# Patient Record
Sex: FEMALE | Race: WHITE | Employment: UNEMPLOYED | URBAN - METROPOLITAN AREA
[De-identification: names, ages, dates, MRNs, and addresses within clinical notes are randomized per-mention and may not be internally consistent; named-entity substitution may affect disease eponyms.]

---

## 2017-10-24 ENCOUNTER — HOSPITAL ENCOUNTER (OUTPATIENT)
Dept: WOMENS IMAGING | Age: 62
Discharge: HOME | End: 2017-10-24
Payer: COMMERCIAL

## 2017-10-24 DIAGNOSIS — Z12.31 ENCOUNTER FOR SCREENING MAMMOGRAM FOR BREAST CANCER: ICD-10-CM

## 2017-10-24 PROCEDURE — 77063 BREAST TOMOSYNTHESIS BI: CPT

## 2017-10-26 ENCOUNTER — HOSPITAL ENCOUNTER (OUTPATIENT)
Age: 62
Discharge: HOME | End: 2017-10-26
Payer: COMMERCIAL

## 2017-10-26 DIAGNOSIS — E78.5 HYPERLIPIDEMIA WITH TARGET LDL LESS THAN 70: Chronic | ICD-10-CM

## 2017-10-26 DIAGNOSIS — I10 HYPERTENSION, BENIGN: Chronic | ICD-10-CM

## 2017-10-26 DIAGNOSIS — E11.9 CONTROLLED TYPE 2 DIABETES MELLITUS WITHOUT COMPLICATION, WITHOUT LONG-TERM CURRENT USE OF INSULIN (HCC): Chronic | ICD-10-CM

## 2017-10-26 LAB
ALBUMIN SERPL-MCNC: 3.9 G/DL (ref 3.5–5.2)
ALBUMIN/GLOBULIN RATIO: ABNORMAL (ref 1–2.5)
ALP BLD-CCNC: 75 U/L (ref 35–104)
ALT SERPL-CCNC: 34 U/L (ref 5–33)
ANION GAP SERPL CALCULATED.3IONS-SCNC: 16 MMOL/L (ref 9–17)
AST SERPL-CCNC: 55 U/L
BILIRUB SERPL-MCNC: 0.27 MG/DL (ref 0.3–1.2)
BUN BLDV-MCNC: 19 MG/DL (ref 8–23)
BUN/CREAT BLD: ABNORMAL (ref 9–20)
CALCIUM SERPL-MCNC: 9.4 MG/DL (ref 8.6–10.4)
CHLORIDE BLD-SCNC: 100 MMOL/L (ref 98–107)
CHOLESTEROL/HDL RATIO: 2.6
CHOLESTEROL: 126 MG/DL
CO2: 28 MMOL/L (ref 20–31)
CREAT SERPL-MCNC: 0.9 MG/DL (ref 0.5–0.9)
CREATININE URINE: 121.4 MG/DL (ref 28–217)
GFR AFRICAN AMERICAN: >60 ML/MIN
GFR NON-AFRICAN AMERICAN: >60 ML/MIN
GFR SERPL CREATININE-BSD FRML MDRD: ABNORMAL ML/MIN/{1.73_M2}
GFR SERPL CREATININE-BSD FRML MDRD: ABNORMAL ML/MIN/{1.73_M2}
GLUCOSE BLD-MCNC: 117 MG/DL (ref 70–99)
HDLC SERPL-MCNC: 48 MG/DL
LDL CHOLESTEROL: 51 MG/DL (ref 0–130)
MICROALBUMIN/CREAT 24H UR: <12 MG/L
MICROALBUMIN/CREAT UR-RTO: 10 MCG/MG CREAT
POTASSIUM SERPL-SCNC: 5.3 MMOL/L (ref 3.7–5.3)
SODIUM BLD-SCNC: 144 MMOL/L (ref 135–144)
TOTAL PROTEIN: 7.9 G/DL (ref 6.4–8.3)
TRIGL SERPL-MCNC: 133 MG/DL
VLDLC SERPL CALC-MCNC: NORMAL MG/DL (ref 1–30)

## 2017-10-26 PROCEDURE — 82043 UR ALBUMIN QUANTITATIVE: CPT

## 2017-10-26 PROCEDURE — 36415 COLL VENOUS BLD VENIPUNCTURE: CPT

## 2017-10-26 PROCEDURE — 80061 LIPID PANEL: CPT

## 2017-10-26 PROCEDURE — 82570 ASSAY OF URINE CREATININE: CPT

## 2017-10-26 PROCEDURE — 80053 COMPREHEN METABOLIC PANEL: CPT

## 2018-04-25 ENCOUNTER — HOSPITAL ENCOUNTER (OUTPATIENT)
Dept: GENERAL RADIOLOGY | Age: 63
Discharge: HOME | End: 2018-04-27
Payer: COMMERCIAL

## 2018-04-25 ENCOUNTER — HOSPITAL ENCOUNTER (OUTPATIENT)
Age: 63
Discharge: HOME | End: 2018-04-27
Payer: COMMERCIAL

## 2018-04-25 ENCOUNTER — HOSPITAL ENCOUNTER (OUTPATIENT)
Age: 63
Discharge: HOME | End: 2018-04-25
Payer: COMMERCIAL

## 2018-04-25 DIAGNOSIS — G83.4 CAUDA EQUINA SYNDROME WITH NEUROGENIC BLADDER (HCC): Primary | ICD-10-CM

## 2018-04-25 DIAGNOSIS — G83.4 CAUDA EQUINA SYNDROME (HCC): ICD-10-CM

## 2018-04-25 LAB
-: ABNORMAL
ABSOLUTE EOS #: 0.4 K/UL (ref 0–0.4)
ABSOLUTE IMMATURE GRANULOCYTE: ABNORMAL K/UL (ref 0–0.3)
ABSOLUTE LYMPH #: 3.1 K/UL (ref 1–4.8)
ABSOLUTE MONO #: 0.8 K/UL (ref 0.1–1.3)
AMORPHOUS: ABNORMAL
ANION GAP SERPL CALCULATED.3IONS-SCNC: 15 MMOL/L (ref 9–17)
BACTERIA: ABNORMAL
BASOPHILS # BLD: 0 % (ref 0–2)
BASOPHILS ABSOLUTE: 0 K/UL (ref 0–0.2)
BILIRUBIN URINE: ABNORMAL
BUN BLDV-MCNC: 13 MG/DL (ref 8–23)
BUN/CREAT BLD: ABNORMAL (ref 9–20)
CALCIUM SERPL-MCNC: 8.8 MG/DL (ref 8.6–10.4)
CASTS UA: ABNORMAL /LPF
CHLORIDE BLD-SCNC: 98 MMOL/L (ref 98–107)
CO2: 27 MMOL/L (ref 20–31)
COLOR: YELLOW
COMMENT UA: ABNORMAL
CREAT SERPL-MCNC: 0.96 MG/DL (ref 0.5–0.9)
CRYSTALS, UA: ABNORMAL /HPF
DIFFERENTIAL TYPE: ABNORMAL
EKG ATRIAL RATE: 82 BPM
EKG P AXIS: 44 DEGREES
EKG P-R INTERVAL: 146 MS
EKG Q-T INTERVAL: 372 MS
EKG QRS DURATION: 74 MS
EKG QTC CALCULATION (BAZETT): 434 MS
EKG R AXIS: 38 DEGREES
EKG T AXIS: 48 DEGREES
EKG VENTRICULAR RATE: 82 BPM
EOSINOPHILS RELATIVE PERCENT: 3 % (ref 0–4)
EPITHELIAL CELLS UA: ABNORMAL /HPF
ESTIMATED AVERAGE GLUCOSE: 174 MG/DL
GFR AFRICAN AMERICAN: >60 ML/MIN
GFR NON-AFRICAN AMERICAN: 59 ML/MIN
GFR SERPL CREATININE-BSD FRML MDRD: ABNORMAL ML/MIN/{1.73_M2}
GFR SERPL CREATININE-BSD FRML MDRD: ABNORMAL ML/MIN/{1.73_M2}
GLUCOSE BLD-MCNC: 114 MG/DL (ref 70–99)
GLUCOSE URINE: NEGATIVE
HBA1C MFR BLD: 7.7 % (ref 4–6)
HCT VFR BLD CALC: 37.4 % (ref 36–46)
HEMOGLOBIN: 11.9 G/DL (ref 12–16)
IMMATURE GRANULOCYTES: ABNORMAL %
INR BLD: 1
KETONES, URINE: NEGATIVE
LEUKOCYTE ESTERASE, URINE: NEGATIVE
LYMPHOCYTES # BLD: 24 % (ref 24–44)
MCH RBC QN AUTO: 28.8 PG (ref 26–34)
MCHC RBC AUTO-ENTMCNC: 31.8 G/DL (ref 31–37)
MCV RBC AUTO: 90.6 FL (ref 80–100)
MONOCYTES # BLD: 6 % (ref 1–7)
MRSA, DNA, NASAL: NORMAL
MUCUS: ABNORMAL
NITRITE, URINE: NEGATIVE
NRBC AUTOMATED: ABNORMAL PER 100 WBC
OTHER OBSERVATIONS UA: ABNORMAL
PARTIAL THROMBOPLASTIN TIME: 26.4 SEC (ref 23–31)
PDW BLD-RTO: 15.2 % (ref 11.5–14.9)
PH UA: 6 (ref 5–8)
PLATELET # BLD: 442 K/UL (ref 150–450)
PLATELET ESTIMATE: ABNORMAL
PMV BLD AUTO: 8.3 FL (ref 6–12)
POTASSIUM SERPL-SCNC: 4.4 MMOL/L (ref 3.7–5.3)
PROTEIN UA: NEGATIVE
PROTHROMBIN TIME: 10.7 SEC (ref 9.7–12)
RBC # BLD: 4.13 M/UL (ref 4–5.2)
RBC # BLD: ABNORMAL 10*6/UL
RBC UA: ABNORMAL /HPF
RENAL EPITHELIAL, UA: ABNORMAL /HPF
SEG NEUTROPHILS: 67 % (ref 36–66)
SEGMENTED NEUTROPHILS ABSOLUTE COUNT: 8.6 K/UL (ref 1.3–9.1)
SODIUM BLD-SCNC: 140 MMOL/L (ref 135–144)
SPECIFIC GRAVITY UA: 1.02 (ref 1–1.03)
SPECIMEN DESCRIPTION: NORMAL
TRICHOMONAS: ABNORMAL
TURBIDITY: CLEAR
URINE HGB: NEGATIVE
UROBILINOGEN, URINE: NORMAL
WBC # BLD: 13 K/UL (ref 3.5–11)
WBC # BLD: ABNORMAL 10*3/UL
WBC UA: ABNORMAL /HPF
YEAST: ABNORMAL

## 2018-04-25 PROCEDURE — 85610 PROTHROMBIN TIME: CPT

## 2018-04-25 PROCEDURE — 87086 URINE CULTURE/COLONY COUNT: CPT

## 2018-04-25 PROCEDURE — 85025 COMPLETE CBC W/AUTO DIFF WBC: CPT

## 2018-04-25 PROCEDURE — 93005 ELECTROCARDIOGRAM TRACING: CPT

## 2018-04-25 PROCEDURE — 87641 MR-STAPH DNA AMP PROBE: CPT

## 2018-04-25 PROCEDURE — 83036 HEMOGLOBIN GLYCOSYLATED A1C: CPT

## 2018-04-25 PROCEDURE — 80048 BASIC METABOLIC PNL TOTAL CA: CPT

## 2018-04-25 PROCEDURE — 85730 THROMBOPLASTIN TIME PARTIAL: CPT

## 2018-04-25 PROCEDURE — 36415 COLL VENOUS BLD VENIPUNCTURE: CPT

## 2018-04-25 PROCEDURE — 71046 X-RAY EXAM CHEST 2 VIEWS: CPT

## 2018-04-25 PROCEDURE — 81001 URINALYSIS AUTO W/SCOPE: CPT

## 2018-04-26 LAB
CULTURE: NORMAL
CULTURE: NORMAL
Lab: NORMAL
SPECIMEN DESCRIPTION: NORMAL
SPECIMEN DESCRIPTION: NORMAL
STATUS: NORMAL

## 2018-05-17 ENCOUNTER — HOSPITAL ENCOUNTER (EMERGENCY)
Age: 63
Discharge: ANOTHER HOSPITAL | End: 2018-05-17
Attending: EMERGENCY MEDICINE
Payer: COMMERCIAL

## 2018-05-17 VITALS
SYSTOLIC BLOOD PRESSURE: 144 MMHG | HEART RATE: 81 BPM | DIASTOLIC BLOOD PRESSURE: 55 MMHG | BODY MASS INDEX: 55.17 KG/M2 | TEMPERATURE: 97.4 F | RESPIRATION RATE: 15 BRPM | WEIGHT: 281 LBS | OXYGEN SATURATION: 96 % | HEIGHT: 60 IN

## 2018-05-17 DIAGNOSIS — G97.82 POSTOPERATIVE CSF LEAK: Primary | ICD-10-CM

## 2018-05-17 DIAGNOSIS — G96.00 POSTOPERATIVE CSF LEAK: Primary | ICD-10-CM

## 2018-05-17 LAB
ABSOLUTE EOS #: 0.4 K/UL (ref 0–0.4)
ABSOLUTE IMMATURE GRANULOCYTE: ABNORMAL K/UL (ref 0–0.3)
ABSOLUTE LYMPH #: 3 K/UL (ref 1–4.8)
ABSOLUTE MONO #: 0.5 K/UL (ref 0.1–1.3)
ALBUMIN SERPL-MCNC: 3.5 G/DL (ref 3.5–5.2)
ALBUMIN/GLOBULIN RATIO: ABNORMAL (ref 1–2.5)
ALP BLD-CCNC: 103 U/L (ref 35–104)
ALT SERPL-CCNC: 26 U/L (ref 5–33)
ANION GAP SERPL CALCULATED.3IONS-SCNC: 18 MMOL/L (ref 9–17)
AST SERPL-CCNC: 30 U/L
BASOPHILS # BLD: 1 % (ref 0–2)
BASOPHILS ABSOLUTE: 0.1 K/UL (ref 0–0.2)
BILIRUB SERPL-MCNC: 0.18 MG/DL (ref 0.3–1.2)
BUN BLDV-MCNC: 17 MG/DL (ref 8–23)
BUN/CREAT BLD: ABNORMAL (ref 9–20)
CALCIUM SERPL-MCNC: 9.4 MG/DL (ref 8.6–10.4)
CHLORIDE BLD-SCNC: 98 MMOL/L (ref 98–107)
CO2: 21 MMOL/L (ref 20–31)
CREAT SERPL-MCNC: 0.99 MG/DL (ref 0.5–0.9)
DIFFERENTIAL TYPE: ABNORMAL
EOSINOPHILS RELATIVE PERCENT: 4 % (ref 0–4)
GFR AFRICAN AMERICAN: >60 ML/MIN
GFR NON-AFRICAN AMERICAN: 57 ML/MIN
GFR SERPL CREATININE-BSD FRML MDRD: ABNORMAL ML/MIN/{1.73_M2}
GFR SERPL CREATININE-BSD FRML MDRD: ABNORMAL ML/MIN/{1.73_M2}
GLUCOSE BLD-MCNC: 192 MG/DL (ref 70–99)
HCT VFR BLD CALC: 36.3 % (ref 36–46)
HEMOGLOBIN: 11.5 G/DL (ref 12–16)
IMMATURE GRANULOCYTES: ABNORMAL %
INR BLD: 1.1
LYMPHOCYTES # BLD: 27 % (ref 24–44)
MCH RBC QN AUTO: 29.4 PG (ref 26–34)
MCHC RBC AUTO-ENTMCNC: 31.7 G/DL (ref 31–37)
MCV RBC AUTO: 93 FL (ref 80–100)
MONOCYTES # BLD: 5 % (ref 1–7)
NRBC AUTOMATED: ABNORMAL PER 100 WBC
PARTIAL THROMBOPLASTIN TIME: 23.2 SEC (ref 23–31)
PDW BLD-RTO: 14.8 % (ref 11.5–14.9)
PLATELET # BLD: 326 K/UL (ref 150–450)
PLATELET ESTIMATE: ABNORMAL
PMV BLD AUTO: 8.3 FL (ref 6–12)
POTASSIUM SERPL-SCNC: 4.2 MMOL/L (ref 3.7–5.3)
PROTHROMBIN TIME: 11.2 SEC (ref 9.7–12)
RBC # BLD: 3.9 M/UL (ref 4–5.2)
RBC # BLD: ABNORMAL 10*6/UL
SEG NEUTROPHILS: 63 % (ref 36–66)
SEGMENTED NEUTROPHILS ABSOLUTE COUNT: 6.9 K/UL (ref 1.3–9.1)
SODIUM BLD-SCNC: 137 MMOL/L (ref 135–144)
TOTAL PROTEIN: 7.6 G/DL (ref 6.4–8.3)
WBC # BLD: 10.9 K/UL (ref 3.5–11)
WBC # BLD: ABNORMAL 10*3/UL

## 2018-05-17 PROCEDURE — 96366 THER/PROPH/DIAG IV INF ADDON: CPT

## 2018-05-17 PROCEDURE — 36415 COLL VENOUS BLD VENIPUNCTURE: CPT

## 2018-05-17 PROCEDURE — 85025 COMPLETE CBC W/AUTO DIFF WBC: CPT

## 2018-05-17 PROCEDURE — 6360000002 HC RX W HCPCS: Performed by: EMERGENCY MEDICINE

## 2018-05-17 PROCEDURE — 85610 PROTHROMBIN TIME: CPT

## 2018-05-17 PROCEDURE — 96365 THER/PROPH/DIAG IV INF INIT: CPT

## 2018-05-17 PROCEDURE — 80053 COMPREHEN METABOLIC PANEL: CPT

## 2018-05-17 PROCEDURE — 85730 THROMBOPLASTIN TIME PARTIAL: CPT

## 2018-05-17 PROCEDURE — 2580000003 HC RX 258: Performed by: EMERGENCY MEDICINE

## 2018-05-17 PROCEDURE — 99285 EMERGENCY DEPT VISIT HI MDM: CPT

## 2018-05-17 PROCEDURE — 96367 TX/PROPH/DG ADDL SEQ IV INF: CPT

## 2018-05-17 PROCEDURE — 96375 TX/PRO/DX INJ NEW DRUG ADDON: CPT

## 2018-05-17 RX ORDER — FENTANYL CITRATE 50 UG/ML
50 INJECTION, SOLUTION INTRAMUSCULAR; INTRAVENOUS ONCE
Status: COMPLETED | OUTPATIENT
Start: 2018-05-17 | End: 2018-05-17

## 2018-05-17 RX ORDER — 0.9 % SODIUM CHLORIDE 0.9 %
1000 INTRAVENOUS SOLUTION INTRAVENOUS ONCE
Status: COMPLETED | OUTPATIENT
Start: 2018-05-17 | End: 2018-05-17

## 2018-05-17 RX ORDER — ONDANSETRON 2 MG/ML
8 INJECTION INTRAMUSCULAR; INTRAVENOUS ONCE
Status: COMPLETED | OUTPATIENT
Start: 2018-05-17 | End: 2018-05-17

## 2018-05-17 RX ADMIN — CEFTRIAXONE SODIUM 1 G: 1 INJECTION, POWDER, FOR SOLUTION INTRAMUSCULAR; INTRAVENOUS at 06:17

## 2018-05-17 RX ADMIN — VANCOMYCIN HYDROCHLORIDE 1750 MG: 1 INJECTION, POWDER, LYOPHILIZED, FOR SOLUTION INTRAVENOUS at 06:34

## 2018-05-17 RX ADMIN — ONDANSETRON 8 MG: 2 INJECTION INTRAMUSCULAR; INTRAVENOUS at 08:09

## 2018-05-17 RX ADMIN — FENTANYL CITRATE 50 MCG: 50 INJECTION INTRAMUSCULAR; INTRAVENOUS at 05:53

## 2018-05-17 RX ADMIN — CEFTRIAXONE SODIUM 1 G: 1 INJECTION, POWDER, FOR SOLUTION INTRAMUSCULAR; INTRAVENOUS at 05:52

## 2018-05-17 RX ADMIN — SODIUM CHLORIDE 1000 ML: 9 INJECTION, SOLUTION INTRAVENOUS at 05:52

## 2018-05-17 ASSESSMENT — PAIN SCALES - GENERAL
PAINLEVEL_OUTOF10: 9
PAINLEVEL_OUTOF10: 7

## 2018-05-17 ASSESSMENT — ENCOUNTER SYMPTOMS
RESPIRATORY NEGATIVE: 1
COUGH: 0
ABDOMINAL PAIN: 0
GASTROINTESTINAL NEGATIVE: 1
BACK PAIN: 1
SHORTNESS OF BREATH: 0
EYES NEGATIVE: 1

## 2018-09-05 PROBLEM — M54.16 LUMBAR RADICULOPATHY: Status: ACTIVE | Noted: 2018-09-05

## 2019-08-29 ENCOUNTER — HOSPITAL ENCOUNTER (OUTPATIENT)
Age: 64
Discharge: HOME | End: 2019-08-29
Payer: COMMERCIAL

## 2019-08-29 DIAGNOSIS — E11.9 CONTROLLED TYPE 2 DIABETES MELLITUS WITHOUT COMPLICATION, WITHOUT LONG-TERM CURRENT USE OF INSULIN (HCC): ICD-10-CM

## 2019-08-29 LAB
ABSOLUTE EOS #: 0.4 K/UL (ref 0–0.4)
ABSOLUTE IMMATURE GRANULOCYTE: ABNORMAL K/UL (ref 0–0.3)
ABSOLUTE LYMPH #: 2.8 K/UL (ref 1–4.8)
ABSOLUTE MONO #: 0.6 K/UL (ref 0.1–1.3)
ALBUMIN SERPL-MCNC: 3.9 G/DL (ref 3.5–5.2)
ALBUMIN/GLOBULIN RATIO: ABNORMAL (ref 1–2.5)
ALP BLD-CCNC: 95 U/L (ref 35–104)
ALT SERPL-CCNC: 32 U/L (ref 5–33)
ANION GAP SERPL CALCULATED.3IONS-SCNC: 12 MMOL/L (ref 9–17)
AST SERPL-CCNC: 55 U/L
BASOPHILS # BLD: 1 % (ref 0–2)
BASOPHILS ABSOLUTE: 0.2 K/UL (ref 0–0.2)
BILIRUB SERPL-MCNC: 0.41 MG/DL (ref 0.3–1.2)
BUN BLDV-MCNC: 28 MG/DL (ref 8–23)
BUN/CREAT BLD: ABNORMAL (ref 9–20)
CALCIUM SERPL-MCNC: 9.8 MG/DL (ref 8.6–10.4)
CHLORIDE BLD-SCNC: 94 MMOL/L (ref 98–107)
CHOLESTEROL/HDL RATIO: 3
CHOLESTEROL: 134 MG/DL
CO2: 28 MMOL/L (ref 20–31)
CREAT SERPL-MCNC: 1.31 MG/DL (ref 0.5–0.9)
DIFFERENTIAL TYPE: ABNORMAL
EOSINOPHILS RELATIVE PERCENT: 3 % (ref 0–4)
GFR AFRICAN AMERICAN: 50 ML/MIN
GFR NON-AFRICAN AMERICAN: 41 ML/MIN
GFR SERPL CREATININE-BSD FRML MDRD: ABNORMAL ML/MIN/{1.73_M2}
GFR SERPL CREATININE-BSD FRML MDRD: ABNORMAL ML/MIN/{1.73_M2}
GLUCOSE BLD-MCNC: 143 MG/DL (ref 70–99)
HCT VFR BLD CALC: 35.8 % (ref 36–46)
HDLC SERPL-MCNC: 44 MG/DL
HEMOGLOBIN: 11.4 G/DL (ref 12–16)
IMMATURE GRANULOCYTES: ABNORMAL %
LDL CHOLESTEROL: 51 MG/DL (ref 0–130)
LYMPHOCYTES # BLD: 23 % (ref 24–44)
MCH RBC QN AUTO: 30.3 PG (ref 26–34)
MCHC RBC AUTO-ENTMCNC: 32 G/DL (ref 31–37)
MCV RBC AUTO: 94.7 FL (ref 80–100)
MONOCYTES # BLD: 5 % (ref 1–7)
NRBC AUTOMATED: ABNORMAL PER 100 WBC
PDW BLD-RTO: 14.1 % (ref 11.5–14.9)
PLATELET # BLD: 379 K/UL (ref 150–450)
PLATELET ESTIMATE: ABNORMAL
PMV BLD AUTO: 8.5 FL (ref 6–12)
POTASSIUM SERPL-SCNC: 5.9 MMOL/L (ref 3.7–5.3)
RBC # BLD: 3.78 M/UL (ref 4–5.2)
RBC # BLD: ABNORMAL 10*6/UL
SEG NEUTROPHILS: 68 % (ref 36–66)
SEGMENTED NEUTROPHILS ABSOLUTE COUNT: 8.1 K/UL (ref 1.3–9.1)
SODIUM BLD-SCNC: 134 MMOL/L (ref 135–144)
TOTAL PROTEIN: 7.5 G/DL (ref 6.4–8.3)
TRIGL SERPL-MCNC: 193 MG/DL
VLDLC SERPL CALC-MCNC: ABNORMAL MG/DL (ref 1–30)
WBC # BLD: 12 K/UL (ref 3.5–11)
WBC # BLD: ABNORMAL 10*3/UL

## 2019-08-29 PROCEDURE — 36415 COLL VENOUS BLD VENIPUNCTURE: CPT

## 2019-08-29 PROCEDURE — 80053 COMPREHEN METABOLIC PANEL: CPT

## 2019-08-29 PROCEDURE — 80061 LIPID PANEL: CPT

## 2019-08-29 PROCEDURE — 85025 COMPLETE CBC W/AUTO DIFF WBC: CPT

## 2019-10-07 ENCOUNTER — HOSPITAL ENCOUNTER (OUTPATIENT)
Age: 64
Discharge: HOME | End: 2019-10-07
Payer: COMMERCIAL

## 2019-10-07 DIAGNOSIS — E87.5 HYPERKALEMIA: ICD-10-CM

## 2019-10-07 DIAGNOSIS — E11.65 UNCONTROLLED TYPE 2 DIABETES MELLITUS WITH HYPERGLYCEMIA (HCC): ICD-10-CM

## 2019-10-07 LAB
ANION GAP SERPL CALCULATED.3IONS-SCNC: 12 MMOL/L (ref 9–17)
BUN BLDV-MCNC: 19 MG/DL (ref 8–23)
BUN/CREAT BLD: ABNORMAL (ref 9–20)
CALCIUM SERPL-MCNC: 9.7 MG/DL (ref 8.6–10.4)
CHLORIDE BLD-SCNC: 100 MMOL/L (ref 98–107)
CO2: 28 MMOL/L (ref 20–31)
CREAT SERPL-MCNC: 1.28 MG/DL (ref 0.5–0.9)
ESTIMATED AVERAGE GLUCOSE: 186 MG/DL
GFR AFRICAN AMERICAN: 51 ML/MIN
GFR NON-AFRICAN AMERICAN: 42 ML/MIN
GFR SERPL CREATININE-BSD FRML MDRD: ABNORMAL ML/MIN/{1.73_M2}
GFR SERPL CREATININE-BSD FRML MDRD: ABNORMAL ML/MIN/{1.73_M2}
GLUCOSE BLD-MCNC: 152 MG/DL (ref 70–99)
HBA1C MFR BLD: 8.1 % (ref 4–6)
POTASSIUM SERPL-SCNC: 6 MMOL/L (ref 3.7–5.3)
SODIUM BLD-SCNC: 140 MMOL/L (ref 135–144)

## 2019-10-07 PROCEDURE — 83036 HEMOGLOBIN GLYCOSYLATED A1C: CPT

## 2019-10-07 PROCEDURE — 36415 COLL VENOUS BLD VENIPUNCTURE: CPT

## 2019-10-07 PROCEDURE — 80048 BASIC METABOLIC PNL TOTAL CA: CPT

## 2019-10-09 ENCOUNTER — HOSPITAL ENCOUNTER (OUTPATIENT)
Age: 64
Discharge: HOME | End: 2019-10-09
Payer: COMMERCIAL

## 2019-10-09 DIAGNOSIS — E87.5 HYPERKALEMIA: ICD-10-CM

## 2019-10-09 LAB — POTASSIUM SERPL-SCNC: 6.1 MMOL/L (ref 3.7–5.3)

## 2019-10-09 PROCEDURE — 36415 COLL VENOUS BLD VENIPUNCTURE: CPT

## 2019-10-09 PROCEDURE — 84132 ASSAY OF SERUM POTASSIUM: CPT

## 2019-10-15 ENCOUNTER — HOSPITAL ENCOUNTER (INPATIENT)
Age: 64
LOS: 3 days | Discharge: HOME | DRG: 378 | End: 2019-10-18
Attending: EMERGENCY MEDICINE | Admitting: FAMILY MEDICINE
Payer: COMMERCIAL

## 2019-10-15 ENCOUNTER — APPOINTMENT (OUTPATIENT)
Dept: GENERAL RADIOLOGY | Age: 64
DRG: 378 | End: 2019-10-15
Payer: COMMERCIAL

## 2019-10-15 ENCOUNTER — APPOINTMENT (OUTPATIENT)
Dept: CT IMAGING | Age: 64
DRG: 378 | End: 2019-10-15
Payer: COMMERCIAL

## 2019-10-15 DIAGNOSIS — K92.1 MELENA: Primary | ICD-10-CM

## 2019-10-15 PROBLEM — K92.2 UPPER GI BLEED: Status: ACTIVE | Noted: 2019-10-15

## 2019-10-15 LAB
-: NORMAL
ABSOLUTE EOS #: 0.43 K/UL (ref 0–0.4)
ABSOLUTE IMMATURE GRANULOCYTE: ABNORMAL K/UL (ref 0–0.3)
ABSOLUTE LYMPH #: 5.18 K/UL (ref 1–4.8)
ABSOLUTE MONO #: 1.3 K/UL (ref 0.1–1.3)
ALBUMIN SERPL-MCNC: 3.7 G/DL (ref 3.5–5.2)
ALBUMIN/GLOBULIN RATIO: ABNORMAL (ref 1–2.5)
ALP BLD-CCNC: 74 U/L (ref 35–104)
ALT SERPL-CCNC: 31 U/L (ref 5–33)
ANION GAP SERPL CALCULATED.3IONS-SCNC: 17 MMOL/L (ref 9–17)
AST SERPL-CCNC: 30 U/L
BASOPHILS # BLD: 0 % (ref 0–2)
BASOPHILS ABSOLUTE: 0 K/UL (ref 0–0.2)
BILIRUB SERPL-MCNC: 0.29 MG/DL (ref 0.3–1.2)
BUN BLDV-MCNC: 56 MG/DL (ref 8–23)
BUN/CREAT BLD: ABNORMAL (ref 9–20)
CALCIUM SERPL-MCNC: 9.6 MG/DL (ref 8.6–10.4)
CHLORIDE BLD-SCNC: 101 MMOL/L (ref 98–107)
CO2: 24 MMOL/L (ref 20–31)
CREAT SERPL-MCNC: 1.27 MG/DL (ref 0.5–0.9)
DIFFERENTIAL TYPE: ABNORMAL
EOSINOPHILS RELATIVE PERCENT: 2 % (ref 0–4)
GFR AFRICAN AMERICAN: 52 ML/MIN
GFR NON-AFRICAN AMERICAN: 43 ML/MIN
GFR SERPL CREATININE-BSD FRML MDRD: ABNORMAL ML/MIN/{1.73_M2}
GFR SERPL CREATININE-BSD FRML MDRD: ABNORMAL ML/MIN/{1.73_M2}
GLUCOSE BLD-MCNC: 180 MG/DL (ref 70–99)
HCT VFR BLD CALC: 28.1 % (ref 36–46)
HEMOGLOBIN: 9 G/DL (ref 12–16)
IMMATURE GRANULOCYTES: ABNORMAL %
INR BLD: 1.1
LACTIC ACID: 3.5 MMOL/L (ref 0.5–2.2)
LIPASE: 52 U/L (ref 13–60)
LYMPHOCYTES # BLD: 24 % (ref 24–44)
MCH RBC QN AUTO: 30.1 PG (ref 26–34)
MCHC RBC AUTO-ENTMCNC: 32 G/DL (ref 31–37)
MCV RBC AUTO: 94.2 FL (ref 80–100)
MONOCYTES # BLD: 6 % (ref 1–7)
MORPHOLOGY: NORMAL
NRBC AUTOMATED: ABNORMAL PER 100 WBC
PARTIAL THROMBOPLASTIN TIME: 24.8 SEC (ref 24–36)
PDW BLD-RTO: 14.2 % (ref 11.5–14.9)
PLATELET # BLD: 439 K/UL (ref 150–450)
PLATELET ESTIMATE: ABNORMAL
PMV BLD AUTO: 8.4 FL (ref 6–12)
POTASSIUM SERPL-SCNC: 4.9 MMOL/L (ref 3.7–5.3)
PROTHROMBIN TIME: 14.2 SEC (ref 11.8–14.6)
RBC # BLD: 2.98 M/UL (ref 4–5.2)
RBC # BLD: ABNORMAL 10*6/UL
REASON FOR REJECTION: NORMAL
SEG NEUTROPHILS: 68 % (ref 36–66)
SEGMENTED NEUTROPHILS ABSOLUTE COUNT: 14.69 K/UL (ref 1.3–9.1)
SODIUM BLD-SCNC: 142 MMOL/L (ref 135–144)
TOTAL PROTEIN: 7.2 G/DL (ref 6.4–8.3)
TROPONIN INTERP: NORMAL
TROPONIN T: NORMAL NG/ML
TROPONIN, HIGH SENSITIVITY: 13 NG/L (ref 0–14)
WBC # BLD: 21.6 K/UL (ref 3.5–11)
WBC # BLD: ABNORMAL 10*3/UL
ZZ NTE CLEAN UP: ORDERED TEST: NORMAL
ZZ NTE WITH NAME CLEAN UP: SPECIMEN SOURCE: NORMAL

## 2019-10-15 PROCEDURE — 36415 COLL VENOUS BLD VENIPUNCTURE: CPT

## 2019-10-15 PROCEDURE — 85025 COMPLETE CBC W/AUTO DIFF WBC: CPT

## 2019-10-15 PROCEDURE — C9113 INJ PANTOPRAZOLE SODIUM, VIA: HCPCS | Performed by: EMERGENCY MEDICINE

## 2019-10-15 PROCEDURE — 85610 PROTHROMBIN TIME: CPT

## 2019-10-15 PROCEDURE — 86850 RBC ANTIBODY SCREEN: CPT

## 2019-10-15 PROCEDURE — 74176 CT ABD & PELVIS W/O CONTRAST: CPT

## 2019-10-15 PROCEDURE — 2580000003 HC RX 258: Performed by: EMERGENCY MEDICINE

## 2019-10-15 PROCEDURE — 6360000002 HC RX W HCPCS: Performed by: EMERGENCY MEDICINE

## 2019-10-15 PROCEDURE — 2060000000 HC ICU INTERMEDIATE R&B

## 2019-10-15 PROCEDURE — 2580000003 HC RX 258: Performed by: STUDENT IN AN ORGANIZED HEALTH CARE EDUCATION/TRAINING PROGRAM

## 2019-10-15 PROCEDURE — 83605 ASSAY OF LACTIC ACID: CPT

## 2019-10-15 PROCEDURE — 86900 BLOOD TYPING SEROLOGIC ABO: CPT

## 2019-10-15 PROCEDURE — 6360000002 HC RX W HCPCS: Performed by: STUDENT IN AN ORGANIZED HEALTH CARE EDUCATION/TRAINING PROGRAM

## 2019-10-15 PROCEDURE — 99285 EMERGENCY DEPT VISIT HI MDM: CPT

## 2019-10-15 PROCEDURE — 85730 THROMBOPLASTIN TIME PARTIAL: CPT

## 2019-10-15 PROCEDURE — 87040 BLOOD CULTURE FOR BACTERIA: CPT

## 2019-10-15 PROCEDURE — 86920 COMPATIBILITY TEST SPIN: CPT

## 2019-10-15 PROCEDURE — 93005 ELECTROCARDIOGRAM TRACING: CPT | Performed by: STUDENT IN AN ORGANIZED HEALTH CARE EDUCATION/TRAINING PROGRAM

## 2019-10-15 PROCEDURE — 81001 URINALYSIS AUTO W/SCOPE: CPT

## 2019-10-15 PROCEDURE — 71045 X-RAY EXAM CHEST 1 VIEW: CPT

## 2019-10-15 PROCEDURE — 80053 COMPREHEN METABOLIC PANEL: CPT

## 2019-10-15 PROCEDURE — 86901 BLOOD TYPING SEROLOGIC RH(D): CPT

## 2019-10-15 PROCEDURE — 83690 ASSAY OF LIPASE: CPT

## 2019-10-15 PROCEDURE — 84484 ASSAY OF TROPONIN QUANT: CPT

## 2019-10-15 RX ORDER — 0.9 % SODIUM CHLORIDE 0.9 %
1000 INTRAVENOUS SOLUTION INTRAVENOUS ONCE
Status: COMPLETED | OUTPATIENT
Start: 2019-10-15 | End: 2019-10-16

## 2019-10-15 RX ORDER — PANTOPRAZOLE SODIUM 40 MG/1
40 TABLET, DELAYED RELEASE ORAL
Status: DISCONTINUED | OUTPATIENT
Start: 2019-10-18 | End: 2019-10-15 | Stop reason: SDUPTHER

## 2019-10-15 RX ADMIN — PANTOPRAZOLE SODIUM 80 MG: 40 INJECTION, POWDER, FOR SOLUTION INTRAVENOUS at 23:32

## 2019-10-15 RX ADMIN — CEFTRIAXONE SODIUM 1 G: 1 INJECTION, POWDER, FOR SOLUTION INTRAMUSCULAR; INTRAVENOUS at 23:57

## 2019-10-15 RX ADMIN — SODIUM CHLORIDE 1000 ML: 9 INJECTION, SOLUTION INTRAVENOUS at 23:32

## 2019-10-15 RX ADMIN — SODIUM CHLORIDE 8 MG/HR: 9 INJECTION, SOLUTION INTRAVENOUS at 23:57

## 2019-10-16 ENCOUNTER — ANESTHESIA EVENT (OUTPATIENT)
Dept: ENDOSCOPY | Age: 64
DRG: 378 | End: 2019-10-16
Payer: COMMERCIAL

## 2019-10-16 ENCOUNTER — ANESTHESIA (OUTPATIENT)
Dept: ENDOSCOPY | Age: 64
DRG: 378 | End: 2019-10-16
Payer: COMMERCIAL

## 2019-10-16 VITALS — TEMPERATURE: 98.6 F | SYSTOLIC BLOOD PRESSURE: 130 MMHG | OXYGEN SATURATION: 100 % | DIASTOLIC BLOOD PRESSURE: 57 MMHG

## 2019-10-16 LAB
-: ABNORMAL
ABSOLUTE EOS #: 0.53 K/UL (ref 0–0.4)
ABSOLUTE IMMATURE GRANULOCYTE: ABNORMAL K/UL (ref 0–0.3)
ABSOLUTE LYMPH #: 3.89 K/UL (ref 1–4.8)
ABSOLUTE MONO #: 0.53 K/UL (ref 0.1–1.3)
AMORPHOUS: ABNORMAL
ANION GAP SERPL CALCULATED.3IONS-SCNC: 13 MMOL/L (ref 9–17)
BACTERIA: ABNORMAL
BASOPHILS # BLD: 0 % (ref 0–2)
BASOPHILS ABSOLUTE: 0 K/UL (ref 0–0.2)
BILIRUBIN URINE: ABNORMAL
BUN BLDV-MCNC: 48 MG/DL (ref 8–23)
BUN/CREAT BLD: ABNORMAL (ref 9–20)
CALCIUM SERPL-MCNC: 8.4 MG/DL (ref 8.6–10.4)
CASTS UA: ABNORMAL /LPF
CASTS UA: ABNORMAL /LPF
CHLORIDE BLD-SCNC: 104 MMOL/L (ref 98–107)
CO2: 23 MMOL/L (ref 20–31)
COLOR: YELLOW
COMMENT UA: ABNORMAL
CREAT SERPL-MCNC: 1.17 MG/DL (ref 0.5–0.9)
CRYSTALS, UA: ABNORMAL /HPF
DIFFERENTIAL TYPE: ABNORMAL
EKG ATRIAL RATE: 77 BPM
EKG P AXIS: 38 DEGREES
EKG P-R INTERVAL: 142 MS
EKG Q-T INTERVAL: 376 MS
EKG QRS DURATION: 70 MS
EKG QTC CALCULATION (BAZETT): 425 MS
EKG R AXIS: 38 DEGREES
EKG T AXIS: 70 DEGREES
EKG VENTRICULAR RATE: 77 BPM
EOSINOPHILS RELATIVE PERCENT: 3 % (ref 0–4)
EPITHELIAL CELLS UA: ABNORMAL /HPF
ESTIMATED AVERAGE GLUCOSE: 192 MG/DL
GFR AFRICAN AMERICAN: 57 ML/MIN
GFR NON-AFRICAN AMERICAN: 47 ML/MIN
GFR SERPL CREATININE-BSD FRML MDRD: ABNORMAL ML/MIN/{1.73_M2}
GFR SERPL CREATININE-BSD FRML MDRD: ABNORMAL ML/MIN/{1.73_M2}
GLUCOSE BLD-MCNC: 147 MG/DL (ref 65–105)
GLUCOSE BLD-MCNC: 160 MG/DL (ref 65–105)
GLUCOSE BLD-MCNC: 174 MG/DL (ref 65–105)
GLUCOSE BLD-MCNC: 194 MG/DL (ref 65–105)
GLUCOSE BLD-MCNC: 199 MG/DL (ref 70–99)
GLUCOSE BLD-MCNC: 209 MG/DL (ref 65–105)
GLUCOSE URINE: NEGATIVE
HBA1C MFR BLD: 8.3 % (ref 4–6)
HCT VFR BLD CALC: 23.8 % (ref 36–46)
HCT VFR BLD CALC: 23.8 % (ref 36–46)
HCT VFR BLD CALC: 25 % (ref 36–46)
HEMOGLOBIN: 7.5 G/DL (ref 12–16)
HEMOGLOBIN: 7.6 G/DL (ref 12–16)
HEMOGLOBIN: 7.8 G/DL (ref 12–16)
IMMATURE GRANULOCYTES: ABNORMAL %
KETONES, URINE: NEGATIVE
LACTIC ACID, WHOLE BLOOD: ABNORMAL MMOL/L (ref 0.7–2.1)
LACTIC ACID: 2.4 MMOL/L (ref 0.5–2.2)
LACTIC ACID: 2.5 MMOL/L (ref 0.5–2.2)
LEUKOCYTE ESTERASE, URINE: NEGATIVE
LYMPHOCYTES # BLD: 22 % (ref 24–44)
MCH RBC QN AUTO: 29.4 PG (ref 26–34)
MCHC RBC AUTO-ENTMCNC: 31.1 G/DL (ref 31–37)
MCV RBC AUTO: 94.4 FL (ref 80–100)
MONOCYTES # BLD: 3 % (ref 1–7)
MORPHOLOGY: NORMAL
MUCUS: ABNORMAL
NITRITE, URINE: NEGATIVE
NRBC AUTOMATED: ABNORMAL PER 100 WBC
OTHER OBSERVATIONS UA: ABNORMAL
PDW BLD-RTO: 14.4 % (ref 11.5–14.9)
PH UA: 5 (ref 5–8)
PLATELET # BLD: 382 K/UL (ref 150–450)
PLATELET ESTIMATE: ABNORMAL
PMV BLD AUTO: 8.3 FL (ref 6–12)
POTASSIUM SERPL-SCNC: 5.3 MMOL/L (ref 3.7–5.3)
PROTEIN UA: NEGATIVE
RBC # BLD: 2.64 M/UL (ref 4–5.2)
RBC # BLD: ABNORMAL 10*6/UL
RBC UA: ABNORMAL /HPF
RENAL EPITHELIAL, UA: ABNORMAL /HPF
SEG NEUTROPHILS: 72 % (ref 36–66)
SEGMENTED NEUTROPHILS ABSOLUTE COUNT: 12.75 K/UL (ref 1.3–9.1)
SODIUM BLD-SCNC: 140 MMOL/L (ref 135–144)
SPECIFIC GRAVITY UA: 1.02 (ref 1–1.03)
TRICHOMONAS: ABNORMAL
TURBIDITY: CLEAR
URINE HGB: NEGATIVE
UROBILINOGEN, URINE: NORMAL
WBC # BLD: 17.7 K/UL (ref 3.5–11)
WBC # BLD: ABNORMAL 10*3/UL
WBC UA: ABNORMAL /HPF
YEAST: ABNORMAL

## 2019-10-16 PROCEDURE — 88342 IMHCHEM/IMCYTCHM 1ST ANTB: CPT

## 2019-10-16 PROCEDURE — 83036 HEMOGLOBIN GLYCOSYLATED A1C: CPT

## 2019-10-16 PROCEDURE — 7100000000 HC PACU RECOVERY - FIRST 15 MIN: Performed by: INTERNAL MEDICINE

## 2019-10-16 PROCEDURE — 43239 EGD BIOPSY SINGLE/MULTIPLE: CPT | Performed by: INTERNAL MEDICINE

## 2019-10-16 PROCEDURE — 88305 TISSUE EXAM BY PATHOLOGIST: CPT

## 2019-10-16 PROCEDURE — 2580000003 HC RX 258: Performed by: INTERNAL MEDICINE

## 2019-10-16 PROCEDURE — 85018 HEMOGLOBIN: CPT

## 2019-10-16 PROCEDURE — 2709999900 HC NON-CHARGEABLE SUPPLY: Performed by: INTERNAL MEDICINE

## 2019-10-16 PROCEDURE — 6360000002 HC RX W HCPCS: Performed by: INTERNAL MEDICINE

## 2019-10-16 PROCEDURE — 6360000002 HC RX W HCPCS: Performed by: FAMILY MEDICINE

## 2019-10-16 PROCEDURE — 2580000003 HC RX 258: Performed by: FAMILY MEDICINE

## 2019-10-16 PROCEDURE — 82947 ASSAY GLUCOSE BLOOD QUANT: CPT

## 2019-10-16 PROCEDURE — 2060000000 HC ICU INTERMEDIATE R&B

## 2019-10-16 PROCEDURE — 99222 1ST HOSP IP/OBS MODERATE 55: CPT | Performed by: INTERNAL MEDICINE

## 2019-10-16 PROCEDURE — 3609012400 HC EGD TRANSORAL BIOPSY SINGLE/MULTIPLE: Performed by: INTERNAL MEDICINE

## 2019-10-16 PROCEDURE — 3700000001 HC ADD 15 MINUTES (ANESTHESIA): Performed by: INTERNAL MEDICINE

## 2019-10-16 PROCEDURE — 83605 ASSAY OF LACTIC ACID: CPT

## 2019-10-16 PROCEDURE — 7100000001 HC PACU RECOVERY - ADDTL 15 MIN: Performed by: INTERNAL MEDICINE

## 2019-10-16 PROCEDURE — C9113 INJ PANTOPRAZOLE SODIUM, VIA: HCPCS | Performed by: INTERNAL MEDICINE

## 2019-10-16 PROCEDURE — 6360000002 HC RX W HCPCS: Performed by: NURSE ANESTHETIST, CERTIFIED REGISTERED

## 2019-10-16 PROCEDURE — 87086 URINE CULTURE/COLONY COUNT: CPT

## 2019-10-16 PROCEDURE — 0DB68ZX EXCISION OF STOMACH, VIA NATURAL OR ARTIFICIAL OPENING ENDOSCOPIC, DIAGNOSTIC: ICD-10-PCS | Performed by: INTERNAL MEDICINE

## 2019-10-16 PROCEDURE — 80048 BASIC METABOLIC PNL TOTAL CA: CPT

## 2019-10-16 PROCEDURE — 93010 ELECTROCARDIOGRAM REPORT: CPT | Performed by: INTERNAL MEDICINE

## 2019-10-16 PROCEDURE — 2500000003 HC RX 250 WO HCPCS: Performed by: NURSE ANESTHETIST, CERTIFIED REGISTERED

## 2019-10-16 PROCEDURE — 6370000000 HC RX 637 (ALT 250 FOR IP): Performed by: INTERNAL MEDICINE

## 2019-10-16 PROCEDURE — C9113 INJ PANTOPRAZOLE SODIUM, VIA: HCPCS | Performed by: FAMILY MEDICINE

## 2019-10-16 PROCEDURE — 3700000000 HC ANESTHESIA ATTENDED CARE: Performed by: INTERNAL MEDICINE

## 2019-10-16 PROCEDURE — 36415 COLL VENOUS BLD VENIPUNCTURE: CPT

## 2019-10-16 PROCEDURE — 85025 COMPLETE CBC W/AUTO DIFF WBC: CPT

## 2019-10-16 PROCEDURE — 2580000003 HC RX 258: Performed by: ANESTHESIOLOGY

## 2019-10-16 PROCEDURE — 85014 HEMATOCRIT: CPT

## 2019-10-16 PROCEDURE — 6370000000 HC RX 637 (ALT 250 FOR IP): Performed by: FAMILY MEDICINE

## 2019-10-16 RX ORDER — FUROSEMIDE 20 MG/1
20 TABLET ORAL DAILY
Status: DISCONTINUED | OUTPATIENT
Start: 2019-10-16 | End: 2019-10-18 | Stop reason: HOSPADM

## 2019-10-16 RX ORDER — PANTOPRAZOLE SODIUM 40 MG/10ML
40 INJECTION, POWDER, LYOPHILIZED, FOR SOLUTION INTRAVENOUS EVERY 12 HOURS
Status: DISCONTINUED | OUTPATIENT
Start: 2019-10-18 | End: 2019-10-18 | Stop reason: HOSPADM

## 2019-10-16 RX ORDER — ACETAMINOPHEN 325 MG/1
650 TABLET ORAL EVERY 4 HOURS PRN
Status: DISCONTINUED | OUTPATIENT
Start: 2019-10-16 | End: 2019-10-18 | Stop reason: HOSPADM

## 2019-10-16 RX ORDER — NICOTINE POLACRILEX 4 MG
15 LOZENGE BUCCAL PRN
Status: DISCONTINUED | OUTPATIENT
Start: 2019-10-16 | End: 2019-10-18 | Stop reason: HOSPADM

## 2019-10-16 RX ORDER — GLIMEPIRIDE 4 MG/1
8 TABLET ORAL
Status: DISCONTINUED | OUTPATIENT
Start: 2019-10-17 | End: 2019-10-18 | Stop reason: HOSPADM

## 2019-10-16 RX ORDER — M-VIT,TX,IRON,MINS/CALC/FOLIC 27MG-0.4MG
1 TABLET ORAL NIGHTLY
Status: DISCONTINUED | OUTPATIENT
Start: 2019-10-16 | End: 2019-10-18 | Stop reason: HOSPADM

## 2019-10-16 RX ORDER — PREGABALIN 150 MG/1
150 CAPSULE ORAL NIGHTLY
Status: DISCONTINUED | OUTPATIENT
Start: 2019-10-16 | End: 2019-10-18 | Stop reason: HOSPADM

## 2019-10-16 RX ORDER — LIDOCAINE HYDROCHLORIDE 10 MG/ML
INJECTION, SOLUTION EPIDURAL; INFILTRATION; INTRACAUDAL; PERINEURAL PRN
Status: DISCONTINUED | OUTPATIENT
Start: 2019-10-16 | End: 2019-10-16 | Stop reason: SDUPTHER

## 2019-10-16 RX ORDER — DEXTROSE MONOHYDRATE 50 MG/ML
100 INJECTION, SOLUTION INTRAVENOUS PRN
Status: DISCONTINUED | OUTPATIENT
Start: 2019-10-16 | End: 2019-10-18 | Stop reason: HOSPADM

## 2019-10-16 RX ORDER — SODIUM CHLORIDE 0.9 % (FLUSH) 0.9 %
10 SYRINGE (ML) INJECTION PRN
Status: DISCONTINUED | OUTPATIENT
Start: 2019-10-16 | End: 2019-10-18 | Stop reason: HOSPADM

## 2019-10-16 RX ORDER — SODIUM CHLORIDE 9 MG/ML
INJECTION, SOLUTION INTRAVENOUS CONTINUOUS
Status: DISCONTINUED | OUTPATIENT
Start: 2019-10-16 | End: 2019-10-16

## 2019-10-16 RX ORDER — DEXTROSE MONOHYDRATE 25 G/50ML
12.5 INJECTION, SOLUTION INTRAVENOUS PRN
Status: DISCONTINUED | OUTPATIENT
Start: 2019-10-16 | End: 2019-10-18 | Stop reason: HOSPADM

## 2019-10-16 RX ORDER — 0.9 % SODIUM CHLORIDE 0.9 %
10 VIAL (ML) INJECTION EVERY 12 HOURS
Status: DISCONTINUED | OUTPATIENT
Start: 2019-10-18 | End: 2019-10-18 | Stop reason: HOSPADM

## 2019-10-16 RX ORDER — CETIRIZINE HYDROCHLORIDE 10 MG/1
10 TABLET ORAL DAILY
Status: DISCONTINUED | OUTPATIENT
Start: 2019-10-16 | End: 2019-10-18 | Stop reason: HOSPADM

## 2019-10-16 RX ORDER — SODIUM CHLORIDE 0.9 % (FLUSH) 0.9 %
10 SYRINGE (ML) INJECTION EVERY 12 HOURS SCHEDULED
Status: DISCONTINUED | OUTPATIENT
Start: 2019-10-16 | End: 2019-10-18 | Stop reason: HOSPADM

## 2019-10-16 RX ORDER — OXYCODONE HYDROCHLORIDE AND ACETAMINOPHEN 5; 325 MG/1; MG/1
2 TABLET ORAL EVERY 6 HOURS PRN
Status: DISCONTINUED | OUTPATIENT
Start: 2019-10-16 | End: 2019-10-18 | Stop reason: HOSPADM

## 2019-10-16 RX ORDER — ONDANSETRON 2 MG/ML
4 INJECTION INTRAMUSCULAR; INTRAVENOUS EVERY 6 HOURS PRN
Status: DISCONTINUED | OUTPATIENT
Start: 2019-10-16 | End: 2019-10-18 | Stop reason: HOSPADM

## 2019-10-16 RX ORDER — VENLAFAXINE HYDROCHLORIDE 37.5 MG/1
37.5 CAPSULE, EXTENDED RELEASE ORAL DAILY
Status: DISCONTINUED | OUTPATIENT
Start: 2019-10-16 | End: 2019-10-18 | Stop reason: HOSPADM

## 2019-10-16 RX ORDER — ATORVASTATIN CALCIUM 40 MG/1
40 TABLET, FILM COATED ORAL NIGHTLY
Status: DISCONTINUED | OUTPATIENT
Start: 2019-10-16 | End: 2019-10-18 | Stop reason: HOSPADM

## 2019-10-16 RX ORDER — DEXTROSE AND SODIUM CHLORIDE 5; .9 G/100ML; G/100ML
INJECTION, SOLUTION INTRAVENOUS CONTINUOUS
Status: DISCONTINUED | OUTPATIENT
Start: 2019-10-16 | End: 2019-10-17

## 2019-10-16 RX ORDER — PREGABALIN 150 MG/1
300 CAPSULE ORAL DAILY
Status: DISCONTINUED | OUTPATIENT
Start: 2019-10-16 | End: 2019-10-18 | Stop reason: HOSPADM

## 2019-10-16 RX ORDER — OXYCODONE HYDROCHLORIDE 10 MG/1
30 TABLET ORAL EVERY 8 HOURS SCHEDULED
Status: DISCONTINUED | OUTPATIENT
Start: 2019-10-16 | End: 2019-10-18 | Stop reason: HOSPADM

## 2019-10-16 RX ORDER — PROPOFOL 10 MG/ML
INJECTION, EMULSION INTRAVENOUS PRN
Status: DISCONTINUED | OUTPATIENT
Start: 2019-10-16 | End: 2019-10-16 | Stop reason: SDUPTHER

## 2019-10-16 RX ORDER — POLYETHYLENE GLYCOL 3350 17 G/17G
238 POWDER, FOR SOLUTION ORAL ONCE
Status: COMPLETED | OUTPATIENT
Start: 2019-10-16 | End: 2019-10-16

## 2019-10-16 RX ORDER — AMLODIPINE BESYLATE 10 MG/1
10 TABLET ORAL DAILY
Status: DISCONTINUED | OUTPATIENT
Start: 2019-10-16 | End: 2019-10-18 | Stop reason: HOSPADM

## 2019-10-16 RX ORDER — INSULIN GLARGINE 100 [IU]/ML
20 INJECTION, SOLUTION SUBCUTANEOUS NIGHTLY
Status: DISCONTINUED | OUTPATIENT
Start: 2019-10-16 | End: 2019-10-18 | Stop reason: HOSPADM

## 2019-10-16 RX ORDER — PSEUDOEPHEDRINE HCL 120 MG/1
120 TABLET, FILM COATED, EXTENDED RELEASE ORAL DAILY
Status: DISCONTINUED | OUTPATIENT
Start: 2019-10-16 | End: 2019-10-18 | Stop reason: HOSPADM

## 2019-10-16 RX ADMIN — HYDROMORPHONE HYDROCHLORIDE 0.25 MG: 1 INJECTION, SOLUTION INTRAMUSCULAR; INTRAVENOUS; SUBCUTANEOUS at 13:13

## 2019-10-16 RX ADMIN — BISACODYL 20 MG: 5 TABLET, COATED ORAL at 13:08

## 2019-10-16 RX ADMIN — ATORVASTATIN CALCIUM 40 MG: 40 TABLET, FILM COATED ORAL at 21:26

## 2019-10-16 RX ADMIN — INSULIN LISPRO 2 UNITS: 100 INJECTION, SOLUTION INTRAVENOUS; SUBCUTANEOUS at 21:23

## 2019-10-16 RX ADMIN — PREGABALIN 150 MG: 150 CAPSULE ORAL at 21:24

## 2019-10-16 RX ADMIN — CETIRIZINE HYDROCHLORIDE 10 MG: 10 TABLET, FILM COATED ORAL at 14:17

## 2019-10-16 RX ADMIN — DEXTROSE AND SODIUM CHLORIDE: 5; 900 INJECTION, SOLUTION INTRAVENOUS at 02:00

## 2019-10-16 RX ADMIN — INSULIN GLARGINE 20 UNITS: 100 INJECTION, SOLUTION SUBCUTANEOUS at 21:23

## 2019-10-16 RX ADMIN — DEXTROSE AND SODIUM CHLORIDE: 5; 900 INJECTION, SOLUTION INTRAVENOUS at 17:29

## 2019-10-16 RX ADMIN — VENLAFAXINE HYDROCHLORIDE 37.5 MG: 37.5 CAPSULE, EXTENDED RELEASE ORAL at 15:47

## 2019-10-16 RX ADMIN — Medication 1250 MG: at 17:33

## 2019-10-16 RX ADMIN — FUROSEMIDE 20 MG: 20 TABLET ORAL at 14:00

## 2019-10-16 RX ADMIN — DICLOFENAC 2 G: 10 GEL TOPICAL at 14:29

## 2019-10-16 RX ADMIN — SODIUM CHLORIDE: 9 INJECTION, SOLUTION INTRAVENOUS at 09:50

## 2019-10-16 RX ADMIN — INSULIN LISPRO 2 UNITS: 100 INJECTION, SOLUTION INTRAVENOUS; SUBCUTANEOUS at 13:58

## 2019-10-16 RX ADMIN — AMLODIPINE BESYLATE 10 MG: 10 TABLET ORAL at 14:00

## 2019-10-16 RX ADMIN — INSULIN LISPRO 1 UNITS: 100 INJECTION, SOLUTION INTRAVENOUS; SUBCUTANEOUS at 17:47

## 2019-10-16 RX ADMIN — SODIUM CHLORIDE 8 MG/HR: 9 INJECTION, SOLUTION INTRAVENOUS at 08:52

## 2019-10-16 RX ADMIN — PROPOFOL 100 MG: 10 INJECTION, EMULSION INTRAVENOUS at 10:40

## 2019-10-16 RX ADMIN — OXYCODONE HYDROCHLORIDE 30 MG: 10 TABLET ORAL at 15:34

## 2019-10-16 RX ADMIN — MULTIPLE VITAMINS W/ MINERALS TAB 1 TABLET: TAB at 21:26

## 2019-10-16 RX ADMIN — POLYETHYLENE GLYCOL 3350 238 G: 17 POWDER, FOR SOLUTION ORAL at 13:13

## 2019-10-16 RX ADMIN — PREGABALIN 300 MG: 150 CAPSULE ORAL at 14:00

## 2019-10-16 RX ADMIN — DEXTROSE AND SODIUM CHLORIDE: 5; 900 INJECTION, SOLUTION INTRAVENOUS at 23:38

## 2019-10-16 RX ADMIN — LIDOCAINE HYDROCHLORIDE 50 MG: 10 INJECTION, SOLUTION EPIDURAL; INFILTRATION; INTRACAUDAL at 10:40

## 2019-10-16 RX ADMIN — DEXTROSE AND SODIUM CHLORIDE: 5; 900 INJECTION, SOLUTION INTRAVENOUS at 08:07

## 2019-10-16 RX ADMIN — PSEUDOEPHEDRINE HYDROCHLORIDE 120 MG: 120 TABLET, FILM COATED, EXTENDED RELEASE ORAL at 15:47

## 2019-10-16 RX ADMIN — SODIUM CHLORIDE 8 MG/HR: 9 INJECTION, SOLUTION INTRAVENOUS at 23:39

## 2019-10-16 RX ADMIN — OXYCODONE HYDROCHLORIDE 30 MG: 10 TABLET ORAL at 21:26

## 2019-10-16 ASSESSMENT — PAIN SCALES - GENERAL
PAINLEVEL_OUTOF10: 6
PAINLEVEL_OUTOF10: 7
PAINLEVEL_OUTOF10: 0
PAINLEVEL_OUTOF10: 3
PAINLEVEL_OUTOF10: 6

## 2019-10-16 ASSESSMENT — LIFESTYLE VARIABLES: SMOKING_STATUS: 0

## 2019-10-16 ASSESSMENT — PULMONARY FUNCTION TESTS
PIF_VALUE: 1

## 2019-10-16 ASSESSMENT — ENCOUNTER SYMPTOMS
EYE PAIN: 0
BLOOD IN STOOL: 1
NAUSEA: 1
SINUS PAIN: 0
DIARRHEA: 0
VOMITING: 1
ABDOMINAL PAIN: 1
RHINORRHEA: 0
COUGH: 0
EYE REDNESS: 0
WHEEZING: 0
DYSPNEA ACTIVITY LEVEL: NO INTERVAL CHANGE
SHORTNESS OF BREATH: 1
SHORTNESS OF BREATH: 1
APNEA: 0
STRIDOR: 0

## 2019-10-17 ENCOUNTER — ANESTHESIA EVENT (OUTPATIENT)
Dept: ENDOSCOPY | Age: 64
DRG: 378 | End: 2019-10-17
Payer: COMMERCIAL

## 2019-10-17 ENCOUNTER — ANESTHESIA (OUTPATIENT)
Dept: ENDOSCOPY | Age: 64
DRG: 378 | End: 2019-10-17
Payer: COMMERCIAL

## 2019-10-17 VITALS — DIASTOLIC BLOOD PRESSURE: 69 MMHG | OXYGEN SATURATION: 99 % | SYSTOLIC BLOOD PRESSURE: 152 MMHG

## 2019-10-17 PROBLEM — G89.4 CHRONIC PAIN SYNDROME: Chronic | Status: ACTIVE | Noted: 2019-10-17

## 2019-10-17 PROBLEM — D72.829 LEUKOCYTOSIS: Status: ACTIVE | Noted: 2019-10-17

## 2019-10-17 LAB
ABSOLUTE EOS #: 0.4 K/UL (ref 0–0.4)
ABSOLUTE IMMATURE GRANULOCYTE: ABNORMAL K/UL (ref 0–0.3)
ABSOLUTE LYMPH #: 2.9 K/UL (ref 1–4.8)
ABSOLUTE MONO #: 0.7 K/UL (ref 0.1–1.3)
ANION GAP SERPL CALCULATED.3IONS-SCNC: 11 MMOL/L (ref 9–17)
BASOPHILS # BLD: 1 % (ref 0–2)
BASOPHILS ABSOLUTE: 0.1 K/UL (ref 0–0.2)
BUN BLDV-MCNC: 19 MG/DL (ref 8–23)
BUN/CREAT BLD: ABNORMAL (ref 9–20)
CALCIUM SERPL-MCNC: 8 MG/DL (ref 8.6–10.4)
CHLORIDE BLD-SCNC: 105 MMOL/L (ref 98–107)
CO2: 23 MMOL/L (ref 20–31)
CREAT SERPL-MCNC: 0.94 MG/DL (ref 0.5–0.9)
CULTURE: NO GROWTH
DIFFERENTIAL TYPE: ABNORMAL
EOSINOPHILS RELATIVE PERCENT: 3 % (ref 0–4)
GFR AFRICAN AMERICAN: >60 ML/MIN
GFR NON-AFRICAN AMERICAN: >60 ML/MIN
GFR SERPL CREATININE-BSD FRML MDRD: ABNORMAL ML/MIN/{1.73_M2}
GFR SERPL CREATININE-BSD FRML MDRD: ABNORMAL ML/MIN/{1.73_M2}
GLUCOSE BLD-MCNC: 155 MG/DL (ref 65–105)
GLUCOSE BLD-MCNC: 159 MG/DL (ref 65–105)
GLUCOSE BLD-MCNC: 164 MG/DL (ref 65–105)
GLUCOSE BLD-MCNC: 178 MG/DL (ref 65–105)
GLUCOSE BLD-MCNC: 186 MG/DL (ref 65–105)
GLUCOSE BLD-MCNC: 190 MG/DL (ref 65–105)
GLUCOSE BLD-MCNC: 199 MG/DL (ref 70–99)
GLUCOSE BLD-MCNC: 244 MG/DL (ref 65–105)
HCT VFR BLD CALC: 20.7 % (ref 36–46)
HCT VFR BLD CALC: 22.7 % (ref 36–46)
HCT VFR BLD CALC: 23 % (ref 36–46)
HCT VFR BLD CALC: 25.9 % (ref 36–46)
HEMOGLOBIN: 6.5 G/DL (ref 12–16)
HEMOGLOBIN: 7.2 G/DL (ref 12–16)
HEMOGLOBIN: 7.4 G/DL (ref 12–16)
HEMOGLOBIN: 8.2 G/DL (ref 12–16)
IMMATURE GRANULOCYTES: ABNORMAL %
LYMPHOCYTES # BLD: 22 % (ref 24–44)
Lab: NORMAL
MCH RBC QN AUTO: 29.9 PG (ref 26–34)
MCHC RBC AUTO-ENTMCNC: 31.9 G/DL (ref 31–37)
MCV RBC AUTO: 93.8 FL (ref 80–100)
MONOCYTES # BLD: 6 % (ref 1–7)
NRBC AUTOMATED: ABNORMAL PER 100 WBC
PDW BLD-RTO: 14.5 % (ref 11.5–14.9)
PLATELET # BLD: 327 K/UL (ref 150–450)
PLATELET ESTIMATE: ABNORMAL
PMV BLD AUTO: 7.9 FL (ref 6–12)
POTASSIUM SERPL-SCNC: 4.7 MMOL/L (ref 3.7–5.3)
RBC # BLD: 2.42 M/UL (ref 4–5.2)
RBC # BLD: ABNORMAL 10*6/UL
SEG NEUTROPHILS: 68 % (ref 36–66)
SEGMENTED NEUTROPHILS ABSOLUTE COUNT: 9.1 K/UL (ref 1.3–9.1)
SODIUM BLD-SCNC: 139 MMOL/L (ref 135–144)
SPECIMEN DESCRIPTION: NORMAL
WBC # BLD: 13.2 K/UL (ref 3.5–11)
WBC # BLD: ABNORMAL 10*3/UL

## 2019-10-17 PROCEDURE — 82947 ASSAY GLUCOSE BLOOD QUANT: CPT

## 2019-10-17 PROCEDURE — 2580000003 HC RX 258: Performed by: ANESTHESIOLOGY

## 2019-10-17 PROCEDURE — 2580000003 HC RX 258: Performed by: INTERNAL MEDICINE

## 2019-10-17 PROCEDURE — 36415 COLL VENOUS BLD VENIPUNCTURE: CPT

## 2019-10-17 PROCEDURE — 6360000002 HC RX W HCPCS: Performed by: INTERNAL MEDICINE

## 2019-10-17 PROCEDURE — 6370000000 HC RX 637 (ALT 250 FOR IP): Performed by: INTERNAL MEDICINE

## 2019-10-17 PROCEDURE — 99223 1ST HOSP IP/OBS HIGH 75: CPT | Performed by: FAMILY MEDICINE

## 2019-10-17 PROCEDURE — 45378 DIAGNOSTIC COLONOSCOPY: CPT | Performed by: INTERNAL MEDICINE

## 2019-10-17 PROCEDURE — 2580000003 HC RX 258: Performed by: FAMILY MEDICINE

## 2019-10-17 PROCEDURE — 2500000003 HC RX 250 WO HCPCS: Performed by: NURSE ANESTHETIST, CERTIFIED REGISTERED

## 2019-10-17 PROCEDURE — 3609027000 HC COLONOSCOPY: Performed by: INTERNAL MEDICINE

## 2019-10-17 PROCEDURE — 36430 TRANSFUSION BLD/BLD COMPNT: CPT

## 2019-10-17 PROCEDURE — 85025 COMPLETE CBC W/AUTO DIFF WBC: CPT

## 2019-10-17 PROCEDURE — 80048 BASIC METABOLIC PNL TOTAL CA: CPT

## 2019-10-17 PROCEDURE — 2060000000 HC ICU INTERMEDIATE R&B

## 2019-10-17 PROCEDURE — P9016 RBC LEUKOCYTES REDUCED: HCPCS

## 2019-10-17 PROCEDURE — 3700000000 HC ANESTHESIA ATTENDED CARE: Performed by: INTERNAL MEDICINE

## 2019-10-17 PROCEDURE — 7100000000 HC PACU RECOVERY - FIRST 15 MIN: Performed by: INTERNAL MEDICINE

## 2019-10-17 PROCEDURE — 86900 BLOOD TYPING SEROLOGIC ABO: CPT

## 2019-10-17 PROCEDURE — 0DJD8ZZ INSPECTION OF LOWER INTESTINAL TRACT, VIA NATURAL OR ARTIFICIAL OPENING ENDOSCOPIC: ICD-10-PCS | Performed by: INTERNAL MEDICINE

## 2019-10-17 PROCEDURE — C9113 INJ PANTOPRAZOLE SODIUM, VIA: HCPCS | Performed by: INTERNAL MEDICINE

## 2019-10-17 PROCEDURE — 85014 HEMATOCRIT: CPT

## 2019-10-17 PROCEDURE — 7100000001 HC PACU RECOVERY - ADDTL 15 MIN: Performed by: INTERNAL MEDICINE

## 2019-10-17 PROCEDURE — 6360000002 HC RX W HCPCS: Performed by: NURSE ANESTHETIST, CERTIFIED REGISTERED

## 2019-10-17 PROCEDURE — 2709999900 HC NON-CHARGEABLE SUPPLY: Performed by: INTERNAL MEDICINE

## 2019-10-17 PROCEDURE — 99232 SBSQ HOSP IP/OBS MODERATE 35: CPT | Performed by: INTERNAL MEDICINE

## 2019-10-17 PROCEDURE — 85018 HEMOGLOBIN: CPT

## 2019-10-17 PROCEDURE — 3700000001 HC ADD 15 MINUTES (ANESTHESIA): Performed by: INTERNAL MEDICINE

## 2019-10-17 PROCEDURE — 6370000000 HC RX 637 (ALT 250 FOR IP): Performed by: FAMILY MEDICINE

## 2019-10-17 RX ORDER — 0.9 % SODIUM CHLORIDE 0.9 %
250 INTRAVENOUS SOLUTION INTRAVENOUS ONCE
Status: COMPLETED | OUTPATIENT
Start: 2019-10-17 | End: 2019-10-17

## 2019-10-17 RX ORDER — ONDANSETRON 2 MG/ML
4 INJECTION INTRAMUSCULAR; INTRAVENOUS
Status: DISCONTINUED | OUTPATIENT
Start: 2019-10-17 | End: 2019-10-17 | Stop reason: HOSPADM

## 2019-10-17 RX ORDER — LIDOCAINE HYDROCHLORIDE 10 MG/ML
INJECTION, SOLUTION EPIDURAL; INFILTRATION; INTRACAUDAL; PERINEURAL PRN
Status: DISCONTINUED | OUTPATIENT
Start: 2019-10-17 | End: 2019-10-17 | Stop reason: SDUPTHER

## 2019-10-17 RX ORDER — PROPOFOL 10 MG/ML
INJECTION, EMULSION INTRAVENOUS PRN
Status: DISCONTINUED | OUTPATIENT
Start: 2019-10-17 | End: 2019-10-17 | Stop reason: SDUPTHER

## 2019-10-17 RX ORDER — SODIUM CHLORIDE 9 MG/ML
INJECTION, SOLUTION INTRAVENOUS CONTINUOUS
Status: DISCONTINUED | OUTPATIENT
Start: 2019-10-17 | End: 2019-10-17

## 2019-10-17 RX ORDER — SODIUM PHOSPHATE, DIBASIC AND SODIUM PHOSPHATE, MONOBASIC 7; 19 G/133ML; G/133ML
1 ENEMA RECTAL ONCE
Status: COMPLETED | OUTPATIENT
Start: 2019-10-17 | End: 2019-10-17

## 2019-10-17 RX ORDER — MEPERIDINE HYDROCHLORIDE 25 MG/ML
12.5 INJECTION INTRAMUSCULAR; INTRAVENOUS; SUBCUTANEOUS EVERY 5 MIN PRN
Status: DISCONTINUED | OUTPATIENT
Start: 2019-10-17 | End: 2019-10-17 | Stop reason: HOSPADM

## 2019-10-17 RX ORDER — FUROSEMIDE 10 MG/ML
20 INJECTION INTRAMUSCULAR; INTRAVENOUS ONCE
Status: COMPLETED | OUTPATIENT
Start: 2019-10-17 | End: 2019-10-17

## 2019-10-17 RX ORDER — MORPHINE SULFATE 2 MG/ML
2 INJECTION, SOLUTION INTRAMUSCULAR; INTRAVENOUS EVERY 5 MIN PRN
Status: DISCONTINUED | OUTPATIENT
Start: 2019-10-17 | End: 2019-10-17 | Stop reason: HOSPADM

## 2019-10-17 RX ORDER — DIPHENHYDRAMINE HYDROCHLORIDE 50 MG/ML
12.5 INJECTION INTRAMUSCULAR; INTRAVENOUS
Status: DISCONTINUED | OUTPATIENT
Start: 2019-10-17 | End: 2019-10-17 | Stop reason: HOSPADM

## 2019-10-17 RX ORDER — LABETALOL 20 MG/4 ML (5 MG/ML) INTRAVENOUS SYRINGE
5 EVERY 10 MIN PRN
Status: DISCONTINUED | OUTPATIENT
Start: 2019-10-17 | End: 2019-10-17 | Stop reason: HOSPADM

## 2019-10-17 RX ADMIN — FUROSEMIDE 20 MG: 20 TABLET ORAL at 09:01

## 2019-10-17 RX ADMIN — INSULIN LISPRO 2 UNITS: 100 INJECTION, SOLUTION INTRAVENOUS; SUBCUTANEOUS at 21:00

## 2019-10-17 RX ADMIN — SODIUM CHLORIDE: 9 INJECTION, SOLUTION INTRAVENOUS at 13:20

## 2019-10-17 RX ADMIN — INSULIN GLARGINE 20 UNITS: 100 INJECTION, SOLUTION SUBCUTANEOUS at 21:00

## 2019-10-17 RX ADMIN — OXYCODONE HYDROCHLORIDE 30 MG: 10 TABLET ORAL at 20:58

## 2019-10-17 RX ADMIN — PROPOFOL 300 MG: 10 INJECTION, EMULSION INTRAVENOUS at 13:27

## 2019-10-17 RX ADMIN — MULTIPLE VITAMINS W/ MINERALS TAB 1 TABLET: TAB at 20:58

## 2019-10-17 RX ADMIN — FUROSEMIDE 20 MG: 10 INJECTION, SOLUTION INTRAMUSCULAR; INTRAVENOUS at 20:58

## 2019-10-17 RX ADMIN — PREGABALIN 150 MG: 150 CAPSULE ORAL at 20:59

## 2019-10-17 RX ADMIN — PSEUDOEPHEDRINE HYDROCHLORIDE 120 MG: 120 TABLET, FILM COATED, EXTENDED RELEASE ORAL at 09:10

## 2019-10-17 RX ADMIN — SODIUM CHLORIDE 250 ML: 0.9 INJECTION, SOLUTION INTRAVENOUS at 20:57

## 2019-10-17 RX ADMIN — GLIMEPIRIDE 8 MG: 4 TABLET ORAL at 06:06

## 2019-10-17 RX ADMIN — SODIUM PHOSPHATE 1 ENEMA: 7; 19 ENEMA RECTAL at 09:01

## 2019-10-17 RX ADMIN — SODIUM CHLORIDE 8 MG/HR: 9 INJECTION, SOLUTION INTRAVENOUS at 09:01

## 2019-10-17 RX ADMIN — DEXTROSE AND SODIUM CHLORIDE: 5; 900 INJECTION, SOLUTION INTRAVENOUS at 06:05

## 2019-10-17 RX ADMIN — CETIRIZINE HYDROCHLORIDE 10 MG: 10 TABLET, FILM COATED ORAL at 09:00

## 2019-10-17 RX ADMIN — PREGABALIN 300 MG: 150 CAPSULE ORAL at 09:10

## 2019-10-17 RX ADMIN — DEXTROSE AND SODIUM CHLORIDE: 5; 900 INJECTION, SOLUTION INTRAVENOUS at 14:57

## 2019-10-17 RX ADMIN — INSULIN LISPRO 2 UNITS: 100 INJECTION, SOLUTION INTRAVENOUS; SUBCUTANEOUS at 17:59

## 2019-10-17 RX ADMIN — Medication 1250 MG: at 14:42

## 2019-10-17 RX ADMIN — OXYCODONE HYDROCHLORIDE 30 MG: 10 TABLET ORAL at 14:59

## 2019-10-17 RX ADMIN — OXYCODONE HYDROCHLORIDE 30 MG: 10 TABLET ORAL at 06:06

## 2019-10-17 RX ADMIN — AMLODIPINE BESYLATE 10 MG: 10 TABLET ORAL at 09:00

## 2019-10-17 RX ADMIN — LIDOCAINE HYDROCHLORIDE 50 MG: 10 INJECTION, SOLUTION EPIDURAL; INFILTRATION; INTRACAUDAL at 13:27

## 2019-10-17 RX ADMIN — VENLAFAXINE HYDROCHLORIDE 37.5 MG: 37.5 CAPSULE, EXTENDED RELEASE ORAL at 09:10

## 2019-10-17 RX ADMIN — ATORVASTATIN CALCIUM 40 MG: 40 TABLET, FILM COATED ORAL at 20:58

## 2019-10-17 RX ADMIN — Medication 1250 MG: at 02:32

## 2019-10-17 RX ADMIN — SODIUM PHOSPHATE 1 ENEMA: 7; 19 ENEMA RECTAL at 10:53

## 2019-10-17 RX ADMIN — INSULIN LISPRO 1 UNITS: 100 INJECTION, SOLUTION INTRAVENOUS; SUBCUTANEOUS at 09:05

## 2019-10-17 ASSESSMENT — PULMONARY FUNCTION TESTS
PIF_VALUE: 0

## 2019-10-17 ASSESSMENT — ENCOUNTER SYMPTOMS
STRIDOR: 0
SHORTNESS OF BREATH: 1
DYSPNEA ACTIVITY LEVEL: NO INTERVAL CHANGE

## 2019-10-17 ASSESSMENT — LIFESTYLE VARIABLES: SMOKING_STATUS: 0

## 2019-10-17 ASSESSMENT — PAIN SCALES - GENERAL
PAINLEVEL_OUTOF10: 0
PAINLEVEL_OUTOF10: 5

## 2019-10-17 ASSESSMENT — PAIN - FUNCTIONAL ASSESSMENT: PAIN_FUNCTIONAL_ASSESSMENT: 0-10

## 2019-10-18 VITALS
WEIGHT: 293 LBS | DIASTOLIC BLOOD PRESSURE: 56 MMHG | SYSTOLIC BLOOD PRESSURE: 154 MMHG | HEART RATE: 77 BPM | RESPIRATION RATE: 17 BRPM | HEIGHT: 60 IN | BODY MASS INDEX: 57.52 KG/M2 | OXYGEN SATURATION: 94 % | TEMPERATURE: 98 F

## 2019-10-18 LAB
ABSOLUTE EOS #: 0.3 K/UL (ref 0–0.4)
ABSOLUTE IMMATURE GRANULOCYTE: ABNORMAL K/UL (ref 0–0.3)
ABSOLUTE LYMPH #: 2.5 K/UL (ref 1–4.8)
ABSOLUTE MONO #: 0.7 K/UL (ref 0.1–1.3)
ANION GAP SERPL CALCULATED.3IONS-SCNC: 14 MMOL/L (ref 9–17)
BASOPHILS # BLD: 1 % (ref 0–2)
BASOPHILS ABSOLUTE: 0.2 K/UL (ref 0–0.2)
BUN BLDV-MCNC: 13 MG/DL (ref 8–23)
BUN/CREAT BLD: ABNORMAL (ref 9–20)
CALCIUM SERPL-MCNC: 7.7 MG/DL (ref 8.6–10.4)
CHLORIDE BLD-SCNC: 102 MMOL/L (ref 98–107)
CO2: 22 MMOL/L (ref 20–31)
CREAT SERPL-MCNC: 0.97 MG/DL (ref 0.5–0.9)
DIFFERENTIAL TYPE: ABNORMAL
EOSINOPHILS RELATIVE PERCENT: 3 % (ref 0–4)
GFR AFRICAN AMERICAN: >60 ML/MIN
GFR NON-AFRICAN AMERICAN: 58 ML/MIN
GFR SERPL CREATININE-BSD FRML MDRD: ABNORMAL ML/MIN/{1.73_M2}
GFR SERPL CREATININE-BSD FRML MDRD: ABNORMAL ML/MIN/{1.73_M2}
GLUCOSE BLD-MCNC: 140 MG/DL (ref 65–105)
GLUCOSE BLD-MCNC: 152 MG/DL (ref 70–99)
GLUCOSE BLD-MCNC: 173 MG/DL (ref 65–105)
HCT VFR BLD CALC: 27.1 % (ref 36–46)
HCT VFR BLD CALC: 27.6 % (ref 36–46)
HCT VFR BLD CALC: 27.6 % (ref 36–46)
HEMOGLOBIN: 8.7 G/DL (ref 12–16)
HEMOGLOBIN: 9 G/DL (ref 12–16)
HEMOGLOBIN: 9.1 G/DL (ref 12–16)
IMMATURE GRANULOCYTES: ABNORMAL %
LYMPHOCYTES # BLD: 21 % (ref 24–44)
MCH RBC QN AUTO: 29.4 PG (ref 26–34)
MCHC RBC AUTO-ENTMCNC: 32.6 G/DL (ref 31–37)
MCV RBC AUTO: 90.3 FL (ref 80–100)
MONOCYTES # BLD: 6 % (ref 1–7)
NRBC AUTOMATED: ABNORMAL PER 100 WBC
PDW BLD-RTO: 15.2 % (ref 11.5–14.9)
PLATELET # BLD: 293 K/UL (ref 150–450)
PLATELET ESTIMATE: ABNORMAL
PMV BLD AUTO: 8 FL (ref 6–12)
POTASSIUM SERPL-SCNC: 3.7 MMOL/L (ref 3.7–5.3)
RBC # BLD: 3.05 M/UL (ref 4–5.2)
RBC # BLD: ABNORMAL 10*6/UL
SEG NEUTROPHILS: 69 % (ref 36–66)
SEGMENTED NEUTROPHILS ABSOLUTE COUNT: 8.5 K/UL (ref 1.3–9.1)
SODIUM BLD-SCNC: 138 MMOL/L (ref 135–144)
SURGICAL PATHOLOGY REPORT: NORMAL
WBC # BLD: 12.2 K/UL (ref 3.5–11)
WBC # BLD: ABNORMAL 10*3/UL

## 2019-10-18 PROCEDURE — 2580000003 HC RX 258: Performed by: INTERNAL MEDICINE

## 2019-10-18 PROCEDURE — 82947 ASSAY GLUCOSE BLOOD QUANT: CPT

## 2019-10-18 PROCEDURE — 80048 BASIC METABOLIC PNL TOTAL CA: CPT

## 2019-10-18 PROCEDURE — 85014 HEMATOCRIT: CPT

## 2019-10-18 PROCEDURE — 85018 HEMOGLOBIN: CPT

## 2019-10-18 PROCEDURE — 85025 COMPLETE CBC W/AUTO DIFF WBC: CPT

## 2019-10-18 PROCEDURE — 6370000000 HC RX 637 (ALT 250 FOR IP): Performed by: INTERNAL MEDICINE

## 2019-10-18 PROCEDURE — 99233 SBSQ HOSP IP/OBS HIGH 50: CPT | Performed by: INTERNAL MEDICINE

## 2019-10-18 PROCEDURE — 36415 COLL VENOUS BLD VENIPUNCTURE: CPT

## 2019-10-18 PROCEDURE — 6360000002 HC RX W HCPCS: Performed by: INTERNAL MEDICINE

## 2019-10-18 PROCEDURE — C9113 INJ PANTOPRAZOLE SODIUM, VIA: HCPCS | Performed by: INTERNAL MEDICINE

## 2019-10-18 RX ORDER — DOXYCYCLINE 100 MG/1
100 CAPSULE ORAL EVERY 12 HOURS SCHEDULED
Status: DISCONTINUED | OUTPATIENT
Start: 2019-10-18 | End: 2019-10-18

## 2019-10-18 RX ORDER — DOXYCYCLINE 100 MG/1
100 CAPSULE ORAL EVERY 12 HOURS SCHEDULED
Qty: 10 CAPSULE | Refills: 0 | Status: SHIPPED | OUTPATIENT
Start: 2019-10-18 | End: 2019-11-21

## 2019-10-18 RX ORDER — DOXYCYCLINE 100 MG/1
100 CAPSULE ORAL EVERY 12 HOURS SCHEDULED
Status: DISCONTINUED | OUTPATIENT
Start: 2019-10-18 | End: 2019-10-18 | Stop reason: HOSPADM

## 2019-10-18 RX ADMIN — INSULIN LISPRO 1 UNITS: 100 INJECTION, SOLUTION INTRAVENOUS; SUBCUTANEOUS at 08:50

## 2019-10-18 RX ADMIN — Medication 1250 MG: at 02:19

## 2019-10-18 RX ADMIN — SODIUM CHLORIDE 8 MG/HR: 9 INJECTION, SOLUTION INTRAVENOUS at 00:01

## 2019-10-18 RX ADMIN — SODIUM CHLORIDE 8 MG/HR: 9 INJECTION, SOLUTION INTRAVENOUS at 07:43

## 2019-10-18 RX ADMIN — VENLAFAXINE HYDROCHLORIDE 37.5 MG: 37.5 CAPSULE, EXTENDED RELEASE ORAL at 08:49

## 2019-10-18 RX ADMIN — OXYCODONE HYDROCHLORIDE 30 MG: 10 TABLET ORAL at 13:16

## 2019-10-18 RX ADMIN — AMLODIPINE BESYLATE 10 MG: 10 TABLET ORAL at 08:46

## 2019-10-18 RX ADMIN — CETIRIZINE HYDROCHLORIDE 10 MG: 10 TABLET, FILM COATED ORAL at 08:47

## 2019-10-18 RX ADMIN — OXYCODONE HYDROCHLORIDE 30 MG: 10 TABLET ORAL at 05:34

## 2019-10-18 RX ADMIN — Medication 10 ML: at 08:49

## 2019-10-18 RX ADMIN — GLIMEPIRIDE 8 MG: 4 TABLET ORAL at 05:34

## 2019-10-18 RX ADMIN — PREGABALIN 300 MG: 150 CAPSULE ORAL at 08:46

## 2019-10-18 RX ADMIN — OXYCODONE HYDROCHLORIDE AND ACETAMINOPHEN 2 TABLET: 5; 325 TABLET ORAL at 08:46

## 2019-10-18 RX ADMIN — PSEUDOEPHEDRINE HYDROCHLORIDE 120 MG: 120 TABLET, FILM COATED, EXTENDED RELEASE ORAL at 08:49

## 2019-10-18 ASSESSMENT — PAIN SCALES - GENERAL
PAINLEVEL_OUTOF10: 6
PAINLEVEL_OUTOF10: 7
PAINLEVEL_OUTOF10: 8

## 2019-10-21 LAB
ABO/RH: NORMAL
ANTIBODY SCREEN: NEGATIVE
ARM BAND NUMBER: NORMAL
BLD PROD TYP BPU: NORMAL
BLD PROD TYP BPU: NORMAL
CROSSMATCH RESULT: NORMAL
CROSSMATCH RESULT: NORMAL
DISPENSE STATUS BLOOD BANK: NORMAL
DISPENSE STATUS BLOOD BANK: NORMAL
EXPIRATION DATE: NORMAL
TRANSFUSION STATUS: NORMAL
TRANSFUSION STATUS: NORMAL
UNIT DIVISION: 0
UNIT DIVISION: 0
UNIT NUMBER: NORMAL
UNIT NUMBER: NORMAL

## 2019-10-22 LAB
CULTURE: NORMAL
CULTURE: NORMAL
Lab: NORMAL
Lab: NORMAL
SPECIMEN DESCRIPTION: NORMAL
SPECIMEN DESCRIPTION: NORMAL

## 2019-11-21 ENCOUNTER — APPOINTMENT (OUTPATIENT)
Dept: GENERAL RADIOLOGY | Age: 64
End: 2019-11-21
Payer: COMMERCIAL

## 2019-11-21 ENCOUNTER — DIRECT ADMIT ORDERS (OUTPATIENT)
Dept: INTERNAL MEDICINE CLINIC | Age: 64
End: 2019-11-21

## 2019-11-21 ENCOUNTER — APPOINTMENT (OUTPATIENT)
Dept: CT IMAGING | Age: 64
End: 2019-11-21
Payer: COMMERCIAL

## 2019-11-21 ENCOUNTER — HOSPITAL ENCOUNTER (EMERGENCY)
Age: 64
Discharge: ANOTHER HOSPITAL | End: 2019-11-22
Attending: EMERGENCY MEDICINE
Payer: COMMERCIAL

## 2019-11-21 DIAGNOSIS — I50.9 ACUTE ON CHRONIC CONGESTIVE HEART FAILURE, UNSPECIFIED HEART FAILURE TYPE (HCC): Primary | ICD-10-CM

## 2019-11-21 LAB
ABSOLUTE EOS #: 0.2 K/UL (ref 0–0.4)
ABSOLUTE IMMATURE GRANULOCYTE: ABNORMAL K/UL (ref 0–0.3)
ABSOLUTE LYMPH #: 1.8 K/UL (ref 1–4.8)
ABSOLUTE MONO #: 0.6 K/UL (ref 0.1–1.3)
ALBUMIN SERPL-MCNC: 3.9 G/DL (ref 3.5–5.2)
ALBUMIN/GLOBULIN RATIO: ABNORMAL (ref 1–2.5)
ALP BLD-CCNC: 107 U/L (ref 35–104)
ALT SERPL-CCNC: 30 U/L (ref 5–33)
ANION GAP SERPL CALCULATED.3IONS-SCNC: 17 MMOL/L (ref 9–17)
AST SERPL-CCNC: 46 U/L
BASOPHILS # BLD: 1 % (ref 0–2)
BASOPHILS ABSOLUTE: 0.1 K/UL (ref 0–0.2)
BILIRUB SERPL-MCNC: 0.23 MG/DL (ref 0.3–1.2)
BNP INTERPRETATION: NORMAL
BUN BLDV-MCNC: 21 MG/DL (ref 8–23)
BUN/CREAT BLD: ABNORMAL (ref 9–20)
CALCIUM SERPL-MCNC: 8.9 MG/DL (ref 8.6–10.4)
CHLORIDE BLD-SCNC: 96 MMOL/L (ref 98–107)
CO2: 25 MMOL/L (ref 20–31)
CREAT SERPL-MCNC: 1.22 MG/DL (ref 0.5–0.9)
DIFFERENTIAL TYPE: ABNORMAL
EOSINOPHILS RELATIVE PERCENT: 1 % (ref 0–4)
GFR AFRICAN AMERICAN: 54 ML/MIN
GFR NON-AFRICAN AMERICAN: 45 ML/MIN
GFR SERPL CREATININE-BSD FRML MDRD: ABNORMAL ML/MIN/{1.73_M2}
GFR SERPL CREATININE-BSD FRML MDRD: ABNORMAL ML/MIN/{1.73_M2}
GLUCOSE BLD-MCNC: 265 MG/DL (ref 70–99)
HCT VFR BLD CALC: 29.9 % (ref 36–46)
HEMOGLOBIN: 9 G/DL (ref 12–16)
IMMATURE GRANULOCYTES: ABNORMAL %
LYMPHOCYTES # BLD: 12 % (ref 24–44)
MCH RBC QN AUTO: 27 PG (ref 26–34)
MCHC RBC AUTO-ENTMCNC: 30.1 G/DL (ref 31–37)
MCV RBC AUTO: 89.5 FL (ref 80–100)
MONOCYTES # BLD: 4 % (ref 1–7)
NRBC AUTOMATED: ABNORMAL PER 100 WBC
PDW BLD-RTO: 16.3 % (ref 11.5–14.9)
PLATELET # BLD: 460 K/UL (ref 150–450)
PLATELET ESTIMATE: ABNORMAL
PMV BLD AUTO: 8.9 FL (ref 6–12)
POTASSIUM SERPL-SCNC: 4.7 MMOL/L (ref 3.7–5.3)
PRO-BNP: 228 PG/ML
RBC # BLD: 3.34 M/UL (ref 4–5.2)
RBC # BLD: ABNORMAL 10*6/UL
SEG NEUTROPHILS: 82 % (ref 36–66)
SEGMENTED NEUTROPHILS ABSOLUTE COUNT: 12 K/UL (ref 1.3–9.1)
SODIUM BLD-SCNC: 138 MMOL/L (ref 135–144)
TOTAL PROTEIN: 7.9 G/DL (ref 6.4–8.3)
TROPONIN INTERP: NORMAL
TROPONIN INTERP: NORMAL
TROPONIN T: NORMAL NG/ML
TROPONIN T: NORMAL NG/ML
TROPONIN, HIGH SENSITIVITY: 11 NG/L (ref 0–14)
TROPONIN, HIGH SENSITIVITY: 12 NG/L (ref 0–14)
WBC # BLD: 14.6 K/UL (ref 3.5–11)
WBC # BLD: ABNORMAL 10*3/UL

## 2019-11-21 PROCEDURE — 94761 N-INVAS EAR/PLS OXIMETRY MLT: CPT

## 2019-11-21 PROCEDURE — 94660 CPAP INITIATION&MGMT: CPT

## 2019-11-21 PROCEDURE — 84484 ASSAY OF TROPONIN QUANT: CPT

## 2019-11-21 PROCEDURE — 85025 COMPLETE CBC W/AUTO DIFF WBC: CPT

## 2019-11-21 PROCEDURE — 94640 AIRWAY INHALATION TREATMENT: CPT

## 2019-11-21 PROCEDURE — 99285 EMERGENCY DEPT VISIT HI MDM: CPT

## 2019-11-21 PROCEDURE — 71045 X-RAY EXAM CHEST 1 VIEW: CPT

## 2019-11-21 PROCEDURE — 96374 THER/PROPH/DIAG INJ IV PUSH: CPT

## 2019-11-21 PROCEDURE — 80053 COMPREHEN METABOLIC PANEL: CPT

## 2019-11-21 PROCEDURE — 36415 COLL VENOUS BLD VENIPUNCTURE: CPT

## 2019-11-21 PROCEDURE — 96375 TX/PRO/DX INJ NEW DRUG ADDON: CPT

## 2019-11-21 PROCEDURE — 93005 ELECTROCARDIOGRAM TRACING: CPT | Performed by: EMERGENCY MEDICINE

## 2019-11-21 PROCEDURE — 2700000000 HC OXYGEN THERAPY PER DAY

## 2019-11-21 PROCEDURE — 6370000000 HC RX 637 (ALT 250 FOR IP)

## 2019-11-21 PROCEDURE — 83880 ASSAY OF NATRIURETIC PEPTIDE: CPT

## 2019-11-21 PROCEDURE — 6360000002 HC RX W HCPCS: Performed by: EMERGENCY MEDICINE

## 2019-11-21 RX ORDER — DEXTROSE MONOHYDRATE 25 G/50ML
12.5 INJECTION, SOLUTION INTRAVENOUS PRN
Status: CANCELLED | OUTPATIENT
Start: 2019-11-21

## 2019-11-21 RX ORDER — FUROSEMIDE 10 MG/ML
40 INJECTION INTRAMUSCULAR; INTRAVENOUS ONCE
Status: COMPLETED | OUTPATIENT
Start: 2019-11-21 | End: 2019-11-21

## 2019-11-21 RX ORDER — SODIUM CHLORIDE 0.9 % (FLUSH) 0.9 %
10 SYRINGE (ML) INJECTION PRN
Status: DISCONTINUED | OUTPATIENT
Start: 2019-11-21 | End: 2019-11-22 | Stop reason: HOSPADM

## 2019-11-21 RX ORDER — NICOTINE 21 MG/24HR
1 PATCH, TRANSDERMAL 24 HOURS TRANSDERMAL DAILY PRN
Status: CANCELLED | OUTPATIENT
Start: 2019-11-21

## 2019-11-21 RX ORDER — CARVEDILOL 3.12 MG/1
3.12 TABLET ORAL 2 TIMES DAILY WITH MEALS
Status: CANCELLED | OUTPATIENT
Start: 2019-11-22

## 2019-11-21 RX ORDER — NICOTINE POLACRILEX 4 MG
15 LOZENGE BUCCAL PRN
Status: CANCELLED | OUTPATIENT
Start: 2019-11-21

## 2019-11-21 RX ORDER — LISINOPRIL 20 MG/1
20 TABLET ORAL NIGHTLY
Status: ON HOLD | COMMUNITY
End: 2019-11-22 | Stop reason: SINTOL

## 2019-11-21 RX ORDER — ONDANSETRON 2 MG/ML
4 INJECTION INTRAMUSCULAR; INTRAVENOUS EVERY 6 HOURS PRN
Status: CANCELLED | OUTPATIENT
Start: 2019-11-21

## 2019-11-21 RX ORDER — IPRATROPIUM BROMIDE AND ALBUTEROL SULFATE 2.5; .5 MG/3ML; MG/3ML
SOLUTION RESPIRATORY (INHALATION)
Status: COMPLETED
Start: 2019-11-21 | End: 2019-11-21

## 2019-11-21 RX ORDER — ACETAMINOPHEN 325 MG/1
650 TABLET ORAL EVERY 4 HOURS PRN
Status: CANCELLED | OUTPATIENT
Start: 2019-11-21

## 2019-11-21 RX ORDER — SODIUM CHLORIDE 0.9 % (FLUSH) 0.9 %
10 SYRINGE (ML) INJECTION PRN
Status: CANCELLED | OUTPATIENT
Start: 2019-11-21

## 2019-11-21 RX ORDER — FUROSEMIDE 10 MG/ML
40 INJECTION INTRAMUSCULAR; INTRAVENOUS 2 TIMES DAILY
Status: CANCELLED | OUTPATIENT
Start: 2019-11-22

## 2019-11-21 RX ORDER — IPRATROPIUM BROMIDE AND ALBUTEROL SULFATE 2.5; .5 MG/3ML; MG/3ML
2 SOLUTION RESPIRATORY (INHALATION) ONCE
Status: COMPLETED | OUTPATIENT
Start: 2019-11-21 | End: 2019-11-21

## 2019-11-21 RX ORDER — SODIUM CHLORIDE 0.9 % (FLUSH) 0.9 %
10 SYRINGE (ML) INJECTION EVERY 12 HOURS SCHEDULED
Status: CANCELLED | OUTPATIENT
Start: 2019-11-21

## 2019-11-21 RX ORDER — DEXTROSE MONOHYDRATE 50 MG/ML
100 INJECTION, SOLUTION INTRAVENOUS PRN
Status: CANCELLED | OUTPATIENT
Start: 2019-11-21

## 2019-11-21 RX ORDER — LORAZEPAM 2 MG/ML
1 INJECTION INTRAMUSCULAR ONCE
Status: COMPLETED | OUTPATIENT
Start: 2019-11-21 | End: 2019-11-21

## 2019-11-21 RX ORDER — GLUCAGON 1 MG/ML
1 KIT INJECTION PRN
Status: CANCELLED | OUTPATIENT
Start: 2019-11-21

## 2019-11-21 RX ORDER — 0.9 % SODIUM CHLORIDE 0.9 %
80 INTRAVENOUS SOLUTION INTRAVENOUS ONCE
Status: DISCONTINUED | OUTPATIENT
Start: 2019-11-21 | End: 2019-11-22 | Stop reason: HOSPADM

## 2019-11-21 RX ADMIN — FUROSEMIDE 40 MG: 10 INJECTION, SOLUTION INTRAMUSCULAR; INTRAVENOUS at 20:56

## 2019-11-21 RX ADMIN — LORAZEPAM 1 MG: 2 INJECTION INTRAMUSCULAR; INTRAVENOUS at 20:56

## 2019-11-21 RX ADMIN — IPRATROPIUM BROMIDE AND ALBUTEROL SULFATE 2 AMPULE: 2.5; .5 SOLUTION RESPIRATORY (INHALATION) at 19:35

## 2019-11-21 RX ADMIN — IPRATROPIUM BROMIDE AND ALBUTEROL SULFATE 2 AMPULE: .5; 2.5 SOLUTION RESPIRATORY (INHALATION) at 19:35

## 2019-11-21 ASSESSMENT — ENCOUNTER SYMPTOMS
SHORTNESS OF BREATH: 1
EYES NEGATIVE: 1
GASTROINTESTINAL NEGATIVE: 1
WHEEZING: 1
ABDOMINAL PAIN: 0
COUGH: 1
BACK PAIN: 0

## 2019-11-22 ENCOUNTER — HOSPITAL ENCOUNTER (INPATIENT)
Age: 64
LOS: 3 days | Discharge: HOME | DRG: 291 | End: 2019-11-25
Attending: INTERNAL MEDICINE | Admitting: INTERNAL MEDICINE
Payer: COMMERCIAL

## 2019-11-22 VITALS
TEMPERATURE: 98 F | DIASTOLIC BLOOD PRESSURE: 76 MMHG | SYSTOLIC BLOOD PRESSURE: 166 MMHG | WEIGHT: 293 LBS | HEIGHT: 60 IN | HEART RATE: 84 BPM | OXYGEN SATURATION: 96 % | RESPIRATION RATE: 13 BRPM | BODY MASS INDEX: 57.52 KG/M2

## 2019-11-22 DIAGNOSIS — G89.4 CHRONIC PAIN SYNDROME: Chronic | ICD-10-CM

## 2019-11-22 DIAGNOSIS — M79.7 FIBROMYALGIA: ICD-10-CM

## 2019-11-22 DIAGNOSIS — M54.2 CERVICALGIA: Primary | ICD-10-CM

## 2019-11-22 PROBLEM — N17.9 AKI (ACUTE KIDNEY INJURY) (HCC): Status: ACTIVE | Noted: 2019-11-22

## 2019-11-22 PROBLEM — Z87.19 H/O: GI BLEED: Status: ACTIVE | Noted: 2019-11-22

## 2019-11-22 PROBLEM — J96.01 ACUTE HYPOXEMIC RESPIRATORY FAILURE (HCC): Status: ACTIVE | Noted: 2019-11-22

## 2019-11-22 PROBLEM — D50.9 IRON DEFICIENCY ANEMIA: Status: ACTIVE | Noted: 2019-11-22

## 2019-11-22 LAB
ESTIMATED AVERAGE GLUCOSE: 166 MG/DL
FERRITIN: 37 UG/L (ref 13–150)
FOLATE: >20 NG/ML
GLUCOSE BLD-MCNC: 128 MG/DL (ref 65–105)
GLUCOSE BLD-MCNC: 132 MG/DL (ref 65–105)
GLUCOSE BLD-MCNC: 184 MG/DL (ref 65–105)
GLUCOSE BLD-MCNC: 218 MG/DL (ref 65–105)
HBA1C MFR BLD: 7.4 % (ref 4–6)
INR BLD: 1.1
IRON SATURATION: 6 % (ref 20–55)
IRON: 24 UG/DL (ref 37–145)
LACTIC ACID, WHOLE BLOOD: 1.5 MMOL/L (ref 0.7–2.1)
LACTIC ACID: NORMAL MMOL/L
PROTHROMBIN TIME: 11.6 SEC (ref 9–12)
TOTAL IRON BINDING CAPACITY: 388 UG/DL (ref 250–450)
UNSATURATED IRON BINDING CAPACITY: 364 UG/DL (ref 112–347)
VITAMIN B-12: 322 PG/ML (ref 232–1245)

## 2019-11-22 PROCEDURE — 96375 TX/PRO/DX INJ NEW DRUG ADDON: CPT

## 2019-11-22 PROCEDURE — 6360000002 HC RX W HCPCS: Performed by: FAMILY MEDICINE

## 2019-11-22 PROCEDURE — 82746 ASSAY OF FOLIC ACID SERUM: CPT

## 2019-11-22 PROCEDURE — 6360000002 HC RX W HCPCS: Performed by: EMERGENCY MEDICINE

## 2019-11-22 PROCEDURE — 6370000000 HC RX 637 (ALT 250 FOR IP): Performed by: NURSE PRACTITIONER

## 2019-11-22 PROCEDURE — 82947 ASSAY GLUCOSE BLOOD QUANT: CPT

## 2019-11-22 PROCEDURE — 82607 VITAMIN B-12: CPT

## 2019-11-22 PROCEDURE — 6360000002 HC RX W HCPCS: Performed by: NURSE PRACTITIONER

## 2019-11-22 PROCEDURE — 82728 ASSAY OF FERRITIN: CPT

## 2019-11-22 PROCEDURE — 2060000000 HC ICU INTERMEDIATE R&B

## 2019-11-22 PROCEDURE — 94660 CPAP INITIATION&MGMT: CPT

## 2019-11-22 PROCEDURE — 2580000003 HC RX 258: Performed by: NURSE PRACTITIONER

## 2019-11-22 PROCEDURE — 2700000000 HC OXYGEN THERAPY PER DAY

## 2019-11-22 PROCEDURE — 83550 IRON BINDING TEST: CPT

## 2019-11-22 PROCEDURE — 83036 HEMOGLOBIN GLYCOSYLATED A1C: CPT

## 2019-11-22 PROCEDURE — 36415 COLL VENOUS BLD VENIPUNCTURE: CPT

## 2019-11-22 PROCEDURE — 99223 1ST HOSP IP/OBS HIGH 75: CPT | Performed by: FAMILY MEDICINE

## 2019-11-22 PROCEDURE — 85610 PROTHROMBIN TIME: CPT

## 2019-11-22 PROCEDURE — 83605 ASSAY OF LACTIC ACID: CPT

## 2019-11-22 PROCEDURE — APPSS60 APP SPLIT SHARED TIME 46-60 MINUTES: Performed by: NURSE PRACTITIONER

## 2019-11-22 PROCEDURE — 6370000000 HC RX 637 (ALT 250 FOR IP): Performed by: FAMILY MEDICINE

## 2019-11-22 PROCEDURE — 83540 ASSAY OF IRON: CPT

## 2019-11-22 RX ORDER — GLIMEPIRIDE 2 MG/1
4 TABLET ORAL
Status: DISCONTINUED | OUTPATIENT
Start: 2019-11-23 | End: 2019-11-25 | Stop reason: HOSPADM

## 2019-11-22 RX ORDER — NICOTINE 21 MG/24HR
1 PATCH, TRANSDERMAL 24 HOURS TRANSDERMAL DAILY PRN
Status: DISCONTINUED | OUTPATIENT
Start: 2019-11-22 | End: 2019-11-25 | Stop reason: HOSPADM

## 2019-11-22 RX ORDER — ONDANSETRON 2 MG/ML
4 INJECTION INTRAMUSCULAR; INTRAVENOUS EVERY 6 HOURS PRN
Status: DISCONTINUED | OUTPATIENT
Start: 2019-11-22 | End: 2019-11-25 | Stop reason: HOSPADM

## 2019-11-22 RX ORDER — ATORVASTATIN CALCIUM 40 MG/1
40 TABLET, FILM COATED ORAL DAILY
Status: DISCONTINUED | OUTPATIENT
Start: 2019-11-22 | End: 2019-11-25 | Stop reason: HOSPADM

## 2019-11-22 RX ORDER — ACETAMINOPHEN 325 MG/1
650 TABLET ORAL EVERY 4 HOURS PRN
Status: DISCONTINUED | OUTPATIENT
Start: 2019-11-22 | End: 2019-11-25 | Stop reason: HOSPADM

## 2019-11-22 RX ORDER — DEXTROSE MONOHYDRATE 25 G/50ML
12.5 INJECTION, SOLUTION INTRAVENOUS PRN
Status: DISCONTINUED | OUTPATIENT
Start: 2019-11-22 | End: 2019-11-25 | Stop reason: HOSPADM

## 2019-11-22 RX ORDER — GLIMEPIRIDE 2 MG/1
8 TABLET ORAL
Status: DISCONTINUED | OUTPATIENT
Start: 2019-11-22 | End: 2019-11-22

## 2019-11-22 RX ORDER — OXYCODONE HYDROCHLORIDE 5 MG/1
15 TABLET ORAL EVERY 12 HOURS PRN
Status: DISCONTINUED | OUTPATIENT
Start: 2019-11-22 | End: 2019-11-22

## 2019-11-22 RX ORDER — PREGABALIN 75 MG/1
150 CAPSULE ORAL NIGHTLY
Status: DISCONTINUED | OUTPATIENT
Start: 2019-11-22 | End: 2019-11-22

## 2019-11-22 RX ORDER — FUROSEMIDE 10 MG/ML
40 INJECTION INTRAMUSCULAR; INTRAVENOUS 2 TIMES DAILY
Status: DISCONTINUED | OUTPATIENT
Start: 2019-11-22 | End: 2019-11-22

## 2019-11-22 RX ORDER — NICOTINE POLACRILEX 4 MG
15 LOZENGE BUCCAL PRN
Status: DISCONTINUED | OUTPATIENT
Start: 2019-11-22 | End: 2019-11-25 | Stop reason: HOSPADM

## 2019-11-22 RX ORDER — MAGNESIUM SULFATE 1 G/100ML
1 INJECTION INTRAVENOUS PRN
Status: DISCONTINUED | OUTPATIENT
Start: 2019-11-22 | End: 2019-11-25 | Stop reason: HOSPADM

## 2019-11-22 RX ORDER — FUROSEMIDE 10 MG/ML
20 INJECTION INTRAMUSCULAR; INTRAVENOUS 2 TIMES DAILY
Status: DISCONTINUED | OUTPATIENT
Start: 2019-11-22 | End: 2019-11-22

## 2019-11-22 RX ORDER — POTASSIUM CHLORIDE 7.45 MG/ML
10 INJECTION INTRAVENOUS PRN
Status: DISCONTINUED | OUTPATIENT
Start: 2019-11-22 | End: 2019-11-25 | Stop reason: HOSPADM

## 2019-11-22 RX ORDER — HYDRALAZINE HYDROCHLORIDE 20 MG/ML
10 INJECTION INTRAMUSCULAR; INTRAVENOUS EVERY 6 HOURS PRN
Status: DISCONTINUED | OUTPATIENT
Start: 2019-11-22 | End: 2019-11-25 | Stop reason: HOSPADM

## 2019-11-22 RX ORDER — AMLODIPINE BESYLATE 10 MG/1
10 TABLET ORAL DAILY
Status: DISCONTINUED | OUTPATIENT
Start: 2019-11-22 | End: 2019-11-25 | Stop reason: HOSPADM

## 2019-11-22 RX ORDER — CARVEDILOL 3.12 MG/1
3.12 TABLET ORAL 2 TIMES DAILY WITH MEALS
Status: DISCONTINUED | OUTPATIENT
Start: 2019-11-23 | End: 2019-11-25 | Stop reason: HOSPADM

## 2019-11-22 RX ORDER — POTASSIUM CHLORIDE 20 MEQ/1
40 TABLET, EXTENDED RELEASE ORAL PRN
Status: DISCONTINUED | OUTPATIENT
Start: 2019-11-22 | End: 2019-11-25 | Stop reason: HOSPADM

## 2019-11-22 RX ORDER — ALPRAZOLAM 0.25 MG/1
0.25 TABLET ORAL 2 TIMES DAILY PRN
Status: DISCONTINUED | OUTPATIENT
Start: 2019-11-22 | End: 2019-11-25 | Stop reason: HOSPADM

## 2019-11-22 RX ORDER — PREGABALIN 75 MG/1
150 CAPSULE ORAL 2 TIMES DAILY
Status: DISCONTINUED | OUTPATIENT
Start: 2019-11-22 | End: 2019-11-25 | Stop reason: HOSPADM

## 2019-11-22 RX ORDER — OXYCODONE HYDROCHLORIDE 5 MG/1
30 TABLET ORAL EVERY 8 HOURS PRN
Status: DISCONTINUED | OUTPATIENT
Start: 2019-11-22 | End: 2019-11-25 | Stop reason: HOSPADM

## 2019-11-22 RX ORDER — SODIUM CHLORIDE 0.9 % (FLUSH) 0.9 %
10 SYRINGE (ML) INJECTION EVERY 12 HOURS SCHEDULED
Status: DISCONTINUED | OUTPATIENT
Start: 2019-11-22 | End: 2019-11-25 | Stop reason: HOSPADM

## 2019-11-22 RX ORDER — MORPHINE SULFATE 4 MG/ML
4 INJECTION, SOLUTION INTRAMUSCULAR; INTRAVENOUS ONCE
Status: DISCONTINUED | OUTPATIENT
Start: 2019-11-22 | End: 2019-11-22

## 2019-11-22 RX ORDER — SODIUM CHLORIDE 0.9 % (FLUSH) 0.9 %
10 SYRINGE (ML) INJECTION PRN
Status: DISCONTINUED | OUTPATIENT
Start: 2019-11-22 | End: 2019-11-25 | Stop reason: HOSPADM

## 2019-11-22 RX ORDER — VENLAFAXINE HYDROCHLORIDE 37.5 MG/1
37.5 CAPSULE, EXTENDED RELEASE ORAL DAILY
Status: DISCONTINUED | OUTPATIENT
Start: 2019-11-22 | End: 2019-11-25 | Stop reason: HOSPADM

## 2019-11-22 RX ORDER — AMPICILLIN TRIHYDRATE 250 MG
200 CAPSULE ORAL NIGHTLY
Status: DISCONTINUED | OUTPATIENT
Start: 2019-11-22 | End: 2019-11-22

## 2019-11-22 RX ORDER — OXYCODONE HYDROCHLORIDE 5 MG/1
30 TABLET ORAL EVERY 12 HOURS PRN
Status: DISCONTINUED | OUTPATIENT
Start: 2019-11-22 | End: 2019-11-22

## 2019-11-22 RX ORDER — FUROSEMIDE 10 MG/ML
40 INJECTION INTRAMUSCULAR; INTRAVENOUS 2 TIMES DAILY
Status: DISCONTINUED | OUTPATIENT
Start: 2019-11-22 | End: 2019-11-25 | Stop reason: HOSPADM

## 2019-11-22 RX ORDER — DEXTROSE MONOHYDRATE 50 MG/ML
100 INJECTION, SOLUTION INTRAVENOUS PRN
Status: DISCONTINUED | OUTPATIENT
Start: 2019-11-22 | End: 2019-11-25 | Stop reason: HOSPADM

## 2019-11-22 RX ADMIN — FUROSEMIDE 20 MG: 10 INJECTION, SOLUTION INTRAMUSCULAR; INTRAVENOUS at 09:39

## 2019-11-22 RX ADMIN — OXYCODONE HYDROCHLORIDE 30 MG: 5 TABLET ORAL at 18:29

## 2019-11-22 RX ADMIN — ALPRAZOLAM 0.25 MG: 0.25 TABLET ORAL at 16:43

## 2019-11-22 RX ADMIN — HYDROMORPHONE HYDROCHLORIDE 0.5 MG: 1 INJECTION, SOLUTION INTRAMUSCULAR; INTRAVENOUS; SUBCUTANEOUS at 01:30

## 2019-11-22 RX ADMIN — FUROSEMIDE 40 MG: 10 INJECTION, SOLUTION INTRAMUSCULAR; INTRAVENOUS at 18:18

## 2019-11-22 RX ADMIN — VENLAFAXINE HYDROCHLORIDE 37.5 MG: 37.5 CAPSULE, EXTENDED RELEASE ORAL at 11:46

## 2019-11-22 RX ADMIN — OXYCODONE HYDROCHLORIDE 30 MG: 5 TABLET ORAL at 09:33

## 2019-11-22 RX ADMIN — INSULIN LISPRO 1 UNITS: 100 INJECTION, SOLUTION INTRAVENOUS; SUBCUTANEOUS at 20:08

## 2019-11-22 RX ADMIN — AMLODIPINE BESYLATE 10 MG: 10 TABLET ORAL at 09:38

## 2019-11-22 RX ADMIN — INSULIN LISPRO 1 UNITS: 100 INJECTION, SOLUTION INTRAVENOUS; SUBCUTANEOUS at 12:32

## 2019-11-22 RX ADMIN — SODIUM CHLORIDE, PRESERVATIVE FREE 10 ML: 5 INJECTION INTRAVENOUS at 10:12

## 2019-11-22 RX ADMIN — PREGABALIN 150 MG: 75 CAPSULE ORAL at 14:28

## 2019-11-22 RX ADMIN — SODIUM CHLORIDE, PRESERVATIVE FREE 10 ML: 5 INJECTION INTRAVENOUS at 20:07

## 2019-11-22 RX ADMIN — HYDRALAZINE HYDROCHLORIDE 10 MG: 20 INJECTION INTRAMUSCULAR; INTRAVENOUS at 20:07

## 2019-11-22 RX ADMIN — DESMOPRESSIN ACETATE 40 MG: 0.2 TABLET ORAL at 09:38

## 2019-11-22 RX ADMIN — PREGABALIN 150 MG: 75 CAPSULE ORAL at 20:03

## 2019-11-22 RX ADMIN — ENOXAPARIN SODIUM 40 MG: 40 INJECTION SUBCUTANEOUS at 09:40

## 2019-11-22 ASSESSMENT — PAIN SCALES - GENERAL
PAINLEVEL_OUTOF10: 10
PAINLEVEL_OUTOF10: 8
PAINLEVEL_OUTOF10: 0
PAINLEVEL_OUTOF10: 7

## 2019-11-23 ENCOUNTER — APPOINTMENT (OUTPATIENT)
Dept: GENERAL RADIOLOGY | Age: 64
DRG: 291 | End: 2019-11-23
Attending: INTERNAL MEDICINE
Payer: COMMERCIAL

## 2019-11-23 PROBLEM — D72.829 LEUKOCYTOSIS: Status: RESOLVED | Noted: 2019-10-17 | Resolved: 2019-11-23

## 2019-11-23 PROBLEM — K92.2 UPPER GI BLEED: Status: RESOLVED | Noted: 2019-10-15 | Resolved: 2019-11-23

## 2019-11-23 PROBLEM — I50.31 ACUTE DIASTOLIC CHF (CONGESTIVE HEART FAILURE) (HCC): Status: ACTIVE | Noted: 2019-11-21

## 2019-11-23 LAB
ANION GAP SERPL CALCULATED.3IONS-SCNC: 16 MMOL/L (ref 9–17)
BNP INTERPRETATION: NORMAL
BUN BLDV-MCNC: 14 MG/DL (ref 8–23)
BUN/CREAT BLD: ABNORMAL (ref 9–20)
CALCIUM SERPL-MCNC: 8.9 MG/DL (ref 8.6–10.4)
CHLORIDE BLD-SCNC: 99 MMOL/L (ref 98–107)
CHOLESTEROL, FASTING: 127 MG/DL
CHOLESTEROL/HDL RATIO: 3.1
CO2: 27 MMOL/L (ref 20–31)
CREAT SERPL-MCNC: 0.9 MG/DL (ref 0.5–0.9)
EKG ATRIAL RATE: 84 BPM
EKG P AXIS: 52 DEGREES
EKG P-R INTERVAL: 146 MS
EKG Q-T INTERVAL: 350 MS
EKG QRS DURATION: 70 MS
EKG QTC CALCULATION (BAZETT): 413 MS
EKG R AXIS: 54 DEGREES
EKG T AXIS: 74 DEGREES
EKG VENTRICULAR RATE: 84 BPM
GFR AFRICAN AMERICAN: >60 ML/MIN
GFR NON-AFRICAN AMERICAN: >60 ML/MIN
GFR SERPL CREATININE-BSD FRML MDRD: ABNORMAL ML/MIN/{1.73_M2}
GFR SERPL CREATININE-BSD FRML MDRD: ABNORMAL ML/MIN/{1.73_M2}
GLUCOSE BLD-MCNC: 144 MG/DL (ref 65–105)
GLUCOSE BLD-MCNC: 149 MG/DL (ref 70–99)
GLUCOSE BLD-MCNC: 163 MG/DL (ref 65–105)
GLUCOSE BLD-MCNC: 192 MG/DL (ref 65–105)
GLUCOSE BLD-MCNC: 224 MG/DL (ref 65–105)
HCT VFR BLD CALC: 31.3 % (ref 36.3–47.1)
HDLC SERPL-MCNC: 41 MG/DL
HEMOGLOBIN: 9.1 G/DL (ref 11.9–15.1)
LDL CHOLESTEROL: 57 MG/DL (ref 0–130)
MAGNESIUM: 1.9 MG/DL (ref 1.6–2.6)
MCH RBC QN AUTO: 27.8 PG (ref 25.2–33.5)
MCHC RBC AUTO-ENTMCNC: 29.1 G/DL (ref 28.4–34.8)
MCV RBC AUTO: 95.7 FL (ref 82.6–102.9)
NRBC AUTOMATED: 0 PER 100 WBC
PDW BLD-RTO: 15.8 % (ref 11.8–14.4)
PLATELET # BLD: 460 K/UL (ref 138–453)
PMV BLD AUTO: 10.2 FL (ref 8.1–13.5)
POTASSIUM SERPL-SCNC: 3.8 MMOL/L (ref 3.7–5.3)
PRO-BNP: 295 PG/ML
RBC # BLD: 3.27 M/UL (ref 3.95–5.11)
SODIUM BLD-SCNC: 142 MMOL/L (ref 135–144)
TRIGLYCERIDE, FASTING: 145 MG/DL
TSH SERPL DL<=0.05 MIU/L-ACNC: 2.44 MIU/L (ref 0.3–5)
VLDLC SERPL CALC-MCNC: NORMAL MG/DL (ref 1–30)
WBC # BLD: 13.9 K/UL (ref 3.5–11.3)

## 2019-11-23 PROCEDURE — 83735 ASSAY OF MAGNESIUM: CPT

## 2019-11-23 PROCEDURE — 84443 ASSAY THYROID STIM HORMONE: CPT

## 2019-11-23 PROCEDURE — 85027 COMPLETE CBC AUTOMATED: CPT

## 2019-11-23 PROCEDURE — 6360000002 HC RX W HCPCS: Performed by: NURSE PRACTITIONER

## 2019-11-23 PROCEDURE — 97116 GAIT TRAINING THERAPY: CPT

## 2019-11-23 PROCEDURE — 97165 OT EVAL LOW COMPLEX 30 MIN: CPT

## 2019-11-23 PROCEDURE — 6370000000 HC RX 637 (ALT 250 FOR IP): Performed by: FAMILY MEDICINE

## 2019-11-23 PROCEDURE — 97161 PT EVAL LOW COMPLEX 20 MIN: CPT

## 2019-11-23 PROCEDURE — 71046 X-RAY EXAM CHEST 2 VIEWS: CPT

## 2019-11-23 PROCEDURE — 82947 ASSAY GLUCOSE BLOOD QUANT: CPT

## 2019-11-23 PROCEDURE — 97535 SELF CARE MNGMENT TRAINING: CPT

## 2019-11-23 PROCEDURE — 93010 ELECTROCARDIOGRAM REPORT: CPT | Performed by: INTERNAL MEDICINE

## 2019-11-23 PROCEDURE — 80048 BASIC METABOLIC PNL TOTAL CA: CPT

## 2019-11-23 PROCEDURE — 6370000000 HC RX 637 (ALT 250 FOR IP): Performed by: NURSE PRACTITIONER

## 2019-11-23 PROCEDURE — 83880 ASSAY OF NATRIURETIC PEPTIDE: CPT

## 2019-11-23 PROCEDURE — 6360000002 HC RX W HCPCS: Performed by: FAMILY MEDICINE

## 2019-11-23 PROCEDURE — 2060000000 HC ICU INTERMEDIATE R&B

## 2019-11-23 PROCEDURE — 99233 SBSQ HOSP IP/OBS HIGH 50: CPT | Performed by: INTERNAL MEDICINE

## 2019-11-23 PROCEDURE — 2580000003 HC RX 258: Performed by: NURSE PRACTITIONER

## 2019-11-23 PROCEDURE — 80061 LIPID PANEL: CPT

## 2019-11-23 PROCEDURE — 36415 COLL VENOUS BLD VENIPUNCTURE: CPT

## 2019-11-23 PROCEDURE — 2700000000 HC OXYGEN THERAPY PER DAY

## 2019-11-23 PROCEDURE — 6370000000 HC RX 637 (ALT 250 FOR IP): Performed by: INTERNAL MEDICINE

## 2019-11-23 PROCEDURE — 94761 N-INVAS EAR/PLS OXIMETRY MLT: CPT

## 2019-11-23 RX ORDER — LISINOPRIL 5 MG/1
5 TABLET ORAL DAILY
Status: DISCONTINUED | OUTPATIENT
Start: 2019-11-23 | End: 2019-11-25 | Stop reason: HOSPADM

## 2019-11-23 RX ADMIN — HYDRALAZINE HYDROCHLORIDE 10 MG: 20 INJECTION INTRAMUSCULAR; INTRAVENOUS at 17:59

## 2019-11-23 RX ADMIN — INSULIN LISPRO 1 UNITS: 100 INJECTION, SOLUTION INTRAVENOUS; SUBCUTANEOUS at 07:55

## 2019-11-23 RX ADMIN — VENLAFAXINE HYDROCHLORIDE 37.5 MG: 37.5 CAPSULE, EXTENDED RELEASE ORAL at 21:10

## 2019-11-23 RX ADMIN — INSULIN LISPRO 1 UNITS: 100 INJECTION, SOLUTION INTRAVENOUS; SUBCUTANEOUS at 11:45

## 2019-11-23 RX ADMIN — HYDRALAZINE HYDROCHLORIDE 10 MG: 20 INJECTION INTRAMUSCULAR; INTRAVENOUS at 03:24

## 2019-11-23 RX ADMIN — AMLODIPINE BESYLATE 10 MG: 10 TABLET ORAL at 07:54

## 2019-11-23 RX ADMIN — FUROSEMIDE 40 MG: 10 INJECTION, SOLUTION INTRAMUSCULAR; INTRAVENOUS at 07:54

## 2019-11-23 RX ADMIN — OXYCODONE HYDROCHLORIDE 30 MG: 5 TABLET ORAL at 07:54

## 2019-11-23 RX ADMIN — CARVEDILOL 3.12 MG: 3.12 TABLET, FILM COATED ORAL at 16:45

## 2019-11-23 RX ADMIN — DESMOPRESSIN ACETATE 40 MG: 0.2 TABLET ORAL at 21:10

## 2019-11-23 RX ADMIN — SODIUM CHLORIDE, PRESERVATIVE FREE 10 ML: 5 INJECTION INTRAVENOUS at 21:09

## 2019-11-23 RX ADMIN — LISINOPRIL 5 MG: 5 TABLET ORAL at 15:38

## 2019-11-23 RX ADMIN — INSULIN LISPRO 1 UNITS: 100 INJECTION, SOLUTION INTRAVENOUS; SUBCUTANEOUS at 16:43

## 2019-11-23 RX ADMIN — PREGABALIN 150 MG: 75 CAPSULE ORAL at 21:09

## 2019-11-23 RX ADMIN — ENOXAPARIN SODIUM 40 MG: 40 INJECTION SUBCUTANEOUS at 07:55

## 2019-11-23 RX ADMIN — HYDRALAZINE HYDROCHLORIDE 10 MG: 20 INJECTION INTRAMUSCULAR; INTRAVENOUS at 22:18

## 2019-11-23 RX ADMIN — OXYCODONE HYDROCHLORIDE 30 MG: 5 TABLET ORAL at 18:36

## 2019-11-23 RX ADMIN — SODIUM CHLORIDE, PRESERVATIVE FREE 10 ML: 5 INJECTION INTRAVENOUS at 07:55

## 2019-11-23 RX ADMIN — FUROSEMIDE 40 MG: 10 INJECTION, SOLUTION INTRAMUSCULAR; INTRAVENOUS at 17:59

## 2019-11-23 RX ADMIN — CARVEDILOL 3.12 MG: 3.12 TABLET, FILM COATED ORAL at 07:53

## 2019-11-23 RX ADMIN — PREGABALIN 150 MG: 75 CAPSULE ORAL at 07:53

## 2019-11-23 RX ADMIN — ALPRAZOLAM 0.25 MG: 0.25 TABLET ORAL at 22:11

## 2019-11-23 RX ADMIN — GLIMEPIRIDE 4 MG: 2 TABLET ORAL at 07:53

## 2019-11-23 RX ADMIN — INSULIN LISPRO 1 UNITS: 100 INJECTION, SOLUTION INTRAVENOUS; SUBCUTANEOUS at 21:11

## 2019-11-23 ASSESSMENT — PAIN DESCRIPTION - LOCATION: LOCATION: GENERALIZED

## 2019-11-23 ASSESSMENT — PAIN SCALES - GENERAL
PAINLEVEL_OUTOF10: 6
PAINLEVEL_OUTOF10: 9
PAINLEVEL_OUTOF10: 6

## 2019-11-23 ASSESSMENT — PAIN DESCRIPTION - FREQUENCY: FREQUENCY: CONTINUOUS

## 2019-11-23 ASSESSMENT — ENCOUNTER SYMPTOMS
WHEEZING: 0
SHORTNESS OF BREATH: 1
NAUSEA: 0
EYE DISCHARGE: 0
ABDOMINAL PAIN: 0
BLOOD IN STOOL: 0
BACK PAIN: 0
VOMITING: 0
COLOR CHANGE: 0

## 2019-11-23 ASSESSMENT — PAIN DESCRIPTION - ONSET: ONSET: ON-GOING

## 2019-11-23 ASSESSMENT — PAIN - FUNCTIONAL ASSESSMENT: PAIN_FUNCTIONAL_ASSESSMENT: PREVENTS OR INTERFERES SOME ACTIVE ACTIVITIES AND ADLS

## 2019-11-23 ASSESSMENT — PAIN DESCRIPTION - PROGRESSION: CLINICAL_PROGRESSION: NOT CHANGED

## 2019-11-23 ASSESSMENT — PAIN DESCRIPTION - PAIN TYPE: TYPE: CHRONIC PAIN

## 2019-11-23 ASSESSMENT — PAIN DESCRIPTION - DESCRIPTORS: DESCRIPTORS: DISCOMFORT

## 2019-11-24 LAB
ANION GAP SERPL CALCULATED.3IONS-SCNC: 14 MMOL/L (ref 9–17)
BUN BLDV-MCNC: 18 MG/DL (ref 8–23)
BUN/CREAT BLD: ABNORMAL (ref 9–20)
CALCIUM SERPL-MCNC: 8.7 MG/DL (ref 8.6–10.4)
CHLORIDE BLD-SCNC: 99 MMOL/L (ref 98–107)
CO2: 27 MMOL/L (ref 20–31)
CREAT SERPL-MCNC: 0.93 MG/DL (ref 0.5–0.9)
GFR AFRICAN AMERICAN: >60 ML/MIN
GFR NON-AFRICAN AMERICAN: >60 ML/MIN
GFR SERPL CREATININE-BSD FRML MDRD: ABNORMAL ML/MIN/{1.73_M2}
GFR SERPL CREATININE-BSD FRML MDRD: ABNORMAL ML/MIN/{1.73_M2}
GLUCOSE BLD-MCNC: 136 MG/DL (ref 65–105)
GLUCOSE BLD-MCNC: 137 MG/DL (ref 70–99)
GLUCOSE BLD-MCNC: 153 MG/DL (ref 65–105)
GLUCOSE BLD-MCNC: 179 MG/DL (ref 65–105)
GLUCOSE BLD-MCNC: 190 MG/DL (ref 65–105)
HCT VFR BLD CALC: 31.7 % (ref 36.3–47.1)
HEMOGLOBIN: 9.3 G/DL (ref 11.9–15.1)
MCH RBC QN AUTO: 27.7 PG (ref 25.2–33.5)
MCHC RBC AUTO-ENTMCNC: 29.3 G/DL (ref 28.4–34.8)
MCV RBC AUTO: 94.3 FL (ref 82.6–102.9)
NRBC AUTOMATED: 0 PER 100 WBC
PDW BLD-RTO: 15.9 % (ref 11.8–14.4)
PLATELET # BLD: 427 K/UL (ref 138–453)
PMV BLD AUTO: 10.2 FL (ref 8.1–13.5)
POTASSIUM SERPL-SCNC: 3.3 MMOL/L (ref 3.7–5.3)
RBC # BLD: 3.36 M/UL (ref 3.95–5.11)
SODIUM BLD-SCNC: 140 MMOL/L (ref 135–144)
WBC # BLD: 13 K/UL (ref 3.5–11.3)

## 2019-11-24 PROCEDURE — 6370000000 HC RX 637 (ALT 250 FOR IP): Performed by: FAMILY MEDICINE

## 2019-11-24 PROCEDURE — 6370000000 HC RX 637 (ALT 250 FOR IP): Performed by: NURSE PRACTITIONER

## 2019-11-24 PROCEDURE — 94761 N-INVAS EAR/PLS OXIMETRY MLT: CPT

## 2019-11-24 PROCEDURE — 6360000002 HC RX W HCPCS: Performed by: NURSE PRACTITIONER

## 2019-11-24 PROCEDURE — 36415 COLL VENOUS BLD VENIPUNCTURE: CPT

## 2019-11-24 PROCEDURE — 99233 SBSQ HOSP IP/OBS HIGH 50: CPT | Performed by: INTERNAL MEDICINE

## 2019-11-24 PROCEDURE — 2580000003 HC RX 258: Performed by: NURSE PRACTITIONER

## 2019-11-24 PROCEDURE — 85027 COMPLETE CBC AUTOMATED: CPT

## 2019-11-24 PROCEDURE — 6370000000 HC RX 637 (ALT 250 FOR IP): Performed by: INTERNAL MEDICINE

## 2019-11-24 PROCEDURE — 80048 BASIC METABOLIC PNL TOTAL CA: CPT

## 2019-11-24 PROCEDURE — 2060000000 HC ICU INTERMEDIATE R&B

## 2019-11-24 PROCEDURE — 2700000000 HC OXYGEN THERAPY PER DAY

## 2019-11-24 PROCEDURE — 82947 ASSAY GLUCOSE BLOOD QUANT: CPT

## 2019-11-24 RX ADMIN — AMLODIPINE BESYLATE 10 MG: 10 TABLET ORAL at 08:41

## 2019-11-24 RX ADMIN — OXYCODONE HYDROCHLORIDE 30 MG: 5 TABLET ORAL at 14:31

## 2019-11-24 RX ADMIN — PREGABALIN 150 MG: 75 CAPSULE ORAL at 08:41

## 2019-11-24 RX ADMIN — INSULIN LISPRO 1 UNITS: 100 INJECTION, SOLUTION INTRAVENOUS; SUBCUTANEOUS at 12:22

## 2019-11-24 RX ADMIN — CARVEDILOL 3.12 MG: 3.12 TABLET, FILM COATED ORAL at 17:26

## 2019-11-24 RX ADMIN — INSULIN LISPRO 1 UNITS: 100 INJECTION, SOLUTION INTRAVENOUS; SUBCUTANEOUS at 21:39

## 2019-11-24 RX ADMIN — INSULIN LISPRO 1 UNITS: 100 INJECTION, SOLUTION INTRAVENOUS; SUBCUTANEOUS at 17:25

## 2019-11-24 RX ADMIN — OXYCODONE HYDROCHLORIDE 30 MG: 5 TABLET ORAL at 02:43

## 2019-11-24 RX ADMIN — SODIUM CHLORIDE, PRESERVATIVE FREE 10 ML: 5 INJECTION INTRAVENOUS at 21:38

## 2019-11-24 RX ADMIN — GLIMEPIRIDE 4 MG: 2 TABLET ORAL at 06:42

## 2019-11-24 RX ADMIN — POTASSIUM CHLORIDE 40 MEQ: 1500 TABLET, EXTENDED RELEASE ORAL at 09:00

## 2019-11-24 RX ADMIN — PREGABALIN 150 MG: 75 CAPSULE ORAL at 21:38

## 2019-11-24 RX ADMIN — CARVEDILOL 3.12 MG: 3.12 TABLET, FILM COATED ORAL at 08:41

## 2019-11-24 RX ADMIN — DESMOPRESSIN ACETATE 40 MG: 0.2 TABLET ORAL at 08:41

## 2019-11-24 RX ADMIN — SODIUM CHLORIDE, PRESERVATIVE FREE 10 ML: 5 INJECTION INTRAVENOUS at 08:42

## 2019-11-24 RX ADMIN — LISINOPRIL 5 MG: 5 TABLET ORAL at 08:41

## 2019-11-24 RX ADMIN — FUROSEMIDE 40 MG: 10 INJECTION, SOLUTION INTRAMUSCULAR; INTRAVENOUS at 08:42

## 2019-11-24 RX ADMIN — ENOXAPARIN SODIUM 40 MG: 40 INJECTION SUBCUTANEOUS at 08:42

## 2019-11-24 RX ADMIN — FUROSEMIDE 40 MG: 10 INJECTION, SOLUTION INTRAMUSCULAR; INTRAVENOUS at 17:25

## 2019-11-24 RX ADMIN — VENLAFAXINE HYDROCHLORIDE 37.5 MG: 37.5 CAPSULE, EXTENDED RELEASE ORAL at 08:42

## 2019-11-24 ASSESSMENT — ENCOUNTER SYMPTOMS
BACK PAIN: 0
VOMITING: 0
NAUSEA: 0
BLOOD IN STOOL: 0
WHEEZING: 0
ABDOMINAL PAIN: 0
SHORTNESS OF BREATH: 0
EYE DISCHARGE: 0
COLOR CHANGE: 0

## 2019-11-24 ASSESSMENT — PAIN SCALES - GENERAL
PAINLEVEL_OUTOF10: 9
PAINLEVEL_OUTOF10: 8

## 2019-11-25 VITALS
HEART RATE: 78 BPM | SYSTOLIC BLOOD PRESSURE: 141 MMHG | TEMPERATURE: 98 F | HEIGHT: 60 IN | BODY MASS INDEX: 57.52 KG/M2 | WEIGHT: 293 LBS | DIASTOLIC BLOOD PRESSURE: 53 MMHG | RESPIRATION RATE: 18 BRPM | OXYGEN SATURATION: 92 %

## 2019-11-25 LAB
ANION GAP SERPL CALCULATED.3IONS-SCNC: 14 MMOL/L (ref 9–17)
BUN BLDV-MCNC: 17 MG/DL (ref 8–23)
BUN/CREAT BLD: ABNORMAL (ref 9–20)
CALCIUM SERPL-MCNC: 8.6 MG/DL (ref 8.6–10.4)
CHLORIDE BLD-SCNC: 99 MMOL/L (ref 98–107)
CO2: 25 MMOL/L (ref 20–31)
CREAT SERPL-MCNC: 0.92 MG/DL (ref 0.5–0.9)
GFR AFRICAN AMERICAN: >60 ML/MIN
GFR NON-AFRICAN AMERICAN: >60 ML/MIN
GFR SERPL CREATININE-BSD FRML MDRD: ABNORMAL ML/MIN/{1.73_M2}
GFR SERPL CREATININE-BSD FRML MDRD: ABNORMAL ML/MIN/{1.73_M2}
GLUCOSE BLD-MCNC: 161 MG/DL (ref 65–105)
GLUCOSE BLD-MCNC: 180 MG/DL (ref 65–105)
GLUCOSE BLD-MCNC: 226 MG/DL (ref 70–99)
LV EF: 65 %
LVEF MODALITY: NORMAL
POTASSIUM SERPL-SCNC: 3.7 MMOL/L (ref 3.7–5.3)
SODIUM BLD-SCNC: 138 MMOL/L (ref 135–144)

## 2019-11-25 PROCEDURE — 6360000002 HC RX W HCPCS: Performed by: NURSE PRACTITIONER

## 2019-11-25 PROCEDURE — 80048 BASIC METABOLIC PNL TOTAL CA: CPT

## 2019-11-25 PROCEDURE — 6370000000 HC RX 637 (ALT 250 FOR IP): Performed by: FAMILY MEDICINE

## 2019-11-25 PROCEDURE — 99239 HOSP IP/OBS DSCHRG MGMT >30: CPT | Performed by: INTERNAL MEDICINE

## 2019-11-25 PROCEDURE — 6370000000 HC RX 637 (ALT 250 FOR IP): Performed by: INTERNAL MEDICINE

## 2019-11-25 PROCEDURE — 6370000000 HC RX 637 (ALT 250 FOR IP): Performed by: NURSE PRACTITIONER

## 2019-11-25 PROCEDURE — 97110 THERAPEUTIC EXERCISES: CPT

## 2019-11-25 PROCEDURE — 93306 TTE W/DOPPLER COMPLETE: CPT

## 2019-11-25 PROCEDURE — 97116 GAIT TRAINING THERAPY: CPT

## 2019-11-25 PROCEDURE — 82947 ASSAY GLUCOSE BLOOD QUANT: CPT

## 2019-11-25 PROCEDURE — 2580000003 HC RX 258: Performed by: NURSE PRACTITIONER

## 2019-11-25 PROCEDURE — 36415 COLL VENOUS BLD VENIPUNCTURE: CPT

## 2019-11-25 RX ORDER — GLIMEPIRIDE 4 MG/1
4 TABLET ORAL
Qty: 30 TABLET | Refills: 3 | Status: SHIPPED | OUTPATIENT
Start: 2019-11-26 | End: 2020-06-02

## 2019-11-25 RX ORDER — CARVEDILOL 3.12 MG/1
3.12 TABLET ORAL 2 TIMES DAILY WITH MEALS
Qty: 60 TABLET | Refills: 3 | Status: ON HOLD | OUTPATIENT
Start: 2019-11-25 | End: 2020-02-13 | Stop reason: HOSPADM

## 2019-11-25 RX ORDER — LISINOPRIL 5 MG/1
5 TABLET ORAL DAILY
Qty: 30 TABLET | Refills: 3 | Status: ON HOLD | OUTPATIENT
Start: 2019-11-26 | End: 2020-02-13 | Stop reason: HOSPADM

## 2019-11-25 RX ORDER — PREGABALIN 150 MG/1
150 CAPSULE ORAL 2 TIMES DAILY
Qty: 60 CAPSULE | Refills: 0 | Status: SHIPPED | OUTPATIENT
Start: 2019-11-25 | End: 2020-01-20 | Stop reason: SDUPTHER

## 2019-11-25 RX ADMIN — LISINOPRIL 5 MG: 5 TABLET ORAL at 09:26

## 2019-11-25 RX ADMIN — INSULIN LISPRO 1 UNITS: 100 INJECTION, SOLUTION INTRAVENOUS; SUBCUTANEOUS at 07:38

## 2019-11-25 RX ADMIN — VENLAFAXINE HYDROCHLORIDE 37.5 MG: 37.5 CAPSULE, EXTENDED RELEASE ORAL at 09:27

## 2019-11-25 RX ADMIN — OXYCODONE HYDROCHLORIDE 30 MG: 5 TABLET ORAL at 09:27

## 2019-11-25 RX ADMIN — DESMOPRESSIN ACETATE 40 MG: 0.2 TABLET ORAL at 09:26

## 2019-11-25 RX ADMIN — GLIMEPIRIDE 4 MG: 2 TABLET ORAL at 06:49

## 2019-11-25 RX ADMIN — CARVEDILOL 3.12 MG: 3.12 TABLET, FILM COATED ORAL at 07:38

## 2019-11-25 RX ADMIN — AMLODIPINE BESYLATE 10 MG: 10 TABLET ORAL at 09:26

## 2019-11-25 RX ADMIN — ENOXAPARIN SODIUM 40 MG: 40 INJECTION SUBCUTANEOUS at 09:29

## 2019-11-25 RX ADMIN — INSULIN LISPRO 2 UNITS: 100 INJECTION, SOLUTION INTRAVENOUS; SUBCUTANEOUS at 11:28

## 2019-11-25 RX ADMIN — FUROSEMIDE 40 MG: 10 INJECTION, SOLUTION INTRAMUSCULAR; INTRAVENOUS at 09:29

## 2019-11-25 RX ADMIN — SODIUM CHLORIDE, PRESERVATIVE FREE 10 ML: 5 INJECTION INTRAVENOUS at 09:37

## 2019-11-25 RX ADMIN — PREGABALIN 150 MG: 75 CAPSULE ORAL at 09:26

## 2019-11-25 ASSESSMENT — PAIN SCALES - GENERAL: PAINLEVEL_OUTOF10: 9

## 2019-11-25 ASSESSMENT — ENCOUNTER SYMPTOMS
VOMITING: 0
ABDOMINAL PAIN: 0
COLOR CHANGE: 0
EYE DISCHARGE: 0
WHEEZING: 0
SHORTNESS OF BREATH: 0
BACK PAIN: 0
NAUSEA: 0
BLOOD IN STOOL: 0

## 2020-01-05 ENCOUNTER — APPOINTMENT (OUTPATIENT)
Dept: GENERAL RADIOLOGY | Age: 65
DRG: 683 | End: 2020-01-05
Payer: COMMERCIAL

## 2020-01-05 ENCOUNTER — HOSPITAL ENCOUNTER (INPATIENT)
Age: 65
LOS: 6 days | Discharge: HOME | DRG: 683 | End: 2020-01-11
Attending: EMERGENCY MEDICINE | Admitting: FAMILY MEDICINE
Payer: COMMERCIAL

## 2020-01-05 PROBLEM — E87.5 HYPERKALEMIA: Status: ACTIVE | Noted: 2020-01-05

## 2020-01-05 LAB
-: ABNORMAL
ABSOLUTE BANDS #: 0.48 K/UL (ref 0–1)
ABSOLUTE EOS #: 0 K/UL (ref 0–0.4)
ABSOLUTE IMMATURE GRANULOCYTE: ABNORMAL K/UL (ref 0–0.3)
ABSOLUTE LYMPH #: 2.25 K/UL (ref 1–4.8)
ABSOLUTE MONO #: 0.64 K/UL (ref 0.1–1.3)
ALBUMIN SERPL-MCNC: 3.7 G/DL (ref 3.5–5.2)
ALBUMIN/GLOBULIN RATIO: ABNORMAL (ref 1–2.5)
ALP BLD-CCNC: 103 U/L (ref 35–104)
ALT SERPL-CCNC: 28 U/L (ref 5–33)
AMORPHOUS: ABNORMAL
ANION GAP SERPL CALCULATED.3IONS-SCNC: 13 MMOL/L (ref 9–17)
ANION GAP SERPL CALCULATED.3IONS-SCNC: 15 MMOL/L (ref 9–17)
AST SERPL-CCNC: 40 U/L
ATYPICAL LYMPHOCYTE ABSOLUTE COUNT: 0.32 K/UL
ATYPICAL LYMPHOCYTES: 2 %
BACTERIA: ABNORMAL
BANDS: 3 % (ref 0–10)
BASOPHILS # BLD: 0 % (ref 0–2)
BASOPHILS ABSOLUTE: 0 K/UL (ref 0–0.2)
BILIRUB SERPL-MCNC: 0.21 MG/DL (ref 0.3–1.2)
BILIRUBIN URINE: NEGATIVE
BNP INTERPRETATION: NORMAL
BUN BLDV-MCNC: 30 MG/DL (ref 8–23)
BUN BLDV-MCNC: 31 MG/DL (ref 8–23)
BUN/CREAT BLD: ABNORMAL (ref 9–20)
BUN/CREAT BLD: ABNORMAL (ref 9–20)
CALCIUM SERPL-MCNC: 8.6 MG/DL (ref 8.6–10.4)
CALCIUM SERPL-MCNC: 8.9 MG/DL (ref 8.6–10.4)
CASTS UA: ABNORMAL /LPF
CHLORIDE BLD-SCNC: 90 MMOL/L (ref 98–107)
CHLORIDE BLD-SCNC: 90 MMOL/L (ref 98–107)
CO2: 26 MMOL/L (ref 20–31)
CO2: 26 MMOL/L (ref 20–31)
COLOR: YELLOW
COMMENT UA: ABNORMAL
CREAT SERPL-MCNC: 1.33 MG/DL (ref 0.5–0.9)
CREAT SERPL-MCNC: 1.42 MG/DL (ref 0.5–0.9)
CRYSTALS, UA: ABNORMAL /HPF
DIFFERENTIAL TYPE: ABNORMAL
DIRECT EXAM: NORMAL
EOSINOPHILS RELATIVE PERCENT: 0 % (ref 0–4)
EPITHELIAL CELLS UA: ABNORMAL /HPF
GFR AFRICAN AMERICAN: 45 ML/MIN
GFR AFRICAN AMERICAN: 49 ML/MIN
GFR NON-AFRICAN AMERICAN: 37 ML/MIN
GFR NON-AFRICAN AMERICAN: 40 ML/MIN
GFR SERPL CREATININE-BSD FRML MDRD: ABNORMAL ML/MIN/{1.73_M2}
GLUCOSE BLD-MCNC: 179 MG/DL (ref 65–105)
GLUCOSE BLD-MCNC: 184 MG/DL (ref 65–105)
GLUCOSE BLD-MCNC: 207 MG/DL (ref 70–99)
GLUCOSE BLD-MCNC: 213 MG/DL (ref 70–99)
GLUCOSE BLD-MCNC: 221 MG/DL (ref 65–105)
GLUCOSE URINE: NEGATIVE
HCT VFR BLD CALC: 31.4 % (ref 36–46)
HEMOGLOBIN: 9.7 G/DL (ref 12–16)
IMMATURE GRANULOCYTES: ABNORMAL %
KETONES, URINE: NEGATIVE
LEUKOCYTE ESTERASE, URINE: NEGATIVE
LYMPHOCYTES # BLD: 14 % (ref 24–44)
Lab: NORMAL
MCH RBC QN AUTO: 25.5 PG (ref 26–34)
MCHC RBC AUTO-ENTMCNC: 30.9 G/DL (ref 31–37)
MCV RBC AUTO: 82.7 FL (ref 80–100)
METAMYELOCYTES ABSOLUTE COUNT: 0.16 K/UL
METAMYELOCYTES: 1 %
MONOCYTES # BLD: 4 % (ref 1–7)
MORPHOLOGY: ABNORMAL
MORPHOLOGY: ABNORMAL
MUCUS: ABNORMAL
NITRITE, URINE: NEGATIVE
NRBC AUTOMATED: ABNORMAL PER 100 WBC
OTHER OBSERVATIONS UA: ABNORMAL
PDW BLD-RTO: 16.8 % (ref 11.5–14.9)
PH UA: 5 (ref 5–8)
PLATELET # BLD: 400 K/UL (ref 150–450)
PLATELET ESTIMATE: ABNORMAL
PMV BLD AUTO: 8 FL (ref 6–12)
POTASSIUM SERPL-SCNC: 4.9 MMOL/L (ref 3.7–5.3)
POTASSIUM SERPL-SCNC: 6 MMOL/L (ref 3.7–5.3)
PRO-BNP: 212 PG/ML
PROTEIN UA: NEGATIVE
RBC # BLD: 3.79 M/UL (ref 4–5.2)
RBC # BLD: ABNORMAL 10*6/UL
RBC UA: ABNORMAL /HPF
RENAL EPITHELIAL, UA: ABNORMAL /HPF
SEG NEUTROPHILS: 76 % (ref 36–66)
SEGMENTED NEUTROPHILS ABSOLUTE COUNT: 12.25 K/UL (ref 1.3–9.1)
SODIUM BLD-SCNC: 129 MMOL/L (ref 135–144)
SODIUM BLD-SCNC: 131 MMOL/L (ref 135–144)
SPECIFIC GRAVITY UA: 1.01 (ref 1–1.03)
SPECIMEN DESCRIPTION: NORMAL
TOTAL PROTEIN: 7.8 G/DL (ref 6.4–8.3)
TRICHOMONAS: ABNORMAL
TROPONIN INTERP: NORMAL
TROPONIN INTERP: NORMAL
TROPONIN T: NORMAL NG/ML
TROPONIN T: NORMAL NG/ML
TROPONIN, HIGH SENSITIVITY: 10 NG/L (ref 0–14)
TROPONIN, HIGH SENSITIVITY: 12 NG/L (ref 0–14)
TURBIDITY: ABNORMAL
URINE HGB: NEGATIVE
UROBILINOGEN, URINE: NORMAL
WBC # BLD: 16.1 K/UL (ref 3.5–11)
WBC # BLD: ABNORMAL 10*3/UL
WBC UA: ABNORMAL /HPF
YEAST: ABNORMAL

## 2020-01-05 PROCEDURE — 6360000002 HC RX W HCPCS: Performed by: FAMILY MEDICINE

## 2020-01-05 PROCEDURE — 84484 ASSAY OF TROPONIN QUANT: CPT

## 2020-01-05 PROCEDURE — 87804 INFLUENZA ASSAY W/OPTIC: CPT

## 2020-01-05 PROCEDURE — 96374 THER/PROPH/DIAG INJ IV PUSH: CPT

## 2020-01-05 PROCEDURE — 6370000000 HC RX 637 (ALT 250 FOR IP): Performed by: STUDENT IN AN ORGANIZED HEALTH CARE EDUCATION/TRAINING PROGRAM

## 2020-01-05 PROCEDURE — 6370000000 HC RX 637 (ALT 250 FOR IP): Performed by: FAMILY MEDICINE

## 2020-01-05 PROCEDURE — 96375 TX/PRO/DX INJ NEW DRUG ADDON: CPT

## 2020-01-05 PROCEDURE — 80048 BASIC METABOLIC PNL TOTAL CA: CPT

## 2020-01-05 PROCEDURE — 83036 HEMOGLOBIN GLYCOSYLATED A1C: CPT

## 2020-01-05 PROCEDURE — 87040 BLOOD CULTURE FOR BACTERIA: CPT

## 2020-01-05 PROCEDURE — 71046 X-RAY EXAM CHEST 2 VIEWS: CPT

## 2020-01-05 PROCEDURE — 94640 AIRWAY INHALATION TREATMENT: CPT

## 2020-01-05 PROCEDURE — 93005 ELECTROCARDIOGRAM TRACING: CPT | Performed by: STUDENT IN AN ORGANIZED HEALTH CARE EDUCATION/TRAINING PROGRAM

## 2020-01-05 PROCEDURE — 82947 ASSAY GLUCOSE BLOOD QUANT: CPT

## 2020-01-05 PROCEDURE — 80053 COMPREHEN METABOLIC PANEL: CPT

## 2020-01-05 PROCEDURE — 2700000000 HC OXYGEN THERAPY PER DAY

## 2020-01-05 PROCEDURE — 85025 COMPLETE CBC W/AUTO DIFF WBC: CPT

## 2020-01-05 PROCEDURE — 6360000002 HC RX W HCPCS: Performed by: STUDENT IN AN ORGANIZED HEALTH CARE EDUCATION/TRAINING PROGRAM

## 2020-01-05 PROCEDURE — 99285 EMERGENCY DEPT VISIT HI MDM: CPT

## 2020-01-05 PROCEDURE — 36415 COLL VENOUS BLD VENIPUNCTURE: CPT

## 2020-01-05 PROCEDURE — 83880 ASSAY OF NATRIURETIC PEPTIDE: CPT

## 2020-01-05 PROCEDURE — 2580000003 HC RX 258: Performed by: STUDENT IN AN ORGANIZED HEALTH CARE EDUCATION/TRAINING PROGRAM

## 2020-01-05 PROCEDURE — 81001 URINALYSIS AUTO W/SCOPE: CPT

## 2020-01-05 PROCEDURE — 2580000003 HC RX 258: Performed by: FAMILY MEDICINE

## 2020-01-05 PROCEDURE — 2060000000 HC ICU INTERMEDIATE R&B

## 2020-01-05 RX ORDER — PREGABALIN 150 MG/1
150 CAPSULE ORAL 2 TIMES DAILY
Status: DISCONTINUED | OUTPATIENT
Start: 2020-01-05 | End: 2020-01-11 | Stop reason: HOSPADM

## 2020-01-05 RX ORDER — ALBUTEROL SULFATE 2.5 MG/3ML
2.5 SOLUTION RESPIRATORY (INHALATION) EVERY 6 HOURS PRN
Status: DISCONTINUED | OUTPATIENT
Start: 2020-01-05 | End: 2020-01-11 | Stop reason: HOSPADM

## 2020-01-05 RX ORDER — M-VIT,TX,IRON,MINS/CALC/FOLIC 27MG-0.4MG
1 TABLET ORAL NIGHTLY
Status: DISCONTINUED | OUTPATIENT
Start: 2020-01-05 | End: 2020-01-11 | Stop reason: HOSPADM

## 2020-01-05 RX ORDER — LORAZEPAM 2 MG/ML
0.5 INJECTION INTRAMUSCULAR EVERY 4 HOURS PRN
Status: DISCONTINUED | OUTPATIENT
Start: 2020-01-05 | End: 2020-01-05

## 2020-01-05 RX ORDER — ATORVASTATIN CALCIUM 40 MG/1
40 TABLET, FILM COATED ORAL NIGHTLY
Status: DISCONTINUED | OUTPATIENT
Start: 2020-01-05 | End: 2020-01-11 | Stop reason: HOSPADM

## 2020-01-05 RX ORDER — METHYLPREDNISOLONE SODIUM SUCCINATE 125 MG/2ML
60 INJECTION, POWDER, LYOPHILIZED, FOR SOLUTION INTRAMUSCULAR; INTRAVENOUS EVERY 6 HOURS
Status: DISCONTINUED | OUTPATIENT
Start: 2020-01-05 | End: 2020-01-07

## 2020-01-05 RX ORDER — VENLAFAXINE HYDROCHLORIDE 37.5 MG/1
37.5 CAPSULE, EXTENDED RELEASE ORAL NIGHTLY
Status: DISCONTINUED | OUTPATIENT
Start: 2020-01-05 | End: 2020-01-11 | Stop reason: HOSPADM

## 2020-01-05 RX ORDER — CETIRIZINE HYDROCHLORIDE, PSEUDOEPHEDRINE HYDROCHLORIDE 5; 120 MG/1; MG/1
1 TABLET, FILM COATED, EXTENDED RELEASE ORAL DAILY
Status: DISCONTINUED | OUTPATIENT
Start: 2020-01-06 | End: 2020-01-11 | Stop reason: HOSPADM

## 2020-01-05 RX ORDER — AMLODIPINE BESYLATE 10 MG/1
10 TABLET ORAL DAILY
Status: DISCONTINUED | OUTPATIENT
Start: 2020-01-06 | End: 2020-01-11 | Stop reason: HOSPADM

## 2020-01-05 RX ORDER — CARVEDILOL 3.12 MG/1
3.12 TABLET ORAL 2 TIMES DAILY WITH MEALS
Status: DISCONTINUED | OUTPATIENT
Start: 2020-01-06 | End: 2020-01-11 | Stop reason: HOSPADM

## 2020-01-05 RX ORDER — DEXTROSE MONOHYDRATE 100 MG/ML
INJECTION, SOLUTION INTRAVENOUS CONTINUOUS
Status: ACTIVE | OUTPATIENT
Start: 2020-01-05 | End: 2020-01-05

## 2020-01-05 RX ORDER — DEXTROSE MONOHYDRATE 25 G/50ML
12.5 INJECTION, SOLUTION INTRAVENOUS PRN
Status: DISCONTINUED | OUTPATIENT
Start: 2020-01-05 | End: 2020-01-11 | Stop reason: HOSPADM

## 2020-01-05 RX ORDER — SODIUM CHLORIDE 0.9 % (FLUSH) 0.9 %
10 SYRINGE (ML) INJECTION PRN
Status: DISCONTINUED | OUTPATIENT
Start: 2020-01-05 | End: 2020-01-11 | Stop reason: HOSPADM

## 2020-01-05 RX ORDER — GLIMEPIRIDE 4 MG/1
4 TABLET ORAL
Status: DISCONTINUED | OUTPATIENT
Start: 2020-01-06 | End: 2020-01-11 | Stop reason: HOSPADM

## 2020-01-05 RX ORDER — LORAZEPAM 0.5 MG/1
0.5 TABLET ORAL EVERY 4 HOURS PRN
Status: DISCONTINUED | OUTPATIENT
Start: 2020-01-05 | End: 2020-01-05

## 2020-01-05 RX ORDER — IPRATROPIUM BROMIDE AND ALBUTEROL SULFATE 2.5; .5 MG/3ML; MG/3ML
1 SOLUTION RESPIRATORY (INHALATION)
Status: DISCONTINUED | OUTPATIENT
Start: 2020-01-05 | End: 2020-01-11

## 2020-01-05 RX ORDER — METHYLPREDNISOLONE SODIUM SUCCINATE 125 MG/2ML
125 INJECTION, POWDER, LYOPHILIZED, FOR SOLUTION INTRAMUSCULAR; INTRAVENOUS ONCE
Status: COMPLETED | OUTPATIENT
Start: 2020-01-05 | End: 2020-01-05

## 2020-01-05 RX ORDER — LORAZEPAM 2 MG/ML
0.5 INJECTION INTRAMUSCULAR EVERY 4 HOURS PRN
Status: DISCONTINUED | OUTPATIENT
Start: 2020-01-05 | End: 2020-01-11 | Stop reason: HOSPADM

## 2020-01-05 RX ORDER — ONDANSETRON 2 MG/ML
4 INJECTION INTRAMUSCULAR; INTRAVENOUS EVERY 6 HOURS PRN
Status: DISCONTINUED | OUTPATIENT
Start: 2020-01-05 | End: 2020-01-11 | Stop reason: HOSPADM

## 2020-01-05 RX ORDER — UBIDECARENONE 200 MG
200 CAPSULE ORAL NIGHTLY
Status: DISCONTINUED | OUTPATIENT
Start: 2020-01-05 | End: 2020-01-11 | Stop reason: HOSPADM

## 2020-01-05 RX ORDER — SODIUM CHLORIDE 9 MG/ML
INJECTION, SOLUTION INTRAVENOUS CONTINUOUS
Status: DISCONTINUED | OUTPATIENT
Start: 2020-01-05 | End: 2020-01-07

## 2020-01-05 RX ORDER — ACETAMINOPHEN 325 MG/1
650 TABLET ORAL EVERY 4 HOURS PRN
Status: DISCONTINUED | OUTPATIENT
Start: 2020-01-05 | End: 2020-01-11 | Stop reason: HOSPADM

## 2020-01-05 RX ORDER — DEXTROSE MONOHYDRATE 50 MG/ML
100 INJECTION, SOLUTION INTRAVENOUS PRN
Status: DISCONTINUED | OUTPATIENT
Start: 2020-01-05 | End: 2020-01-11 | Stop reason: HOSPADM

## 2020-01-05 RX ORDER — NICOTINE POLACRILEX 4 MG
15 LOZENGE BUCCAL PRN
Status: DISCONTINUED | OUTPATIENT
Start: 2020-01-05 | End: 2020-01-11 | Stop reason: HOSPADM

## 2020-01-05 RX ORDER — SODIUM CHLORIDE 0.9 % (FLUSH) 0.9 %
10 SYRINGE (ML) INJECTION EVERY 12 HOURS SCHEDULED
Status: DISCONTINUED | OUTPATIENT
Start: 2020-01-05 | End: 2020-01-11 | Stop reason: HOSPADM

## 2020-01-05 RX ORDER — LORAZEPAM 0.5 MG/1
0.5 TABLET ORAL EVERY 4 HOURS PRN
Status: DISCONTINUED | OUTPATIENT
Start: 2020-01-05 | End: 2020-01-11 | Stop reason: HOSPADM

## 2020-01-05 RX ADMIN — Medication 1 TABLET: at 22:51

## 2020-01-05 RX ADMIN — METHYLPREDNISOLONE SODIUM SUCCINATE 60 MG: 125 INJECTION, POWDER, FOR SOLUTION INTRAMUSCULAR; INTRAVENOUS at 22:52

## 2020-01-05 RX ADMIN — Medication 200 MG: at 22:52

## 2020-01-05 RX ADMIN — ATORVASTATIN CALCIUM 40 MG: 40 TABLET, FILM COATED ORAL at 22:51

## 2020-01-05 RX ADMIN — Medication 10 ML: at 21:24

## 2020-01-05 RX ADMIN — INSULIN HUMAN 10 UNITS: 100 INJECTION, SOLUTION PARENTERAL at 19:03

## 2020-01-05 RX ADMIN — INSULIN LISPRO 1 UNITS: 100 INJECTION, SOLUTION INTRAVENOUS; SUBCUTANEOUS at 22:55

## 2020-01-05 RX ADMIN — SODIUM CHLORIDE: 9 INJECTION, SOLUTION INTRAVENOUS at 21:24

## 2020-01-05 RX ADMIN — IPRATROPIUM BROMIDE AND ALBUTEROL SULFATE 1 AMPULE: .5; 3 SOLUTION RESPIRATORY (INHALATION) at 19:50

## 2020-01-05 RX ADMIN — MULTIPLE VITAMINS W/ MINERALS TAB 1 TABLET: TAB at 22:51

## 2020-01-05 RX ADMIN — VENLAFAXINE HYDROCHLORIDE 37.5 MG: 37.5 CAPSULE, EXTENDED RELEASE ORAL at 22:52

## 2020-01-05 RX ADMIN — PREGABALIN 150 MG: 150 CAPSULE ORAL at 22:53

## 2020-01-05 RX ADMIN — METHYLPREDNISOLONE SODIUM SUCCINATE 125 MG: 125 INJECTION, POWDER, FOR SOLUTION INTRAMUSCULAR; INTRAVENOUS at 18:59

## 2020-01-05 RX ADMIN — DEXTROSE MONOHYDRATE: 100 INJECTION, SOLUTION INTRAVENOUS at 19:05

## 2020-01-05 ASSESSMENT — PAIN SCALES - GENERAL
PAINLEVEL_OUTOF10: 4
PAINLEVEL_OUTOF10: 4

## 2020-01-05 NOTE — ED PROVIDER NOTES
[address] [facility]  Emergency Department Encounter  EmergencyMedicine Resident     Pt Name:Camelia Calvo  MRN: [id number]  [place] 1955  Date of evaluation: 1/5/20  PCP:  Antonella Aranda MD    CHIEF COMPLAINT       Chief Complaint   Patient presents with    Fatigue    Shortness of Breath       HISTORY OF PRESENT ILLNESS  (Location/Symptom, Timing/Onset, Context/Setting, Quality, Duration, Modifying Factors, Severity.)      Patient with a history of congestive heart failure, type II diabetic, hyperlipidemia hypertension morbid obesity presents with progressive shortness of breath for 1 week increased cough increased sputum production, near syncope for the past few days she states she is felt lightheaded. She is brought in by EMS because she slid off her recliner and her  could not get her up. She states she still would probably be coming to the ER for her symptoms as her  would have made her. Recently discharged 2 weeks ago for CHF exacerbation. She states her Lasix were increased but she still feels like she is again short of breath denies any new water weight swelling her lower extremities she does not know her baseline dy  Weight. Her last echocardiogram showed a hyperdynamic heart with ejection fraction 65% 1 month ago last nuclear medicine stress test was 4 years ago patient follows with Dr. Christian Conteh cardiology    [facility],[address] / SURGICAL / SOCIAL / FAMILY HISTORY     Past medical and surgical history reviewed by nursing    Social history reviewed by nursing    Allergies reviewed by nursing    Home Medications:  Prior to Admission medications    Medication Sig Start Date End Date Taking?  Authorizing Provider   carvedilol (COREG) 3.125 MG tablet Take 1 tablet by mouth 2 times daily (with meals) 11/25/19  Yes Debbie Brown MD   venlafaxine (EFFEXOR XR) 37.5 MG extended release capsule Take 1 capsule by mouth daily  Patient taking differently: Take 37.5 mg by mouth nightly No myalgias, No arthalgias  Neurological ROS - No headache, positive dizziness/lightheadedness, No focal weakness, no loss of sensation  Dermatological ROS - No lesions, No rash         PHYSICAL EXAM   (up to 7 for level 4, 8 or more for level 5)      INITIAL VITALS:   BP (!) 138/58   Pulse 94   Temp 98.8 °F (37.1 °C) (Oral)   Resp 16   Ht 5' 5\" (1.651 m)   Wt 297 lb 6.4 oz (134.9 kg)   LMP  (LMP Unknown)   SpO2 (!) 86%   BMI 49.49 kg/m²     General Appearance: Well-appearing, in no acute distress  HEENT: Head: normocephalic/atraumatic eyes: PERRLA, EOMT, conjunctiva not injected, sclerae nonicteric ears: External canals patent nose: Nares patent, no rhinorrhea, throat:mucous membranes moist, oropharynx clear     Neck: Trachea midline, no JVD. Lungs: No evidence of increased work of breathing. Right sided wheeze    Cardiovascular: RRR, no murmur, 2+ peripheral pulses bilaterally. Cap refill less than 2 seconds. B/l non pitting lower extremity edema noted    Abdomen: Soft, nontender. No guarding or rebound tenderness. Neurologic: SCHMIDT  to person, place, time, and event. No sensation deficits. Moving all extremities    Extremities: Skin warm, dry and intact.       DIFFERENTIAL  DIAGNOSIS     PLAN (LABS / IMAGING / EKG):  Orders Placed This Encounter   Procedures    Rapid influenza A/B antigens    Culture Blood #1    Culture Blood #1    XR CHEST STANDARD (2 VW)    Troponin    Troponin    CBC Auto Differential    Comprehensive Metabolic Panel    Brain Natriuretic Peptide    Urinalysis Reflex to Culture    Basic Metabolic Panel    Basic Metabolic Panel w/ Reflex to MG    CBC auto differential    Microscopic Urinalysis    Hemoglobin A1C    DIET CARB CONTROL; Carb Control: 4 carb choices (60 gms)/meal    Vital signs per unit routine    Daily weights    Intake and output    Tobacco cessation education protocol    Place intermittent pneumatic compression device    HYPOGLYCEMIA TREATMENT: blood glucose less than 50 mg/dL and patient  ALERT and TOLERATING PO    HYPOGLYCEMIA TREATMENT: blood glucose less than 70 mg/dL and patient ALERT and TOLERATING PO    HYPOGLYCEMIA TREATMENT: blood glucose less than 70 mg/dL and patient NOT ALERT or NPO    Notify physician    Up as tolerated    Telemetry Monitoring    Full Code    Inpatient consult to [facility]    OT eval and treat    PT evaluation and treat    Initiate Oxygen Therapy Protocol    Respiratory Care Evaluation and Treat    HHN Treatment    POC Glucose Fingerstick    POCT Glucose    POC Glucose Fingerstick    POCT glucose    POCT Glucose    POC Glucose Fingerstick    EKG 12 Lead    PATIENT STATUS (FROM ED OR OR/PROCEDURAL) Inpatient       MEDICATIONS ORDERED:  Orders Placed This Encounter   Medications    ipratropium-albuterol (DUONEB) nebulizer solution 1 ampule    methylPREDNISolone sodium (SOLU-MEDROL) injection 125 mg    insulin regular (HUMULIN R;NOVOLIN R) injection 10 Units    dextrose 10 % infusion    amLODIPine (NORVASC) tablet 10 mg    atorvastatin (LIPITOR) tablet 40 mg    calcium carbonate-vitamin D (CALTRATE) 600-400 MG-UNIT per tab 1 tablet    carvedilol (COREG) tablet 3.125 mg    coenzyme Q10 capsule 200 mg    diclofenac sodium 1 % gel 2 g    glimepiride (AMARYL) tablet 4 mg    DISCONTD: loratadine-pseudoephedrine (CLARITIN-D 12HR) 5-120 MG per extended release tablet 1 tablet    therapeutic multivitamin-minerals 1 tablet    pregabalin (LYRICA) capsule 150 mg    venlafaxine (EFFEXOR XR) extended release capsule 37.5 mg    sodium chloride flush 0.9 % injection 10 mL    sodium chloride flush 0.9 % injection 10 mL    magnesium hydroxide (MILK OF MAGNESIA) 400 MG/5ML suspension 30 mL    ondansetron (ZOFRAN) injection 4 mg    enoxaparin (LOVENOX) injection 30 mg    glucose (GLUTOSE) 40 % oral gel 15 g    dextrose 50 % IV solution    glucagon (rDNA) injection 1 mg    dextrose 5 % CLEAR    Glucose, Ur NEGATIVE NEGATIVE    Bilirubin Urine NEGATIVE NEGATIVE    Ketones, Urine NEGATIVE NEGATIVE    Specific [place], UA 1.011 1.000 - 1.030    Urine Hgb NEGATIVE NEGATIVE    pH, UA 5.0 5.0 - 8.0    Protein, UA NEGATIVE NEGATIVE    Urobilinogen, Urine Normal Normal    Nitrite, Urine NEGATIVE NEGATIVE    Leukocyte Esterase, Urine NEGATIVE NEGATIVE    Urinalysis Comments NOT REPORTED    Basic Metabolic Panel   Result Value Ref Range    Glucose 207 (H) 70 - 99 mg/dL    BUN 30 (H) 8 - 23 mg/dL    CREATININE 1.33 (H) 0.50 - 0.90 mg/dL    Bun/Cre Ratio NOT REPORTED 9 - 20    Calcium 8.6 8.6 - 10.4 mg/dL    Sodium 129 (L) 135 - 144 mmol/L    Potassium 4.9 3.7 - 5.3 mmol/L    Chloride 90 (L) 98 - 107 mmol/L    CO2 26 20 - 31 mmol/L    Anion Gap 13 9 - 17 mmol/L    GFR Non-African American 40 (L) >60 mL/min    GFR  49 (L) >60 mL/min    GFR Comment          GFR Staging NOT REPORTED    Microscopic Urinalysis   Result Value Ref Range    -          WBC, UA 0 TO 2 /HPF    RBC, UA None /HPF    Casts UA NOT REPORTED /LPF    Crystals UA NOT REPORTED None /HPF    Epithelial Cells UA 2 TO 5 /HPF    Renal Epithelial, Urine NOT REPORTED 0 /HPF    Bacteria, UA FEW (A) None    Mucus, UA NOT REPORTED None    Trichomonas, UA NOT REPORTED None    Amorphous, UA NOT REPORTED None    Other Observations UA NOT REPORTED NOT REQ.     Yeast, UA NOT REPORTED None   POC Glucose Fingerstick   Result Value Ref Range    POC Glucose 184 (H) 65 - 105 mg/dL   POC Glucose Fingerstick   Result Value Ref Range    POC Glucose 179 (H) 65 - 105 mg/dL   POC Glucose Fingerstick   Result Value Ref Range    POC Glucose 221 (H) 65 - 105 mg/dL   EKG 12 Lead   Result Value Ref Range    Ventricular Rate 81 BPM    Atrial Rate 81 BPM    P-R Interval 168 ms    QRS Duration 80 ms    Q-T Interval 338 ms    QTc Calculation (Bazett) 392 ms    P Axis 60 degrees    R Axis 60 degrees    T Axis 58 degrees           RADIOLOGY:  No results Isotretinoin Counseling: Patient should get monthly blood tests, not donate blood, not drive at night if vision affected, not share medication, and not undergo elective surgery for 6 months after tx completed. Side effects reviewed, pt to contact office should one occur.

## 2020-01-06 ENCOUNTER — APPOINTMENT (OUTPATIENT)
Dept: NUCLEAR MEDICINE | Age: 65
DRG: 683 | End: 2020-01-06
Payer: COMMERCIAL

## 2020-01-06 ENCOUNTER — APPOINTMENT (OUTPATIENT)
Dept: GENERAL RADIOLOGY | Age: 65
DRG: 683 | End: 2020-01-06
Payer: COMMERCIAL

## 2020-01-06 PROBLEM — E66.01 CLASS 3 SEVERE OBESITY DUE TO EXCESS CALORIES WITH SERIOUS COMORBIDITY AND BODY MASS INDEX (BMI) OF 45.0 TO 49.9 IN ADULT (HCC): Status: ACTIVE | Noted: 2020-01-06

## 2020-01-06 PROBLEM — E66.813 CLASS 3 SEVERE OBESITY DUE TO EXCESS CALORIES WITH SERIOUS COMORBIDITY AND BODY MASS INDEX (BMI) OF 45.0 TO 49.9 IN ADULT: Status: ACTIVE | Noted: 2020-01-06

## 2020-01-06 PROBLEM — R25.1 TREMOR: Status: ACTIVE | Noted: 2020-01-06

## 2020-01-06 LAB
ABSOLUTE EOS #: 0 K/UL (ref 0–0.4)
ABSOLUTE IMMATURE GRANULOCYTE: ABNORMAL K/UL (ref 0–0.3)
ABSOLUTE LYMPH #: 0.74 K/UL (ref 1–4.8)
ABSOLUTE MONO #: 0.12 K/UL (ref 0.1–1.3)
ALBUMIN SERPL-MCNC: 3.8 G/DL (ref 3.5–5.2)
ALBUMIN/GLOBULIN RATIO: ABNORMAL (ref 1–2.5)
ALLEN TEST: ABNORMAL
ALP BLD-CCNC: 99 U/L (ref 35–104)
ALT SERPL-CCNC: 31 U/L (ref 5–33)
AMMONIA: 40 UMOL/L (ref 11–51)
ANION GAP SERPL CALCULATED.3IONS-SCNC: 16 MMOL/L (ref 9–17)
AST SERPL-CCNC: 38 U/L
BASOPHILS # BLD: 0 % (ref 0–2)
BASOPHILS ABSOLUTE: 0 K/UL (ref 0–0.2)
BILIRUB SERPL-MCNC: <0.15 MG/DL (ref 0.3–1.2)
BILIRUBIN DIRECT: <0.08 MG/DL
BILIRUBIN, INDIRECT: ABNORMAL MG/DL (ref 0–1)
BUN BLDV-MCNC: 28 MG/DL (ref 8–23)
BUN/CREAT BLD: ABNORMAL (ref 9–20)
CALCIUM SERPL-MCNC: 9.1 MG/DL (ref 8.6–10.4)
CARBOXYHEMOGLOBIN: 1.3 % (ref 0–5)
CHLORIDE BLD-SCNC: 92 MMOL/L (ref 98–107)
CO2: 25 MMOL/L (ref 20–31)
CREAT SERPL-MCNC: 1.05 MG/DL (ref 0.5–0.9)
DIFFERENTIAL TYPE: ABNORMAL
EOSINOPHILS RELATIVE PERCENT: 0 % (ref 0–4)
ESTIMATED AVERAGE GLUCOSE: 192 MG/DL
FIO2: ABNORMAL
GFR AFRICAN AMERICAN: >60 ML/MIN
GFR NON-AFRICAN AMERICAN: 53 ML/MIN
GFR SERPL CREATININE-BSD FRML MDRD: ABNORMAL ML/MIN/{1.73_M2}
GFR SERPL CREATININE-BSD FRML MDRD: ABNORMAL ML/MIN/{1.73_M2}
GLOBULIN: ABNORMAL G/DL (ref 1.5–3.8)
GLUCOSE BLD-MCNC: 274 MG/DL (ref 65–105)
GLUCOSE BLD-MCNC: 286 MG/DL (ref 65–105)
GLUCOSE BLD-MCNC: 322 MG/DL (ref 65–105)
GLUCOSE BLD-MCNC: 328 MG/DL (ref 70–99)
GLUCOSE BLD-MCNC: 354 MG/DL (ref 65–105)
HBA1C MFR BLD: 8.3 % (ref 4–6)
HCO3 ARTERIAL: 32.2 MMOL/L (ref 22–26)
HCT VFR BLD CALC: 31.2 % (ref 36–46)
HEMOGLOBIN: 9.7 G/DL (ref 12–16)
IMMATURE GRANULOCYTES: ABNORMAL %
LYMPHOCYTES # BLD: 6 % (ref 24–44)
MCH RBC QN AUTO: 25.5 PG (ref 26–34)
MCHC RBC AUTO-ENTMCNC: 31.1 G/DL (ref 31–37)
MCV RBC AUTO: 82 FL (ref 80–100)
METHEMOGLOBIN: 0.8 % (ref 0–1.9)
MODE: ABNORMAL
MONOCYTES # BLD: 1 % (ref 1–7)
MORPHOLOGY: ABNORMAL
NEGATIVE BASE EXCESS, ART: ABNORMAL MMOL/L (ref 0–2)
NOTIFICATION TIME: ABNORMAL
NOTIFICATION: ABNORMAL
NRBC AUTOMATED: ABNORMAL PER 100 WBC
O2 DEVICE/FLOW/%: ABNORMAL
O2 SAT, ARTERIAL: 87.7 % (ref 95–98)
OXYHEMOGLOBIN: ABNORMAL % (ref 95–98)
PATIENT TEMP: 37
PCO2 ARTERIAL: 50.2 MMHG (ref 35–45)
PCO2, ART, TEMP ADJ: ABNORMAL (ref 35–45)
PDW BLD-RTO: 16.8 % (ref 11.5–14.9)
PEEP/CPAP: ABNORMAL
PH ARTERIAL: 7.42 (ref 7.35–7.45)
PH, ART, TEMP ADJ: ABNORMAL (ref 7.35–7.45)
PLATELET # BLD: 381 K/UL (ref 150–450)
PLATELET ESTIMATE: ABNORMAL
PMV BLD AUTO: 8.6 FL (ref 6–12)
PO2 ARTERIAL: 58 MMHG (ref 80–100)
PO2, ART, TEMP ADJ: ABNORMAL MMHG (ref 80–100)
POSITIVE BASE EXCESS, ART: 7.7 MMOL/L (ref 0–2)
POTASSIUM SERPL-SCNC: 4.8 MMOL/L (ref 3.7–5.3)
PSV: ABNORMAL
PT. POSITION: ABNORMAL
RBC # BLD: 3.8 M/UL (ref 4–5.2)
RBC # BLD: ABNORMAL 10*6/UL
RESPIRATORY RATE: 18
SAMPLE SITE: ABNORMAL
SEG NEUTROPHILS: 93 % (ref 36–66)
SEGMENTED NEUTROPHILS ABSOLUTE COUNT: 11.54 K/UL (ref 1.3–9.1)
SET RATE: ABNORMAL
SODIUM BLD-SCNC: 133 MMOL/L (ref 135–144)
TEXT FOR RESPIRATORY: ABNORMAL
THYROXINE, FREE: 0.99 NG/DL (ref 0.93–1.7)
TOTAL HB: ABNORMAL G/DL (ref 12–16)
TOTAL PROTEIN: 7.8 G/DL (ref 6.4–8.3)
TOTAL RATE: ABNORMAL
TSH SERPL DL<=0.05 MIU/L-ACNC: 0.22 MIU/L (ref 0.3–5)
VT: ABNORMAL
WBC # BLD: 12.4 K/UL (ref 3.5–11)
WBC # BLD: ABNORMAL 10*3/UL

## 2020-01-06 PROCEDURE — 80076 HEPATIC FUNCTION PANEL: CPT

## 2020-01-06 PROCEDURE — 2700000000 HC OXYGEN THERAPY PER DAY

## 2020-01-06 PROCEDURE — A9540 TC99M MAA: HCPCS | Performed by: INTERNAL MEDICINE

## 2020-01-06 PROCEDURE — 94761 N-INVAS EAR/PLS OXIMETRY MLT: CPT

## 2020-01-06 PROCEDURE — 82805 BLOOD GASES W/O2 SATURATION: CPT

## 2020-01-06 PROCEDURE — 82947 ASSAY GLUCOSE BLOOD QUANT: CPT

## 2020-01-06 PROCEDURE — 97530 THERAPEUTIC ACTIVITIES: CPT

## 2020-01-06 PROCEDURE — 6370000000 HC RX 637 (ALT 250 FOR IP): Performed by: FAMILY MEDICINE

## 2020-01-06 PROCEDURE — 71045 X-RAY EXAM CHEST 1 VIEW: CPT

## 2020-01-06 PROCEDURE — 84439 ASSAY OF FREE THYROXINE: CPT

## 2020-01-06 PROCEDURE — 6360000002 HC RX W HCPCS: Performed by: FAMILY MEDICINE

## 2020-01-06 PROCEDURE — 36415 COLL VENOUS BLD VENIPUNCTURE: CPT

## 2020-01-06 PROCEDURE — 82140 ASSAY OF AMMONIA: CPT

## 2020-01-06 PROCEDURE — 3430000000 HC RX DIAGNOSTIC RADIOPHARMACEUTICAL: Performed by: INTERNAL MEDICINE

## 2020-01-06 PROCEDURE — 99223 1ST HOSP IP/OBS HIGH 75: CPT | Performed by: FAMILY MEDICINE

## 2020-01-06 PROCEDURE — 80048 BASIC METABOLIC PNL TOTAL CA: CPT

## 2020-01-06 PROCEDURE — 99222 1ST HOSP IP/OBS MODERATE 55: CPT | Performed by: PSYCHIATRY & NEUROLOGY

## 2020-01-06 PROCEDURE — 84443 ASSAY THYROID STIM HORMONE: CPT

## 2020-01-06 PROCEDURE — 97165 OT EVAL LOW COMPLEX 30 MIN: CPT

## 2020-01-06 PROCEDURE — 94640 AIRWAY INHALATION TREATMENT: CPT

## 2020-01-06 PROCEDURE — 85025 COMPLETE CBC W/AUTO DIFF WBC: CPT

## 2020-01-06 PROCEDURE — 97162 PT EVAL MOD COMPLEX 30 MIN: CPT

## 2020-01-06 PROCEDURE — 2060000000 HC ICU INTERMEDIATE R&B

## 2020-01-06 PROCEDURE — 2580000003 HC RX 258: Performed by: FAMILY MEDICINE

## 2020-01-06 PROCEDURE — 78582 LUNG VENTILAT&PERFUS IMAGING: CPT

## 2020-01-06 PROCEDURE — A9538 TC99M PYROPHOSPHATE: HCPCS | Performed by: INTERNAL MEDICINE

## 2020-01-06 PROCEDURE — 36600 WITHDRAWAL OF ARTERIAL BLOOD: CPT

## 2020-01-06 RX ORDER — OXYCODONE HYDROCHLORIDE 30 MG/1
30 TABLET ORAL EVERY 8 HOURS PRN
COMMUNITY
End: 2020-01-20 | Stop reason: SDUPTHER

## 2020-01-06 RX ORDER — SODIUM CHLORIDE 0.9 % (FLUSH) 0.9 %
10 SYRINGE (ML) INJECTION 2 TIMES DAILY
Status: DISCONTINUED | OUTPATIENT
Start: 2020-01-06 | End: 2020-01-11 | Stop reason: HOSPADM

## 2020-01-06 RX ORDER — OXYCODONE HYDROCHLORIDE 10 MG/1
30 TABLET ORAL EVERY 8 HOURS PRN
Status: DISCONTINUED | OUTPATIENT
Start: 2020-01-06 | End: 2020-01-11 | Stop reason: HOSPADM

## 2020-01-06 RX ADMIN — ATORVASTATIN CALCIUM 40 MG: 40 TABLET, FILM COATED ORAL at 21:46

## 2020-01-06 RX ADMIN — OXYCODONE HYDROCHLORIDE 30 MG: 10 TABLET ORAL at 12:19

## 2020-01-06 RX ADMIN — INSULIN LISPRO 3 UNITS: 100 INJECTION, SOLUTION INTRAVENOUS; SUBCUTANEOUS at 12:06

## 2020-01-06 RX ADMIN — METHYLPREDNISOLONE SODIUM SUCCINATE 60 MG: 125 INJECTION, POWDER, FOR SOLUTION INTRAMUSCULAR; INTRAVENOUS at 06:09

## 2020-01-06 RX ADMIN — DICLOFENAC 2 G: 10 GEL TOPICAL at 11:02

## 2020-01-06 RX ADMIN — GLIMEPIRIDE 4 MG: 4 TABLET ORAL at 06:09

## 2020-01-06 RX ADMIN — METHYLPREDNISOLONE SODIUM SUCCINATE 60 MG: 125 INJECTION, POWDER, FOR SOLUTION INTRAMUSCULAR; INTRAVENOUS at 12:09

## 2020-01-06 RX ADMIN — PREGABALIN 150 MG: 150 CAPSULE ORAL at 08:46

## 2020-01-06 RX ADMIN — Medication 200 MG: at 21:48

## 2020-01-06 RX ADMIN — AMLODIPINE BESYLATE 10 MG: 10 TABLET ORAL at 08:46

## 2020-01-06 RX ADMIN — Medication 1 TABLET: at 08:46

## 2020-01-06 RX ADMIN — CARVEDILOL 3.12 MG: 3.12 TABLET, FILM COATED ORAL at 18:40

## 2020-01-06 RX ADMIN — Medication 27 MILLICURIE: at 16:59

## 2020-01-06 RX ADMIN — Medication 10 ML: at 17:30

## 2020-01-06 RX ADMIN — Medication 1 TABLET: at 21:46

## 2020-01-06 RX ADMIN — CARVEDILOL 3.12 MG: 3.12 TABLET, FILM COATED ORAL at 08:46

## 2020-01-06 RX ADMIN — INSULIN LISPRO 3 UNITS: 100 INJECTION, SOLUTION INTRAVENOUS; SUBCUTANEOUS at 22:58

## 2020-01-06 RX ADMIN — VENLAFAXINE HYDROCHLORIDE 37.5 MG: 37.5 CAPSULE, EXTENDED RELEASE ORAL at 21:49

## 2020-01-06 RX ADMIN — MULTIPLE VITAMINS W/ MINERALS TAB 1 TABLET: TAB at 21:46

## 2020-01-06 RX ADMIN — INSULIN LISPRO 4 UNITS: 100 INJECTION, SOLUTION INTRAVENOUS; SUBCUTANEOUS at 18:30

## 2020-01-06 RX ADMIN — DICLOFENAC 2 G: 10 GEL TOPICAL at 14:20

## 2020-01-06 RX ADMIN — CETIRIZINE HCL, PSEUDOEPHEDRINE HCL 1 TABLET: 5; 120 TABLET, EXTENDED RELEASE ORAL at 11:59

## 2020-01-06 RX ADMIN — IPRATROPIUM BROMIDE AND ALBUTEROL SULFATE 1 AMPULE: .5; 3 SOLUTION RESPIRATORY (INHALATION) at 19:22

## 2020-01-06 RX ADMIN — METHYLPREDNISOLONE SODIUM SUCCINATE 60 MG: 125 INJECTION, POWDER, FOR SOLUTION INTRAMUSCULAR; INTRAVENOUS at 22:59

## 2020-01-06 RX ADMIN — METHYLPREDNISOLONE SODIUM SUCCINATE 60 MG: 125 INJECTION, POWDER, FOR SOLUTION INTRAMUSCULAR; INTRAVENOUS at 18:29

## 2020-01-06 RX ADMIN — Medication 8.5 MILLICURIE: at 17:30

## 2020-01-06 RX ADMIN — Medication 10 ML: at 21:47

## 2020-01-06 RX ADMIN — INSULIN LISPRO 3 UNITS: 100 INJECTION, SOLUTION INTRAVENOUS; SUBCUTANEOUS at 08:45

## 2020-01-06 RX ADMIN — ENOXAPARIN SODIUM 30 MG: 30 INJECTION SUBCUTANEOUS at 21:47

## 2020-01-06 RX ADMIN — IPRATROPIUM BROMIDE AND ALBUTEROL SULFATE 1 AMPULE: .5; 3 SOLUTION RESPIRATORY (INHALATION) at 15:17

## 2020-01-06 RX ADMIN — DICLOFENAC 2 G: 10 GEL TOPICAL at 21:48

## 2020-01-06 RX ADMIN — PREGABALIN 150 MG: 150 CAPSULE ORAL at 21:46

## 2020-01-06 RX ADMIN — ENOXAPARIN SODIUM 30 MG: 30 INJECTION SUBCUTANEOUS at 08:44

## 2020-01-06 ASSESSMENT — PAIN SCALES - GENERAL
PAINLEVEL_OUTOF10: 7
PAINLEVEL_OUTOF10: 9
PAINLEVEL_OUTOF10: 6
PAINLEVEL_OUTOF10: 0
PAINLEVEL_OUTOF10: 2

## 2020-01-06 ASSESSMENT — PAIN DESCRIPTION - LOCATION: LOCATION: BACK

## 2020-01-06 ASSESSMENT — PAIN DESCRIPTION - ORIENTATION: ORIENTATION: LOWER;MID;UPPER

## 2020-01-06 ASSESSMENT — PAIN DESCRIPTION - ONSET: ONSET: ON-GOING

## 2020-01-06 ASSESSMENT — PAIN DESCRIPTION - PAIN TYPE: TYPE: CHRONIC PAIN

## 2020-01-06 ASSESSMENT — PAIN DESCRIPTION - FREQUENCY: FREQUENCY: CONTINUOUS

## 2020-01-06 ASSESSMENT — PAIN DESCRIPTION - PROGRESSION: CLINICAL_PROGRESSION: NOT CHANGED

## 2020-01-06 NOTE — CONSULTS
[facility] Neurology   IN-PATIENT SERVICE      NEUROLOGY CONSULT  NOTE            Date:   1/6/2020  Patient name:  Alexia Jain  Date of admission:  1/5/2020  YOB: 1955      Chief Complaint:     Chief Complaint   Patient presents with    Fatigue    Shortness of Breath       Reason for Consult:      Tremors    History of Present Illness: The patient is a 60 y.o. female who presents with Fatigue and Shortness of Breath  . The patient was seen and examined and the chart was reviewed. Patient admits to history of jerking type movements in the upper and lower extremities for the last 1.5 years. She states they randomly occur and are a jerking type movement and will come on suddenly. At times they will cause her to fall. She recently had one at the end of December that made her fall due to her leg jerking. She states today they are much improved, but had noticed prior to hospitalization that they were occurring rather frequently. She does not lose consciousness during them. There is no tongue biting. There is no bowel or bladder incontinence. She does not believe these are seizures. Patient had a ABG done earlier today with PCO2 50, PO2 58 and O2 sat 87%. She states she has chronic respiratory issues but is unsure what this is from. She states she has a history of CHF. Denies any history of COPD or smoking. She does admit to dyspnea upon exertion. She states at this time the jerking is very well controlled. EEG has not been done yet. She did have mild MAMTA upon admission which has now improved.     Past Medical History:     Past Medical History:   Diagnosis Date    Acute hypoxemic respiratory failure (HCC)     Arthritis     CHF (congestive heart failure) (HCC)     Chronic back pain     Diabetes mellitus type II, controlled ([facility] [address] 2/14/2012    Fibromyalgia     Hyperlipidemia LDL goal < 70 2/14/2012    Hypertension, benign 02/14/2012    Morbid obesity with BMI of 60.0-69.9, adult ([facility] [address] 8/28/2015    Pain of toe of right foot     morgans cyst    Right wrist pain     rate 8    Skin cancer     skin under lt eye,face    Sleep apnea     Bipap does not work    Wears glasses         Past Surgical History:     Past Surgical History:   Procedure Laterality Date    CARPAL TUNNEL RELEASE Right 09/05/2014    CATARACT REMOVAL WITH IMPLANT Bilateral 2013    CHOLECYSTECTOMY, LAPAROSCOPIC  1998    COLONOSCOPY N/A 10/17/2019    COLONOSCOPY DIAGNOSTIC performed by Isabel Christian MD at [facility] 238 cyst from base of tail bone   [address] [place] Bilateral 01/20/2017    abcess removed    LUMBAR FUSION  10/2010; 03/2011    L4/L5    LUMBAR FUSION  2009    L4-S1    OTHER SURGICAL HISTORY  05/02/2018    Lumbar decompression laminectomy at L3-L4 bilaterally wiith complete facetomies at L3-L4 bilaterally;  diskectomy L3-L4: posterior lumbar interbody fusion; placement of [place]-type graft; local harvest of bone; microsurgical dissection.  ROTATOR CUFF REPAIR Left 2012    SKIN CANCER EXCISION  2005    Basal Cell Carcinoma, Curly size from Lt. face    TUBAL LIGATION  1981    UPPER GASTROINTESTINAL ENDOSCOPY N/A 10/16/2019    EGD BIOPSY performed by Isabel Christina MD at [address] [place] [address]        Medications Prior to Admission:     Prior to Admission medications    Medication Sig Start Date End Date Taking? Authorizing Provider   oxyCODONE (OXY-IR) 30 MG immediate release tablet Take 30 mg by mouth every 8 hours as needed for Pain. Yes Historical Provider, MD   carvedilol (COREG) 3.125 MG tablet Take 1 tablet by mouth 2 times daily (with meals) 11/25/19  Yes Sarwat Chung MD   venlafaxine (EFFEXOR XR) 37.5 MG extended release capsule Take 1 capsule by mouth daily  Patient taking differently: Take 37.5 mg by mouth nightly  9/10/19  Yes Jerald Ontiveros MD   pregabalin (LYRICA) 150 MG capsule Take 1 capsule by mouth 2 times daily for 30 days. 11/25/19 12/25/19  Mj Jama MD   glimepiride (AMARYL) 4 MG tablet Take 1 tablet by mouth every morning (before breakfast) 11/26/19   Mj Jama MD   lisinopril (PRINIVIL;ZESTRIL) 5 MG tablet Take 1 tablet by mouth daily 11/26/19   Mj Jama MD   amLODIPine (NORVASC) 10 MG tablet Take 1 tablet by mouth daily 10/7/19   Sheri Doshi MD   metFORMIN (GLUCOPHAGE) 1000 MG tablet TAKE 1 TABLET TWICE DAILY WITH THE MORNING AND EVENING MEALS 9/4/19   Sheri Doshi MD   atorvastatin (LIPITOR) 40 MG tablet TAKE 1 TABLET BY MOUTH EVERY DAY AT NIGHT 9/4/19   Sheri Doshi MD   diclofenac sodium 1 % GEL Apply 2 g topically 3 times daily 2/20/19   Sheri Doshi MD   furosemide (LASIX) 40 MG tablet Take 0.5 tablets by mouth daily 9/5/18   Sheri Doshi MD   Calcium Carb-Cholecalciferol (CALCIUM 600 + D PO) Take 1 tablet by mouth 2 times daily    Historical Provider, MD   Multiple Vitamins-Iron (ONE DAILY MULTIVITAMIN/IRON PO) Take 1 tablet by mouth nightly    Historical Provider, MD   Coenzyme Q10 (COQ10 PO) Take 200 mg by mouth nightly     Historical Provider, MD   Loratadine-Pseudoephedrine (CLARITIN-D 12 HOUR PO) Take 1 tablet by mouth daily. Historical Provider, MD        Allergies:     Adhesive tape; Morphine; Iron; and Pcn [penicillins]    Social History:     Tobacco:    reports that she has never smoked. She has never used smokeless tobacco.  Alcohol:      reports previous alcohol use. Drug Use:  reports no history of drug use.     Family History:     Family History   Problem Relation Age of Onset    Diabetes Mother     Cancer Mother         Multiple Myeloma    Lung Cancer Father     Diabetes Sister     Diabetes Brother     Stomach Cancer Brother     Diabetes Maternal Grandmother     High Blood Pressure Paternal Grandmother     Heart Disease Paternal Grandmother     Stroke Paternal Grandmother     Heart Attack Paternal Grandfather     COPD Sister     High Blood

## 2020-01-06 NOTE — PROGRESS NOTES
[address]   Occupational Therapy Evaluation  Date: 20  Patient Name: Nelva Kanner       Room: -  MRN: [id number]  Account: [de-identified]   : 1955  (66 y.o.) Gender: female     Discharge Recommendations:  Further Occupational  Therapy is recommended upon facility discharge. OT Equipment Recommendations  Equipment Needed: No       Diagnosis: fall, slid out of recliner, hyperkalemia, MAMTA, CHF, full body tremors-  neurology consult r/o seizures  Additional Pertinent Hx: She was discharged 2 weeks ago after a CHF exacerbation. The patient is a 60 y.o. female who presents to [facility] with above complaints with cough and increased sputum. She has been feeling lightheaded. She was discharged 2 weeks ago after a CHF exacerbation. She has been having episodes of \"tremors\" - nursing described as whole body shaking in which she would stop conversation, then try to  conversation when the episode was over, but wouldn't quite be able to restart her thought process. She complains today of a sore throat and generally not feeling well. Past Medical History:  has a past medical history of Acute hypoxemic respiratory failure ([address] Arthritis, CHF (congestive heart failure) ([address] Chronic back pain, Diabetes mellitus type II, controlled ([address] Fibromyalgia, Hyperlipidemia LDL goal < 70, Hypertension, benign, Morbid obesity with BMI of 60.0-69.9, adult ([address] Pain of toe of right foot, Right wrist pain, Skin cancer, Sleep apnea, and Wears glasses. Past Surgical History:   has a past surgical history that includes Tubal ligation (); Hysterectomy (); lumbar fusion (10/2010; 2011); Carpal tunnel release (Right, 2014); cyst removal (); Cholecystectomy, laparoscopic (); lumbar fusion (); Rotator cuff repair (Left, ); Cataract removal with implant (Bilateral, ); Skin cancer excision ();  Inguinal hernia repair (Bilateral, 2017); other surgical history (05/02/2018); Upper gastrointestinal endoscopy (N/A, 10/16/2019); and Colonoscopy (N/A, 10/17/2019). Restrictions  Restrictions/Precautions: Fall Risk  Other position/activity restrictions: oxygen, was on at home     Vitals  Temp: 98.8 °F (37.1 °C)  Pulse: 94  Resp: 16  BP: (!) 153/59  Height: 5' 5\" (165.1 cm)  Weight: 297 lb 6.4 oz (134.9 kg)  BMI (Calculated): 49.6  Oxygen Therapy  SpO2: 92 %  O2 Device: Nasal cannula  O2 Flow Rate (L/min): 3.5 L/min  Level of Consciousness: Alert    Subjective  Subjective: \"I'm just not ready, exhausted. \" pt decline mobility, has external female catheter, notes has completed BSC SPT here with A x 2.      Vision  Vision: Impaired  Vision Exceptions: Wears glasses for reading  Hearing  Hearing: Within functional limits  Social/Functional History  Lives With: Spouse  Type of Home: House  Home Layout: Two level, Able to Live on Main level with bedroom/bathroom  Home Access: Stairs to enter with rails  Entrance Stairs - Number of Steps: (level entry then 4 small (4 inch) interior steps with B GB)  Entrance Stairs - Rails: Both  Bathroom Shower/Tub: Tub/Shower unit(on main)  Bathroom Toilet: Standard  Bathroom Equipment: Grab bars in shower, Shower chair, Grab bars around toilet  Home Equipment: 4 wheeled walker, [place], [address] Help From: Family  ADL Assistance: [facility]: Independent  Ambulation Assistance: Independent(cane and rollator in house)  Transfer Assistance: Independent  Active : Yes  Mode of Transportation: (has not driven for months)  IADL Comments: leaves house about once/ week to eat out, was home from hospital for CHF 2 weeks prior to this admit  Additional Comments: pt does laundry, occasional run vacum, clean bathroom but too fatigued to manage well, spouse cooks, dishes and [employer]  x 1 year due to pt with decreased memory       Objective      Cognition  Overall Cognitive Status: [facility] Sensation  Overall Sensation Status: WFL(WFL hands and feet per pt)   ADL  Feeding: Independent  Grooming: Setup  UE Bathing: Setup  LE Bathing: Setup, Maximum assistance  UE Dressing: Setup, Minimal assistance  LE Dressing: Setup, Maximum assistance  Toileting: Dependent/Total  Additional Comments: pt decline mobility, has external female catheter, notes has completed BSC SPT here with A x 2.    UE Function           LUE Strength  Gross LUE Strength: WFL           LUE AROM (degrees)  LUE AROM : WFL        RUE Strength  Gross RUE Strength: WFL            RUE AROM (degrees)  RUE AROM : WFL          Fine Motor Skills  Coordination  Movements Are Fluid And Coordinated: Yes                           Mobility             Standing Balance  Comment: pt decline mobility, has external female catheter, notes has completed BSC SPT here with A x 2. Transfers  Stand Step Transfers: Dependent/Total  Transfer Comments: pt decline mobility, has external female catheter, notes has completed BSC SPT here with A x 2.       Assessment  Performance deficits / Impairments: Decreased functional mobility , Decreased balance, Decreased ADL status, Decreased endurance, Decreased high-level IADLs  Prognosis: Fair  Decision Making: Low Complexity  History: fall, slid out of recliner, hyperkalemia, MAMTA, CHF, full body tremors-  neurology consult r/o seizures  Exam: 5 performance deficits  Assistance / Modification: low  REQUIRES OT FOLLOW UP: Yes  Activity Tolerance: Patient limited by fatigue  Activity Tolerance: pt mildly SOB supine with oxygen 3.5                   Goals  Patient Goals   Patient goals : return home, indep ambulation and self care as prior  Short term goals  Time Frame for Short term goals: 1 week  Short term goal 1: pt to daniele 15 min seated EOB with good balance during adls, ex  Short term goal 2: pt to daniele 2-4 min stand with BUE support and min A  Short term goal 3: min A and RW for adl transfers (have 2 A initially)  Short term goal 4: set up UE dressing  Short term goal 5: mod A LE bathe and dress, min A toileting    Plan        Plan  Times per week: 5-6  Times per day: Daily  Current Treatment Recommendations: Endurance Training, Patient/Caregiver Education & Training, Self-Care / ADL, Balance Training, Home Management Training, Functional Mobility Training, Safety Education & Training  OT Education  OT Education: OT Role, Plan of Care  Patient Education: activity promotion    OT Equipment Recommendations  Equipment Needed: No  OT Individual Minutes  Time In: 0066  Time Out: 8597  Minutes: 20    Electronically signed by Melvin Prajapati OT on 1/6/20 at 12:34 PM

## 2020-01-06 NOTE — PROGRESS NOTES
Called Dr. Ender Mcmullen regarding pt SOB orders received for respiratory evaluationl and treat, Also updated Dr Ender Mcmullen on pts tremors. Per Dr. Handley Edu. \"Ativan 0.5 mg PO or IV Q4 PRN. Will see in morning. \"   Orders placed will continue to monitor.

## 2020-01-06 NOTE — H&P
Family Medicine Admit Note    PCP: Sheree Camejo MD    Date of Admission: 1/5/2020    Date of Service: Pt seen/examined on 1/6/2020 and Admitted to Inpatient     Chief Complaint:  Fatigue, SOB      History Of Present Illness: The patient is a 60 y.o. female who presents to [facility] with above complaints with cough and increased sputum. She has been feeling lightheaded. She was discharged 2 weeks ago after a CHF exacerbation. She has been having episodes of \"tremors\" - nursing described as whole body shaking in which she would stop conversation, then try to  conversation when the episode was over, but wouldn't quite be able to restart her thought process. She complains today of a sore throat and generally not feeling well.     Past Medical History:        Diagnosis Date    Acute hypoxemic respiratory failure (HCC)     Arthritis     CHF (congestive heart failure) (HCC)     Chronic back pain     Diabetes mellitus type II, controlled ([facility] [address] 2/14/2012    Fibromyalgia     Hyperlipidemia LDL goal < 70 2/14/2012    Hypertension, benign 02/14/2012    Morbid obesity with BMI of 60.0-69.9, adult ([facility] [address] 8/28/2015    Pain of toe of right foot     morgans cyst    Right wrist pain     rate 8    Skin cancer     skin under lt eye,face    Sleep apnea     Bipap does not work    Wears glasses        Past Surgical History:        Procedure Laterality Date    CARPAL TUNNEL RELEASE Right 09/05/2014    CATARACT REMOVAL WITH IMPLANT Bilateral 2013    CHOLECYSTECTOMY, LAPAROSCOPIC  1998    COLONOSCOPY N/A 10/17/2019    COLONOSCOPY DIAGNOSTIC performed by Zeynep Luque MD at [facility] 238 cyst from base of tail bone   [id number] [facility] Bilateral 01/20/2017    abcess removed    LUMBAR FUSION  10/2010; 03/2011    L4/L5    LUMBAR FUSION  2009    L4-S1    OTHER SURGICAL HISTORY  05/02/2018    Lumbar decompression laminectomy at L3-L4 Take 1 tablet by mouth nightly    Historical Provider, MD   Coenzyme Q10 (COQ10 PO) Take 200 mg by mouth nightly     Historical Provider, MD   Loratadine-Pseudoephedrine (CLARITIN-D 12 HOUR PO) Take 1 tablet by mouth daily. Historical Provider, MD       Allergies:  Adhesive tape; Morphine; Iron; and Pcn [penicillins]    Social History:  The patient currently lives at home with spouse    TOBACCO:   reports that she has never smoked. She has never used smokeless tobacco.  ETOH:   reports previous alcohol use. Family History:          Problem Relation Age of Onset    Diabetes Mother     Cancer Mother         Multiple Myeloma    Lung Cancer Father     Diabetes Sister     Diabetes Brother     Stomach Cancer Brother     Diabetes Maternal Grandmother     High Blood Pressure Paternal Grandmother     Heart Disease Paternal Grandmother     Stroke Paternal Grandmother     Heart Attack Paternal Grandfather     COPD Sister     High Blood Pressure Son     High Blood Pressure Daughter     Heart Disease Sister         stents in       PHYSICAL EXAM:    BP (!) 153/59   Pulse 94   Temp 98.8 °F (37.1 °C) (Oral)   Resp 16   Ht 5' 5\" (1.651 m)   Wt 297 lb 6.4 oz (134.9 kg)   LMP  (LMP Unknown)   SpO2 92%   BMI 49.49 kg/m²     General appearance: ill appearing  HEENT Normal cephalic, atraumatic without obvious deformity. Neck: Supple, No jugular venous distention/bruits. Lungs: diminished with wheezes  Heart: Regular rate and rhythm with Normal S1/S2 without murmurs, rubs or gallops  Neurologic: no tremors  Mental status: Alert, oriented, thought content appropriate.     CXR:  I have reviewed the CXR with the following interpretation:   Similar appearance of cardiac silhouette enlargement.  Vascular congestion is  noted without overt edema.  No consolidation.           EKG:  I have reviewed the EKG with the following interpretation: NSR    CBC   Recent Labs     01/05/20  1715 01/06/20  0515   WBC 16.1*

## 2020-01-06 NOTE — PROGRESS NOTES
Physical Therapy    Facility/Department: [address] [place] [address] CARE  Initial Assessment    NAME: Merissa Redmond  : 1955  MRN: [id number]    Date of Service: 2020    Discharge Recommendations:  Patient would benefit from continued therapy after discharge   PT Equipment Recommendations  Equipment Needed: No    Assessment   Body structures, Functions, Activity limitations: Decreased functional mobility ; Decreased endurance;Decreased strength;Decreased balance;Decreased safe awareness  Assessment: continue per POC to maxmize potential for safe D/C  Treatment Diagnosis: weakness  Specific instructions for Next Treatment: 2020 mod x 1 for rolling to the left and supine > sit, min x 2 sit <> stand and bed > chair- pt took 5  side steps towards the chair w/ min x 2- refused w walker, O2 at 4 L, FALL RISK  Prognosis: Good  Decision Making: Medium Complexity  History: admitted due to MAMTA and hyperkalemia  Exam: ROM, MMT, balance and mobility assessments  Clinical Presentation: mod x 1 for rolling to the left and supine > sit, min x 2 sit <> stand and bed > chair- pt took 5  side steps towards the chair w/ min x 2- refused w walker, O2 at 4 L, FALL RISK  PT Education: Goals;PT Role;Plan of Care;Gait Training  Barriers to Learning: none  REQUIRES PT FOLLOW UP: Yes  Activity Tolerance  Activity Tolerance: Patient limited by fatigue;Patient limited by endurance       Patient Diagnosis(es): The primary encounter diagnosis was Hyperkalemia. A diagnosis of MAMTA (acute kidney injury) ([address] was also pertinent to this visit. has a past medical history of Acute hypoxemic respiratory failure ([address] Arthritis, CHF (congestive heart failure) ([address] Chronic back pain, Diabetes mellitus type II, controlled ([address] Fibromyalgia, Hyperlipidemia LDL goal < 70, Hypertension, benign, Morbid obesity with BMI of 60.0-69.9, adult ([address] Pain of toe of right foot, Right wrist pain, Skin cancer, Sleep apnea, and Wears glasses.    has a past surgical history that includes Tubal ligation (1981); Hysterectomy (1998); lumbar fusion (10/2010; 03/2011); Carpal tunnel release (Right, 09/05/2014); cyst removal (1985); Cholecystectomy, laparoscopic (1998); lumbar fusion (2009); Rotator cuff repair (Left, 2012); Cataract removal with implant (Bilateral, 2013); Skin cancer excision (2005); Inguinal hernia repair (Bilateral, 01/20/2017); other surgical history (05/02/2018); Upper gastrointestinal endoscopy (N/A, 10/16/2019); and Colonoscopy (N/A, 10/17/2019). Restrictions  Restrictions/Precautions  Restrictions/Precautions: Fall Risk(O2 at 4 L, peripheral IV right antecubital, external urinary catheter)  Required Braces or Orthoses?: No  Implants present? : Metal implants(lumbar surgeries x 4 )  Position Activity Restriction  Other position/activity restrictions: oxygen, was on at home  Vision/Hearing  Vision: Impaired  Vision Exceptions: Wears glasses for reading  Hearing: Within functional limits     Subjective  General  Patient assessed for rehabilitation services?: Yes  Response To Previous Treatment: Not applicable  Family / Caregiver Present: Yes(spouse)  Referring Practitioner: Dr. Geneva Tse  Referral Date : 01/05/20  Diagnosis: hyperkalemia, MAMTA  Follows Commands: Within Functional Limits  General Comment  Comments: per chart, pt had C/O SOB, coughing, fatigue, weakness and jerky movements for the last several days. Subjective  Subjective: \"I collapsed. I was standing next to the recliner and just went down. \" Spouse reports that the fall occurred yesterday around 3:30 p.m. \"These are the most uncomfortable beds. I got no sleep whatsoever. \"  Pain Screening  Patient Currently in Pain: Yes  Pain Assessment  Pain Assessment: 0-10  Pain Level: 7  Patient's Stated Pain Goal: 2  Pain Type: Chronic pain  Pain Location: Back  Pain Orientation: Lower;Mid;Upper  Pain Descriptors: (hurts )  Pain Frequency: Continuous  Pain Onset: On-going  Clinical Progression: Not changed  Non-Pharmaceutical Pain Intervention(s): Repositioned  Response to Pain Intervention: Drowsy  Multiple Pain Sites: No  Vital Signs  Patient Currently in Pain: Yes       Orientation  Orientation  Overall Orientation Status: Within Normal Limits(place: [facility], month: January, year: 2020, YOB: 1955, President: \"Alivia\")  Social/Functional History  Social/Functional History  Lives With: Spouse  Type of Home: House  Home Layout: Two level, Able to Live on Main level with bedroom/bathroom  Home Access: Stairs to enter with rails  Entrance Stairs - Number of Steps: (level entry then 4 small (4 inch) interior steps with B GB)  Entrance Stairs - Rails: Both  Bathroom Shower/Tub: Tub/Shower unit, Shower chair with back, Curtain(on main)  Bathroom Toilet: Standard  Bathroom Equipment: Grab bars in shower, Shower chair, Grab bars around toilet, Hand-held shower  Home Equipment: 4 wheeled walker, Cane, Oxygen, Reacher, Sock aid, Long-handled shoehorn, Lift chair, Wheelchair-manual(O2 at 2.5 L as needed PRN when sleeping)  Receives Help From: Family  ADL Assistance: Independent  Homemaking Assistance: Independent(pt does her own laundry, spouse does his own laundry, grocery shopping, cleaning and cooking)  Homemaking Responsibilities: Yes(pt does her own laundry, spouse does his own laundry, grocery shopping, cleaning and cooking)  Ambulation Assistance: Independent(uses rollator the majority of the time  in the house, uses s cane on steps)  Transfer Assistance: Independent  Active : Yes  Mode of Transportation: Family(has not driven for months, spouse is primary)  IADL Comments: leaves house about once/ week to eat out, was home from hospital for CHF 2 weeks prior to this admit  Additional Comments: pt does laundry, occasional run vacum, clean bathroom but too fatigued to manage well, spouse cooks, dishes and [employer] shop  x 1 year due to pt with decreased memory  Cognition Recommendations: Strengthening, Gait Training, Patient/Caregiver Education & Training, Stair training, Balance Training, Endurance Training, Functional Mobility Training, Transfer Training, Safety Education & Training  Safety Devices  Type of devices:  All fall risk precautions in place, Left in chair, Call light within reach, Nurse notified, Gait belt, Patient at risk for falls(nurse Cristi Amaya)    G-Code       OutComes Score                                                  AM-PAC Score  AM-PAC Inpatient Mobility Raw Score : 8 (01/06/20 1400)  AM-PAC Inpatient T-Scale Score : 28.52 (01/06/20 1400)  Mobility Inpatient CMS 0-100% Score: 86.62 (01/06/20 1400)  Mobility Inpatient CMS G-Code Modifier : CM (01/06/20 1400)          Goals  Short term goals  Time Frame for Short term goals: 5-7 treatments/ week  Short term goal 1: pt to tolerate 1/2 hour of therapuetic exercise and activity-  keeping O2 sats above 90%  Short term goal 2: pt to demonstrate good technique for LE strengthening and energy conservation techniques  Short term goal 3: pt to demonstrate  improved bed mobility to SBA x 1 for rolling and supine <> sit  w/ O2 as needed  Short term goal 4: pt to demonstrate transfers sit <> stand and bed <> chair w/ CGA x 1  w/ O2 as needed  Short term goal 5: pt to demonstrate gait w/ w walker 50-80' w/ CGA x 1 w/ O2 as needed  Short term goal 6: pt to demonstrate good balance using w walker  Short term goal 7: pt to demonstrate ability to ascend/ descend 4\" steps x 4 w/ s cane and rail and min x 1 to enter/ exit the home  Patient Goals   Patient goals : return home       Therapy Time   Individual Concurrent Group Co-treatment   Time In 1400         Time Out 1437         Minutes 37         Timed Code Treatment Minutes: [facility], PT

## 2020-01-06 NOTE — ED NOTES
Mode of arrival: EMS        Chief complaints: Fatigue and SOB        Scenario: Per EMS pt slid from recliner onto the floor. Pt states she was really weak and had generalized weakness and fatigue. Pt states for the last 2 weeks she has felt this way and now has a tremor and jerky movement. Pt states she been sleeping a lot and wears O2 while sleeping at 2.5L wearing it most of the day. Pt BS check upon arrival and labs to be drawn. Pt alert and oriented x4.        C= \"Have you ever felt that you should Cut down on your drinking? \"  No  A= \"Have people Annoyed you by criticizing your drinking? \"  No  G= \"Have you ever felt bad or Guilty about your drinking? \"  No  E= \"Have you ever had a drink as an Eye-opener first thing in the morning to steady your nerves or to help a hangover? \"  No      Deferred [x]      Reason for deferring: N/A    *If yes to two or more: probable alcohol abuse. Thaddeus Blanco RN  01/05/20 9200

## 2020-01-06 NOTE — PROGRESS NOTES
Pt arrived to floor via stretcher from ED and was transfered to bed. Vitals taken. Admission and assessment complete. No distress noted. Call light within reach, and pt educated on its use. Bed in lowest position, and locked. Side rails up x 2. Denied further questions or needs at this time. Will continue to monitor.

## 2020-01-06 NOTE — ED PROVIDER NOTES
[facility] ED     Emergency Department     Faculty Attestation        I performed a history and physical examination of the patient and discussed management with the resident. I reviewed the residents note and agree with the documented findings and plan of care. Any areas of disagreement are noted on the chart. I was personally present for the key portions of any procedures. I have documented in the chart those procedures where I was not present during the key portions. I have reviewed the emergency nurses triage note. I agree with the chief complaint, past medical history, past surgical history, allergies, medications, social and family history as documented unless otherwise noted below. Documentation of the HPI, Physical Exam and Medical Decision Making performed by medical students or scribes is based on my personal performance of the HPI, PE and MDM. For Physician Assistant/ Nurse Practitioner cases/documentation I have have had a face to face evaluation with this patient and have completed at least one if not all key elements of the E/M (history, physical exam, and MDM). Additional findings are as noted. Pertinent Comments     Primary Care Physician: Lubna Lemos MD    History: This is a 60 y.o. female who presents to the Emergency Department with complaint of generalized weakness, progressive cough and shortness of breath. Denies any chest pain at this time. Recent hospital admission for similar complaints. Physical:     height is 5' 5\" (1.651 m) and weight is 300 lb (136.1 kg). Her oral temperature is 97.8 °F (36.6 °C). Her blood pressure is 163/72 (abnormal) and her pulse is 86. Her respiration is 16 and oxygen saturation is 91%.    60 y.o. female     Afebrile, nontoxic, normal vital signs. No acute distress. Speaking in full sentences. Lung sounds are distant.   Actively coughing during exam.    Impression: Nonspecific progressive shortness of breath, weakness and cough. Possible pneumonia, URI, metabolic abnormality, ACS, dysrhythmia.     Plan: Broad cardiac, infectious and metabolic work-up    EKG Interpretation    Interpreted by me    Rhythm: normal sinus   Rate: normal  Axis: normal  Ectopy: none  Conduction: normal  ST Segments: no acute change  T Waves: Nonspecific  Q Waves: none    Clinical Impression: Nonspecific EKG, no significant ST or T wave changes, no QRS widening    (Please note that portions of this note were completed with a voice recognition program.  Efforts were made to edit the dictations but occasionally words are mis-transcribed.)    Lavinia Sexton DO  Attending Emergency Physician        Lavinia Sexton DO  01/05/20 5445

## 2020-01-06 NOTE — PROGRESS NOTES
Pt. Is having some full body tremor periodically throughout conversation. Pt. Stops conversation but dose not lose consciousness. After the tremor stops she restart conversation. Per  this has been going on for a couple weeks and she usually does not remember what they were talking about.

## 2020-01-06 NOTE — PROGRESS NOTES
RN verified with patient's pharmacy, [address], that the patient takes oxycodone IR 30mg tablets Q8H PRN. Okay to order per Dr Jose Daniel Spiveyllor.

## 2020-01-06 NOTE — PROGRESS NOTES
Breath Sounds: diminished     RR[de-identified] 16   Pulse Sat: 93% on 3.5lnc  Home Meds: no respiratory meds    · Bronchodilator assessment at level:   · []    Home Level  BRONCHODILATOR ASSESSMENT SCORE  Score 0 1 2 3 4 5   Breath Sounds   [x]  Patient Baseline []  No Wheeze good aeration []  Faint, scattered wheezing, good aeration []  Expiratory Wheezing and or moderately diminished []  Insp/Exp wheeze and/or very diminished []  Insp/Exp and/ or marked distress   Respiratory Rate   [x]  Patient Baseline []  Less than 20 []  Less than 20 []  20-25 []  Greater than 25 []  Greater than 25   Peak flow % of Pred or PB [x]  NA   []  Greater than 90%  []  81-90% []  71-80% []  Less than or equal to 70%  or unable to perform []  Unable due to Respiratory Distress   Dyspnea re [x]  Patient Baseline []  No SOB []  No SOB []  SOB on exertion []  SOB min activity []  At rest/acute   e FEV% Predicted       [x]  NA []  Above 69%  []  Unable []  Above 60-69%  []  Unable []  Above 50-59%  []  Unable []  Above 35-49%  []  Unable []  Less than 35%  []  Unable

## 2020-01-07 LAB
ABSOLUTE BANDS #: 0.55 K/UL (ref 0–1)
ABSOLUTE EOS #: 0 K/UL (ref 0–0.4)
ABSOLUTE IMMATURE GRANULOCYTE: ABNORMAL K/UL (ref 0–0.3)
ABSOLUTE LYMPH #: 0.37 K/UL (ref 1–4.8)
ABSOLUTE MONO #: 0.55 K/UL (ref 0.1–1.3)
ANION GAP SERPL CALCULATED.3IONS-SCNC: 14 MMOL/L (ref 9–17)
BANDS: 3 % (ref 0–10)
BASOPHILS # BLD: 0 % (ref 0–2)
BASOPHILS ABSOLUTE: 0 K/UL (ref 0–0.2)
BUN BLDV-MCNC: 27 MG/DL (ref 8–23)
BUN/CREAT BLD: ABNORMAL (ref 9–20)
CALCIUM SERPL-MCNC: 9.8 MG/DL (ref 8.6–10.4)
CHLORIDE BLD-SCNC: 93 MMOL/L (ref 98–107)
CO2: 27 MMOL/L (ref 20–31)
CREAT SERPL-MCNC: 1.1 MG/DL (ref 0.5–0.9)
DIFFERENTIAL TYPE: ABNORMAL
EKG ATRIAL RATE: 81 BPM
EKG P AXIS: 60 DEGREES
EKG P-R INTERVAL: 168 MS
EKG Q-T INTERVAL: 338 MS
EKG QRS DURATION: 80 MS
EKG QTC CALCULATION (BAZETT): 392 MS
EKG R AXIS: 60 DEGREES
EKG T AXIS: 58 DEGREES
EKG VENTRICULAR RATE: 81 BPM
EOSINOPHILS RELATIVE PERCENT: 0 % (ref 0–4)
GFR AFRICAN AMERICAN: >60 ML/MIN
GFR NON-AFRICAN AMERICAN: 50 ML/MIN
GFR SERPL CREATININE-BSD FRML MDRD: ABNORMAL ML/MIN/{1.73_M2}
GFR SERPL CREATININE-BSD FRML MDRD: ABNORMAL ML/MIN/{1.73_M2}
GLUCOSE BLD-MCNC: 250 MG/DL (ref 65–105)
GLUCOSE BLD-MCNC: 283 MG/DL (ref 65–105)
GLUCOSE BLD-MCNC: 289 MG/DL (ref 70–99)
GLUCOSE BLD-MCNC: 299 MG/DL (ref 65–105)
GLUCOSE BLD-MCNC: 302 MG/DL (ref 65–105)
GLUCOSE BLD-MCNC: 366 MG/DL (ref 65–105)
HCT VFR BLD CALC: 31.8 % (ref 36–46)
HEMOGLOBIN: 9.9 G/DL (ref 12–16)
IMMATURE GRANULOCYTES: ABNORMAL %
LYMPHOCYTES # BLD: 2 % (ref 24–44)
MCH RBC QN AUTO: 25.4 PG (ref 26–34)
MCHC RBC AUTO-ENTMCNC: 31.1 G/DL (ref 31–37)
MCV RBC AUTO: 81.6 FL (ref 80–100)
METAMYELOCYTES ABSOLUTE COUNT: 0.18 K/UL
METAMYELOCYTES: 1 %
MONOCYTES # BLD: 3 % (ref 1–7)
MORPHOLOGY: ABNORMAL
MORPHOLOGY: ABNORMAL
NRBC AUTOMATED: ABNORMAL PER 100 WBC
PDW BLD-RTO: 16.9 % (ref 11.5–14.9)
PLATELET # BLD: 441 K/UL (ref 150–450)
PLATELET ESTIMATE: ABNORMAL
PMV BLD AUTO: 8.3 FL (ref 6–12)
POTASSIUM SERPL-SCNC: 4.6 MMOL/L (ref 3.7–5.3)
RBC # BLD: 3.89 M/UL (ref 4–5.2)
RBC # BLD: ABNORMAL 10*6/UL
SEG NEUTROPHILS: 91 % (ref 36–66)
SEGMENTED NEUTROPHILS ABSOLUTE COUNT: 16.65 K/UL (ref 1.3–9.1)
SODIUM BLD-SCNC: 134 MMOL/L (ref 135–144)
WBC # BLD: 18.3 K/UL (ref 3.5–11)
WBC # BLD: ABNORMAL 10*3/UL

## 2020-01-07 PROCEDURE — 94761 N-INVAS EAR/PLS OXIMETRY MLT: CPT

## 2020-01-07 PROCEDURE — 2060000000 HC ICU INTERMEDIATE R&B

## 2020-01-07 PROCEDURE — 99232 SBSQ HOSP IP/OBS MODERATE 35: CPT | Performed by: PSYCHIATRY & NEUROLOGY

## 2020-01-07 PROCEDURE — 82947 ASSAY GLUCOSE BLOOD QUANT: CPT

## 2020-01-07 PROCEDURE — 6370000000 HC RX 637 (ALT 250 FOR IP): Performed by: FAMILY MEDICINE

## 2020-01-07 PROCEDURE — 6360000002 HC RX W HCPCS: Performed by: FAMILY MEDICINE

## 2020-01-07 PROCEDURE — 2580000003 HC RX 258: Performed by: INTERNAL MEDICINE

## 2020-01-07 PROCEDURE — 93010 ELECTROCARDIOGRAM REPORT: CPT | Performed by: INTERNAL MEDICINE

## 2020-01-07 PROCEDURE — 2700000000 HC OXYGEN THERAPY PER DAY

## 2020-01-07 PROCEDURE — 99232 SBSQ HOSP IP/OBS MODERATE 35: CPT | Performed by: FAMILY MEDICINE

## 2020-01-07 PROCEDURE — 85025 COMPLETE CBC W/AUTO DIFF WBC: CPT

## 2020-01-07 PROCEDURE — 36415 COLL VENOUS BLD VENIPUNCTURE: CPT

## 2020-01-07 PROCEDURE — 6360000002 HC RX W HCPCS: Performed by: INTERNAL MEDICINE

## 2020-01-07 PROCEDURE — 95819 EEG AWAKE AND ASLEEP: CPT

## 2020-01-07 PROCEDURE — 94640 AIRWAY INHALATION TREATMENT: CPT

## 2020-01-07 PROCEDURE — 95816 EEG AWAKE AND DROWSY: CPT | Performed by: PSYCHIATRY & NEUROLOGY

## 2020-01-07 PROCEDURE — 80048 BASIC METABOLIC PNL TOTAL CA: CPT

## 2020-01-07 RX ORDER — FUROSEMIDE 20 MG/1
20 TABLET ORAL DAILY
Status: DISCONTINUED | OUTPATIENT
Start: 2020-01-07 | End: 2020-01-11 | Stop reason: HOSPADM

## 2020-01-07 RX ORDER — METHYLPREDNISOLONE SODIUM SUCCINATE 40 MG/ML
40 INJECTION, POWDER, LYOPHILIZED, FOR SOLUTION INTRAMUSCULAR; INTRAVENOUS EVERY 6 HOURS
Status: DISCONTINUED | OUTPATIENT
Start: 2020-01-07 | End: 2020-01-08

## 2020-01-07 RX ADMIN — OXYCODONE HYDROCHLORIDE 30 MG: 10 TABLET ORAL at 01:31

## 2020-01-07 RX ADMIN — IPRATROPIUM BROMIDE AND ALBUTEROL SULFATE 1 AMPULE: .5; 3 SOLUTION RESPIRATORY (INHALATION) at 19:31

## 2020-01-07 RX ADMIN — ENOXAPARIN SODIUM 30 MG: 30 INJECTION SUBCUTANEOUS at 20:56

## 2020-01-07 RX ADMIN — CETIRIZINE HCL, PSEUDOEPHEDRINE HCL 1 TABLET: 5; 120 TABLET, EXTENDED RELEASE ORAL at 11:57

## 2020-01-07 RX ADMIN — FUROSEMIDE 20 MG: 20 TABLET ORAL at 09:33

## 2020-01-07 RX ADMIN — Medication 1 TABLET: at 09:16

## 2020-01-07 RX ADMIN — Medication 1 TABLET: at 20:56

## 2020-01-07 RX ADMIN — INSULIN LISPRO 3 UNITS: 100 INJECTION, SOLUTION INTRAVENOUS; SUBCUTANEOUS at 20:56

## 2020-01-07 RX ADMIN — CARVEDILOL 3.12 MG: 3.12 TABLET, FILM COATED ORAL at 09:17

## 2020-01-07 RX ADMIN — AMLODIPINE BESYLATE 10 MG: 10 TABLET ORAL at 09:16

## 2020-01-07 RX ADMIN — IPRATROPIUM BROMIDE AND ALBUTEROL SULFATE 1 AMPULE: .5; 3 SOLUTION RESPIRATORY (INHALATION) at 12:19

## 2020-01-07 RX ADMIN — DICLOFENAC 2 G: 10 GEL TOPICAL at 15:14

## 2020-01-07 RX ADMIN — GLIMEPIRIDE 4 MG: 4 TABLET ORAL at 05:17

## 2020-01-07 RX ADMIN — METHYLPREDNISOLONE SODIUM SUCCINATE 40 MG: 40 INJECTION, POWDER, FOR SOLUTION INTRAMUSCULAR; INTRAVENOUS at 17:19

## 2020-01-07 RX ADMIN — ENOXAPARIN SODIUM 30 MG: 30 INJECTION SUBCUTANEOUS at 09:17

## 2020-01-07 RX ADMIN — DICLOFENAC 2 G: 10 GEL TOPICAL at 09:18

## 2020-01-07 RX ADMIN — IPRATROPIUM BROMIDE AND ALBUTEROL SULFATE 1 AMPULE: .5; 3 SOLUTION RESPIRATORY (INHALATION) at 07:43

## 2020-01-07 RX ADMIN — INSULIN LISPRO 3 UNITS: 100 INJECTION, SOLUTION INTRAVENOUS; SUBCUTANEOUS at 09:20

## 2020-01-07 RX ADMIN — PREGABALIN 150 MG: 150 CAPSULE ORAL at 09:16

## 2020-01-07 RX ADMIN — INSULIN LISPRO 4 UNITS: 100 INJECTION, SOLUTION INTRAVENOUS; SUBCUTANEOUS at 11:57

## 2020-01-07 RX ADMIN — DICLOFENAC 2 G: 10 GEL TOPICAL at 23:49

## 2020-01-07 RX ADMIN — ATORVASTATIN CALCIUM 40 MG: 40 TABLET, FILM COATED ORAL at 20:56

## 2020-01-07 RX ADMIN — PREGABALIN 150 MG: 150 CAPSULE ORAL at 20:56

## 2020-01-07 RX ADMIN — METHYLPREDNISOLONE SODIUM SUCCINATE 40 MG: 40 INJECTION, POWDER, FOR SOLUTION INTRAMUSCULAR; INTRAVENOUS at 23:42

## 2020-01-07 RX ADMIN — CARVEDILOL 3.12 MG: 3.12 TABLET, FILM COATED ORAL at 17:19

## 2020-01-07 RX ADMIN — Medication 10 ML: at 09:19

## 2020-01-07 RX ADMIN — OXYCODONE HYDROCHLORIDE 30 MG: 10 TABLET ORAL at 15:18

## 2020-01-07 RX ADMIN — IPRATROPIUM BROMIDE AND ALBUTEROL SULFATE 1 AMPULE: .5; 3 SOLUTION RESPIRATORY (INHALATION) at 15:32

## 2020-01-07 RX ADMIN — METHYLPREDNISOLONE SODIUM SUCCINATE 60 MG: 125 INJECTION, POWDER, FOR SOLUTION INTRAMUSCULAR; INTRAVENOUS at 09:33

## 2020-01-07 RX ADMIN — METHYLPREDNISOLONE SODIUM SUCCINATE 60 MG: 125 INJECTION, POWDER, FOR SOLUTION INTRAMUSCULAR; INTRAVENOUS at 05:17

## 2020-01-07 RX ADMIN — INSULIN LISPRO 3 UNITS: 100 INJECTION, SOLUTION INTRAVENOUS; SUBCUTANEOUS at 17:19

## 2020-01-07 ASSESSMENT — PAIN SCALES - GENERAL
PAINLEVEL_OUTOF10: 7
PAINLEVEL_OUTOF10: 8
PAINLEVEL_OUTOF10: 0
PAINLEVEL_OUTOF10: 1

## 2020-01-07 NOTE — PROGRESS NOTES
 Hypertension, benign 02/14/2012    Morbid obesity with BMI of 60.0-69.9, adult ([facility] [address] 8/28/2015    Pain of toe of right foot     morgans cyst    Right wrist pain     rate 8    Skin cancer     skin under lt eye,face    Sleep apnea     Bipap does not work    Wears glasses         Past Surgical History:     Past Surgical History:   Procedure Laterality Date    CARPAL TUNNEL RELEASE Right 09/05/2014    CATARACT REMOVAL WITH IMPLANT Bilateral 2013    CHOLECYSTECTOMY, LAPAROSCOPIC  1998    COLONOSCOPY N/A 10/17/2019    COLONOSCOPY DIAGNOSTIC performed by Inocencia Mcginnis MD at [facility] 238 cyst from base of tail bone   [address] [place] Bilateral 01/20/2017    abcess removed    LUMBAR FUSION  10/2010; 03/2011    L4/L5    LUMBAR FUSION  2009    L4-S1    OTHER SURGICAL HISTORY  05/02/2018    Lumbar decompression laminectomy at L3-L4 bilaterally wiith complete facetomies at L3-L4 bilaterally;  diskectomy L3-L4: posterior lumbar interbody fusion; placement of [place]-type graft; local harvest of bone; microsurgical dissection.  ROTATOR CUFF REPAIR Left 2012    SKIN CANCER EXCISION  2005    Basal Cell Carcinoma, Curly size from Lt.  face    TUBAL LIGATION  1981    UPPER GASTROINTESTINAL ENDOSCOPY N/A 10/16/2019    EGD BIOPSY performed by Inocencia Mcginnis MD at [address] [place] [address]        Medications during admission:      furosemide  20 mg Oral Daily    sodium chloride flush  10 mL Intravenous BID    sodium chloride flush  10 mL Intravenous BID    ipratropium-albuterol  1 ampule Inhalation Q4H WA    amLODIPine  10 mg Oral Daily    atorvastatin  40 mg Oral Nightly    calcium carbonate-vitamin D  1 tablet Oral BID    carvedilol  3.125 mg Oral BID WC    coenzyme Q10  200 mg Oral Nightly    diclofenac sodium  2 g Topical TID    glimepiride  4 mg Oral QAM AC    therapeutic multivitamin-minerals  1 tablet Oral Nightly    pregabalin  150 mg Oral BID  venlafaxine  37.5 mg Oral Nightly    sodium chloride flush  10 mL Intravenous 2 times per day    enoxaparin  30 mg Subcutaneous BID    insulin lispro  0-6 Units Subcutaneous TID WC    insulin lispro  0-3 Units Subcutaneous Nightly    methylPREDNISolone  60 mg Intravenous Q6H    cetirizine-psuedoephedrine  1 tablet Oral Daily         Physical Exam:   BP (!) 176/66   Pulse 87   Temp 97.6 °F (36.4 °C) (Oral)   Resp 16   Ht 5' 5\" (1.651 m)   Wt 297 lb 6.4 oz (134.9 kg)   LMP  (LMP Unknown)   SpO2 98%   BMI 49.49 kg/m²   Temp (24hrs), Av.1 °F (36.7 °C), Min:97.6 °F (36.4 °C), Max:98.8 °F (37.1 °C)          Neurological examination:    Mental status    Alert and oriented x 3; following all commands; speech is fluent, no dysarthria, aphasia.       Cranial nerves    II - visual fields intact to confrontation; pupils reactive  III, IV, VI - extraocular muscles intact; no ALICIA; no nystagmus; no ptosis   V - normal facial sensation                                                               VII - normal facial symmetry                                                             VIII - intact hearing                                                                             IX, X - symmetrical palate elevation                                               XI - symmetrical shoulder shrug                                                       XII - midline tongue without atrophy or fasciculation      Motor function  Strength: 5/5 RUE, 5/5 RLE, 5/5 LUE, 5/5  LLE  Normal bulk and tone. No tremors                       Sensory function  decreased to light touch, temperature sensation in bilateral LE distally to proximally      Cerebellar Intact finger-nose-finger testing.      Reflex function 1/4 symmetric throughout . Downgoing plantar response bilaterally.  (-)Morrissey's sign bilaterally    Gait                   not assessed due to dyspnea                Diagnostics:      Laboratory Testing:  CBC:   Recent Labs     01/05/20  1715 01/06/20  0515 01/07/20  0500   WBC 16.1* 12.4* 18.3*   HGB 9.7* 9.7* 9.9*    381 441     BMP:    Recent Labs     01/05/20 2000 01/06/20  0515 01/07/20  0500   * 133* 134*   K 4.9 4.8 4.6   CL 90* 92* 93*   CO2 26 25 27   BUN 30* 28* 27*   CREATININE 1.33* 1.05* 1.10*   GLUCOSE 207* 328* 289*         Lab Results   Component Value Date    CHOL 134 08/29/2019    LDLCHOLESTEROL 57 11/23/2019    HDL 41 11/23/2019    TRIG 193 (H) 08/29/2019    ALT 31 01/06/2020    AST 38 (H) 01/06/2020    TSH 0.22 (L) 01/06/2020    INR 1.1 11/22/2019    LABA1C 8.3 (H) 01/05/2020    LABMICR 10 10/26/2017    [id number] 322 11/22/2019       Imaging/Diagnostics:      EEG: Normal.  No epileptiform activity. N/a      All of the above medications, clinical laboratory, imaging and other diagnostic tests were reviewed by myself. Impression:      1. Multifocal myoclonus in extremities likely metabolic in etiology. Elevated CO2, MAMTA contributing. Very low suspicion for seizure.   -Resolved    2. Dyspnea secondary to obesity hypoventilation syndrome    3. Diabetic peripheral neuropathy     Plan:     -Continue treatment of respiratory issues, elevated CO2 could be contributing  -Can consider low-dose Klonopin 0.5 mg twice daily as needed if myoclonus reoccurs in the future  -At this time patient neurologically stable. Will sign off. Please contact with any further questions or concerns.       Electronically signed by Lamin York DO on 1/7/2020 at 10:51 AM      Lamin York, [address] [facility]  Neurology

## 2020-01-07 NOTE — PROGRESS NOTES
MD        Or    LORazepam (ATIVAN) tablet 0.5 mg  0.5 mg Oral Q4H PRN Layne Zapata MD        albuterol (PROVENTIL) nebulizer solution 2.5 mg  2.5 mg Nebulization Q6H PRN Layne Zapata MD            BP (!) 148/52   Pulse 87   Temp 98.4 °F (36.9 °C) (Oral)   Resp 16   Ht 5' 5\" (1.651 m)   Wt 297 lb 6.4 oz (134.9 kg)   LMP  (LMP Unknown)   SpO2 96%   BMI 49.49 kg/m²    I/O last 3 completed shifts: In: 5904 [P.O.:400; I.V.:919]  Out: -   No intake/output data recorded. General: Patient is alert, oriented, in no acute distress. HEENT: Head normocephalic, atraumatic. Lung Exam: End expiratory wheezes throughout lung fields, most prominent in RUL. Heart Exam: RRR, no murmurs, rubs or gallops. Extremity Exam: 1+ pitting edema in LE bilaterally. Mental Status: Cooperative, appropriate affect.     Recent Results (from the past 24 hour(s))   POC Glucose Fingerstick    Collection Time: 01/06/20 10:51 PM   Result Value Ref Range    POC Glucose 354 (H) 65 - 105 mg/dL   Basic Metabolic Panel w/ Reflex to MG    Collection Time: 01/07/20  5:00 AM   Result Value Ref Range    Glucose 289 (H) 70 - 99 mg/dL    BUN 27 (H) 8 - 23 mg/dL    CREATININE 1.10 (H) 0.50 - 0.90 mg/dL    Bun/Cre Ratio NOT REPORTED 9 - 20    Calcium 9.8 8.6 - 10.4 mg/dL    Sodium 134 (L) 135 - 144 mmol/L    Potassium 4.6 3.7 - 5.3 mmol/L    Chloride 93 (L) 98 - 107 mmol/L    CO2 27 20 - 31 mmol/L    Anion Gap 14 9 - 17 mmol/L    GFR Non-African American 50 (L) >60 mL/min    GFR African American >60 >60 mL/min    GFR Comment          GFR Staging NOT REPORTED    CBC auto differential    Collection Time: 01/07/20  5:00 AM   Result Value Ref Range    WBC 18.3 (H) 3.5 - 11.0 k/uL    RBC 3.89 (L) 4.0 - 5.2 m/uL    Hemoglobin 9.9 (L) 12.0 - 16.0 g/dL    Hematocrit 31.8 (L) 36 - 46 %    MCV 81.6 80 - 100 fL    MCH 25.4 (L) 26 - 34 pg    MCHC 31.1 31 - 37 g/dL    RDW 16.9 (H) 11.5 - 14.9 %    Platelets 819 069 - 721 k/uL    MPV 8.3 6.0 - 12.0 fL    NRBC Automated NOT REPORTED per 100 WBC    Differential Type NOT REPORTED     Immature Granulocytes NOT REPORTED 0 %    Absolute Immature Granulocyte NOT REPORTED 0.00 - 0.30 k/uL    WBC Morphology NOT REPORTED     RBC Morphology NOT REPORTED     Platelet Estimate NOT REPORTED     Seg Neutrophils 91 (H) 36 - 66 %    Lymphocytes 2 (L) 24 - 44 %    Monocytes 3 1 - 7 %    Eosinophils % 0 0 - 4 %    Basophils 0 0 - 2 %    Bands 3 0 - 10 %    Metamyelocytes 1 (H) 0 %    Segs Absolute 16.65 (H) 1.3 - 9.1 k/uL    Absolute Lymph # 0.37 (L) 1.0 - 4.8 k/uL    Absolute Mono # 0.55 0.1 - 1.3 k/uL    Absolute Eos # 0.00 0.0 - 0.4 k/uL    Basophils Absolute 0.00 0.0 - 0.2 k/uL    Absolute Bands # 0.55 0.0 - 1.0 k/uL    Metamyelocytes Absolute 0.18 (H) 0 k/uL    Morphology ANISOCYTOSIS PRESENT     Morphology MICROCYTOSIS PRESENT    POC Glucose Fingerstick    Collection Time: 01/07/20  5:15 AM   Result Value Ref Range    POC Glucose 250 (H) 65 - 105 mg/dL   POC Glucose Fingerstick    Collection Time: 01/07/20  7:12 AM   Result Value Ref Range    POC Glucose 283 (H) 65 - 105 mg/dL   POC Glucose Fingerstick    Collection Time: 01/07/20 11:15 AM   Result Value Ref Range    POC Glucose 302 (H) 65 - 105 mg/dL   POC Glucose Fingerstick    Collection Time: 01/07/20  4:02 PM   Result Value Ref Range    POC Glucose 299 (H) 65 - 105 mg/dL     Assessment:   Principal Problem:    Hyperkalemia  Active Problems:    Hypertension, benign    Diabetes mellitus type II, controlled (HCC)    Chronic pain syndrome    MAMTA (acute kidney injury) ([facility])    Class 3 severe obesity due to excess calories with serious comorbidity and body mass index (BMI) of 45.0 to 49.9 in adult [facility])    Tremor  Resolved Problems:    * No resolved hospital problems. *       Plan:    Continue DVT prophylaxis. V/Q scan from yesterday shows low probability of PE. Continue Lasix. Patient seen and examined by me.   Discussed in rounds with student    VQ scan low probability for pulmonary embolism. Patient's wheezing is less.   We will continue with Lovenox for DVT prophylaxis  Decrease Solu-Medrol to 40 every 6 hours    Electronically signed by Helene Kay MD on 1/7/2020 at 4:29 PM

## 2020-01-07 NOTE — PROCEDURES
EEG REPORT    Patient Name: Robson Avila  Patient MRN: [id number]    Referring Physician: Michael Mg DO  Dictating Physician: Michael Mg DO  Date: 1/7/20      CLINICAL HISTORY: This EEG was performed on a 60 y.o. female with The primary encounter diagnosis was Hyperkalemia. A diagnosis of MAMTA (acute kidney injury) ([address] was also pertinent to this visit. . It is being performed to evaluate for seizure activity. CURRENT ANTI-EPILEPTIC MEDICATIONS: Lyrica    DESCRIPTION: Wakefulness and drowsiness were obtained. During wakefulness there was a posterior background of regular, reactive, symmetrical, moderate voltage 9 Hz activity with an anterior background of low voltage mixed frequencies. During drowsiness there was symmetrical slowing of the waking background. Photic stimulation evoked no significant posterior driving response. Hyperventilation was not performed due to patient's clinical history. EEG DIAGNOSIS: Normal    CLINICAL INTERPRETATION: No epileptiform activity or evidence of focal cerebral dysfunction was present. No electrographic seizures were recorded.      Michael Mg, [address] [place] [address]  Neurology

## 2020-01-07 NOTE — PROGRESS NOTES
Lizz Armenta is a 60 y.o. female patient.     Current Facility-Administered Medications   Medication Dose Route Frequency Provider Last Rate Last Dose    furosemide (LASIX) tablet 20 mg  20 mg Oral Daily Layne Zapata MD        oxyCODONE HCl (OXY-IR) immediate release tablet 30 mg  30 mg Oral Q8H PRN Layne Zapata MD   30 mg at 01/07/20 0131    sodium chloride flush 0.9 % injection 10 mL  10 mL Intravenous BID Justina Lopez MD        sodium chloride flush 0.9 % injection 10 mL  10 mL Intravenous BID Justina Lopez MD        ipratropium-albuterol (DUONEB) nebulizer solution 1 ampule  1 ampule Inhalation Q4H WA Layne Zapata MD   1 ampule at 01/07/20 0743    amLODIPine (NORVASC) tablet 10 mg  10 mg Oral Daily Layne Zapata MD   10 mg at 01/06/20 0846    atorvastatin (LIPITOR) tablet 40 mg  40 mg Oral Nightly Layne Zapata MD   40 mg at 01/06/20 2146    calcium carbonate-vitamin D (CALTRATE) 600-400 MG-UNIT per tab 1 tablet  1 tablet Oral BID Layne Zapata MD   1 tablet at 01/06/20 2146    carvedilol (COREG) tablet 3.125 mg  3.125 mg Oral BID WC Layne Zapata MD   3.125 mg at 01/06/20 1840    coenzyme Q10 capsule 200 mg  200 mg Oral Nightly Layne Zapata MD   200 mg at 01/06/20 2148    diclofenac sodium 1 % gel 2 g  2 g Topical TID Layne Zapata MD   2 g at 01/06/20 2148    glimepiride (AMARYL) tablet 4 mg  4 mg Oral QAM AC Layne Zapata MD   4 mg at 01/07/20 6854    therapeutic multivitamin-minerals 1 tablet  1 tablet Oral Nightly Layne Zapata MD   1 tablet at 01/06/20 2146    pregabalin (LYRICA) capsule 150 mg  150 mg Oral BID Layne Zapata MD   150 mg at 01/06/20 2146    venlafaxine (EFFEXOR XR) extended release capsule 37.5 mg  37.5 mg Oral Nightly Layne Zapata MD   37.5 mg at 01/06/20 2149    sodium chloride flush 0.9 % injection 10 mL  10 mL Intravenous 2 times per day Layne Zapata MD   10 mL at 01/06/20 2147    sodium chloride flush 0.9 % injection 10 mL  10 mL Intravenous PRN Monica Urena MD   10 mL at 01/06/20 1730    magnesium hydroxide (MILK OF MAGNESIA) 400 MG/5ML suspension 30 mL  30 mL Oral Daily PRN Monica Urena MD        ondansetron [facility]) injection 4 mg  4 mg Intravenous Q6H PRN Monica Urena MD        enoxaparin (LOVENOX) injection 30 mg  30 mg Subcutaneous BID Monica Urena MD   30 mg at 01/06/20 2147    glucose (GLUTOSE) 40 % oral gel 15 g  15 g Oral PRN Monica Urena MD        dextrose 50 % IV solution  12.5 g Intravenous PRN Monica Urena MD        glucagon (rDNA) injection 1 mg  1 mg Intramuscular PRN Monica Urena MD        dextrose 5 % solution  100 mL/hr Intravenous PRN Monica Urena MD        acetaminophen (TYLENOL) tablet 650 mg  650 mg Oral Q4H PRN Monica Urena MD        insulin lispro (HUMALOG) injection vial 0-6 Units  0-6 Units Subcutaneous TID WC Monica Urena MD   4 Units at 01/06/20 1830    insulin lispro (HUMALOG) injection vial 0-3 Units  0-3 Units Subcutaneous Nightly Monica Urena MD   3 Units at 01/06/20 2258    methylPREDNISolone sodium (SOLU-MEDROL) injection 60 mg  60 mg Intravenous Q6H Monica Urena MD   60 mg at 01/07/20 0517    cetirizine-psuedoephedrine (ZYRTEC-D) 5-120 MG per extended release tablet 1 tablet  1 tablet Oral Daily Monica Urena MD   1 tablet at 01/06/20 1159    LORazepam (ATIVAN) injection 0.5 mg  0.5 mg Intravenous Q4H PRN Monica Urena MD        Or    LORazepam (ATIVAN) tablet 0.5 mg  0.5 mg Oral Q4H PRN Monica Urena MD        albuterol (PROVENTIL) nebulizer solution 2.5 mg  2.5 mg Nebulization Q6H PRN Monica Urena MD         Allergies   Allergen Reactions    Adhesive Tape Other (See Comments)     Skin blisters severe skin tearing    Morphine Other (See Comments)     Severe Headache    Iron Other (See Comments) Stomach cramps    Pcn [Penicillins] Swelling     Injection site swelling     Principal Problem:    Hyperkalemia  Active Problems:    Hypertension, benign    Diabetes mellitus type II, controlled (HCC)    Chronic pain syndrome    MAMTA (acute kidney injury) ([facility] [address]    Class 3 severe obesity due to excess calories with serious comorbidity and body mass index (BMI) of 45.0 to 49.9 in adult [facility])    Tremor  Resolved Problems:    * No resolved hospital problems. *    Blood pressure (!) 176/66, pulse 87, temperature 97.6 °F (36.4 °C), temperature source Oral, resp. rate 16, height 5' 5\" (1.651 m), weight 297 lb 6.4 oz (134.9 kg), SpO2 98 %, not currently breastfeeding. Subjective:  Symptoms:  Improved. (No more major episodes of myoclonus. Less short of breath. Sleeping in recliner for comfort. Getting a breathing treatment during exam. ). Diet:  Adequate intake. Activity level: Impaired due to weakness. Pain:  She complains of pain that is moderate. Objective:  General Appearance:  Comfortable. Vital signs: (most recent): Blood pressure (!) 176/66, pulse 87, temperature 97.6 °F (36.4 °C), temperature source Oral, resp. rate 16, height 5' 5\" (1.651 m), weight 297 lb 6.4 oz (134.9 kg), SpO2 98 %, not currently breastfeeding. Vital signs are normal.  (BP elevated). Heart: Normal rate. Regular rhythm. S1 normal and S2 normal.      Assessment & Plan  Shortness of breath d/t obesity hypoventilation syndrome - per pulmonology. No PE on V/Q    Myoclonus - per neurology    MAMTA - resolving. D/c IVF and restart low dose lasix. Chronic pain syndrome - home pain meds    Diabetes - hyperglycemia d/t steroids - home meds and bolus insulin as needed.    Aleksander Burch MD  1/7/2020

## 2020-01-08 ENCOUNTER — APPOINTMENT (OUTPATIENT)
Dept: CT IMAGING | Age: 65
DRG: 683 | End: 2020-01-08
Payer: COMMERCIAL

## 2020-01-08 LAB
ABSOLUTE EOS #: 0 K/UL (ref 0–0.4)
ABSOLUTE IMMATURE GRANULOCYTE: ABNORMAL K/UL (ref 0–0.3)
ABSOLUTE LYMPH #: 0.83 K/UL (ref 1–4.8)
ABSOLUTE MONO #: 0.5 K/UL (ref 0.1–1.3)
ANION GAP SERPL CALCULATED.3IONS-SCNC: 17 MMOL/L (ref 9–17)
BASOPHILS # BLD: 0 % (ref 0–2)
BASOPHILS ABSOLUTE: 0 K/UL (ref 0–0.2)
BNP INTERPRETATION: ABNORMAL
BUN BLDV-MCNC: 32 MG/DL (ref 8–23)
BUN/CREAT BLD: ABNORMAL (ref 9–20)
CALCIUM SERPL-MCNC: 9.9 MG/DL (ref 8.6–10.4)
CHLORIDE BLD-SCNC: 94 MMOL/L (ref 98–107)
CO2: 27 MMOL/L (ref 20–31)
CREAT SERPL-MCNC: 1.03 MG/DL (ref 0.5–0.9)
DIFFERENTIAL TYPE: ABNORMAL
EOSINOPHILS RELATIVE PERCENT: 0 % (ref 0–4)
GFR AFRICAN AMERICAN: >60 ML/MIN
GFR NON-AFRICAN AMERICAN: 54 ML/MIN
GFR SERPL CREATININE-BSD FRML MDRD: ABNORMAL ML/MIN/{1.73_M2}
GFR SERPL CREATININE-BSD FRML MDRD: ABNORMAL ML/MIN/{1.73_M2}
GLUCOSE BLD-MCNC: 208 MG/DL (ref 65–105)
GLUCOSE BLD-MCNC: 222 MG/DL (ref 65–105)
GLUCOSE BLD-MCNC: 251 MG/DL (ref 65–105)
GLUCOSE BLD-MCNC: 265 MG/DL (ref 70–99)
GLUCOSE BLD-MCNC: 287 MG/DL (ref 65–105)
GLUCOSE BLD-MCNC: 346 MG/DL (ref 65–105)
HCT VFR BLD CALC: 32.1 % (ref 36–46)
HEMOGLOBIN: 9.9 G/DL (ref 12–16)
IMMATURE GRANULOCYTES: ABNORMAL %
LYMPHOCYTES # BLD: 5 % (ref 24–44)
MCH RBC QN AUTO: 25.3 PG (ref 26–34)
MCHC RBC AUTO-ENTMCNC: 30.8 G/DL (ref 31–37)
MCV RBC AUTO: 82 FL (ref 80–100)
MONOCYTES # BLD: 3 % (ref 1–7)
MORPHOLOGY: ABNORMAL
MORPHOLOGY: ABNORMAL
NRBC AUTOMATED: ABNORMAL PER 100 WBC
PDW BLD-RTO: 16.9 % (ref 11.5–14.9)
PLATELET # BLD: 431 K/UL (ref 150–450)
PLATELET ESTIMATE: ABNORMAL
PMV BLD AUTO: 7.9 FL (ref 6–12)
POTASSIUM SERPL-SCNC: 4.8 MMOL/L (ref 3.7–5.3)
PRO-BNP: 785 PG/ML
RBC # BLD: 3.91 M/UL (ref 4–5.2)
RBC # BLD: ABNORMAL 10*6/UL
SEG NEUTROPHILS: 92 % (ref 36–66)
SEGMENTED NEUTROPHILS ABSOLUTE COUNT: 15.27 K/UL (ref 1.3–9.1)
SODIUM BLD-SCNC: 138 MMOL/L (ref 135–144)
TROPONIN INTERP: NORMAL
TROPONIN T: NORMAL NG/ML
TROPONIN, HIGH SENSITIVITY: 8 NG/L (ref 0–14)
WBC # BLD: 16.6 K/UL (ref 3.5–11)
WBC # BLD: ABNORMAL 10*3/UL

## 2020-01-08 PROCEDURE — 6370000000 HC RX 637 (ALT 250 FOR IP): Performed by: FAMILY MEDICINE

## 2020-01-08 PROCEDURE — 94761 N-INVAS EAR/PLS OXIMETRY MLT: CPT

## 2020-01-08 PROCEDURE — 2060000000 HC ICU INTERMEDIATE R&B

## 2020-01-08 PROCEDURE — 71250 CT THORAX DX C-: CPT

## 2020-01-08 PROCEDURE — 6360000002 HC RX W HCPCS: Performed by: FAMILY MEDICINE

## 2020-01-08 PROCEDURE — 80048 BASIC METABOLIC PNL TOTAL CA: CPT

## 2020-01-08 PROCEDURE — 36415 COLL VENOUS BLD VENIPUNCTURE: CPT

## 2020-01-08 PROCEDURE — 6360000002 HC RX W HCPCS: Performed by: INTERNAL MEDICINE

## 2020-01-08 PROCEDURE — 2580000003 HC RX 258: Performed by: FAMILY MEDICINE

## 2020-01-08 PROCEDURE — 99232 SBSQ HOSP IP/OBS MODERATE 35: CPT | Performed by: FAMILY MEDICINE

## 2020-01-08 PROCEDURE — 85025 COMPLETE CBC W/AUTO DIFF WBC: CPT

## 2020-01-08 PROCEDURE — 2580000003 HC RX 258: Performed by: INTERNAL MEDICINE

## 2020-01-08 PROCEDURE — 84484 ASSAY OF TROPONIN QUANT: CPT

## 2020-01-08 PROCEDURE — 83880 ASSAY OF NATRIURETIC PEPTIDE: CPT

## 2020-01-08 PROCEDURE — 94640 AIRWAY INHALATION TREATMENT: CPT

## 2020-01-08 PROCEDURE — 82947 ASSAY GLUCOSE BLOOD QUANT: CPT

## 2020-01-08 PROCEDURE — 2700000000 HC OXYGEN THERAPY PER DAY

## 2020-01-08 RX ORDER — METHYLPREDNISOLONE SODIUM SUCCINATE 40 MG/ML
40 INJECTION, POWDER, LYOPHILIZED, FOR SOLUTION INTRAMUSCULAR; INTRAVENOUS EVERY 8 HOURS
Status: DISCONTINUED | OUTPATIENT
Start: 2020-01-08 | End: 2020-01-08

## 2020-01-08 RX ORDER — METHYLPREDNISOLONE SODIUM SUCCINATE 40 MG/ML
30 INJECTION, POWDER, LYOPHILIZED, FOR SOLUTION INTRAMUSCULAR; INTRAVENOUS EVERY 12 HOURS
Status: DISCONTINUED | OUTPATIENT
Start: 2020-01-08 | End: 2020-01-11

## 2020-01-08 RX ADMIN — FUROSEMIDE 20 MG: 20 TABLET ORAL at 09:35

## 2020-01-08 RX ADMIN — OXYCODONE HYDROCHLORIDE 30 MG: 10 TABLET ORAL at 23:01

## 2020-01-08 RX ADMIN — IPRATROPIUM BROMIDE AND ALBUTEROL SULFATE 1 AMPULE: .5; 3 SOLUTION RESPIRATORY (INHALATION) at 08:21

## 2020-01-08 RX ADMIN — ATORVASTATIN CALCIUM 40 MG: 40 TABLET, FILM COATED ORAL at 22:47

## 2020-01-08 RX ADMIN — Medication 1 TABLET: at 09:34

## 2020-01-08 RX ADMIN — INSULIN LISPRO 3 UNITS: 100 INJECTION, SOLUTION INTRAVENOUS; SUBCUTANEOUS at 09:37

## 2020-01-08 RX ADMIN — VENLAFAXINE HYDROCHLORIDE 37.5 MG: 37.5 CAPSULE, EXTENDED RELEASE ORAL at 22:49

## 2020-01-08 RX ADMIN — CETIRIZINE HCL, PSEUDOEPHEDRINE HCL 1 TABLET: 5; 120 TABLET, EXTENDED RELEASE ORAL at 09:37

## 2020-01-08 RX ADMIN — GLIMEPIRIDE 4 MG: 4 TABLET ORAL at 05:17

## 2020-01-08 RX ADMIN — DICLOFENAC 2 G: 10 GEL TOPICAL at 09:35

## 2020-01-08 RX ADMIN — IPRATROPIUM BROMIDE AND ALBUTEROL SULFATE 1 AMPULE: .5; 3 SOLUTION RESPIRATORY (INHALATION) at 11:55

## 2020-01-08 RX ADMIN — PREGABALIN 150 MG: 150 CAPSULE ORAL at 22:48

## 2020-01-08 RX ADMIN — PREGABALIN 150 MG: 150 CAPSULE ORAL at 09:35

## 2020-01-08 RX ADMIN — MULTIPLE VITAMINS W/ MINERALS TAB 1 TABLET: TAB at 22:48

## 2020-01-08 RX ADMIN — AMLODIPINE BESYLATE 10 MG: 10 TABLET ORAL at 09:35

## 2020-01-08 RX ADMIN — Medication 1 TABLET: at 22:47

## 2020-01-08 RX ADMIN — Medication 10 ML: at 23:02

## 2020-01-08 RX ADMIN — INSULIN LISPRO 2 UNITS: 100 INJECTION, SOLUTION INTRAVENOUS; SUBCUTANEOUS at 22:48

## 2020-01-08 RX ADMIN — METHYLPREDNISOLONE SODIUM SUCCINATE 40 MG: 40 INJECTION, POWDER, FOR SOLUTION INTRAMUSCULAR; INTRAVENOUS at 05:17

## 2020-01-08 RX ADMIN — Medication 10 ML: at 09:36

## 2020-01-08 RX ADMIN — CARVEDILOL 3.12 MG: 3.12 TABLET, FILM COATED ORAL at 09:35

## 2020-01-08 RX ADMIN — ENOXAPARIN SODIUM 30 MG: 30 INJECTION SUBCUTANEOUS at 22:47

## 2020-01-08 RX ADMIN — DICLOFENAC 2 G: 10 GEL TOPICAL at 22:47

## 2020-01-08 RX ADMIN — INSULIN LISPRO 2 UNITS: 100 INJECTION, SOLUTION INTRAVENOUS; SUBCUTANEOUS at 11:43

## 2020-01-08 RX ADMIN — INSULIN LISPRO 3 UNITS: 100 INJECTION, SOLUTION INTRAVENOUS; SUBCUTANEOUS at 17:20

## 2020-01-08 RX ADMIN — CARVEDILOL 3.12 MG: 3.12 TABLET, FILM COATED ORAL at 17:19

## 2020-01-08 RX ADMIN — METHYLPREDNISOLONE SODIUM SUCCINATE 40 MG: 40 INJECTION, POWDER, FOR SOLUTION INTRAMUSCULAR; INTRAVENOUS at 11:43

## 2020-01-08 RX ADMIN — Medication 200 MG: at 22:49

## 2020-01-08 RX ADMIN — METHYLPREDNISOLONE SODIUM SUCCINATE 30 MG: 40 INJECTION, POWDER, FOR SOLUTION INTRAMUSCULAR; INTRAVENOUS at 17:19

## 2020-01-08 RX ADMIN — ENOXAPARIN SODIUM 30 MG: 30 INJECTION SUBCUTANEOUS at 09:35

## 2020-01-08 RX ADMIN — IPRATROPIUM BROMIDE AND ALBUTEROL SULFATE 1 AMPULE: .5; 3 SOLUTION RESPIRATORY (INHALATION) at 16:01

## 2020-01-08 RX ADMIN — IPRATROPIUM BROMIDE AND ALBUTEROL SULFATE 1 AMPULE: .5; 3 SOLUTION RESPIRATORY (INHALATION) at 19:59

## 2020-01-08 ASSESSMENT — PAIN SCALES - GENERAL: PAINLEVEL_OUTOF10: 6

## 2020-01-08 ASSESSMENT — ENCOUNTER SYMPTOMS: SHORTNESS OF BREATH: 1

## 2020-01-08 NOTE — PROGRESS NOTES
[facility]   Daily Progress Note  PATIENT NAME: Estela Rollins                     : 1955    Subjective: Patient was seen and examined at bedside. Patient reports that she feels worse overall since yesterday. She notes that she is not necessarily short of breath, but that she feels the need to \"cough something up\" but is unable to. She claims that her cough is no longer productive. Patient reports that she feels feverish and has chills. She feels exceptionally tired and weak compared to yesterday.     Objective:   Current Facility-Administered Medications   Medication Dose Route Frequency Provider Last Rate Last Dose    methylPREDNISolone sodium (SOLU-MEDROL) injection 40 mg  40 mg Intravenous Q8H Parth Solano MD        furosemide (LASIX) tablet 20 mg  20 mg Oral Daily Angela Balbuena MD   20 mg at 20 0935    oxyCODONE HCl (OXY-IR) immediate release tablet 30 mg  30 mg Oral Q8H PRN Angela Balbuena MD   30 mg at 20 1518    sodium chloride flush 0.9 % injection 10 mL  10 mL Intravenous BID Parth Solano MD        sodium chloride flush 0.9 % injection 10 mL  10 mL Intravenous BID Parth Solano MD   10 mL at 20 0936    ipratropium-albuterol (DUONEB) nebulizer solution 1 ampule  1 ampule Inhalation Q4H WA Angela Balbuena MD   1 ampule at 20 1601    amLODIPine (NORVASC) tablet 10 mg  10 mg Oral Daily Angela Balbuena MD   10 mg at 20 0935    atorvastatin (LIPITOR) tablet 40 mg  40 mg Oral Nightly Angela Balbuena MD   40 mg at 20 2056    calcium carbonate-vitamin D (CALTRATE) 600-400 MG-UNIT per tab 1 tablet  1 tablet Oral BID Angela Balbuena MD   1 tablet at 20 0934    carvedilol (COREG) tablet 3.125 mg  3.125 mg Oral BID WC Angela Balbuena MD   3.125 mg at 20 0935    coenzyme Q10 capsule 200 mg  200 mg Oral Nightly Angela Balbuena MD   200 mg at 20 2148    diclofenac sodium 1 % gel 2 g  2 g Topical TID Celester Levels, MD   2 g at 01/08/20 0935    glimepiride (AMARYL) tablet 4 mg  4 mg Oral QAM AC Celester Levels, MD   4 mg at 01/08/20 3825    therapeutic multivitamin-minerals 1 tablet  1 tablet Oral Nightly Celester Levels, MD   1 tablet at 01/06/20 2146    pregabalin (LYRICA) capsule 150 mg  150 mg Oral BID Celester Levels, MD   150 mg at 01/08/20 0935    venlafaxine (EFFEXOR XR) extended release capsule 37.5 mg  37.5 mg Oral Nightly Celester Levels, MD   37.5 mg at 01/06/20 2149    sodium chloride flush 0.9 % injection 10 mL  10 mL Intravenous 2 times per day Celester Levels, MD   10 mL at 01/06/20 2147    sodium chloride flush 0.9 % injection 10 mL  10 mL Intravenous PRN Celester Levels, MD   10 mL at 01/06/20 1730    magnesium hydroxide (MILK OF MAGNESIA) 400 MG/5ML suspension 30 mL  30 mL Oral Daily PRN Celester Levels, MD        ondansetron [facility]) injection 4 mg  4 mg Intravenous Q6H PRN Celester Levels, MD        enoxaparin (LOVENOX) injection 30 mg  30 mg Subcutaneous BID Celester Levels, MD   30 mg at 01/08/20 0935    glucose (GLUTOSE) 40 % oral gel 15 g  15 g Oral PRN Celester Levels, MD        dextrose 50 % IV solution  12.5 g Intravenous PRN Celester Levels, MD        glucagon (rDNA) injection 1 mg  1 mg Intramuscular PRN Celester Levels, MD        dextrose 5 % solution  100 mL/hr Intravenous PRN Celester Levels, MD        acetaminophen (TYLENOL) tablet 650 mg  650 mg Oral Q4H PRN Celester Levels, MD        insulin lispro (HUMALOG) injection vial 0-6 Units  0-6 Units Subcutaneous TID WC Celester Levels, MD   2 Units at 01/08/20 1143    insulin lispro (HUMALOG) injection vial 0-3 Units  0-3 Units Subcutaneous Nightly Celester Levels, MD   3 Units at 01/07/20 2056    cetirizine-psuedoephedrine (ZYRTEC-D) 5-120 MG per extended release tablet 1 tablet  1 tablet Oral Daily Kristin Zapata pg    MCHC 30.8 (L) 31 - 37 g/dL    RDW 16.9 (H) 11.5 - 14.9 %    Platelets 307 309 - 955 k/uL    MPV 7.9 6.0 - 12.0 fL    NRBC Automated NOT REPORTED per 100 WBC    Differential Type NOT REPORTED     Immature Granulocytes NOT REPORTED 0 %    Absolute Immature Granulocyte NOT REPORTED 0.00 - 0.30 k/uL    WBC Morphology NOT REPORTED     RBC Morphology NOT REPORTED     Platelet Estimate NOT REPORTED     Seg Neutrophils 92 (H) 36 - 66 %    Lymphocytes 5 (L) 24 - 44 %    Monocytes 3 1 - 7 %    Eosinophils % 0 0 - 4 %    Basophils 0 0 - 2 %    Segs Absolute 15.27 (H) 1.3 - 9.1 k/uL    Absolute Lymph # 0.83 (L) 1.0 - 4.8 k/uL    Absolute Mono # 0.50 0.1 - 1.3 k/uL    Absolute Eos # 0.00 0.0 - 0.4 k/uL    Basophils Absolute 0.00 0.0 - 0.2 k/uL    Morphology ANISOCYTOSIS PRESENT     Morphology MICROCYTOSIS PRESENT    Brain Natriuretic Peptide    Collection Time: 01/08/20  8:41 AM   Result Value Ref Range    Pro- (H) <300 pg/mL    BNP Interpretation Pro-BNP Reference Range:    TROPONIN    Collection Time: 01/08/20  8:41 AM   Result Value Ref Range    Troponin, High Sensitivity 8 0 - 14 ng/L    Troponin T NOT REPORTED <0.03 ng/mL    Troponin Interp NOT REPORTED    POC Glucose Fingerstick    Collection Time: 01/08/20  9:28 AM   Result Value Ref Range    POC Glucose 251 (H) 65 - 105 mg/dL   POC Glucose Fingerstick    Collection Time: 01/08/20 11:14 AM   Result Value Ref Range    POC Glucose 208 (H) 65 - 105 mg/dL   POC Glucose Fingerstick    Collection Time: 01/08/20  3:58 PM   Result Value Ref Range    POC Glucose 287 (H) 65 - 105 mg/dL     Assessment:   Principal Problem:    Hyperkalemia  Active Problems:    Hypertension, benign    Diabetes mellitus type II, controlled (HCC)    Chronic pain syndrome    MAMTA (acute kidney injury) (HCC)    Class 3 severe obesity due to excess calories with serious comorbidity and body mass index (BMI) of 45.0 to 49.9 in [facility])    Tremor  Resolved Problems:    * No resolved hospital problems. *    Patient with evidence of chronic respiratory failure with hypercapnia. No gross evidence of pulmonary embolism given low probability VQ scan. Her main concern is that sometimes she has a hard time bringing up phlegm. She thinks it is down there. Her breathing is about the same no worse no better. Patient is on Lovenox for DVT prophylaxis  She is not wheezing much    Plan:    Continue DVT prophylaxis. With Lovenox  Solu-Medrol to 30 every 12 hours  Change DuoNeb to albuterol  Check CT scan without IV contrast to see if there is a parenchymal issue regarding her breathing.     will follow        Electronically signed by Alanna Ovalles MD on 1/8/2020 at 4:30 PM

## 2020-01-08 NOTE — PROGRESS NOTES
Alexia Jain is a 60 y.o. female patient.     Current Facility-Administered Medications   Medication Dose Route Frequency Provider Last Rate Last Dose    furosemide (LASIX) tablet 20 mg  20 mg Oral Daily Calderon Schwab MD   20 mg at 01/07/20 0933    methylPREDNISolone sodium (SOLU-MEDROL) injection 40 mg  40 mg Intravenous Q6H Melanie Umaña MD   40 mg at 01/08/20 0517    oxyCODONE HCl (OXY-IR) immediate release tablet 30 mg  30 mg Oral Q8H PRN Calderon Schwab MD   30 mg at 01/07/20 1518    sodium chloride flush 0.9 % injection 10 mL  10 mL Intravenous BID Melanie Umaña MD        sodium chloride flush 0.9 % injection 10 mL  10 mL Intravenous BID Melanie Umaña MD   10 mL at 01/07/20 0919    ipratropium-albuterol ([facility]) nebulizer solution 1 ampule  1 ampule Inhalation Q4H WA Calderon Schwab MD   1 ampule at 01/07/20 1931    amLODIPine (NORVASC) tablet 10 mg  10 mg Oral Daily Calderon Schwab MD   10 mg at 01/07/20 6729    atorvastatin (LIPITOR) tablet 40 mg  40 mg Oral Nightly Calderon Schwab MD   40 mg at 01/07/20 2056    calcium carbonate-vitamin D (CALTRATE) 600-400 MG-UNIT per tab 1 tablet  1 tablet Oral BID Calderon Schwab MD   1 tablet at 01/07/20 2056    carvedilol (COREG) tablet 3.125 mg  3.125 mg Oral BID WC Calderon Schwab MD   3.125 mg at 01/07/20 1719    coenzyme Q10 capsule 200 mg  200 mg Oral Nightly Calderon Schwab MD   200 mg at 01/06/20 2148    diclofenac sodium 1 % gel 2 g  2 g Topical TID Calderon Schwab MD   2 g at 01/07/20 2349    glimepiride (AMARYL) tablet 4 mg  4 mg Oral QAM AC Calderon Schwab MD   4 mg at 01/08/20 3039    therapeutic multivitamin-minerals 1 tablet  1 tablet Oral Nightly Calderon Shcwab MD   1 tablet at 01/06/20 2146    pregabalin (LYRICA) capsule 150 mg  150 mg Oral BID Calderon Schwab MD   150 mg at 01/07/20 2056    venlafaxine (EFFEXOR XR) extended release capsule 37.5 mg  37.5 mg Oral Nightly Jenny Mcguire MD   37.5 mg at 01/06/20 2149    sodium chloride flush 0.9 % injection 10 mL  10 mL Intravenous 2 times per day Jenny Mcguire MD   10 mL at 01/06/20 2147    sodium chloride flush 0.9 % injection 10 mL  10 mL Intravenous PRN Jenny Mcguire MD   10 mL at 01/06/20 1730    magnesium hydroxide (MILK OF MAGNESIA) 400 MG/5ML suspension 30 mL  30 mL Oral Daily PRN Jenny Mcguire MD        ondansetron [facility]) injection 4 mg  4 mg Intravenous Q6H PRN Jenny Mcguire MD        enoxaparin (LOVENOX) injection 30 mg  30 mg Subcutaneous BID Jenny Mcguire MD   30 mg at 01/07/20 2056    glucose (GLUTOSE) 40 % oral gel 15 g  15 g Oral PRN Jenny Mcguire MD        dextrose 50 % IV solution  12.5 g Intravenous PRN Jenny Mcguire MD        glucagon (rDNA) injection 1 mg  1 mg Intramuscular PRN Jenny Mcguire MD        dextrose 5 % solution  100 mL/hr Intravenous PRN Jenny Mcguire MD        acetaminophen (TYLENOL) tablet 650 mg  650 mg Oral Q4H PRN Jenny Mcguire MD        insulin lispro (HUMALOG) injection vial 0-6 Units  0-6 Units Subcutaneous TID WC Jenny Mcguire MD   3 Units at 01/07/20 1719    insulin lispro (HUMALOG) injection vial 0-3 Units  0-3 Units Subcutaneous Nightly Jenny Mcguire MD   3 Units at 01/07/20 2056    cetirizine-psuedoephedrine (ZYRTEC-D) 5-120 MG per extended release tablet 1 tablet  1 tablet Oral Daily Jenny Mcguire MD   1 tablet at 01/07/20 1157    LORazepam (ATIVAN) injection 0.5 mg  0.5 mg Intravenous Q4H PRN Jenny Mcguire MD        Or    LORazepam (ATIVAN) tablet 0.5 mg  0.5 mg Oral Q4H PRN Jenny Mcguire MD        albuterol (PROVENTIL) nebulizer solution 2.5 mg  2.5 mg Nebulization Q6H PRN Jenny Mcguire MD         Allergies   Allergen Reactions    Adhesive Tape Other (See Comments)     Skin blisters severe skin tearing    Morphine Other (See Comments)

## 2020-01-08 NOTE — CARE COORDINATION
DISCHARGE PLANNING NOTE:    Patient continues to say that she is not sure whether she wants VNS upon discharge or not yet - Would like to make the decision on day of discharge. On IV steroids 30Q12. Will continue to follow along.      Electronically signed by Sedrick Blue RN on 1/8/2020 at 5:12 PM

## 2020-01-08 NOTE — PROGRESS NOTES
Physical Therapy  DATE: 2020    NAME: Marciano Ewing  MRN: [id number]   : 1955    Patient not seen this date for Physical Therapy due to:  [] Blood transfusion in progress  [] Hemodialysis  []  Patient Declined  [] Spine Precautions   [] Strict Bedrest  [] Surgery/ Procedure  [] Testing      [x] Other RN reports pt took a full shower this AM. Upon arrival, pt reports increase fatigue from just getting back from [place]. \"'I'm tired from my EEG from yesterday\". Pt fatigues easily per notes and is unable to participate in PT at this time. Defer tx at this time. Will check back later time permitting. [] PT being discontinued at this time. Patient independent. No further needs. [] PT being discontinued at this time as the patient has been transferred to palliative care. No further needs.     Сергей Morillo, PTA

## 2020-01-08 NOTE — PROGRESS NOTES
[facility]   OCCUPATIONAL THERAPY MISSED TREATMENT NOTE   INPATIENT   Date: 20  Patient Name: Aly Pollack       Room:   MRN: [id number]   Account #: [de-identified]    : 1955  (66 y.o.)  Gender: female   Diagnosis: fall, slid out of recliner, hyperkalemia, MAMTA, CHF, full body tremors-  neurology consult r/o seizures             REASON FOR MISSED TREATMENT:  Patient unable to participate   -   Other - Upon arrival is receiving A to bedside chair after completing toileting. Per RN and PCA pt additionally completed a shower this date using AE/DME PRN and SBA. She was able to stand and complete tomas tasks stabilizing with grab bar. Pt is fatigued at this time. She does state she is 'tired from her EEG yesterday' as well. Treatment deferred until tomorrow.         MIKE Oden

## 2020-01-09 LAB
ABSOLUTE EOS #: 0 K/UL (ref 0–0.4)
ABSOLUTE IMMATURE GRANULOCYTE: ABNORMAL K/UL (ref 0–0.3)
ABSOLUTE LYMPH #: 0.8 K/UL (ref 1–4.8)
ABSOLUTE MONO #: 0.4 K/UL (ref 0.1–1.3)
ANION GAP SERPL CALCULATED.3IONS-SCNC: 13 MMOL/L (ref 9–17)
BASOPHILS # BLD: 0 % (ref 0–2)
BASOPHILS ABSOLUTE: 0 K/UL (ref 0–0.2)
BUN BLDV-MCNC: 35 MG/DL (ref 8–23)
BUN/CREAT BLD: ABNORMAL (ref 9–20)
CALCIUM SERPL-MCNC: 9.2 MG/DL (ref 8.6–10.4)
CHLORIDE BLD-SCNC: 97 MMOL/L (ref 98–107)
CO2: 28 MMOL/L (ref 20–31)
CREAT SERPL-MCNC: 0.95 MG/DL (ref 0.5–0.9)
DIFFERENTIAL TYPE: ABNORMAL
EOSINOPHILS RELATIVE PERCENT: 0 % (ref 0–4)
GFR AFRICAN AMERICAN: >60 ML/MIN
GFR NON-AFRICAN AMERICAN: 60 ML/MIN
GFR SERPL CREATININE-BSD FRML MDRD: ABNORMAL ML/MIN/{1.73_M2}
GFR SERPL CREATININE-BSD FRML MDRD: ABNORMAL ML/MIN/{1.73_M2}
GLUCOSE BLD-MCNC: 254 MG/DL (ref 65–105)
GLUCOSE BLD-MCNC: 256 MG/DL (ref 65–105)
GLUCOSE BLD-MCNC: 259 MG/DL (ref 65–105)
GLUCOSE BLD-MCNC: 296 MG/DL (ref 70–99)
GLUCOSE BLD-MCNC: 323 MG/DL (ref 65–105)
GLUCOSE BLD-MCNC: 367 MG/DL (ref 65–105)
HCT VFR BLD CALC: 30 % (ref 36–46)
HEMOGLOBIN: 9.4 G/DL (ref 12–16)
IMMATURE GRANULOCYTES: ABNORMAL %
LYMPHOCYTES # BLD: 7 % (ref 24–44)
MCH RBC QN AUTO: 25.5 PG (ref 26–34)
MCHC RBC AUTO-ENTMCNC: 31.5 G/DL (ref 31–37)
MCV RBC AUTO: 81.1 FL (ref 80–100)
MONOCYTES # BLD: 4 % (ref 1–7)
NRBC AUTOMATED: ABNORMAL PER 100 WBC
PDW BLD-RTO: 16.8 % (ref 11.5–14.9)
PLATELET # BLD: 406 K/UL (ref 150–450)
PLATELET ESTIMATE: ABNORMAL
PMV BLD AUTO: 8.1 FL (ref 6–12)
POTASSIUM SERPL-SCNC: 4.8 MMOL/L (ref 3.7–5.3)
RBC # BLD: 3.69 M/UL (ref 4–5.2)
RBC # BLD: ABNORMAL 10*6/UL
SEG NEUTROPHILS: 89 % (ref 36–66)
SEGMENTED NEUTROPHILS ABSOLUTE COUNT: 9.9 K/UL (ref 1.3–9.1)
SODIUM BLD-SCNC: 138 MMOL/L (ref 135–144)
WBC # BLD: 11.1 K/UL (ref 3.5–11)
WBC # BLD: ABNORMAL 10*3/UL

## 2020-01-09 PROCEDURE — 82947 ASSAY GLUCOSE BLOOD QUANT: CPT

## 2020-01-09 PROCEDURE — 2580000003 HC RX 258: Performed by: INTERNAL MEDICINE

## 2020-01-09 PROCEDURE — 85025 COMPLETE CBC W/AUTO DIFF WBC: CPT

## 2020-01-09 PROCEDURE — 94640 AIRWAY INHALATION TREATMENT: CPT

## 2020-01-09 PROCEDURE — 6370000000 HC RX 637 (ALT 250 FOR IP): Performed by: FAMILY MEDICINE

## 2020-01-09 PROCEDURE — 80048 BASIC METABOLIC PNL TOTAL CA: CPT

## 2020-01-09 PROCEDURE — 2700000000 HC OXYGEN THERAPY PER DAY

## 2020-01-09 PROCEDURE — 6360000002 HC RX W HCPCS: Performed by: FAMILY MEDICINE

## 2020-01-09 PROCEDURE — 6360000002 HC RX W HCPCS: Performed by: INTERNAL MEDICINE

## 2020-01-09 PROCEDURE — 94761 N-INVAS EAR/PLS OXIMETRY MLT: CPT

## 2020-01-09 PROCEDURE — 2060000000 HC ICU INTERMEDIATE R&B

## 2020-01-09 PROCEDURE — 2580000003 HC RX 258: Performed by: FAMILY MEDICINE

## 2020-01-09 PROCEDURE — 36415 COLL VENOUS BLD VENIPUNCTURE: CPT

## 2020-01-09 RX ADMIN — AMLODIPINE BESYLATE 10 MG: 10 TABLET ORAL at 08:03

## 2020-01-09 RX ADMIN — PREGABALIN 150 MG: 150 CAPSULE ORAL at 08:04

## 2020-01-09 RX ADMIN — INSULIN LISPRO 3 UNITS: 100 INJECTION, SOLUTION INTRAVENOUS; SUBCUTANEOUS at 21:30

## 2020-01-09 RX ADMIN — CARVEDILOL 3.12 MG: 3.12 TABLET, FILM COATED ORAL at 16:36

## 2020-01-09 RX ADMIN — IPRATROPIUM BROMIDE AND ALBUTEROL SULFATE 1 AMPULE: .5; 3 SOLUTION RESPIRATORY (INHALATION) at 09:11

## 2020-01-09 RX ADMIN — VENLAFAXINE HYDROCHLORIDE 37.5 MG: 37.5 CAPSULE, EXTENDED RELEASE ORAL at 20:29

## 2020-01-09 RX ADMIN — CETIRIZINE HCL, PSEUDOEPHEDRINE HCL 1 TABLET: 5; 120 TABLET, EXTENDED RELEASE ORAL at 08:06

## 2020-01-09 RX ADMIN — IPRATROPIUM BROMIDE AND ALBUTEROL SULFATE 1 AMPULE: .5; 3 SOLUTION RESPIRATORY (INHALATION) at 12:30

## 2020-01-09 RX ADMIN — DICLOFENAC 2 G: 10 GEL TOPICAL at 20:29

## 2020-01-09 RX ADMIN — IPRATROPIUM BROMIDE AND ALBUTEROL SULFATE 1 AMPULE: .5; 3 SOLUTION RESPIRATORY (INHALATION) at 16:31

## 2020-01-09 RX ADMIN — INSULIN LISPRO 3 UNITS: 100 INJECTION, SOLUTION INTRAVENOUS; SUBCUTANEOUS at 08:07

## 2020-01-09 RX ADMIN — Medication 10 ML: at 08:13

## 2020-01-09 RX ADMIN — Medication 200 MG: at 20:28

## 2020-01-09 RX ADMIN — MULTIPLE VITAMINS W/ MINERALS TAB 1 TABLET: TAB at 20:29

## 2020-01-09 RX ADMIN — ENOXAPARIN SODIUM 30 MG: 30 INJECTION SUBCUTANEOUS at 20:29

## 2020-01-09 RX ADMIN — PREGABALIN 150 MG: 150 CAPSULE ORAL at 20:29

## 2020-01-09 RX ADMIN — Medication 1 TABLET: at 08:03

## 2020-01-09 RX ADMIN — ENOXAPARIN SODIUM 30 MG: 30 INJECTION SUBCUTANEOUS at 08:04

## 2020-01-09 RX ADMIN — GLIMEPIRIDE 4 MG: 4 TABLET ORAL at 06:11

## 2020-01-09 RX ADMIN — Medication 1 TABLET: at 20:29

## 2020-01-09 RX ADMIN — Medication 10 ML: at 16:37

## 2020-01-09 RX ADMIN — METHYLPREDNISOLONE SODIUM SUCCINATE 30 MG: 40 INJECTION, POWDER, FOR SOLUTION INTRAMUSCULAR; INTRAVENOUS at 06:11

## 2020-01-09 RX ADMIN — INSULIN LISPRO 3 UNITS: 100 INJECTION, SOLUTION INTRAVENOUS; SUBCUTANEOUS at 12:02

## 2020-01-09 RX ADMIN — IPRATROPIUM BROMIDE AND ALBUTEROL SULFATE 1 AMPULE: .5; 3 SOLUTION RESPIRATORY (INHALATION) at 20:43

## 2020-01-09 RX ADMIN — INSULIN LISPRO 4 UNITS: 100 INJECTION, SOLUTION INTRAVENOUS; SUBCUTANEOUS at 16:37

## 2020-01-09 RX ADMIN — ATORVASTATIN CALCIUM 40 MG: 40 TABLET, FILM COATED ORAL at 20:29

## 2020-01-09 RX ADMIN — METHYLPREDNISOLONE SODIUM SUCCINATE 30 MG: 40 INJECTION, POWDER, FOR SOLUTION INTRAMUSCULAR; INTRAVENOUS at 16:37

## 2020-01-09 RX ADMIN — Medication 10 ML: at 20:30

## 2020-01-09 RX ADMIN — FUROSEMIDE 20 MG: 20 TABLET ORAL at 08:03

## 2020-01-09 RX ADMIN — OXYCODONE HYDROCHLORIDE 30 MG: 10 TABLET ORAL at 08:10

## 2020-01-09 RX ADMIN — CARVEDILOL 3.12 MG: 3.12 TABLET, FILM COATED ORAL at 08:03

## 2020-01-09 RX ADMIN — Medication 10 ML: at 09:20

## 2020-01-09 ASSESSMENT — PAIN SCALES - GENERAL
PAINLEVEL_OUTOF10: 5
PAINLEVEL_OUTOF10: 0

## 2020-01-09 NOTE — PROGRESS NOTES
[facility]   Daily Progress Note  PATIENT NAME: Catrachita Bassett                     : 1955    Subjective: Patient was seen and examined at bedside. Patient reports that she is feeling better today and is having less trouble breathing. She notes that she continues to feel the need to cough something up, and with effort is able to cough up green sputum. Patient reports that her energy has increased and she does not feel subjective fever or chills today.     Objective:   Current Facility-Administered Medications   Medication Dose Route Frequency Provider Last Rate Last Dose    methylPREDNISolone sodium (SOLU-MEDROL) injection 30 mg  30 mg Intravenous Q12H Violet Perkins MD   30 mg at 20 3608    furosemide (LASIX) tablet 20 mg  20 mg Oral Daily Jenny Mcguire MD   20 mg at 20 0803    oxyCODONE HCl (OXY-IR) immediate release tablet 30 mg  30 mg Oral Q8H PRN Jenny Mcguire MD   30 mg at 20 0810    sodium chloride flush 0.9 % injection 10 mL  10 mL Intravenous BID Violet Perkins MD        sodium chloride flush 0.9 % injection 10 mL  10 mL Intravenous BID Violet Perkins MD   10 mL at 20 0936    ipratropium-albuterol (DUONEB) nebulizer solution 1 ampule  1 ampule Inhalation Q4H WA Jenny Mcguire MD   1 ampule at 20 0911    amLODIPine (NORVASC) tablet 10 mg  10 mg Oral Daily Jenny Mcguire MD   10 mg at 20 0803    atorvastatin (LIPITOR) tablet 40 mg  40 mg Oral Nightly Jenny Mcguire MD   40 mg at 20 2247    calcium carbonate-vitamin D (CALTRATE) 600-400 MG-UNIT per tab 1 tablet  1 tablet Oral BID Jenny Mcguire MD   1 tablet at 20 08    carvedilol (COREG) tablet 3.125 mg  3.125 mg Oral BID WC Jenny Mcguire MD   3.125 mg at 20 0803    coenzyme Q10 capsule 200 mg  200 mg Oral Nightly Jenny Mcguire MD   200 mg at 20 224    diclofenac sodium 1 % gel 2 g  2 g Lovenox  Continue Solu-medrol and albuterol  Recheck CT chest w/o contrast in __ weeks? Consider discharge soon?     Electronically signed by Shamika Lombardi on 1/9/2020 at 10:06 AM

## 2020-01-09 NOTE — PROGRESS NOTES
Suzy Landry is a 60 y.o. female patient.     Current Facility-Administered Medications   Medication Dose Route Frequency Provider Last Rate Last Dose    methylPREDNISolone sodium (SOLU-MEDROL) injection 30 mg  30 mg Intravenous Q12H Stephen Hastings MD   30 mg at 01/09/20 5563    furosemide (LASIX) tablet 20 mg  20 mg Oral Daily Jordy Gonzalez MD   20 mg at 01/09/20 0803    oxyCODONE HCl (OXY-IR) immediate release tablet 30 mg  30 mg Oral Q8H PRN Jordy Gonzalez MD   30 mg at 01/09/20 0810    sodium chloride flush 0.9 % injection 10 mL  10 mL Intravenous BID Stephen Hastings MD        sodium chloride flush 0.9 % injection 10 mL  10 mL Intravenous BID Stephen Hastings MD   10 mL at 01/08/20 0936    ipratropium-albuterol (DUONEB) nebulizer solution 1 ampule  1 ampule Inhalation Q4H WA Jordy Gonzalez MD   1 ampule at 01/08/20 1959    amLODIPine (NORVASC) tablet 10 mg  10 mg Oral Daily Jordy Gonzalez MD   10 mg at 01/09/20 0803    atorvastatin (LIPITOR) tablet 40 mg  40 mg Oral Nightly Jordy Gonzalez MD   40 mg at 01/08/20 2247    calcium carbonate-vitamin D (CALTRATE) 600-400 MG-UNIT per tab 1 tablet  1 tablet Oral BID Jordy Gonzalez MD   1 tablet at 01/09/20 0803    carvedilol (COREG) tablet 3.125 mg  3.125 mg Oral BID WC Jordy Gonzalez MD   3.125 mg at 01/09/20 0803    coenzyme Q10 capsule 200 mg  200 mg Oral Nightly Jordy Gonzalez MD   200 mg at 01/08/20 2249    diclofenac sodium 1 % gel 2 g  2 g Topical TID Jordy Gonzalez MD   2 g at 01/08/20 2247    glimepiride (AMARYL) tablet 4 mg  4 mg Oral QAM AC Jordy Gonzalez MD   4 mg at 01/09/20 8994    therapeutic multivitamin-minerals 1 tablet  1 tablet Oral Nightly Jordy Gonzalez MD   1 tablet at 01/08/20 2248    pregabalin (LYRICA) capsule 150 mg  150 mg Oral BID Jordy Gonzalez MD   150 mg at 01/09/20 0804    venlafaxine (EFFEXOR XR) extended release capsule 37.5 mg  37.5 mg Oral Nightly Flory Justice MD   37.5 mg at 01/08/20 2249    sodium chloride flush 0.9 % injection 10 mL  10 mL Intravenous 2 times per day Flory Justice MD   10 mL at 01/09/20 0813    sodium chloride flush 0.9 % injection 10 mL  10 mL Intravenous PRN Flory Justice MD   10 mL at 01/06/20 1730    magnesium hydroxide (MILK OF MAGNESIA) 400 MG/5ML suspension 30 mL  30 mL Oral Daily PRN Flory Justice MD        ondansetron [facility]) injection 4 mg  4 mg Intravenous Q6H PRN Flory Justice MD        enoxaparin (LOVENOX) injection 30 mg  30 mg Subcutaneous BID Flory Justice MD   30 mg at 01/09/20 0804    glucose (GLUTOSE) 40 % oral gel 15 g  15 g Oral PRN Flory Justice MD        dextrose 50 % IV solution  12.5 g Intravenous PRN Flory Justice MD        glucagon (rDNA) injection 1 mg  1 mg Intramuscular PRN Flory Justice MD        dextrose 5 % solution  100 mL/hr Intravenous PRN Flory Justice MD        acetaminophen (TYLENOL) tablet 650 mg  650 mg Oral Q4H PRN Flory Justice MD        insulin lispro (HUMALOG) injection vial 0-6 Units  0-6 Units Subcutaneous TID WC Flory uJstice MD   3 Units at 01/09/20 3034    insulin lispro (HUMALOG) injection vial 0-3 Units  0-3 Units Subcutaneous Nightly Flory Justice MD   2 Units at 01/08/20 2248    cetirizine-psuedoephedrine (ZYRTEC-D) 5-120 MG per extended release tablet 1 tablet  1 tablet Oral Daily Flory Justice MD   1 tablet at 01/09/20 0806    LORazepam (ATIVAN) injection 0.5 mg  0.5 mg Intravenous Q4H PRN Flory Justice MD        Or    LORazepam (ATIVAN) tablet 0.5 mg  0.5 mg Oral Q4H PRN Flory Justice MD        albuterol (PROVENTIL) nebulizer solution 2.5 mg  2.5 mg Nebulization Q6H PRN lFory Justice MD         Allergies   Allergen Reactions    Adhesive Tape Other (See Comments)     Skin blisters severe skin tearing    Morphine Other (See Comments)

## 2020-01-09 NOTE — PROGRESS NOTES
30 mg Intravenous Q12H    furosemide  20 mg Oral Daily    sodium chloride flush  10 mL Intravenous BID    sodium chloride flush  10 mL Intravenous BID    ipratropium-albuterol  1 ampule Inhalation Q4H WA    amLODIPine  10 mg Oral Daily    atorvastatin  40 mg Oral Nightly    calcium carbonate-vitamin D  1 tablet Oral BID    carvedilol  3.125 mg Oral BID WC    coenzyme Q10  200 mg Oral Nightly    diclofenac sodium  2 g Topical TID    glimepiride  4 mg Oral QAM AC    therapeutic multivitamin-minerals  1 tablet Oral Nightly    pregabalin  150 mg Oral BID    venlafaxine  37.5 mg Oral Nightly    sodium chloride flush  10 mL Intravenous 2 times per day    enoxaparin  30 mg Subcutaneous BID    insulin lispro  0-6 Units Subcutaneous TID WC    insulin lispro  0-3 Units Subcutaneous Nightly    cetirizine-psuedoephedrine  1 tablet Oral Daily      dextrose       oxyCODONE, sodium chloride flush, magnesium hydroxide, ondansetron, glucose, dextrose, glucagon (rDNA), dextrose, acetaminophen, LORazepam **OR** LORazepam, albuterol    LABS   CBC   Recent Labs     01/09/20  0504   WBC 11.1*   HGB 9.4*   HCT 30.0*   MCV 81.1        BMP:   Lab Results   Component Value Date     01/09/2020    K 4.8 01/09/2020    CL 97 01/09/2020    CO2 28 01/09/2020    BUN 35 01/09/2020    LABALBU 3.8 01/06/2020    LABALBU 4.5 03/07/2012    CREATININE 0.95 01/09/2020    CALCIUM 9.2 01/09/2020    GFRAA >60 01/09/2020    LABGLOM 60 01/09/2020     ABGs:  Lab Results   Component Value Date    PHART 7.416 01/06/2020    PO2ART 58.0 01/06/2020    MBB1MFK 50.2 01/06/2020      Lab Results   Component Value Date    MODE NOT REPORTED 01/06/2020     Ionized Calcium:  No results found for: IONCA  Magnesium:    Lab Results   Component Value Date    MG 1.9 11/23/2019     Phosphorus:  No results found for: PHOS     LIVER PROFILE   Recent Labs     01/06/20  1559   AST 38*   ALT 31   BILIDIR <0.08   BILITOT <0.15*   ALKPHOS 99     INR No results for input(s): INR in the last 72 hours. PTT   Lab Results   Component Value Date    APTT 24.8 10/15/2019         RADIOLOGY     (See actual reports for details)    ASSESSMENT/PLAN     Patient Active Problem List   Diagnosis    Hypertension, benign    Diabetes mellitus type II, controlled ([address]    Other hyperlipidemia    Degeneration of cervical intervertebral disc    Cervicalgia    Rotator cuff (capsule) sprain    Lateral epicondylitis    Radial styloid tenosynovitis    Morbid obesity with BMI of 60.0-69.9, adult ([address]    BRIANNA (obstructive sleep apnea)    Hepatic steatosis    Lumbar radiculopathy    Chronic pain syndrome    Acute diastolic CHF (congestive heart failure) ([address]    [facility]) [facility]    H/O: GI bleed    Iron deficiency anemia    Fibromyalgia    Acute hypoxemic respiratory failure (HCC)    Hyperkalemia    Class 3 severe obesity due to excess calories with serious comorbidity and body mass index (BMI) of 45.0 to 49.9 in adult [facility])    Tremor     Patient breathing appears to be much better. Patient has a history of chronic respiratory failure with hypercapnia. BRIANNA on oxygen at night. Not able to tolerate mask because of claustrophobia. sheis willing to try some of the newer masks. Patient is okay for MedSurg. She believes that she is \"turning the corner. \"      Electronically signed by Violet Perkins MD on 1/9/2020 at 2:34 PM

## 2020-01-09 NOTE — CARE COORDINATION
ONGOING DISCHARGE PLAN:    Spoke with patient regarding discharge plan and patient confirms that plan is still home. Pt states she is still thinking about VNS, but \"I can't make any decisions like that just yet. \"    Active order for IV steroids 30Q12 hours    Will continue to follow for additional discharge needs.     Electronically signed by Laura Giang RN on 1/9/2020 at 3:27 PM

## 2020-01-09 NOTE — PROGRESS NOTES
Physical Therapy  DATE: 2020    NAME: Jose Mello  MRN: [id number]   : 1955    Patient not seen this date for Physical Therapy due to:  [] Blood transfusion in progress  [] Hemodialysis  [x]  Patient Declined  [] Spine Precautions   [] Strict Bedrest  [] Surgery/ Procedure  [] Testing      [x] Other Pt adamantly refuses PT at this time d/t \"trying to get a good nap in\". Pt appreciative of education on the importance of functional mobility however continues to refuse PT. Will check back later time permitting. RN H-umus informed. [] PT being discontinued at this time. Patient independent. No further needs. [] PT being discontinued at this time as the patient has been transferred to palliative care. No further needs.     Randy Jacobs, PTA

## 2020-01-10 LAB
ABSOLUTE EOS #: 0.13 K/UL (ref 0–0.4)
ABSOLUTE IMMATURE GRANULOCYTE: ABNORMAL K/UL (ref 0–0.3)
ABSOLUTE LYMPH #: 1.18 K/UL (ref 1–4.8)
ABSOLUTE MONO #: 0.39 K/UL (ref 0.1–1.3)
ANION GAP SERPL CALCULATED.3IONS-SCNC: 12 MMOL/L (ref 9–17)
BASOPHILS # BLD: 0 % (ref 0–2)
BASOPHILS ABSOLUTE: 0 K/UL (ref 0–0.2)
BUN BLDV-MCNC: 34 MG/DL (ref 8–23)
BUN/CREAT BLD: ABNORMAL (ref 9–20)
CALCIUM SERPL-MCNC: 9.4 MG/DL (ref 8.6–10.4)
CHLORIDE BLD-SCNC: 98 MMOL/L (ref 98–107)
CO2: 31 MMOL/L (ref 20–31)
CREAT SERPL-MCNC: 0.98 MG/DL (ref 0.5–0.9)
DIFFERENTIAL TYPE: ABNORMAL
EOSINOPHILS RELATIVE PERCENT: 1 % (ref 0–4)
GFR AFRICAN AMERICAN: >60 ML/MIN
GFR NON-AFRICAN AMERICAN: 58 ML/MIN
GFR SERPL CREATININE-BSD FRML MDRD: ABNORMAL ML/MIN/{1.73_M2}
GFR SERPL CREATININE-BSD FRML MDRD: ABNORMAL ML/MIN/{1.73_M2}
GLUCOSE BLD-MCNC: 214 MG/DL (ref 65–105)
GLUCOSE BLD-MCNC: 230 MG/DL (ref 65–105)
GLUCOSE BLD-MCNC: 276 MG/DL (ref 70–99)
GLUCOSE BLD-MCNC: 298 MG/DL (ref 65–105)
GLUCOSE BLD-MCNC: 328 MG/DL (ref 65–105)
GLUCOSE BLD-MCNC: 353 MG/DL (ref 65–105)
HCT VFR BLD CALC: 32.2 % (ref 36–46)
HEMOGLOBIN: 10 G/DL (ref 12–16)
IMMATURE GRANULOCYTES: ABNORMAL %
LYMPHOCYTES # BLD: 9 % (ref 24–44)
MCH RBC QN AUTO: 25 PG (ref 26–34)
MCHC RBC AUTO-ENTMCNC: 30.9 G/DL (ref 31–37)
MCV RBC AUTO: 80.9 FL (ref 80–100)
MONOCYTES # BLD: 3 % (ref 1–7)
MORPHOLOGY: ABNORMAL
MORPHOLOGY: ABNORMAL
NRBC AUTOMATED: ABNORMAL PER 100 WBC
PDW BLD-RTO: 16.9 % (ref 11.5–14.9)
PLATELET # BLD: 417 K/UL (ref 150–450)
PLATELET ESTIMATE: ABNORMAL
PMV BLD AUTO: 8.2 FL (ref 6–12)
POTASSIUM SERPL-SCNC: 4.8 MMOL/L (ref 3.7–5.3)
RBC # BLD: 3.97 M/UL (ref 4–5.2)
RBC # BLD: ABNORMAL 10*6/UL
SEG NEUTROPHILS: 87 % (ref 36–66)
SEGMENTED NEUTROPHILS ABSOLUTE COUNT: 11.4 K/UL (ref 1.3–9.1)
SODIUM BLD-SCNC: 141 MMOL/L (ref 135–144)
WBC # BLD: 13.1 K/UL (ref 3.5–11)
WBC # BLD: ABNORMAL 10*3/UL

## 2020-01-10 PROCEDURE — 2700000000 HC OXYGEN THERAPY PER DAY

## 2020-01-10 PROCEDURE — 6370000000 HC RX 637 (ALT 250 FOR IP): Performed by: FAMILY MEDICINE

## 2020-01-10 PROCEDURE — 94640 AIRWAY INHALATION TREATMENT: CPT

## 2020-01-10 PROCEDURE — 2580000003 HC RX 258: Performed by: INTERNAL MEDICINE

## 2020-01-10 PROCEDURE — 2580000003 HC RX 258: Performed by: FAMILY MEDICINE

## 2020-01-10 PROCEDURE — 6360000002 HC RX W HCPCS: Performed by: INTERNAL MEDICINE

## 2020-01-10 PROCEDURE — 82947 ASSAY GLUCOSE BLOOD QUANT: CPT

## 2020-01-10 PROCEDURE — 1200000000 HC SEMI PRIVATE

## 2020-01-10 PROCEDURE — 97530 THERAPEUTIC ACTIVITIES: CPT

## 2020-01-10 PROCEDURE — 6360000002 HC RX W HCPCS: Performed by: FAMILY MEDICINE

## 2020-01-10 PROCEDURE — 94761 N-INVAS EAR/PLS OXIMETRY MLT: CPT

## 2020-01-10 PROCEDURE — 85025 COMPLETE CBC W/AUTO DIFF WBC: CPT

## 2020-01-10 PROCEDURE — 97110 THERAPEUTIC EXERCISES: CPT

## 2020-01-10 PROCEDURE — 36415 COLL VENOUS BLD VENIPUNCTURE: CPT

## 2020-01-10 PROCEDURE — 80048 BASIC METABOLIC PNL TOTAL CA: CPT

## 2020-01-10 RX ADMIN — IPRATROPIUM BROMIDE AND ALBUTEROL SULFATE 1 AMPULE: .5; 3 SOLUTION RESPIRATORY (INHALATION) at 08:04

## 2020-01-10 RX ADMIN — DICLOFENAC 2 G: 10 GEL TOPICAL at 08:51

## 2020-01-10 RX ADMIN — INSULIN LISPRO 2 UNITS: 100 INJECTION, SOLUTION INTRAVENOUS; SUBCUTANEOUS at 08:51

## 2020-01-10 RX ADMIN — METHYLPREDNISOLONE SODIUM SUCCINATE 30 MG: 40 INJECTION, POWDER, FOR SOLUTION INTRAMUSCULAR; INTRAVENOUS at 05:56

## 2020-01-10 RX ADMIN — ENOXAPARIN SODIUM 30 MG: 30 INJECTION SUBCUTANEOUS at 20:56

## 2020-01-10 RX ADMIN — Medication 10 ML: at 08:51

## 2020-01-10 RX ADMIN — Medication 1 TABLET: at 20:55

## 2020-01-10 RX ADMIN — OXYCODONE HYDROCHLORIDE 30 MG: 10 TABLET ORAL at 00:46

## 2020-01-10 RX ADMIN — INSULIN LISPRO 2 UNITS: 100 INJECTION, SOLUTION INTRAVENOUS; SUBCUTANEOUS at 20:55

## 2020-01-10 RX ADMIN — VENLAFAXINE HYDROCHLORIDE 37.5 MG: 37.5 CAPSULE, EXTENDED RELEASE ORAL at 20:55

## 2020-01-10 RX ADMIN — ENOXAPARIN SODIUM 30 MG: 30 INJECTION SUBCUTANEOUS at 08:50

## 2020-01-10 RX ADMIN — OXYCODONE HYDROCHLORIDE 30 MG: 10 TABLET ORAL at 18:20

## 2020-01-10 RX ADMIN — IPRATROPIUM BROMIDE AND ALBUTEROL SULFATE 1 AMPULE: .5; 3 SOLUTION RESPIRATORY (INHALATION) at 11:32

## 2020-01-10 RX ADMIN — INSULIN LISPRO 5 UNITS: 100 INJECTION, SOLUTION INTRAVENOUS; SUBCUTANEOUS at 16:24

## 2020-01-10 RX ADMIN — CARVEDILOL 3.12 MG: 3.12 TABLET, FILM COATED ORAL at 08:50

## 2020-01-10 RX ADMIN — METHYLPREDNISOLONE SODIUM SUCCINATE 30 MG: 40 INJECTION, POWDER, FOR SOLUTION INTRAMUSCULAR; INTRAVENOUS at 16:24

## 2020-01-10 RX ADMIN — ATORVASTATIN CALCIUM 40 MG: 40 TABLET, FILM COATED ORAL at 20:54

## 2020-01-10 RX ADMIN — CARVEDILOL 3.12 MG: 3.12 TABLET, FILM COATED ORAL at 16:23

## 2020-01-10 RX ADMIN — IPRATROPIUM BROMIDE AND ALBUTEROL SULFATE 1 AMPULE: .5; 3 SOLUTION RESPIRATORY (INHALATION) at 15:11

## 2020-01-10 RX ADMIN — PREGABALIN 150 MG: 150 CAPSULE ORAL at 08:50

## 2020-01-10 RX ADMIN — INSULIN LISPRO 3 UNITS: 100 INJECTION, SOLUTION INTRAVENOUS; SUBCUTANEOUS at 12:13

## 2020-01-10 RX ADMIN — Medication 10 ML: at 21:06

## 2020-01-10 RX ADMIN — PREGABALIN 150 MG: 150 CAPSULE ORAL at 20:55

## 2020-01-10 RX ADMIN — CETIRIZINE HCL, PSEUDOEPHEDRINE HCL 1 TABLET: 5; 120 TABLET, EXTENDED RELEASE ORAL at 08:51

## 2020-01-10 RX ADMIN — FUROSEMIDE 20 MG: 20 TABLET ORAL at 08:50

## 2020-01-10 RX ADMIN — MULTIPLE VITAMINS W/ MINERALS TAB 1 TABLET: TAB at 20:55

## 2020-01-10 RX ADMIN — Medication 1 TABLET: at 08:50

## 2020-01-10 RX ADMIN — IPRATROPIUM BROMIDE AND ALBUTEROL SULFATE 1 AMPULE: .5; 3 SOLUTION RESPIRATORY (INHALATION) at 21:05

## 2020-01-10 RX ADMIN — Medication 200 MG: at 20:55

## 2020-01-10 RX ADMIN — GLIMEPIRIDE 4 MG: 4 TABLET ORAL at 05:56

## 2020-01-10 RX ADMIN — AMLODIPINE BESYLATE 10 MG: 10 TABLET ORAL at 08:50

## 2020-01-10 ASSESSMENT — PAIN SCALES - GENERAL
PAINLEVEL_OUTOF10: 0
PAINLEVEL_OUTOF10: 5
PAINLEVEL_OUTOF10: 4
PAINLEVEL_OUTOF10: 6
PAINLEVEL_OUTOF10: 6

## 2020-01-10 ASSESSMENT — PAIN DESCRIPTION - LOCATION: LOCATION: GENERALIZED;BACK

## 2020-01-10 ASSESSMENT — PAIN DESCRIPTION - PAIN TYPE: TYPE: CHRONIC PAIN

## 2020-01-10 NOTE — PROGRESS NOTES
20 mg Oral Daily    sodium chloride flush  10 mL Intravenous BID    sodium chloride flush  10 mL Intravenous BID    ipratropium-albuterol  1 ampule Inhalation Q4H WA    amLODIPine  10 mg Oral Daily    atorvastatin  40 mg Oral Nightly    calcium carbonate-vitamin D  1 tablet Oral BID    carvedilol  3.125 mg Oral BID WC    coenzyme Q10  200 mg Oral Nightly    diclofenac sodium  2 g Topical TID    glimepiride  4 mg Oral QAM AC    therapeutic multivitamin-minerals  1 tablet Oral Nightly    pregabalin  150 mg Oral BID    venlafaxine  37.5 mg Oral Nightly    sodium chloride flush  10 mL Intravenous 2 times per day    enoxaparin  30 mg Subcutaneous BID    insulin lispro  0-6 Units Subcutaneous TID WC    insulin lispro  0-3 Units Subcutaneous Nightly    cetirizine-psuedoephedrine  1 tablet Oral Daily      dextrose       oxyCODONE, sodium chloride flush, magnesium hydroxide, ondansetron, glucose, dextrose, glucagon (rDNA), dextrose, acetaminophen, LORazepam **OR** LORazepam, albuterol    LABS   CBC   Recent Labs     01/10/20  0518   WBC 13.1*   HGB 10.0*   HCT 32.2*   MCV 80.9        BMP:   Lab Results   Component Value Date     01/10/2020    K 4.8 01/10/2020    CL 98 01/10/2020    CO2 31 01/10/2020    BUN 34 01/10/2020    LABALBU 3.8 01/06/2020    LABALBU 4.5 03/07/2012    CREATININE 0.98 01/10/2020    CALCIUM 9.4 01/10/2020    GFRAA >60 01/10/2020    LABGLOM 58 01/10/2020     ABGs:  Lab Results   Component Value Date    PHART 7.416 01/06/2020    PO2ART 58.0 01/06/2020    USQ3HGL 50.2 01/06/2020      Lab Results   Component Value Date    MODE NOT REPORTED 01/06/2020     Ionized Calcium:  No results found for: IONCA  Magnesium:    Lab Results   Component Value Date    MG 1.9 11/23/2019     Phosphorus:  No results found for: PHOS     LIVER PROFILE No results for input(s): AST, ALT, LIPASE, BILIDIR, BILITOT, ALKPHOS in the last 72 hours. Invalid input(s):   AMYLASE,  ALB  INR No results for input(s): INR in the last 72 hours. PTT   Lab Results   Component Value Date    APTT 24.8 10/15/2019         RADIOLOGY     (See actual reports for details)    ASSESSMENT/PLAN     Patient Active Problem List   Diagnosis    Hypertension, benign    Diabetes mellitus type II, controlled ([address]    Other hyperlipidemia    Degeneration of cervical intervertebral disc    Cervicalgia    Rotator cuff (capsule) sprain    Lateral epicondylitis    Radial styloid tenosynovitis    Morbid obesity with BMI of 60.0-69.9, adult ([address]    BRIANNA (obstructive sleep apnea)    Hepatic steatosis    Lumbar radiculopathy    Chronic pain syndrome    Acute diastolic CHF (congestive heart failure) ([address]    MAMTA (acute kidney injury) ([address]    H/O: GI bleed    Iron deficiency anemia    Fibromyalgia    Acute hypoxemic respiratory failure (HCC)    Hyperkalemia    Class 3 severe obesity due to excess calories with serious comorbidity and body mass index (BMI) of 45.0 to 49.9 in adult [facility])    Tremor     Will proceed with increasing her activity.    requesting that we wean patient O2 to room air keep sats 92 or above    MedSurg okay with us    Hopefully, discharge over the weekend    Electronically signed by Rhoda Conley MD on 1/10/2020 at 12:37 PM

## 2020-01-10 NOTE — PROGRESS NOTES
soundly through the night and feels energentic this date. Pt reports D/C dates will most likely be over the weekened; pt will have at home PT care but pt feels she won't need PT services for very long. Pt reports Dr. Alveta Ganser took O2 nasal cannual off; pt is to have O2 off during the day and wear O2 at night when sleeping. Pt reported no dizziness/lightheadedness throughout tx  Comments: RN Shelley Dwyer ok's therapy tx. Writer assisted pt to bathroom toilet per pt's request. Pt has cough and easily SOB throughout tx    Vital Signs  Pulse: 96(sitting in chair after amb)  Heart Rate Source: Monitor  Patient Currently in Pain: Denies        Oxygen Therapy  SpO2: 91 %(increased to 93 after breathing tech (seated after amb))  Pulse Oximeter Device Mode: Intermittent  Pulse Oximeter Device Location: Finger  O2 Device: None (Room air)  Patient Observation  Observations: Pt is pleasant to work with and motivated to get better. Pt reports she uses cane and rollator at home and did not want to use walker to amb around room/hallway. Pt reports that she has a tendency to touch various surfaces when she walks just for comfort. Pt reports she uses call light in room when she needs assistance from nursing staff for mobility d/t pt's fall risk status. Bed Mobility  Comment: Pt wanted to remain in chair/ambulate in room/hallwayfor duration of therapy    Transfers:  Sit to Stand: Contact guard assistance  Stand to sit: Contact guard assistance  Comments: Pt continues to refuse walker to amb and transfers; pt had no LOB this date but requires standing rest breaks d/t SOB with mobility. Pt has tendency to touch hallway railings throughout amb.                  Ambulation 1  Surface: level tile  Device: No Device  Assistance: Contact guard assistance  Gait Deviations: Slow Odette  Distance: 30ftx2(standing rest break before turning back to room)  Comments: Pt becomes easily fatigued with mobility and SOB, reported no dizziness

## 2020-01-10 NOTE — DISCHARGE INSTR - COC
Continuity of Care Form    Patient Name: Zehra Zee   :  1955  MRN:  [id number]    Admit date:  2020  Discharge date:  01/10/2020    Code Status Order: Full Code   Advance Directives:   [facility] Documentation     Date/Time Healthcare Directive Type of Healthcare Directive Copy in [address] Agent's Name Healthcare Agent's Phone Number    [phone]  No, patient does not have an advance directive for healthcare treatment -- -- -- -- --          Admitting Physician:  Jenny Mcguier MD  PCP: Jenny Mcguire MD    Discharging Nurse: [facility] Unit/Room#: 2113/2113-01  Discharging Unit Phone Number: [phone]    Emergency Contact:   Extended Emergency Contact Information  Primary Emergency Contact: Christoph Beltran  Address: [address] [place] of [address] Phone: [phone]  Relation: Spouse  Hearing or visual needs: None  Preferred language: English   needed? No  Secondary Emergency Contact: Denise Beltran  Address: N/A           [facility] [address] [place] [address] [place] of [address] Phone: [phone]  Relation: Child  Hearing or visual needs: None  Other needs: None  Preferred language: English   needed?  No    Past Surgical History:  Past Surgical History:   Procedure Laterality Date    CARPAL TUNNEL RELEASE Right 2014    CATARACT REMOVAL WITH IMPLANT Bilateral 2013    CHOLECYSTECTOMY, LAPAROSCOPIC  1998    COLONOSCOPY N/A 10/17/2019    COLONOSCOPY DIAGNOSTIC performed by Geoffrey Muller MD at [facility] 238 cyst from base of tail bone   [id number] [facility] Bilateral 2017    abcess removed    LUMBAR FUSION  10/2010; 2011    L4/L5    LUMBAR FUSION  2009    L4-S1    OTHER SURGICAL HISTORY  2018    Lumbar decompression laminectomy at L3-L4 bilaterally wiith complete facetomies at L3-L4 bilaterally;  diskectomy L3-L4: posterior lumbar interbody fusion; placement of [place]-type graft; local harvest of bone; microsurgical dissection.  ROTATOR CUFF REPAIR Left 2012    SKIN CANCER EXCISION  2005    Basal Cell Carcinoma, Curly size from Lt.  face    TUBAL LIGATION  1981    UPPER GASTROINTESTINAL ENDOSCOPY N/A 10/16/2019    EGD BIOPSY performed by Yumiko Baez MD at [facility] ENDO       Immunization History:   Immunization History   Administered Date(s) Administered    Influenza Virus Vaccine 12/18/2015    Influenza, [place], IM, (6 mo and older Fluzone, Flulaval, Fluarix and 3 yrs and older Afluria) 12/15/2016, 12/10/2018    Influenza, [place] Justen, IM, PF (6 mo and older Fluzone, Flulaval, Fluarix, and 3 yrs and older Afluria) 10/10/2019    Pneumococcal Polysaccharide ([id number]) 12/15/2016       Active Problems:  Patient Active Problem List   Diagnosis Code    Hypertension, benign I10    Diabetes mellitus type II, controlled ([facility] E11.9    Other hyperlipidemia E78.49    Degeneration of cervical intervertebral disc M50.30    Cervicalgia M54.2    Rotator cuff (capsule) sprain S43.429A    Lateral epicondylitis M77.10    Radial styloid tenosynovitis M65.4    Morbid obesity with BMI of 60.0-69.9, adult ([facility] E66.01, Z68.44    BRIANNA (obstructive sleep apnea) G47.33    Hepatic steatosis K76.0    Lumbar radiculopathy M54.16    Chronic pain syndrome G89.4    Acute diastolic CHF (congestive heart failure) ([facility]) I50.31    MAMTA (acute kidney injury) ([facility] N17.9    H/O: GI bleed Z87.19    Iron deficiency anemia D50.9    Fibromyalgia M79.7    Acute hypoxemic respiratory failure ([facility]) J96.01    Hyperkalemia E87.5    Class 3 severe obesity due to excess calories with serious comorbidity and body mass index (BMI) of 45.0 to 49.9 in adult ([facility]) E66.01, Z68.42    Tremor R25.1       Isolation/Infection:   Isolation          No Isolation        Patient Infection Status     None to display Nurse Assessment:  Last Vital Signs: BP (!) 152/69   Pulse 74   Temp 97.6 °F (36.4 °C) (Oral)   Resp 17   Ht 5' 5\" (1.651 m)   Wt 294 lb 15.6 oz (133.8 kg)   LMP  (LMP Unknown)   SpO2 97%   BMI 49.09 kg/m²     Last documented pain score (0-10 scale): Pain Level: 4  Last Weight:   Wt Readings from Last 1 Encounters:   01/09/20 294 lb 15.6 oz (133.8 kg)     Mental Status:  oriented and alert    IV Access:  - None    Nursing Mobility/ADLs:  Walking   Independent  Transfer  Independent  Bathing  Independent  Dressing  Independent  Toileting  Independent  Feeding  Independent  Med Admin  Independent  Med Delivery   whole    Wound Care Documentation and Therapy:        Elimination:  Continence:   · Bowel: Yes  · Bladder: Yes  Urinary Catheter: None   Colostomy/Ileostomy/Ileal Conduit: No       Date of Last BM: 01/09/2020    Intake/Output Summary (Last 24 hours) at 1/10/2020 0722  Last data filed at 1/10/2020 0553  Gross per 24 hour   Intake 480 ml   Output 3550 ml   Net -3070 ml     I/O last 3 completed shifts: In: 480 [P.O.:480]  Out: 3550 [Urine:3550]    Safety Concerns: At Risk for Falls    Impairments/Disabilities:      None    Nutrition Therapy:  Current Nutrition Therapy:   - Oral Diet:  Carb Control 4 carbs/meal (1800kcals/day)    Routes of Feeding: Oral  Liquids: No Restrictions  Daily Fluid Restriction: no  Last Modified Barium Swallow with Video (Video Swallowing Test): not done    Treatments at the Time of Hospital Discharge:   Respiratory Treatments: Wears 2.5L nasal cannula at night/bedtime. Oxygen Therapy:  is on oxygen at 2.5 L/min per nasal cannula.   Ventilator:    - No ventilator support    Rehab Therapies: Physical Therapy and Occupational Therapy  Weight Bearing Status/Restrictions: No weight bearing restirctions  Other Medical Equipment (for information only, NOT a DME order):  walker  Other Treatments: N/A    Patient's personal belongings (please select all that are sent with

## 2020-01-10 NOTE — CARE COORDINATION
DISCHARGE PLANNING NOTE:    Plan remains for patient to return home. She continues to state that she will make her decision regarding VNS closer to discharge. Active order for IV solu-medrol 30mg every 12 hours. Will continue to follow for additional d/c needs.     Electronically signed by Leanna Merritt RN on 1/10/2020 at 9:56 AM

## 2020-01-11 VITALS
OXYGEN SATURATION: 94 % | HEART RATE: 80 BPM | TEMPERATURE: 98.5 F | HEIGHT: 65 IN | WEIGHT: 293 LBS | RESPIRATION RATE: 16 BRPM | DIASTOLIC BLOOD PRESSURE: 68 MMHG | BODY MASS INDEX: 48.82 KG/M2 | SYSTOLIC BLOOD PRESSURE: 155 MMHG

## 2020-01-11 LAB
ABSOLUTE EOS #: 0 K/UL (ref 0–0.4)
ABSOLUTE IMMATURE GRANULOCYTE: ABNORMAL K/UL (ref 0–0.3)
ABSOLUTE LYMPH #: 2.28 K/UL (ref 1–4.8)
ABSOLUTE MONO #: 0.62 K/UL (ref 0.1–1.3)
ANION GAP SERPL CALCULATED.3IONS-SCNC: 15 MMOL/L (ref 9–17)
BASOPHILS # BLD: 0 % (ref 0–2)
BASOPHILS ABSOLUTE: 0 K/UL (ref 0–0.2)
BUN BLDV-MCNC: 36 MG/DL (ref 8–23)
BUN/CREAT BLD: ABNORMAL (ref 9–20)
CALCIUM SERPL-MCNC: 9.6 MG/DL (ref 8.6–10.4)
CHLORIDE BLD-SCNC: 95 MMOL/L (ref 98–107)
CO2: 27 MMOL/L (ref 20–31)
CREAT SERPL-MCNC: 1.02 MG/DL (ref 0.5–0.9)
DIFFERENTIAL TYPE: ABNORMAL
EOSINOPHILS RELATIVE PERCENT: 0 % (ref 0–4)
GFR AFRICAN AMERICAN: >60 ML/MIN
GFR NON-AFRICAN AMERICAN: 55 ML/MIN
GFR SERPL CREATININE-BSD FRML MDRD: ABNORMAL ML/MIN/{1.73_M2}
GFR SERPL CREATININE-BSD FRML MDRD: ABNORMAL ML/MIN/{1.73_M2}
GLUCOSE BLD-MCNC: 261 MG/DL (ref 65–105)
GLUCOSE BLD-MCNC: 262 MG/DL (ref 65–105)
GLUCOSE BLD-MCNC: 266 MG/DL (ref 70–99)
GLUCOSE BLD-MCNC: 288 MG/DL (ref 65–105)
HCT VFR BLD CALC: 35.1 % (ref 36–46)
HEMOGLOBIN: 11.2 G/DL (ref 12–16)
IMMATURE GRANULOCYTES: ABNORMAL %
LYMPHOCYTES # BLD: 11 % (ref 24–44)
MCH RBC QN AUTO: 25.8 PG (ref 26–34)
MCHC RBC AUTO-ENTMCNC: 31.8 G/DL (ref 31–37)
MCV RBC AUTO: 81.2 FL (ref 80–100)
MONOCYTES # BLD: 3 % (ref 1–7)
MORPHOLOGY: ABNORMAL
MORPHOLOGY: ABNORMAL
NRBC AUTOMATED: ABNORMAL PER 100 WBC
PDW BLD-RTO: 16.6 % (ref 11.5–14.9)
PLATELET # BLD: 517 K/UL (ref 150–450)
PLATELET ESTIMATE: ABNORMAL
PMV BLD AUTO: 8.8 FL (ref 6–12)
POTASSIUM SERPL-SCNC: 4.9 MMOL/L (ref 3.7–5.3)
RBC # BLD: 4.33 M/UL (ref 4–5.2)
RBC # BLD: ABNORMAL 10*6/UL
SEG NEUTROPHILS: 86 % (ref 36–66)
SEGMENTED NEUTROPHILS ABSOLUTE COUNT: 17.8 K/UL (ref 1.3–9.1)
SODIUM BLD-SCNC: 137 MMOL/L (ref 135–144)
WBC # BLD: 20.7 K/UL (ref 3.5–11)
WBC # BLD: ABNORMAL 10*3/UL

## 2020-01-11 PROCEDURE — 2580000003 HC RX 258: Performed by: INTERNAL MEDICINE

## 2020-01-11 PROCEDURE — 36415 COLL VENOUS BLD VENIPUNCTURE: CPT

## 2020-01-11 PROCEDURE — 99232 SBSQ HOSP IP/OBS MODERATE 35: CPT | Performed by: FAMILY MEDICINE

## 2020-01-11 PROCEDURE — 6370000000 HC RX 637 (ALT 250 FOR IP): Performed by: FAMILY MEDICINE

## 2020-01-11 PROCEDURE — 80048 BASIC METABOLIC PNL TOTAL CA: CPT

## 2020-01-11 PROCEDURE — 97116 GAIT TRAINING THERAPY: CPT

## 2020-01-11 PROCEDURE — 94761 N-INVAS EAR/PLS OXIMETRY MLT: CPT

## 2020-01-11 PROCEDURE — 94640 AIRWAY INHALATION TREATMENT: CPT

## 2020-01-11 PROCEDURE — 82947 ASSAY GLUCOSE BLOOD QUANT: CPT

## 2020-01-11 PROCEDURE — 85025 COMPLETE CBC W/AUTO DIFF WBC: CPT

## 2020-01-11 PROCEDURE — 6370000000 HC RX 637 (ALT 250 FOR IP): Performed by: INTERNAL MEDICINE

## 2020-01-11 PROCEDURE — 6360000002 HC RX W HCPCS: Performed by: INTERNAL MEDICINE

## 2020-01-11 PROCEDURE — 6360000002 HC RX W HCPCS: Performed by: FAMILY MEDICINE

## 2020-01-11 RX ORDER — PREDNISONE 10 MG/1
10 TABLET ORAL DAILY
Status: DISCONTINUED | OUTPATIENT
Start: 2020-01-17 | End: 2020-01-11 | Stop reason: HOSPADM

## 2020-01-11 RX ORDER — PREDNISONE 20 MG/1
20 TABLET ORAL DAILY
Status: DISCONTINUED | OUTPATIENT
Start: 2020-01-11 | End: 2020-01-11 | Stop reason: HOSPADM

## 2020-01-11 RX ORDER — ALBUTEROL SULFATE 2.5 MG/3ML
2.5 SOLUTION RESPIRATORY (INHALATION) 2 TIMES DAILY
Status: DISCONTINUED | OUTPATIENT
Start: 2020-01-11 | End: 2020-01-11 | Stop reason: HOSPADM

## 2020-01-11 RX ORDER — PREDNISONE 10 MG/1
15 TABLET ORAL DAILY
Status: DISCONTINUED | OUTPATIENT
Start: 2020-01-14 | End: 2020-01-11 | Stop reason: HOSPADM

## 2020-01-11 RX ORDER — PREDNISONE 10 MG/1
5 TABLET ORAL DAILY
Status: DISCONTINUED | OUTPATIENT
Start: 2020-01-20 | End: 2020-01-11 | Stop reason: HOSPADM

## 2020-01-11 RX ORDER — PREDNISONE 1 MG/1
15 TABLET ORAL DAILY
Qty: 9 TABLET | Refills: 0 | Status: SHIPPED | OUTPATIENT
Start: 2020-01-14 | End: 2020-01-17

## 2020-01-11 RX ORDER — PREDNISONE 10 MG/1
10 TABLET ORAL DAILY
Qty: 3 TABLET | Refills: 0 | Status: SHIPPED | OUTPATIENT
Start: 2020-01-17 | End: 2020-01-20

## 2020-01-11 RX ORDER — ALBUTEROL SULFATE 2.5 MG/3ML
2.5 SOLUTION RESPIRATORY (INHALATION) 2 TIMES DAILY
Qty: 120 EACH | Refills: 3 | Status: SHIPPED | OUTPATIENT
Start: 2020-01-11 | End: 2022-04-18

## 2020-01-11 RX ORDER — PREDNISONE 20 MG/1
20 TABLET ORAL DAILY
Qty: 3 TABLET | Refills: 0 | Status: SHIPPED | OUTPATIENT
Start: 2020-01-11 | End: 2020-01-14

## 2020-01-11 RX ORDER — PREDNISONE 1 MG/1
5 TABLET ORAL DAILY
Qty: 7 TABLET | Refills: 0 | Status: SHIPPED | OUTPATIENT
Start: 2020-01-20 | End: 2020-01-27

## 2020-01-11 RX ADMIN — FUROSEMIDE 20 MG: 20 TABLET ORAL at 08:55

## 2020-01-11 RX ADMIN — Medication 10 ML: at 08:56

## 2020-01-11 RX ADMIN — CARVEDILOL 3.12 MG: 3.12 TABLET, FILM COATED ORAL at 17:07

## 2020-01-11 RX ADMIN — IPRATROPIUM BROMIDE AND ALBUTEROL SULFATE 1 AMPULE: .5; 3 SOLUTION RESPIRATORY (INHALATION) at 07:03

## 2020-01-11 RX ADMIN — INSULIN LISPRO 3 UNITS: 100 INJECTION, SOLUTION INTRAVENOUS; SUBCUTANEOUS at 17:07

## 2020-01-11 RX ADMIN — GLIMEPIRIDE 4 MG: 4 TABLET ORAL at 06:25

## 2020-01-11 RX ADMIN — CETIRIZINE HCL, PSEUDOEPHEDRINE HCL 1 TABLET: 5; 120 TABLET, EXTENDED RELEASE ORAL at 10:12

## 2020-01-11 RX ADMIN — Medication 1 TABLET: at 08:54

## 2020-01-11 RX ADMIN — IPRATROPIUM BROMIDE AND ALBUTEROL SULFATE 1 AMPULE: .5; 3 SOLUTION RESPIRATORY (INHALATION) at 15:09

## 2020-01-11 RX ADMIN — PREDNISONE 20 MG: 20 TABLET ORAL at 17:07

## 2020-01-11 RX ADMIN — ENOXAPARIN SODIUM 30 MG: 30 INJECTION SUBCUTANEOUS at 08:55

## 2020-01-11 RX ADMIN — IPRATROPIUM BROMIDE AND ALBUTEROL SULFATE 1 AMPULE: .5; 3 SOLUTION RESPIRATORY (INHALATION) at 10:59

## 2020-01-11 RX ADMIN — METHYLPREDNISOLONE SODIUM SUCCINATE 30 MG: 40 INJECTION, POWDER, FOR SOLUTION INTRAMUSCULAR; INTRAVENOUS at 04:53

## 2020-01-11 RX ADMIN — AMLODIPINE BESYLATE 10 MG: 10 TABLET ORAL at 08:55

## 2020-01-11 RX ADMIN — CARVEDILOL 3.12 MG: 3.12 TABLET, FILM COATED ORAL at 08:55

## 2020-01-11 RX ADMIN — PREGABALIN 150 MG: 150 CAPSULE ORAL at 08:55

## 2020-01-11 RX ADMIN — INSULIN LISPRO 3 UNITS: 100 INJECTION, SOLUTION INTRAVENOUS; SUBCUTANEOUS at 11:20

## 2020-01-11 RX ADMIN — INSULIN LISPRO 3 UNITS: 100 INJECTION, SOLUTION INTRAVENOUS; SUBCUTANEOUS at 09:01

## 2020-01-11 RX ADMIN — OXYCODONE HYDROCHLORIDE 30 MG: 10 TABLET ORAL at 11:19

## 2020-01-11 ASSESSMENT — PAIN SCALES - GENERAL: PAINLEVEL_OUTOF10: 7

## 2020-01-11 NOTE — PROGRESS NOTES
Exam: No edema    MEDS      predniSONE  20 mg Oral Daily    Followed by   Anastacio Contreras ON 1/14/2020] predniSONE  15 mg Oral Daily    Followed by   Anastacio Ocasiohand ON 1/17/2020] predniSONE  10 mg Oral Daily    Followed by   Anastacio Contreras ON 1/20/2020] predniSONE  5 mg Oral Daily    albuterol  2.5 mg Nebulization BID    furosemide  20 mg Oral Daily    sodium chloride flush  10 mL Intravenous BID    sodium chloride flush  10 mL Intravenous BID    amLODIPine  10 mg Oral Daily    atorvastatin  40 mg Oral Nightly    calcium carbonate-vitamin D  1 tablet Oral BID    carvedilol  3.125 mg Oral BID WC    coenzyme Q10  200 mg Oral Nightly    diclofenac sodium  2 g Topical TID    glimepiride  4 mg Oral QAM AC    therapeutic multivitamin-minerals  1 tablet Oral Nightly    pregabalin  150 mg Oral BID    venlafaxine  37.5 mg Oral Nightly    sodium chloride flush  10 mL Intravenous 2 times per day    enoxaparin  30 mg Subcutaneous BID    insulin lispro  0-6 Units Subcutaneous TID WC    insulin lispro  0-3 Units Subcutaneous Nightly    cetirizine-psuedoephedrine  1 tablet Oral Daily      dextrose       oxyCODONE, sodium chloride flush, magnesium hydroxide, ondansetron, glucose, dextrose, glucagon (rDNA), dextrose, acetaminophen, LORazepam **OR** LORazepam, albuterol    LABS   CBC   Recent Labs     01/11/20  0620   WBC 20.7*   HGB 11.2*   HCT 35.1*   MCV 81.2   *     BMP:   Lab Results   Component Value Date     01/11/2020    K 4.9 01/11/2020    CL 95 01/11/2020    CO2 27 01/11/2020    BUN 36 01/11/2020    LABALBU 3.8 01/06/2020    LABALBU 4.5 03/07/2012    CREATININE 1.02 01/11/2020    CALCIUM 9.6 01/11/2020    GFRAA >60 01/11/2020    LABGLOM 55 01/11/2020     ABGs:  Lab Results   Component Value Date    PHART 7.416 01/06/2020    PO2ART 58.0 01/06/2020    YYV2GAO 50.2 01/06/2020      Lab Results   Component Value Date    MODE NOT REPORTED 01/06/2020     Ionized Calcium:  No results found for: IONCA  Magnesium: Lab Results   Component Value Date    MG 1.9 11/23/2019     Phosphorus:  No results found for: PHOS     LIVER PROFILE No results for input(s): AST, ALT, LIPASE, BILIDIR, BILITOT, ALKPHOS in the last 72 hours. Invalid input(s): AMYLASE,  ALB  INR No results for input(s): INR in the last 72 hours. PTT   Lab Results   Component Value Date    APTT 24.8 10/15/2019         RADIOLOGY     (See actual reports for details)    ASSESSMENT/PLAN     Patient Active Problem List   Diagnosis    Hypertension, benign    Diabetes mellitus type II, controlled ([address]    Other hyperlipidemia    Degeneration of cervical intervertebral disc    Cervicalgia    Rotator cuff (capsule) sprain    Lateral epicondylitis    Radial styloid tenosynovitis    Morbid obesity with BMI of 60.0-69.9, adult ([address]    BRIANNA (obstructive sleep apnea)    Hepatic steatosis    Lumbar radiculopathy    Chronic pain syndrome    Acute diastolic CHF (congestive heart failure) ([address]    [facility]acute kidney injury) [facility]    H/O: GI bleed    Iron deficiency anemia    Fibromyalgia    Acute hypoxemic respiratory failure (HCC)    Hyperkalemia    Class 3 severe obesity due to excess calories with serious comorbidity and body mass index (BMI) of 45.0 to 49.9 in adult ([address]    Tremor     #1 bronchospasm markedly improved  #2 chronic respiratory failure with hypercapnia  #3 concern for BRIANNA obesity hypoventilation syndrome she has a previous diagnosis of BRIANNA  For patchy process on CT scan. Could be infectious versus inflammatory. Patient currently is asymptomatic with no signs of pneumonia. We can change patient from Solu-Medrol to prednisone for treatment of bronchospasm and wean as an outpatient    Face-to-face discussion was done for the patient to use albuterol nebulizers by twice a day and as needed. This is for bronchospasm. Patient voiced understanding and is agreeable.     I did give the patient my office practice card so she can schedule

## 2020-01-11 NOTE — CARE COORDINATION
Continuity of Care Form    Patient Name: Lyndsey Matos   :  1955  MRN:  [id number]    Admit date:  2020  Discharge date:  01/10/2020    Code Status Order: Full Code   Advance Directives:   [facility] Documentation     Date/Time Healthcare Directive Type of Healthcare Directive Copy in [address] Agent's Name Healthcare Agent's Phone Number    [phone]  No, patient does not have an advance directive for healthcare treatment -- -- -- -- --          Admitting Physician:  Sofia Spivey MD  PCP: Sofia Spivey MD    Discharging Nurse: [facility] Unit/Room#: 2113/2113-01  Discharging Unit Phone Number: [phone]    Emergency Contact:   Extended Emergency Contact Information  Primary Emergency Contact: Christoph Beltran  Address: [address]  Phone: [phone]  Relation: Spouse  Hearing or visual needs: None  Preferred language: English   needed? No  Secondary Emergency Contact: Denise Beltran  Address: N/A           Eugenio [address]  Phone: [phone]  Relation: Child  Hearing or visual needs: None  Other needs: None  Preferred language: English   needed?  No    Past Surgical History:  Past Surgical History:   Procedure Laterality Date    CARPAL TUNNEL RELEASE Right 2014    CATARACT REMOVAL WITH IMPLANT Bilateral 2013    CHOLECYSTECTOMY, LAPAROSCOPIC  1998    COLONOSCOPY N/A 10/17/2019    COLONOSCOPY DIAGNOSTIC performed by Renetta Starr MD at [facility] 238 cyst from base of tail bone   [address] [facility] Bilateral 2017    abcess removed    LUMBAR FUSION  10/2010; 2011    L4/L5    LUMBAR FUSION  2009    L4-S1    OTHER SURGICAL HISTORY  2018    Lumbar decompression laminectomy at L3-L4 bilaterally wiith complete facetomies at L3-L4 bilaterally;  diskectomy L3-L4: posterior lumbar interbody fusion; placement of Beaver-type graft; local harvest of bone; microsurgical dissection.  ROTATOR CUFF REPAIR Left 2012    SKIN CANCER EXCISION  2005    Basal Cell Carcinoma, Curly size from Lt.  face    TUBAL LIGATION  1981    UPPER GASTROINTESTINAL ENDOSCOPY N/A 10/16/2019    EGD BIOPSY performed by Abdulaziz Metz MD at [facility]       Immunization History:   Immunization History   Administered Date(s) Administered    Influenza Virus Vaccine 12/18/2015    Influenza, Anant Reasoner, IM, (6 mo and older Fluzone, Flulaval, Fluarix and 3 yrs and older Afluria) 12/15/2016, 12/10/2018    Influenza, Anant Reasoner, IM, PF (6 mo and older Fluzone, Flulaval, Fluarix, and 3 yrs and older Afluria) 10/10/2019    Pneumococcal Polysaccharide ([id number]) 12/15/2016       Active Problems:  Patient Active Problem List   Diagnosis Code    Hypertension, benign I10    Diabetes mellitus type II, controlled ([facility] E11.9    Other hyperlipidemia E78.49    Degeneration of cervical intervertebral disc M50.30    Cervicalgia M54.2    Rotator cuff (capsule) sprain S43.429A    Lateral epicondylitis M77.10    Radial styloid tenosynovitis M65.4    Morbid obesity with BMI of 60.0-69.9, adult ([facility] E66.01, Z68.44    BRIANNA (obstructive sleep apnea) G47.33    Hepatic steatosis K76.0    Lumbar radiculopathy M54.16    Chronic pain syndrome G89.4    Acute diastolic CHF (congestive heart failure) ([facility]) I50.31    MAMTA (acute kidney injury) ([facility] N17.9    H/O: GI bleed Z87.19    Iron deficiency anemia D50.9    Fibromyalgia M79.7    Acute hypoxemic respiratory failure ([facility]) J96.01    Hyperkalemia E87.5    Class 3 severe obesity due to excess calories with serious comorbidity and body mass index (BMI) of 45.0 to 49.9 in adult ([facility]) E66.01, Z68.42    Tremor R25.1       Isolation/Infection:   Isolation          No Isolation        Patient Infection Status     None to display Nurse Assessment:  Last Vital Signs: BP (!) 152/69   Pulse 74   Temp 97.6 °F (36.4 °C) (Oral)   Resp 17   Ht 5' 5\" (1.651 m)   Wt 294 lb 15.6 oz (133.8 kg)   LMP  (LMP Unknown)   SpO2 97%   BMI 49.09 kg/m²     Last documented pain score (0-10 scale): Pain Level: 4  Last Weight:   Wt Readings from Last 1 Encounters:   01/09/20 294 lb 15.6 oz (133.8 kg)     Mental Status:  oriented and alert    IV Access:  - None    Nursing Mobility/ADLs:  Walking   Independent  Transfer  Independent  Bathing  Independent  Dressing  Independent  Toileting  Independent  Feeding  Independent  Med Admin  Independent  Med Delivery   whole    Wound Care Documentation and Therapy:        Elimination:  Continence:   · Bowel: Yes  · Bladder: Yes  Urinary Catheter: None   Colostomy/Ileostomy/Ileal Conduit: No       Date of Last BM: 01/09/2020    Intake/Output Summary (Last 24 hours) at 1/10/2020 0722  Last data filed at 1/10/2020 0553  Gross per 24 hour   Intake 480 ml   Output 3550 ml   Net -3070 ml     I/O last 3 completed shifts: In: 480 [P.O.:480]  Out: 3550 [Urine:3550]    Safety Concerns: At Risk for Falls    Impairments/Disabilities:      None    Nutrition Therapy:  Current Nutrition Therapy:   - Oral Diet:  Carb Control 4 carbs/meal (1800kcals/day)    Routes of Feeding: Oral  Liquids: No Restrictions  Daily Fluid Restriction: no  Last Modified Barium Swallow with Video (Video Swallowing Test): not done    Treatments at the Time of Hospital Discharge:   Respiratory Treatments: Wears 2.5L nasal cannula at night/bedtime. Oxygen Therapy:  is on oxygen at 2.5 L/min per nasal cannula. Ventilator:    - No ventilator support    Rehab Therapies: Physical Therapy and Occupational Therapy  Weight Bearing Status/Restrictions: No weight bearing restirctions  Other Medical Equipment (for information only, NOT a DME order):  walker  Other Treatments: Skilled nursing assessment. Medication administration and education. Patient's personal belongings (please select all that are sent with patient):  None    RN SIGNATURE:  Electronically signed by Maria T Huizar RN on 1/10/20 at 7:24 AM    CASE MANAGEMENT/SOCIAL WORK SECTION    Inpatient Status Date: 1/5/20    Readmission Risk Assessment Score:  Readmission Risk              Risk of Unplanned Readmission:        29           Discharging to Facility/ Agency   · [address] [facility] agency's  to evaluate patient two weeks prior to discharge from home health to determine post-discharge services. / signature: Electronically signed by Dariel Tesfaye RN on 1/11/20 at 5:11 PM    PHYSICIAN SECTION    Prognosis: Good    Condition at Discharge: Stable    Rehab Potential (if transferring to Rehab): Fair    Recommended Labs or Other Treatments After Discharge:     Physician Certification: I certify the above information and transfer of Abigail Talley  is necessary for the continuing treatment of the diagnosis listed and that she requires [address] for greater 30 days. Update Admission H&P: No change in H&P    PHYSICIAN SIGNATURE:  Electronically signed by Viki Edwards MD on 1/11/20 at 4:33 PM    Current Discharge Medication List     START taking these medications     Medication Dose   !! predniSONE (DELTASONE) 20 MG tablet 20 mg   Take 1 tablet by mouth daily for 3 days   Quantity: 3 tablet Refills: 0       !! predniSONE (DELTASONE) 5 MG tablet 15 mg   Take 3 tablets by mouth daily for 3 days   Quantity: 9 tablet Refills: 0       !! predniSONE (DELTASONE) 10 MG tablet 10 mg   Take 1 tablet by mouth daily for 3 days   Quantity: 3 tablet Refills: 0       !! predniSONE (DELTASONE) 5 MG tablet 5 mg   Take 1 tablet by mouth daily for 7 days   Quantity: 7 tablet Refills: 0       albuterol (PROVENTIL) (2.5 MG/3ML) 0.083% nebulizer solution 2.5 mg   Take 3 mLs by nebulization 2 times daily   Quantity: 120 each Refills: 3        !!

## 2020-01-11 NOTE — PROGRESS NOTES
energentic this date. Pt reports D/C dates will most likely be over the weekened; pt will have at home PT care but pt feels she won't need PT services for very long. Pt reports Dr. Tom Casper took O2 nasal cannual off; pt is to have O2 off during the day and wear O2 at night when sleeping. Pt reported no dizziness/lightheadedness throughout tx  Comments: nursing approves treatment    Vital Signs  Patient Currently in Pain: Denies        Oxygen Therapy  O2 Device: None (Room air)  Patient Observation  Observations: Pt is pleasant to work with and motivated to get better. Pt reports she uses cane and rollator at home and did not want to use walker to amb around room/hallway. Pt reports that she has a tendency to touch various surfaces when she walks just for comfort. Pt reports she uses call light in room when she needs assistance from nursing staff for mobility d/t pt's fall risk status.        Bed Mobility:        Transfers:  Sit to Stand: Stand by assistance  Stand to sit: Stand by assistance      Ambulation 1  Surface: level tile  Device: Single point cane  Assistance: Stand by assistance  Gait Deviations: Slow Odette  Distance: 150 ft  Comments: pt instructed in energy conservation technique good return demonstration , no standing rest break         Stairs/Curb  Stairs?: Yes  Stairs  # Steps : 4  Stairs Height: 4\"  Rails: Right ascending  Assistance: Contact guard assistance;Stand by assistance  Comment: step to right ascending, SpO2 91% HR 97      Posture: Good  Sitting - Static: Good(in bedside chair)  Sitting - Dynamic: Good;-(in bedside chair)  Standing - Static: Good;-(standing no ad)  Standing - Dynamic: Good;-(standing no ad)  Comments: no LOB throughout tx     Other exercises?: Yes  Other exercises 1: seated bilateral LE x 15  Other Activities  Comment: discussed ambulation in hallway with pt and nursing with staff as tolerated and able per pt direction        Activity Tolerance: Patient limited by fatigue;Patient limited by endurance  PT Equipment Recommendations  Equipment Needed: No       Assessment  Activity Tolerance: Patient limited by fatigue;Patient limited by endurance   Body structures, Functions, Activity limitations: Decreased functional mobility ; Decreased endurance;Decreased strength;Decreased balance;Decreased safe awareness  Prognosis: Good  Discharge Recommendations: Patient would benefit from continued therapy after discharge     Type of devices:  All fall risk precautions in place;Call light within reach;Gait belt;Left in chair;Nurse notified( present for first half of PT)     Plan  Times per week: 5-7 treatments/ week  Times per day: (5-7 treatments/ week)  Current Treatment Recommendations: Strengthening, Gait Training, Patient/Caregiver Education & Training, Stair training, Balance Training, Endurance Training, Functional Mobility Training, Transfer Training, Safety Education & Training    Patient Education  New Education Provided:    Learner:patient  Method: explanation       Outcome: demonstrated understanding     Goals  Short term goals  Time Frame for Short term goals: 5-7 treatments/ week  Short term goal 1: pt to tolerate 1/2 hour of therapuetic exercise and activity-  keeping O2 sats above 90%  Short term goal 2: pt to demonstrate good technique for LE strengthening and energy conservation techniques  Short term goal 3: pt to demonstrate  improved bed mobility to SBA x 1 for rolling and supine <> sit  w/ O2 as needed  Short term goal 4: pt to demonstrate transfers sit <> stand and bed <> chair w/ CGA x 1  w/ O2 as needed  Short term goal 5: pt to demonstrate gait w/ w walker 50-80' w/ CGA x 1 w/ O2 as needed  Short term goal 6: pt to demonstrate good balance using w walker  Short term goal 7: pt to demonstrate ability to ascend/ descend 4\" steps x 4 w/ s cane and rail and min x 1 to enter/ exit the home    PT Individual Minutes  Time In: (P) 7268  Time Out: (P) 7940  Minutes: (P) 29    Electronically signed by Angelo Srivastava PTA on 1/11/20 at 8:46 AM         01/11/20 0845   PT Individual Minutes   Time In [phone]   Time Out 0864   Minutes 29

## 2020-01-11 NOTE — CARE COORDINATION
Patient given list of VNS agencies and selected [address]. Facesheet, MARY LOU and discharge orders faxed with new referral.     Patient also requested Claudell Cota as supplier for her nebulizer. Facesheet, DME order, med list and face to face notes faxed to [facility] supply. Their office is closed and will reopen Monday. Contact information was placed on the discharge summary. Patient verbalized understanding and will call Monday to have the equipment delivered.

## 2020-01-11 NOTE — PROGRESS NOTES
3987  Last data filed at 1/10/2020 1453  Gross per 24 hour   Intake 685 ml   Output 700 ml   Net -15 ml     Recent Results (from the past 24 hour(s))   POC Glucose Fingerstick    Collection Time: 01/10/20 11:24 AM   Result Value Ref Range    POC Glucose 298 (H) 65 - 105 mg/dL   POC Glucose Fingerstick    Collection Time: 01/10/20  4:03 PM   Result Value Ref Range    POC Glucose 353 (H) 65 - 105 mg/dL   POC Glucose Fingerstick    Collection Time: 01/10/20  8:02 PM   Result Value Ref Range    POC Glucose 328 (H) 65 - 105 mg/dL   POC Glucose Fingerstick    Collection Time: 01/11/20  6:03 AM   Result Value Ref Range    POC Glucose 262 (H) 65 - 105 mg/dL   Basic Metabolic Panel w/ Reflex to MG    Collection Time: 01/11/20  6:20 AM   Result Value Ref Range    Glucose 266 (H) 70 - 99 mg/dL    BUN 36 (H) 8 - 23 mg/dL    CREATININE 1.02 (H) 0.50 - 0.90 mg/dL    Bun/Cre Ratio NOT REPORTED 9 - 20    Calcium 9.6 8.6 - 10.4 mg/dL    Sodium 137 135 - 144 mmol/L    Potassium 4.9 3.7 - 5.3 mmol/L    Chloride 95 (L) 98 - 107 mmol/L    CO2 27 20 - 31 mmol/L    Anion Gap 15 9 - 17 mmol/L    GFR Non-African American 55 (L) >60 mL/min    GFR African American >60 >60 mL/min    GFR Comment          GFR Staging NOT REPORTED    CBC auto differential    Collection Time: 01/11/20  6:20 AM   Result Value Ref Range    WBC 20.7 (H) 3.5 - 11.0 k/uL    RBC 4.33 4.0 - 5.2 m/uL    Hemoglobin 11.2 (L) 12.0 - 16.0 g/dL    Hematocrit 35.1 (L) 36 - 46 %    MCV 81.2 80 - 100 fL    MCH 25.8 (L) 26 - 34 pg    MCHC 31.8 31 - 37 g/dL    RDW 16.6 (H) 11.5 - 14.9 %    Platelets 697 (H) 661 - 450 k/uL    MPV 8.8 6.0 - 12.0 fL    NRBC Automated NOT REPORTED per 100 WBC    Differential Type NOT REPORTED     Seg Neutrophils PENDING %    Lymphocytes PENDING %    Monocytes PENDING %    Eosinophils % PENDING %    Basophils PENDING %    Immature Granulocytes NOT REPORTED 0 %    Segs Absolute PENDING k/uL    Absolute Lymph # PENDING k/uL    Absolute Mono # PENDING k/uL

## 2020-01-11 NOTE — PLAN OF CARE
Problem: Falls - Risk of:  Goal: Will remain free from falls  1/11/2020 1616 by Loree Murphy RN  Outcome: Ongoing  1/11/2020 0405 by Francine Talley RN  Outcome: Ongoing  Goal: Absence of physical injury  Outcome: Ongoing     Problem: Risk for Impaired Skin Integrity  Goal: Tissue integrity - skin and mucous membranes  1/11/2020 1616 by Loree Murphy RN  Outcome: Ongoing  1/11/2020 0405 by Francine Talley RN  Outcome: Ongoing     Problem: Pain:  Goal: Pain level will decrease  Outcome: Ongoing  Goal: Control of acute pain  1/11/2020 1616 by Loree Murphy RN  Outcome: Ongoing  1/11/2020 0405 by Francine Talley RN  Outcome: Ongoing  Goal: Control of chronic pain  Outcome: Ongoing     Problem: Musculor/Skeletal Functional Status  Goal: Highest potential functional level  Outcome: Ongoing  Goal: Absence of falls  Outcome: Ongoing  Goal: Highest potential functional level  Outcome: Ongoing  Goal: Absence of falls  Outcome: Ongoing     Problem: Breathing Pattern - Ineffective:  Goal: Ability to achieve and maintain a regular respiratory rate will improve  1/11/2020 1616 by Loree Murphy RN  Outcome: Ongoing  1/11/2020 0405 by Francine Talley RN  Outcome: Ongoing     Problem:  Activity:  Goal: Ability to tolerate increased activity will improve  Outcome: Ongoing
Problem: Falls - Risk of:  Goal: Will remain free from falls  Description  Will remain free from falls  1/11/2020 0405 by Mahesh Ramos RN  Outcome: Ongoing  Note:   Pt remained absent from falls. Call light within reach. Bed locked and in lowest position. Problem: Risk for Impaired Skin Integrity  Goal: Tissue integrity - skin and mucous membranes  Description  Structural intactness and normal physiological function of skin and  mucous membranes. 1/11/2020 0405 by Mahesh Ramos RN  Outcome: Ongoing  Note:   Pt skin integrity remained intact, no new alterations noted. Head to toe completed, see chart assessment. Problem: Pain:  Goal: Control of acute pain  Description  Control of acute pain  Outcome: Ongoing  Note:   Pain assessed. Patient tolerating. Will continue to monitor      Problem: Breathing Pattern - Ineffective:  Goal: Ability to achieve and maintain a regular respiratory rate will improve  Description  Ability to achieve and maintain a regular respiratory rate will improve  1/11/2020 0405 by Mahesh Ramos RN  Outcome: Ongoing  Note:   Regular respiratory rate maintained.  Will continue to montior
Problem: Falls - Risk of:  Goal: Will remain free from falls  Description  Will remain free from falls  1/7/2020 1540 by Siddhartha Miller RN  Outcome: Ongoing  Note:   The patient remained free from falls this shift, call light within reach, bed in locked and lowest position. Side rails up x2. Continue to monitor closely. Problem: Falls - Risk of:  Goal: Absence of physical injury  Description  Absence of physical injury  1/7/2020 1540 by Siddhartha Miller RN  Outcome: Ongoing     Problem: Risk for Impaired Skin Integrity  Goal: Tissue integrity - skin and mucous membranes  Description  Structural intactness and normal physiological function of skin and  mucous membranes.   1/7/2020 1540 by Siddhartha Miller RN  Outcome: Ongoing     Problem: Pain:  Goal: Pain level will decrease  Description  Pain level will decrease  1/7/2020 1540 by Siddhartha Miller RN  Outcome: Ongoing     Problem: Pain:  Goal: Control of acute pain  Description  Control of acute pain  1/7/2020 1540 by Siddhartha Miller RN  Outcome: Ongoing
Problem: Falls - Risk of:  Goal: Will remain free from falls  Description  Will remain free from falls  1/8/2020 0414 by David Jacques RN  Outcome: Ongoing   The patient remained free from falls this shift, call light within reach, bed in locked and lowest position. Side rails up x2. Continue to monitor closely.    Problem: Pain:  Goal: Pain level will decrease  Description  Pain level will decrease  1/8/2020 0414 by David Jacques RN  Outcome: Ongoing   Pt has had no complaints of pain throughout this shift
Problem: Falls - Risk of:  Goal: Will remain free from falls  Description  Will remain free from falls  1/8/2020 1637 by Trenton Osborn RN  Outcome: Ongoing  Note:   The patient remained free from falls this shift, call light within reach, bed in locked and lowest position. Side rails up x2. Continue to monitor closely. Pt calls out appropriately. Problem: Falls - Risk of:  Goal: Absence of physical injury  Description  Absence of physical injury  1/8/2020 1637 by Trenton Osborn RN  Outcome: Ongoing     Problem: Risk for Impaired Skin Integrity  Goal: Tissue integrity - skin and mucous membranes  Description  Structural intactness and normal physiological function of skin and  mucous membranes. 1/8/2020 1637 by Trenton Osborn RN  Outcome: Ongoing     Problem: Pain:  Goal: Pain level will decrease  Description  Pain level will decrease  1/8/2020 1637 by Trneton Osborn RN  Outcome: Ongoing     Problem: Pain:  Goal: Control of acute pain  Description  Control of acute pain  1/8/2020 1637 by Trenton Osborn RN  Outcome: Ongoing  Note:   Pt has not c/o pain this shift.
Problem: Falls - Risk of:  Goal: Will remain free from falls  Description  Will remain free from falls  1/9/2020 0420 by Artemio Ruiz RN  Outcome: Ongoing   Pt free of falls. Call light within reach, siderails up x2. Room free of clutter. Problem: Risk for Impaired Skin Integrity  Goal: Tissue integrity - skin and mucous membranes  Description  Structural intactness and normal physiological function of skin and  mucous membranes. 1/9/2020 0420 by Artemio Ruiz RN  Outcome: Ongoing   No new wounds      Problem: Pain:  Goal: Pain level will decrease  Description  Pain level will decrease  1/9/2020 0420 by Artemio Ruiz RN  Outcome: Ongoing   Pt comfortable throughout shift.
Problem: Falls - Risk of:  Goal: Will remain free from falls  Description  Will remain free from falls  1/9/2020 1426 by Wilfredo Delarosa RN  Outcome: Met This Shift     Problem: Falls - Risk of:  Goal: Absence of physical injury  Description  Absence of physical injury  1/9/2020 1426 by Wilfredo Delarosa RN  Outcome: Met This Shift     Problem: Risk for Impaired Skin Integrity  Goal: Tissue integrity - skin and mucous membranes  Description  Structural intactness and normal physiological function of skin and  mucous membranes.   1/9/2020 1426 by Wilfredo Delarosa RN  Outcome: Met This Shift     Problem: Pain:  Goal: Pain level will decrease  Description  Pain level will decrease  1/9/2020 1426 by Wilfredo Delarosa RN  Outcome: Met This Shift     Problem: Pain:  Goal: Control of acute pain  Description  Control of acute pain  1/9/2020 1426 by Wilfredo Delarosa RN  Outcome: Met This Shift     Problem: Pain:  Goal: Control of chronic pain  Description  Control of chronic pain  1/9/2020 1426 by Wilfredo Delarosa RN  Outcome: Met This Shift     Problem: Musculor/Skeletal Functional Status  Goal: Highest potential functional level  1/9/2020 1426 by Wilfredo Delraosa RN  Outcome: Ongoing
Problem: Falls - Risk of:  Goal: Will remain free from falls  Description  Will remain free from falls  1/9/2020 1840 by Demarco Milligan RN  Outcome: Ongoing  Note:   No falls noted this shift. Patient ambulates with x1 staff assistance without difficulty. Bed kept in low position. Safe environment maintained. Bedside table & call light in reach. Uses call light appropriately when needing assistance. Problem: Risk for Impaired Skin Integrity  Goal: Tissue integrity - skin and mucous membranes  Description  Structural intactness and normal physiological function of skin and  mucous membranes. 1/9/2020 1840 by Demarco Milligan RN  Outcome: Ongoing  Note:   Skin assessment complete. Turned and repositioned every two hours per self. Area kept free from moisture. Proper nourishment and fluids encouraged, as appropriate. Will continue to monitor for additional needs and changes in skin breakdown. Problem: Pain:  Goal: Pain level will decrease  Description  Pain level will decrease  1/9/2020 1840 by Demarco Milligan RN  Outcome: Ongoing  Note:   No pain at this time. 0/10 pain scale.
physical therapy this shift.

## 2020-01-13 LAB — GLUCOSE BLD-MCNC: 244 MG/DL (ref 65–105)

## 2020-01-14 ENCOUNTER — OFFICE VISIT (OUTPATIENT)
Dept: GASTROENTEROLOGY | Age: 65
End: 2020-01-14
Payer: COMMERCIAL

## 2020-01-14 VITALS
BODY MASS INDEX: 46.13 KG/M2 | HEART RATE: 89 BPM | SYSTOLIC BLOOD PRESSURE: 138 MMHG | WEIGHT: 277.2 LBS | DIASTOLIC BLOOD PRESSURE: 74 MMHG

## 2020-01-14 PROCEDURE — 1036F TOBACCO NON-USER: CPT | Performed by: INTERNAL MEDICINE

## 2020-01-14 PROCEDURE — G8482 FLU IMMUNIZE ORDER/ADMIN: HCPCS | Performed by: INTERNAL MEDICINE

## 2020-01-14 PROCEDURE — 1111F DSCHRG MED/CURRENT MED MERGE: CPT | Performed by: INTERNAL MEDICINE

## 2020-01-14 PROCEDURE — G8417 CALC BMI ABV UP PARAM F/U: HCPCS | Performed by: INTERNAL MEDICINE

## 2020-01-14 PROCEDURE — G8427 DOCREV CUR MEDS BY ELIG CLIN: HCPCS | Performed by: INTERNAL MEDICINE

## 2020-01-14 PROCEDURE — 99214 OFFICE O/P EST MOD 30 MIN: CPT | Performed by: INTERNAL MEDICINE

## 2020-01-14 PROCEDURE — 3017F COLORECTAL CA SCREEN DOC REV: CPT | Performed by: INTERNAL MEDICINE

## 2020-01-14 ASSESSMENT — ENCOUNTER SYMPTOMS
DIARRHEA: 0
BACK PAIN: 0
WHEEZING: 0
NAUSEA: 0
ABDOMINAL DISTENTION: 0
ANAL BLEEDING: 0
BLOOD IN STOOL: 0
SORE THROAT: 0
RECTAL PAIN: 0
ABDOMINAL PAIN: 0
VOICE CHANGE: 0
GASTROINTESTINAL NEGATIVE: 1
COUGH: 0
CHOKING: 0
SINUS PRESSURE: 0
CONSTIPATION: 0
TROUBLE SWALLOWING: 0
VOMITING: 0

## 2020-01-14 NOTE — CARE COORDINATION
Received call from [facility] stating that they are out of network for nebulizer. Faxed facesheet, order, d/c med list, and face to face to Melida Walker at [facility] [place]. Notified Melida Walker from [facility] of this order and the urgent need.     Electronically signed by Ramiro Foss RN on 1/14/2020 at 10:50 AM

## 2020-01-14 NOTE — PROGRESS NOTES
Subjective:      Patient ID: Fadi Malone is a 60 y.o. female. HPI    Dr. Berna Toro MD our mutual patient Fadi Malone was seen  for   1. Other iron deficiency anemia    2. Morbid obesity ([address]    3. PUD (peptic ulcer disease)     . Here for follow-up evaluation  She has been admitted in the past with history for GI bleeding a pyloric ulcer was found  She is clinically seems to be doing well now  She was recently hospitalized with some breathing issues no GI bleeding was reported  Her current hemoglobin is 11.2  Patient has been complaining of some abdominal pains, off and on cramping  Also complains of abdominal bloating and gas  Has off and on nausea without any sig vomiting  Has some alternating constipation and diarrhea  Has no weight loss  Has some anxiety issues    Past Medical, Family, and Social History reviewed and does contribute to the patient presenting condition. patient\"s PMH/PSH,SH,PSYCH hx, MEDs, ALLERGIES, and ROS was all reviewed and updated ion the appropriate sections    Review of Systems   Constitutional: Positive for fatigue. Negative for appetite change and unexpected weight change. HENT: Negative. Negative for dental problem, postnasal drip, sinus pressure, sore throat, trouble swallowing and voice change. Denies   Eyes: Negative for visual disturbance. Respiratory: Negative for cough, choking and wheezing. Cardiovascular: Negative for chest pain, palpitations and leg swelling. Gastrointestinal: Negative. Negative for abdominal distention, abdominal pain, anal bleeding, blood in stool, constipation, diarrhea, nausea, rectal pain and vomiting. Denies   Genitourinary: Negative for difficulty urinating. Musculoskeletal: Positive for gait problem. Negative for arthralgias, back pain and myalgias. Allergic/Immunologic: Negative for environmental allergies and food allergies.    Neurological: Negative for dizziness, weakness, light-headedness,

## 2020-01-14 NOTE — DISCHARGE SUMMARY
[facility] Discharge Summary      Patient ID: Jeanette Loving    MRN: [id number]     Acct:  [de-identified]       Patient's PCP: Robert Hanson MD    Admit Date: 1/5/2020     Discharge Date: 1/11/2020      Admitting Physician: Robert Hanson MD    Discharge Physician: Dahiana Garcia MD     Discharge Diagnoses:    Primary Problem  Hyperkalemia    Active Hospital Problems    Diagnosis Date Noted    Class 3 severe obesity due to excess calories with serious comorbidity and body mass index (BMI) of 45.0 to 49.9 in adult [facility]) [E66.01, Z68.42] 01/06/2020    Tremor [R25.1] 01/06/2020    Hyperkalemia [E87.5] 01/05/2020    MAMTA (acute kidney injury) ([facility] [N17.9] 11/22/2019    Chronic pain syndrome [G89.4] 10/17/2019    Diabetes mellitus type II, controlled ([facility] [E11.9] 02/14/2012    Hypertension, benign [I10] 02/14/2012     Past Medical History:   Diagnosis Date    Acute hypoxemic respiratory failure (HCC)     Arthritis     CHF (congestive heart failure) (HCC)     Chronic back pain     Diabetes mellitus type II, controlled ([facility] [facility] 2/14/2012    Fibromyalgia     Hyperlipidemia LDL goal < 70 2/14/2012    Hypertension, benign 02/14/2012    Morbid obesity with BMI of 60.0-69.9, adult ([facility] [facility] 8/28/2015    Pain of toe of right foot     morgans cyst    Right wrist pain     rate 8    Skin cancer     skin under lt eye,face    Sleep apnea     Bipap does not work    Wears glasses      The patient was seen and examined on day of discharge and this discharge summary is in conjunction with any daily progress note from day of discharge.     Code Status:  Prior    Hospital Course: uncomplicated    Consults:  pulmonary/intensive care and neurology    Significant Diagnostic Studies: as above, and as follows: see chart    Treatments: as above    Disposition: home    Discharged Condition: Stable    Follow Up:  Robert Hanson MD in one week    Discharge Medications:    [address] Medication Instructions COI:[id number]    Printed on:01/14/20 0548   Medication Information                      albuterol (PROVENTIL) (2.5 MG/3ML) 0.083% nebulizer solution  Take 3 mLs by nebulization 2 times daily             amLODIPine (NORVASC) 10 MG tablet  Take 1 tablet by mouth daily             atorvastatin (LIPITOR) 40 MG tablet  TAKE 1 TABLET BY MOUTH EVERY DAY AT NIGHT             Calcium Carb-Cholecalciferol (CALCIUM 600 + D PO)  Take 1 tablet by mouth 2 times daily             carvedilol (COREG) 3.125 MG tablet  Take 1 tablet by mouth 2 times daily (with meals)             Coenzyme Q10 (COQ10 PO)  Take 200 mg by mouth nightly              diclofenac sodium 1 % GEL  Apply 2 g topically 3 times daily             furosemide (LASIX) 40 MG tablet  Take 0.5 tablets by mouth daily             glimepiride (AMARYL) 4 MG tablet  Take 1 tablet by mouth every morning (before breakfast)             lisinopril (PRINIVIL;ZESTRIL) 5 MG tablet  Take 1 tablet by mouth daily             Loratadine-Pseudoephedrine (CLARITIN-D 12 HOUR PO)  Take 1 tablet by mouth daily. metFORMIN (GLUCOPHAGE) 1000 MG tablet  TAKE 1 TABLET TWICE DAILY WITH THE MORNING AND EVENING MEALS             Multiple Vitamins-Iron (ONE DAILY MULTIVITAMIN/IRON PO)  Take 1 tablet by mouth nightly             oxyCODONE (OXY-IR) 30 MG immediate release tablet  Take 30 mg by mouth every 8 hours as needed for Pain. predniSONE (DELTASONE) 10 MG tablet  Take 1 tablet by mouth daily for 3 days             predniSONE (DELTASONE) 20 MG tablet  Take 1 tablet by mouth daily for 3 days             predniSONE (DELTASONE) 5 MG tablet  Take 3 tablets by mouth daily for 3 days             predniSONE (DELTASONE) 5 MG tablet  Take 1 tablet by mouth daily for 7 days             pregabalin (LYRICA) 150 MG capsule  Take 1 capsule by mouth 2 times daily for 30 days.              venlafaxine (EFFEXOR XR) 37.5 MG extended release capsule  Take 1 capsule

## 2020-02-10 ENCOUNTER — APPOINTMENT (OUTPATIENT)
Dept: GENERAL RADIOLOGY | Age: 65
DRG: 682 | End: 2020-02-10
Payer: COMMERCIAL

## 2020-02-10 ENCOUNTER — HOSPITAL ENCOUNTER (INPATIENT)
Age: 65
LOS: 3 days | Discharge: HOME | DRG: 682 | End: 2020-02-13
Attending: EMERGENCY MEDICINE | Admitting: FAMILY MEDICINE
Payer: COMMERCIAL

## 2020-02-10 PROBLEM — N17.9 ACUTE RENAL FAILURE SUPERIMPOSED ON CHRONIC KIDNEY DISEASE (HCC): Status: ACTIVE | Noted: 2020-02-10

## 2020-02-10 PROBLEM — N18.9 ACUTE RENAL FAILURE SUPERIMPOSED ON CHRONIC KIDNEY DISEASE (HCC): Status: ACTIVE | Noted: 2020-02-10

## 2020-02-10 LAB
ABSOLUTE BANDS #: 0.12 K/UL (ref 0–1)
ABSOLUTE EOS #: 0.12 K/UL (ref 0–0.4)
ABSOLUTE IMMATURE GRANULOCYTE: ABNORMAL K/UL (ref 0–0.3)
ABSOLUTE LYMPH #: 2.56 K/UL (ref 1–4.8)
ABSOLUTE MONO #: 0.73 K/UL (ref 0.1–1.3)
ALBUMIN SERPL-MCNC: 3.6 G/DL (ref 3.5–5.2)
ALBUMIN/GLOBULIN RATIO: ABNORMAL (ref 1–2.5)
ALLEN TEST: ABNORMAL
ALP BLD-CCNC: 82 U/L (ref 35–104)
ALT SERPL-CCNC: 22 U/L (ref 5–33)
ANION GAP SERPL CALCULATED.3IONS-SCNC: 10 MMOL/L (ref 9–17)
ANION GAP SERPL CALCULATED.3IONS-SCNC: 12 MMOL/L (ref 9–17)
AST SERPL-CCNC: 29 U/L
ATYPICAL LYMPHOCYTE ABSOLUTE COUNT: 0.24 K/UL
ATYPICAL LYMPHOCYTES: 2 %
BANDS: 1 % (ref 0–10)
BASOPHILS # BLD: 0 % (ref 0–2)
BASOPHILS ABSOLUTE: 0 K/UL (ref 0–0.2)
BILIRUB SERPL-MCNC: 0.18 MG/DL (ref 0.3–1.2)
BILIRUBIN URINE: NEGATIVE
BNP INTERPRETATION: ABNORMAL
BUN BLDV-MCNC: 40 MG/DL (ref 8–23)
BUN BLDV-MCNC: 43 MG/DL (ref 8–23)
BUN/CREAT BLD: ABNORMAL (ref 9–20)
BUN/CREAT BLD: ABNORMAL (ref 9–20)
CALCIUM SERPL-MCNC: 10 MG/DL (ref 8.6–10.4)
CALCIUM SERPL-MCNC: 9.5 MG/DL (ref 8.6–10.4)
CARBOXYHEMOGLOBIN: 2.6 % (ref 0–5)
CHLORIDE BLD-SCNC: 96 MMOL/L (ref 98–107)
CHLORIDE BLD-SCNC: 97 MMOL/L (ref 98–107)
CO2: 28 MMOL/L (ref 20–31)
CO2: 29 MMOL/L (ref 20–31)
COLOR: YELLOW
COMMENT UA: NORMAL
CREAT SERPL-MCNC: 2.13 MG/DL (ref 0.5–0.9)
CREAT SERPL-MCNC: 2.42 MG/DL (ref 0.5–0.9)
DIFFERENTIAL TYPE: ABNORMAL
EOSINOPHILS RELATIVE PERCENT: 1 % (ref 0–4)
ESTIMATED AVERAGE GLUCOSE: 232 MG/DL
FIO2: ABNORMAL
GFR AFRICAN AMERICAN: 25 ML/MIN
GFR AFRICAN AMERICAN: 28 ML/MIN
GFR NON-AFRICAN AMERICAN: 20 ML/MIN
GFR NON-AFRICAN AMERICAN: 23 ML/MIN
GFR SERPL CREATININE-BSD FRML MDRD: ABNORMAL ML/MIN/{1.73_M2}
GLUCOSE BLD-MCNC: 117 MG/DL (ref 65–105)
GLUCOSE BLD-MCNC: 132 MG/DL (ref 70–99)
GLUCOSE BLD-MCNC: 219 MG/DL (ref 65–105)
GLUCOSE BLD-MCNC: 230 MG/DL (ref 70–99)
GLUCOSE BLD-MCNC: 250 MG/DL (ref 65–105)
GLUCOSE URINE: NEGATIVE
HBA1C MFR BLD: 9.7 % (ref 4–6)
HCO3 VENOUS: 34.7 MMOL/L (ref 24–30)
HCT VFR BLD CALC: 27.7 % (ref 36–46)
HEMOGLOBIN: 8.7 G/DL (ref 12–16)
IMMATURE GRANULOCYTES: ABNORMAL %
INR BLD: 1
KETONES, URINE: NEGATIVE
LEUKOCYTE ESTERASE, URINE: NEGATIVE
LYMPHOCYTES # BLD: 21 % (ref 24–44)
MCH RBC QN AUTO: 25.8 PG (ref 26–34)
MCHC RBC AUTO-ENTMCNC: 31.4 G/DL (ref 31–37)
MCV RBC AUTO: 82.2 FL (ref 80–100)
METHEMOGLOBIN: 0.1 % (ref 0–1.9)
MODE: ABNORMAL
MONOCYTES # BLD: 6 % (ref 1–7)
MORPHOLOGY: ABNORMAL
NEGATIVE BASE EXCESS, VEN: ABNORMAL MMOL/L (ref 0–2)
NITRITE, URINE: NEGATIVE
NOTIFICATION TIME: ABNORMAL
NOTIFICATION: ABNORMAL
NRBC AUTOMATED: ABNORMAL PER 100 WBC
O2 DEVICE/FLOW/%: ABNORMAL
O2 SAT, VEN: 75.7 % (ref 60–85)
OXYHEMOGLOBIN: ABNORMAL % (ref 95–98)
PARTIAL THROMBOPLASTIN TIME: 29.1 SEC (ref 24–36)
PATIENT TEMP: 37
PCO2, VEN, TEMP ADJ: ABNORMAL MMHG (ref 39–55)
PCO2, VEN: 65.8 (ref 39–55)
PDW BLD-RTO: 19.7 % (ref 11.5–14.9)
PEEP/CPAP: ABNORMAL
PH UA: 5.5 (ref 5–8)
PH VENOUS: 7.33 (ref 7.32–7.42)
PH, VEN, TEMP ADJ: ABNORMAL (ref 7.32–7.42)
PLATELET # BLD: 358 K/UL (ref 150–450)
PLATELET ESTIMATE: ABNORMAL
PMV BLD AUTO: 8.4 FL (ref 6–12)
PO2, VEN, TEMP ADJ: ABNORMAL MMHG (ref 30–50)
PO2, VEN: 47.3 (ref 30–50)
POSITIVE BASE EXCESS, VEN: 8.8 MMOL/L (ref 0–2)
POTASSIUM SERPL-SCNC: 5.1 MMOL/L (ref 3.7–5.3)
POTASSIUM SERPL-SCNC: 5.2 MMOL/L (ref 3.7–5.3)
PRO-BNP: 647 PG/ML
PROTEIN UA: NEGATIVE
PROTHROMBIN TIME: 13.6 SEC (ref 11.8–14.6)
PSV: ABNORMAL
PT. POSITION: ABNORMAL
RBC # BLD: 3.37 M/UL (ref 4–5.2)
RBC # BLD: ABNORMAL 10*6/UL
RESPIRATORY RATE: ABNORMAL
SAMPLE SITE: ABNORMAL
SEG NEUTROPHILS: 69 % (ref 36–66)
SEGMENTED NEUTROPHILS ABSOLUTE COUNT: 8.43 K/UL (ref 1.3–9.1)
SET RATE: ABNORMAL
SODIUM BLD-SCNC: 135 MMOL/L (ref 135–144)
SODIUM BLD-SCNC: 137 MMOL/L (ref 135–144)
SPECIFIC GRAVITY UA: 1.01 (ref 1–1.03)
TEXT FOR RESPIRATORY: ABNORMAL
TOTAL CK: 30 U/L (ref 26–192)
TOTAL HB: ABNORMAL G/DL (ref 12–16)
TOTAL PROTEIN: 7.9 G/DL (ref 6.4–8.3)
TOTAL RATE: ABNORMAL
TROPONIN INTERP: ABNORMAL
TROPONIN T: ABNORMAL NG/ML
TROPONIN, HIGH SENSITIVITY: 16 NG/L (ref 0–14)
TURBIDITY: CLEAR
URINE HGB: NEGATIVE
UROBILINOGEN, URINE: NORMAL
VT: ABNORMAL
WBC # BLD: 12.2 K/UL (ref 3.5–11)
WBC # BLD: ABNORMAL 10*3/UL

## 2020-02-10 PROCEDURE — 80048 BASIC METABOLIC PNL TOTAL CA: CPT

## 2020-02-10 PROCEDURE — 6370000000 HC RX 637 (ALT 250 FOR IP): Performed by: INTERNAL MEDICINE

## 2020-02-10 PROCEDURE — 93005 ELECTROCARDIOGRAM TRACING: CPT | Performed by: EMERGENCY MEDICINE

## 2020-02-10 PROCEDURE — 82550 ASSAY OF CK (CPK): CPT

## 2020-02-10 PROCEDURE — 85610 PROTHROMBIN TIME: CPT

## 2020-02-10 PROCEDURE — 2580000003 HC RX 258: Performed by: FAMILY MEDICINE

## 2020-02-10 PROCEDURE — 2580000003 HC RX 258: Performed by: INTERNAL MEDICINE

## 2020-02-10 PROCEDURE — 85730 THROMBOPLASTIN TIME PARTIAL: CPT

## 2020-02-10 PROCEDURE — 85025 COMPLETE CBC W/AUTO DIFF WBC: CPT

## 2020-02-10 PROCEDURE — 36415 COLL VENOUS BLD VENIPUNCTURE: CPT

## 2020-02-10 PROCEDURE — 83880 ASSAY OF NATRIURETIC PEPTIDE: CPT

## 2020-02-10 PROCEDURE — 99285 EMERGENCY DEPT VISIT HI MDM: CPT

## 2020-02-10 PROCEDURE — 2580000003 HC RX 258: Performed by: EMERGENCY MEDICINE

## 2020-02-10 PROCEDURE — 80053 COMPREHEN METABOLIC PANEL: CPT

## 2020-02-10 PROCEDURE — 81003 URINALYSIS AUTO W/O SCOPE: CPT

## 2020-02-10 PROCEDURE — 71045 X-RAY EXAM CHEST 1 VIEW: CPT

## 2020-02-10 PROCEDURE — 6360000002 HC RX W HCPCS

## 2020-02-10 PROCEDURE — 94640 AIRWAY INHALATION TREATMENT: CPT

## 2020-02-10 PROCEDURE — 6370000000 HC RX 637 (ALT 250 FOR IP): Performed by: FAMILY MEDICINE

## 2020-02-10 PROCEDURE — 83036 HEMOGLOBIN GLYCOSYLATED A1C: CPT

## 2020-02-10 PROCEDURE — 6360000002 HC RX W HCPCS: Performed by: FAMILY MEDICINE

## 2020-02-10 PROCEDURE — 6360000002 HC RX W HCPCS: Performed by: INTERNAL MEDICINE

## 2020-02-10 PROCEDURE — 84484 ASSAY OF TROPONIN QUANT: CPT

## 2020-02-10 PROCEDURE — 82805 BLOOD GASES W/O2 SATURATION: CPT

## 2020-02-10 PROCEDURE — 1200000000 HC SEMI PRIVATE

## 2020-02-10 PROCEDURE — 82947 ASSAY GLUCOSE BLOOD QUANT: CPT

## 2020-02-10 RX ORDER — NICOTINE POLACRILEX 4 MG
15 LOZENGE BUCCAL PRN
Status: DISCONTINUED | OUTPATIENT
Start: 2020-02-10 | End: 2020-02-13 | Stop reason: HOSPADM

## 2020-02-10 RX ORDER — HEPARIN SODIUM 5000 [USP'U]/ML
5000 INJECTION, SOLUTION INTRAVENOUS; SUBCUTANEOUS EVERY 8 HOURS SCHEDULED
Status: DISCONTINUED | OUTPATIENT
Start: 2020-02-10 | End: 2020-02-13 | Stop reason: HOSPADM

## 2020-02-10 RX ORDER — SODIUM CHLORIDE 0.9 % (FLUSH) 0.9 %
10 SYRINGE (ML) INJECTION PRN
Status: DISCONTINUED | OUTPATIENT
Start: 2020-02-10 | End: 2020-02-13 | Stop reason: HOSPADM

## 2020-02-10 RX ORDER — DEXTROSE MONOHYDRATE 25 G/50ML
12.5 INJECTION, SOLUTION INTRAVENOUS PRN
Status: DISCONTINUED | OUTPATIENT
Start: 2020-02-10 | End: 2020-02-13 | Stop reason: HOSPADM

## 2020-02-10 RX ORDER — ONDANSETRON 2 MG/ML
4 INJECTION INTRAMUSCULAR; INTRAVENOUS EVERY 6 HOURS PRN
Status: DISCONTINUED | OUTPATIENT
Start: 2020-02-10 | End: 2020-02-13 | Stop reason: HOSPADM

## 2020-02-10 RX ORDER — AMLODIPINE BESYLATE 10 MG/1
10 TABLET ORAL DAILY
Status: DISCONTINUED | OUTPATIENT
Start: 2020-02-10 | End: 2020-02-13 | Stop reason: HOSPADM

## 2020-02-10 RX ORDER — DEXTROSE MONOHYDRATE 50 MG/ML
100 INJECTION, SOLUTION INTRAVENOUS PRN
Status: DISCONTINUED | OUTPATIENT
Start: 2020-02-10 | End: 2020-02-13 | Stop reason: HOSPADM

## 2020-02-10 RX ORDER — OXYCODONE HYDROCHLORIDE 10 MG/1
30 TABLET ORAL EVERY 8 HOURS SCHEDULED
Status: DISCONTINUED | OUTPATIENT
Start: 2020-02-10 | End: 2020-02-13 | Stop reason: HOSPADM

## 2020-02-10 RX ORDER — SODIUM CHLORIDE 9 MG/ML
INJECTION, SOLUTION INTRAVENOUS CONTINUOUS
Status: DISCONTINUED | OUTPATIENT
Start: 2020-02-10 | End: 2020-02-12

## 2020-02-10 RX ORDER — ATORVASTATIN CALCIUM 40 MG/1
40 TABLET, FILM COATED ORAL NIGHTLY
Status: DISCONTINUED | OUTPATIENT
Start: 2020-02-10 | End: 2020-02-13 | Stop reason: HOSPADM

## 2020-02-10 RX ORDER — PREGABALIN 75 MG/1
75 CAPSULE ORAL 2 TIMES DAILY
Status: DISCONTINUED | OUTPATIENT
Start: 2020-02-10 | End: 2020-02-13 | Stop reason: HOSPADM

## 2020-02-10 RX ORDER — SODIUM CHLORIDE 0.9 % (FLUSH) 0.9 %
10 SYRINGE (ML) INJECTION EVERY 12 HOURS SCHEDULED
Status: DISCONTINUED | OUTPATIENT
Start: 2020-02-10 | End: 2020-02-13 | Stop reason: HOSPADM

## 2020-02-10 RX ORDER — UBIDECARENONE 200 MG
200 CAPSULE ORAL NIGHTLY
Status: DISCONTINUED | OUTPATIENT
Start: 2020-02-10 | End: 2020-02-13 | Stop reason: HOSPADM

## 2020-02-10 RX ORDER — GLIMEPIRIDE 4 MG/1
4 TABLET ORAL
Status: DISCONTINUED | OUTPATIENT
Start: 2020-02-11 | End: 2020-02-13 | Stop reason: HOSPADM

## 2020-02-10 RX ORDER — ALBUTEROL SULFATE 2.5 MG/3ML
2.5 SOLUTION RESPIRATORY (INHALATION)
Status: DISCONTINUED | OUTPATIENT
Start: 2020-02-10 | End: 2020-02-13 | Stop reason: HOSPADM

## 2020-02-10 RX ORDER — ALBUTEROL SULFATE 2.5 MG/3ML
2.5 SOLUTION RESPIRATORY (INHALATION) 2 TIMES DAILY
Status: DISCONTINUED | OUTPATIENT
Start: 2020-02-10 | End: 2020-02-13 | Stop reason: HOSPADM

## 2020-02-10 RX ORDER — ALBUTEROL SULFATE 2.5 MG/3ML
SOLUTION RESPIRATORY (INHALATION)
Status: COMPLETED
Start: 2020-02-10 | End: 2020-02-10

## 2020-02-10 RX ORDER — VENLAFAXINE HYDROCHLORIDE 37.5 MG/1
37.5 CAPSULE, EXTENDED RELEASE ORAL NIGHTLY
Status: DISCONTINUED | OUTPATIENT
Start: 2020-02-10 | End: 2020-02-13 | Stop reason: HOSPADM

## 2020-02-10 RX ORDER — CETIRIZINE HYDROCHLORIDE, PSEUDOEPHEDRINE HYDROCHLORIDE 5; 120 MG/1; MG/1
1 TABLET, FILM COATED, EXTENDED RELEASE ORAL DAILY
Status: DISCONTINUED | OUTPATIENT
Start: 2020-02-10 | End: 2020-02-13 | Stop reason: HOSPADM

## 2020-02-10 RX ORDER — 0.9 % SODIUM CHLORIDE 0.9 %
1000 INTRAVENOUS SOLUTION INTRAVENOUS ONCE
Status: COMPLETED | OUTPATIENT
Start: 2020-02-10 | End: 2020-02-10

## 2020-02-10 RX ORDER — M-VIT,TX,IRON,MINS/CALC/FOLIC 27MG-0.4MG
1 TABLET ORAL NIGHTLY
Status: DISCONTINUED | OUTPATIENT
Start: 2020-02-10 | End: 2020-02-13 | Stop reason: HOSPADM

## 2020-02-10 RX ORDER — PREGABALIN 150 MG/1
150 CAPSULE ORAL 2 TIMES DAILY
Status: DISCONTINUED | OUTPATIENT
Start: 2020-02-10 | End: 2020-02-10

## 2020-02-10 RX ORDER — CARVEDILOL 3.12 MG/1
3.12 TABLET ORAL 2 TIMES DAILY WITH MEALS
Status: DISCONTINUED | OUTPATIENT
Start: 2020-02-10 | End: 2020-02-11

## 2020-02-10 RX ADMIN — SODIUM CHLORIDE: 9 INJECTION, SOLUTION INTRAVENOUS at 21:47

## 2020-02-10 RX ADMIN — PREGABALIN 75 MG: 75 CAPSULE ORAL at 21:00

## 2020-02-10 RX ADMIN — OXYCODONE HYDROCHLORIDE 30 MG: 10 TABLET ORAL at 21:00

## 2020-02-10 RX ADMIN — MULTIPLE VITAMINS W/ MINERALS TAB 1 TABLET: TAB at 20:59

## 2020-02-10 RX ADMIN — Medication 10 ML: at 20:59

## 2020-02-10 RX ADMIN — ALBUTEROL SULFATE 2.5 MG: 2.5 SOLUTION RESPIRATORY (INHALATION) at 10:06

## 2020-02-10 RX ADMIN — HEPARIN SODIUM 5000 UNITS: 5000 INJECTION INTRAVENOUS; SUBCUTANEOUS at 16:47

## 2020-02-10 RX ADMIN — CARVEDILOL 3.12 MG: 3.12 TABLET, FILM COATED ORAL at 16:47

## 2020-02-10 RX ADMIN — HEPARIN SODIUM 5000 UNITS: 5000 INJECTION INTRAVENOUS; SUBCUTANEOUS at 21:00

## 2020-02-10 RX ADMIN — SODIUM CHLORIDE 1000 ML: 9 INJECTION, SOLUTION INTRAVENOUS at 11:38

## 2020-02-10 RX ADMIN — Medication 1 TABLET: at 20:59

## 2020-02-10 RX ADMIN — ATORVASTATIN CALCIUM 40 MG: 40 TABLET, FILM COATED ORAL at 20:59

## 2020-02-10 RX ADMIN — INSULIN LISPRO 2 UNITS: 100 INJECTION, SOLUTION INTRAVENOUS; SUBCUTANEOUS at 21:00

## 2020-02-10 RX ADMIN — VENLAFAXINE HYDROCHLORIDE 37.5 MG: 37.5 CAPSULE, EXTENDED RELEASE ORAL at 20:59

## 2020-02-10 RX ADMIN — Medication 200 MG: at 20:59

## 2020-02-10 RX ADMIN — INSULIN LISPRO 2 UNITS: 100 INJECTION, SOLUTION INTRAVENOUS; SUBCUTANEOUS at 17:10

## 2020-02-10 RX ADMIN — ALBUTEROL SULFATE 2.5 MG: 2.5 SOLUTION RESPIRATORY (INHALATION) at 19:06

## 2020-02-10 ASSESSMENT — ENCOUNTER SYMPTOMS
SHORTNESS OF BREATH: 1
GASTROINTESTINAL NEGATIVE: 1
ABDOMINAL PAIN: 0
EYES NEGATIVE: 1

## 2020-02-10 ASSESSMENT — PAIN SCALES - GENERAL
PAINLEVEL_OUTOF10: 0
PAINLEVEL_OUTOF10: 8

## 2020-02-10 NOTE — ED PROVIDER NOTES
EMERGENCY DEPARTMENT ENCOUNTER    Pt Name: Marciano Ewing  MRN: [id number]  [place] 1955  Date of evaluation: 2/10/20  CHIEF COMPLAINT       Chief Complaint   Patient presents with    Fatigue     HISTORY OF PRESENT ILLNESS   The pt presents for evaluation of shortness of breath and fatigue. Ongoing for a few days. Pt was recently admitted with similar symptoms. No fever. Pt has been having a cough. She is supposed to be on bipap at night but is not using it. She is only using o2 via nc. Pt was found to be hypoxic in the 80's by ems. Started on o2. The history is provided by the patient. Shortness of Breath   Severity:  Moderate  Onset quality:  Gradual  Duration: few days. Timing:  Constant  Progression:  Worsening  Chronicity:  New  Relieved by:  Nothing  Worsened by:  Nothing  Ineffective treatments:  None tried  Associated symptoms: no abdominal pain, no chest pain, no headaches and no rash        REVIEW OF SYSTEMS     Review of Systems   Constitutional: Positive for fatigue. HENT: Negative. Negative for congestion. Eyes: Negative. Respiratory: Positive for shortness of breath. Cardiovascular: Positive for leg swelling. Negative for chest pain. Gastrointestinal: Negative. Negative for abdominal pain. Genitourinary: Negative. Musculoskeletal: Negative. Skin: Negative. Negative for rash. Neurological: Negative. Negative for headaches. All other systems reviewed and are negative.     PASTMEDICAL HISTORY     Past Medical History:   Diagnosis Date    Acute hypoxemic respiratory failure (HCC)     Arthritis     CHF (congestive heart failure) (HCC)     Chronic back pain     Diabetes mellitus type II, controlled ([facility] [address] 2/14/2012    Fibromyalgia     Hyperlipidemia LDL goal < 70 2/14/2012    Hypertension, benign 02/14/2012    Morbid obesity with BMI of 60.0-69.9, adult ([facility] [address] 8/28/2015    Pain of toe of right foot     morgans cyst    Right wrist pain     rate 8    Skin SOCIAL HISTORY       Social History     Tobacco Use    Smoking status: Never Smoker    Smokeless tobacco: Never Used   Substance Use Topics    Alcohol use: Not Currently     Alcohol/week: 0.0 standard drinks     Comment: rarely    Drug use: No     PHYSICAL EXAM     INITIAL VITALS: BP (!) 112/55   Pulse 80   Temp 98.1 °F (36.7 °C) (Oral)   Resp 18   Ht 5' (1.524 m)   Wt 284 lb 13.4 oz (129.2 kg)   LMP  (LMP Unknown)   SpO2 94%   BMI 55.63 kg/m²    Physical Exam  Vitals signs and nursing note reviewed. Constitutional:       Appearance: Normal appearance. Comments: Pt frequently falling asleep. HENT:      Head: Normocephalic and atraumatic. Nose: No congestion. Cardiovascular:      Rate and Rhythm: Normal rate and regular rhythm. Heart sounds: No murmur. Pulmonary:      Effort: Pulmonary effort is normal. No respiratory distress. Breath sounds: Wheezing (quiet wheeze) present. Abdominal:      Palpations: Abdomen is soft. Tenderness: There is no abdominal tenderness. Musculoskeletal:         General: Swelling (trace bilateral le.) present. No signs of injury. Skin:     General: Skin is warm and dry. Capillary Refill: Capillary refill takes less than 2 seconds. Findings: No rash. Neurological:      General: No focal deficit present. Mental Status: She is alert and oriented to person, place, and time. MEDICAL DECISION MAKING:     Reviewed results. New dacia. Anemia. Last hgb 11 after diuresis but prior pt was running in the 9's. No obvious evidence of acute bleeding. Mild nonspecific leukocytosis. Mild trop elevation likely secondary to renal failure. CXR demonstrates mild pulmonary edema. On reeval, exam unchanged. Pt and family updated regarding results and plan. Discussed with Dr. Syed Foss, will admit for further therapy and evaluation. Pt is ready for admission at this time.        CRITICAL CARE: HEMOGLOBIN A1C   POCT GLUCOSE   POCT GLUCOSE       EMERGENCY DEPARTMENTCOURSE:         Vitals:    Vitals:    02/10/20 1129 02/10/20 1141 02/10/20 1145 02/10/20 1405   BP:  (!) 146/49 (!) 176/63 (!) 112/55   Pulse:  77 79 80   Resp:  16 16 18   Temp: 98.2 °F (36.8 °C)  98.5 °F (36.9 °C) 98.1 °F (36.7 °C)   TempSrc: Oral  Oral Oral   SpO2:  91% 100% 94%   Weight:   284 lb 13.4 oz (129.2 kg)    Height:   5' (1.524 m)        The patient was given the following medications while in the emergency department:  Orders Placed This Encounter   Medications    albuterol (PROVENTIL) nebulizer solution 2.5 mg    albuterol (PROVENTIL) (2.5 MG/3ML) 0.083% nebulizer solution     [facility]: cabinet override    0.9 % sodium chloride bolus    albuterol (PROVENTIL) nebulizer solution 2.5 mg    amLODIPine (NORVASC) tablet 10 mg    atorvastatin (LIPITOR) tablet 40 mg    calcium carbonate-vitamin D (CALTRATE) 600-400 MG-UNIT per tab 1 tablet    carvedilol (COREG) tablet 3.125 mg    coenzyme Q10 capsule 200 mg    diclofenac sodium 1 % gel 2 g    glimepiride (AMARYL) tablet 4 mg    DISCONTD: loratadine-pseudoephedrine (CLARITIN-D 12HR) 5-120 MG per extended release tablet 1 tablet    therapeutic multivitamin-minerals 1 tablet    oxyCODONE HCl (OXY-IR) immediate release tablet 30 mg    pregabalin (LYRICA) capsule 150 mg    venlafaxine (EFFEXOR XR) extended release capsule 37.5 mg    sodium chloride flush 0.9 % injection 10 mL    sodium chloride flush 0.9 % injection 10 mL    magnesium hydroxide (MILK OF MAGNESIA) 400 MG/5ML suspension 30 mL    ondansetron (ZOFRAN) injection 4 mg    DISCONTD: enoxaparin (LOVENOX) injection 40 mg    glucose (GLUTOSE) 40 % oral gel 15 g    dextrose 50 % IV solution    glucagon (rDNA) injection 1 mg    dextrose 5 % solution    insulin lispro (HUMALOG) injection vial 0-6 Units    insulin lispro (HUMALOG) injection vial 0-3 Units    cetirizine-psuedoephedrine (ZYRTEC-D) 5-120 MG per extended release tablet 1 tablet     Order Specific Question:   Please select a reason the therapeutic interchange was not accepted: Answer: Other (Please Comment)     Comments:   sub    enoxaparin (LOVENOX) injection 30 mg     CONSULTS:  IP CONSULT TO INTERNAL MEDICINE  IP CONSULT TO NEPHROLOGY    FINAL IMPRESSION      1. MAMTA (acute kidney injury) ([facility] [address]    2. Anemia, unspecified type    3.  Other fatigue          DISPOSITION/PLAN   DISPOSITION Admitted 02/10/2020 11:14:38 AM      PATIENT REFERRED TO:  Teresa Faust MD  [facility]  [address] [address]  [phone]          DISCHARGE MEDICATIONS:  Current Discharge Medication List        [address] Shantel Plasencia MD  Attending Emergency Physician                    Jatin Gomez MD  02/10/20 6515

## 2020-02-10 NOTE — ED NOTES
[facility]     [facility]  02/10/20 1316

## 2020-02-10 NOTE — CONSULTS
NEPHROLOGY CONSULT       Reason for Consult: acute kidney injury      Chief Complaint: fall and weakness  History Obtained From: Patient     History of Present Illness: This is a 60 y.o. female with history of Hypertension, type 2 DM, fibromylagia, who presented with a fall at home. She also presented with fatigue and generalized weakness and cough. She was found to be hypoxic in the 80s by EMS started on oxygen. Patient reports she had fallen while trying to get out of bed and use her walker at home and was on the ground for several hours. She denies loss of consciousness or prodromal symptoms of dizziness,. Patient denies dysuria, , flank pain, nocturia, urgency,or any urinary or GI complaints. On presentation her creatinine was 2.42 with BUN of 43 and potassium of 5.2 with calcium of 10. WBC of 12.   Urinalysis was bland without proteinuria, hematuria with negative leukocyte and negative nitrate    Past Medical History:        Diagnosis Date    Acute hypoxemic respiratory failure (HCC)     Arthritis     CHF (congestive heart failure) (HCC)     Chronic back pain     Diabetes mellitus type II, controlled ([facility] 2/14/2012    Fibromyalgia     Hyperlipidemia LDL goal < 70 2/14/2012    Hypertension, benign 02/14/2012    Morbid obesity with BMI of 60.0-69.9, adult ([facility] 8/28/2015    Pain of toe of right foot     morgans cyst    Right wrist pain     rate 8    Skin cancer     skin under lt eye,face    Sleep apnea     Bipap does not work    Wears glasses        Past Surgical History:        Procedure Laterality Date    CARPAL TUNNEL RELEASE Right 09/05/2014    CATARACT REMOVAL WITH IMPLANT Bilateral 2013    CHOLECYSTECTOMY, LAPAROSCOPIC  1998    COLONOSCOPY N/A 10/17/2019    COLONOSCOPY DIAGNOSTIC performed by Isabel Christina MD at [facility] 238 cyst from base of tail bone   [id number] [facility] Bilateral 01/20/2017    abcess removed    LUMBAR FUSION  10/2010; 03/2011    L4/L5    LUMBAR FUSION  2009    L4-S1    OTHER SURGICAL HISTORY  05/02/2018    Lumbar decompression laminectomy at L3-L4 bilaterally wiith complete facetomies at L3-L4 bilaterally;  diskectomy L3-L4: posterior lumbar interbody fusion; placement of [place]-type graft; local harvest of bone; microsurgical dissection.  ROTATOR CUFF REPAIR Left 2012    SKIN CANCER EXCISION  2005    Basal Cell Carcinoma, Curly size from Lt.  face    TUBAL LIGATION  1981    UPPER GASTROINTESTINAL ENDOSCOPY N/A 10/16/2019    EGD BIOPSY performed by Heike Dickens MD at [address] [place] [address]       Current Medications:    albuterol (PROVENTIL) nebulizer solution 2.5 mg, Q20 Min PRN  albuterol (PROVENTIL) nebulizer solution 2.5 mg, BID  amLODIPine (NORVASC) tablet 10 mg, Daily  atorvastatin (LIPITOR) tablet 40 mg, Nightly  calcium carbonate-vitamin D (CALTRATE) 600-400 MG-UNIT per tab 1 tablet, BID  carvedilol (COREG) tablet 3.125 mg, BID WC  coenzyme Q10 capsule 200 mg, Nightly  [START ON 2/11/2020] glimepiride (AMARYL) tablet 4 mg, QAM AC  therapeutic multivitamin-minerals 1 tablet, Nightly  oxyCODONE HCl (OXY-IR) immediate release tablet 30 mg, 3 times per day  pregabalin (LYRICA) capsule 150 mg, BID  venlafaxine (EFFEXOR XR) extended release capsule 37.5 mg, Nightly  sodium chloride flush 0.9 % injection 10 mL, 2 times per day  sodium chloride flush 0.9 % injection 10 mL, PRN  magnesium hydroxide (MILK OF MAGNESIA) 400 MG/5ML suspension 30 mL, Daily PRN  ondansetron (ZOFRAN) injection 4 mg, Q6H PRN  glucose (GLUTOSE) 40 % oral gel 15 g, PRN  dextrose 50 % IV solution, PRN  glucagon (rDNA) injection 1 mg, PRN  dextrose 5 % solution, PRN  insulin lispro (HUMALOG) injection vial 0-6 Units, TID WC  insulin lispro (HUMALOG) injection vial 0-3 Units, Nightly  cetirizine-psuedoephedrine (ZYRTEC-D) 5-120 MG per extended release tablet 1 tablet, Daily  heparin (porcine) injection 5,000 Units, 3 times per day        Allergies:  Adhesive tape; Morphine; Iron; and Pcn [penicillins]    Social History:   Social History     Socioeconomic History    Marital status:      Spouse name: Fidelina Murray Number of children: 3    Years of education: Not on file    Highest education level: Not on file   Occupational History    Not on file   Social Needs    Financial resource strain: Not on file    Food insecurity:     Worry: Not on file     Inability: Not on file   Live[employer] needs:     Medical: Not on file     Non-medical: Not on file   Tobacco Use    Smoking status: Never Smoker    Smokeless tobacco: Never Used   Substance and Sexual Activity    Alcohol use: Not Currently     Alcohol/week: 0.0 standard drinks     Comment: rarely    Drug use: No    Sexual activity: Yes     Partners: Male   Lifestyle    Physical activity:     Days per week: Not on file     Minutes per session: Not on file    Stress: Not on file   Relationships    Social connections:     Talks on phone: Not on file     Gets together: Not on file     Attends [employer] service: Not on file     Active member of club or organization: Not on file     Attends meetings of clubs or organizations: Not on file     Relationship status: Not on file    Intimate partner violence:     Fear of current or ex partner: Not on file     Emotionally abused: Not on file     Physically abused: Not on file     Forced sexual activity: Not on file   Other Topics Concern    Not on file   Social History Narrative    Not on file       Family History:   Family History   Problem Relation Age of Onset    Diabetes Mother     Cancer Mother         Multiple Myeloma    Lung Cancer Father     Diabetes Sister     Diabetes Brother     Stomach Cancer Brother     Diabetes Maternal Grandmother     High Blood Pressure Paternal Grandmother     Heart Disease Paternal Grandmother     Stroke Paternal Grandmother     Heart Attack Paternal Grandfather     COPD Sister     High 2 01/05/2020    RBCUA None 01/05/2020    MUCUS NOT REPORTED 01/05/2020    TRICHOMONAS NOT REPORTED 01/05/2020    YEAST NOT REPORTED 01/05/2020    BACTERIA FEW 01/05/2020    SPECGRAV 1.013 02/10/2020    LEUKOCYTESUR NEGATIVE 02/10/2020    UROBILINOGEN Normal 02/10/2020    BILIRUBINUR NEGATIVE 02/10/2020    GLUCOSEU NEGATIVE 02/10/2020    KETUA NEGATIVE 02/10/2020    AMORPHOUS NOT REPORTED 01/05/2020         Radiology:  Reviewed as available. Assessment:  1. Acute kidney injury, nonoliguric associated with prerenal azotemia and hemodynamic injury-baseline creatinine 0.9 mg/dl. rule out rhabdomyolysis. 2. Lower extremity swelling bilaterally    3. Mild hyperkalemia    4. Essential Hypertension-controlled       Plan:  1. IVF with 0.9 saline at 75 cc/hr  2. Renal USS. 3.Change lovenox to heparin 5000 iu q 8 hrly  4. BMP daily  5. Strict I/Os  6. Hold voltaren gel. 7Decrease lyrica to 75 mg bid  From 150 mg bid due to decreased GFR. Thank you for the consultation.       Electronically signed by Whitney Meredith MD on 2/10/2020 at 3:46 PM

## 2020-02-10 NOTE — PROGRESS NOTES
Pt admitted to room 2062 via cart. Pt oriented to room and call light system. Admission assessment and vitals completed. Pt tolerated transfer, no distress noted at this time. Will continue to monitor.     Electronically signed by Bteh Pompa RN on 2/10/2020 at 3:57 PM

## 2020-02-10 NOTE — PROGRESS NOTES
I paged Dr. Syed Veliz at 1:01 pm on 2/10/2020. Sondra Escoto    I will make sure the patient is on the list.Billie Gibson

## 2020-02-10 NOTE — PROGRESS NOTES
[facility]  DVT Prophylaxis and Vaccine Status  Work List  Mandatory for all patients      Patient must be on both Chemical prophylaxis and Mechanical prophylaxis.  If chemical/mechanical prophylaxis is not ordered, the physician must document a reason for not using prophylaxis     Chemical Prophylaxis  Is patient on chemical prophylaxis: Yes  If no chemical prophylaxis Is a order in for No Chemical VTE prophylaxis n/a  If no was the physician notified not applicable      Mechanical Prophylaxis  Is patient on mechanical prophylaxis, intermittent pneumatic compression device: Yes  If no was the physician notified n/a        Pneumonia Vaccine  Vaccine indicated:  Not indicated  If indicated was the vaccine given: not applicable    Influenza Vaccine (applicable from October through March):  Vaccine indicated: Not indicated  If indicated was the vaccine given: not applicable    Patient Education  Education completed on DVT prophylaxis: yes   Electronically signed by Laly Santacruz RN on 2/10/2020 at 3:55 PM

## 2020-02-10 NOTE — ED NOTES
Mode of arrival (squad #, walk in, police, etc) : Squad        Chief complaint(s): fatigue        Arrival Note (brief scenario, treatment PTA, etc). : Pt reports \"not feeling right\" over the last 24 hrs. Pt reports generalized fatigue. Pt arrives AOx4. RReasy, unlabored. Pt arrives on NC- states she is on NC only at night currently. Pt reports recent fall. Pt states she was getting out of bed attempting to use walker when she fell. Denies dizziness/LOC. Per report- SpO2@ high 80's upon arrival to home        C= [facility] you ever felt that you should Cut down on your drinking? \"  No  A= \"Have people Annoyed you by criticizing your drinking? \"  No  G= \"Have you ever felt bad or Guilty about your drinking? \"  No  E= \"Have you ever had a drink as an Eye-opener first thing in the morning to steady your nerves or to help a hangover? \"  No      Deferred []      Reason for deferring: N/A    *If yes to two or more: probable alcohol abuse. Yonathan Morley RN  02/10/20 0838

## 2020-02-11 ENCOUNTER — APPOINTMENT (OUTPATIENT)
Dept: ULTRASOUND IMAGING | Age: 65
DRG: 682 | End: 2020-02-11
Payer: COMMERCIAL

## 2020-02-11 LAB
ABSOLUTE EOS #: 0.1 K/UL (ref 0–0.4)
ABSOLUTE IMMATURE GRANULOCYTE: ABNORMAL K/UL (ref 0–0.3)
ABSOLUTE LYMPH #: 2.1 K/UL (ref 1–4.8)
ABSOLUTE MONO #: 0.6 K/UL (ref 0.1–1.3)
ANION GAP SERPL CALCULATED.3IONS-SCNC: 13 MMOL/L (ref 9–17)
BASOPHILS # BLD: 0 % (ref 0–2)
BASOPHILS ABSOLUTE: 0 K/UL (ref 0–0.2)
BUN BLDV-MCNC: 32 MG/DL (ref 8–23)
BUN/CREAT BLD: ABNORMAL (ref 9–20)
CALCIUM SERPL-MCNC: 9.8 MG/DL (ref 8.6–10.4)
CHLORIDE BLD-SCNC: 101 MMOL/L (ref 98–107)
CO2: 27 MMOL/L (ref 20–31)
CREAT SERPL-MCNC: 1.72 MG/DL (ref 0.5–0.9)
DIFFERENTIAL TYPE: ABNORMAL
EKG ATRIAL RATE: 73 BPM
EKG P AXIS: 42 DEGREES
EKG P-R INTERVAL: 156 MS
EKG Q-T INTERVAL: 374 MS
EKG QRS DURATION: 76 MS
EKG QTC CALCULATION (BAZETT): 412 MS
EKG R AXIS: 43 DEGREES
EKG T AXIS: 49 DEGREES
EKG VENTRICULAR RATE: 73 BPM
EOSINOPHILS RELATIVE PERCENT: 1 % (ref 0–4)
GFR AFRICAN AMERICAN: 36 ML/MIN
GFR NON-AFRICAN AMERICAN: 30 ML/MIN
GFR SERPL CREATININE-BSD FRML MDRD: ABNORMAL ML/MIN/{1.73_M2}
GFR SERPL CREATININE-BSD FRML MDRD: ABNORMAL ML/MIN/{1.73_M2}
GLUCOSE BLD-MCNC: 129 MG/DL (ref 65–105)
GLUCOSE BLD-MCNC: 146 MG/DL (ref 70–99)
GLUCOSE BLD-MCNC: 149 MG/DL (ref 65–105)
GLUCOSE BLD-MCNC: 168 MG/DL (ref 65–105)
GLUCOSE BLD-MCNC: 202 MG/DL (ref 65–105)
HCT VFR BLD CALC: 28.3 % (ref 36–46)
HEMOGLOBIN: 9 G/DL (ref 12–16)
IMMATURE GRANULOCYTES: ABNORMAL %
LYMPHOCYTES # BLD: 20 % (ref 24–44)
MCH RBC QN AUTO: 26.2 PG (ref 26–34)
MCHC RBC AUTO-ENTMCNC: 31.6 G/DL (ref 31–37)
MCV RBC AUTO: 82.9 FL (ref 80–100)
MONOCYTES # BLD: 6 % (ref 1–7)
NRBC AUTOMATED: ABNORMAL PER 100 WBC
PDW BLD-RTO: 19.8 % (ref 11.5–14.9)
PLATELET # BLD: 310 K/UL (ref 150–450)
PLATELET ESTIMATE: ABNORMAL
PMV BLD AUTO: 8.5 FL (ref 6–12)
POTASSIUM SERPL-SCNC: 4.8 MMOL/L (ref 3.7–5.3)
RBC # BLD: 3.41 M/UL (ref 4–5.2)
RBC # BLD: ABNORMAL 10*6/UL
SEG NEUTROPHILS: 73 % (ref 36–66)
SEGMENTED NEUTROPHILS ABSOLUTE COUNT: 7.7 K/UL (ref 1.3–9.1)
SODIUM BLD-SCNC: 141 MMOL/L (ref 135–144)
TROPONIN INTERP: NORMAL
TROPONIN T: NORMAL NG/ML
TROPONIN, HIGH SENSITIVITY: 14 NG/L (ref 0–14)
WBC # BLD: 10.5 K/UL (ref 3.5–11)
WBC # BLD: ABNORMAL 10*3/UL

## 2020-02-11 PROCEDURE — 99222 1ST HOSP IP/OBS MODERATE 55: CPT | Performed by: PSYCHIATRY & NEUROLOGY

## 2020-02-11 PROCEDURE — 84484 ASSAY OF TROPONIN QUANT: CPT

## 2020-02-11 PROCEDURE — 2700000000 HC OXYGEN THERAPY PER DAY

## 2020-02-11 PROCEDURE — 6370000000 HC RX 637 (ALT 250 FOR IP): Performed by: FAMILY MEDICINE

## 2020-02-11 PROCEDURE — 80048 BASIC METABOLIC PNL TOTAL CA: CPT

## 2020-02-11 PROCEDURE — 94761 N-INVAS EAR/PLS OXIMETRY MLT: CPT

## 2020-02-11 PROCEDURE — 1200000000 HC SEMI PRIVATE

## 2020-02-11 PROCEDURE — 93010 ELECTROCARDIOGRAM REPORT: CPT | Performed by: INTERNAL MEDICINE

## 2020-02-11 PROCEDURE — 94640 AIRWAY INHALATION TREATMENT: CPT

## 2020-02-11 PROCEDURE — 36415 COLL VENOUS BLD VENIPUNCTURE: CPT

## 2020-02-11 PROCEDURE — 6370000000 HC RX 637 (ALT 250 FOR IP): Performed by: INTERNAL MEDICINE

## 2020-02-11 PROCEDURE — 6360000002 HC RX W HCPCS: Performed by: INTERNAL MEDICINE

## 2020-02-11 PROCEDURE — 6360000002 HC RX W HCPCS: Performed by: FAMILY MEDICINE

## 2020-02-11 PROCEDURE — 76770 US EXAM ABDO BACK WALL COMP: CPT

## 2020-02-11 PROCEDURE — 82947 ASSAY GLUCOSE BLOOD QUANT: CPT

## 2020-02-11 PROCEDURE — 99223 1ST HOSP IP/OBS HIGH 75: CPT | Performed by: FAMILY MEDICINE

## 2020-02-11 PROCEDURE — 85025 COMPLETE CBC W/AUTO DIFF WBC: CPT

## 2020-02-11 PROCEDURE — 2580000003 HC RX 258: Performed by: INTERNAL MEDICINE

## 2020-02-11 RX ORDER — CARVEDILOL 6.25 MG/1
6.25 TABLET ORAL 2 TIMES DAILY WITH MEALS
Status: DISCONTINUED | OUTPATIENT
Start: 2020-02-11 | End: 2020-02-12

## 2020-02-11 RX ADMIN — ALBUTEROL SULFATE 2.5 MG: 2.5 SOLUTION RESPIRATORY (INHALATION) at 07:31

## 2020-02-11 RX ADMIN — LINAGLIPTIN 5 MG: 5 TABLET, FILM COATED ORAL at 11:40

## 2020-02-11 RX ADMIN — Medication 1 TABLET: at 07:30

## 2020-02-11 RX ADMIN — Medication 1 TABLET: at 22:16

## 2020-02-11 RX ADMIN — Medication 200 MG: at 22:24

## 2020-02-11 RX ADMIN — ALBUTEROL SULFATE 2.5 MG: 2.5 SOLUTION RESPIRATORY (INHALATION) at 19:47

## 2020-02-11 RX ADMIN — INSULIN LISPRO 1 UNITS: 100 INJECTION, SOLUTION INTRAVENOUS; SUBCUTANEOUS at 17:05

## 2020-02-11 RX ADMIN — MULTIPLE VITAMINS W/ MINERALS TAB 1 TABLET: TAB at 22:17

## 2020-02-11 RX ADMIN — INSULIN LISPRO 2 UNITS: 100 INJECTION, SOLUTION INTRAVENOUS; SUBCUTANEOUS at 11:40

## 2020-02-11 RX ADMIN — CARVEDILOL 3.12 MG: 3.12 TABLET, FILM COATED ORAL at 07:30

## 2020-02-11 RX ADMIN — VENLAFAXINE HYDROCHLORIDE 37.5 MG: 37.5 CAPSULE, EXTENDED RELEASE ORAL at 22:15

## 2020-02-11 RX ADMIN — SODIUM CHLORIDE: 9 INJECTION, SOLUTION INTRAVENOUS at 22:15

## 2020-02-11 RX ADMIN — OXYCODONE HYDROCHLORIDE 30 MG: 10 TABLET ORAL at 13:08

## 2020-02-11 RX ADMIN — INSULIN LISPRO 1 UNITS: 100 INJECTION, SOLUTION INTRAVENOUS; SUBCUTANEOUS at 22:17

## 2020-02-11 RX ADMIN — OXYCODONE HYDROCHLORIDE 30 MG: 10 TABLET ORAL at 22:16

## 2020-02-11 RX ADMIN — HEPARIN SODIUM 5000 UNITS: 5000 INJECTION INTRAVENOUS; SUBCUTANEOUS at 06:43

## 2020-02-11 RX ADMIN — PREGABALIN 75 MG: 75 CAPSULE ORAL at 07:30

## 2020-02-11 RX ADMIN — HEPARIN SODIUM 5000 UNITS: 5000 INJECTION INTRAVENOUS; SUBCUTANEOUS at 13:08

## 2020-02-11 RX ADMIN — ATORVASTATIN CALCIUM 40 MG: 40 TABLET, FILM COATED ORAL at 22:17

## 2020-02-11 RX ADMIN — PREGABALIN 75 MG: 75 CAPSULE ORAL at 22:16

## 2020-02-11 RX ADMIN — SODIUM CHLORIDE: 9 INJECTION, SOLUTION INTRAVENOUS at 07:31

## 2020-02-11 RX ADMIN — HEPARIN SODIUM 5000 UNITS: 5000 INJECTION INTRAVENOUS; SUBCUTANEOUS at 22:17

## 2020-02-11 RX ADMIN — CARVEDILOL 6.25 MG: 6.25 TABLET, FILM COATED ORAL at 17:04

## 2020-02-11 RX ADMIN — AMLODIPINE BESYLATE 10 MG: 10 TABLET ORAL at 07:30

## 2020-02-11 RX ADMIN — GLIMEPIRIDE 4 MG: 4 TABLET ORAL at 06:41

## 2020-02-11 RX ADMIN — CETIRIZINE HCL, PSEUDOEPHEDRINE HCL 1 TABLET: 5; 120 TABLET, EXTENDED RELEASE ORAL at 07:30

## 2020-02-11 RX ADMIN — OXYCODONE HYDROCHLORIDE 30 MG: 10 TABLET ORAL at 06:41

## 2020-02-11 ASSESSMENT — PAIN SCALES - GENERAL
PAINLEVEL_OUTOF10: 1
PAINLEVEL_OUTOF10: 6
PAINLEVEL_OUTOF10: 9
PAINLEVEL_OUTOF10: 8
PAINLEVEL_OUTOF10: 9
PAINLEVEL_OUTOF10: 1

## 2020-02-11 ASSESSMENT — PAIN DESCRIPTION - ORIENTATION: ORIENTATION: LOWER

## 2020-02-11 ASSESSMENT — PAIN DESCRIPTION - LOCATION: LOCATION: BACK

## 2020-02-11 ASSESSMENT — PAIN DESCRIPTION - DESCRIPTORS: DESCRIPTORS: ACHING

## 2020-02-11 ASSESSMENT — PAIN DESCRIPTION - PAIN TYPE: TYPE: CHRONIC PAIN

## 2020-02-11 NOTE — PROGRESS NOTES
Signed off to new nurse, Nina Morales, with bedside rounding  IV intact, no redness noted,  IV tubing labeled  Pt. Cont. To sit up in lazy boy at bedside  O 2  Cont. On per NC  Foam dressing intact to rt knee abrasion and mid lower abd. Area  Pt.  Denies any pain or SOB at this time

## 2020-02-11 NOTE — CONSULTS
([facility] 8/28/2015    Pain of toe of right foot     morgans cyst    Right wrist pain     rate 8    Skin cancer     skin under lt eye,face    Sleep apnea     Bipap does not work    Wears glasses         Past Surgical History:     Past Surgical History:   Procedure Laterality Date    CARPAL TUNNEL RELEASE Right 09/05/2014    CATARACT REMOVAL WITH IMPLANT Bilateral 2013    CHOLECYSTECTOMY, LAPAROSCOPIC  1998    COLONOSCOPY N/A 10/17/2019    COLONOSCOPY DIAGNOSTIC performed by Deepak Edwards MD at [facility] 238 cyst from base of tail bone   [id number] [place] Bilateral 01/20/2017    abcess removed    LUMBAR FUSION  10/2010; 03/2011    L4/L5    LUMBAR FUSION  2009    L4-S1    OTHER SURGICAL HISTORY  05/02/2018    Lumbar decompression laminectomy at L3-L4 bilaterally wiith complete facetomies at L3-L4 bilaterally;  diskectomy L3-L4: posterior lumbar interbody fusion; placement of [place]-type graft; local harvest of bone; microsurgical dissection.  ROTATOR CUFF REPAIR Left 2012    SKIN CANCER EXCISION  2005    Basal Cell Carcinoma, Curly size from Lt. face    TUBAL LIGATION  1981    UPPER GASTROINTESTINAL ENDOSCOPY N/A 10/16/2019    EGD BIOPSY performed by Deepak Edwards MD at [address] [place] [address]        Medications Prior to Admission:     Prior to Admission medications    Medication Sig Start Date End Date Taking? Authorizing Provider   pregabalin (LYRICA) 150 MG capsule Take 1 capsule by mouth 2 times daily for 30 days. 1/20/20 2/19/20 Yes Robert Hasnon MD   amLODIPine (NORVASC) 10 MG tablet Take 1 tablet by mouth daily 1/20/20  Yes Robert Hanson MD   oxyCODONE (OXY-IR) 30 MG immediate release tablet Take 1 tablet by mouth every 8 hours as needed for Pain for up to 30 days.  1/20/20 2/19/20 Yes Robert Hanson MD   albuterol (PROVENTIL) (2.5 MG/3ML) 0.083% nebulizer solution Take 3 mLs by nebulization 2 times daily 1/11/20 2/10/20 Yes dysarthria, aphasia. Cranial nerves   II - visual fields intact to confrontation; pupils reactive  III, IV, VI - extraocular muscles intact; no ALICIA; no nystagmus; no ptosis   V - normal facial sensation                                                               VII - normal facial symmetry                                                             VIII - intact hearing                                                                             IX, X - symmetrical palate elevation                                               XI - symmetrical shoulder shrug                                                       XII - midline tongue without atrophy or fasciculation     Motor function  Strength: 5/5 RUE, 4/5 RLE, 5/5 LUE, 4/5  LLE  Normal bulk and tone. (+) Negative myoclonus bilateral upper and lower extremities                  Sensory function  decreased in all modalities distally to proximally in lower extremities     Cerebellar Intact finger-nose-finger testing. Intact heel-shin testing. Reflex function 1/4 symmetric throughout . Downgoing plantar response bilaterally.  (-)Morrissey's sign bilaterally    Gait                   not assessed due to myoclonus and shortness of breath             Diagnostics:      Laboratory Testing:  CBC:   Recent Labs     02/10/20  1015 02/11/20  0620   WBC 12.2* 10.5   HGB 8.7* 9.0*    310     BMP:    Recent Labs     02/10/20  1015 02/10/20  1635 02/11/20  0620    135 141   K 5.2 5.1 4.8   CL 96* 97* 101   CO2 29 28 27   BUN 43* 40* 32*   CREATININE 2.42* 2.13* 1.72*   GLUCOSE 132* 230* 146*         Lab Results   Component Value Date    CHOL 134 08/29/2019    LDLCHOLESTEROL 57 11/23/2019    HDL 41 11/23/2019    TRIG 193 (H) 08/29/2019    ALT 22 02/10/2020    AST 29 02/10/2020    TSH 0.22 (L) 01/06/2020    INR 1.0 02/10/2020    LABA1C 9.7 (H) 02/10/2020    LABMICR 10 10/26/2017    FELJVSTF18 322 11/22/2019       Imaging/Diagnostics:      EEG (1/7/2020) : Normal.  No epileptiform activity. n/a     I personally reviewed all of the above medications, clinical laboratory, imaging and other diagnostic tests. Impression:      1. 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 Acute respiratory failure    3. Diabetic peripheral neuropathy    Plan:     -Will continue to monitor myoclonus at this time. Continue current treatment of MAMTA, elevated CO2. If symptoms persist despite treatment of above will consider low-dose Klonopin for symptomatic relief. No need to repeat EEG as this was done 1 month ago. -We will follow       Thank you for this very interesting consultation.       Electronically signed by Chandana Brown DO on 2/11/2020 at 12:22 PM      Chandana Brown [address] [place] [address]  Neurology

## 2020-02-11 NOTE — PROGRESS NOTES
I spoke with Reina Michaels at Dr. Brit Gaytan office who states that they have not seen the patient in 5 years and to please have another cardiologist  see the patient why she is in the hospital. This was done 9:46 am on 2/11/2020. [facility]

## 2020-02-11 NOTE — H&P
Historical Provider, MD   Multiple Vitamins-Iron (ONE DAILY MULTIVITAMIN/IRON PO) Take 1 tablet by mouth nightly   Yes Historical Provider, MD   Coenzyme Q10 (COQ10 PO) Take 200 mg by mouth nightly    Yes Historical Provider, MD   Loratadine-Pseudoephedrine (CLARITIN-D 12 HOUR PO) Take 1 tablet by mouth daily. Yes Historical Provider, MD       Allergies:  Adhesive tape; Morphine; Iron; and Pcn [penicillins]    Social History:  The patient currently lives at home with spouse    TOBACCO:   reports that she has never smoked. She has never used smokeless tobacco.  ETOH:   reports previous alcohol use. Family History:          Problem Relation Age of Onset    Diabetes Mother     Cancer Mother         Multiple Myeloma    Lung Cancer Father     Diabetes Sister     Diabetes Brother     Stomach Cancer Brother     Diabetes Maternal Grandmother     High Blood Pressure Paternal Grandmother     Heart Disease Paternal Grandmother     Stroke Paternal Grandmother     Heart Attack Paternal Grandfather     COPD Sister     High Blood Pressure Son     High Blood Pressure Daughter     Heart Disease Sister         stents in       PHYSICAL EXAM:    BP (!) 160/49   Pulse 76   Temp 98.3 °F (36.8 °C) (Oral)   Resp 18   Ht 5' (1.524 m)   Wt 284 lb 13.4 oz (129.2 kg)   LMP  (LMP Unknown)   SpO2 96%   BMI 55.63 kg/m²     General appearance: No apparent distress appears stated age and cooperative. States she feels confused. Lying in recliner/  HEENT Normal cephalic, atraumatic without obvious deformity. Neck: Supple, No jugular venous distention/bruits. Heart: Regular rate and rhythm with Normal S1/S2 without murmurs, rubs or gallops  Abdomen: Soft, non-tender or non-distended without rigidity or guarding and positive bowel sounds all four quadrants. Extremities: 2+ edema bilaterlly   Skin: Skin color, texture, turgor normal.  No rashes or lesions.   Neurologic: tremor in hands r>L at rest  Mental status:

## 2020-02-11 NOTE — CONSULTS
Consult note dictated:    Mild respiratory acidosis  Hypercapnia, acute on chronic  BRIANNA/has not used a machine in years  Nocturnal hypoxia/on home O2 at 2.5 L overnight  OHS/BMI at 56.2  MAMTA  Multifocal myoclonus with  gait instability-frequent falls thought to be metabolic as per neurology  DM/neuropathy  HTN, history of diastolic CHF/no fluid overload on chest x-ray

## 2020-02-11 NOTE — CONSULTS
Ciro Staley MD   oxyCODONE (OXY-IR) 30 MG immediate release tablet Take 1 tablet by mouth every 8 hours as needed for Pain for up to 30 days. 1/20/20 2/19/20 Yes Flory Justice MD   albuterol (PROVENTIL) (2.5 MG/3ML) 0.083% nebulizer solution Take 3 mLs by nebulization 2 times daily 1/11/20 2/10/20 Yes Cindy Muñiz MD   glimepiride (AMARYL) 4 MG tablet Take 1 tablet by mouth every morning (before breakfast) 11/26/19  Yes Beto Claire MD   lisinopril (PRINIVIL;ZESTRIL) 5 MG tablet Take 1 tablet by mouth daily 11/26/19  Yes Beto Claire MD   carvedilol (COREG) 3.125 MG tablet Take 1 tablet by mouth 2 times daily (with meals) 11/25/19  Yes Beto Claire MD   venlafaxine (EFFEXOR XR) 37.5 MG extended release capsule Take 1 capsule by mouth daily  Patient taking differently: Take 37.5 mg by mouth nightly  9/10/19  Yes Flory Justice MD   metFORMIN (GLUCOPHAGE) 1000 MG tablet TAKE 1 TABLET TWICE DAILY WITH THE MORNING AND EVENING MEALS 9/4/19  Yes Flory Justice MD   atorvastatin (LIPITOR) 40 MG tablet TAKE 1 TABLET BY MOUTH EVERY DAY AT NIGHT 9/4/19  Yes Flory Justice MD   diclofenac sodium 1 % GEL Apply 2 g topically 3 times daily 2/20/19  Yes Flory Justice MD   furosemide (LASIX) 40 MG tablet Take 0.5 tablets by mouth daily 9/5/18  Yes Flory Justice MD   Calcium Carb-Cholecalciferol (CALCIUM 600 + D PO) Take 1 tablet by mouth 2 times daily   Yes Historical Provider, MD   Multiple Vitamins-Iron (ONE DAILY MULTIVITAMIN/IRON PO) Take 1 tablet by mouth nightly   Yes Historical Provider, MD   Coenzyme Q10 (COQ10 PO) Take 200 mg by mouth nightly    Yes Historical Provider, MD   Loratadine-Pseudoephedrine (CLARITIN-D 12 HOUR PO) Take 1 tablet by mouth daily. Yes Historical Provider, MD       Allergies:  Adhesive tape; Morphine; Iron; and Pcn [penicillins]    Social History:   reports that she has never smoked. She has never used smokeless tobacco. She reports previous alcohol use.  She upstroke is normal in amplitude and contour without delay or bruit  · Peripheral pulses are symmetrical and full   Abdomen:  · No masses or tenderness  · Bowel sounds present  Extremities:  ·  No Cyanosis or Clubbing  ·  Lower extremity edema: none   ·  Skin: Warm and dry  Neurological:  · Alert and oriented. · Moves all extremities well  · No abnormalities of mood, affect, memory, mentation, or behavior are noted    DATA:    Diagnostics:      EKG:   Results for orders placed or performed during the hospital encounter of 02/10/20   EKG 12 Lead   Result Value Ref Range    Ventricular Rate 73 BPM    Atrial Rate 73 BPM    P-R Interval 156 ms    QRS Duration 76 ms    Q-T Interval 374 ms    QTc Calculation (Bazett) 412 ms    P Axis 42 degrees    R Axis 43 degrees    T Axis 49 degrees    Narrative    Normal sinus rhythm  Normal ECG  When compared with ECG of 05-JAN-2020 17:04,  No significant change was found       Echo:  Results for orders placed or performed during the hospital encounter of 11/22/19   Echocardiogram complete 2D with doppler with color  Summary  Left ventricle is normal in size. Global left ventricular systolic function  is hyperdynamic. Estimated ejection fraction is > 65 % . Moderate left ventricular hypertrophy. No obvious wall motion abnormality seen. Aortic valve velocity is increased with a mean gradient of 14 mmHg, may be  due to hyperdynamic function. Trivial tricuspid regurgitation. Labs:     CBC:   Recent Labs     02/10/20  1015 02/11/20  0620   WBC 12.2* 10.5   HGB 8.7* 9.0*   HCT 27.7* 28.3*    310     BMP:   Recent Labs     02/10/20  1635 02/11/20  0620    141   K 5.1 4.8   CO2 28 27   BUN 40* 32*   CREATININE 2.13* 1.72*   LABGLOM 23* 30*   GLUCOSE 230* 146*     BNP: No results for input(s): BNP in the last 72 hours.   PT/INR:   Recent Labs     02/10/20  1015   PROTIME 13.6   INR 1.0     APTT:  Recent Labs     02/10/20  1015   APTT 29.1     CARDIAC ENZYMES:  Recent

## 2020-02-11 NOTE — PROGRESS NOTES
Patient up to bathroom at this time. While patient was ambulating back to bed, she became weak and slightly shaky. The PCT got the patient back into bed and notified this RN. I assessed the patient, obtained her vitals and checked her blood sugar. Vitals charted in flow sheets, but were all within normal limits. Blood sugar was 149. Patient states that this has been occurring at home as well, and is what led to her most recent fall at home. Patient requested a recliner to sleep in and states that a recliner helps her sleep more comfortbally. Recliner placed in room and patient is resting in it. No further shaking noted at this time. Will continue to monitor.

## 2020-02-11 NOTE — PROGRESS NOTES
Writer reviews and agrees with charting done per [employer].     Electronically signed by Ximena Sahu RN on 2/10/20 at 7:31 PM

## 2020-02-11 NOTE — PROGRESS NOTES
Nephrology Progress Note    Subjective/   60y.o. year old female who we are seeing in consultation for Acute kidney Injury     Interval history:  Patient feels better today, tremors are less and she is sitting out of bed. Creatinine  Improving  at 1.7 mg/dl  with satisfactory urine output. Renal ultrasound showed no evidence of kidney stone and no hydronephrosis or perinephric fluid. HPI  This is a 60 y.o. female with history of Hypertension, type 2 DM, fibromylagia, who presented with a fall at home. She also presented with fatigue and generalized weakness and cough. She was found to be hypoxic in the 80s by EMS started on oxygen. Patient reports she had fallen while trying to get out of bed and use her walker at home and was on the ground for several hours. She denies loss of consciousness or prodromal symptoms of dizziness,. Patient denies dysuria, , flank pain, nocturia, urgency,or any urinary or GI complaints. On presentation her creatinine was 2.42 with BUN of 43 and potassium of 5.2 with calcium of 10. WBC of 12.   Urinalysis was bland without proteinuria, hematuria with negative leukocyte and negative nitrate       Objective/     Vitals:    02/11/20 0711 02/11/20 0731 02/11/20 0733 02/11/20 1348   BP: (!) 160/49   (!) 185/47   Pulse: 76  78 81   Resp: 16 18  16   Temp: 98.3 °F (36.8 °C)   97.7 °F (36.5 °C)   TempSrc: Oral   Oral   SpO2: 97% 96%  95%   Weight: 287 lb 0.6 oz (130.2 kg)      Height:         24HR INTAKE/OUTPUT:      Intake/Output Summary (Last 24 hours) at 2/11/2020 1618  Last data filed at 2/11/2020 1345  Gross per 24 hour   Intake 1565 ml   Output 3425 ml   Net -1860 ml     Patient Vitals for the past 96 hrs (Last 3 readings):   Weight   02/11/20 0711 287 lb 0.6 oz (130.2 kg)   02/10/20 1145 284 lb 13.4 oz (129.2 kg)   02/10/20 0942 300 lb (136.1 kg)       Constitutional:  Alert, awake, no apparent distress  Cardiovascular:  S1, S2 without m/r/g  Respiratory:  CTA B without

## 2020-02-12 LAB
ABSOLUTE EOS #: 0.1 K/UL (ref 0–0.4)
ABSOLUTE IMMATURE GRANULOCYTE: ABNORMAL K/UL (ref 0–0.3)
ABSOLUTE LYMPH #: 2 K/UL (ref 1–4.8)
ABSOLUTE MONO #: 0.5 K/UL (ref 0.1–1.3)
ANION GAP SERPL CALCULATED.3IONS-SCNC: 11 MMOL/L (ref 9–17)
BASOPHILS # BLD: 1 % (ref 0–2)
BASOPHILS ABSOLUTE: 0.1 K/UL (ref 0–0.2)
BUN BLDV-MCNC: 22 MG/DL (ref 8–23)
BUN/CREAT BLD: ABNORMAL (ref 9–20)
CALCIUM SERPL-MCNC: 9.7 MG/DL (ref 8.6–10.4)
CHLORIDE BLD-SCNC: 98 MMOL/L (ref 98–107)
CO2: 27 MMOL/L (ref 20–31)
CREAT SERPL-MCNC: 1.63 MG/DL (ref 0.5–0.9)
DIFFERENTIAL TYPE: ABNORMAL
EOSINOPHILS RELATIVE PERCENT: 1 % (ref 0–4)
GFR AFRICAN AMERICAN: 39 ML/MIN
GFR NON-AFRICAN AMERICAN: 32 ML/MIN
GFR SERPL CREATININE-BSD FRML MDRD: ABNORMAL ML/MIN/{1.73_M2}
GFR SERPL CREATININE-BSD FRML MDRD: ABNORMAL ML/MIN/{1.73_M2}
GLUCOSE BLD-MCNC: 145 MG/DL (ref 65–105)
GLUCOSE BLD-MCNC: 165 MG/DL (ref 70–99)
GLUCOSE BLD-MCNC: 199 MG/DL (ref 65–105)
GLUCOSE BLD-MCNC: 201 MG/DL (ref 65–105)
GLUCOSE BLD-MCNC: 227 MG/DL (ref 65–105)
GLUCOSE BLD-MCNC: 246 MG/DL (ref 65–105)
HCT VFR BLD CALC: 28.2 % (ref 36–46)
HEMOGLOBIN: 8.8 G/DL (ref 12–16)
IMMATURE GRANULOCYTES: ABNORMAL %
LYMPHOCYTES # BLD: 20 % (ref 24–44)
MCH RBC QN AUTO: 26 PG (ref 26–34)
MCHC RBC AUTO-ENTMCNC: 31.3 G/DL (ref 31–37)
MCV RBC AUTO: 83.2 FL (ref 80–100)
MONOCYTES # BLD: 5 % (ref 1–7)
NRBC AUTOMATED: ABNORMAL PER 100 WBC
PDW BLD-RTO: 19.8 % (ref 11.5–14.9)
PLATELET # BLD: 291 K/UL (ref 150–450)
PLATELET ESTIMATE: ABNORMAL
PMV BLD AUTO: 8.1 FL (ref 6–12)
POTASSIUM SERPL-SCNC: 4.8 MMOL/L (ref 3.7–5.3)
RBC # BLD: 3.39 M/UL (ref 4–5.2)
RBC # BLD: ABNORMAL 10*6/UL
SEG NEUTROPHILS: 73 % (ref 36–66)
SEGMENTED NEUTROPHILS ABSOLUTE COUNT: 7.1 K/UL (ref 1.3–9.1)
SODIUM BLD-SCNC: 136 MMOL/L (ref 135–144)
WBC # BLD: 9.7 K/UL (ref 3.5–11)
WBC # BLD: ABNORMAL 10*3/UL

## 2020-02-12 PROCEDURE — 36415 COLL VENOUS BLD VENIPUNCTURE: CPT

## 2020-02-12 PROCEDURE — 6370000000 HC RX 637 (ALT 250 FOR IP): Performed by: INTERNAL MEDICINE

## 2020-02-12 PROCEDURE — 6370000000 HC RX 637 (ALT 250 FOR IP): Performed by: FAMILY MEDICINE

## 2020-02-12 PROCEDURE — 2580000003 HC RX 258: Performed by: FAMILY MEDICINE

## 2020-02-12 PROCEDURE — 99232 SBSQ HOSP IP/OBS MODERATE 35: CPT | Performed by: FAMILY MEDICINE

## 2020-02-12 PROCEDURE — 2700000000 HC OXYGEN THERAPY PER DAY

## 2020-02-12 PROCEDURE — 85025 COMPLETE CBC W/AUTO DIFF WBC: CPT

## 2020-02-12 PROCEDURE — 6360000002 HC RX W HCPCS: Performed by: FAMILY MEDICINE

## 2020-02-12 PROCEDURE — 94761 N-INVAS EAR/PLS OXIMETRY MLT: CPT

## 2020-02-12 PROCEDURE — 2580000003 HC RX 258: Performed by: INTERNAL MEDICINE

## 2020-02-12 PROCEDURE — 80048 BASIC METABOLIC PNL TOTAL CA: CPT

## 2020-02-12 PROCEDURE — 94640 AIRWAY INHALATION TREATMENT: CPT

## 2020-02-12 PROCEDURE — 94660 CPAP INITIATION&MGMT: CPT

## 2020-02-12 PROCEDURE — 1200000000 HC SEMI PRIVATE

## 2020-02-12 PROCEDURE — 6360000002 HC RX W HCPCS: Performed by: INTERNAL MEDICINE

## 2020-02-12 PROCEDURE — 99231 SBSQ HOSP IP/OBS SF/LOW 25: CPT | Performed by: PSYCHIATRY & NEUROLOGY

## 2020-02-12 RX ORDER — DOXYCYCLINE 100 MG/1
100 CAPSULE ORAL EVERY 12 HOURS SCHEDULED
Status: DISCONTINUED | OUTPATIENT
Start: 2020-02-12 | End: 2020-02-13 | Stop reason: HOSPADM

## 2020-02-12 RX ORDER — CARVEDILOL 12.5 MG/1
12.5 TABLET ORAL 2 TIMES DAILY WITH MEALS
Status: DISCONTINUED | OUTPATIENT
Start: 2020-02-12 | End: 2020-02-13 | Stop reason: HOSPADM

## 2020-02-12 RX ADMIN — OXYCODONE HYDROCHLORIDE 30 MG: 10 TABLET ORAL at 06:21

## 2020-02-12 RX ADMIN — ALBUTEROL SULFATE 2.5 MG: 2.5 SOLUTION RESPIRATORY (INHALATION) at 07:13

## 2020-02-12 RX ADMIN — Medication 1 TABLET: at 07:51

## 2020-02-12 RX ADMIN — MULTIPLE VITAMINS W/ MINERALS TAB 1 TABLET: TAB at 21:26

## 2020-02-12 RX ADMIN — INSULIN LISPRO 1 UNITS: 100 INJECTION, SOLUTION INTRAVENOUS; SUBCUTANEOUS at 21:26

## 2020-02-12 RX ADMIN — INSULIN LISPRO 1 UNITS: 100 INJECTION, SOLUTION INTRAVENOUS; SUBCUTANEOUS at 16:56

## 2020-02-12 RX ADMIN — PREGABALIN 75 MG: 75 CAPSULE ORAL at 07:51

## 2020-02-12 RX ADMIN — GLIMEPIRIDE 4 MG: 4 TABLET ORAL at 06:22

## 2020-02-12 RX ADMIN — CARVEDILOL 6.25 MG: 6.25 TABLET, FILM COATED ORAL at 07:51

## 2020-02-12 RX ADMIN — CETIRIZINE HCL, PSEUDOEPHEDRINE HCL 1 TABLET: 5; 120 TABLET, EXTENDED RELEASE ORAL at 07:51

## 2020-02-12 RX ADMIN — ALBUTEROL SULFATE 2.5 MG: 2.5 SOLUTION RESPIRATORY (INHALATION) at 21:20

## 2020-02-12 RX ADMIN — Medication 10 ML: at 21:29

## 2020-02-12 RX ADMIN — LINAGLIPTIN 5 MG: 5 TABLET, FILM COATED ORAL at 07:51

## 2020-02-12 RX ADMIN — CARVEDILOL 12.5 MG: 12.5 TABLET, FILM COATED ORAL at 16:55

## 2020-02-12 RX ADMIN — Medication 1 TABLET: at 21:26

## 2020-02-12 RX ADMIN — HEPARIN SODIUM 5000 UNITS: 5000 INJECTION INTRAVENOUS; SUBCUTANEOUS at 21:27

## 2020-02-12 RX ADMIN — VENLAFAXINE HYDROCHLORIDE 37.5 MG: 37.5 CAPSULE, EXTENDED RELEASE ORAL at 21:26

## 2020-02-12 RX ADMIN — OXYCODONE HYDROCHLORIDE 30 MG: 10 TABLET ORAL at 21:26

## 2020-02-12 RX ADMIN — OXYCODONE HYDROCHLORIDE 30 MG: 10 TABLET ORAL at 14:22

## 2020-02-12 RX ADMIN — DOXYCYCLINE 100 MG: 100 CAPSULE ORAL at 21:26

## 2020-02-12 RX ADMIN — INSULIN LISPRO 1 UNITS: 100 INJECTION, SOLUTION INTRAVENOUS; SUBCUTANEOUS at 07:51

## 2020-02-12 RX ADMIN — Medication 200 MG: at 21:26

## 2020-02-12 RX ADMIN — PREGABALIN 75 MG: 75 CAPSULE ORAL at 21:26

## 2020-02-12 RX ADMIN — ATORVASTATIN CALCIUM 40 MG: 40 TABLET, FILM COATED ORAL at 21:26

## 2020-02-12 RX ADMIN — HEPARIN SODIUM 5000 UNITS: 5000 INJECTION INTRAVENOUS; SUBCUTANEOUS at 06:21

## 2020-02-12 RX ADMIN — INSULIN LISPRO 2 UNITS: 100 INJECTION, SOLUTION INTRAVENOUS; SUBCUTANEOUS at 12:58

## 2020-02-12 RX ADMIN — AMLODIPINE BESYLATE 10 MG: 10 TABLET ORAL at 07:51

## 2020-02-12 RX ADMIN — SODIUM CHLORIDE: 9 INJECTION, SOLUTION INTRAVENOUS at 11:43

## 2020-02-12 RX ADMIN — HEPARIN SODIUM 5000 UNITS: 5000 INJECTION INTRAVENOUS; SUBCUTANEOUS at 14:22

## 2020-02-12 ASSESSMENT — PAIN SCALES - GENERAL
PAINLEVEL_OUTOF10: 9
PAINLEVEL_OUTOF10: 8
PAINLEVEL_OUTOF10: 7

## 2020-02-12 NOTE — DISCHARGE INSTR - COC
Continuity of Care Form    Patient Name: Mignon Hawkins   :  1955  MRN:  [id number]    Admit date:  2/10/2020  Discharge date:  2020    Code Status Order: Full Code   Advance Directives:   Advance Care Flowsheet Documentation     Date/Time Healthcare Directive Type of Healthcare Directive Copy in [address] Agent's Name Healthcare Agent's Phone Number    02/10/20 1226  Yes, patient has an advance directive for healthcare treatment  Durable power of  for health care  --  Spouse  Ricardo Rued  [phone]          Admitting Physician:  Monica Urena MD  PCP: Monica Urena MD    Discharging Nurse: [facility] Unit/Room#:   Discharging Unit Phone Number: [phone]    Emergency Contact:   Extended Emergency Contact Information  Primary Emergency Contact: Christoph Beltran  Address: [address]  Phone: [phone]  Relation: Spouse  Hearing or visual needs: None  Preferred language: English   needed? No  Secondary Emergency Contact: Denise Beltran  Address: N/A           [address]  Phone: [phone]  Relation: Child  Hearing or visual needs: None  Other needs: None  Preferred language: English   needed?  No    Past Surgical History:  Past Surgical History:   Procedure Laterality Date    CARPAL TUNNEL RELEASE Right 2014    CATARACT REMOVAL WITH IMPLANT Bilateral 2013    CHOLECYSTECTOMY, LAPAROSCOPIC  1998    COLONOSCOPY N/A 10/17/2019    COLONOSCOPY DIAGNOSTIC performed by Yolie Clark MD at [facility] 238 cyst from base of tail bone   [id number] [facility] Bilateral 2017    abcess removed    LUMBAR FUSION  10/2010; 2011    L4/L5    LUMBAR FUSION  2009    L4-S1    OTHER SURGICAL HISTORY  2018    Lumbar decompression laminectomy at L3-L4 bilaterally wiith complete facetomies at L3-L4 bilaterally;  diskectomy L3-L4: posterior lumbar interbody fusion; placement of [place]-type graft; local harvest of bone; microsurgical dissection.  ROTATOR CUFF REPAIR Left 2012    SKIN CANCER EXCISION  2005    Basal Cell Carcinoma, Curly size from Lt.  face    TUBAL LIGATION  1981    UPPER GASTROINTESTINAL ENDOSCOPY N/A 10/16/2019    EGD BIOPSY performed by Inocencia Mcginnis MD at [facility] ENDO       Immunization History:   Immunization History   Administered Date(s) Administered    Influenza Virus Vaccine 12/18/2015    Influenza, Peewee Chantelle, IM, (6 mo and older Fluzone, Flulaval, Fluarix and 3 yrs and older Afluria) 12/15/2016, 12/10/2018    Influenza, Peewee Chantelle, IM, PF (6 mo and older Fluzone, Flulaval, Fluarix, and 3 yrs and older Afluria) 10/10/2019    Pneumococcal Polysaccharide ([id number]) 12/15/2016       Active Problems:  Patient Active Problem List   Diagnosis Code    Hypertension, benign I10    Diabetes mellitus type II, controlled ([facility] E11.9    Other hyperlipidemia E78.49    Degeneration of cervical intervertebral disc M50.30    Cervicalgia M54.2    Rotator cuff (capsule) sprain S43.429A    Lateral epicondylitis M77.10    Radial styloid tenosynovitis M65.4    Morbid obesity with BMI of 60.0-69.9, adult ([facility] E66.01, Z68.44    BRIANNA (obstructive sleep apnea) G47.33    Hepatic steatosis K76.0    Lumbar radiculopathy M54.16    Chronic pain syndrome G89.4    Acute diastolic CHF (congestive heart failure) ([facility]) I50.31    MAMTA (acute kidney injury) ([facility] N17.9    H/O: GI bleed Z87.19    Iron deficiency anemia D50.9    Fibromyalgia M79.7    Acute hypoxemic respiratory failure ([facility]) J96.01    Hyperkalemia E87.5    Class 3 severe obesity due to excess calories with serious comorbidity and body mass index (BMI) of 50.0 to 59.9 in adult ([facility]) E66.01, Z68.43    Tremor R25.1    Acute renal failure superimposed on chronic kidney disease ([facility] N17.9, N18.9       Isolation/Infection:   Isolation          No Isolation        Patient Infection Status     None to display          Nurse Assessment:  Last Vital Signs: BP (!) 153/61   Pulse 80   Temp 98.5 °F (36.9 °C) (Oral)   Resp 17   Ht 5' (1.524 m)   Wt 287 lb 0.6 oz (130.2 kg)   LMP  (LMP Unknown)   SpO2 97%   BMI 56.06 kg/m²     Last documented pain score (0-10 scale): Pain Level: 7  Last Weight:   Wt Readings from Last 1 Encounters:   02/11/20 287 lb 0.6 oz (130.2 kg)     Mental Status:  oriented and alert    IV Access:  - None    Nursing Mobility/ADLs:  Walking   Assisted  Transfer  Assisted  Bathing  Assisted  Dressing  Assisted  Toileting  Assisted  Feeding  Independent  Med Admin  Assisted  Med Delivery   whole    Wound Care Documentation and Therapy:  Wound 02/11/20 Knee Anterior;Right (Active)   Number of days: 1       Wound 87/25/02 Umbilicus Lower;Medial foam dressing intact (Active)   Number of days: 1        Elimination:  Continence:   · Bowel: Yes  · Bladder: Yes  Urinary Catheter: None   Colostomy/Ileostomy/Ileal Conduit: No       Date of Last BM: 2/11/20    Intake/Output Summary (Last 24 hours) at 2/12/2020 1032  Last data filed at 2/12/2020 0800  Gross per 24 hour   Intake 3520 ml   Output 3150 ml   Net 370 ml     I/O last 3 completed shifts: In: 3873 [P.O.:1090; I.V.:2235]  Out: [place] 2891 [Urine:3475]    Safety Concerns:      At Risk for Falls    Impairments/Disabilities:      None    Nutrition Therapy:  Current Nutrition Therapy:   - Oral Diet:  Carb Control 4 carbs/meal (1800kcals/day)    Routes of Feeding: Oral  Liquids: No Restrictions  Daily Fluid Restriction: no  Last Modified Barium Swallow with Video (Video Swallowing Test): not done    Treatments at the Time of Hospital Discharge:   Respiratory Treatments: see MAR  Oxygen Therapy:  2.5L Nasal cannula at night only  Ventilator:    - BiPAP    ,   only when sleeping    Rehab Therapies: Physical Therapy and

## 2020-02-12 NOTE — PROGRESS NOTES
Josemanuel Mar is a 60 y.o. female patient.     Current Facility-Administered Medications   Medication Dose Route Frequency Provider Last Rate Last Dose    carvedilol (COREG) tablet 6.25 mg  6.25 mg Oral BID WC Jerald Ontiveros MD   6.25 mg at 02/12/20 0751    linagliptin (TRADJENTA) tablet 5 mg  5 mg Oral Daily Jerald Ontiveros MD   5 mg at 02/12/20 0751    albuterol (PROVENTIL) nebulizer solution 2.5 mg  2.5 mg Nebulization Q20 Min PRN Jerald Ontiveros MD   2.5 mg at 02/10/20 1006    albuterol (PROVENTIL) nebulizer solution 2.5 mg  2.5 mg Nebulization BID Jerald Ontiveros MD   2.5 mg at 02/12/20 0713    amLODIPine (NORVASC) tablet 10 mg  10 mg Oral Daily Jerald Ontiveros MD   10 mg at 02/12/20 0751    atorvastatin (LIPITOR) tablet 40 mg  40 mg Oral Nightly Jerald Ontiveros MD   40 mg at 02/11/20 2217    calcium carbonate-vitamin D (CALTRATE) 600-400 MG-UNIT per tab 1 tablet  1 tablet Oral BID Jerald Ontiveros MD   1 tablet at 02/12/20 0751    coenzyme Q10 capsule 200 mg  200 mg Oral Nightly Jerald Ontiveros MD   200 mg at 02/11/20 2224    glimepiride (AMARYL) tablet 4 mg  4 mg Oral QAM AC Jerald Ontiveros MD   4 mg at 02/12/20 2479    therapeutic multivitamin-minerals 1 tablet  1 tablet Oral Nightly Jerald Ontiveros MD   1 tablet at 02/11/20 2217    oxyCODONE HCl (OXY-IR) immediate release tablet 30 mg  30 mg Oral 3 times per day Jerald Ontiveros MD   30 mg at 02/12/20 5113    venlafaxine (EFFEXOR XR) extended release capsule 37.5 mg  37.5 mg Oral Nightly Jerald Ontiveros MD   37.5 mg at 02/11/20 2215    sodium chloride flush 0.9 % injection 10 mL  10 mL Intravenous 2 times per day Jerald Ontiveros MD   10 mL at 02/10/20 2059    sodium chloride flush 0.9 % injection 10 mL  10 mL Intravenous PRN Jerald Ontiveros MD        magnesium hydroxide (MILK OF MAGNESIA) 400 MG/5ML suspension 30 mL  30 mL Oral Daily PRN MD Sampson Lucas hospital problems. *    Blood pressure (!) 153/61, pulse 80, temperature 98.5 °F (36.9 °C), temperature source Oral, resp. rate 17, height 5' (1.524 m), weight 287 lb 0.6 oz (130.2 kg), SpO2 97 %, not currently breastfeeding. Subjective:  Symptoms:  (Tremors improved; patient feels fatigued. ). Activity level: Impaired due to weakness. Pain:  She complains of pain that is moderate. Objective:  General Appearance:  Comfortable. Vital signs: (most recent): Blood pressure (!) 153/61, pulse 80, temperature 98.5 °F (36.9 °C), temperature source Oral, resp. rate 17, height 5' (1.524 m), weight 287 lb 0.6 oz (130.2 kg), SpO2 97 %, not currently breastfeeding.   Vital signs are normal.      Assessment & Plan  Myoclonus d/t metabolic abnormalities - improved    Diabetes - sugars stable on new regimen    Acute on chronic hypercapnia with OHS and BRIANNA - per pulmonology    Hx diastolic CHF- not a factor in this admission    Hypertension - suboptimal control; increase carvedilol to 12.5 mg BID    Aquiles Robbins MD  2/12/2020

## 2020-02-12 NOTE — PROGRESS NOTES
Pulmonary Progress Note  [facility]      Patient - Sharmila Moore,  Age - 60 y.o.    - 1955      Room Number - -01   N -  [id number]   [facility] # - [de-identified]  Date of Admission -  2/10/2020  9:41 AM        Consulting Carlie Hernández MD  Primary Care Physician - Aquiles Robbins MD     SUBJECTIVE   Feels fine, room air, could not use the BiPAP last night with a full facemask  No fever or chills, no chest pain  Little dry cough, no short of breath or wheezing  No myoclonus or shakiness today    OBJECTIVE   VITALS    height is 5' (1.524 m) and weight is 287 lb 0.6 oz (130.2 kg). Her oral temperature is 98.5 °F (36.9 °C). Her blood pressure is 153/61 (abnormal) and her pulse is 80. Her respiration is 17 and oxygen saturation is 97%. Body mass index is 56.06 kg/m². Temperature Range: Temp: 98.5 °F (36.9 °C) Temp  Av °F (36.7 °C)  Min: 97.7 °F (36.5 °C)  Max: 98.5 °F (36.9 °C)  BP Range:  Systolic (35PHY), MCR:080 , Min:138 , VEI:823     Diastolic (27ZQV), XLR:81, Min:47, Max:70    Pulse Range: Pulse  Av  Min: 69  Max: 82  Respiration Range: Resp  Av.2  Min: 16  Max: 18  Current Pulse Ox[de-identified]  SpO2: 97 %  24HR Pulse Ox Range:  SpO2  Av.2 %  Min: 91 %  Max: 99 %  Oxygen Amount and Delivery: O2 Flow Rate (L/min): 2.5 L/min    Wt Readings from Last 3 Encounters:   20 287 lb 0.6 oz (130.2 kg)   20 277 lb 3.2 oz (125.7 kg)   20 294 lb 15.6 oz (133.8 kg)       I/O (24 Hours)    Intake/Output Summary (Last 24 hours) at 2020 1322  Last data filed at 2020 0800  Gross per 24 hour   Intake 3120 ml   Output 2750 ml   Net 370 ml       EXAM     General Appearance  Awake, alert,  in no acute distress  HEENT - normocephalic, atraumatic.   Neck - Supple,  trachea midline   Lungs -clear, no wheezing or distress  Cardiovascular - Heart sounds are normal.  Regular rate and rhythm ALKPHOS 82     INR   Recent Labs     02/10/20  1015   INR 1.0     PTT   Recent Labs     02/10/20  1015   APTT 29.1     BNP No results for input(s): BNP in the last 72 hours. RADIOLOGY     (See actual reports for details)    ASSESSMENT/PLAN     1. Mild respiratory acidosis. 2.  Hypercapnia, acute-on-chronic. 3.  BRIANNA, has not used a machine in years. She said she is  claustrophobic from the mask. 4.  Nocturnal hypoxia on home O2 at 2.5 liters overnight. 5.  Obesity hypoventilation syndrome. Her BMI is at 56.2.  6.  History of allergic rhinitis, no exacerbation. 7.  Acute renal failure/chronic anemia. 8.  Multifocal myoclonus with gait instability and frequent falls. Myoclonus thought to be metabolic  as per Neurology. 9.  Diabetes with neuropathy. 10.  Hypertension, history of diastolic CHF. No fluid overload noted on chest x-ray      PLAN OF TREATMENT:     Discussed with patient, daughter-in-law and staff, explained to patient that there is no other way but using the machine, weight loss or tracheostomy which seems too aggressive of a measure to her, she got upset about the weight loss and did not want to talk about it,  advised her to check with her company to try an oral nasal mask and See if this is something that she can wear  continue with O2 overnight.      will sign off for now, call if needed.          Electronically signed by Louanna Collet, MD on 2/12/2020 at 1:22 PM

## 2020-02-12 NOTE — PROGRESS NOTES
[facility] Neurology   IN-PATIENT SERVICE      NEUROLOGY PROGRESS  NOTE                Interval History:     No current complaints at this time. She states tremors are much improved today. Creatinine continues to downtrend. History of Present Illness: The patient is a 60 y.o. female who presents with Fatigue. The patient was seen and examined and the chart was reviewed. . The patient was seen and examined and the chart was reviewed. Patient presents to the hospital on 2/10 with shortness of breath, cough, jerking type movements. Patient seen last month on  by consultant for jerking movements. Noted to have multifocal myoclonus which had improved after admission. It was thought to have been related to her elevation in CO2 and creatinine. It did improve when these numbers improved. EEG was done which was normal.  There was recommendation for low-dose Klonopin in the future if the symptoms persisted. Patient was discharged home and states over the next couple weeks did not notice any symptoms of myoclonus. Then over the last several days she noticed it progressively getting worse. She states he gets to the point where when she walks she has jerking movements and falls. She hit her left knee pretty bad and states it is painful at this time. She presented again with creatinine of 2.42. It has come down from yesterday down to 1.72. Her venous blood gas showed PCO2 of 65. She states the myoclonus is still occurring. She is currently being treated for MAMTA on CKD, acute respiratory failure. Physical Exam:   BP (!) 153/61   Pulse 80   Temp 98.5 °F (36.9 °C) (Oral)   Resp 17   Ht 5' (1.524 m)   Wt 287 lb 0.6 oz (130.2 kg)   LMP  (LMP Unknown)   SpO2 97%   BMI 56.06 kg/m²   Temp (24hrs), Av °F (36.7 °C), Min:97.7 °F (36.5 °C), Max:98.5 °F (36.9 °C)    Neurological examination:    Mental status   Alert and oriented x 3; following all commands; speech is fluent.     Cranial nerves   CN II - XII intact   Motor function  Strength: 5/5 RUE, 5/5 RLE, 5/5 LUE, 5/5  LLE                  Sensory function  decrease in all modalities distally to proximally in lower extremities     Cerebellar Intact finger-nose-finger testing. Intact heel-shin testing. Reflex function 1/4 UE 0/4 LE       Gait                  Not assessed           Diagnostics:      Laboratory Testing:  CBC:   Recent Labs     02/10/20  1015 02/11/20  0620 02/12/20  0642   WBC 12.2* 10.5 9.7   HGB 8.7* 9.0* 8.8*    310 291     BMP:    Recent Labs     02/10/20  1635 02/11/20  0620 02/12/20  0642    141 136   K 5.1 4.8 4.8   CL 97* 101 98   CO2 28 27 27   BUN 40* 32* 22   CREATININE 2.13* 1.72* 1.63*   GLUCOSE 230* 146* 165*         Lab Results   Component Value Date    CHOL 134 08/29/2019    LDLCHOLESTEROL 57 11/23/2019    HDL 41 11/23/2019    TRIG 193 (H) 08/29/2019    ALT 22 02/10/2020    AST 29 02/10/2020    TSH 0.22 (L) 01/06/2020    INR 1.0 02/10/2020    LABA1C 9.7 (H) 02/10/2020    LABMICR 10 10/26/2017    DSCJMILU22 322 11/22/2019         Impression:      1. Multifocal myoclonus, metabolic in etiology; worsened with MAMTA, elevated CO2. Now improved. 2.  Acute respiratory failure    3. Diabetic peripheral neuropathy    Plan:     -Continue current treatment of MAMTA, respiratory symptoms  -Follow-up with neurology as outpatient on as-needed basis  -Will sign off at this time. Please contact with any further questions or concerns.       Electronically signed by Ambrose Danielle DO on 2/12/2020 at 9:09 AM      Ambrose Danielle [address] [place] [address]  Neurology

## 2020-02-12 NOTE — PROGRESS NOTES
Pt currently on nasal 02 sat 97% , Rt spoke with pt earlier in shift the importance to wear the Bipap unit ,however she has a problem with claustrophobia and wont wear the hospital equipment , She also stated that Dr Debra Argueta found a special kind of mask that she is interested in . Bipap unit on standby at bedside is she should change her mind.

## 2020-02-12 NOTE — PROGRESS NOTES
Nephrology Progress Note    Subjective/   60y.o. year old female who we are seeing in consultation for Acute kidney Injury     Interval history:  Patient feels better today, no nausea vomiting tolerated oral diet  Denies any tremors  Able to sleep well with BiPAP  Creatinine  Improving  at 1.6 mg/dl  with satisfactory urine output. Renal ultrasound showed no evidence of kidney stone and no hydronephrosis or perinephric fluid. HPI  This is a 60 y.o. female with history of Hypertension, type 2 DM, fibromylagia, who presented with a fall at home. She also presented with fatigue and generalized weakness and cough. She was found to be hypoxic in the 80s by EMS started on oxygen. Patient reports she had fallen while trying to get out of bed and use her walker at home and was on the ground for several hours. She denies loss of consciousness or prodromal symptoms of dizziness,. Patient denies dysuria, , flank pain, nocturia, urgency,or any urinary or GI complaints. On presentation her creatinine was 2.42 with BUN of 43 and potassium of 5.2 with calcium of 10. WBC of 12.   Urinalysis was bland without proteinuria, hematuria with negative leukocyte and negative nitrate       Objective/     Vitals:    02/11/20 1929 02/11/20 1947 02/12/20 0713 02/12/20 0741   BP: 138/70   (!) 153/61   Pulse: 69   80   Resp: 17 18  17   Temp: 97.7 °F (36.5 °C)   98.5 °F (36.9 °C)   TempSrc: Oral   Oral   SpO2: 91% 94% 99% 97%   Weight:       Height:         24HR INTAKE/OUTPUT:      Intake/Output Summary (Last 24 hours) at 2/12/2020 1530  Last data filed at 2/12/2020 1334  Gross per 24 hour   Intake 3120 ml   Output 2151 ml   Net 969 ml     Patient Vitals for the past 96 hrs (Last 3 readings):   Weight   02/11/20 0711 287 lb 0.6 oz (130.2 kg)   02/10/20 1145 284 lb 13.4 oz (129.2 kg)   02/10/20 0942 300 lb (136.1 kg)       Constitutional:  Alert, awake, no apparent distress  Cardiovascular:  S1, S2 without m/r/g  Respiratory:  CTA B without w/r/r  Abdomen: +bs, soft, nt  Ext: 1+ LE edema    Data/  Recent Labs     02/10/20  1015 02/11/20  0620 02/12/20  0642   WBC 12.2* 10.5 9.7   HGB 8.7* 9.0* 8.8*   HCT 27.7* 28.3* 28.2*   MCV 82.2 82.9 83.2    310 291     Recent Labs     02/10/20  1635 02/11/20  0620 02/12/20  0642    141 136   K 5.1 4.8 4.8   CL 97* 101 98   CO2 28 27 27   GLUCOSE 230* 146* 165*   BUN 40* 32* 22   CREATININE 2.13* 1.72* 1.63*   LABGLOM 23* 30* 32*   GFRAA 28* 36* 39*       Assessment/   1. Acute kidney injury, nonoliguric associated with prerenal azotemia and hemodynamic injury-baseline creatinine 0.9 mg/dl  Lisinopril is on hold     2. Lower extremity swelling bilaterally     3. Mild hyperkalemia-improved.     4.Essential Hypertension-uncontrolled after holding lisinopril-patient started on carvedilol  IV hydralazine 20 mg every 6 as needed for systolic blood pressure greater than 160    5. History of CHF with preserved EF moderate diverticular hypertrophy diastolic dysfunction     5. Type 2 diabetes mellitus diabetic peripheral neuropathy-Metformin on hold    6. Gait instability/current falls-neurology following  Plan/   1. DC IVF  2. BMP daily  3. .Strict I/Os      [facility]

## 2020-02-12 NOTE — CONSULTS
[address]                 [address] [facility], [address]                                  CONSULTATION    PATIENT NAME: Shanthi Li                     :        1955  MED REC NO:   [id number]                              ROOM:       2062  ACCOUNT NO:   [de-identified]                           ADMIT DATE: 02/10/2020  PROVIDER:     Faustino Reyna    PULMONARY CRITICAL CARE CONSULT    CONSULT DATE:  2020    REFERRING PROVIDER:  Dr. Obed Gold. REASON FOR CONSULTATION:  Hypercapnia. HISTORY OF PRESENT ILLNESS:  This is a 72-year-old female with history  of BRIANNA, obesity hypoventilation syndrome and she is being followed by  Dr. Jameel Hwang. She is supposed to be on BiPAP, but has not used it in  years. She noted that she is claustrophobic for the mask and she could  not find a mask that fit on her face. She has been using 2.5 liters of  oxygen overnight. She was admitted yesterday for frequent falls. Apparently, the patient had multifocal myoclonus, seen by Neurology  before and thought to be metabolic either secondary to the renal failure  or to the hypercapnia. Her CO2 was done showing pCO2 of 65. The  patient is feeling fine now. Other than that, she has frequent falls,  she fell yesterday on her knees without losing consciousness. She said  she would feel unsteady on her feet and she will have some shakiness and  will fall down and no broken bones or bruising. REVIEW OF SYSTEMS:  GENERAL:  She denies fever or chills. NEURO:  No headaches or dizziness. Noted some weakness. EYES:  No redness or watery eyes. ENT:  No nasal congestion or drip. She had history of allergic  rhinitis. CARDIAC:  No chest pain or palpitation. RESPIRATORY:  Does not have much short of breath now, cough or wheezing. GI:  No nausea, vomiting or dysphagia. She has alternating diarrhea and  constipation. No GI bleed.   GENITOURINARY:  No dysuria or hematuria. MUSCULOSKELETAL:  She had generalized arthralgia with fibromyalgia. SKIN:  Had some redness in both feet and as such no bruising after the  recent fall. PSYCH:  Denies any anxiety or depression right now. PAST MEDICAL HISTORY:  Significant for BRIANNA. She had allergic rhinitis. Denies any prior DVT or PE. No asthma or COPD. Had history of skin  cancer and she had fibromyalgia with generalized arthralgia and  hypertension, history of diastolic CHF and chronic back pain and had  diabetes with neuropathy and morbid obesity as noted. PAST SURGICAL HISTORY:  Had tubal ligation, skin cancer excision in 2005  she had basal cell from her left side of face and had left rotator cuff  repair and she has laminectomy L3-L4 and lumbar fusion in 2009. Had  bilateral inguinal hernia repair, had a hysterectomy and had pilonidal  cyst removal in 1985. Had EGD and colonoscopy, had laparoscopic  cholecystectomy, bilateral cataract removal and right carpal tunnel  release. FAMILY HISTORY:  Positive for diabetes, multiple myeloma, lung cancer  and COPD, coronary artery disease and had stomach cancer, hypertension  and stroke. SOCIAL HISTORY:  She never smoked. She does not abuse any alcohol or  drugs. ALLERGIES:  ADHESIVE TAPE, MORPHINE, IRON and PENICILLIN. MEDICATIONS:  Home medication, she is on nocturnal home O2 at 2.5 liters  overnight. She is on albuterol, even though she denies any history of  asthma or COPD and she is on Lasix and Claritin-D. Rest of her  medication noted. Current medication noted. PHYSICAL EXAMINATION:  VITALS:  Her temperature is 97.7, pulse is 81, respiratory rate 16,  blood pressure is 185/47, saturation 95% on room air. HEENT:  No icterus noted. No JVD or lymphadenopathy appreciated. HEART:  S1, S2 regular. No gallop. LUNGS:  Clear. No wheezing. No tachypnea, distress. ABDOMEN:  Soft, no guarding. Bowel sounds present.   EXTREMITIES:  Bilateral edema and

## 2020-02-13 VITALS
DIASTOLIC BLOOD PRESSURE: 59 MMHG | RESPIRATION RATE: 18 BRPM | BODY MASS INDEX: 56.35 KG/M2 | TEMPERATURE: 98.2 F | WEIGHT: 287.04 LBS | HEART RATE: 78 BPM | HEIGHT: 60 IN | SYSTOLIC BLOOD PRESSURE: 149 MMHG | OXYGEN SATURATION: 92 %

## 2020-02-13 LAB
ABSOLUTE EOS #: 0.1 K/UL (ref 0–0.4)
ABSOLUTE IMMATURE GRANULOCYTE: ABNORMAL K/UL (ref 0–0.3)
ABSOLUTE LYMPH #: 1.8 K/UL (ref 1–4.8)
ABSOLUTE MONO #: 0.6 K/UL (ref 0.1–1.3)
ANION GAP SERPL CALCULATED.3IONS-SCNC: 12 MMOL/L (ref 9–17)
BASOPHILS # BLD: 1 % (ref 0–2)
BASOPHILS ABSOLUTE: 0.1 K/UL (ref 0–0.2)
BUN BLDV-MCNC: 19 MG/DL (ref 8–23)
BUN/CREAT BLD: ABNORMAL (ref 9–20)
CALCIUM SERPL-MCNC: 9.5 MG/DL (ref 8.6–10.4)
CHLORIDE BLD-SCNC: 102 MMOL/L (ref 98–107)
CO2: 26 MMOL/L (ref 20–31)
CREAT SERPL-MCNC: 1.51 MG/DL (ref 0.5–0.9)
DIFFERENTIAL TYPE: ABNORMAL
EOSINOPHILS RELATIVE PERCENT: 1 % (ref 0–4)
GFR AFRICAN AMERICAN: 42 ML/MIN
GFR NON-AFRICAN AMERICAN: 35 ML/MIN
GFR SERPL CREATININE-BSD FRML MDRD: ABNORMAL ML/MIN/{1.73_M2}
GFR SERPL CREATININE-BSD FRML MDRD: ABNORMAL ML/MIN/{1.73_M2}
GLUCOSE BLD-MCNC: 108 MG/DL (ref 65–105)
GLUCOSE BLD-MCNC: 134 MG/DL (ref 70–99)
HCT VFR BLD CALC: 26.9 % (ref 36–46)
HEMOGLOBIN: 8.4 G/DL (ref 12–16)
IMMATURE GRANULOCYTES: ABNORMAL %
LYMPHOCYTES # BLD: 18 % (ref 24–44)
MCH RBC QN AUTO: 25.7 PG (ref 26–34)
MCHC RBC AUTO-ENTMCNC: 31.2 G/DL (ref 31–37)
MCV RBC AUTO: 82.4 FL (ref 80–100)
MONOCYTES # BLD: 6 % (ref 1–7)
NRBC AUTOMATED: ABNORMAL PER 100 WBC
PDW BLD-RTO: 19.8 % (ref 11.5–14.9)
PLATELET # BLD: 295 K/UL (ref 150–450)
PLATELET ESTIMATE: ABNORMAL
PMV BLD AUTO: 8.7 FL (ref 6–12)
POTASSIUM SERPL-SCNC: 4.8 MMOL/L (ref 3.7–5.3)
RBC # BLD: 3.26 M/UL (ref 4–5.2)
RBC # BLD: ABNORMAL 10*6/UL
SEG NEUTROPHILS: 74 % (ref 36–66)
SEGMENTED NEUTROPHILS ABSOLUTE COUNT: 7.2 K/UL (ref 1.3–9.1)
SODIUM BLD-SCNC: 140 MMOL/L (ref 135–144)
WBC # BLD: 9.6 K/UL (ref 3.5–11)
WBC # BLD: ABNORMAL 10*3/UL

## 2020-02-13 PROCEDURE — 6360000002 HC RX W HCPCS: Performed by: INTERNAL MEDICINE

## 2020-02-13 PROCEDURE — 85025 COMPLETE CBC W/AUTO DIFF WBC: CPT

## 2020-02-13 PROCEDURE — 36415 COLL VENOUS BLD VENIPUNCTURE: CPT

## 2020-02-13 PROCEDURE — 82947 ASSAY GLUCOSE BLOOD QUANT: CPT

## 2020-02-13 PROCEDURE — 80048 BASIC METABOLIC PNL TOTAL CA: CPT

## 2020-02-13 PROCEDURE — 94640 AIRWAY INHALATION TREATMENT: CPT

## 2020-02-13 PROCEDURE — 6370000000 HC RX 637 (ALT 250 FOR IP): Performed by: FAMILY MEDICINE

## 2020-02-13 PROCEDURE — 94660 CPAP INITIATION&MGMT: CPT

## 2020-02-13 PROCEDURE — 6360000002 HC RX W HCPCS: Performed by: FAMILY MEDICINE

## 2020-02-13 PROCEDURE — 6370000000 HC RX 637 (ALT 250 FOR IP): Performed by: INTERNAL MEDICINE

## 2020-02-13 RX ORDER — DOXYCYCLINE 100 MG/1
100 CAPSULE ORAL EVERY 12 HOURS SCHEDULED
Qty: 28 CAPSULE | Refills: 0 | Status: SHIPPED | OUTPATIENT
Start: 2020-02-13 | End: 2020-03-16

## 2020-02-13 RX ORDER — CARVEDILOL 12.5 MG/1
12.5 TABLET ORAL 2 TIMES DAILY WITH MEALS
Qty: 60 TABLET | Refills: 11 | Status: SHIPPED | OUTPATIENT
Start: 2020-02-13 | End: 2020-03-10 | Stop reason: SDUPTHER

## 2020-02-13 RX ADMIN — LINAGLIPTIN 5 MG: 5 TABLET, FILM COATED ORAL at 08:40

## 2020-02-13 RX ADMIN — DOXYCYCLINE 100 MG: 100 CAPSULE ORAL at 08:40

## 2020-02-13 RX ADMIN — ALBUTEROL SULFATE 2.5 MG: 2.5 SOLUTION RESPIRATORY (INHALATION) at 07:40

## 2020-02-13 RX ADMIN — AMLODIPINE BESYLATE 10 MG: 10 TABLET ORAL at 08:40

## 2020-02-13 RX ADMIN — GLIMEPIRIDE 4 MG: 4 TABLET ORAL at 05:42

## 2020-02-13 RX ADMIN — HEPARIN SODIUM 5000 UNITS: 5000 INJECTION INTRAVENOUS; SUBCUTANEOUS at 05:42

## 2020-02-13 RX ADMIN — Medication 1 TABLET: at 08:40

## 2020-02-13 RX ADMIN — CETIRIZINE HCL, PSEUDOEPHEDRINE HCL 1 TABLET: 5; 120 TABLET, EXTENDED RELEASE ORAL at 08:42

## 2020-02-13 RX ADMIN — OXYCODONE HYDROCHLORIDE 30 MG: 10 TABLET ORAL at 05:42

## 2020-02-13 RX ADMIN — PREGABALIN 75 MG: 75 CAPSULE ORAL at 08:40

## 2020-02-13 RX ADMIN — CARVEDILOL 12.5 MG: 12.5 TABLET, FILM COATED ORAL at 08:40

## 2020-02-13 ASSESSMENT — PAIN SCALES - GENERAL: PAINLEVEL_OUTOF10: 8

## 2020-02-13 NOTE — CARE COORDINATION
Notified Alphonso Hughes from [facility] that pt was discharged home today.     Electronically signed by Mildred Ontiveros RN on 2/13/2020 at 10:50 AM
Allied, was here on 1/5,pt. Jamesies. Will follow. Nephro following.  Cecilio will need signed/completed, if needed//KB                  Electronically signed by: Darryl Garrido RN on 2/10/2020 at 3:05 PM

## 2020-02-13 NOTE — PROGRESS NOTES
Pt lost IV access, refusing to let IV be restarted at this time. Pt stated she will speak with Dr in am and if she is not discharging then she will let us attempt IV.

## 2020-02-13 NOTE — PROGRESS NOTES
 magnesium hydroxide (MILK OF MAGNESIA) 400 MG/5ML suspension 30 mL  30 mL Oral Daily PRN Jerald Ontiveros MD        ondansetron [facility]) injection 4 mg  4 mg Intravenous Q6H PRN Jerald Ontiveros MD        glucose (GLUTOSE) 40 % oral gel 15 g  15 g Oral PRN Jerald Ontiveros MD        dextrose 50 % IV solution  12.5 g Intravenous PRN Jerald Ontiveros MD        glucagon (rDNA) injection 1 mg  1 mg Intramuscular PRN Jerald Ontiveros MD        dextrose 5 % solution  100 mL/hr Intravenous PRN Jerald Ontiveros MD        insulin lispro (HUMALOG) injection vial 0-6 Units  0-6 Units Subcutaneous TID WC Jerald Ontiveros MD   1 Units at 02/12/20 1656    insulin lispro (HUMALOG) injection vial 0-3 Units  0-3 Units Subcutaneous Nightly Jerald Ontiveros MD   1 Units at 02/12/20 2126    cetirizine-psuedoephedrine (ZYRTEC-D) 5-120 MG per extended release tablet 1 tablet  1 tablet Oral Daily Jerald Ontiveros MD   1 tablet at 02/12/20 0751    heparin (porcine) injection 5,000 Units  5,000 Units Subcutaneous 3 times per day Shady Moore MD   5,000 Units at 02/13/20 0542    pregabalin (LYRICA) capsule 75 mg  75 mg Oral BID Shady Moore MD   75 mg at 02/12/20 2126     Allergies   Allergen Reactions    Adhesive Tape Other (See Comments)     Skin blisters severe skin tearing    Morphine Other (See Comments)     Severe Headache    Iron Other (See Comments)     Stomach cramps    Lisinopril      Elevated creatinine    Metformin And Related      Elevated creatinine      Pcn [Penicillins] Swelling     Injection site swelling     Principal Problem:    Acute renal failure superimposed on chronic kidney disease ([address]  Active Problems:    Hypertension, benign    Diabetes mellitus type II, controlled (HCC)    BRIANNA (obstructive sleep apnea)    Chronic pain syndrome    MAMTA (acute kidney injury) ([address]    Class 3 severe obesity due to excess calories with serious comorbidity and body mass index (BMI) of 50.0 to 59.9 in adult [facility])    Tremor  Resolved Problems:    * No resolved hospital problems. *    Blood pressure (!) 149/59, pulse 78, temperature 98.2 °F (36.8 °C), temperature source Oral, resp. rate 18, height 5' (1.524 m), weight 287 lb 0.6 oz (130.2 kg), SpO2 92 %, not currently breastfeeding. Subjective:  Symptoms:  Resolved. Diet:  Adequate intake. Activity level: Returning to normal.    Pain:  She reports no pain. Objective:  General Appearance:  Comfortable. Vital signs: (most recent): Blood pressure (!) 149/59, pulse 78, temperature 98.2 °F (36.8 °C), temperature source Oral, resp. rate 18, height 5' (1.524 m), weight 287 lb 0.6 oz (130.2 kg), SpO2 92 %, not currently breastfeeding. Vital signs are normal.    Skin:  (Ruptured pustule on right mons pubis and right knee wound with pus and serous fluid)    Assessment & Plan  Abscess mons pubis and cellulitis right knee - doxycycline as OP    Home today; f/u with me next week.      Chrystal Ch MD  2/13/2020

## 2020-02-13 NOTE — PROGRESS NOTES
Objective:   Vitals: BP (!) 116/43   Pulse 69   Temp 98.2 °F (36.8 °C)   Resp 16   Ht 5' (1.524 m)   Wt 287 lb 0.6 oz (130.2 kg)   LMP  (LMP Unknown)   SpO2 95%   BMI 56.06 kg/m²   General appearance: alert and cooperative with exam  HEENT: Head: Normocephalic, no lesions, without obvious abnormality. Neck: no adenopathy, no carotid bruit, no JVD, supple, symmetrical, trachea midline and thyroid not enlarged, symmetric, no tenderness/mass/nodules  Lungs: clear to auscultation bilaterally  Heart: regular rate and rhythm, S1, S2 normal, no murmur, click, rub or gallop  Abdomen: soft, non-tender; bowel sounds normal; no masses,  no organomegaly  Extremities: extremities normal, atraumatic, no cyanosis or edema  Neurologic: Mental status: Alert, oriented, thought content appropriate    2D ECHO ( 11/25/19)  Left ventricle is normal in size. Global left ventricular systolic function  is hyperdynamic. Estimated ejection fraction is > 65 % . Moderate left ventricular hypertrophy. No obvious wall motion abnormality seen. Aortic valve velocity is increased with a mean gradient of 14 mmHg, may be  due to hyperdynamic function. Trivial tricuspid regurgitation. Assessment / Acute Cardiac Problems:   1. BRIANNA with acute-on-chronic hypercapnic respiratory failure  2. Chronic overweight  3. Essential HTN with preserved LVEF  4. Chronic HFpEF; currently no CHF on exam  5. MAMTA with pre-renal azotemia; CKD  6. Type II DM  7.  Hyperlipidemia    Patient Active Problem List:     Hypertension, benign     Diabetes mellitus type II, controlled ([address]     Other hyperlipidemia     Degeneration of cervical intervertebral disc     Cervicalgia     Rotator cuff (capsule) sprain     Lateral epicondylitis     Radial styloid tenosynovitis     Morbid obesity with BMI of 60.0-69.9, adult (HCC)     BRIANNA (obstructive sleep apnea)     Hepatic steatosis     Lumbar radiculopathy     Chronic pain syndrome     Acute diastolic CHF

## 2020-02-13 NOTE — PROGRESS NOTES
BiPAP off. Patient resting on room air  Patient states that she is claustrophobic while laura BiPAP mask.    RT will work with patient on mask alternatives

## 2020-02-16 NOTE — DISCHARGE SUMMARY
Family Medicine Discharge Summary    Leslie Newman  :  1955  MRN:  [id number]    Admit date:  2/10/2020  Discharge date:  2020    Admitting Physician:  Arnoldo Dent MD    Discharge Diagnoses:    Patient Active Problem List   Diagnosis    Hypertension, benign    Diabetes mellitus type II, controlled [facility]    Other hyperlipidemia    Degeneration of cervical intervertebral disc    Cervicalgia    Rotator cuff (capsule) sprain    Lateral epicondylitis    Radial styloid tenosynovitis    Morbid obesity with BMI of 60.0-69.9, adult ([facility]    BRIANNA (obstructive sleep apnea)    Hepatic steatosis    Lumbar radiculopathy    Chronic pain syndrome    Acute diastolic CHF (congestive heart failure) ([facility]    MAMTA (acute kidney injury) ([facility]    H/O: GI bleed    Iron deficiency anemia    Fibromyalgia    Acute hypoxemic respiratory failure (HCC)    Hyperkalemia    Class 3 severe obesity due to excess calories with serious comorbidity and body mass index (BMI) of 50.0 to 59.9 in adult ([facility]    Tremor    Acute renal failure superimposed on chronic kidney disease [facility]       Admission Condition:  poor  Discharged Condition:  fair    Hospital Course:   59 yo admitted with fatigue and myoclonus. She was found to have acute kidney injury. She was treated with discontinuation of metformin and lisinopril and IV fluids.     Discharge Medications:       Elonda Ends   Home Medication Instructions RAJESH:[id number]    Printed on:20 8543   Medication Information                      albuterol (PROVENTIL) (2.5 MG/3ML) 0.083% nebulizer solution  Take 3 mLs by nebulization 2 times daily             amLODIPine (NORVASC) 10 MG tablet  Take 1 tablet by mouth daily             atorvastatin (LIPITOR) 40 MG tablet  TAKE 1 TABLET BY MOUTH EVERY DAY AT NIGHT             Calcium Carb-Cholecalciferol (CALCIUM 600 + D PO)  Take 1 tablet by mouth 2 times daily             carvedilol (COREG) 12.5 MG tablet  Take 1

## 2020-03-09 ENCOUNTER — HOSPITAL ENCOUNTER (OUTPATIENT)
Age: 65
Discharge: HOME | End: 2020-03-09
Payer: COMMERCIAL

## 2020-03-09 LAB
ABSOLUTE EOS #: 0.31 K/UL (ref 0–0.4)
ABSOLUTE IMMATURE GRANULOCYTE: ABNORMAL K/UL (ref 0–0.3)
ABSOLUTE LYMPH #: 2 K/UL (ref 1–4.8)
ABSOLUTE MONO #: 1.08 K/UL (ref 0.1–1.3)
ALBUMIN SERPL-MCNC: 3.6 G/DL (ref 3.5–5.2)
ALBUMIN/GLOBULIN RATIO: ABNORMAL (ref 1–2.5)
ALP BLD-CCNC: 75 U/L (ref 35–104)
ALT SERPL-CCNC: 19 U/L (ref 5–33)
ANION GAP SERPL CALCULATED.3IONS-SCNC: 13 MMOL/L (ref 9–17)
AST SERPL-CCNC: 26 U/L
BASOPHILS # BLD: 1 % (ref 0–2)
BASOPHILS ABSOLUTE: 0.15 K/UL (ref 0–0.2)
BILIRUB SERPL-MCNC: 0.3 MG/DL (ref 0.3–1.2)
BUN BLDV-MCNC: 26 MG/DL (ref 8–23)
BUN/CREAT BLD: ABNORMAL (ref 9–20)
CALCIUM SERPL-MCNC: 8.8 MG/DL (ref 8.6–10.4)
CHLORIDE BLD-SCNC: 102 MMOL/L (ref 98–107)
CO2: 30 MMOL/L (ref 20–31)
CREAT SERPL-MCNC: 1.37 MG/DL (ref 0.5–0.9)
DIFFERENTIAL TYPE: ABNORMAL
EOSINOPHILS RELATIVE PERCENT: 2 % (ref 0–4)
GFR AFRICAN AMERICAN: 47 ML/MIN
GFR NON-AFRICAN AMERICAN: 39 ML/MIN
GFR SERPL CREATININE-BSD FRML MDRD: ABNORMAL ML/MIN/{1.73_M2}
GFR SERPL CREATININE-BSD FRML MDRD: ABNORMAL ML/MIN/{1.73_M2}
GLUCOSE BLD-MCNC: 113 MG/DL (ref 70–99)
HCT VFR BLD CALC: 27.4 % (ref 36–46)
HEMOGLOBIN: 8.5 G/DL (ref 12–16)
IMMATURE GRANULOCYTES: ABNORMAL %
LYMPHOCYTES # BLD: 13 % (ref 24–44)
MAGNESIUM: 2 MG/DL (ref 1.6–2.6)
MCH RBC QN AUTO: 25.8 PG (ref 26–34)
MCHC RBC AUTO-ENTMCNC: 31.1 G/DL (ref 31–37)
MCV RBC AUTO: 83.1 FL (ref 80–100)
MONOCYTES # BLD: 7 % (ref 1–7)
MORPHOLOGY: ABNORMAL
NRBC AUTOMATED: ABNORMAL PER 100 WBC
PDW BLD-RTO: 21.8 % (ref 11.5–14.9)
PHOSPHORUS: 3.9 MG/DL (ref 2.6–4.5)
PLATELET # BLD: 448 K/UL (ref 150–450)
PLATELET ESTIMATE: ABNORMAL
PMV BLD AUTO: 8.1 FL (ref 6–12)
POTASSIUM SERPL-SCNC: 4.7 MMOL/L (ref 3.7–5.3)
RBC # BLD: 3.3 M/UL (ref 4–5.2)
RBC # BLD: ABNORMAL 10*6/UL
SEG NEUTROPHILS: 77 % (ref 36–66)
SEGMENTED NEUTROPHILS ABSOLUTE COUNT: 11.86 K/UL (ref 1.3–9.1)
SODIUM BLD-SCNC: 145 MMOL/L (ref 135–144)
TOTAL PROTEIN: 7.4 G/DL (ref 6.4–8.3)
WBC # BLD: 15.4 K/UL (ref 3.5–11)
WBC # BLD: ABNORMAL 10*3/UL

## 2020-03-09 PROCEDURE — 36415 COLL VENOUS BLD VENIPUNCTURE: CPT

## 2020-03-09 PROCEDURE — 85025 COMPLETE CBC W/AUTO DIFF WBC: CPT

## 2020-03-09 PROCEDURE — 83735 ASSAY OF MAGNESIUM: CPT

## 2020-03-09 PROCEDURE — 80053 COMPREHEN METABOLIC PANEL: CPT

## 2020-03-09 PROCEDURE — 84100 ASSAY OF PHOSPHORUS: CPT

## 2020-03-16 PROBLEM — R53.83 FATIGUE: Status: ACTIVE | Noted: 2020-03-16

## 2020-03-16 PROBLEM — N18.30 CHRONIC RENAL INSUFFICIENCY, STAGE III (MODERATE) (HCC): Status: ACTIVE | Noted: 2020-03-16

## 2020-06-10 ENCOUNTER — HOSPITAL ENCOUNTER (INPATIENT)
Age: 65
LOS: 6 days | Discharge: HOME HEALTH | DRG: 603 | End: 2020-06-16
Attending: FAMILY MEDICINE | Admitting: FAMILY MEDICINE
Payer: COMMERCIAL

## 2020-06-10 PROBLEM — L03.115 CELLULITIS OF RIGHT LEG: Status: ACTIVE | Noted: 2020-06-10

## 2020-06-10 LAB
ABSOLUTE BANDS #: 0.12 K/UL (ref 0–1)
ABSOLUTE EOS #: 0 K/UL (ref 0–0.4)
ABSOLUTE IMMATURE GRANULOCYTE: ABNORMAL K/UL (ref 0–0.3)
ABSOLUTE LYMPH #: 1.12 K/UL (ref 1–4.8)
ABSOLUTE MONO #: 1.49 K/UL (ref 0.1–1.3)
ANION GAP SERPL CALCULATED.3IONS-SCNC: 15 MMOL/L (ref 9–17)
BANDS: 1 % (ref 0–10)
BASOPHILS # BLD: 0 % (ref 0–2)
BASOPHILS ABSOLUTE: 0 K/UL (ref 0–0.2)
BUN BLDV-MCNC: 19 MG/DL (ref 8–23)
BUN/CREAT BLD: ABNORMAL (ref 9–20)
CALCIUM SERPL-MCNC: 8.9 MG/DL (ref 8.6–10.4)
CHLORIDE BLD-SCNC: 92 MMOL/L (ref 98–107)
CO2: 30 MMOL/L (ref 20–31)
CREAT SERPL-MCNC: 1.67 MG/DL (ref 0.5–0.9)
DIFFERENTIAL TYPE: ABNORMAL
EOSINOPHILS RELATIVE PERCENT: 0 % (ref 0–4)
GFR AFRICAN AMERICAN: 38 ML/MIN
GFR NON-AFRICAN AMERICAN: 31 ML/MIN
GFR SERPL CREATININE-BSD FRML MDRD: ABNORMAL ML/MIN/{1.73_M2}
GFR SERPL CREATININE-BSD FRML MDRD: ABNORMAL ML/MIN/{1.73_M2}
GLUCOSE BLD-MCNC: 149 MG/DL (ref 70–99)
HCT VFR BLD CALC: 24.5 % (ref 36–46)
HEMOGLOBIN: 7.2 G/DL (ref 12–16)
IMMATURE GRANULOCYTES: ABNORMAL %
LYMPHOCYTES # BLD: 9 % (ref 24–44)
MCH RBC QN AUTO: 21.5 PG (ref 26–34)
MCHC RBC AUTO-ENTMCNC: 29.5 G/DL (ref 31–37)
MCV RBC AUTO: 73 FL (ref 80–100)
MONOCYTES # BLD: 12 % (ref 1–7)
MORPHOLOGY: ABNORMAL
NRBC AUTOMATED: ABNORMAL PER 100 WBC
PDW BLD-RTO: 19.3 % (ref 11.5–14.9)
PLATELET # BLD: 434 K/UL (ref 150–450)
PLATELET ESTIMATE: ABNORMAL
PMV BLD AUTO: 8.4 FL (ref 6–12)
POTASSIUM SERPL-SCNC: 4.1 MMOL/L (ref 3.7–5.3)
RBC # BLD: 3.36 M/UL (ref 4–5.2)
RBC # BLD: ABNORMAL 10*6/UL
SEG NEUTROPHILS: 78 % (ref 36–66)
SEGMENTED NEUTROPHILS ABSOLUTE COUNT: 9.67 K/UL (ref 1.3–9.1)
SODIUM BLD-SCNC: 137 MMOL/L (ref 135–144)
WBC # BLD: 12.4 K/UL (ref 3.5–11)
WBC # BLD: ABNORMAL 10*3/UL

## 2020-06-10 PROCEDURE — 85025 COMPLETE CBC W/AUTO DIFF WBC: CPT

## 2020-06-10 PROCEDURE — 6360000002 HC RX W HCPCS: Performed by: FAMILY MEDICINE

## 2020-06-10 PROCEDURE — 87186 SC STD MICRODIL/AGAR DIL: CPT

## 2020-06-10 PROCEDURE — 87077 CULTURE AEROBIC IDENTIFY: CPT

## 2020-06-10 PROCEDURE — 2580000003 HC RX 258: Performed by: FAMILY MEDICINE

## 2020-06-10 PROCEDURE — 87205 SMEAR GRAM STAIN: CPT

## 2020-06-10 PROCEDURE — 36415 COLL VENOUS BLD VENIPUNCTURE: CPT

## 2020-06-10 PROCEDURE — 6370000000 HC RX 637 (ALT 250 FOR IP): Performed by: FAMILY MEDICINE

## 2020-06-10 PROCEDURE — 80048 BASIC METABOLIC PNL TOTAL CA: CPT

## 2020-06-10 PROCEDURE — 87185 SC STD ENZYME DETCJ PER NZM: CPT

## 2020-06-10 PROCEDURE — 1200000000 HC SEMI PRIVATE

## 2020-06-10 PROCEDURE — 99213 OFFICE O/P EST LOW 20 MIN: CPT

## 2020-06-10 PROCEDURE — 86403 PARTICLE AGGLUT ANTBDY SCRN: CPT

## 2020-06-10 PROCEDURE — 87070 CULTURE OTHR SPECIMN AEROBIC: CPT

## 2020-06-10 RX ORDER — ASPIRIN 81 MG/1
81 TABLET ORAL
Status: DISCONTINUED | OUTPATIENT
Start: 2020-06-11 | End: 2020-06-16 | Stop reason: HOSPADM

## 2020-06-10 RX ORDER — ACETAMINOPHEN 325 MG/1
650 TABLET ORAL EVERY 6 HOURS PRN
Status: DISCONTINUED | OUTPATIENT
Start: 2020-06-10 | End: 2020-06-16 | Stop reason: HOSPADM

## 2020-06-10 RX ORDER — ATORVASTATIN CALCIUM 40 MG/1
40 TABLET, FILM COATED ORAL NIGHTLY
Status: DISCONTINUED | OUTPATIENT
Start: 2020-06-10 | End: 2020-06-16 | Stop reason: HOSPADM

## 2020-06-10 RX ORDER — PSEUDOEPHEDRINE HCL 120 MG/1
120 TABLET, FILM COATED, EXTENDED RELEASE ORAL DAILY
Status: DISCONTINUED | OUTPATIENT
Start: 2020-06-11 | End: 2020-06-16 | Stop reason: HOSPADM

## 2020-06-10 RX ORDER — SODIUM CHLORIDE 0.9 % (FLUSH) 0.9 %
10 SYRINGE (ML) INJECTION EVERY 12 HOURS SCHEDULED
Status: DISCONTINUED | OUTPATIENT
Start: 2020-06-10 | End: 2020-06-16 | Stop reason: HOSPADM

## 2020-06-10 RX ORDER — CARVEDILOL 12.5 MG/1
12.5 TABLET ORAL 2 TIMES DAILY WITH MEALS
Status: DISCONTINUED | OUTPATIENT
Start: 2020-06-10 | End: 2020-06-16 | Stop reason: HOSPADM

## 2020-06-10 RX ORDER — VENLAFAXINE HYDROCHLORIDE 37.5 MG/1
37.5 CAPSULE, EXTENDED RELEASE ORAL NIGHTLY
Status: DISCONTINUED | OUTPATIENT
Start: 2020-06-10 | End: 2020-06-16 | Stop reason: HOSPADM

## 2020-06-10 RX ORDER — AMLODIPINE BESYLATE 10 MG/1
10 TABLET ORAL DAILY
Status: DISCONTINUED | OUTPATIENT
Start: 2020-06-11 | End: 2020-06-16 | Stop reason: HOSPADM

## 2020-06-10 RX ORDER — PROMETHAZINE HYDROCHLORIDE 25 MG/1
12.5 TABLET ORAL EVERY 6 HOURS PRN
Status: DISCONTINUED | OUTPATIENT
Start: 2020-06-10 | End: 2020-06-16 | Stop reason: HOSPADM

## 2020-06-10 RX ORDER — M-VIT,TX,IRON,MINS/CALC/FOLIC 27MG-0.4MG
1 TABLET ORAL NIGHTLY
Status: DISCONTINUED | OUTPATIENT
Start: 2020-06-10 | End: 2020-06-16 | Stop reason: HOSPADM

## 2020-06-10 RX ORDER — ACETAMINOPHEN 650 MG/1
650 SUPPOSITORY RECTAL EVERY 6 HOURS PRN
Status: DISCONTINUED | OUTPATIENT
Start: 2020-06-10 | End: 2020-06-16 | Stop reason: HOSPADM

## 2020-06-10 RX ORDER — LEVOFLOXACIN 5 MG/ML
250 INJECTION, SOLUTION INTRAVENOUS EVERY 24 HOURS
Status: DISCONTINUED | OUTPATIENT
Start: 2020-06-10 | End: 2020-06-15

## 2020-06-10 RX ORDER — OXYCODONE HYDROCHLORIDE 10 MG/1
30 TABLET ORAL EVERY 8 HOURS PRN
Status: DISCONTINUED | OUTPATIENT
Start: 2020-06-10 | End: 2020-06-16 | Stop reason: HOSPADM

## 2020-06-10 RX ORDER — PREGABALIN 150 MG/1
150 CAPSULE ORAL 2 TIMES DAILY
Status: DISCONTINUED | OUTPATIENT
Start: 2020-06-10 | End: 2020-06-16 | Stop reason: HOSPADM

## 2020-06-10 RX ORDER — SODIUM CHLORIDE 0.9 % (FLUSH) 0.9 %
10 SYRINGE (ML) INJECTION PRN
Status: DISCONTINUED | OUTPATIENT
Start: 2020-06-10 | End: 2020-06-16 | Stop reason: HOSPADM

## 2020-06-10 RX ORDER — CETIRIZINE HYDROCHLORIDE 10 MG/1
5 TABLET ORAL DAILY
Status: DISCONTINUED | OUTPATIENT
Start: 2020-06-11 | End: 2020-06-16 | Stop reason: HOSPADM

## 2020-06-10 RX ORDER — GLIMEPIRIDE 4 MG/1
8 TABLET ORAL
Status: DISCONTINUED | OUTPATIENT
Start: 2020-06-11 | End: 2020-06-16 | Stop reason: HOSPADM

## 2020-06-10 RX ORDER — ALOGLIPTIN 12.5 MG/1
12.5 TABLET, FILM COATED ORAL DAILY
Status: DISCONTINUED | OUTPATIENT
Start: 2020-06-11 | End: 2020-06-16 | Stop reason: HOSPADM

## 2020-06-10 RX ORDER — UBIDECARENONE 100 MG
200 CAPSULE ORAL NIGHTLY
Status: DISCONTINUED | OUTPATIENT
Start: 2020-06-10 | End: 2020-06-16 | Stop reason: HOSPADM

## 2020-06-10 RX ORDER — POLYETHYLENE GLYCOL 3350 17 G/17G
17 POWDER, FOR SOLUTION ORAL DAILY PRN
Status: DISCONTINUED | OUTPATIENT
Start: 2020-06-10 | End: 2020-06-16 | Stop reason: HOSPADM

## 2020-06-10 RX ORDER — ONDANSETRON 2 MG/ML
4 INJECTION INTRAMUSCULAR; INTRAVENOUS EVERY 6 HOURS PRN
Status: DISCONTINUED | OUTPATIENT
Start: 2020-06-10 | End: 2020-06-16 | Stop reason: HOSPADM

## 2020-06-10 RX ORDER — FUROSEMIDE 10 MG/ML
40 INJECTION INTRAMUSCULAR; INTRAVENOUS 2 TIMES DAILY
Status: DISCONTINUED | OUTPATIENT
Start: 2020-06-10 | End: 2020-06-15

## 2020-06-10 RX ADMIN — PREGABALIN 150 MG: 150 CAPSULE ORAL at 19:38

## 2020-06-10 RX ADMIN — VENLAFAXINE HYDROCHLORIDE 37.5 MG: 37.5 CAPSULE, EXTENDED RELEASE ORAL at 19:38

## 2020-06-10 RX ADMIN — CARVEDILOL 12.5 MG: 12.5 TABLET, FILM COATED ORAL at 16:45

## 2020-06-10 RX ADMIN — Medication 200 MG: at 19:38

## 2020-06-10 RX ADMIN — Medication 1 TABLET: at 19:38

## 2020-06-10 RX ADMIN — ENOXAPARIN SODIUM 30 MG: 30 INJECTION SUBCUTANEOUS at 19:38

## 2020-06-10 RX ADMIN — ATORVASTATIN CALCIUM 40 MG: 40 TABLET, FILM COATED ORAL at 19:37

## 2020-06-10 RX ADMIN — Medication 10 ML: at 15:03

## 2020-06-10 RX ADMIN — OXYCODONE HYDROCHLORIDE 30 MG: 10 TABLET ORAL at 16:00

## 2020-06-10 RX ADMIN — ENOXAPARIN SODIUM 30 MG: 30 INJECTION SUBCUTANEOUS at 15:02

## 2020-06-10 RX ADMIN — MULTIPLE VITAMINS W/ MINERALS TAB 1 TABLET: TAB at 19:38

## 2020-06-10 RX ADMIN — LEVOFLOXACIN 250 MG: 5 INJECTION, SOLUTION INTRAVENOUS at 15:02

## 2020-06-10 RX ADMIN — FUROSEMIDE 40 MG: 10 INJECTION, SOLUTION INTRAMUSCULAR; INTRAVENOUS at 16:45

## 2020-06-10 RX ADMIN — Medication 10 ML: at 19:38

## 2020-06-10 ASSESSMENT — PAIN SCALES - GENERAL
PAINLEVEL_OUTOF10: 7
PAINLEVEL_OUTOF10: 9

## 2020-06-10 NOTE — CONSULTS
epicondylitis    Radial styloid tenosynovitis    Morbid obesity with BMI of 60.0-69.9, adult ([facility]    BRIANNA (obstructive sleep apnea)    Hepatic steatosis    Lumbar radiculopathy    Chronic pain syndrome    Acute diastolic CHF (congestive heart failure) ([facility]    [facility] ([facility] kidney injury) [facility]    H/O: GI bleed    Iron deficiency anemia    Fibromyalgia    Acute hypoxemic respiratory failure (HCC)    Hyperkalemia    Class 3 severe obesity due to excess calories with serious comorbidity and body mass index (BMI) of 50.0 to 59.9 in adult [facility])    Tremor    Acute renal failure superimposed on chronic kidney disease (HCC)    Fatigue    Chronic renal insufficiency, stage III (moderate) ([facility])    Uncontrolled type 2 diabetes mellitus ([facility]       Measurements:  Wound 06/10/20 Pretibial Right (Active)   Wound Image   6/10/2020  1:51 PM   Wound Venous 6/10/2020  1:51 PM   Dressing Status Old drainage; Changed 6/10/2020  1:51 PM   Wound Assessment Drainage;Fluid filled blister;Red 6/10/2020  1:51 PM   Drainage Amount Moderate 6/10/2020  1:51 PM   Drainage Description Serosanguinous 6/10/2020  1:51 PM   Odor None 6/10/2020  1:51 PM   Red%Wound Bed 100 6/10/2020  1:51 PM   Number of days: 0       Wound 06/10/20 Leg Posterior;Right; Lower (Active)   Wound Image   6/10/2020  1:51 PM   Wound Venous 6/10/2020  1:51 PM   Wound Assessment Drainage;Red 6/10/2020  1:51 PM   Drainage Amount Moderate 6/10/2020  1:51 PM   Drainage Description Serosanguinous 6/10/2020  1:51 PM   Odor None 6/10/2020  1:51 PM   Red%Wound Bed 100 6/10/2020  1:51 PM   Number of days: 0       Wound 06/10/20 Leg Left;Lateral;Lower (Active)   Wound Image   6/10/2020  1:51 PM   Wound Venous 6/10/2020  1:51 PM   Wound Assessment Drainage;Red;Slough 6/10/2020  1:51 PM   Drainage Amount Moderate 6/10/2020  1:51 PM   Drainage Description Serosanguinous 6/10/2020  1:51 PM   Odor None 6/10/2020  1:51 PM   Red%Wound Bed 100 6/10/2020  1:51 PM   Number of

## 2020-06-11 PROBLEM — N18.9 ACUTE RENAL FAILURE SUPERIMPOSED ON CHRONIC KIDNEY DISEASE (HCC): Status: RESOLVED | Noted: 2020-02-10 | Resolved: 2020-06-11

## 2020-06-11 PROBLEM — I50.31 ACUTE DIASTOLIC CHF (CONGESTIVE HEART FAILURE) (HCC): Status: RESOLVED | Noted: 2019-11-21 | Resolved: 2020-06-11

## 2020-06-11 PROBLEM — J96.01 ACUTE HYPOXEMIC RESPIRATORY FAILURE (HCC): Status: RESOLVED | Noted: 2019-11-22 | Resolved: 2020-06-11

## 2020-06-11 PROBLEM — N17.9 ACUTE RENAL FAILURE SUPERIMPOSED ON CHRONIC KIDNEY DISEASE (HCC): Status: RESOLVED | Noted: 2020-02-10 | Resolved: 2020-06-11

## 2020-06-11 PROBLEM — I89.0 LYMPHEDEMA OF BOTH LOWER EXTREMITIES: Status: ACTIVE | Noted: 2020-06-11

## 2020-06-11 PROBLEM — N17.9 AKI (ACUTE KIDNEY INJURY) (HCC): Status: RESOLVED | Noted: 2019-11-22 | Resolved: 2020-06-11

## 2020-06-11 PROBLEM — E87.5 HYPERKALEMIA: Status: RESOLVED | Noted: 2020-01-05 | Resolved: 2020-06-11

## 2020-06-11 LAB
ABSOLUTE EOS #: 0.33 K/UL (ref 0–0.4)
ABSOLUTE IMMATURE GRANULOCYTE: ABNORMAL K/UL (ref 0–0.3)
ABSOLUTE LYMPH #: 1.21 K/UL (ref 1–4.8)
ABSOLUTE MONO #: 0.77 K/UL (ref 0.1–1.3)
ANION GAP SERPL CALCULATED.3IONS-SCNC: 13 MMOL/L (ref 9–17)
BASOPHILS # BLD: 1 % (ref 0–2)
BASOPHILS ABSOLUTE: 0.11 K/UL (ref 0–0.2)
BUN BLDV-MCNC: 19 MG/DL (ref 8–23)
BUN/CREAT BLD: ABNORMAL (ref 9–20)
CALCIUM SERPL-MCNC: 8.9 MG/DL (ref 8.6–10.4)
CHLORIDE BLD-SCNC: 94 MMOL/L (ref 98–107)
CO2: 32 MMOL/L (ref 20–31)
CREAT SERPL-MCNC: 1.57 MG/DL (ref 0.5–0.9)
DIFFERENTIAL TYPE: ABNORMAL
EOSINOPHILS RELATIVE PERCENT: 3 % (ref 0–4)
GFR AFRICAN AMERICAN: 40 ML/MIN
GFR NON-AFRICAN AMERICAN: 33 ML/MIN
GFR SERPL CREATININE-BSD FRML MDRD: ABNORMAL ML/MIN/{1.73_M2}
GFR SERPL CREATININE-BSD FRML MDRD: ABNORMAL ML/MIN/{1.73_M2}
GLUCOSE BLD-MCNC: 153 MG/DL (ref 65–105)
GLUCOSE BLD-MCNC: 86 MG/DL (ref 70–99)
HCT VFR BLD CALC: 23.1 % (ref 36–46)
HCT VFR BLD CALC: 25.6 % (ref 36–46)
HEMOGLOBIN: 6.9 G/DL (ref 12–16)
HEMOGLOBIN: 7.8 G/DL (ref 12–16)
IMMATURE GRANULOCYTES: ABNORMAL %
LYMPHOCYTES # BLD: 11 % (ref 24–44)
MCH RBC QN AUTO: 21.7 PG (ref 26–34)
MCHC RBC AUTO-ENTMCNC: 29.9 G/DL (ref 31–37)
MCV RBC AUTO: 72.4 FL (ref 80–100)
MONOCYTES # BLD: 7 % (ref 1–7)
MORPHOLOGY: ABNORMAL
NRBC AUTOMATED: ABNORMAL PER 100 WBC
PDW BLD-RTO: 19 % (ref 11.5–14.9)
PLATELET # BLD: 427 K/UL (ref 150–450)
PLATELET ESTIMATE: ABNORMAL
PMV BLD AUTO: 7.8 FL (ref 6–12)
POTASSIUM SERPL-SCNC: 4 MMOL/L (ref 3.7–5.3)
RBC # BLD: 3.19 M/UL (ref 4–5.2)
RBC # BLD: ABNORMAL 10*6/UL
SEG NEUTROPHILS: 78 % (ref 36–66)
SEGMENTED NEUTROPHILS ABSOLUTE COUNT: 8.58 K/UL (ref 1.3–9.1)
SODIUM BLD-SCNC: 139 MMOL/L (ref 135–144)
WBC # BLD: 11 K/UL (ref 3.5–11)
WBC # BLD: ABNORMAL 10*3/UL

## 2020-06-11 PROCEDURE — 2580000003 HC RX 258: Performed by: FAMILY MEDICINE

## 2020-06-11 PROCEDURE — P9016 RBC LEUKOCYTES REDUCED: HCPCS

## 2020-06-11 PROCEDURE — 1200000000 HC SEMI PRIVATE

## 2020-06-11 PROCEDURE — 36430 TRANSFUSION BLD/BLD COMPNT: CPT

## 2020-06-11 PROCEDURE — 6370000000 HC RX 637 (ALT 250 FOR IP): Performed by: FAMILY MEDICINE

## 2020-06-11 PROCEDURE — 86920 COMPATIBILITY TEST SPIN: CPT

## 2020-06-11 PROCEDURE — 80048 BASIC METABOLIC PNL TOTAL CA: CPT

## 2020-06-11 PROCEDURE — 85018 HEMOGLOBIN: CPT

## 2020-06-11 PROCEDURE — 85025 COMPLETE CBC W/AUTO DIFF WBC: CPT

## 2020-06-11 PROCEDURE — 99223 1ST HOSP IP/OBS HIGH 75: CPT | Performed by: FAMILY MEDICINE

## 2020-06-11 PROCEDURE — 2500000003 HC RX 250 WO HCPCS: Performed by: FAMILY MEDICINE

## 2020-06-11 PROCEDURE — 6360000002 HC RX W HCPCS: Performed by: FAMILY MEDICINE

## 2020-06-11 PROCEDURE — 86850 RBC ANTIBODY SCREEN: CPT

## 2020-06-11 PROCEDURE — 86900 BLOOD TYPING SEROLOGIC ABO: CPT

## 2020-06-11 PROCEDURE — 36415 COLL VENOUS BLD VENIPUNCTURE: CPT

## 2020-06-11 PROCEDURE — 86901 BLOOD TYPING SEROLOGIC RH(D): CPT

## 2020-06-11 PROCEDURE — 82947 ASSAY GLUCOSE BLOOD QUANT: CPT

## 2020-06-11 PROCEDURE — 85014 HEMATOCRIT: CPT

## 2020-06-11 RX ORDER — 0.9 % SODIUM CHLORIDE 0.9 %
20 INTRAVENOUS SOLUTION INTRAVENOUS ONCE
Status: COMPLETED | OUTPATIENT
Start: 2020-06-11 | End: 2020-06-11

## 2020-06-11 RX ADMIN — CARVEDILOL 12.5 MG: 12.5 TABLET, FILM COATED ORAL at 10:07

## 2020-06-11 RX ADMIN — OXYCODONE HYDROCHLORIDE 30 MG: 10 TABLET ORAL at 02:56

## 2020-06-11 RX ADMIN — LEVOFLOXACIN 250 MG: 5 INJECTION, SOLUTION INTRAVENOUS at 14:52

## 2020-06-11 RX ADMIN — ASPIRIN 81 MG: 81 TABLET, COATED ORAL at 14:52

## 2020-06-11 RX ADMIN — ATORVASTATIN CALCIUM 40 MG: 40 TABLET, FILM COATED ORAL at 19:56

## 2020-06-11 RX ADMIN — PSEUDOEPHEDRINE HCL 120 MG: 120 TABLET, FILM COATED, EXTENDED RELEASE ORAL at 14:54

## 2020-06-11 RX ADMIN — PREGABALIN 150 MG: 150 CAPSULE ORAL at 19:56

## 2020-06-11 RX ADMIN — Medication 1 TABLET: at 09:56

## 2020-06-11 RX ADMIN — AMLODIPINE BESYLATE 10 MG: 10 TABLET ORAL at 09:56

## 2020-06-11 RX ADMIN — Medication 200 MG: at 19:57

## 2020-06-11 RX ADMIN — Medication 10 ML: at 14:55

## 2020-06-11 RX ADMIN — OXYCODONE HYDROCHLORIDE 30 MG: 10 TABLET ORAL at 14:52

## 2020-06-11 RX ADMIN — Medication 1 TABLET: at 19:56

## 2020-06-11 RX ADMIN — VENLAFAXINE HYDROCHLORIDE 37.5 MG: 37.5 CAPSULE, EXTENDED RELEASE ORAL at 19:57

## 2020-06-11 RX ADMIN — ALOGLIPTIN 12.5 MG: 12.5 TABLET, FILM COATED ORAL at 14:52

## 2020-06-11 RX ADMIN — ANTI-FUNGAL POWDER MICONAZOLE NITRATE TALC FREE: 1.42 POWDER TOPICAL at 19:57

## 2020-06-11 RX ADMIN — ANTI-FUNGAL POWDER MICONAZOLE NITRATE TALC FREE: 1.42 POWDER TOPICAL at 14:55

## 2020-06-11 RX ADMIN — MULTIPLE VITAMINS W/ MINERALS TAB 1 TABLET: TAB at 19:56

## 2020-06-11 RX ADMIN — ENOXAPARIN SODIUM 30 MG: 30 INJECTION SUBCUTANEOUS at 19:56

## 2020-06-11 RX ADMIN — CARVEDILOL 12.5 MG: 12.5 TABLET, FILM COATED ORAL at 17:03

## 2020-06-11 RX ADMIN — Medication 10 ML: at 19:56

## 2020-06-11 RX ADMIN — GLIMEPIRIDE 8 MG: 4 TABLET ORAL at 08:30

## 2020-06-11 RX ADMIN — ALOGLIPTIN 12.5 MG: 12.5 TABLET, FILM COATED ORAL at 09:55

## 2020-06-11 RX ADMIN — FUROSEMIDE 40 MG: 10 INJECTION, SOLUTION INTRAMUSCULAR; INTRAVENOUS at 18:54

## 2020-06-11 RX ADMIN — FUROSEMIDE 40 MG: 10 INJECTION, SOLUTION INTRAMUSCULAR; INTRAVENOUS at 09:56

## 2020-06-11 RX ADMIN — SODIUM CHLORIDE 20 ML: 9 INJECTION, SOLUTION INTRAVENOUS at 09:57

## 2020-06-11 RX ADMIN — CETIRIZINE HYDROCHLORIDE 5 MG: 10 TABLET, FILM COATED ORAL at 09:56

## 2020-06-11 RX ADMIN — PREGABALIN 150 MG: 150 CAPSULE ORAL at 09:56

## 2020-06-11 ASSESSMENT — PAIN SCALES - GENERAL
PAINLEVEL_OUTOF10: 9
PAINLEVEL_OUTOF10: 0
PAINLEVEL_OUTOF10: 9

## 2020-06-11 NOTE — H&P
Family Medicine Admit Note    PCP: Lucy Austin MD    Date of Admission: 6/10/2020    Date of Service: Pt seen/examined on 6/11/2020 and Admitted to Inpatient     Chief Complaint:  fatigue      History Of Present Illness: The patient is a 60 y.o. female who presents to [facility] after being seen in my office with increased fatigue and edema of legs. We have been treating her edema with increased diuretics for several weeks. Past Medical History:        Diagnosis Date    Acute hypoxemic respiratory failure (HCC)     Arthritis     CHF (congestive heart failure) (HCC)     Chronic back pain     Diabetes mellitus type II, controlled ([facility] 2/14/2012    Fibromyalgia     Hyperlipidemia LDL goal < 70 2/14/2012    Hypertension, benign 02/14/2012    Morbid obesity with BMI of 60.0-69.9, adult ([facility] 8/28/2015    Pain of toe of right foot     morgans cyst    Right wrist pain     rate 8    Skin cancer     skin under lt eye,face    Sleep apnea     Bipap does not work    Wears glasses        Past Surgical History:        Procedure Laterality Date    CARPAL TUNNEL RELEASE Right 09/05/2014    CATARACT REMOVAL WITH IMPLANT Bilateral 2013    CHOLECYSTECTOMY, LAPAROSCOPIC  1998    COLONOSCOPY N/A 10/17/2019    COLONOSCOPY DIAGNOSTIC performed by Jeff Pineda MD at [facility] cyst from base of tail bone   [id number] [facility] Bilateral 01/20/2017    abcess removed    LUMBAR FUSION  10/2010; 03/2011    L4/L5    LUMBAR FUSION  2009    L4-S1    OTHER SURGICAL HISTORY  05/02/2018    Lumbar decompression laminectomy at L3-L4 bilaterally wiith complete facetomies at L3-L4 bilaterally;  diskectomy L3-L4: posterior lumbar interbody fusion; placement of [place]-type graft; local harvest of bone; microsurgical dissection.  ROTATOR CUFF REPAIR Left 2012    SKIN CANCER EXCISION  2005    Basal Cell Carcinoma, Curly size from Lt.  face

## 2020-06-11 NOTE — PROGRESS NOTES
Blood administration started. Patient monitored for reaction. No signs of distress or adverse reaction.

## 2020-06-11 NOTE — CARE COORDINATION
Writer notified, Rut Georges, from [facility], that pt would like services again. He will follow.

## 2020-06-11 NOTE — DISCHARGE INSTR - COC
wrap;Vaseline gauze;Dry dressing 6/10/2020  7:38 PM   Wound Assessment Other (Comment) 6/10/2020  7:38 PM   Drainage Amount Moderate 6/10/2020  1:51 PM   Drainage Description Serosanguinous 6/10/2020  1:51 PM   Odor None 6/10/2020  1:51 PM   Red%Wound Bed 100 6/10/2020  1:51 PM   Number of days: 0        Elimination:  Continence:   · Bowel: Yes  · Bladder: Yes  Urinary Catheter: None   Colostomy/Ileostomy/Ileal Conduit: No       Date of Last BM: 6/15/2020      Intake/Output Summary (Last 24 hours) at 6/11/2020 1212  Last data filed at 6/11/2020 1036  Gross per 24 hour   Intake 720 ml   Output 1250 ml   Net -530 ml     I/O last 3 completed shifts: In: 480 [P.O.:480]  Out: 900 [Urine:900]    Safety Concerns: At Risk for Falls    Impairments/Disabilities:      None    Nutrition Therapy:  Current Nutrition Therapy:   - Oral Diet:  General    Routes of Feeding: Oral  Liquids: No Restrictions  Daily Fluid Restriction: no  Last Modified Barium Swallow with Video (Video Swallowing Test): not done    Treatments at the Time of Hospital Discharge:   Respiratory Treatments: none  Oxygen Therapy:  is on oxygen at 2.5 L/min per nasal cannula.   Ventilator:    - No ventilator support    Rehab Therapies: Physical Therapy and Occupational Therapy  Weight Bearing Status/Restrictions: No weight bearing restirctions  Other Medical Equipment (for information only, NOT a DME order):  walker  Other Treatments: Skilled nursing assessment, medication education, ace wraps to legs      Patient's personal belongings (please select all that are sent with patient):  None    RN SIGNATURE:  Electronically signed by Renita Sexton RN on 6/16/20 at 9:40 AM EDT    CASE MANAGEMENT/SOCIAL WORK SECTION    Inpatient Status Date: 6/10/2020    Readmission Risk Assessment Score:  Readmission Risk              Risk of Unplanned Readmission:        22           Discharging to Facility/ [facility]:  [phone]  · F:

## 2020-06-12 LAB
ABSOLUTE EOS #: 0.4 K/UL (ref 0–0.4)
ABSOLUTE IMMATURE GRANULOCYTE: ABNORMAL K/UL (ref 0–0.3)
ABSOLUTE LYMPH #: 1.3 K/UL (ref 1–4.8)
ABSOLUTE MONO #: 0.6 K/UL (ref 0.1–1.3)
ANION GAP SERPL CALCULATED.3IONS-SCNC: 11 MMOL/L (ref 9–17)
BASOPHILS # BLD: 1 % (ref 0–2)
BASOPHILS ABSOLUTE: 0.1 K/UL (ref 0–0.2)
BUN BLDV-MCNC: 20 MG/DL (ref 8–23)
BUN/CREAT BLD: ABNORMAL (ref 9–20)
CALCIUM SERPL-MCNC: 9 MG/DL (ref 8.6–10.4)
CHLORIDE BLD-SCNC: 96 MMOL/L (ref 98–107)
CO2: 32 MMOL/L (ref 20–31)
CREAT SERPL-MCNC: 1.44 MG/DL (ref 0.5–0.9)
CULTURE: ABNORMAL
DATE, STOOL #1: NORMAL
DATE, STOOL #2: NORMAL
DATE, STOOL #3: NORMAL
DIFFERENTIAL TYPE: ABNORMAL
DIRECT EXAM: ABNORMAL
EOSINOPHILS RELATIVE PERCENT: 3 % (ref 0–4)
GFR AFRICAN AMERICAN: 45 ML/MIN
GFR NON-AFRICAN AMERICAN: 37 ML/MIN
GFR SERPL CREATININE-BSD FRML MDRD: ABNORMAL ML/MIN/{1.73_M2}
GFR SERPL CREATININE-BSD FRML MDRD: ABNORMAL ML/MIN/{1.73_M2}
GLUCOSE BLD-MCNC: 110 MG/DL (ref 70–99)
GLUCOSE BLD-MCNC: 165 MG/DL (ref 65–105)
HCT VFR BLD CALC: 25.4 % (ref 36–46)
HEMOCCULT SP1 STL QL: NEGATIVE
HEMOCCULT SP2 STL QL: NORMAL
HEMOCCULT SP3 STL QL: NORMAL
HEMOGLOBIN: 7.8 G/DL (ref 12–16)
IMMATURE GRANULOCYTES: ABNORMAL %
LYMPHOCYTES # BLD: 12 % (ref 24–44)
Lab: ABNORMAL
MCH RBC QN AUTO: 22.8 PG (ref 26–34)
MCHC RBC AUTO-ENTMCNC: 30.7 G/DL (ref 31–37)
MCV RBC AUTO: 74.2 FL (ref 80–100)
MONOCYTES # BLD: 6 % (ref 1–7)
NRBC AUTOMATED: ABNORMAL PER 100 WBC
PDW BLD-RTO: 19.9 % (ref 11.5–14.9)
PLATELET # BLD: 436 K/UL (ref 150–450)
PLATELET ESTIMATE: ABNORMAL
PMV BLD AUTO: 8.2 FL (ref 6–12)
POTASSIUM SERPL-SCNC: 3.8 MMOL/L (ref 3.7–5.3)
RBC # BLD: 3.43 M/UL (ref 4–5.2)
RBC # BLD: ABNORMAL 10*6/UL
SEG NEUTROPHILS: 78 % (ref 36–66)
SEGMENTED NEUTROPHILS ABSOLUTE COUNT: 9 K/UL (ref 1.3–9.1)
SODIUM BLD-SCNC: 139 MMOL/L (ref 135–144)
SPECIMEN DESCRIPTION: ABNORMAL
TIME, STOOL #1: NORMAL
TIME, STOOL #2: NORMAL
TIME, STOOL #3: NORMAL
WBC # BLD: 11.5 K/UL (ref 3.5–11)
WBC # BLD: ABNORMAL 10*3/UL

## 2020-06-12 PROCEDURE — 6360000002 HC RX W HCPCS: Performed by: FAMILY MEDICINE

## 2020-06-12 PROCEDURE — 2580000003 HC RX 258: Performed by: FAMILY MEDICINE

## 2020-06-12 PROCEDURE — 97116 GAIT TRAINING THERAPY: CPT

## 2020-06-12 PROCEDURE — 6370000000 HC RX 637 (ALT 250 FOR IP): Performed by: FAMILY MEDICINE

## 2020-06-12 PROCEDURE — 80048 BASIC METABOLIC PNL TOTAL CA: CPT

## 2020-06-12 PROCEDURE — 36415 COLL VENOUS BLD VENIPUNCTURE: CPT

## 2020-06-12 PROCEDURE — 1200000000 HC SEMI PRIVATE

## 2020-06-12 PROCEDURE — 97161 PT EVAL LOW COMPLEX 20 MIN: CPT

## 2020-06-12 PROCEDURE — G0328 FECAL BLOOD SCRN IMMUNOASSAY: HCPCS

## 2020-06-12 PROCEDURE — 85025 COMPLETE CBC W/AUTO DIFF WBC: CPT

## 2020-06-12 PROCEDURE — 82947 ASSAY GLUCOSE BLOOD QUANT: CPT

## 2020-06-12 RX ORDER — CLONAZEPAM 0.5 MG/1
0.5 TABLET ORAL 2 TIMES DAILY
Status: DISCONTINUED | OUTPATIENT
Start: 2020-06-12 | End: 2020-06-13

## 2020-06-12 RX ADMIN — ATORVASTATIN CALCIUM 40 MG: 40 TABLET, FILM COATED ORAL at 21:40

## 2020-06-12 RX ADMIN — Medication 10 ML: at 21:42

## 2020-06-12 RX ADMIN — CARVEDILOL 12.5 MG: 12.5 TABLET, FILM COATED ORAL at 17:25

## 2020-06-12 RX ADMIN — ALOGLIPTIN 12.5 MG: 12.5 TABLET, FILM COATED ORAL at 08:20

## 2020-06-12 RX ADMIN — PREGABALIN 150 MG: 150 CAPSULE ORAL at 08:21

## 2020-06-12 RX ADMIN — ENOXAPARIN SODIUM 30 MG: 30 INJECTION SUBCUTANEOUS at 21:40

## 2020-06-12 RX ADMIN — ENOXAPARIN SODIUM 30 MG: 30 INJECTION SUBCUTANEOUS at 08:21

## 2020-06-12 RX ADMIN — Medication 200 MG: at 21:43

## 2020-06-12 RX ADMIN — LEVOFLOXACIN 250 MG: 5 INJECTION, SOLUTION INTRAVENOUS at 15:21

## 2020-06-12 RX ADMIN — GLIMEPIRIDE 8 MG: 4 TABLET ORAL at 08:20

## 2020-06-12 RX ADMIN — CETIRIZINE HYDROCHLORIDE 5 MG: 10 TABLET, FILM COATED ORAL at 08:20

## 2020-06-12 RX ADMIN — FUROSEMIDE 40 MG: 10 INJECTION, SOLUTION INTRAMUSCULAR; INTRAVENOUS at 17:52

## 2020-06-12 RX ADMIN — FUROSEMIDE 40 MG: 10 INJECTION, SOLUTION INTRAMUSCULAR; INTRAVENOUS at 08:21

## 2020-06-12 RX ADMIN — AMLODIPINE BESYLATE 10 MG: 10 TABLET ORAL at 08:20

## 2020-06-12 RX ADMIN — Medication 1 TABLET: at 21:41

## 2020-06-12 RX ADMIN — CLONAZEPAM 0.5 MG: 0.5 TABLET ORAL at 21:41

## 2020-06-12 RX ADMIN — CLONAZEPAM 0.5 MG: 0.5 TABLET ORAL at 08:24

## 2020-06-12 RX ADMIN — VENLAFAXINE HYDROCHLORIDE 37.5 MG: 37.5 CAPSULE, EXTENDED RELEASE ORAL at 21:43

## 2020-06-12 RX ADMIN — Medication 1 TABLET: at 08:20

## 2020-06-12 RX ADMIN — OXYCODONE HYDROCHLORIDE 30 MG: 10 TABLET ORAL at 13:11

## 2020-06-12 RX ADMIN — MULTIPLE VITAMINS W/ MINERALS TAB 1 TABLET: TAB at 21:40

## 2020-06-12 RX ADMIN — PSEUDOEPHEDRINE HCL 120 MG: 120 TABLET, FILM COATED, EXTENDED RELEASE ORAL at 08:21

## 2020-06-12 RX ADMIN — PREGABALIN 150 MG: 150 CAPSULE ORAL at 21:40

## 2020-06-12 RX ADMIN — ANTI-FUNGAL POWDER MICONAZOLE NITRATE TALC FREE: 1.42 POWDER TOPICAL at 21:41

## 2020-06-12 RX ADMIN — ANTI-FUNGAL POWDER MICONAZOLE NITRATE TALC FREE: 1.42 POWDER TOPICAL at 08:26

## 2020-06-12 RX ADMIN — Medication 10 ML: at 09:14

## 2020-06-12 RX ADMIN — OXYCODONE HYDROCHLORIDE 30 MG: 10 TABLET ORAL at 01:40

## 2020-06-12 RX ADMIN — ASPIRIN 81 MG: 81 TABLET, COATED ORAL at 08:20

## 2020-06-12 RX ADMIN — CARVEDILOL 12.5 MG: 12.5 TABLET, FILM COATED ORAL at 08:20

## 2020-06-12 ASSESSMENT — PAIN DESCRIPTION - PROGRESSION: CLINICAL_PROGRESSION: NOT CHANGED

## 2020-06-12 ASSESSMENT — PAIN SCALES - GENERAL
PAINLEVEL_OUTOF10: 7
PAINLEVEL_OUTOF10: 10
PAINLEVEL_OUTOF10: 7
PAINLEVEL_OUTOF10: 9
PAINLEVEL_OUTOF10: 0

## 2020-06-12 ASSESSMENT — PAIN DESCRIPTION - FREQUENCY: FREQUENCY: CONTINUOUS

## 2020-06-12 ASSESSMENT — PAIN DESCRIPTION - DIRECTION: RADIATING_TOWARDS: LEGS

## 2020-06-12 ASSESSMENT — PAIN DESCRIPTION - PAIN TYPE: TYPE: CHRONIC PAIN

## 2020-06-12 ASSESSMENT — PAIN DESCRIPTION - ORIENTATION: ORIENTATION: LOWER

## 2020-06-12 ASSESSMENT — PAIN DESCRIPTION - LOCATION: LOCATION: BACK

## 2020-06-12 ASSESSMENT — PAIN DESCRIPTION - ONSET: ONSET: ON-GOING

## 2020-06-12 ASSESSMENT — PAIN DESCRIPTION - DESCRIPTORS: DESCRIPTORS: ACHING;DULL

## 2020-06-12 NOTE — PROGRESS NOTES
who presents to [address] [place] [address] after being seen at [facility] office with increased fatigue and edema of legs. We have been treating her edema with increased diuretics for several weeks  Subjective  Subjective: Pt up in a recliner chiar, reports she sleeps sarkis recliner and not in a hospital bed here. Pain Screening  Patient Currently in Pain: Yes  Pain Assessment  Pain Assessment: 0-10  Pain Level: 7  Pain Type: Chronic pain  Pain Location: Back  Pain Orientation: Lower  Pain Radiating Towards: Legs  Pain Descriptors: Aching;Dull  Pain Frequency: Continuous  Pain Onset: On-going  Clinical Progression: Not changed  Response to Pain Intervention: Asleep with RR greater than 10  Vital Signs  Patient Currently in Pain: Yes  Oxygen Therapy  O2 Device: Nasal cannula  O2 Flow Rate (L/min): 2.5 L/min       Orientation  Orientation  Overall Orientation Status: Within Functional Limits  Cognition      Objective      Transfers  Sit to Stand: Modified independent  Stand to sit: Modified independent  Bed to Chair: Modified independent  Ambulation  Ambulation?: Yes  Ambulation 1  Surface: level tile  Device: Rollator  Other Apparatus: O2(2.5 lt)  Assistance: Modified Independent  Quality of Gait: Steady, slow pace, able to manage her o2 lines well t marimar 4 WW in the room  Distance: 55 to 60 ft in the room  Comments: Pt reports she has been getting up on her own in the room to go to the bathroom. Pt verbalizes that she will get up couple times in her room , and will also perform seated legs ex's to maintain mobility/strength. Pt reports she does need skilled therapist at his time.      Balance  Posture: Good  Sitting - Static: Good  Sitting - Dynamic: Fair  Standing - Static: Good(Rollator)  Standing - Dynamic: Good(Rollator)      AROM RLE (degrees)  RLE AROM: WFL  RLE General AROM: Cellutilis/swelling with erythem noted, R > L  AROM LLE (degrees)  LLE AROM : WFL  LLE General AROM: Cellutilis/swelling with erythem noted, R > L  Strength RLE  Strength RLE: WFL  Strength LLE  Strength LLE: WFL                 G-Code     OutComes Score                                                     AM-PAC Score             Goals  Short term goals  Time Frame for Short term goals: NA  Patient Goals   Patient goals : NA    Plan    Plan  Times per week: NA  Safety Devices  Type of devices: Call light within reach, Chair alarm in place     Therapy Time   Individual Concurrent Group Co-treatment   Time In 0930         Time Out 1002         Minutes [address], PT

## 2020-06-12 NOTE — CARE COORDINATION
Writer notified, that Pt. Is to F/U in the Lymphedema Clinic. Writer spoke to [place]", from [facility]. [facility], at [phone], who is the  for the Clinics at [phone] & Their [place] Location. Talia Toscano, states, that the Lymphedema Specialist, Jacob Reed, sees pts on M/W at [facility] in [place] on T/TH. She does not work on Friday & [facility] not have a L[facility]hedema clinic, nor does [facility]. Talia Toscano, states, she needs \"An Occupational therapy order stating the need for the Lymphedema Clinic & how many times she needs to be seen\". Talia Toscano, states, Order can be faxed to  or she regularly checks Epic & can pull the order & call the pt. At home to schedule. Talia Toscano also states, the [place] location, will be closer for pt. & will be easier to get into. Also they are currently scheduling after July 7th.

## 2020-06-12 NOTE — CARE COORDINATION
ONGOING DISCHARGE PLAN:    Spoke with patient regarding discharge plan and patient confirms that plan is still to DC to home w/ . Pt. Still agreeable to VNS, Interim. Per Previous Primary notes, Pt. To F/U in Lymphedema Clinic. See notes. Pt remains on IV Lasix, 40mg, Bid & IV Levaquin. PT/OT on board. Will continue to follow for additional discharge needs.     Electronically signed by Juan Abernathy RN on 6/12/2020 at 2:18 PM

## 2020-06-13 LAB
ABO/RH: NORMAL
ABSOLUTE EOS #: 0.44 K/UL (ref 0–0.4)
ABSOLUTE IMMATURE GRANULOCYTE: ABNORMAL K/UL (ref 0–0.3)
ABSOLUTE LYMPH #: 1.43 K/UL (ref 1–4.8)
ABSOLUTE MONO #: 0.66 K/UL (ref 0.1–1.3)
ABSOLUTE RETIC #: 0.08 M/UL (ref 0.02–0.1)
ANION GAP SERPL CALCULATED.3IONS-SCNC: 11 MMOL/L (ref 9–17)
ANTIBODY SCREEN: NEGATIVE
ARM BAND NUMBER: NORMAL
BASOPHILS # BLD: 1 % (ref 0–2)
BASOPHILS ABSOLUTE: 0.11 K/UL (ref 0–0.2)
BLD PROD TYP BPU: NORMAL
BUN BLDV-MCNC: 19 MG/DL (ref 8–23)
BUN/CREAT BLD: ABNORMAL (ref 9–20)
CALCIUM SERPL-MCNC: 9 MG/DL (ref 8.6–10.4)
CHLORIDE BLD-SCNC: 94 MMOL/L (ref 98–107)
CO2: 35 MMOL/L (ref 20–31)
CREAT SERPL-MCNC: 1.21 MG/DL (ref 0.5–0.9)
CROSSMATCH RESULT: NORMAL
DIFFERENTIAL TYPE: ABNORMAL
DISPENSE STATUS BLOOD BANK: NORMAL
EOSINOPHILS RELATIVE PERCENT: 4 % (ref 0–4)
EXPIRATION DATE: NORMAL
FERRITIN: 72 UG/L (ref 13–150)
FOLATE: 18.3 NG/ML
FREE KAPPA/LAMBDA RATIO: 1.62 (ref 0.26–1.65)
GFR AFRICAN AMERICAN: 55 ML/MIN
GFR NON-AFRICAN AMERICAN: 45 ML/MIN
GFR SERPL CREATININE-BSD FRML MDRD: ABNORMAL ML/MIN/{1.73_M2}
GFR SERPL CREATININE-BSD FRML MDRD: ABNORMAL ML/MIN/{1.73_M2}
GLUCOSE BLD-MCNC: 172 MG/DL (ref 65–105)
GLUCOSE BLD-MCNC: 93 MG/DL (ref 70–99)
HAPTOGLOBIN: 359 MG/DL (ref 30–200)
HCT VFR BLD CALC: 25.9 % (ref 36–46)
HEMOGLOBIN: 7.8 G/DL (ref 12–16)
IMMATURE GRANULOCYTES: ABNORMAL %
IMMATURE RETIC FRACT: ABNORMAL %
IRON SATURATION: 7 % (ref 20–55)
IRON: 18 UG/DL (ref 37–145)
KAPPA FREE LIGHT CHAINS QNT: 10.52 MG/DL (ref 0.37–1.94)
LACTATE DEHYDROGENASE: 163 U/L (ref 135–214)
LAMBDA FREE LIGHT CHAINS QNT: 6.49 MG/DL (ref 0.57–2.63)
LYMPHOCYTES # BLD: 13 % (ref 24–44)
MAGNESIUM: 1.9 MG/DL (ref 1.6–2.6)
MCH RBC QN AUTO: 22.3 PG (ref 26–34)
MCHC RBC AUTO-ENTMCNC: 29.9 G/DL (ref 31–37)
MCV RBC AUTO: 74.7 FL (ref 80–100)
MONOCYTES # BLD: 6 % (ref 1–7)
MORPHOLOGY: ABNORMAL
NRBC AUTOMATED: ABNORMAL PER 100 WBC
PATHOLOGIST REVIEW: NORMAL
PDW BLD-RTO: 20.1 % (ref 11.5–14.9)
PLATELET # BLD: 443 K/UL (ref 150–450)
PLATELET ESTIMATE: ABNORMAL
PMV BLD AUTO: 8.5 FL (ref 6–12)
POTASSIUM SERPL-SCNC: 3.5 MMOL/L (ref 3.7–5.3)
RBC # BLD: 3.47 M/UL (ref 4–5.2)
RBC # BLD: ABNORMAL 10*6/UL
RETIC %: 2.4 % (ref 0.5–2)
RETIC HEMOGLOBIN: ABNORMAL PG (ref 28.2–35.7)
SEG NEUTROPHILS: 76 % (ref 36–66)
SEGMENTED NEUTROPHILS ABSOLUTE COUNT: 8.36 K/UL (ref 1.3–9.1)
SODIUM BLD-SCNC: 140 MMOL/L (ref 135–144)
TOTAL IRON BINDING CAPACITY: 252 UG/DL (ref 250–450)
TRANSFUSION STATUS: NORMAL
TSH SERPL DL<=0.05 MIU/L-ACNC: 1.73 MIU/L (ref 0.3–5)
UNIT DIVISION: 0
UNIT NUMBER: NORMAL
UNSATURATED IRON BINDING CAPACITY: 234 UG/DL (ref 112–347)
VITAMIN B-12: 406 PG/ML (ref 232–1245)
WBC # BLD: 11 K/UL (ref 3.5–11)
WBC # BLD: ABNORMAL 10*3/UL

## 2020-06-13 PROCEDURE — 86334 IMMUNOFIX E-PHORESIS SERUM: CPT

## 2020-06-13 PROCEDURE — 83883 ASSAY NEPHELOMETRY NOT SPEC: CPT

## 2020-06-13 PROCEDURE — 85025 COMPLETE CBC W/AUTO DIFF WBC: CPT

## 2020-06-13 PROCEDURE — 6360000002 HC RX W HCPCS: Performed by: FAMILY MEDICINE

## 2020-06-13 PROCEDURE — 82607 VITAMIN B-12: CPT

## 2020-06-13 PROCEDURE — 36415 COLL VENOUS BLD VENIPUNCTURE: CPT

## 2020-06-13 PROCEDURE — 6370000000 HC RX 637 (ALT 250 FOR IP): Performed by: FAMILY MEDICINE

## 2020-06-13 PROCEDURE — 1200000000 HC SEMI PRIVATE

## 2020-06-13 PROCEDURE — 84165 PROTEIN E-PHORESIS SERUM: CPT

## 2020-06-13 PROCEDURE — 82728 ASSAY OF FERRITIN: CPT

## 2020-06-13 PROCEDURE — 83550 IRON BINDING TEST: CPT

## 2020-06-13 PROCEDURE — 99223 1ST HOSP IP/OBS HIGH 75: CPT | Performed by: INTERNAL MEDICINE

## 2020-06-13 PROCEDURE — 83010 ASSAY OF HAPTOGLOBIN QUANT: CPT

## 2020-06-13 PROCEDURE — 82668 ASSAY OF ERYTHROPOIETIN: CPT

## 2020-06-13 PROCEDURE — 82272 OCCULT BLD FECES 1-3 TESTS: CPT

## 2020-06-13 PROCEDURE — 99232 SBSQ HOSP IP/OBS MODERATE 35: CPT | Performed by: FAMILY MEDICINE

## 2020-06-13 PROCEDURE — 2580000003 HC RX 258: Performed by: FAMILY MEDICINE

## 2020-06-13 PROCEDURE — 83735 ASSAY OF MAGNESIUM: CPT

## 2020-06-13 PROCEDURE — 84443 ASSAY THYROID STIM HORMONE: CPT

## 2020-06-13 PROCEDURE — 85045 AUTOMATED RETICULOCYTE COUNT: CPT

## 2020-06-13 PROCEDURE — 84155 ASSAY OF PROTEIN SERUM: CPT

## 2020-06-13 PROCEDURE — 83615 LACTATE (LD) (LDH) ENZYME: CPT

## 2020-06-13 PROCEDURE — 82947 ASSAY GLUCOSE BLOOD QUANT: CPT

## 2020-06-13 PROCEDURE — 83540 ASSAY OF IRON: CPT

## 2020-06-13 PROCEDURE — 80048 BASIC METABOLIC PNL TOTAL CA: CPT

## 2020-06-13 PROCEDURE — 82746 ASSAY OF FOLIC ACID SERUM: CPT

## 2020-06-13 RX ORDER — POTASSIUM CHLORIDE 7.45 MG/ML
10 INJECTION INTRAVENOUS PRN
Status: DISCONTINUED | OUTPATIENT
Start: 2020-06-13 | End: 2020-06-16 | Stop reason: HOSPADM

## 2020-06-13 RX ORDER — POTASSIUM CHLORIDE 20 MEQ/1
40 TABLET, EXTENDED RELEASE ORAL PRN
Status: DISCONTINUED | OUTPATIENT
Start: 2020-06-13 | End: 2020-06-16 | Stop reason: HOSPADM

## 2020-06-13 RX ORDER — CLONAZEPAM 0.5 MG/1
0.5 TABLET ORAL 2 TIMES DAILY PRN
Status: DISCONTINUED | OUTPATIENT
Start: 2020-06-13 | End: 2020-06-16 | Stop reason: HOSPADM

## 2020-06-13 RX ADMIN — GLIMEPIRIDE 8 MG: 4 TABLET ORAL at 09:06

## 2020-06-13 RX ADMIN — PSEUDOEPHEDRINE HCL 120 MG: 120 TABLET, FILM COATED, EXTENDED RELEASE ORAL at 09:07

## 2020-06-13 RX ADMIN — CARVEDILOL 12.5 MG: 12.5 TABLET, FILM COATED ORAL at 16:48

## 2020-06-13 RX ADMIN — OXYCODONE HYDROCHLORIDE 30 MG: 10 TABLET ORAL at 16:48

## 2020-06-13 RX ADMIN — Medication 10 ML: at 09:07

## 2020-06-13 RX ADMIN — PREGABALIN 150 MG: 150 CAPSULE ORAL at 20:40

## 2020-06-13 RX ADMIN — OXYCODONE HYDROCHLORIDE 30 MG: 10 TABLET ORAL at 03:16

## 2020-06-13 RX ADMIN — CETIRIZINE HYDROCHLORIDE 5 MG: 10 TABLET, FILM COATED ORAL at 09:06

## 2020-06-13 RX ADMIN — FUROSEMIDE 40 MG: 10 INJECTION, SOLUTION INTRAMUSCULAR; INTRAVENOUS at 18:26

## 2020-06-13 RX ADMIN — ENOXAPARIN SODIUM 30 MG: 30 INJECTION SUBCUTANEOUS at 20:40

## 2020-06-13 RX ADMIN — VENLAFAXINE HYDROCHLORIDE 37.5 MG: 37.5 CAPSULE, EXTENDED RELEASE ORAL at 20:41

## 2020-06-13 RX ADMIN — ANTI-FUNGAL POWDER MICONAZOLE NITRATE TALC FREE: 1.42 POWDER TOPICAL at 09:08

## 2020-06-13 RX ADMIN — ASPIRIN 81 MG: 81 TABLET, COATED ORAL at 09:05

## 2020-06-13 RX ADMIN — POTASSIUM CHLORIDE 40 MEQ: 1500 TABLET, EXTENDED RELEASE ORAL at 09:05

## 2020-06-13 RX ADMIN — Medication 1 TABLET: at 20:40

## 2020-06-13 RX ADMIN — Medication 10 ML: at 20:42

## 2020-06-13 RX ADMIN — PREGABALIN 150 MG: 150 CAPSULE ORAL at 09:04

## 2020-06-13 RX ADMIN — FUROSEMIDE 40 MG: 10 INJECTION, SOLUTION INTRAMUSCULAR; INTRAVENOUS at 09:06

## 2020-06-13 RX ADMIN — ALOGLIPTIN 12.5 MG: 12.5 TABLET, FILM COATED ORAL at 09:05

## 2020-06-13 RX ADMIN — ATORVASTATIN CALCIUM 40 MG: 40 TABLET, FILM COATED ORAL at 20:40

## 2020-06-13 RX ADMIN — LEVOFLOXACIN 250 MG: 5 INJECTION, SOLUTION INTRAVENOUS at 14:30

## 2020-06-13 RX ADMIN — ANTI-FUNGAL POWDER MICONAZOLE NITRATE TALC FREE: 1.42 POWDER TOPICAL at 20:42

## 2020-06-13 RX ADMIN — ENOXAPARIN SODIUM 30 MG: 30 INJECTION SUBCUTANEOUS at 09:06

## 2020-06-13 RX ADMIN — CARVEDILOL 12.5 MG: 12.5 TABLET, FILM COATED ORAL at 09:04

## 2020-06-13 RX ADMIN — MULTIPLE VITAMINS W/ MINERALS TAB 1 TABLET: TAB at 20:40

## 2020-06-13 RX ADMIN — Medication 1 TABLET: at 09:04

## 2020-06-13 RX ADMIN — AMLODIPINE BESYLATE 10 MG: 10 TABLET ORAL at 09:05

## 2020-06-13 RX ADMIN — Medication 200 MG: at 20:41

## 2020-06-13 ASSESSMENT — PAIN DESCRIPTION - DESCRIPTORS: DESCRIPTORS: CONSTANT;STABBING

## 2020-06-13 ASSESSMENT — PAIN DESCRIPTION - LOCATION: LOCATION: COCCYX

## 2020-06-13 ASSESSMENT — PAIN SCALES - GENERAL
PAINLEVEL_OUTOF10: 6
PAINLEVEL_OUTOF10: 0
PAINLEVEL_OUTOF10: 10
PAINLEVEL_OUTOF10: 9

## 2020-06-13 ASSESSMENT — PAIN DESCRIPTION - FREQUENCY: FREQUENCY: CONTINUOUS

## 2020-06-13 ASSESSMENT — PAIN DESCRIPTION - PAIN TYPE: TYPE: ACUTE PAIN

## 2020-06-13 NOTE — PROGRESS NOTES
Liz Mckeon is a 60 y.o. female patient.     Current Facility-Administered Medications   Medication Dose Route Frequency Provider Last Rate Last Dose    clonazePAM (KLONOPIN) tablet 0.5 mg  0.5 mg Oral BID PRN Kaley Piña MD        potassium chloride (KLOR-CON M) extended release tablet 40 mEq  40 mEq Oral PRN Kaley Piña MD   40 mEq at 06/13/20 7831    Or    potassium bicarb-citric acid (EFFER-K) effervescent tablet 40 mEq  40 mEq Oral PRN Kaley Piña MD        Or    potassium chloride 10 mEq/100 mL IVPB (Peripheral Line)  10 mEq Intravenous PRN Kaley Piña MD        acetaminophen (TYLENOL) tablet 650 mg  650 mg Oral Q6H PRN Kaley Piña MD        Or    acetaminophen (TYLENOL) suppository 650 mg  650 mg Rectal Q6H PRN Kaley Piña MD        enoxaparin (LOVENOX) injection 30 mg  30 mg Subcutaneous BID Kaley Piña MD   30 mg at 06/12/20 2140    polyethylene glycol (GLYCOLAX) packet 17 g  17 g Oral Daily PRN Kaley Piña MD        promethazine (PHENERGAN) tablet 12.5 mg  12.5 mg Oral Q6H PRN Kaley Piña MD        Or    [facility] injection 4 mg  4 mg Intravenous Q6H PRN Kaley Piña MD        sodium chloride flush 0.9 % injection 10 mL  10 mL Intravenous 2 times per day Kaley Piña MD   10 mL at 06/12/20 2142    sodium chloride flush 0.9 % injection 10 mL  10 mL Intravenous PRN Kaley Piañ MD        furosemide (LASIX) injection 40 mg  40 mg Intravenous BID Kaley Piña MD   40 mg at 06/12/20 1752    amLODIPine (NORVASC) tablet 10 mg  10 mg Oral Daily Kaley Piña MD   10 mg at 06/13/20 0905    aspirin EC tablet 81 mg  81 mg Oral Daily with breakfast Kaley Piña MD   81 mg at 06/13/20 0905    calcium carbonate-vitamin D (CALTRATE) 600-400 MG-UNIT per tab 1 tablet  1 tablet Oral BID Kaley Piña MD   1 tablet at 06/13/20 0904    carvedilol (COREG) tablet 12.5 mg  12.5 mg Oral BID WC Adam Elmore MD   12.5 mg at 06/13/20 3681    coenzyme Q10 capsule 200 mg  200 mg Oral Nightly Adam Elmore MD   200 mg at 06/12/20 2143    glimepiride (AMARYL) tablet 8 mg  8 mg Oral Daily with breakfast Adam Elmore MD   8 mg at 06/12/20 0820    alogliptin (NESINA) tablet 12.5 mg  12.5 mg Oral Daily Adam Elmore MD   12.5 mg at 06/13/20 4013    therapeutic multivitamin-minerals 1 tablet  1 tablet Oral Nightly Adam Elmore MD   1 tablet at 06/12/20 2140    oxyCODONE HCl (OXY-IR) immediate release tablet 30 mg  30 mg Oral Q8H PRN Adam Elmore MD   30 mg at 06/13/20 0316    pregabalin (LYRICA) capsule 150 mg  150 mg Oral BID Adam Elmore MD   150 mg at 06/13/20 0904    venlafaxine (EFFEXOR XR) extended release capsule 37.5 mg  37.5 mg Oral Nightly Adam Elmore MD   37.5 mg at 06/12/20 2143    atorvastatin (LIPITOR) tablet 40 mg  40 mg Oral Nightly Adam Elmore MD   40 mg at 06/12/20 2140    levoFLOXacin (LEVAQUIN) 250 MG/50ML infusion 250 mg  250 mg Intravenous Q24H Adam Elmore MD   Stopped at 06/12/20 1648    pseudoephedrine (SUDAFED 12 HR) extended release tablet 120 mg  120 mg Oral Daily Adam Elmore MD   120 mg at 06/12/20 0290    And    cetirizine (ZYRTEC) tablet 5 mg  5 mg Oral Daily Adam Elmore MD   5 mg at 06/12/20 0820    miconazole (MICOTIN) 2 % powder   Topical BID Adam Elmore MD         Allergies   Allergen Reactions    Adhesive Tape Other (See Comments)     Skin blisters severe skin tearing    Morphine Other (See Comments)     Severe Headache    Iron Other (See Comments)     Stomach cramps    Lisinopril      Elevated creatinine    Metformin And Related      Elevated creatinine      Pcn [Penicillins] Swelling     Injection site swelling     Principal Problem:    Cellulitis of right leg  Active Problems:    Hypertension, benign    Diabetes mellitus type II, controlled ([facility]    Other hyperlipidemia    BRIANNA (obstructive sleep apnea)    Anemia    Lumbar radiculopathy    Chronic pain syndrome    H/O: GI bleed    Fibromyalgia    Class 3 severe obesity due to excess calories with serious comorbidity and body mass index (BMI) of 50.0 to 59.9 in adult [facility])    Chronic renal insufficiency, stage III (moderate) (HCC)    Lymphedema of both lower extremities  Resolved Problems:    * No resolved hospital problems. *    Blood pressure (!) 142/60, pulse 60, temperature 97.3 °F (36.3 °C), temperature source Oral, resp. rate 16, height 5' (1.524 m), weight 299 lb (135.6 kg), SpO2 98 %, not currently breastfeeding. Subjective:  Symptoms:  Improved. Diet:  Adequate intake. Activity level: Impaired due to weakness. Pain:  She complains of pain that is moderate. Objective:  General Appearance:  Comfortable. Vital signs: (most recent): Blood pressure (!) 142/60, pulse 60, temperature 97.3 °F (36.3 °C), temperature source Oral, resp. rate 16, height 5' (1.524 m), weight 299 lb (135.6 kg), SpO2 98 %, not currently breastfeeding. Vital signs are normal.    Extremities: (Lymphedema)  Skin:  Warm and dry. (Legs less red, less drainage, less crusting)    Assessment & Plan    Anemia - stool negative for OB. Recheck CBC and consult hematology. Lymphedema and cellulitis legs - improved. Staph and klebsiella, both sensitive to quinolones.      CKD III - improved      Franki Solis MD  6/13/2020

## 2020-06-13 NOTE — CONSULTS
17 g  17 g Oral Daily PRN Bobby Merritt MD        promethazine (PHENERGAN) tablet 12.5 mg  12.5 mg Oral Q6H PRN Bobby Merritt MD        Or    [facility] 4 mg  4 mg Intravenous Q6H PRN Bobby Merritt MD        sodium chloride flush 0.9 % injection 10 mL  10 mL Intravenous 2 times per day Bobby Merritt MD   10 mL at 06/13/20 0907    sodium chloride flush 0.9 % injection 10 mL  10 mL Intravenous PRN Bobby Merritt MD        furosemide (LASIX) injection 40 mg  40 mg Intravenous BID Bobby Merritt MD   40 mg at 06/13/20 0906    amLODIPine (NORVASC) tablet 10 mg  10 mg Oral Daily Bobby Merritt MD   10 mg at 06/13/20 0905    aspirin EC tablet 81 mg  81 mg Oral Daily with breakfast Bobby Merritt MD   81 mg at 06/13/20 0905    calcium carbonate-vitamin D (CALTRATE) 600-400 MG-UNIT per tab 1 tablet  1 tablet Oral BID Bobby Merritt MD   1 tablet at 06/13/20 0904    carvedilol (COREG) tablet 12.5 mg  12.5 mg Oral BID WC Bobby Merritt MD   12.5 mg at 06/13/20 0904    coenzyme Q10 capsule 200 mg  200 mg Oral Nightly Bobby Merritt MD   200 mg at 06/12/20 2143    glimepiride (AMARYL) tablet 8 mg  8 mg Oral Daily with breakfast Bobby Merritt MD   8 mg at 06/13/20 1120    alogliptin (NESINA) tablet 12.5 mg  12.5 mg Oral Daily Bobby Merritt MD   12.5 mg at 06/13/20 4935    therapeutic multivitamin-minerals 1 tablet  1 tablet Oral Nightly Bobby Merritt MD   1 tablet at 06/12/20 2140    oxyCODONE HCl (OXY-IR) immediate release tablet 30 mg  30 mg Oral Q8H PRN Bobby Merritt MD   30 mg at 06/13/20 0316    pregabalin (LYRICA) capsule 150 mg  150 mg Oral BID Bobby Merritt MD   150 mg at 06/13/20 0904    venlafaxine (EFFEXOR XR) extended release capsule 37.5 mg  37.5 mg Oral Nightly Bobby Merritt MD   37.5 mg at 06/12/20 2145    atorvastatin (LIPITOR) tablet 40 mg  40 mg Oral Nightly Gwen Face Range    POC Glucose 165 (H) 65 - 105 mg/dL   TYPE AND SCREEN   Result Value Ref Range    Expiration Date 06/14/2020,2359     Arm Band Number [id number]     ABO/Rh O POSITIVE     Antibody Screen NEGATIVE     Unit Number [id number]     Product Code Leukocyte Reduced Red Cell     Unit Divison 00     Dispense Status TRANSFUSED     Transfusion Status OK TO TRANSFUSE     Crossmatch Result COMPATIBLE          IMAGING DATA:          IMPRESSION:   Primary Problem  Cellulitis of right leg    Active Hospital Problems    Diagnosis Date Noted    Lymphedema of both lower extremities [I89.0] 06/11/2020    Cellulitis of right leg [L03.115] 06/10/2020    Chronic renal insufficiency, stage III (moderate) (HCC) [N18.3] 03/16/2020    Class 3 severe obesity due to excess calories with serious comorbidity and body mass index (BMI) of 50.0 to 59.9 in adult ([facility] [E66.01, Z68.43] 01/06/2020    Fibromyalgia [M79.7] 11/22/2019    H/O: GI bleed [Z87.19] 11/22/2019    Chronic pain syndrome [G89.4] 10/17/2019    Lumbar radiculopathy [M54.16] 09/05/2018    BRIANNA (obstructive sleep apnea) [G47.33] 09/15/2015    Anemia [D64.9] 09/15/2015    [facility] [E11.9] 02/14/2012    Hypertension, benign [I10] 02/14/2012    Other hyperlipidemia [E78.49] 02/14/2012       Chronic anemia, hypoproliferative  History of GI bleed with peptic ulcer disease/pyloric ulcer, negative for H. pylori-10/2019  History of iron deficiency  Borderline B12 stores  CKD  Morbid obesity  Multiple comorbidities      RECOMMENDATIONS:    Personally reviewed results of lab work-up and other relevant clinical data. Reviewed patient's medical chart and procedures done including EGD and colonoscopy as discussed above. I will order work-up for anemia including peripheral smear reticulocyte count rule out hemolysis. Check TSH. Even though patient stool occult blood is negative it has low sensitivity.   Will check erythropoietin level given CKD.We will order work-up to rule out paraproteinemia. Will obtain peripheral smear. Patient microcytic anemia is suggestive of iron deficiency especially since patient has history of GI bleed. However will order work-up to rule out other etiologies which can exist concomitantly with iron deficiency. Patient is intolerant to oral iron. If patient is noted to be iron deficient I will give IV iron. Discussed with patient and Nurse. Thank you for asking us to see this patient. Lenox Brittle Shahid,MD          This note is created with the assistance of a speech recognition program.  While intending to generate a document that actually reflects the content of the visit, the document can still have some errors including those of syntax and sound a like substitutions which may escape proof reading. It such instances, actual meaning can be extrapolated by contextual diversion.

## 2020-06-14 LAB
ABSOLUTE EOS #: 0.4 K/UL (ref 0–0.4)
ABSOLUTE IMMATURE GRANULOCYTE: ABNORMAL K/UL (ref 0–0.3)
ABSOLUTE LYMPH #: 1.7 K/UL (ref 1–4.8)
ABSOLUTE MONO #: 0.7 K/UL (ref 0.1–1.3)
ANION GAP SERPL CALCULATED.3IONS-SCNC: 14 MMOL/L (ref 9–17)
BASOPHILS # BLD: 1 % (ref 0–2)
BASOPHILS ABSOLUTE: 0.1 K/UL (ref 0–0.2)
BUN BLDV-MCNC: 19 MG/DL (ref 8–23)
BUN/CREAT BLD: ABNORMAL (ref 9–20)
CALCIUM SERPL-MCNC: 9.3 MG/DL (ref 8.6–10.4)
CHLORIDE BLD-SCNC: 93 MMOL/L (ref 98–107)
CO2: 33 MMOL/L (ref 20–31)
CREAT SERPL-MCNC: 1.36 MG/DL (ref 0.5–0.9)
DATE, STOOL #1: NORMAL
DATE, STOOL #2: NORMAL
DATE, STOOL #3: NORMAL
DIFFERENTIAL TYPE: ABNORMAL
EOSINOPHILS RELATIVE PERCENT: 4 % (ref 0–4)
GFR AFRICAN AMERICAN: 48 ML/MIN
GFR NON-AFRICAN AMERICAN: 39 ML/MIN
GFR SERPL CREATININE-BSD FRML MDRD: ABNORMAL ML/MIN/{1.73_M2}
GFR SERPL CREATININE-BSD FRML MDRD: ABNORMAL ML/MIN/{1.73_M2}
GLUCOSE BLD-MCNC: 129 MG/DL (ref 65–105)
GLUCOSE BLD-MCNC: 64 MG/DL (ref 70–99)
HCT VFR BLD CALC: 26.7 % (ref 36–46)
HEMOCCULT SP1 STL QL: NEGATIVE
HEMOCCULT SP2 STL QL: NORMAL
HEMOCCULT SP3 STL QL: NORMAL
HEMOGLOBIN: 8.2 G/DL (ref 12–16)
IMMATURE GRANULOCYTES: ABNORMAL %
LYMPHOCYTES # BLD: 14 % (ref 24–44)
MCH RBC QN AUTO: 23 PG (ref 26–34)
MCHC RBC AUTO-ENTMCNC: 30.7 G/DL (ref 31–37)
MCV RBC AUTO: 75.1 FL (ref 80–100)
MONOCYTES # BLD: 6 % (ref 1–7)
NRBC AUTOMATED: ABNORMAL PER 100 WBC
PDW BLD-RTO: 20.2 % (ref 11.5–14.9)
PLATELET # BLD: 478 K/UL (ref 150–450)
PLATELET ESTIMATE: ABNORMAL
PMV BLD AUTO: 8 FL (ref 6–12)
POTASSIUM SERPL-SCNC: 3.7 MMOL/L (ref 3.7–5.3)
RBC # BLD: 3.55 M/UL (ref 4–5.2)
RBC # BLD: ABNORMAL 10*6/UL
SEG NEUTROPHILS: 75 % (ref 36–66)
SEGMENTED NEUTROPHILS ABSOLUTE COUNT: 8.9 K/UL (ref 1.3–9.1)
SODIUM BLD-SCNC: 140 MMOL/L (ref 135–144)
SPECIMEN DESCRIPTION: NORMAL
TIME, STOOL #1: NORMAL
TIME, STOOL #2: NORMAL
TIME, STOOL #3: NORMAL
WBC # BLD: 11.9 K/UL (ref 3.5–11)
WBC # BLD: ABNORMAL 10*3/UL

## 2020-06-14 PROCEDURE — 6370000000 HC RX 637 (ALT 250 FOR IP): Performed by: FAMILY MEDICINE

## 2020-06-14 PROCEDURE — 99232 SBSQ HOSP IP/OBS MODERATE 35: CPT | Performed by: INTERNAL MEDICINE

## 2020-06-14 PROCEDURE — 85025 COMPLETE CBC W/AUTO DIFF WBC: CPT

## 2020-06-14 PROCEDURE — 6360000002 HC RX W HCPCS: Performed by: INTERNAL MEDICINE

## 2020-06-14 PROCEDURE — 99232 SBSQ HOSP IP/OBS MODERATE 35: CPT | Performed by: FAMILY MEDICINE

## 2020-06-14 PROCEDURE — 82947 ASSAY GLUCOSE BLOOD QUANT: CPT

## 2020-06-14 PROCEDURE — 36415 COLL VENOUS BLD VENIPUNCTURE: CPT

## 2020-06-14 PROCEDURE — 2580000003 HC RX 258: Performed by: INTERNAL MEDICINE

## 2020-06-14 PROCEDURE — 6360000002 HC RX W HCPCS: Performed by: FAMILY MEDICINE

## 2020-06-14 PROCEDURE — 80048 BASIC METABOLIC PNL TOTAL CA: CPT

## 2020-06-14 PROCEDURE — 2580000003 HC RX 258: Performed by: FAMILY MEDICINE

## 2020-06-14 PROCEDURE — 1200000000 HC SEMI PRIVATE

## 2020-06-14 RX ADMIN — Medication 1 TABLET: at 07:30

## 2020-06-14 RX ADMIN — FUROSEMIDE 40 MG: 10 INJECTION, SOLUTION INTRAMUSCULAR; INTRAVENOUS at 17:13

## 2020-06-14 RX ADMIN — Medication 1 TABLET: at 21:02

## 2020-06-14 RX ADMIN — ENOXAPARIN SODIUM 30 MG: 30 INJECTION SUBCUTANEOUS at 07:29

## 2020-06-14 RX ADMIN — CETIRIZINE HYDROCHLORIDE 5 MG: 10 TABLET, FILM COATED ORAL at 07:29

## 2020-06-14 RX ADMIN — OXYCODONE HYDROCHLORIDE 30 MG: 10 TABLET ORAL at 01:31

## 2020-06-14 RX ADMIN — GLIMEPIRIDE 8 MG: 4 TABLET ORAL at 07:29

## 2020-06-14 RX ADMIN — CARVEDILOL 12.5 MG: 12.5 TABLET, FILM COATED ORAL at 17:13

## 2020-06-14 RX ADMIN — IRON SUCROSE 300 MG: 20 INJECTION, SOLUTION INTRAVENOUS at 12:32

## 2020-06-14 RX ADMIN — ANTI-FUNGAL POWDER MICONAZOLE NITRATE TALC FREE: 1.42 POWDER TOPICAL at 07:30

## 2020-06-14 RX ADMIN — ATORVASTATIN CALCIUM 40 MG: 40 TABLET, FILM COATED ORAL at 21:03

## 2020-06-14 RX ADMIN — OXYCODONE HYDROCHLORIDE 30 MG: 10 TABLET ORAL at 15:07

## 2020-06-14 RX ADMIN — LEVOFLOXACIN 250 MG: 5 INJECTION, SOLUTION INTRAVENOUS at 15:07

## 2020-06-14 RX ADMIN — PREGABALIN 150 MG: 150 CAPSULE ORAL at 21:04

## 2020-06-14 RX ADMIN — PREGABALIN 150 MG: 150 CAPSULE ORAL at 07:30

## 2020-06-14 RX ADMIN — CARVEDILOL 12.5 MG: 12.5 TABLET, FILM COATED ORAL at 07:30

## 2020-06-14 RX ADMIN — MULTIPLE VITAMINS W/ MINERALS TAB 1 TABLET: TAB at 21:03

## 2020-06-14 RX ADMIN — Medication 10 ML: at 07:37

## 2020-06-14 RX ADMIN — ALOGLIPTIN 12.5 MG: 12.5 TABLET, FILM COATED ORAL at 07:29

## 2020-06-14 RX ADMIN — Medication 200 MG: at 20:54

## 2020-06-14 RX ADMIN — PSEUDOEPHEDRINE HCL 120 MG: 120 TABLET, FILM COATED, EXTENDED RELEASE ORAL at 07:33

## 2020-06-14 RX ADMIN — Medication 10 ML: at 21:09

## 2020-06-14 RX ADMIN — FUROSEMIDE 40 MG: 10 INJECTION, SOLUTION INTRAMUSCULAR; INTRAVENOUS at 07:36

## 2020-06-14 RX ADMIN — ANTI-FUNGAL POWDER MICONAZOLE NITRATE TALC FREE: 1.42 POWDER TOPICAL at 21:05

## 2020-06-14 RX ADMIN — ASPIRIN 81 MG: 81 TABLET, COATED ORAL at 07:29

## 2020-06-14 RX ADMIN — ENOXAPARIN SODIUM 30 MG: 30 INJECTION SUBCUTANEOUS at 20:53

## 2020-06-14 RX ADMIN — AMLODIPINE BESYLATE 10 MG: 10 TABLET ORAL at 07:30

## 2020-06-14 RX ADMIN — VENLAFAXINE HYDROCHLORIDE 37.5 MG: 37.5 CAPSULE, EXTENDED RELEASE ORAL at 20:54

## 2020-06-14 ASSESSMENT — PAIN SCALES - GENERAL
PAINLEVEL_OUTOF10: 8
PAINLEVEL_OUTOF10: 9
PAINLEVEL_OUTOF10: 4
PAINLEVEL_OUTOF10: 0

## 2020-06-14 ASSESSMENT — PAIN DESCRIPTION - PAIN TYPE: TYPE: ACUTE PAIN

## 2020-06-14 ASSESSMENT — PAIN DESCRIPTION - FREQUENCY: FREQUENCY: CONTINUOUS

## 2020-06-14 ASSESSMENT — PAIN DESCRIPTION - DESCRIPTORS: DESCRIPTORS: ACHING;CONSTANT;THROBBING

## 2020-06-14 ASSESSMENT — PAIN DESCRIPTION - DIRECTION: RADIATING_TOWARDS: LEGS

## 2020-06-14 ASSESSMENT — PAIN DESCRIPTION - LOCATION: LOCATION: BACK;LEG

## 2020-06-14 ASSESSMENT — PAIN DESCRIPTION - ORIENTATION: ORIENTATION: RIGHT;LEFT;LOWER

## 2020-06-14 ASSESSMENT — PAIN DESCRIPTION - ONSET: ONSET: ON-GOING

## 2020-06-14 NOTE — PLAN OF CARE
Problem: Falls - Risk of:  Goal: Will remain free from falls  Description: Will remain free from falls  Outcome: Met This Shift   Pt. Free of falls and injuries this shift. Problem: Falls - Risk of:  Goal: Absence of physical injury  Description: Absence of physical injury  Outcome: Met This Shift   No injuries this shift. Patient's safety maintained.     Problem: Skin Integrity:  Goal: Demonstration of wound healing without infection will improve  Description: Demonstration of wound healing without infection will improve  Outcome: Ongoing     Problem: Skin Integrity:  Goal: Complications related to intravenous access or infusion will be avoided or minimized  Description: Complications related to intravenous access or infusion will be avoided or minimized  Outcome: Ongoing     Problem: Pain:  Goal: Pain level will decrease  Description: Pain level will decrease  Outcome: Ongoing

## 2020-06-14 NOTE — PLAN OF CARE
Problem: Falls - Risk of:  Goal: Will remain free from falls  Description: Will remain free from falls  6/14/2020 1555 by Lola Garza RN  Outcome: Ongoing     Problem: Falls - Risk of:  Goal: Absence of physical injury  Description: Absence of physical injury  6/14/2020 1555 by Lola Garza RN  Outcome: Ongoing     Problem: Skin Integrity:  Goal: Demonstration of wound healing without infection will improve  Description: Demonstration of wound healing without infection will improve  6/14/2020 1555 by Lola Garza RN  Outcome: Ongoing     Problem: Skin Integrity:  Goal: Complications related to intravenous access or infusion will be avoided or minimized  Description: Complications related to intravenous access or infusion will be avoided or minimized  6/14/2020 1555 by Lola Garza RN  Outcome: Ongoing     Problem: Pain:  Goal: Pain level will decrease  Description: Pain level will decrease  6/14/2020 1555 by Lola Garza RN  Outcome: Ongoing     Problem: Pain:  Goal: Control of acute pain  Description: Control of acute pain  Outcome: Ongoing     Problem: Pain:  Goal: Control of chronic pain  Description: Control of chronic pain  Outcome: Ongoing

## 2020-06-14 NOTE — PROGRESS NOTES
mg  12.5 mg Oral BID WC Joaquín Wilkinson MD   12.5 mg at 06/14/20 0730    coenzyme Q10 capsule 200 mg  200 mg Oral Nightly Joaquín Wilkinson MD   200 mg at 06/13/20 2041    glimepiride (AMARYL) tablet 8 mg  8 mg Oral Daily with breakfast Joaquín Wilkinson MD   8 mg at 06/14/20 0729    alogliptin (NESINA) tablet 12.5 mg  12.5 mg Oral Daily Joaquín Wilkinson MD   12.5 mg at 06/14/20 0987    therapeutic multivitamin-minerals 1 tablet  1 tablet Oral Nightly Joaquín Wilkinson MD   1 tablet at 06/13/20 2040    oxyCODONE HCl (OXY-IR) immediate release tablet 30 mg  30 mg Oral Q8H PRN Joaquín Wilkinson MD   30 mg at 06/14/20 0131    pregabalin (LYRICA) capsule 150 mg  150 mg Oral BID Joaquín Wilkinson MD   150 mg at 06/14/20 0730    venlafaxine (EFFEXOR XR) extended release capsule 37.5 mg  37.5 mg Oral Nightly Joaquín Wilkinson MD   37.5 mg at 06/13/20 2041    atorvastatin (LIPITOR) tablet 40 mg  40 mg Oral Nightly Joaquín Wilkinson MD   40 mg at 06/13/20 2040    levoFLOXacin (LEVAQUIN) 250 MG/50ML infusion 250 mg  250 mg Intravenous Q24H Joaquín Wilkinson MD   Stopped at 06/13/20 1540    pseudoephedrine (SUDAFED 12 HR) extended release tablet 120 mg  120 mg Oral Daily Joaquín Wilkinson MD   120 mg at 06/14/20 9909    And    cetirizine (ZYRTEC) tablet 5 mg  5 mg Oral Daily Joaquín Wilkinson MD   5 mg at 06/14/20 0729    miconazole (MICOTIN) 2 % powder   Topical BID Joaquín Wilkinson MD         Allergies   Allergen Reactions    Adhesive Tape Other (See Comments)     Skin blisters severe skin tearing    Morphine Other (See Comments)     Severe Headache    Iron Other (See Comments)     Stomach cramps    Lisinopril      Elevated creatinine    Metformin And Related      Elevated creatinine      Pcn [Penicillins] Swelling     Injection site swelling     Principal Problem:    Cellulitis of right leg  Active Problems:    Hypertension, benign    Diabetes mellitus type II,

## 2020-06-14 NOTE — PROGRESS NOTES
16RESISTANT     ampicillin-sulbactam Value in next row        NOT REPORTED     aztreonam Value in next row Sensitive       <=1SUSCEPTIBLE     ceFAZolin Value in next row Sensitive       <=4SUSCEPTIBLE     cefepime Value in next row        NOT REPORTED     cefTRIAXone Value in next row Sensitive       <=1SUSCEPTIBLE     ciprofloxacin Value in next row Sensitive       <=0.25SUSCEPTIBLE     ertapenem Value in next row        NOT REPORTED     Confirmatory Extended Spectrum Beta-Lactamase Value in next row Negative       NOT REPORTED     gentamicin Value in next row Sensitive       <=1SUSCEPTIBLE     meropenem Value in next row        NOT REPORTED     nitrofurantoin Value in next row        NOT REPORTED     tigecycline Value in next row        NOT REPORTED     tobramycin Value in next row Sensitive       <=1SUSCEPTIBLE     trimethoprim-sulfamethoxazole Value in next row Sensitive       <=20SUSCEPTIBLE     piperacillin-tazobactam Value in next row Sensitive       <=8OGORLIGAMVI   Basic Metabolic Panel w/ Reflex to MG   Result Value Ref Range    Glucose 149 (H) 70 - 99 mg/dL    BUN 19 8 - 23 mg/dL    CREATININE 1.67 (H) 0.50 - 0.90 mg/dL    Bun/Cre Ratio NOT REPORTED 9 - 20    Calcium 8.9 8.6 - 10.4 mg/dL    Sodium 137 135 - 144 mmol/L    Potassium 4.1 3.7 - 5.3 mmol/L    Chloride 92 (L) 98 - 107 mmol/L    CO2 30 20 - 31 mmol/L    Anion Gap 15 9 - 17 mmol/L    GFR Non-African American 31 (L) >60 mL/min    GFR  38 (L) >60 mL/min    GFR Comment          GFR Staging NOT REPORTED    CBC auto differential   Result Value Ref Range    WBC 12.4 (H) 3.5 - 11.0 k/uL    RBC 3.36 (L) 4.0 - 5.2 m/uL    Hemoglobin 7.2 (L) 12.0 - 16.0 g/dL    Hematocrit 24.5 (L) 36 - 46 %    MCV 73.0 (L) 80 - 100 fL    MCH 21.5 (L) 26 - 34 pg    MCHC 29.5 (L) 31 - 37 g/dL    RDW 19.3 (H) 11.5 - 14.9 %    Platelets 376 892 - 375 k/uL    MPV 8.4 6.0 - 12.0 fL    NRBC Automated NOT REPORTED per 100 WBC    Differential Type NOT REPORTED Immature Granulocytes NOT REPORTED 0 %    Absolute Immature Granulocyte NOT REPORTED 0.00 - 0.30 k/uL    WBC Morphology NOT REPORTED     RBC Morphology NOT REPORTED     Platelet Estimate NOT REPORTED     Seg Neutrophils 78 (H) 36 - 66 %    Lymphocytes 9 (L) 24 - 44 %    Monocytes 12 (H) 1 - 7 %    Eosinophils % 0 0 - 4 %    Basophils 0 0 - 2 %    Bands 1 0 - 10 %    Segs Absolute 9.67 (H) 1.3 - 9.1 k/uL    Absolute Lymph # 1.12 1.0 - 4.8 k/uL    Absolute Mono # 1.49 (H) 0.1 - 1.3 k/uL    Absolute Eos # 0.00 0.0 - 0.4 k/uL    Basophils Absolute 0.00 0.0 - 0.2 k/uL    Absolute Bands # 0.12 0.0 - 1.0 k/uL    Morphology ANISOCYTOSIS PRESENT     Morphology 1+ POLYCHROMASIA     Morphology 1+ ELLIPTOCYTES    Basic Metabolic Panel w/ Reflex to MG   Result Value Ref Range    Glucose 86 70 - 99 mg/dL    BUN 19 8 - 23 mg/dL    CREATININE 1.57 (H) 0.50 - 0.90 mg/dL    Bun/Cre Ratio NOT REPORTED 9 - 20    Calcium 8.9 8.6 - 10.4 mg/dL    Sodium 139 135 - 144 mmol/L    Potassium 4.0 3.7 - 5.3 mmol/L    Chloride 94 (L) 98 - 107 mmol/L    CO2 32 (H) 20 - 31 mmol/L    Anion Gap 13 9 - 17 mmol/L    GFR Non-African American 33 (L) >60 mL/min    GFR African American 40 (L) >60 mL/min    GFR Comment          GFR Staging NOT REPORTED    CBC auto differential   Result Value Ref Range    WBC 11.0 3.5 - 11.0 k/uL    RBC 3.19 (L) 4.0 - 5.2 m/uL    Hemoglobin 6.9 (LL) 12.0 - 16.0 g/dL    Hematocrit 23.1 (L) 36 - 46 %    MCV 72.4 (L) 80 - 100 fL    MCH 21.7 (L) 26 - 34 pg    MCHC 29.9 (L) 31 - 37 g/dL    RDW 19.0 (H) 11.5 - 14.9 %    Platelets 394 055 - 667 k/uL    MPV 7.8 6.0 - 12.0 fL    NRBC Automated NOT REPORTED per 100 WBC    Differential Type NOT REPORTED     Immature Granulocytes NOT REPORTED 0 %    Absolute Immature Granulocyte NOT REPORTED 0.00 - 0.30 k/uL    WBC Morphology NOT REPORTED     RBC Morphology NOT REPORTED     Platelet Estimate NOT REPORTED     Seg Neutrophils 78 (H) 36 - 66 %    Lymphocytes 11 (L) 24 - 44 % 0.0 - 0.2 k/uL    Morphology ANISOCYTOSIS PRESENT     Morphology MICROCYTOSIS PRESENT     Morphology SLT POLYCHROMASIA     Morphology HYPOCHROMIA PRESENT     Morphology FEW TARGET CELLS    PERIPHERAL BLOOD SMEAR, PATH REVIEW   Result Value Ref Range    Pathologist Review TO BE REVIEWED BY PATHOLOGIST    RETICULOCYTES   Result Value Ref Range    Retic % 2.4 (H) 0.5 - 2.0 %    Absolute Retic # 0.081 0.0245 - 0.098 M/uL    Immature Retic Fract NOT REPORTED %    Retic Hemoglobin NOT REPORTED 28.2 - 35.7 pg   Lactate Dehydrogenase   Result Value Ref Range     135 - 214 U/L   Haptoglobin   Result Value Ref Range    Haptoglobin 359 (H) 30 - 200 mg/dL   FERRITIN   Result Value Ref Range    Ferritin 72 13 - 150 ug/L   IRON AND TIBC   Result Value Ref Range    Iron 18 (L) 37 - 145 ug/dL    TIBC 252 250 - 450 ug/dL    Iron Saturation 7 (L) 20 - 55 %    UIBC 234 112 - 347 ug/dL   Vitamin B12 & Folate   Result Value Ref Range    Vitamin B-12 406 232 - 1245 pg/mL    Folate 18.3 >4.8 ng/mL   TSH WITH REFLEX   Result Value Ref Range    TSH 1.73 0.30 - 5.00 mIU/L   Electrophoresis Protein, Serum without Reflex to Immunofixation   Result Value Ref Range    Total Protein 6.7 6.4 - 8.3 g/dL    Albumin (calculated) PENDING g/dL    Albumin % PENDING %    Alpha-1-Globulin PENDING g/dL    Alpha 1 % PENDING %    Alpha-2-Globulin PENDING g/dL    Alpha 2 % PENDING %    Beta Globulin PENDING g/dL    Beta Percent PENDING %    Gamma Globulin PENDING g/dL    Gamma Globulin % PENDING %    Total Prot. Sum PENDING g/dL    Total Prot.  Sum,% PENDING %    Protein Electrophoresis, Serum PENDING     Pathologist PENDING    KAPPA/LAMBDA QUANT FREE LIGHT CHAINS SERUM   Result Value Ref Range    Kappa Free Light Chains QNT 10.52 (H) 0.37 - 1.94 mg/dL    Lambda Free Light Chains QNT 6.49 (H) 0.57 - 2.63 mg/dL    Free Kappa/Lambda Ratio 1.62 0.26 - 4.45   Basic Metabolic Panel w/ Reflex to MG   Result Value Ref Range    Glucose 64 (L) 70 - 99 mg/dL Product Code Leukocyte Reduced Red Cell     Unit Divison 00     Dispense Status TRANSFUSED     Transfusion Status OK TO TRANSFUSE     Crossmatch Result COMPATIBLE          IMAGING DATA:    Imaging studies reviewed      IMPRESSION:   Primary Problem  Cellulitis of right leg    Active Hospital Problems    Diagnosis Date Noted    Lymphedema of both lower extremities [I89.0] 06/11/2020    Cellulitis of right leg [L03.115] 06/10/2020    Chronic renal insufficiency, stage III (moderate) (HCC) [N18.3] 03/16/2020    Class 3 severe obesity due to excess calories with serious comorbidity and body mass index (BMI) of 50.0 to 59.9 in adult ([facility] [E66.01, Z68.43] 01/06/2020    Fibromyalgia [M79.7] 11/22/2019    H/O: GI bleed [Z87.19] 11/22/2019    Chronic pain syndrome [G89.4] 10/17/2019    Lumbar radiculopathy [M54.16] 09/05/2018    BRIANNA (obstructive sleep apnea) [G47.33] 09/15/2015    Anemia [D64.9] 09/15/2015    Diabetes [facility] [E11.9] 02/14/2012    Hypertension, benign [I10] 02/14/2012    Other hyperlipidemia [E78.49] 02/14/2012       Chronic anemia, hypoproliferative  History of GI bleed with peptic ulcer disease/pyloric ulcer, negative for H. pylori-10/2019  History of iron deficiency  Borderline B12 stores  CKD  Morbid obesity  Multiple comorbidities      RECOMMENDATIONS:    Personally reviewed results of lab work-up and other relevant clinical data. Reviewed patient's medical chart and procedures done including EGD and colonoscopy as discussed above. Iron studies suggest iron deficiency. We will start patient on IV iron. Patient has been intolerant to oral iron in the past.    Free light chain ratio is within normal range. Immunofixation studies are pending. Hemolysis work-up is negative. Work-up suggest hypoproliferative anemia. We will follow-up on erythropoietin level. Follow-up on stool occult blood testing.    Follow-up on peripheral smear    I believe iron deficiency is also contributing to thrombocytosis. Patient will need further work-up of iron deficiency which can be done as an outpatient per patient's GI team    I will set up outpatient follow-up appointment at our [place] office in a month's time to document improvement in hemoglobin after IV iron and also to assess for any further testing including bone marrow biopsy if indicated    Discussed with patient and Nurse. Thank you for asking us to see this patient. Rocio Allison MD          This note is created with the assistance of a speech recognition program.  While intending to generate a document that actually reflects the content of the visit, the document can still have some errors including those of syntax and sound a like substitutions which may escape proof reading. It such instances, actual meaning can be extrapolated by contextual diversion.

## 2020-06-15 LAB
ABSOLUTE EOS #: 0.39 K/UL (ref 0–0.4)
ABSOLUTE IMMATURE GRANULOCYTE: ABNORMAL K/UL (ref 0–0.3)
ABSOLUTE LYMPH #: 1.31 K/UL (ref 1–4.8)
ABSOLUTE MONO #: 0.79 K/UL (ref 0.1–1.3)
ALBUMIN (CALCULATED): 3 G/DL (ref 3.2–5.2)
ALBUMIN PERCENT: 44 % (ref 45–65)
ALPHA 1 PERCENT: 4 % (ref 3–6)
ALPHA 2 PERCENT: 14 % (ref 6–13)
ALPHA-1-GLOBULIN: 0.2 G/DL (ref 0.1–0.4)
ALPHA-2-GLOBULIN: 0.9 G/DL (ref 0.5–0.9)
ANION GAP SERPL CALCULATED.3IONS-SCNC: 12 MMOL/L (ref 9–17)
BASOPHILS # BLD: 1 % (ref 0–2)
BASOPHILS ABSOLUTE: 0.13 K/UL (ref 0–0.2)
BETA GLOBULIN: 1 G/DL (ref 0.5–1.1)
BETA PERCENT: 15 % (ref 11–19)
BUN BLDV-MCNC: 20 MG/DL (ref 8–23)
BUN/CREAT BLD: ABNORMAL (ref 9–20)
CALCIUM SERPL-MCNC: 9.1 MG/DL (ref 8.6–10.4)
CHLORIDE BLD-SCNC: 97 MMOL/L (ref 98–107)
CO2: 34 MMOL/L (ref 20–31)
CREAT SERPL-MCNC: 1.19 MG/DL (ref 0.5–0.9)
DIFFERENTIAL TYPE: ABNORMAL
EOSINOPHILS RELATIVE PERCENT: 3 % (ref 0–4)
GAMMA GLOBULIN %: 24 % (ref 9–20)
GAMMA GLOBULIN: 1.6 G/DL (ref 0.5–1.5)
GFR AFRICAN AMERICAN: 56 ML/MIN
GFR NON-AFRICAN AMERICAN: 46 ML/MIN
GFR SERPL CREATININE-BSD FRML MDRD: ABNORMAL ML/MIN/{1.73_M2}
GFR SERPL CREATININE-BSD FRML MDRD: ABNORMAL ML/MIN/{1.73_M2}
GLUCOSE BLD-MCNC: 96 MG/DL (ref 70–99)
HCT VFR BLD CALC: 26.1 % (ref 36–46)
HEMOGLOBIN: 8 G/DL (ref 12–16)
IMMATURE GRANULOCYTES: ABNORMAL %
LYMPHOCYTES # BLD: 10 % (ref 24–44)
MCH RBC QN AUTO: 22.9 PG (ref 26–34)
MCHC RBC AUTO-ENTMCNC: 30.5 G/DL (ref 31–37)
MCV RBC AUTO: 75.2 FL (ref 80–100)
MONOCYTES # BLD: 6 % (ref 1–7)
MORPHOLOGY: ABNORMAL
NRBC AUTOMATED: ABNORMAL PER 100 WBC
PATHOLOGIST: ABNORMAL
PATHOLOGIST: NORMAL
PDW BLD-RTO: 20.4 % (ref 11.5–14.9)
PLATELET # BLD: 440 K/UL (ref 150–450)
PLATELET ESTIMATE: ABNORMAL
PMV BLD AUTO: 7.9 FL (ref 6–12)
POTASSIUM SERPL-SCNC: 4.7 MMOL/L (ref 3.7–5.3)
PROTEIN ELECTROPHORESIS, SERUM: ABNORMAL
RBC # BLD: 3.47 M/UL (ref 4–5.2)
RBC # BLD: ABNORMAL 10*6/UL
SEG NEUTROPHILS: 80 % (ref 36–66)
SEGMENTED NEUTROPHILS ABSOLUTE COUNT: 10.48 K/UL (ref 1.3–9.1)
SERUM IFX INTERP: NORMAL
SODIUM BLD-SCNC: 143 MMOL/L (ref 135–144)
TOTAL PROT. SUM,%: 101 % (ref 98–102)
TOTAL PROT. SUM: 6.7 G/DL (ref 6.3–8.2)
TOTAL PROTEIN: 6.7 G/DL (ref 6.4–8.3)
WBC # BLD: 13.1 K/UL (ref 3.5–11)
WBC # BLD: ABNORMAL 10*3/UL

## 2020-06-15 PROCEDURE — 85025 COMPLETE CBC W/AUTO DIFF WBC: CPT

## 2020-06-15 PROCEDURE — 80048 BASIC METABOLIC PNL TOTAL CA: CPT

## 2020-06-15 PROCEDURE — 6370000000 HC RX 637 (ALT 250 FOR IP): Performed by: FAMILY MEDICINE

## 2020-06-15 PROCEDURE — 1200000000 HC SEMI PRIVATE

## 2020-06-15 PROCEDURE — 2580000003 HC RX 258: Performed by: FAMILY MEDICINE

## 2020-06-15 PROCEDURE — 36415 COLL VENOUS BLD VENIPUNCTURE: CPT

## 2020-06-15 PROCEDURE — 2580000003 HC RX 258: Performed by: INTERNAL MEDICINE

## 2020-06-15 PROCEDURE — 99212 OFFICE O/P EST SF 10 MIN: CPT

## 2020-06-15 PROCEDURE — 6360000002 HC RX W HCPCS: Performed by: FAMILY MEDICINE

## 2020-06-15 PROCEDURE — 99232 SBSQ HOSP IP/OBS MODERATE 35: CPT | Performed by: INTERNAL MEDICINE

## 2020-06-15 PROCEDURE — 99232 SBSQ HOSP IP/OBS MODERATE 35: CPT | Performed by: FAMILY MEDICINE

## 2020-06-15 PROCEDURE — 6360000002 HC RX W HCPCS: Performed by: INTERNAL MEDICINE

## 2020-06-15 RX ORDER — FUROSEMIDE 40 MG/1
40 TABLET ORAL 2 TIMES DAILY
Status: DISCONTINUED | OUTPATIENT
Start: 2020-06-15 | End: 2020-06-16 | Stop reason: HOSPADM

## 2020-06-15 RX ORDER — LEVOFLOXACIN 500 MG/1
500 TABLET, FILM COATED ORAL DAILY
Status: DISCONTINUED | OUTPATIENT
Start: 2020-06-15 | End: 2020-06-16 | Stop reason: HOSPADM

## 2020-06-15 RX ORDER — SULFAMETHOXAZOLE AND TRIMETHOPRIM 800; 160 MG/1; MG/1
1 TABLET ORAL EVERY 12 HOURS SCHEDULED
Status: DISCONTINUED | OUTPATIENT
Start: 2020-06-15 | End: 2020-06-16 | Stop reason: HOSPADM

## 2020-06-15 RX ADMIN — Medication 10 ML: at 20:00

## 2020-06-15 RX ADMIN — MULTIPLE VITAMINS W/ MINERALS TAB 1 TABLET: TAB at 19:59

## 2020-06-15 RX ADMIN — IRON SUCROSE 300 MG: 20 INJECTION, SOLUTION INTRAVENOUS at 13:30

## 2020-06-15 RX ADMIN — CARVEDILOL 12.5 MG: 12.5 TABLET, FILM COATED ORAL at 17:15

## 2020-06-15 RX ADMIN — SULFAMETHOXAZOLE AND TRIMETHOPRIM 1 TABLET: 800; 160 TABLET ORAL at 10:29

## 2020-06-15 RX ADMIN — CETIRIZINE HYDROCHLORIDE 5 MG: 10 TABLET, FILM COATED ORAL at 07:50

## 2020-06-15 RX ADMIN — ENOXAPARIN SODIUM 30 MG: 30 INJECTION SUBCUTANEOUS at 19:59

## 2020-06-15 RX ADMIN — OXYCODONE HYDROCHLORIDE 30 MG: 10 TABLET ORAL at 23:26

## 2020-06-15 RX ADMIN — GLIMEPIRIDE 8 MG: 4 TABLET ORAL at 07:48

## 2020-06-15 RX ADMIN — Medication 200 MG: at 20:01

## 2020-06-15 RX ADMIN — PREGABALIN 150 MG: 150 CAPSULE ORAL at 19:58

## 2020-06-15 RX ADMIN — FUROSEMIDE 40 MG: 40 TABLET ORAL at 17:15

## 2020-06-15 RX ADMIN — ENOXAPARIN SODIUM 30 MG: 30 INJECTION SUBCUTANEOUS at 07:48

## 2020-06-15 RX ADMIN — FUROSEMIDE 40 MG: 10 INJECTION, SOLUTION INTRAMUSCULAR; INTRAVENOUS at 07:48

## 2020-06-15 RX ADMIN — CARVEDILOL 12.5 MG: 12.5 TABLET, FILM COATED ORAL at 07:50

## 2020-06-15 RX ADMIN — ALOGLIPTIN 12.5 MG: 12.5 TABLET, FILM COATED ORAL at 07:50

## 2020-06-15 RX ADMIN — SULFAMETHOXAZOLE AND TRIMETHOPRIM 1 TABLET: 800; 160 TABLET ORAL at 19:59

## 2020-06-15 RX ADMIN — LEVOFLOXACIN 500 MG: 500 TABLET, FILM COATED ORAL at 14:47

## 2020-06-15 RX ADMIN — Medication 10 ML: at 07:54

## 2020-06-15 RX ADMIN — Medication 1 TABLET: at 19:59

## 2020-06-15 RX ADMIN — OXYCODONE HYDROCHLORIDE 30 MG: 10 TABLET ORAL at 14:47

## 2020-06-15 RX ADMIN — ATORVASTATIN CALCIUM 40 MG: 40 TABLET, FILM COATED ORAL at 19:58

## 2020-06-15 RX ADMIN — VENLAFAXINE HYDROCHLORIDE 37.5 MG: 37.5 CAPSULE, EXTENDED RELEASE ORAL at 20:02

## 2020-06-15 RX ADMIN — Medication 1 TABLET: at 07:50

## 2020-06-15 RX ADMIN — PREGABALIN 150 MG: 150 CAPSULE ORAL at 07:50

## 2020-06-15 RX ADMIN — ANTI-FUNGAL POWDER MICONAZOLE NITRATE TALC FREE: 1.42 POWDER TOPICAL at 07:54

## 2020-06-15 RX ADMIN — ASPIRIN 81 MG: 81 TABLET, COATED ORAL at 07:50

## 2020-06-15 RX ADMIN — ANTI-FUNGAL POWDER MICONAZOLE NITRATE TALC FREE: 1.42 POWDER TOPICAL at 20:00

## 2020-06-15 RX ADMIN — MAGNESIUM HYDROXIDE 30 ML: 400 SUSPENSION ORAL at 08:51

## 2020-06-15 RX ADMIN — PSEUDOEPHEDRINE HCL 120 MG: 120 TABLET, FILM COATED, EXTENDED RELEASE ORAL at 07:54

## 2020-06-15 RX ADMIN — AMLODIPINE BESYLATE 10 MG: 10 TABLET ORAL at 07:50

## 2020-06-15 RX ADMIN — OXYCODONE HYDROCHLORIDE 30 MG: 10 TABLET ORAL at 04:15

## 2020-06-15 ASSESSMENT — PAIN SCALES - GENERAL
PAINLEVEL_OUTOF10: 8
PAINLEVEL_OUTOF10: 8
PAINLEVEL_OUTOF10: 7
PAINLEVEL_OUTOF10: 5
PAINLEVEL_OUTOF10: 5
PAINLEVEL_OUTOF10: 8

## 2020-06-15 NOTE — PROGRESS NOTES
peripheral pulses normal, no pedal edema, no clubbing or cyanosis   Skin - normal coloration and turgor, no rashes, no suspicious skin lesions noted ,      DATA:      Labs:     Results for orders placed or performed during the hospital encounter of 06/10/20   Culture, Wound   Result Value Ref Range    Specimen Description . LEG     Special Requests NOT REPORTED     Direct Exam (A)      This specimen contains greater than 9 Squamous Epithelial cells per LPF which is an indicator of a low quality specimen. Direct Exam NO NEUTROPHILS SEEN     Direct Exam FEW GRAM NEGATIVE RODS (A)     Direct Exam FEW GRAM POSITIVE COCCI IN PAIRS (A)     Culture (A)      STAPHYLOCOCCUS AUREUS HEAVY GROWTH This isolate is methicillin susceptible.     Culture KLEBSIELLA OXYTOCA LIGHT GROWTH (A)     Culture (A)      MORAXELLA CATARRHALIS BETA LACTAMASE POSITIVE MODERATE GROWTH       Susceptibility    Staphylococcus aureus - BACTERIAL SUSCEPTIBILITY PANEL CARMEN     penicillin Value in next row Resistant       0.25RESISTANT     cefoxitin screen Value in next row        0.25RESISTANT     ciprofloxacin Value in next row        NOT REPORTED     clindamycin Value in next row Sensitive       <=0.25SUSCEPTIBLE     erythromycin Value in next row Sensitive       <=0.25SUSCEPTIBLE     gentamicin Value in next row Sensitive       <=0.5SUSCEPTIBLE     gentamicin Value in next row Sensitive       <=0.5SUSCEPTIBLE     Induced Clind Resist Value in next row        <=0.5SUSCEPTIBLE     levofloxacin Value in next row Sensitive       0.25SUSCEPTIBLE     linezolid Value in next row        NOT REPORTED     moxifloxacin Value in next row        NOT REPORTED     nitrofurantoin Value in next row        NOT REPORTED     oxacillin Value in next row Sensitive       <=0.25SUSCEPTIBLE     Synercid Value in next row        NOT REPORTED     rifampin Value in next row        NOT REPORTED     tetracycline Value in next row Sensitive       <=1SUSCEPTIBLE     tigecycline per 100 WBC    Differential Type NOT REPORTED     Immature Granulocytes NOT REPORTED 0 %    Absolute Immature Granulocyte NOT REPORTED 0.00 - 0.30 k/uL    WBC Morphology NOT REPORTED     RBC Morphology NOT REPORTED     Platelet Estimate NOT REPORTED     Seg Neutrophils 78 (H) 36 - 66 %    Lymphocytes 11 (L) 24 - 44 %    Monocytes 7 1 - 7 %    Eosinophils % 3 0 - 4 %    Basophils 1 0 - 2 %    Segs Absolute 8.58 1.3 - 9.1 k/uL    Absolute Lymph # 1.21 1.0 - 4.8 k/uL    Absolute Mono # 0.77 0.1 - 1.3 k/uL    Absolute Eos # 0.33 0.0 - 0.4 k/uL    Basophils Absolute 0.11 0.0 - 0.2 k/uL    Morphology HYPOCHROMIA PRESENT     Morphology MICROCYTOSIS PRESENT     Morphology ANISOCYTOSIS PRESENT    Occ Bld, Fecal Scrn   Result Value Ref Range    Occult Blood, Stool #1 NEGATIVE NEGATIVE    Date, Stool #1 61,220     Time, Stool #1 UNKNOWN     Occult Blood, Stool #2 NOT REPORTED NEGATIVE    Date, Stool #2 NOT REPORTED     Time, Stool #2 NOT REPORTED     Occult Blood, Stool #3 NOT REPORTED NEGATIVE    Date, Stool #3 NOT REPORTED     Time, Stool #3 NOT REPORTED    Hgb/Hct   Result Value Ref Range    Hemoglobin 7.8 (L) 12.0 - 16.0 g/dL    Hematocrit 25.6 (L) 36 - 46 %   Basic Metabolic Panel w/ Reflex to MG   Result Value Ref Range    Glucose 110 (H) 70 - 99 mg/dL    BUN 20 8 - 23 mg/dL    CREATININE 1.44 (H) 0.50 - 0.90 mg/dL    Bun/Cre Ratio NOT REPORTED 9 - 20    Calcium 9.0 8.6 - 10.4 mg/dL    Sodium 139 135 - 144 mmol/L    Potassium 3.8 3.7 - 5.3 mmol/L    Chloride 96 (L) 98 - 107 mmol/L    CO2 32 (H) 20 - 31 mmol/L    Anion Gap 11 9 - 17 mmol/L    GFR Non-African American 37 (L) >60 mL/min    GFR  45 (L) >60 mL/min    GFR Comment          GFR Staging NOT REPORTED    CBC auto differential   Result Value Ref Range    WBC 11.5 (H) 3.5 - 11.0 k/uL    RBC 3.43 (L) 4.0 - 5.2 m/uL    Hemoglobin 7.8 (L) 12.0 - 16.0 g/dL    Hematocrit 25.4 (L) 36 - 46 %    MCV 74.2 (L) 80 - 100 fL    MCH 22.8 (L) 26 - 34 pg    MCHC 30.7 (L) 31 - 37 g/dL    RDW 19.9 (H) 11.5 - 14.9 %    Platelets 880 729 - 931 k/uL    MPV 8.2 6.0 - 12.0 fL    NRBC Automated NOT REPORTED per 100 WBC    Differential Type NOT REPORTED     Immature Granulocytes NOT REPORTED 0 %    Absolute Immature Granulocyte NOT REPORTED 0.00 - 0.30 k/uL    WBC Morphology NOT REPORTED     RBC Morphology NOT REPORTED     Platelet Estimate NOT REPORTED     Seg Neutrophils 78 (H) 36 - 66 %    Lymphocytes 12 (L) 24 - 44 %    Monocytes 6 1 - 7 %    Eosinophils % 3 0 - 4 %    Basophils 1 0 - 2 %    Segs Absolute 9.00 1.3 - 9.1 k/uL    Absolute Lymph # 1.30 1.0 - 4.8 k/uL    Absolute Mono # 0.60 0.1 - 1.3 k/uL    Absolute Eos # 0.40 0.0 - 0.4 k/uL    Basophils Absolute 0.10 0.0 - 0.2 k/uL   Basic Metabolic Panel w/ Reflex to MG   Result Value Ref Range    Glucose 93 70 - 99 mg/dL    BUN 19 8 - 23 mg/dL    CREATININE 1.21 (H) 0.50 - 0.90 mg/dL    Bun/Cre Ratio NOT REPORTED 9 - 20    Calcium 9.0 8.6 - 10.4 mg/dL    Sodium 140 135 - 144 mmol/L    Potassium 3.5 (L) 3.7 - 5.3 mmol/L    Chloride 94 (L) 98 - 107 mmol/L    CO2 35 (H) 20 - 31 mmol/L    Anion Gap 11 9 - 17 mmol/L    GFR Non-African American 45 (L) >60 mL/min    GFR  55 (L) >60 mL/min    GFR Comment          GFR Staging NOT REPORTED    Magnesium   Result Value Ref Range    Magnesium 1.9 1.6 - 2.6 mg/dL   CBC with DIFF   Result Value Ref Range    WBC 11.0 3.5 - 11.0 k/uL    RBC 3.47 (L) 4.0 - 5.2 m/uL    Hemoglobin 7.8 (L) 12.0 - 16.0 g/dL    Hematocrit 25.9 (L) 36 - 46 %    MCV 74.7 (L) 80 - 100 fL    MCH 22.3 (L) 26 - 34 pg    MCHC 29.9 (L) 31 - 37 g/dL    RDW 20.1 (H) 11.5 - 14.9 %    Platelets 382 771 - 979 k/uL    MPV 8.5 6.0 - 12.0 fL    NRBC Automated NOT REPORTED per 100 WBC    Differential Type NOT REPORTED     Immature Granulocytes NOT REPORTED 0 %    Absolute Immature Granulocyte NOT REPORTED 0.00 - 0.30 k/uL    WBC Morphology NOT REPORTED     RBC Morphology NOT REPORTED     Platelet Estimate NOT REPORTED Seg Neutrophils 76 (H) 36 - 66 %    Lymphocytes 13 (L) 24 - 44 %    Monocytes 6 1 - 7 %    Eosinophils % 4 0 - 4 %    Basophils 1 0 - 2 %    Segs Absolute 8.36 1.3 - 9.1 k/uL    Absolute Lymph # 1.43 1.0 - 4.8 k/uL    Absolute Mono # 0.66 0.1 - 1.3 k/uL    Absolute Eos # 0.44 (H) 0.0 - 0.4 k/uL    Basophils Absolute 0.11 0.0 - 0.2 k/uL    Morphology ANISOCYTOSIS PRESENT     Morphology MICROCYTOSIS PRESENT     Morphology SLT POLYCHROMASIA     Morphology HYPOCHROMIA PRESENT     Morphology FEW TARGET CELLS    PERIPHERAL BLOOD SMEAR, PATH REVIEW   Result Value Ref Range    Pathologist Review TO BE REVIEWED BY PATHOLOGIST    RETICULOCYTES   Result Value Ref Range    Retic % 2.4 (H) 0.5 - 2.0 %    Absolute Retic # 0.081 0.0245 - 0.098 M/uL    Immature Retic Fract NOT REPORTED %    Retic Hemoglobin NOT REPORTED 28.2 - 35.7 pg   Lactate Dehydrogenase   Result Value Ref Range     135 - 214 U/L   Haptoglobin   Result Value Ref Range    Haptoglobin 359 (H) 30 - 200 mg/dL   FERRITIN   Result Value Ref Range    Ferritin 72 13 - 150 ug/L   IRON AND TIBC   Result Value Ref Range    Iron 18 (L) 37 - 145 ug/dL    TIBC 252 250 - 450 ug/dL    Iron Saturation 7 (L) 20 - 55 %    UIBC 234 112 - 347 ug/dL   Vitamin B12 & Folate   Result Value Ref Range    Vitamin B-12 406 232 - 1245 pg/mL    Folate 18.3 >4.8 ng/mL   TSH WITH REFLEX   Result Value Ref Range    TSH 1.73 0.30 - 5.00 mIU/L   Electrophoresis Protein, Serum without Reflex to Immunofixation   Result Value Ref Range    Total Protein 6.7 6.4 - 8.3 g/dL    Albumin (calculated) PENDING g/dL    Albumin % PENDING %    Alpha-1-Globulin PENDING g/dL    Alpha 1 % PENDING %    Alpha-2-Globulin PENDING g/dL    Alpha 2 % PENDING %    Beta Globulin PENDING g/dL    Beta Percent PENDING %    Gamma Globulin PENDING g/dL    Gamma Globulin % PENDING %    Total Prot. Sum PENDING g/dL    Total Prot.  Sum,% PENDING %    Protein Electrophoresis, Serum PENDING     Pathologist PENDING KAPPA/LAMBDA QUANT FREE LIGHT CHAINS SERUM   Result Value Ref Range    Kappa Free Light Chains QNT 10.52 (H) 0.37 - 1.94 mg/dL    Lambda Free Light Chains QNT 6.49 (H) 0.57 - 2.63 mg/dL    Free Kappa/Lambda Ratio 1.62 0.26 - 1.65   BLOOD OCCULT STOOL #1   Result Value Ref Range    Specimen Description . FECES     Occult Blood, Stool #1 NEGATIVE     Date, Stool #1 6,267,[id number]     Time, Stool #1 . URINE     Occult Blood, Stool #2 NOT REPORTED     Date, Stool #2 NOT REPORTED     Time, Stool #2 NOT REPORTED     Occult Blood, Stool #3 NOT REPORTED     Date, Stool #3 NOT REPORTED     Time, Stool #3 NOT REPORTED    Basic Metabolic Panel w/ Reflex to MG   Result Value Ref Range    Glucose 64 (L) 70 - 99 mg/dL    BUN 19 8 - 23 mg/dL    CREATININE 1.36 (H) 0.50 - 0.90 mg/dL    Bun/Cre Ratio NOT REPORTED 9 - 20    Calcium 9.3 8.6 - 10.4 mg/dL    Sodium 140 135 - 144 mmol/L    Potassium 3.7 3.7 - 5.3 mmol/L    Chloride 93 (L) 98 - 107 mmol/L    CO2 33 (H) 20 - 31 mmol/L    Anion Gap 14 9 - 17 mmol/L    GFR Non-African American 39 (L) >60 mL/min    GFR  48 (L) >60 mL/min    GFR Comment          GFR Staging NOT REPORTED    CBC with DIFF   Result Value Ref Range    WBC 11.9 (H) 3.5 - 11.0 k/uL    RBC 3.55 (L) 4.0 - 5.2 m/uL    Hemoglobin 8.2 (L) 12.0 - 16.0 g/dL    Hematocrit 26.7 (L) 36 - 46 %    MCV 75.1 (L) 80 - 100 fL    MCH 23.0 (L) 26 - 34 pg    MCHC 30.7 (L) 31 - 37 g/dL    RDW 20.2 (H) 11.5 - 14.9 %    Platelets 390 (H) 508 - 450 k/uL    MPV 8.0 6.0 - 12.0 fL    NRBC Automated NOT REPORTED per 100 WBC    Differential Type NOT REPORTED     Immature Granulocytes NOT REPORTED 0 %    Absolute Immature Granulocyte NOT REPORTED 0.00 - 0.30 k/uL    WBC Morphology NOT REPORTED     RBC Morphology NOT REPORTED     Platelet Estimate NOT REPORTED     Seg Neutrophils 75 (H) 36 - 66 %    Lymphocytes 14 (L) 24 - 44 %    Monocytes 6 1 - 7 %    Eosinophils % 4 0 - 4 %    Basophils 1 0 - 2 %    Segs Absolute 8.90 1.3 - 9.1

## 2020-06-15 NOTE — PROGRESS NOTES
Nurse changed wounds on blat extremities. Pt extremities are red. No openings noted at this time. Pt tolerated wound change. Will continue to monitor.  Legs wrapped

## 2020-06-15 NOTE — PROGRESS NOTES
0.9 % injection 10 mL  10 mL Intravenous PRN Franki Solis MD        amLODIPine (NORVASC) tablet 10 mg  10 mg Oral Daily Franki Solis MD   10 mg at 06/15/20 0750    aspirin EC tablet 81 mg  81 mg Oral Daily with breakfast Franki Solis MD   81 mg at 06/15/20 0750    calcium carbonate-vitamin D (CALTRATE) 600-400 MG-UNIT per tab 1 tablet  1 tablet Oral BID Franki Solis MD   1 tablet at 06/15/20 0750    carvedilol (COREG) tablet 12.5 mg  12.5 mg Oral BID WC Franki Solis MD   12.5 mg at 06/15/20 0750    coenzyme Q10 capsule 200 mg  200 mg Oral Nightly Franki Solis MD   200 mg at 06/14/20 2054    glimepiride (AMARYL) tablet 8 mg  8 mg Oral Daily with breakfast Franki Solis MD   8 mg at 06/15/20 0748    alogliptin (NESINA) tablet 12.5 mg  12.5 mg Oral Daily Franki Solis MD   12.5 mg at 06/15/20 0750    therapeutic multivitamin-minerals 1 tablet  1 tablet Oral Nightly Franki Solis MD   1 tablet at 06/14/20 2103    oxyCODONE HCl (OXY-IR) immediate release tablet 30 mg  30 mg Oral Q8H PRN Franki Solis MD   30 mg at 06/15/20 0415    pregabalin (LYRICA) capsule 150 mg  150 mg Oral BID Franki Solis MD   150 mg at 06/15/20 0750    venlafaxine (EFFEXOR XR) extended release capsule 37.5 mg  37.5 mg Oral Nightly Franki Solis MD   37.5 mg at 06/14/20 2054    atorvastatin (LIPITOR) tablet 40 mg  40 mg Oral Nightly Franki Solis MD   40 mg at 06/14/20 2103    pseudoephedrine (SUDAFED 12 HR) extended release tablet 120 mg  120 mg Oral Daily Franki Solis MD   120 mg at 06/15/20 0754    And    cetirizine (ZYRTEC) tablet 5 mg  5 mg Oral Daily Franki Solis MD   5 mg at 06/15/20 0750    miconazole (MICOTIN) 2 % powder   Topical BID Franki Solis MD         Allergies   Allergen Reactions    Adhesive Tape Other (See Comments)     Skin blisters severe skin tearing    Morphine Other (See Comments)

## 2020-06-15 NOTE — PLAN OF CARE
Problem: Falls - Risk of:  Goal: Will remain free from falls  Description: Will remain free from falls  6/15/2020 0405 by Sandra Baldwin RN  Outcome: Ongoing  Goal: Absence of physical injury  Description: Absence of physical injury  6/15/2020 0405 by Sandra Baldwin RN  Outcome: Ongoing     Problem: Skin Integrity:  Goal: Demonstration of wound healing without infection will improve  Description: Demonstration of wound healing without infection will improve  6/15/2020 0405 by Sandra Baldwin RN  Outcome: Ongoing  Goal: Complications related to intravenous access or infusion will be avoided or minimized  Description: Complications related to intravenous access or infusion will be avoided or minimized  6/15/2020 0405 by Sandra Baldwin RN  Outcome: Ongoing     Problem: Pain:  Description: Pain management should include both nonpharmacologic and pharmacologic interventions.   Goal: Pain level will decrease  Description: Pain level will decrease  6/15/2020 0405 by Sandra Baldwin RN  Outcome: Ongoing  Goal: Control of acute pain  Description: Control of acute pain  6/15/2020 0405 by Sandra Baldwin RN  Outcome: Ongoing  Goal: Control of chronic pain  Description: Control of chronic pain  6/15/2020 0405 by Sandra Baldwin RN  Outcome: Ongoing

## 2020-06-16 VITALS
OXYGEN SATURATION: 100 % | DIASTOLIC BLOOD PRESSURE: 56 MMHG | SYSTOLIC BLOOD PRESSURE: 140 MMHG | HEIGHT: 60 IN | HEART RATE: 68 BPM | WEIGHT: 293 LBS | BODY MASS INDEX: 57.52 KG/M2 | RESPIRATION RATE: 16 BRPM | TEMPERATURE: 97.7 F

## 2020-06-16 LAB
ABSOLUTE EOS #: 0.53 K/UL (ref 0–0.4)
ABSOLUTE IMMATURE GRANULOCYTE: ABNORMAL K/UL (ref 0–0.3)
ABSOLUTE LYMPH #: 1.72 K/UL (ref 1–4.8)
ABSOLUTE MONO #: 0.92 K/UL (ref 0.1–1.3)
ANION GAP SERPL CALCULATED.3IONS-SCNC: 10 MMOL/L (ref 9–17)
BASOPHILS # BLD: 1 % (ref 0–2)
BASOPHILS ABSOLUTE: 0.13 K/UL (ref 0–0.2)
BUN BLDV-MCNC: 20 MG/DL (ref 8–23)
BUN/CREAT BLD: ABNORMAL (ref 9–20)
CALCIUM SERPL-MCNC: 9.5 MG/DL (ref 8.6–10.4)
CHLORIDE BLD-SCNC: 92 MMOL/L (ref 98–107)
CO2: 35 MMOL/L (ref 20–31)
CREAT SERPL-MCNC: 1.48 MG/DL (ref 0.5–0.9)
DIFFERENTIAL TYPE: ABNORMAL
EOSINOPHILS RELATIVE PERCENT: 4 % (ref 0–4)
ERYTHROPOIETIN: 43 MU/ML (ref 4–27)
GFR AFRICAN AMERICAN: 43 ML/MIN
GFR NON-AFRICAN AMERICAN: 36 ML/MIN
GFR SERPL CREATININE-BSD FRML MDRD: ABNORMAL ML/MIN/{1.73_M2}
GFR SERPL CREATININE-BSD FRML MDRD: ABNORMAL ML/MIN/{1.73_M2}
GLUCOSE BLD-MCNC: 65 MG/DL (ref 70–99)
HCT VFR BLD CALC: 26.8 % (ref 36–46)
HEMOGLOBIN: 8.3 G/DL (ref 12–16)
IMMATURE GRANULOCYTES: ABNORMAL %
LYMPHOCYTES # BLD: 13 % (ref 24–44)
MCH RBC QN AUTO: 22.9 PG (ref 26–34)
MCHC RBC AUTO-ENTMCNC: 30.8 G/DL (ref 31–37)
MCV RBC AUTO: 74.5 FL (ref 80–100)
MONOCYTES # BLD: 7 % (ref 1–7)
MORPHOLOGY: ABNORMAL
NRBC AUTOMATED: ABNORMAL PER 100 WBC
PDW BLD-RTO: 20.7 % (ref 11.5–14.9)
PLATELET # BLD: 471 K/UL (ref 150–450)
PLATELET ESTIMATE: ABNORMAL
PMV BLD AUTO: 8.1 FL (ref 6–12)
POTASSIUM SERPL-SCNC: 4.2 MMOL/L (ref 3.7–5.3)
RBC # BLD: 3.6 M/UL (ref 4–5.2)
RBC # BLD: ABNORMAL 10*6/UL
SEG NEUTROPHILS: 75 % (ref 36–66)
SEGMENTED NEUTROPHILS ABSOLUTE COUNT: 9.9 K/UL (ref 1.3–9.1)
SODIUM BLD-SCNC: 137 MMOL/L (ref 135–144)
WBC # BLD: 13.2 K/UL (ref 3.5–11)
WBC # BLD: ABNORMAL 10*3/UL

## 2020-06-16 PROCEDURE — 99238 HOSP IP/OBS DSCHRG MGMT 30/<: CPT | Performed by: FAMILY MEDICINE

## 2020-06-16 PROCEDURE — 2580000003 HC RX 258: Performed by: FAMILY MEDICINE

## 2020-06-16 PROCEDURE — 99232 SBSQ HOSP IP/OBS MODERATE 35: CPT | Performed by: INTERNAL MEDICINE

## 2020-06-16 PROCEDURE — 6370000000 HC RX 637 (ALT 250 FOR IP): Performed by: FAMILY MEDICINE

## 2020-06-16 PROCEDURE — 80048 BASIC METABOLIC PNL TOTAL CA: CPT

## 2020-06-16 PROCEDURE — 85025 COMPLETE CBC W/AUTO DIFF WBC: CPT

## 2020-06-16 PROCEDURE — 36415 COLL VENOUS BLD VENIPUNCTURE: CPT

## 2020-06-16 RX ORDER — SULFAMETHOXAZOLE AND TRIMETHOPRIM 800; 160 MG/1; MG/1
1 TABLET ORAL EVERY 12 HOURS SCHEDULED
Qty: 14 TABLET | Refills: 0 | Status: SHIPPED | OUTPATIENT
Start: 2020-06-16 | End: 2020-06-23

## 2020-06-16 RX ORDER — LEVOFLOXACIN 500 MG/1
500 TABLET, FILM COATED ORAL DAILY
Qty: 7 TABLET | Refills: 0 | Status: SHIPPED | OUTPATIENT
Start: 2020-06-16 | End: 2020-06-23

## 2020-06-16 RX ORDER — CLONAZEPAM 0.5 MG/1
0.5 TABLET ORAL 2 TIMES DAILY PRN
Qty: 30 TABLET | Refills: 0 | Status: SHIPPED | OUTPATIENT
Start: 2020-06-16 | End: 2021-09-10

## 2020-06-16 RX ADMIN — PSEUDOEPHEDRINE HCL 120 MG: 120 TABLET, FILM COATED, EXTENDED RELEASE ORAL at 08:32

## 2020-06-16 RX ADMIN — ASPIRIN 81 MG: 81 TABLET, COATED ORAL at 08:31

## 2020-06-16 RX ADMIN — SULFAMETHOXAZOLE AND TRIMETHOPRIM 1 TABLET: 800; 160 TABLET ORAL at 08:32

## 2020-06-16 RX ADMIN — AMLODIPINE BESYLATE 10 MG: 10 TABLET ORAL at 08:32

## 2020-06-16 RX ADMIN — PREGABALIN 150 MG: 150 CAPSULE ORAL at 08:30

## 2020-06-16 RX ADMIN — Medication 10 ML: at 08:33

## 2020-06-16 RX ADMIN — CETIRIZINE HYDROCHLORIDE 5 MG: 10 TABLET, FILM COATED ORAL at 08:31

## 2020-06-16 RX ADMIN — ALOGLIPTIN 12.5 MG: 12.5 TABLET, FILM COATED ORAL at 08:31

## 2020-06-16 RX ADMIN — FUROSEMIDE 40 MG: 40 TABLET ORAL at 08:31

## 2020-06-16 RX ADMIN — GLIMEPIRIDE 8 MG: 4 TABLET ORAL at 08:31

## 2020-06-16 RX ADMIN — Medication 1 TABLET: at 08:31

## 2020-06-16 RX ADMIN — CARVEDILOL 12.5 MG: 12.5 TABLET, FILM COATED ORAL at 08:31

## 2020-06-16 NOTE — PROGRESS NOTES
Irvin Morel MD        amLODIPine (NORVASC) tablet 10 mg  10 mg Oral Daily Shellie Hollins MD   10 mg at 06/15/20 0750    aspirin EC tablet 81 mg  81 mg Oral Daily with breakfast Shellie Hollins MD   81 mg at 06/15/20 0750    calcium carbonate-vitamin D (CALTRATE) 600-400 MG-UNIT per tab 1 tablet  1 tablet Oral BID Shellie Hollins MD   1 tablet at 06/15/20 1959    carvedilol (COREG) tablet 12.5 mg  12.5 mg Oral BID WC Shellie Hollins MD   12.5 mg at 06/15/20 1715    coenzyme Q10 capsule 200 mg  200 mg Oral Nightly Shellie Hollins MD   200 mg at 06/15/20 2001    glimepiride (AMARYL) tablet 8 mg  8 mg Oral Daily with breakfast Shellie Hollins MD   8 mg at 06/15/20 0748    alogliptin (NESINA) tablet 12.5 mg  12.5 mg Oral Daily Shellie Hollins MD   12.5 mg at 06/15/20 0750    therapeutic multivitamin-minerals 1 tablet  1 tablet Oral Nightly Shellie Hollins MD   1 tablet at 06/15/20 1959    oxyCODONE HCl (OXY-IR) immediate release tablet 30 mg  30 mg Oral Q8H PRN Shellie Hollins MD   30 mg at 06/15/20 2326    pregabalin (LYRICA) capsule 150 mg  150 mg Oral BID Shellie Hollins MD   150 mg at 06/15/20 1958    venlafaxine (EFFEXOR XR) extended release capsule 37.5 mg  37.5 mg Oral Nightly Shellie Hollins MD   37.5 mg at 06/15/20 2002    atorvastatin (LIPITOR) tablet 40 mg  40 mg Oral Nightly Shellie Hollins MD   40 mg at 06/15/20 1958    pseudoephedrine (SUDAFED 12 HR) extended release tablet 120 mg  120 mg Oral Daily Shellie Hollins MD   120 mg at 06/15/20 0754    And    cetirizine (ZYRTEC) tablet 5 mg  5 mg Oral Daily Shellie Hollins MD   5 mg at 06/15/20 0750    miconazole (MICOTIN) 2 % powder   Topical BID Shellie Hollins MD         Allergies   Allergen Reactions    Adhesive Tape Other (See Comments)     Skin blisters severe skin tearing    Morphine Other (See Comments)     Severe Headache    Iron Other (See Comments)     Stomach

## 2020-06-16 NOTE — CARE COORDINATION
DISCHARGE PLANNING NOTE:    Notified Ema Davidson from [facility] that pt discharged home.     Electronically signed by Severo Novak, RN on 6/16/2020 at 10:24 AM

## 2020-06-16 NOTE — DISCHARGE INSTR - DIET

## 2020-06-16 NOTE — PROGRESS NOTES
Hypertension, benign, Morbid obesity with BMI of 60.0-69.9, adult ([facility] [address] Pain of toe of right foot, Right wrist pain, Skin cancer, Sleep apnea, and Wears glasses. Past Surgical History:   has a past surgical history that includes Tubal ligation (1981); Hysterectomy (1998); lumbar fusion (10/2010; 03/2011); Carpal tunnel release (Right, 09/05/2014); cyst removal (1985); Cholecystectomy, laparoscopic (1998); lumbar fusion (2009); Rotator cuff repair (Left, 2012); Cataract removal with implant (Bilateral, 2013); Skin cancer excision (2005); Inguinal hernia repair (Bilateral, 01/20/2017); other surgical history (05/02/2018); Upper gastrointestinal endoscopy (N/A, 10/16/2019); and Colonoscopy (N/A, 10/17/2019). Medications:    Prior to Admission medications    Medication Sig Start Date End Date Taking? Authorizing Provider   clonazePAM (KLONOPIN) 0.5 MG tablet Take 1 tablet by mouth 2 times daily as needed (tremor) for up to 30 doses. 6/16/20 7/15/20 Yes Oskar Varghese MD   levoFLOXacin (LEVAQUIN) 500 MG tablet Take 1 tablet by mouth daily for 7 days 6/16/20 6/23/20 Yes Oskar Varghese MD   sulfamethoxazole-trimethoprim (BACTRIM DS;SEPTRA DS) 800-160 MG per tablet Take 1 tablet by mouth every 12 hours for 7 days 6/16/20 6/23/20 Yes Oskar Varghese MD   pregabalin (LYRICA) 150 MG capsule 2 times daily. 6/8/20  Yes Historical Provider, MD   MULTIPLE VITAMIN PO Take by mouth nightly   Yes Historical Provider, MD   oxyCODONE (OXY-IR) 30 MG immediate release tablet Take 1 tablet by mouth every 8 hours as needed for Pain for up to 30 days. 6/8/20 7/8/20 Yes Oskar Varghese MD   glimepiride (AMARYL) 4 MG tablet TAKE 2 TABLETS BY MOUTH EVERY MORNING (BEFORE BREAKFAST) 6/2/20  Yes Oskar Varghese MD   furosemide (LASIX) 40 MG tablet May increase to 40 mg BID as physician orders.  4/24/20  Yes Oskar Varghese MD   aspirin 81 MG tablet Take 1 tablet by mouth daily (with breakfast) 4/21/20  Yes mL at 06/15/20 0851    levoFLOXacin (LEVAQUIN) tablet 500 mg  500 mg Oral Daily Osmar Lunsford MD   500 mg at 06/15/20 1447    sulfamethoxazole-trimethoprim (BACTRIM DS;SEPTRA DS) 800-160 MG per tablet 1 tablet  1 tablet Oral 2 times per day Osmar Lunsford MD   1 tablet at 06/16/20 8328    furosemide (LASIX) tablet 40 mg  40 mg Oral BID Osmar Lunsford MD   40 mg at 06/16/20 0831    clonazePAM (KLONOPIN) tablet 0.5 mg  0.5 mg Oral BID PRN Osmar Lunsford MD        potassium chloride (KLOR-CON M) extended release tablet 40 mEq  40 mEq Oral PRN Osmar Lunsford MD   40 mEq at 06/13/20 4149    Or    potassium bicarb-citric acid (EFFER-K) effervescent tablet 40 mEq  40 mEq Oral PRN Osmar Lunsford MD        Or    potassium chloride 10 mEq/100 mL IVPB (Peripheral Line)  10 mEq Intravenous PRN Osmar Lunsford MD        acetaminophen (TYLENOL) tablet 650 mg  650 mg Oral Q6H PRN Osmar Lunsford MD        Or    acetaminophen (TYLENOL) suppository 650 mg  650 mg Rectal Q6H PRN Osmar Lunsford MD        enoxaparin (LOVENOX) injection 30 mg  30 mg Subcutaneous BID Osmar Lunsford MD   30 mg at 06/15/20 1959    polyethylene glycol (GLYCOLAX) packet 17 g  17 g Oral Daily PRN Osmar Lunsford MD        promethazine (PHENERGAN) tablet 12.5 mg  12.5 mg Oral Q6H PRN Osmar Lunsford MD        Or    [facility] injection 4 mg  4 mg Intravenous Q6H PRN Osmar Lunsford MD        sodium chloride flush 0.9 % injection 10 mL  10 mL Intravenous 2 times per day Osmar Lunsford MD   10 mL at 06/16/20 0833    sodium chloride flush 0.9 % injection 10 mL  10 mL Intravenous PRN Osmar Lunsford MD        amLODIPine (NORVASC) tablet 10 mg  10 mg Oral Daily Osmar Lunsford MD   10 mg at 06/16/20 6634    aspirin EC tablet 81 mg  81 mg Oral Daily with breakfast Osmar Lunsford MD   81 mg at 06/16/20 0831    calcium carbonate-vitamin D (CALTRATE) 600-400 MG-UNIT per tab 1 tablet  1 tablet Oral BID Ray Clifford MD   1 tablet at 06/16/20 0831    carvedilol (COREG) tablet 12.5 mg  12.5 mg Oral BID WC Ray Clifford MD   12.5 mg at 06/16/20 0831    coenzyme Q10 capsule 200 mg  200 mg Oral Nightly Ray Clifford MD   200 mg at 06/15/20 2001    glimepiride (AMARYL) tablet 8 mg  8 mg Oral Daily with breakfast Ray Clifford MD   8 mg at 06/16/20 0831    alogliptin (NESINA) tablet 12.5 mg  12.5 mg Oral Daily Ray Clifford MD   12.5 mg at 06/16/20 0831    therapeutic multivitamin-minerals 1 tablet  1 tablet Oral Nightly Ray Clifford MD   1 tablet at 06/15/20 1959    oxyCODONE HCl (OXY-IR) immediate release tablet 30 mg  30 mg Oral Q8H PRN Ray Clifford MD   30 mg at 06/15/20 2326    pregabalin (LYRICA) capsule 150 mg  150 mg Oral BID Ray Clifford MD   150 mg at 06/16/20 0830    venlafaxine (EFFEXOR XR) extended release capsule 37.5 mg  37.5 mg Oral Nightly Ray Clifford MD   37.5 mg at 06/15/20 2002    atorvastatin (LIPITOR) tablet 40 mg  40 mg Oral Nightly Ray Clifford MD   40 mg at 06/15/20 1958    pseudoephedrine (SUDAFED 12 HR) extended release tablet 120 mg  120 mg Oral Daily Ray Clifford MD   120 mg at 06/16/20 3126    And    cetirizine (ZYRTEC) tablet 5 mg  5 mg Oral Daily Ray Clifford MD   5 mg at 06/16/20 0831    miconazole (MICOTIN) 2 % powder   Topical BID Ray Clifford MD           Allergies:  Adhesive tape; Morphine; Iron; Lisinopril; Metformin and related; and Pcn [penicillins]    Social History:   reports that she has never smoked. She has never used smokeless tobacco. She reports previous alcohol use. She reports that she does not use drugs. Family History: family history includes COPD in her sister; Cancer in her mother; Diabetes in her brother, maternal grandmother, mother, and sister;  Heart Attack in her paternal grandfather; - 10.4 mg/dL    Sodium 137 135 - 144 mmol/L    Potassium 4.1 3.7 - 5.3 mmol/L    Chloride 92 (L) 98 - 107 mmol/L    CO2 30 20 - 31 mmol/L    Anion Gap 15 9 - 17 mmol/L    GFR Non-African American 31 (L) >60 mL/min    GFR  38 (L) >60 mL/min    GFR Comment          GFR Staging NOT REPORTED    CBC auto differential   Result Value Ref Range    WBC 12.4 (H) 3.5 - 11.0 k/uL    RBC 3.36 (L) 4.0 - 5.2 m/uL    Hemoglobin 7.2 (L) 12.0 - 16.0 g/dL    Hematocrit 24.5 (L) 36 - 46 %    MCV 73.0 (L) 80 - 100 fL    MCH 21.5 (L) 26 - 34 pg    MCHC 29.5 (L) 31 - 37 g/dL    RDW 19.3 (H) 11.5 - 14.9 %    Platelets 264 541 - 301 k/uL    MPV 8.4 6.0 - 12.0 fL    NRBC Automated NOT REPORTED per 100 WBC    Differential Type NOT REPORTED     Immature Granulocytes NOT REPORTED 0 %    Absolute Immature Granulocyte NOT REPORTED 0.00 - 0.30 k/uL    WBC Morphology NOT REPORTED     RBC Morphology NOT REPORTED     Platelet Estimate NOT REPORTED     Seg Neutrophils 78 (H) 36 - 66 %    Lymphocytes 9 (L) 24 - 44 %    Monocytes 12 (H) 1 - 7 %    Eosinophils % 0 0 - 4 %    Basophils 0 0 - 2 %    Bands 1 0 - 10 %    Segs Absolute 9.67 (H) 1.3 - 9.1 k/uL    Absolute Lymph # 1.12 1.0 - 4.8 k/uL    Absolute Mono # 1.49 (H) 0.1 - 1.3 k/uL    Absolute Eos # 0.00 0.0 - 0.4 k/uL    Basophils Absolute 0.00 0.0 - 0.2 k/uL    Absolute Bands # 0.12 0.0 - 1.0 k/uL    Morphology ANISOCYTOSIS PRESENT     Morphology 1+ POLYCHROMASIA     Morphology 1+ ELLIPTOCYTES    Basic Metabolic Panel w/ Reflex to MG   Result Value Ref Range    Glucose 86 70 - 99 mg/dL    BUN 19 8 - 23 mg/dL    CREATININE 1.57 (H) 0.50 - 0.90 mg/dL    Bun/Cre Ratio NOT REPORTED 9 - 20    Calcium 8.9 8.6 - 10.4 mg/dL    Sodium 139 135 - 144 mmol/L    Potassium 4.0 3.7 - 5.3 mmol/L    Chloride 94 (L) 98 - 107 mmol/L    CO2 32 (H) 20 - 31 mmol/L    Anion Gap 13 9 - 17 mmol/L    GFR Non-African American 33 (L) >60 mL/min    GFR African American 40 (L) >60 mL/min    GFR Comment GFR Staging NOT REPORTED    CBC auto differential   Result Value Ref Range    WBC 11.0 3.5 - 11.0 k/uL    RBC 3.19 (L) 4.0 - 5.2 m/uL    Hemoglobin 6.9 (LL) 12.0 - 16.0 g/dL    Hematocrit 23.1 (L) 36 - 46 %    MCV 72.4 (L) 80 - 100 fL    MCH 21.7 (L) 26 - 34 pg    MCHC 29.9 (L) 31 - 37 g/dL    RDW 19.0 (H) 11.5 - 14.9 %    Platelets 969 403 - 664 k/uL    MPV 7.8 6.0 - 12.0 fL    NRBC Automated NOT REPORTED per 100 WBC    Differential Type NOT REPORTED     Immature Granulocytes NOT REPORTED 0 %    Absolute Immature Granulocyte NOT REPORTED 0.00 - 0.30 k/uL    WBC Morphology NOT REPORTED     RBC Morphology NOT REPORTED     Platelet Estimate NOT REPORTED     Seg Neutrophils 78 (H) 36 - 66 %    Lymphocytes 11 (L) 24 - 44 %    Monocytes 7 1 - 7 %    Eosinophils % 3 0 - 4 %    Basophils 1 0 - 2 %    Segs Absolute 8.58 1.3 - 9.1 k/uL    Absolute Lymph # 1.21 1.0 - 4.8 k/uL    Absolute Mono # 0.77 0.1 - 1.3 k/uL    Absolute Eos # 0.33 0.0 - 0.4 k/uL    Basophils Absolute 0.11 0.0 - 0.2 k/uL    Morphology HYPOCHROMIA PRESENT     Morphology MICROCYTOSIS PRESENT     Morphology ANISOCYTOSIS PRESENT    Occ Bld, Fecal Scrn   Result Value Ref Range    Occult Blood, Stool #1 NEGATIVE NEGATIVE    Date, Stool #1 61,220     Time, Stool #1 UNKNOWN     Occult Blood, Stool #2 NOT REPORTED NEGATIVE    Date, Stool #2 NOT REPORTED     Time, Stool #2 NOT REPORTED     Occult Blood, Stool #3 NOT REPORTED NEGATIVE    Date, Stool #3 NOT REPORTED     Time, Stool #3 NOT REPORTED    Hgb/Hct   Result Value Ref Range    Hemoglobin 7.8 (L) 12.0 - 16.0 g/dL    Hematocrit 25.6 (L) 36 - 46 %   Basic Metabolic Panel w/ Reflex to MG   Result Value Ref Range    Glucose 110 (H) 70 - 99 mg/dL    BUN 20 8 - 23 mg/dL    CREATININE 1.44 (H) 0.50 - 0.90 mg/dL    Bun/Cre Ratio NOT REPORTED 9 - 20    Calcium 9.0 8.6 - 10.4 mg/dL    Sodium 139 135 - 144 mmol/L    Potassium 3.8 3.7 - 5.3 mmol/L    Chloride 96 (L) 98 - 107 mmol/L    CO2 32 (H) 20 - 31 mmol/L    Anion Gap 11 9 - 17 mmol/L    GFR Non-African American 37 (L) >60 mL/min    GFR  45 (L) >60 mL/min    GFR Comment          GFR Staging NOT REPORTED    CBC auto differential   Result Value Ref Range    WBC 11.5 (H) 3.5 - 11.0 k/uL    RBC 3.43 (L) 4.0 - 5.2 m/uL    Hemoglobin 7.8 (L) 12.0 - 16.0 g/dL    Hematocrit 25.4 (L) 36 - 46 %    MCV 74.2 (L) 80 - 100 fL    MCH 22.8 (L) 26 - 34 pg    MCHC 30.7 (L) 31 - 37 g/dL    RDW 19.9 (H) 11.5 - 14.9 %    Platelets 304 146 - 810 k/uL    MPV 8.2 6.0 - 12.0 fL    NRBC Automated NOT REPORTED per 100 WBC    Differential Type NOT REPORTED     Immature Granulocytes NOT REPORTED 0 %    Absolute Immature Granulocyte NOT REPORTED 0.00 - 0.30 k/uL    WBC Morphology NOT REPORTED     RBC Morphology NOT REPORTED     Platelet Estimate NOT REPORTED     Seg Neutrophils 78 (H) 36 - 66 %    Lymphocytes 12 (L) 24 - 44 %    Monocytes 6 1 - 7 %    Eosinophils % 3 0 - 4 %    Basophils 1 0 - 2 %    Segs Absolute 9.00 1.3 - 9.1 k/uL    Absolute Lymph # 1.30 1.0 - 4.8 k/uL    Absolute Mono # 0.60 0.1 - 1.3 k/uL    Absolute Eos # 0.40 0.0 - 0.4 k/uL    Basophils Absolute 0.10 0.0 - 0.2 k/uL   Basic Metabolic Panel w/ Reflex to MG   Result Value Ref Range    Glucose 93 70 - 99 mg/dL    BUN 19 8 - 23 mg/dL    CREATININE 1.21 (H) 0.50 - 0.90 mg/dL    Bun/Cre Ratio NOT REPORTED 9 - 20    Calcium 9.0 8.6 - 10.4 mg/dL    Sodium 140 135 - 144 mmol/L    Potassium 3.5 (L) 3.7 - 5.3 mmol/L    Chloride 94 (L) 98 - 107 mmol/L    CO2 35 (H) 20 - 31 mmol/L    Anion Gap 11 9 - 17 mmol/L    GFR Non-African American 45 (L) >60 mL/min    GFR  55 (L) >60 mL/min    GFR Comment          GFR Staging NOT REPORTED    Magnesium   Result Value Ref Range    Magnesium 1.9 1.6 - 2.6 mg/dL   CBC with DIFF   Result Value Ref Range    WBC 11.0 3.5 - 11.0 k/uL    RBC 3.47 (L) 4.0 - 5.2 m/uL    Hemoglobin 7.8 (L) 12.0 - 16.0 g/dL    Hematocrit 25.9 (L) 36 - 46 %    MCV 74.7 (L) 80 - 100 fL    MCH 22.3 (L) 26 - 34 pg    MCHC 29.9 (L) 31 - 37 g/dL    RDW 20.1 (H) 11.5 - 14.9 %    Platelets 249 964 - 962 k/uL    MPV 8.5 6.0 - 12.0 fL    NRBC Automated NOT REPORTED per 100 WBC    Differential Type NOT REPORTED     Immature Granulocytes NOT REPORTED 0 %    Absolute Immature Granulocyte NOT REPORTED 0.00 - 0.30 k/uL    WBC Morphology NOT REPORTED     RBC Morphology NOT REPORTED     Platelet Estimate NOT REPORTED     Seg Neutrophils 76 (H) 36 - 66 %    Lymphocytes 13 (L) 24 - 44 %    Monocytes 6 1 - 7 %    Eosinophils % 4 0 - 4 %    Basophils 1 0 - 2 %    Segs Absolute 8.36 1.3 - 9.1 k/uL    Absolute Lymph # 1.43 1.0 - 4.8 k/uL    Absolute Mono # 0.66 0.1 - 1.3 k/uL    Absolute Eos # 0.44 (H) 0.0 - 0.4 k/uL    Basophils Absolute 0.11 0.0 - 0.2 k/uL    Morphology ANISOCYTOSIS PRESENT     Morphology MICROCYTOSIS PRESENT     Morphology SLT POLYCHROMASIA     Morphology HYPOCHROMIA PRESENT     Morphology FEW TARGET CELLS    PERIPHERAL BLOOD SMEAR, PATH REVIEW   Result Value Ref Range    Pathologist Review TO BE REVIEWED BY PATHOLOGIST    RETICULOCYTES   Result Value Ref Range    Retic % 2.4 (H) 0.5 - 2.0 %    Absolute Retic # 0.081 0.0245 - 0.098 M/uL    Immature Retic Fract NOT REPORTED %    Retic Hemoglobin NOT REPORTED 28.2 - 35.7 pg   Lactate Dehydrogenase   Result Value Ref Range     135 - 214 U/L   Haptoglobin   Result Value Ref Range    Haptoglobin 359 (H) 30 - 200 mg/dL   FERRITIN   Result Value Ref Range    Ferritin 72 13 - 150 ug/L   IRON AND TIBC   Result Value Ref Range    Iron 18 (L) 37 - 145 ug/dL    TIBC 252 250 - 450 ug/dL    Iron Saturation 7 (L) 20 - 55 %    UIBC 234 112 - 347 ug/dL   Vitamin B12 & Folate   Result Value Ref Range    Vitamin B-12 406 232 - 1245 pg/mL    Folate 18.3 >4.8 ng/mL   TSH WITH REFLEX   Result Value Ref Range    TSH 1.73 0.30 - 5.00 mIU/L   Electrophoresis Protein, Serum without Reflex to Immunofixation   Result Value Ref Range    Total Protein 6.7 6.4 - 8.3 g/dL    Albumin 02/14/2012       Chronic anemia, hypoproliferative  History of GI bleed with peptic ulcer disease/pyloric ulcer, negative for H. pylori-10/2019  History of iron deficiency  Borderline B12 stores  CKD  Morbid obesity  Multiple comorbidities      RECOMMENDATIONS:    Personally reviewed results of lab work-up and other relevant clinical data. Reviewed patient's medical chart and procedures done including EGD and colonoscopy as discussed above. Patient has completed IV iron infusion tolerated without any complications. Free light chain ratio is within normal range. Immunofixation studies are negative. Hemolysis work-up is negative. Work-up suggest hypoproliferative anemia. We will follow-up on erythropoietin level. Follow-up on stool occult blood testing. Follow-up on peripheral smear    Patient is scheduled to follow-up at our office in [place] on 7/16. Clinical discharge medical oncology standpoint. Discussed with patient and Nurse. Thank you for asking us to see this patient. Rocio Allison MD          This note is created with the assistance of a speech recognition program.  While intending to generate a document that actually reflects the content of the visit, the document can still have some errors including those of syntax and sound a like substitutions which may escape proof reading. It such instances, actual meaning can be extrapolated by contextual diversion.

## 2020-06-23 LAB
GLUCOSE BLD-MCNC: 124 MG/DL (ref 65–105)
GLUCOSE BLD-MCNC: 129 MG/DL (ref 65–105)
GLUCOSE BLD-MCNC: 139 MG/DL (ref 65–105)
GLUCOSE BLD-MCNC: 47 MG/DL (ref 65–105)
GLUCOSE BLD-MCNC: 50 MG/DL (ref 65–105)
GLUCOSE BLD-MCNC: 71 MG/DL (ref 65–105)
GLUCOSE BLD-MCNC: 94 MG/DL (ref 65–105)

## 2020-06-29 ENCOUNTER — HOSPITAL ENCOUNTER (OUTPATIENT)
Age: 65
Setting detail: SPECIMEN
Discharge: HOME | End: 2020-06-29
Payer: COMMERCIAL

## 2020-06-29 LAB
ANION GAP SERPL CALCULATED.3IONS-SCNC: 12 MMOL/L (ref 9–17)
BUN BLDV-MCNC: 15 MG/DL (ref 8–23)
BUN/CREAT BLD: ABNORMAL (ref 9–20)
CALCIUM SERPL-MCNC: 8.9 MG/DL (ref 8.6–10.4)
CHLORIDE BLD-SCNC: 97 MMOL/L (ref 98–107)
CO2: 32 MMOL/L (ref 20–31)
CREAT SERPL-MCNC: 1.26 MG/DL (ref 0.5–0.9)
GFR AFRICAN AMERICAN: 52 ML/MIN
GFR NON-AFRICAN AMERICAN: 43 ML/MIN
GFR SERPL CREATININE-BSD FRML MDRD: ABNORMAL ML/MIN/{1.73_M2}
GFR SERPL CREATININE-BSD FRML MDRD: ABNORMAL ML/MIN/{1.73_M2}
GLUCOSE BLD-MCNC: 162 MG/DL (ref 70–99)
POTASSIUM SERPL-SCNC: 4.2 MMOL/L (ref 3.7–5.3)
SODIUM BLD-SCNC: 141 MMOL/L (ref 135–144)

## 2020-06-29 PROCEDURE — 80048 BASIC METABOLIC PNL TOTAL CA: CPT

## 2020-07-14 ENCOUNTER — TELEPHONE (OUTPATIENT)
Dept: GASTROENTEROLOGY | Age: 65
End: 2020-07-14

## 2020-07-15 NOTE — TELEPHONE ENCOUNTER
Nurse from [facility] care called wanting to know what labs Dr Prema Ortega wants done . Please call Naima back at [employer] Please return her call [phone].  Thank You
Spoke to Margy Tinoco. Pt has current CBCs in the computer and will not need a new lab before apt on Friday with Dr Abi Cristobal.
Fibroids  09/28/2017    Active  Mitzi Muse

## 2020-07-16 ENCOUNTER — OFFICE VISIT (OUTPATIENT)
Dept: ONCOLOGY | Age: 65
End: 2020-07-16
Payer: COMMERCIAL

## 2020-07-16 ENCOUNTER — HOSPITAL ENCOUNTER (OUTPATIENT)
Age: 65
Setting detail: SPECIMEN
Discharge: HOME | End: 2020-07-16
Payer: COMMERCIAL

## 2020-07-16 VITALS
BODY MASS INDEX: 57.52 KG/M2 | RESPIRATION RATE: 12 BRPM | SYSTOLIC BLOOD PRESSURE: 157 MMHG | HEART RATE: 67 BPM | WEIGHT: 293 LBS | HEIGHT: 60 IN | DIASTOLIC BLOOD PRESSURE: 69 MMHG | TEMPERATURE: 97.7 F

## 2020-07-16 LAB
ABSOLUTE EOS #: 0.71 K/UL (ref 0–0.4)
ABSOLUTE IMMATURE GRANULOCYTE: 0 K/UL (ref 0–0.3)
ABSOLUTE LYMPH #: 1.85 K/UL (ref 1–4.8)
ABSOLUTE MONO #: 1.14 K/UL (ref 0.1–0.8)
ATYPICAL LYMPHOCYTE ABSOLUTE COUNT: 0.14 K/UL
ATYPICAL LYMPHOCYTES: 1 %
BASOPHILS # BLD: 0 % (ref 0–2)
BASOPHILS ABSOLUTE: 0 K/UL (ref 0–0.2)
DIFFERENTIAL TYPE: ABNORMAL
EOSINOPHILS RELATIVE PERCENT: 5 % (ref 1–4)
HCT VFR BLD CALC: 34.6 % (ref 36.3–47.1)
HEMOGLOBIN: 9.9 G/DL (ref 11.9–15.1)
IMMATURE GRANULOCYTES: 0 %
LYMPHOCYTES # BLD: 13 % (ref 24–44)
MCH RBC QN AUTO: 24.4 PG (ref 25.2–33.5)
MCHC RBC AUTO-ENTMCNC: 28.6 G/DL (ref 28.4–34.8)
MCV RBC AUTO: 85.2 FL (ref 82.6–102.9)
MONOCYTES # BLD: 8 % (ref 1–7)
MORPHOLOGY: ABNORMAL
NRBC AUTOMATED: 0 PER 100 WBC
PDW BLD-RTO: 25.7 % (ref 11.8–14.4)
PLATELET # BLD: 408 K/UL (ref 138–453)
PLATELET ESTIMATE: ABNORMAL
PMV BLD AUTO: 11.1 FL (ref 8.1–13.5)
RBC # BLD: 4.06 M/UL (ref 3.95–5.11)
RBC # BLD: ABNORMAL 10*6/UL
SEG NEUTROPHILS: 73 % (ref 36–66)
SEGMENTED NEUTROPHILS ABSOLUTE COUNT: 10.36 K/UL (ref 1.8–7.7)
WBC # BLD: 14.2 K/UL (ref 3.5–11.3)
WBC # BLD: ABNORMAL 10*3/UL

## 2020-07-16 PROCEDURE — G8427 DOCREV CUR MEDS BY ELIG CLIN: HCPCS | Performed by: INTERNAL MEDICINE

## 2020-07-16 PROCEDURE — G8417 CALC BMI ABV UP PARAM F/U: HCPCS | Performed by: INTERNAL MEDICINE

## 2020-07-16 PROCEDURE — 1036F TOBACCO NON-USER: CPT | Performed by: INTERNAL MEDICINE

## 2020-07-16 PROCEDURE — 1111F DSCHRG MED/CURRENT MED MERGE: CPT | Performed by: INTERNAL MEDICINE

## 2020-07-16 PROCEDURE — 99214 OFFICE O/P EST MOD 30 MIN: CPT | Performed by: INTERNAL MEDICINE

## 2020-07-16 PROCEDURE — 3017F COLORECTAL CA SCREEN DOC REV: CPT | Performed by: INTERNAL MEDICINE

## 2020-07-16 NOTE — PROGRESS NOTES
Today's Date: 7/16/2020  Patient Name: Deangelo Dejesus  Date of admission: No admission date for patient encounter. Patient's age: 60 y.o., 1955  Admission Dx: No admission diagnoses are documented for this encounter. Reason for Consult: management recommendations  Requesting Physician: No admitting provider for patient encounter. CHIEF COMPLAINT: Fatigue. Anemia. Weakness    History Obtained From:  patient, electronic medical record    Interval history:    Patient presents to the clinic for a post hospital follow-up visit and to discuss further treatment plan. Patient overall is feeling better. Still complains of lower extremity edema. Denies any hospitalization ER visit. She has not had her labs drawn today. During this visit patient's allergy, social, medical, surgical history and medications were reviewed and updated. hISTORY OF PRESENT ILLNESS:      The patient is a 60 y.o.  female who is admitted to the hospital for chief complaints of fatigue patient was earlier seen in the office with complaints of progressive fatigue and leg edema. Work-up also revealed microcytic anemia. Patient has longstanding history of anemia. Iron studies done in November showed iron saturation of only 6%. Patient at that time had borderline B12 stores. Patient stool occult blood testing on 6-12 is negative. Patient denies any gross bleeding. Patient has not had thyroid testing in recent past.  Patient had EGD and colonoscopy in October 2019. Colonoscopy was unremarkable other than findings of internal hemorrhoids and sticky pasty stools. EGD showed a small 4 mm deep ulcer close to the pylorus with no active bleeding. Biopsies were taken. There was no evidence of H. pylori infection. Patient also has history of CKD. Patient denies any bleeding vaginally per rectum or bloody vomiting or cough. Patient is intolerant to oral iron and has not been taking any iron.     Past Medical History: has a past medical history of Acute hypoxemic respiratory failure ([facility] [address] Arthritis, CHF (congestive heart failure) ([facility] [address] Chronic back pain, Diabetes mellitus type II, controlled ([facility] [address] Fibromyalgia, Hyperlipidemia LDL goal < 70, Hypertension, benign, Morbid obesity with BMI of 60.0-69.9, adult ([facility] [address] Pain of toe of right foot, Right wrist pain, Skin cancer, Sleep apnea, and Wears glasses. Past Surgical History:   has a past surgical history that includes Tubal ligation (1981); Hysterectomy (1998); lumbar fusion (10/2010; 03/2011); Carpal tunnel release (Right, 09/05/2014); cyst removal (1985); Cholecystectomy, laparoscopic (1998); lumbar fusion (2009); Rotator cuff repair (Left, 2012); Cataract removal with implant (Bilateral, 2013); Skin cancer excision (2005); Inguinal hernia repair (Bilateral, 01/20/2017); other surgical history (05/02/2018); Upper gastrointestinal endoscopy (N/A, 10/16/2019); and Colonoscopy (N/A, 10/17/2019). Medications:    Prior to Admission medications    Medication Sig Start Date End Date Taking? Authorizing Provider   venlafaxine (EFFEXOR XR) 37.5 MG extended release capsule TAKE 1 CAPSULE BY MOUTH EVERY DAY 7/5/20  Yes Ge Urrutia MD   oxyCODONE (OXY-IR) 30 MG immediate release tablet Take 1 tablet by mouth every 8 hours as needed for Pain for up to 30 days. 7/2/20 8/1/20 Yes Ge Urrutia MD   clonazePAM (KLONOPIN) 0.5 MG tablet Take 1 tablet by mouth 2 times daily as needed (tremor) for up to 30 doses. 6/16/20 7/16/20 Yes Ge Urrutia MD   pregabalin (LYRICA) 150 MG capsule 2 times daily. 6/8/20  Yes Historical Provider, MD   MULTIPLE VITAMIN PO Take by mouth nightly   Yes Historical Provider, MD   glimepiride (AMARYL) 4 MG tablet TAKE 2 TABLETS BY MOUTH EVERY MORNING (BEFORE BREAKFAST) 6/2/20  Yes Ge Urrutia MD   furosemide (LASIX) 40 MG tablet May increase to 40 mg BID as physician orders.  4/24/20  Yes Ge Urrutia MD   aspirin 81 MG tablet Take 1 tablet by mouth daily (with breakfast) 4/21/20  Yes Heather Delacruz MD   linagliptin (TRADJENTA) 5 MG tablet Take 1 tablet by mouth daily 3/10/20  Yes VIDHYA Davis NP   carvedilol (COREG) 12.5 MG tablet Take 1 tablet by mouth 2 times daily (with meals) 3/10/20  Yes VIDHYA Davis NP   blood glucose test strips (ASCENSIA AUTODISC VI;ONE TOUCH ULTRA TEST VI) strip Accucheck Solange. Check daily; dx E11.9 2/14/20  Yes Heather Delacruz MD   Lancets MISC 1 each by Does not apply route daily Accucheck Solange dx E11.9 check daily 2/14/20  Yes Heather Delacruz MD   amLODIPine (NORVASC) 10 MG tablet Take 1 tablet by mouth daily  Patient taking differently: Take 10 mg by mouth daily Takes with dinner 1/20/20  Yes Heather Delacruz MD   albuterol (PROVENTIL) (2.5 MG/3ML) 0.083% nebulizer solution Take 3 mLs by nebulization 2 times daily 1/11/20 7/16/20 Yes Iglesia Todd MD   atorvastatin (LIPITOR) 40 MG tablet TAKE 1 TABLET BY MOUTH EVERY DAY AT NIGHT 9/4/19  Yes Heather Delacruz MD   diclofenac sodium 1 % GEL Apply 2 g topically 3 times daily 2/20/19  Yes Heather Delacruz MD   Calcium Carb-Cholecalciferol (CALCIUM 600 + D PO) Take 1 tablet by mouth 2 times daily   Yes Historical Provider, MD   Multiple Vitamins-Iron (ONE DAILY MULTIVITAMIN/IRON PO) Take 1 tablet by mouth nightly   Yes Historical Provider, MD   Coenzyme Q10 (COQ10 PO) Take 200 mg by mouth nightly    Yes Historical Provider, MD   Loratadine-Pseudoephedrine (CLARITIN-D 12 HOUR PO) Take 1 tablet by mouth daily. Yes Historical Provider, MD     Current Outpatient Medications   Medication Sig Dispense Refill    venlafaxine (EFFEXOR XR) 37.5 MG extended release capsule TAKE 1 CAPSULE BY MOUTH EVERY DAY 90 capsule 3    oxyCODONE (OXY-IR) 30 MG immediate release tablet Take 1 tablet by mouth every 8 hours as needed for Pain for up to 30 days.  90 tablet 0    clonazePAM (KLONOPIN) 0.5 MG tablet Take 1 tablet by mouth 2 times daily as needed (tremor) for up to 30 doses. 30 tablet 0    pregabalin (LYRICA) 150 MG capsule 2 times daily.  MULTIPLE VITAMIN PO Take by mouth nightly      glimepiride (AMARYL) 4 MG tablet TAKE 2 TABLETS BY MOUTH EVERY MORNING (BEFORE BREAKFAST) 180 tablet 3    furosemide (LASIX) 40 MG tablet May increase to 40 mg BID as physician orders. 90 tablet 3    aspirin 81 MG tablet Take 1 tablet by mouth daily (with breakfast) 30 tablet 11    linagliptin (TRADJENTA) 5 MG tablet Take 1 tablet by mouth daily 30 tablet 11    carvedilol (COREG) 12.5 MG tablet Take 1 tablet by mouth 2 times daily (with meals) 60 tablet 11    blood glucose test strips (ASCENSIA AUTODISC VI;ONE TOUCH ULTRA TEST VI) strip Accucheck Solange. Check daily; dx E11.9 100 strip 11    Lancets MISC 1 each by Does not apply route daily Accucheck Solange dx E11.9 check daily 100 each 11    amLODIPine (NORVASC) 10 MG tablet Take 1 tablet by mouth daily (Patient taking differently: Take 10 mg by mouth daily Takes with dinner) 30 tablet 5    albuterol (PROVENTIL) (2.5 MG/3ML) 0.083% nebulizer solution Take 3 mLs by nebulization 2 times daily 120 each 3    atorvastatin (LIPITOR) 40 MG tablet TAKE 1 TABLET BY MOUTH EVERY DAY AT NIGHT 90 tablet 3    diclofenac sodium 1 % GEL Apply 2 g topically 3 times daily 1 Tube 11    Calcium Carb-Cholecalciferol (CALCIUM 600 + D PO) Take 1 tablet by mouth 2 times daily      Multiple Vitamins-Iron (ONE DAILY MULTIVITAMIN/IRON PO) Take 1 tablet by mouth nightly      Coenzyme Q10 (COQ10 PO) Take 200 mg by mouth nightly       Loratadine-Pseudoephedrine (CLARITIN-D 12 HOUR PO) Take 1 tablet by mouth daily. No current facility-administered medications for this visit. Allergies:  Adhesive tape; Morphine; Iron; Lisinopril; Metformin and related; and Pcn [penicillins]    Social History:   reports that she has never smoked.  She has never used smokeless tobacco. She reports previous alcohol Ears - bilateral TM's and external ear canals normal   Mouth - mucous membranes moist, pharynx normal without lesions   Neck - supple, no significant adenopathy   Lymphatics - no palpable lymphadenopathy, no hepatosplenomegaly   Chest - clear to auscultation, no wheezes, rales or rhonchi, symmetric air entry   Heart - normal rate, regular rhythm, normal S1, S2, no murmurs  Abdomen - soft, nontender, nondistended, no masses or organomegaly   Neurological - alert, oriented, normal speech, no focal findings or movement disorder noted   Musculoskeletal - no joint tenderness, deformity or swelling   Extremities - peripheral pulses normal, no pedal edema, no clubbing or cyanosis   Skin - normal coloration and turgor, no rashes, no suspicious skin lesions noted ,      DATA:      Labs:     Results for orders placed or performed during the hospital encounter of 73/70/40   Basic Metabolic Panel   Result Value Ref Range    Glucose 162 (H) 70 - 99 mg/dL    BUN 15 8 - 23 mg/dL    CREATININE 1.26 (H) 0.50 - 0.90 mg/dL    Bun/Cre Ratio NOT REPORTED 9 - 20    Calcium 8.9 8.6 - 10.4 mg/dL    Sodium 141 135 - 144 mmol/L    Potassium 4.2 3.7 - 5.3 mmol/L    Chloride 97 (L) 98 - 107 mmol/L    CO2 32 (H) 20 - 31 mmol/L    Anion Gap 12 9 - 17 mmol/L    GFR Non-African American 43 (L) >60 mL/min    GFR  52 (L) >60 mL/min    GFR Comment          GFR Staging NOT REPORTED          IMAGING DATA:    Ultrasound of kidney       Impression    1. Fatty liver. 2. Otherwise, unremarkable renal ultrasound.  No hydronephrosis.                IMPRESSION:   Chronic anemia, hypoproliferative  History of GI bleed with peptic ulcer disease/pyloric ulcer, negative for H. pylori-10/2019  iron deficiency  Borderline B12 stores  CKD  Morbid obesity  Multiple comorbidities      RECOMMENDATIONS:    Reviewed results of available lab work-up and the relevant clinical data.   Patient did not get a repeat CBC since she has been out of the hospital however clinically she is feeling better. Reviewed work-up done while in-house. Free light chain ratio is within normal range. Immunofixation studies are negative. Hemolysis work-up is negative. Work-up suggest hypoproliferative anemia. Erythropoietin was 43 which is low for the degree of anemia. Addendum:    Patient repeat CBC shows improvement in hemoglobin with hemoglobin of 9.9. Recommend continued surveillance. Patient is intolerant to oral iron. She will likely need more IV iron in the future which we will continue to monitor. If patient continues to be anemic despite of correction of iron stores. Can treat patient with RAJESH therapy for hemoglobin less than 10. We will see patient back in office in 3 months with repeat CBC and iron studies at that point. I called the patient and left a voice message. Thank you for asking us to see this patient. Zaida Allison MD          I spent more than 25 minutes examining, evaluating, reviewing data, counseling the patient and coordinating care. Greater than 50% of time was spent face-to-face with the patient this note is created with the assistance of a speech recognition program.  While intending to generate a document that actually reflects the content of the visit, the document can still have some errors including those of syntax and sound a like substitutions which may escape proof reading. It such instances, actual meaning can be extrapolated by contextual diversion.

## 2020-07-17 ENCOUNTER — OFFICE VISIT (OUTPATIENT)
Dept: GASTROENTEROLOGY | Age: 65
End: 2020-07-17
Payer: COMMERCIAL

## 2020-07-17 VITALS — BODY MASS INDEX: 55.27 KG/M2 | WEIGHT: 283 LBS

## 2020-07-17 PROBLEM — K59.01 SLOW TRANSIT CONSTIPATION: Status: ACTIVE | Noted: 2020-07-17

## 2020-07-17 PROBLEM — K27.9 PUD (PEPTIC ULCER DISEASE): Status: ACTIVE | Noted: 2020-07-17

## 2020-07-17 PROCEDURE — G8427 DOCREV CUR MEDS BY ELIG CLIN: HCPCS | Performed by: INTERNAL MEDICINE

## 2020-07-17 PROCEDURE — 3017F COLORECTAL CA SCREEN DOC REV: CPT | Performed by: INTERNAL MEDICINE

## 2020-07-17 PROCEDURE — 99214 OFFICE O/P EST MOD 30 MIN: CPT | Performed by: INTERNAL MEDICINE

## 2020-07-17 PROCEDURE — G8417 CALC BMI ABV UP PARAM F/U: HCPCS | Performed by: INTERNAL MEDICINE

## 2020-07-17 PROCEDURE — 1036F TOBACCO NON-USER: CPT | Performed by: INTERNAL MEDICINE

## 2020-07-17 RX ORDER — FAMOTIDINE 20 MG/1
20 TABLET, FILM COATED ORAL 2 TIMES DAILY
Qty: 180 TABLET | Refills: 1 | Status: SHIPPED | OUTPATIENT
Start: 2020-07-17 | End: 2021-01-12 | Stop reason: SDUPTHER

## 2020-07-17 ASSESSMENT — ENCOUNTER SYMPTOMS
ANAL BLEEDING: 0
SINUS PRESSURE: 0
NAUSEA: 0
BACK PAIN: 0
TROUBLE SWALLOWING: 0
COUGH: 0
SHORTNESS OF BREATH: 1
CONSTIPATION: 1
BLOOD IN STOOL: 0
RECTAL PAIN: 0
WHEEZING: 0
ABDOMINAL DISTENTION: 0
VOICE CHANGE: 0
VOMITING: 0
DIARRHEA: 0
CHOKING: 0
SORE THROAT: 0
ABDOMINAL PAIN: 0

## 2020-07-17 NOTE — PROGRESS NOTES
GI OFFICE FOLLOW UP    Deangelo Dejesus is a 60 y.o. female evaluated via on 7/17/2020. Consent:  She and/or health care decision maker is aware that that she may receive a bill for this telephone service, depending on her insurance coverage, and has provided verbal consent to proceed: YES      INTERVAL HISTORY:   No referring provider defined for this encounter. Chief Complaint   Patient presents with    Anemia     HGB  yesterday 9.9       1. Anemia, unspecified type    2. Morbid obesity ([address]    3. PUD (peptic ulcer disease)    4. Slow transit constipation        This patient is seen my office as a follow-up with her     She has history significant for anemia   Had GI work-up done last year was found to have peptic ulcer disease     Apparently she has not been taking any antiacid medicine at this time she says that she do not feel heartburns       She denies any black-colored stools or any rectal bleeding     Has history for constipation also taking narcotic pain medications for a long time       Extreme obesity     Some abdominal bloating and gas symptoms    Has been followed by hematology oncology and receiving IV iron she says that she cannot take p.o. iron because that makes her sick    Last hemoglobin was 9.3    HISTORY OF PRESENT ILLNESS: Ms.Betty Nicholas Grijalva is a 60 y.o. female with a past history remarkable for , referred for evaluation of   Chief Complaint   Patient presents with    Anemia     HGB  yesterday 9.9   . Past Medical,Family, and Social History reviewed and does contribute to the patient presenting condition. Patient's PMH/PSH,SH,PSYCH Hx, MEDs, ALLERGIES, and ROS were all reviewed and updated in the appropriate sections.     PAST MEDICAL HISTORY:  Past Medical History:   Diagnosis Date    Acute hypoxemic respiratory failure (HCC)     Arthritis     CHF (congestive heart failure) TAKE 1 CAPSULE BY MOUTH EVERY DAY, Disp: 90 capsule, Rfl: 3    oxyCODONE (OXY-IR) 30 MG immediate release tablet, Take 1 tablet by mouth every 8 hours as needed for Pain for up to 30 days. , Disp: 90 tablet, Rfl: 0    pregabalin (LYRICA) 150 MG capsule, 2 times daily. , Disp: , Rfl:     MULTIPLE VITAMIN PO, Take by mouth nightly, Disp: , Rfl:     glimepiride (AMARYL) 4 MG tablet, TAKE 2 TABLETS BY MOUTH EVERY MORNING (BEFORE BREAKFAST), Disp: 180 tablet, Rfl: 3    furosemide (LASIX) 40 MG tablet, May increase to 40 mg BID as physician orders. (Patient taking differently: 40 mg May increase to 40 mg BID as physician orders.), Disp: 90 tablet, Rfl: 3    aspirin 81 MG tablet, Take 1 tablet by mouth daily (with breakfast), Disp: 30 tablet, Rfl: 11    linagliptin (TRADJENTA) 5 MG tablet, Take 1 tablet by mouth daily, Disp: 30 tablet, Rfl: 11    carvedilol (COREG) 12.5 MG tablet, Take 1 tablet by mouth 2 times daily (with meals), Disp: 60 tablet, Rfl: 11    blood glucose test strips (ASCENSIA AUTODISC VI;ONE TOUCH ULTRA TEST VI) strip, Accucheck Solange. Check daily; dx E11.9, Disp: 100 strip, Rfl: 11    Lancets MISC, 1 each by Does not apply route daily Accucheck Solange dx E11.9 check daily, Disp: 100 each, Rfl: 11    atorvastatin (LIPITOR) 40 MG tablet, TAKE 1 TABLET BY MOUTH EVERY DAY AT NIGHT, Disp: 90 tablet, Rfl: 3    diclofenac sodium 1 % GEL, Apply 2 g topically 3 times daily, Disp: 1 Tube, Rfl: 11    Calcium Carb-Cholecalciferol (CALCIUM 600 + D PO), Take 1 tablet by mouth 2 times daily, Disp: , Rfl:     Multiple Vitamins-Iron (ONE DAILY MULTIVITAMIN/IRON PO), Take 1 tablet by mouth nightly, Disp: , Rfl:     Coenzyme Q10 (COQ10 PO), Take 200 mg by mouth nightly , Disp: , Rfl:     Loratadine-Pseudoephedrine (CLARITIN-D 12 HOUR PO), Take 1 tablet by mouth daily. , Disp: , Rfl:     clonazePAM (KLONOPIN) 0.5 MG tablet, Take 1 tablet by mouth 2 times daily as needed (tremor) for up to 30 doses. , Disp: 30 tablet, Rfl: 0    albuterol (PROVENTIL) (2.5 MG/3ML) 0.083% nebulizer solution, Take 3 mLs by nebulization 2 times daily, Disp: 120 each, Rfl: 3    ALLERGIES:   Allergies   Allergen Reactions    Adhesive Tape Other (See Comments)     Skin blisters severe skin tearing    Morphine Other (See Comments)     Severe Headache    Iron Other (See Comments)     Stomach cramps    Lisinopril      Elevated creatinine    Metformin And Related      Elevated creatinine      Pcn [Penicillins] Swelling     Injection site swelling       FAMILY HISTORY:       Problem Relation Age of Onset    Diabetes Mother     Cancer Mother         Multiple Myeloma    Lung Cancer Father     Diabetes Sister     Diabetes Brother     Stomach Cancer Brother     Diabetes Maternal Grandmother     High Blood Pressure Paternal Grandmother     Heart Disease Paternal Grandmother     Stroke Paternal Grandmother     Heart Attack Paternal Grandfather     COPD Sister     High Blood Pressure Son     High Blood Pressure Daughter     Heart Disease Sister         stents in         SOCIAL HISTORY:   Social History     Socioeconomic History    Marital status:      Spouse name: Michelle Perry Number of children: 3    Years of education: Not on file    Highest education level: Not on file   Occupational History    Not on file   Social Needs    Financial resource strain: Not on file    Food insecurity     Worry: Not on file     Inability: Not on file   [employer] needs     Medical: Not on file     Non-medical: Not on file   Tobacco Use    Smoking status: Never Smoker    Smokeless tobacco: Never Used   Substance and Sexual Activity    Alcohol use: Not Currently     Alcohol/week: 0.0 standard drinks     Comment: rarely    Drug use: No    Sexual activity: Yes     Partners: Male   Lifestyle    Physical activity     Days per week: Not on file     Minutes per session: Not on file    Stress: Not on file   Relationships    Social Exam  Nursing note reviewed. Constitutional:       Appearance: She is well-developed. Comments: Anxious     Extreme obesity   HENT:      Head: Normocephalic and atraumatic. Eyes:      Conjunctiva/sclera: Conjunctivae normal.      Pupils: Pupils are equal, round, and reactive to light. Neck:      Musculoskeletal: Normal range of motion and neck supple. Cardiovascular:      Heart sounds: Normal heart sounds. Pulmonary:      Effort: Pulmonary effort is normal.      Breath sounds: Normal breath sounds. Abdominal:      General: Bowel sounds are normal.      Palpations: Abdomen is soft. Comments: NON TENDER, NON DISTENTED    BOWEL SOUNDS ARE POSITIVE   Very obese belly not able to palpate any organs clearly   Musculoskeletal: Normal range of motion. Skin:     General: Skin is warm. Neurological:      Mental Status: She is alert and oriented to person, place, and time. Psychiatric:         Behavior: Behavior normal.           LABORATORY DATA: Reviewed  Lab Results   Component Value Date    WBC 14.2 (H) 07/16/2020    HGB 9.9 (L) 07/16/2020    HCT 34.6 (L) 07/16/2020    MCV 85.2 07/16/2020     07/16/2020     06/29/2020    K 4.2 06/29/2020    CL 97 (L) 06/29/2020    CO2 32 (H) 06/29/2020    BUN 15 06/29/2020    CREATININE 1.26 (H) 06/29/2020    LABPROT 7.5 03/07/2012    LABALBU 3.6 03/09/2020    BILITOT 0.30 03/09/2020    ALKPHOS 75 03/09/2020    AST 26 03/09/2020    ALT 19 03/09/2020    INR 1.0 02/10/2020         Lab Results   Component Value Date    RBC 4.06 07/16/2020    HGB 9.9 (L) 07/16/2020    MCV 85.2 07/16/2020    MCH 24.4 (L) 07/16/2020    MCHC 28.6 07/16/2020    RDW 25.7 (H) 07/16/2020    MPV 11.1 07/16/2020    BASOPCT 0 07/16/2020    LYMPHSABS 1.85 07/16/2020    MONOSABS 1.14 (H) 07/16/2020    NEUTROABS 10.36 (H) 07/16/2020    EOSABS 0.71 (H) 07/16/2020    BASOSABS 0.00 07/16/2020         DIAGNOSTIC TESTING:     No results found. Assessment  1.  Anemia, unspecified type 2. Morbid obesity ([address]    3. PUD (peptic ulcer disease)    4. Slow transit constipation        Plan    Linzess    Famotidine    F/u with hematology     Pt was given instructions and advice in detail about the symptom of constipation. She was explained about avoidance of fast food, soda pops, cheese and red meat. Was also told to avoid sedatives narcotics and pain killers if possible. Pt was advised to start drinking ample amount of water and liquid. Was told to adapt and follow an exercise regimen. Instructions were given to increase the amount of fiber including dietary in terms of bran, cereals, whole wheat, brown bread etc. Was also instructed to start using supplemental fiber either Metamucil, citrucell or bennafiber with ample liquids. She was told to start drinking prune juice which is good for constipation. If symptoms don't resolve she will require medicines to assist with her symptoms    Pt has verbalized understanding and agreement to this plan. Pt was advised in detail about some life style and dietary modifications. She was advised about avoidance of caffeine, nicotine and chocolate. Pt was also told to stay away from any kind of fast foods, soda pops. She was also advised to avoid lots of spices, grease and fried food etc.     Instructions were also given about trying to arrange the timing, quality and quantity of food. Instructions were given about using ample amount of fiber including dietary and supplemental fiber either metamucil, bennafiber or citrucell etc.  Pt was advised about drinking ample amount of water without any colors or chemicals. Stress was given about regular exercise. Pt has verbalized understanding and agreement to these modifications.         Continue to follow hemoglobin hematocrit    Call for any problem issues or active bleeding    Discussed with her     I communicated with the patient and/or health care decision maker about   Details of this discussion including any medical advice provided:YES      I affirm this is a Patient Initiated Episode with an Established Patient who has not had a related appointment within my department in the past 7 days or scheduled within the next 24 hours. Total Time: minutes: 21-30 minutes    Note: not billable if this call serves to triage the patient into an appointment for the relevant concern      Thank you for allowing me to participate in the care of Ms. Beltran. For any further questions please do not hesitate to contact me. I have reviewed and agree with the ROS entered by the MA/LPN.          Lorenzo Camacho MD, [facility]  Board Certified in Gastroenterology and [address] [place] [address] Gastroenterology  Office #: [phone]

## 2020-07-20 ENCOUNTER — TELEPHONE (OUTPATIENT)
Dept: GASTROENTEROLOGY | Age: 65
End: 2020-07-20

## 2020-08-11 ENCOUNTER — OFFICE VISIT (OUTPATIENT)
Dept: ORTHOPEDIC SURGERY | Age: 65
End: 2020-08-11
Payer: COMMERCIAL

## 2020-08-11 VITALS — WEIGHT: 275 LBS | HEIGHT: 60 IN | BODY MASS INDEX: 53.99 KG/M2 | TEMPERATURE: 97 F

## 2020-08-11 PROCEDURE — 1036F TOBACCO NON-USER: CPT | Performed by: ORTHOPAEDIC SURGERY

## 2020-08-11 PROCEDURE — G8427 DOCREV CUR MEDS BY ELIG CLIN: HCPCS | Performed by: ORTHOPAEDIC SURGERY

## 2020-08-11 PROCEDURE — 3017F COLORECTAL CA SCREEN DOC REV: CPT | Performed by: ORTHOPAEDIC SURGERY

## 2020-08-11 PROCEDURE — 99203 OFFICE O/P NEW LOW 30 MIN: CPT | Performed by: ORTHOPAEDIC SURGERY

## 2020-08-11 PROCEDURE — G8417 CALC BMI ABV UP PARAM F/U: HCPCS | Performed by: ORTHOPAEDIC SURGERY

## 2020-08-11 NOTE — LETTER
8/11/2020    Judah Garcia, [address]  [address]ending sale to [facility], [address] [place] [address]    RE: Hanny De León    Dear Dr. Ruth Lilly,    Thank you for allowing me to participate in the care of Ms. Beltran. I had the opportunity to evaluate the patient on 8/11/2020. Attached you will find my evaluation and recommendations. Thanks again for the confidence you have expressed in me by allowing my participation in the care of your patient. I will keep you apprised of further developments in the patients treatment course as it progresses. If I can be of further assistance in any fashion, please feel free to contact me at your convenience.     Sincerely,        Vandana Hodge  Shoulder and Elbow Surgery

## 2020-08-12 ENCOUNTER — OFFICE VISIT (OUTPATIENT)
Dept: ORTHOPEDIC SURGERY | Age: 65
End: 2020-08-12
Payer: COMMERCIAL

## 2020-08-12 VITALS
WEIGHT: 275 LBS | HEART RATE: 67 BPM | BODY MASS INDEX: 53.99 KG/M2 | DIASTOLIC BLOOD PRESSURE: 77 MMHG | HEIGHT: 60 IN | SYSTOLIC BLOOD PRESSURE: 154 MMHG

## 2020-08-12 PROCEDURE — 99203 OFFICE O/P NEW LOW 30 MIN: CPT | Performed by: FAMILY MEDICINE

## 2020-08-12 PROCEDURE — G8417 CALC BMI ABV UP PARAM F/U: HCPCS | Performed by: FAMILY MEDICINE

## 2020-08-12 PROCEDURE — 20606 DRAIN/INJ JOINT/BURSA W/US: CPT | Performed by: FAMILY MEDICINE

## 2020-08-12 PROCEDURE — 3017F COLORECTAL CA SCREEN DOC REV: CPT | Performed by: FAMILY MEDICINE

## 2020-08-12 PROCEDURE — 1036F TOBACCO NON-USER: CPT | Performed by: FAMILY MEDICINE

## 2020-08-12 PROCEDURE — S0020 INJECTION, BUPIVICAINE HYDRO: HCPCS | Performed by: FAMILY MEDICINE

## 2020-08-12 PROCEDURE — G8427 DOCREV CUR MEDS BY ELIG CLIN: HCPCS | Performed by: FAMILY MEDICINE

## 2020-08-12 RX ORDER — BUPIVACAINE HYDROCHLORIDE 5 MG/ML
1 INJECTION, SOLUTION PERINEURAL ONCE
Status: COMPLETED | OUTPATIENT
Start: 2020-08-12 | End: 2020-08-12

## 2020-08-12 RX ORDER — TRIAMCINOLONE ACETONIDE 40 MG/ML
40 INJECTION, SUSPENSION INTRA-ARTICULAR; INTRAMUSCULAR ONCE
Status: COMPLETED | OUTPATIENT
Start: 2020-08-12 | End: 2020-08-12

## 2020-08-12 RX ADMIN — BUPIVACAINE HYDROCHLORIDE 5 MG: 5 INJECTION, SOLUTION PERINEURAL at 14:36

## 2020-08-12 RX ADMIN — TRIAMCINOLONE ACETONIDE 40 MG: 40 INJECTION, SUSPENSION INTRA-ARTICULAR; INTRAMUSCULAR at 14:36

## 2020-08-12 NOTE — PROGRESS NOTES
Sports Medicine Consultation    CHIEF COMPLAINT:  Shoulder Pain (Lt shoulder. AC inj. kat referral)        HPI:   The patient is a 63 y.o. female who is being seen as a new patient being seen for regarding new problem left shoulder. The patient is a right hand dominant female who has had shoulder pain for weeks. As far as trauma to the shoulder, the patient indicates none, surgery several years ago no trouble till now. The pain is  worse at night and when doing overhead activities. Weakness of the shoulder has not  been noted. The pain restricts activities such as many. The pain does not seem to improve with time. The following medications have been tried: heating pad with benefit. Physical Therapy has not been tried. Corticosteroid injection has not been done. Neck pain has been present. she has a past medical history of Acute hypoxemic respiratory failure ([address] Arthritis, CHF (congestive heart failure) ([facility] [address] Chronic back pain, Diabetes mellitus type II, controlled ([address] Fibromyalgia, Hyperlipidemia LDL goal < 70, Hypertension, benign, Morbid obesity with BMI of 60.0-69.9, adult ([address] Pain of toe of right foot, Right wrist pain, Skin cancer, Sleep apnea, and Wears glasses. she has a past surgical history that includes Tubal ligation (1981); Hysterectomy (1998); lumbar fusion (10/2010; 03/2011); Carpal tunnel release (Right, 09/05/2014); cyst removal (1985); Cholecystectomy, laparoscopic (1998); lumbar fusion (2009); Rotator cuff repair (Left, 2012); Cataract removal with implant (Bilateral, 2013); Skin cancer excision (2005); Inguinal hernia repair (Bilateral, 01/20/2017); other surgical history (05/02/2018); Upper gastrointestinal endoscopy (N/A, 10/16/2019); and Colonoscopy (N/A, 10/17/2019).     Past Medical History:   Diagnosis Date    Acute hypoxemic respiratory failure (HCC)     Arthritis     CHF (congestive heart failure) (HCC)     Chronic back pain     Diabetes mellitus type II, controlled ([facility] [address] 2/14/2012    Fibromyalgia     Hyperlipidemia LDL goal < 70 2/14/2012    Hypertension, benign 02/14/2012    Morbid obesity with BMI of 60.0-69.9, adult ([facility] [address] 8/28/2015    Pain of toe of right foot     morgans cyst    Right wrist pain     rate 8    Skin cancer     skin under lt eye,face    Sleep apnea     Bipap does not work    Wears glasses        Past Surgical History:   Procedure Laterality Date    CARPAL TUNNEL RELEASE Right 09/05/2014    CATARACT REMOVAL WITH IMPLANT Bilateral 2013    CHOLECYSTECTOMY, LAPAROSCOPIC  1998    COLONOSCOPY N/A 10/17/2019    COLONOSCOPY DIAGNOSTIC performed by Clark Trevino MD at [facility] cyst from base of tail bone   [address] [place] Bilateral 01/20/2017    abcess removed    LUMBAR FUSION  10/2010; 03/2011    L4/L5    LUMBAR FUSION  2009    L4-S1    OTHER SURGICAL HISTORY  05/02/2018    Lumbar decompression laminectomy at L3-L4 bilaterally wiith complete facetomies at L3-L4 bilaterally;  diskectomy L3-L4: posterior lumbar interbody fusion; placement of Oakley-type graft; local harvest of bone; microsurgical dissection.  ROTATOR CUFF REPAIR Left 2012    SKIN CANCER EXCISION  2005    Basal Cell Carcinoma, Curly size from Lt. face    TUBAL LIGATION  1981    UPPER GASTROINTESTINAL ENDOSCOPY N/A 10/16/2019    EGD [facility]OPSY [facility] Clark [facility]       family history includes COPD in her sister; Cancer in her mother; Diabetes in her brother, maternal grandmother, mother, and sister; Heart Attack in her paternal grandfather; Heart Disease in her paternal grandmother and sister; High Blood Pressure in her daughter, paternal grandmother, and son; Avery Matte in her father; Stomach Cancer in her brother; Stroke in her paternal grandmother.     Social History     Socioeconomic History    Marital status:      Spouse name: Lexa Lr Number of children: 3  Years of education: Not on file    Highest education level: Not on file   Occupational History    Not on file   Social Needs    Financial resource strain: Not on file    Food insecurity     Worry: Not on file     Inability: Not on file    Transportation needs     Medical: Not on file     Non-medical: Not on file   Tobacco Use    Smoking status: Never Smoker    Smokeless tobacco: Never Used   Substance and Sexual Activity    Alcohol use: Not Currently     Alcohol/week: 0.0 standard drinks     Comment: rarely    Drug use: No    Sexual activity: Yes     Partners: Male   Lifestyle    Physical activity     Days per week: Not on file     Minutes per session: Not on file    Stress: Not on file   Relationships    Social connections     Talks on phone: Not on file     Gets together: Not on file     Attends Orthodoxy service: Not on file     Active member of club or organization: Not on file     Attends meetings of clubs or organizations: Not on file     Relationship status: Not on file    Intimate partner violence     Fear of current or ex partner: Not on file     Emotionally abused: Not on file     Physically abused: Not on file     Forced sexual activity: Not on file   Other Topics Concern    Not on file   Social History Narrative    Not on file       Current Outpatient Medications   Medication Sig Dispense Refill    oxyCODONE (OXY-IR) 30 MG immediate release tablet Take 1 tablet by mouth every 8 hours as needed for Pain for up to 30 days. 90 tablet 0    oxyCODONE-acetaminophen (PERCOCET) 5-325 MG per tablet Take 1-2 tablets by mouth every 6 hours as needed for Pain.  120 tablet 0    amLODIPine (NORVASC) 10 MG tablet Take 1 tablet by mouth daily Takes with dinner 90 tablet 3    famotidine (PEPCID) 20 MG tablet Take 1 tablet by mouth 2 times daily 180 tablet 1    linaclotide (LINZESS) 145 MCG capsule Take 1 capsule by mouth every morning (before breakfast) 30 capsule 3    venlafaxine (EFFEXOR XR) 37.5 MG extended release capsule TAKE 1 CAPSULE BY MOUTH EVERY DAY 90 capsule 3    clonazePAM (KLONOPIN) 0.5 MG tablet Take 1 tablet by mouth 2 times daily as needed (tremor) for up to 30 doses. 30 tablet 0    pregabalin (LYRICA) 150 MG capsule 2 times daily.  MULTIPLE VITAMIN PO Take by mouth nightly      glimepiride (AMARYL) 4 MG tablet TAKE 2 TABLETS BY MOUTH EVERY MORNING (BEFORE BREAKFAST) 180 tablet 3    furosemide (LASIX) 40 MG tablet May increase to 40 mg BID as physician orders. (Patient taking differently: 40 mg May increase to 40 mg BID as physician orders.) 90 tablet 3    aspirin 81 MG tablet Take 1 tablet by mouth daily (with breakfast) 30 tablet 11    linagliptin (TRADJENTA) 5 MG tablet Take 1 tablet by mouth daily 30 tablet 11    carvedilol (COREG) 12.5 MG tablet Take 1 tablet by mouth 2 times daily (with meals) 60 tablet 11    blood glucose test strips (ASCENSIA AUTODISC VI;ONE TOUCH ULTRA TEST VI) strip Accucheck Solange. Check daily; dx E11.9 100 strip 11    Lancets MISC 1 each by Does not apply route daily Accucheck Solange dx E11.9 check daily 100 each 11    albuterol (PROVENTIL) (2.5 MG/3ML) 0.083% nebulizer solution Take 3 mLs by nebulization 2 times daily 120 each 3    atorvastatin (LIPITOR) 40 MG tablet TAKE 1 TABLET BY MOUTH EVERY DAY AT NIGHT 90 tablet 3    diclofenac sodium 1 % GEL Apply 2 g topically 3 times daily 1 Tube 11    Calcium Carb-Cholecalciferol (CALCIUM 600 + D PO) Take 1 tablet by mouth 2 times daily      Multiple Vitamins-Iron (ONE DAILY MULTIVITAMIN/IRON PO) Take 1 tablet by mouth nightly      Coenzyme Q10 (COQ10 PO) Take 200 mg by mouth nightly       Loratadine-Pseudoephedrine (CLARITIN-D 12 HOUR PO) Take 1 tablet by mouth daily. No current facility-administered medications for this visit. Allergies:  sheis allergic to adhesive tape; morphine; iron; lisinopril; metformin and related; and pcn [penicillins].     ROS:  CV:  Denies chest pain; palpitations; shortness of breath; swelling of feet, ankles; and loss of consciousness. CON: Denies fever and dizziness. ENT:  Denies hearing loss / ringing, ear infections hoarseness, and swallowing problems. RESP:  Denies chronic cough, spitting up blood, and asthma/wheezing. GI: Denies abdominal pain, change in bowel habits, nausea or vomiting, and blood in stools. :  Denies frequent urination, burning or painful urination, blood in the urine, and bladder incontinence. NEURO:  Denies headache, memory loss, sleep disturbance, and tremor or movement disorder. PHYSICAL EXAM:   BP (!) 154/77 (Site: Left Lower Arm)   Pulse 67   Ht 5' (1.524 m)   Wt 275 lb (124.7 kg)   LMP  (LMP Unknown)   BMI 53.71 kg/m²   GENERAL: Vandana Lehman is a 63 y.o. female who is alert and oriented and sitting comfortably in our office. SKIN:  Intact without rashes, lesions or ulcerations. No obvious deformity or swelling. NEURO: Musculoskeletal and axillary nerves intact to sensory and motor testing. EYES:  Extraocular muscles intact. MOUTH: Oral mucosa moist.  No perioral lesions. PULM:  Respirations unlabored and regular. VASC:  Capillary refill less than 3 seconds. Cervical spine ROM WNL  Spurlings: negative,     MSK:  Forward elevation 120degrees, external rotation in neutral 65 degrees, abduction 135 degrees, internal rotation to T12. Supraspinatus 5/5   External rotators 5/5  Internal 5/5  Full Can negative   Empty Can negative   Neer's test positive   Osborn-Jeremy test. positive. Pain with cross body adduction positive. Speed's test negative   Pain over AC joint positive. Pain over traps/rhomboids positive. PSYCH:  Patient has good fund of knowledge and displays understanding of exam.    RADIOLOGY: No results found. IMPRESSION:     1. Arthrosis of left acromioclavicular joint        PLAN:   We discussed some of the etiologies and natural histories of     ICD-10-CM    1.  Arthrosis of left acromioclavicular joint  M19.012      We discussed the various treatment alternatives including anti-inflammatory medications, physical therapy, injections, further imaging studies and as a last resort surgery. At this point patient would like a ultrasound-guided acromioclavicular joint injection and after the risk, benefits, alternatives of procedure itself were discussed patient's left shoulder was prepped in sterile manner with Betadine the skin was cooled cold spray the recleaned with an alcohol prep structures the all the acromioclavicular joint were visualized in image capture vascular structures were noted to be avoided and then under ultrasound guidance injected 1 mL 40 mg Kenalog 1 mL 0.5% Marcaine into the acromioclavicular joint patient tolerated procedure fairly well felt some immediate pain relief follow-up otherwise as needed    Return to clinic Return if symptoms worsen or fail to improve. .    Please be aware portions of this note were completed using voice recognition software and unforeseen errors may have occurred    Electronically signed by Kay Woodruff DO, FAOASM on 8/12/20 at 10:55 AM EDT

## 2020-08-15 NOTE — PROGRESS NOTES
Orthopedic Shoulder Encounter Note     Chief complaint: Left shoulder pain    HPI: Sandra Poe is a 63 y.o. right-hand dominant female who presents for evaluation of her left shoulder. She has been having pain for about 2 weeks now. She denies any precipitating trauma or injury. She indicates that her pain is primarily localized to the superior aspect of the shoulder and it is constant. She has a hard time moving the shoulder in any direction without having pain. Her pain is associated with some mild stiffness but she denies any weakness. Previous treatment:    NSAIDs: None    Physical Therapy: No    Injections: None    Surgeries: History of left shoulder rotator cuff repair by Dr. Larsisa Mcdermott 10 years ago    Review of Systems:     Constitution: no fever or chills   Pain level: 8/10  Musculoskeletal: As noted in the HPI   Neurologic: no neurologic symptoms    Past Medical History  Jean Pierre Payne  has a past medical history of Acute hypoxemic respiratory failure ([address] Arthritis, CHF (congestive heart failure) ([address] Chronic back pain, Diabetes mellitus type II, controlled ([address] Fibromyalgia, Hyperlipidemia LDL goal < 70, Hypertension, benign, Morbid obesity with BMI of 60.0-69.9, adult ([address] Pain of toe of right foot, Right wrist pain, Skin cancer, Sleep apnea, and Wears glasses. Past Surgical History  Jean Pierre Payne  has a past surgical history that includes Tubal ligation (1981); Hysterectomy (1998); lumbar fusion (10/2010; 03/2011); Carpal tunnel release (Right, 09/05/2014); cyst removal (1985); Cholecystectomy, laparoscopic (1998); lumbar fusion (2009); Rotator cuff repair (Left, 2012); Cataract removal with implant (Bilateral, 2013); Skin cancer excision (2005); Inguinal hernia repair (Bilateral, 01/20/2017); other surgical history (05/02/2018); Upper gastrointestinal endoscopy (N/A, 10/16/2019); and Colonoscopy (N/A, 10/17/2019).     Current Medications  Current Outpatient Medications   Medication Sig Dispense Refill    oxyCODONE (OXY-IR) 30 MG immediate release tablet Take 1 tablet by mouth every 8 hours as needed for Pain for up to 30 days. 90 tablet 0    oxyCODONE-acetaminophen (PERCOCET) 5-325 MG per tablet Take 1-2 tablets by mouth every 6 hours as needed for Pain. 120 tablet 0    amLODIPine (NORVASC) 10 MG tablet Take 1 tablet by mouth daily Takes with dinner 90 tablet 3    famotidine (PEPCID) 20 MG tablet Take 1 tablet by mouth 2 times daily 180 tablet 1    linaclotide (LINZESS) 145 MCG capsule Take 1 capsule by mouth every morning (before breakfast) 30 capsule 3    venlafaxine (EFFEXOR XR) 37.5 MG extended release capsule TAKE 1 CAPSULE BY MOUTH EVERY DAY 90 capsule 3    pregabalin (LYRICA) 150 MG capsule 2 times daily.  MULTIPLE VITAMIN PO Take by mouth nightly      glimepiride (AMARYL) 4 MG tablet TAKE 2 TABLETS BY MOUTH EVERY MORNING (BEFORE BREAKFAST) 180 tablet 3    furosemide (LASIX) 40 MG tablet May increase to 40 mg BID as physician orders. (Patient taking differently: 40 mg May increase to 40 mg BID as physician orders.) 90 tablet 3    aspirin 81 MG tablet Take 1 tablet by mouth daily (with breakfast) 30 tablet 11    linagliptin (TRADJENTA) 5 MG tablet Take 1 tablet by mouth daily 30 tablet 11    carvedilol (COREG) 12.5 MG tablet Take 1 tablet by mouth 2 times daily (with meals) 60 tablet 11    blood glucose test strips (ASCENSIA AUTODISC VI;ONE TOUCH ULTRA TEST VI) strip Accucheck Solange.  Check daily; dx E11.9 100 strip 11    Lancets MISC 1 each by Does not apply route daily Accucheck Solange dx E11.9 check daily 100 each 11    atorvastatin (LIPITOR) 40 MG tablet TAKE 1 TABLET BY MOUTH EVERY DAY AT NIGHT 90 tablet 3    diclofenac sodium 1 % GEL Apply 2 g topically 3 times daily 1 Tube 11    Calcium Carb-Cholecalciferol (CALCIUM 600 + D PO) Take 1 tablet by mouth 2 times daily      Multiple Vitamins-Iron (ONE DAILY MULTIVITAMIN/IRON PO) Take 1 tablet by mouth nightly      Coenzyme Q10 (COQ10 PO) Take 200 mg by mouth nightly       Loratadine-Pseudoephedrine (CLARITIN-D 12 HOUR PO) Take 1 tablet by mouth daily.  clonazePAM (KLONOPIN) 0.5 MG tablet Take 1 tablet by mouth 2 times daily as needed (tremor) for up to 30 doses. 30 tablet 0    albuterol (PROVENTIL) (2.5 MG/3ML) 0.083% nebulizer solution Take 3 mLs by nebulization 2 times daily 120 each 3     No current facility-administered medications for this visit. Allergies  Allergies have been reviewed. Tyler Barahona is allergic to adhesive tape; morphine; iron; lisinopril; metformin and related; and pcn [penicillins]. Social History  Tyler Barahona  reports that she has never smoked. She has never used smokeless tobacco. She reports previous alcohol use. She reports that she does not use drugs. Family History  Camelia's family history includes COPD in her sister; Cancer in her mother; Diabetes in her brother, maternal grandmother, mother, and sister; Heart Attack in her paternal grandfather; Heart Disease in her paternal grandmother and sister; High Blood Pressure in her daughter, paternal grandmother, and son; Arlander Cross in her father; Stomach Cancer in her brother; Stroke in her paternal grandmother.      Physical Exam:     Temp 97 °F (36.1 °C) (Infrared)   Ht 5' (1.524 m)   Wt 275 lb (124.7 kg)   LMP  (LMP Unknown)   BMI 53.71 kg/m²    General Appearance: alert, well appearing, and in no distress  Mental Status: alert, oriented to person, place, and time  Gait: normal    Shoulder:    Skin: warm and dry, no rash or erythema; no swelling or obvious muscular atrophy  Vasculature: 2+ radial pulses bilaterally  Neuro: Sensation grossly intact to light touch diffusely  Tenderness: Tender to palpation over the superior aspect of the left shoulder at the level of the acromioclavicular joint and also tender to palpation over the anterior aspect of the shoulder along the biceps tendon in the bicipital groove    ROM: (Degrees)    Right   A P   Left   A P    Elevation  130    Elevation  75 95  Abduction  105    Abduction  70  100  ER   65    ER   55 85  IR   L4    IR   L3   90 abd/ER      90 abd/ER     90 abd/IR      90 abd/IR     Crepitation  No    Crepitation No  Dyskenesia  No    Dyskenesia No      Muscle strength:    Right       Left    Deltoid   5    Deltoid   5  Supraspinatus  5    Supraspinatus  4  ER   5    ER   5  IR   5    IR   5    Special tests    Right   Rotator Cuff    Left    n   Painful arc    y   n   Pain with ER    n    n   Neer's     y    n   Hawkin's    n    n   Drop Arm    n  n   Lift off/Belly Press   n  n   ER Lag    n          AC Joint  n   AC tenderness   y  n   Cross-chest adduction  y       Labrum/biceps    n   L[place]as[place]'s    y (equivocal)   n   Biceps sheer    y      n   Speed's/Yergason's   y    n   Tenderness Biceps Groove  y    n   Srikanth's    n         Instability  n   Ant Apprehension   n    n   Post Apprehension   n    n   Ant Load shift    n    n   Post Load shift   n   n   Sulcus     n  n   Generalized Laxity   n  n   Relocation test   n  n   Crank test     n  n   Marcus-superior escape  n       Imaging:  Xrays: 4 views of the left shoulder obtained on 8/11/2020 were independently reviewed  Indications: Left shoulder pain  Findings: Normal left glenohumeral joint space. Mild to moderate acromioclavicular joint space narrowing associated with osteophytic change at the distal clavicle. Type II acromion. No obvious fracture, dislocation, or subluxation. Impression: Left shoulder radiograph with mild to moderate acromioclavicular joint degeneration. Impression/Plan:     Shimon Wilkinson is a 63 y.o. old female with left shoulder pain that at this time appears to be primarily due to acromioclavicular joint arthrosis. I had a discussion with the patient today with regards to this educating her about this problem.   We discussed treatment options available to her including nonoperative and operative intervention. I would recommend pursuing conservative management at this time. As a result I did recommend a cortisone injection. To increase the accuracy of this injection I would recommend that it be done under ultrasound guidance. Consequently I will have her see my partner Dr. Quyen Krause to have this procedure done. A referral was placed. We will facilitate her getting an appointment in a timely fashion. I will have her follow-up my clinic as needed with persistent or worsening symptoms and with any questions under concerns.         NA = Not assessed  RTC = Rotator cuff  RCT = Rotator cuff tear  ER = External rotation  IR = Internal rotation  AC = Acromioclavicular  GH = Glenohumeral  n = No  y = Yes

## 2020-09-14 ENCOUNTER — HOSPITAL ENCOUNTER (OUTPATIENT)
Dept: CT IMAGING | Age: 65
Discharge: HOME | End: 2020-09-16
Payer: COMMERCIAL

## 2020-09-14 PROCEDURE — 71250 CT THORAX DX C-: CPT

## 2020-09-18 ENCOUNTER — HOSPITAL ENCOUNTER (EMERGENCY)
Age: 65
Discharge: HOME | End: 2020-09-18
Attending: EMERGENCY MEDICINE
Payer: COMMERCIAL

## 2020-09-18 ENCOUNTER — APPOINTMENT (OUTPATIENT)
Dept: CT IMAGING | Age: 65
End: 2020-09-18
Payer: COMMERCIAL

## 2020-09-18 VITALS
HEART RATE: 65 BPM | BODY MASS INDEX: 53.79 KG/M2 | RESPIRATION RATE: 16 BRPM | WEIGHT: 274 LBS | HEIGHT: 60 IN | DIASTOLIC BLOOD PRESSURE: 63 MMHG | SYSTOLIC BLOOD PRESSURE: 150 MMHG | OXYGEN SATURATION: 95 % | TEMPERATURE: 98.1 F

## 2020-09-18 LAB
-: ABNORMAL
ABSOLUTE EOS #: 0.15 K/UL (ref 0–0.4)
ABSOLUTE IMMATURE GRANULOCYTE: ABNORMAL K/UL (ref 0–0.3)
ABSOLUTE LYMPH #: 0.89 K/UL (ref 1–4.8)
ABSOLUTE MONO #: 1.18 K/UL (ref 0.1–1.3)
ALBUMIN SERPL-MCNC: 4 G/DL (ref 3.5–5.2)
ALBUMIN/GLOBULIN RATIO: ABNORMAL (ref 1–2.5)
ALP BLD-CCNC: 127 U/L (ref 35–104)
ALT SERPL-CCNC: 25 U/L (ref 5–33)
AMORPHOUS: ABNORMAL
ANION GAP SERPL CALCULATED.3IONS-SCNC: 15 MMOL/L (ref 9–17)
AST SERPL-CCNC: 27 U/L
ATYPICAL LYMPHOCYTE ABSOLUTE COUNT: 0.15 K/UL
ATYPICAL LYMPHOCYTES: 1 %
BACTERIA: ABNORMAL
BASOPHILS # BLD: 0 % (ref 0–2)
BASOPHILS ABSOLUTE: 0 K/UL (ref 0–0.2)
BILIRUB SERPL-MCNC: 0.32 MG/DL (ref 0.3–1.2)
BILIRUBIN URINE: NEGATIVE
BUN BLDV-MCNC: 20 MG/DL (ref 8–23)
BUN/CREAT BLD: ABNORMAL (ref 9–20)
CALCIUM SERPL-MCNC: 9.3 MG/DL (ref 8.6–10.4)
CASTS UA: ABNORMAL /LPF
CHLORIDE BLD-SCNC: 95 MMOL/L (ref 98–107)
CO2: 26 MMOL/L (ref 20–31)
COLOR: YELLOW
COMMENT UA: ABNORMAL
CREAT SERPL-MCNC: 1.32 MG/DL (ref 0.5–0.9)
CRYSTALS, UA: ABNORMAL /HPF
DIFFERENTIAL TYPE: ABNORMAL
EOSINOPHILS RELATIVE PERCENT: 1 % (ref 0–4)
EPITHELIAL CELLS UA: ABNORMAL /HPF
GFR AFRICAN AMERICAN: 49 ML/MIN
GFR NON-AFRICAN AMERICAN: 41 ML/MIN
GFR SERPL CREATININE-BSD FRML MDRD: ABNORMAL ML/MIN/{1.73_M2}
GFR SERPL CREATININE-BSD FRML MDRD: ABNORMAL ML/MIN/{1.73_M2}
GLUCOSE BLD-MCNC: 222 MG/DL (ref 70–99)
GLUCOSE URINE: NEGATIVE
HCT VFR BLD CALC: 39.3 % (ref 36–46)
HEMOGLOBIN: 12.9 G/DL (ref 12–16)
IMMATURE GRANULOCYTES: ABNORMAL %
KETONES, URINE: NEGATIVE
LEUKOCYTE ESTERASE, URINE: NEGATIVE
LIPASE: 47 U/L (ref 13–60)
LYMPHOCYTES # BLD: 6 % (ref 24–44)
MCH RBC QN AUTO: 27.8 PG (ref 26–34)
MCHC RBC AUTO-ENTMCNC: 32.9 G/DL (ref 31–37)
MCV RBC AUTO: 84.6 FL (ref 80–100)
MONOCYTES # BLD: 8 % (ref 1–7)
MORPHOLOGY: ABNORMAL
MUCUS: ABNORMAL
NITRITE, URINE: NEGATIVE
NRBC AUTOMATED: ABNORMAL PER 100 WBC
OTHER OBSERVATIONS UA: ABNORMAL
PDW BLD-RTO: 19.8 % (ref 11.5–14.9)
PH UA: 6 (ref 5–8)
PLATELET # BLD: 427 K/UL (ref 150–450)
PLATELET ESTIMATE: ABNORMAL
PMV BLD AUTO: 8.1 FL (ref 6–12)
POTASSIUM SERPL-SCNC: 3.7 MMOL/L (ref 3.7–5.3)
PROTEIN UA: ABNORMAL
RBC # BLD: 4.65 M/UL (ref 4–5.2)
RBC # BLD: ABNORMAL 10*6/UL
RBC UA: ABNORMAL /HPF
RENAL EPITHELIAL, UA: ABNORMAL /HPF
SEG NEUTROPHILS: 84 % (ref 36–66)
SEGMENTED NEUTROPHILS ABSOLUTE COUNT: 12.43 K/UL (ref 1.3–9.1)
SODIUM BLD-SCNC: 136 MMOL/L (ref 135–144)
SPECIFIC GRAVITY UA: 1.02 (ref 1–1.03)
TOTAL PROTEIN: 8.4 G/DL (ref 6.4–8.3)
TRICHOMONAS: ABNORMAL
TURBIDITY: CLEAR
URINE HGB: NEGATIVE
UROBILINOGEN, URINE: NORMAL
WBC # BLD: 14.8 K/UL (ref 3.5–11)
WBC # BLD: ABNORMAL 10*3/UL
WBC UA: ABNORMAL /HPF
YEAST: ABNORMAL

## 2020-09-18 PROCEDURE — 36415 COLL VENOUS BLD VENIPUNCTURE: CPT

## 2020-09-18 PROCEDURE — 99284 EMERGENCY DEPT VISIT MOD MDM: CPT

## 2020-09-18 PROCEDURE — 80053 COMPREHEN METABOLIC PANEL: CPT

## 2020-09-18 PROCEDURE — 81001 URINALYSIS AUTO W/SCOPE: CPT

## 2020-09-18 PROCEDURE — 83690 ASSAY OF LIPASE: CPT

## 2020-09-18 PROCEDURE — 85025 COMPLETE CBC W/AUTO DIFF WBC: CPT

## 2020-09-18 PROCEDURE — 74176 CT ABD & PELVIS W/O CONTRAST: CPT

## 2020-09-18 RX ORDER — ONDANSETRON 2 MG/ML
4 INJECTION INTRAMUSCULAR; INTRAVENOUS ONCE
Status: DISCONTINUED | OUTPATIENT
Start: 2020-09-18 | End: 2020-09-18 | Stop reason: HOSPADM

## 2020-09-18 RX ORDER — ONDANSETRON 4 MG/1
4 TABLET, FILM COATED ORAL EVERY 8 HOURS PRN
Qty: 20 TABLET | Refills: 0 | Status: SHIPPED | OUTPATIENT
Start: 2020-09-18 | End: 2021-08-02 | Stop reason: SDUPTHER

## 2020-09-18 ASSESSMENT — PAIN SCALES - GENERAL: PAINLEVEL_OUTOF10: 6

## 2020-09-18 NOTE — ED PROVIDER NOTES
 Wears glasses      Past Problem List  Patient Active Problem List   Diagnosis Code    Hypertension, benign I10    Diabetes mellitus type II, controlled ([facility] [address] E11.9    Other hyperlipidemia E78.49    Degeneration of cervical intervertebral disc M50.30    Cervicalgia M54.2    Rotator cuff (capsule) sprain S43.429A    Lateral epicondylitis M77.10    Radial styloid tenosynovitis M65.4    BRIANNA (obstructive sleep apnea) G47.33    Anemia D64.9    Hepatic steatosis K76.0    Lumbar radiculopathy M54.16    Morbid obesity ([facility]) E66.01    Chronic pain syndrome G89.4    H/O: GI bleed Z87.19    Iron deficiency anemia D50.9    Fibromyalgia M79.7    Class 3 severe obesity due to excess calories with serious comorbidity and body mass index (BMI) of 50.0 to 59.9 in adult ([facility]) E66.01, Z68.43    Tremor R25.1    Fatigue R53.83    Chronic renal insufficiency, stage III (moderate) ([facility]) N18.3    Cellulitis of right leg L03.115    Lymphedema of both lower extremities I89.0    PUD (peptic ulcer disease) K27.9    Slow transit constipation K59.01     SURGICAL HISTORY       Past Surgical History:   Procedure Laterality Date    CARPAL TUNNEL RELEASE Right 09/05/2014    CATARACT REMOVAL WITH IMPLANT Bilateral 2013    CHOLECYSTECTOMY, LAPAROSCOPIC  1998    COLONOSCOPY N/A 10/17/2019    COLONOSCOPY DIAGNOSTIC performed by Gdofrey Arnold MD at [facility] 238 cyst from base of tail bone    HYSTERECTOMY  1998    INGUINAL HERNIA REPAIR Bilateral 01/20/2017    abcess removed    LUMBAR FUSION  10/2010; 03/2011    L4/L5    LUMBAR FUSION  2009    L4-S1    OTHER SURGICAL HISTORY  05/02/2018    Lumbar decompression laminectomy at L3-L4 bilaterally wiith complete facetomies at L3-L4 bilaterally;  diskectomy L3-L4: posterior lumbar interbody fusion; placement of [place]-type graft; local harvest of bone; microsurgical dissection.     ROTATOR CUFF REPAIR Left 2012    SKIN CANCER EXCISION 2005    Basal Cell Carcinoma, Curly size from Lt. face    TUBAL LIGATION  1981    UPPER GASTROINTESTINAL ENDOSCOPY N/A 10/16/2019    EGD BIOPSY performed by Jama Junior MD at [facility] Medication List as of 9/18/2020  8:01 PM      CONTINUE these medications which have NOT CHANGED    Details   atorvastatin (LIPITOR) 40 MG tablet TAKE 1 TABLET BY MOUTH EVERY DAY AT NIGHT, Disp-90 tablet,R-3Normal      oxyCODONE-acetaminophen (PERCOCET) 5-325 MG per tablet Take 1-2 tablets by mouth every 6 hours as needed for Pain., Disp-120 tablet,R-0Normal      amLODIPine (NORVASC) 10 MG tablet Take 1 tablet by mouth daily Takes with dinner, Disp-90 tablet,R-3Normal      famotidine (PEPCID) 20 MG tablet Take 1 tablet by mouth 2 times daily, Disp-180 tablet,R-1Normal      linaclotide (LINZESS) 145 MCG capsule Take 1 capsule by mouth every morning (before breakfast), Disp-30 capsule,R-3Normal      venlafaxine (EFFEXOR XR) 37.5 MG extended release capsule TAKE 1 CAPSULE BY MOUTH EVERY DAY, Disp-90 capsule, R-3Normal      clonazePAM (KLONOPIN) 0.5 MG tablet Take 1 tablet by mouth 2 times daily as needed (tremor) for up to 30 doses. , Disp-30 tablet,R-0Normal      pregabalin (LYRICA) 150 MG capsule 2 times daily. Historical Med      MULTIPLE VITAMIN PO Take by mouth nightlyHistorical Med      glimepiride (AMARYL) 4 MG tablet TAKE 2 TABLETS BY MOUTH EVERY MORNING (BEFORE BREAKFAST), Disp-180 tablet, R-3Normal      furosemide (LASIX) 40 MG tablet May increase to 40 mg BID as physician orders. , Disp-90 tablet, R-3Normal      aspirin 81 MG tablet Take 1 tablet by mouth daily (with breakfast), Disp-30 tablet, R-11NO PRINT      linagliptin (TRADJENTA) 5 MG tablet Take 1 tablet by mouth daily, Disp-30 tablet, R-11Normal      carvedilol (COREG) 12.5 MG tablet Take 1 tablet by mouth 2 times daily (with meals), Disp-60 tablet, R-11Normal      blood glucose test strips ([address] note reviewed. Constitutional:       General: She is not in acute distress. Appearance: Normal appearance. She is obese. She is not toxic-appearing. HENT:      Head: Normocephalic and atraumatic. Nose: Nose normal.      Mouth/Throat:      Mouth: Mucous membranes are moist.      Pharynx: Oropharynx is clear. Eyes:      Extraocular Movements: Extraocular movements intact. Conjunctiva/sclera: Conjunctivae normal.   Neck:      Musculoskeletal: Normal range of motion. Cardiovascular:      Rate and Rhythm: Normal rate and regular rhythm. Pulses: Normal pulses. Heart sounds: Normal heart sounds. Pulmonary:      Effort: Pulmonary effort is normal.      Breath sounds: Normal breath sounds. Abdominal:      General: Bowel sounds are normal. There is no distension. Palpations: Abdomen is soft. Tenderness: There is no abdominal tenderness. There is right CVA tenderness and left CVA tenderness. Musculoskeletal: Normal range of motion. Skin:     General: Skin is warm and dry. Capillary Refill: Capillary refill takes less than 2 seconds. Neurological:      General: No focal deficit present. Mental Status: She is alert. Psychiatric:         Mood and Affect: Mood normal.         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 Initial exam patient is no acute distress vital signs are stable, patient with tenderness to palpation in bilateral flanks and mild tenderness to palpation diffusely over the abdomen. Will obtain labs, UA and imaging. Labs reviewed and unremarkable, ct reviewed and negative for acute process    Patient reexamined and feeling better after medication, discussed results with the patient, states she would like to go home as she is feeling better and will follow up with her pcp    Patient/Guardian was informed of their diagnosis and told to follow up with PCP in 1-3 days.  Patient demonstrates understanding and agreement with the plan. They were given the opportunity to ask questions and those questions were answered to the best of our ability with the available information. Patient/Guardian told to return to the ED for any new, worsening, changing or persistent symptoms. This dictation was prepared using [employer] voice recognition software. As a result, errors may have occurred. When identified, these errors have been corrected. While every attempt is made to correct errors in dictation, errors may still exist.          CRITICAL CARE:       PROCEDURES:    Procedures    DIAGNOSTIC RESULTS   EKG:All EKG's are interpreted by the Emergency Department Physician who either signs or Co-signs this chart in the absence of a cardiologist.        RADIOLOGY:All plain film, CT, MRI, and formal ultrasound images (except ED bedside ultrasound) are read by the radiologist, see reports below, unless otherwisenoted in MDM or here. CT ABDOMEN PELVIS WO CONTRAST Additional Contrast? None   Final Result   1. No acute intra-abdominal abnormality. No nephrolithiasis or   hydronephrosis. 2. Prior cholecystectomy. 3. Trace pericardial effusion. LABS: All lab results were reviewed by myself, and all abnormals are listed below.   Labs Reviewed   URINE RT REFLEX TO CULTURE - Abnormal; Notable for the following components:       Result Value    Protein, UA 1+ (*)     All other components within normal limits   CBC WITH AUTO DIFFERENTIAL - Abnormal; Notable for the following components:    WBC 14.8 (*)     RDW 19.8 (*)     Seg Neutrophils 84 (*)     Lymphocytes 6 (*)     Monocytes 8 (*)     Segs Absolute 12.43 (*)     Absolute Lymph # 0.89 (*)     All other components within normal limits   COMPREHENSIVE METABOLIC PANEL W/ REFLEX TO MG FOR LOW K - Abnormal; Notable for the following components:    Glucose 222 (*)     CREATININE 1.32 (*)     Chloride 95 (*)     Alkaline Phosphatase 127 (*)     Total Protein 8.4 (*)     GFR Non- 41 (*)     GFR  49 (*)     All other components within normal limits   MICROSCOPIC URINALYSIS - Abnormal; Notable for the following components:    Bacteria, UA FEW (*)     Yeast, UA FEW (*)     All other components within normal limits   LIPASE   URINALYSIS       EMERGENCY DEPARTMENTCOURSE:         Vitals:    Vitals:    09/18/20 1638   BP: (!) 150/63   Pulse: 65   Resp: 16   Temp: 98.1 °F (36.7 °C)   TempSrc: Oral   SpO2: 95%   Weight: 274 lb (124.3 kg)   Height: 5' (1.524 m)       The patient was given the following medications while in the emergency department:  Orders Placed This Encounter   Medications    DISCONTD: ondansetron (ZOFRAN) injection 4 mg    ondansetron (ZOFRAN) 4 MG tablet     Sig: Take 1 tablet by mouth every 8 hours as needed for Nausea or Vomiting     Dispense:  20 tablet     Refill:  0     CONSULTS:  None    FINAL IMPRESSION      1. Flank pain          DISPOSITION/PLAN   DISPOSITION Decision To Discharge 09/18/2020 08:00:38 PM      PATIENT REFERRED TO:  Keila Borrero, [address] [facility] [address]  [phone]    Go to   Go to scheduled appointment on Monday.     [facility] [place] [address]  [phone]    As needed, If symptoms worsen    DISCHARGE MEDICATIONS:  Discharge Medication List as of 9/18/2020  8:01 PM      START taking these medications    Details   ondansetron (ZOFRAN) 4 MG tablet Take 1 tablet by mouth every 8 hours as needed for Nausea or Vomiting, Disp-20 tablet,R-0Print           Lilia Christensen DO  Attending Emergency Physician                  Lilia Christensen DO  09/19/20 1037

## 2020-09-19 ASSESSMENT — ENCOUNTER SYMPTOMS
NAUSEA: 1
BACK PAIN: 0
EYE PAIN: 0
SHORTNESS OF BREATH: 0
COLOR CHANGE: 0
ABDOMINAL PAIN: 0

## 2020-09-21 ENCOUNTER — APPOINTMENT (OUTPATIENT)
Dept: GENERAL RADIOLOGY | Age: 65
DRG: 392 | End: 2020-09-21
Payer: COMMERCIAL

## 2020-09-21 ENCOUNTER — HOSPITAL ENCOUNTER (INPATIENT)
Age: 65
LOS: 1 days | Discharge: HOME | DRG: 392 | End: 2020-09-23
Attending: EMERGENCY MEDICINE | Admitting: FAMILY MEDICINE
Payer: COMMERCIAL

## 2020-09-21 LAB
ABSOLUTE EOS #: 0.1 K/UL (ref 0–0.4)
ABSOLUTE IMMATURE GRANULOCYTE: ABNORMAL K/UL (ref 0–0.3)
ABSOLUTE LYMPH #: 1.4 K/UL (ref 1–4.8)
ABSOLUTE MONO #: 0.7 K/UL (ref 0.1–1.3)
BASOPHILS # BLD: 1 % (ref 0–2)
BASOPHILS ABSOLUTE: 0.1 K/UL (ref 0–0.2)
DIFFERENTIAL TYPE: ABNORMAL
EOSINOPHILS RELATIVE PERCENT: 1 % (ref 0–4)
HCT VFR BLD CALC: 38.2 % (ref 36–46)
HEMOGLOBIN: 12.6 G/DL (ref 12–16)
IMMATURE GRANULOCYTES: ABNORMAL %
INR BLD: 1.1
LYMPHOCYTES # BLD: 10 % (ref 24–44)
MCH RBC QN AUTO: 27.7 PG (ref 26–34)
MCHC RBC AUTO-ENTMCNC: 33 G/DL (ref 31–37)
MCV RBC AUTO: 83.9 FL (ref 80–100)
MONOCYTES # BLD: 5 % (ref 1–7)
NRBC AUTOMATED: ABNORMAL PER 100 WBC
PARTIAL THROMBOPLASTIN TIME: 27.8 SEC (ref 24–36)
PDW BLD-RTO: 18.4 % (ref 11.5–14.9)
PLATELET # BLD: 457 K/UL (ref 150–450)
PLATELET ESTIMATE: ABNORMAL
PMV BLD AUTO: 8.4 FL (ref 6–12)
PROTHROMBIN TIME: 14.1 SEC (ref 11.8–14.6)
RBC # BLD: 4.55 M/UL (ref 4–5.2)
RBC # BLD: ABNORMAL 10*6/UL
SEG NEUTROPHILS: 83 % (ref 36–66)
SEGMENTED NEUTROPHILS ABSOLUTE COUNT: 11.2 K/UL (ref 1.3–9.1)
WBC # BLD: 13.5 K/UL (ref 3.5–11)
WBC # BLD: ABNORMAL 10*3/UL

## 2020-09-21 PROCEDURE — 87040 BLOOD CULTURE FOR BACTERIA: CPT

## 2020-09-21 PROCEDURE — 83605 ASSAY OF LACTIC ACID: CPT

## 2020-09-21 PROCEDURE — 82140 ASSAY OF AMMONIA: CPT

## 2020-09-21 PROCEDURE — 80053 COMPREHEN METABOLIC PANEL: CPT

## 2020-09-21 PROCEDURE — 99285 EMERGENCY DEPT VISIT HI MDM: CPT

## 2020-09-21 PROCEDURE — 71045 X-RAY EXAM CHEST 1 VIEW: CPT

## 2020-09-21 PROCEDURE — 80307 DRUG TEST PRSMV CHEM ANLYZR: CPT

## 2020-09-21 PROCEDURE — 85730 THROMBOPLASTIN TIME PARTIAL: CPT

## 2020-09-21 PROCEDURE — 83690 ASSAY OF LIPASE: CPT

## 2020-09-21 PROCEDURE — 84484 ASSAY OF TROPONIN QUANT: CPT

## 2020-09-21 PROCEDURE — 85610 PROTHROMBIN TIME: CPT

## 2020-09-21 PROCEDURE — 93005 ELECTROCARDIOGRAM TRACING: CPT

## 2020-09-21 PROCEDURE — G0378 HOSPITAL OBSERVATION PER HR: HCPCS

## 2020-09-21 PROCEDURE — 85025 COMPLETE CBC W/AUTO DIFF WBC: CPT

## 2020-09-21 PROCEDURE — G0480 DRUG TEST DEF 1-7 CLASSES: HCPCS

## 2020-09-21 PROCEDURE — 36415 COLL VENOUS BLD VENIPUNCTURE: CPT

## 2020-09-21 PROCEDURE — 81001 URINALYSIS AUTO W/SCOPE: CPT

## 2020-09-21 PROCEDURE — 83880 ASSAY OF NATRIURETIC PEPTIDE: CPT

## 2020-09-21 ASSESSMENT — PAIN SCALES - GENERAL: PAINLEVEL_OUTOF10: 3

## 2020-09-21 ASSESSMENT — PAIN DESCRIPTION - PAIN TYPE: TYPE: ACUTE PAIN

## 2020-09-21 ASSESSMENT — PAIN DESCRIPTION - DESCRIPTORS: DESCRIPTORS: PRESSURE

## 2020-09-21 ASSESSMENT — PAIN DESCRIPTION - LOCATION: LOCATION: ABDOMEN

## 2020-09-22 ENCOUNTER — APPOINTMENT (OUTPATIENT)
Dept: CT IMAGING | Age: 65
DRG: 392 | End: 2020-09-22
Payer: COMMERCIAL

## 2020-09-22 ENCOUNTER — APPOINTMENT (OUTPATIENT)
Dept: GENERAL RADIOLOGY | Age: 65
DRG: 392 | End: 2020-09-22
Payer: COMMERCIAL

## 2020-09-22 ENCOUNTER — APPOINTMENT (OUTPATIENT)
Dept: ULTRASOUND IMAGING | Age: 65
DRG: 392 | End: 2020-09-22
Payer: COMMERCIAL

## 2020-09-22 PROBLEM — R10.84 GENERALIZED ABDOMINAL PAIN: Status: ACTIVE | Noted: 2020-09-22

## 2020-09-22 PROBLEM — K59.00 CONSTIPATION: Status: ACTIVE | Noted: 2020-07-17

## 2020-09-22 LAB
-: ABNORMAL
ACETAMINOPHEN LEVEL: <5 UG/ML (ref 10–30)
ALBUMIN SERPL-MCNC: 3.9 G/DL (ref 3.5–5.2)
ALBUMIN/GLOBULIN RATIO: ABNORMAL (ref 1–2.5)
ALP BLD-CCNC: 116 U/L (ref 35–104)
ALT SERPL-CCNC: 21 U/L (ref 5–33)
AMMONIA: 29 UMOL/L (ref 11–51)
AMORPHOUS: ABNORMAL
ANION GAP SERPL CALCULATED.3IONS-SCNC: 15 MMOL/L (ref 9–17)
AST SERPL-CCNC: 24 U/L
BACTERIA: ABNORMAL
BILIRUB SERPL-MCNC: 0.43 MG/DL (ref 0.3–1.2)
BILIRUBIN URINE: ABNORMAL
BNP INTERPRETATION: NORMAL
BUN BLDV-MCNC: 15 MG/DL (ref 8–23)
BUN/CREAT BLD: ABNORMAL (ref 9–20)
CALCIUM SERPL-MCNC: 9.9 MG/DL (ref 8.6–10.4)
CASTS UA: ABNORMAL /LPF
CHLORIDE BLD-SCNC: 99 MMOL/L (ref 98–107)
CHP ED QC CHECK: NORMAL
CO2: 24 MMOL/L (ref 20–31)
COLOR: YELLOW
COMMENT UA: ABNORMAL
CREAT SERPL-MCNC: 1.21 MG/DL (ref 0.5–0.9)
CRYSTALS, UA: ABNORMAL /HPF
EPITHELIAL CELLS UA: ABNORMAL /HPF
ETHANOL PERCENT: <0.01 %
ETHANOL: <10 MG/DL
GFR AFRICAN AMERICAN: 54 ML/MIN
GFR NON-AFRICAN AMERICAN: 45 ML/MIN
GFR SERPL CREATININE-BSD FRML MDRD: ABNORMAL ML/MIN/{1.73_M2}
GFR SERPL CREATININE-BSD FRML MDRD: ABNORMAL ML/MIN/{1.73_M2}
GLUCOSE BLD-MCNC: 103 MG/DL
GLUCOSE BLD-MCNC: 103 MG/DL (ref 65–105)
GLUCOSE BLD-MCNC: 121 MG/DL (ref 65–105)
GLUCOSE BLD-MCNC: 127 MG/DL (ref 70–99)
GLUCOSE BLD-MCNC: 138 MG/DL (ref 65–105)
GLUCOSE BLD-MCNC: 192 MG/DL (ref 65–105)
GLUCOSE BLD-MCNC: 205 MG/DL (ref 65–105)
GLUCOSE URINE: NEGATIVE
KETONES, URINE: ABNORMAL
LACTIC ACID, SEPSIS WHOLE BLOOD: NORMAL MMOL/L (ref 0.5–1.9)
LACTIC ACID, SEPSIS: 1.2 MMOL/L (ref 0.5–1.9)
LEUKOCYTE ESTERASE, URINE: NEGATIVE
LIPASE: 44 U/L (ref 13–60)
MUCUS: ABNORMAL
NITRITE, URINE: NEGATIVE
OTHER OBSERVATIONS UA: ABNORMAL
PH UA: 7.5 (ref 5–8)
POTASSIUM SERPL-SCNC: 3.6 MMOL/L (ref 3.7–5.3)
PRO-BNP: 244 PG/ML
PROTEIN UA: ABNORMAL
RBC UA: ABNORMAL /HPF
RENAL EPITHELIAL, UA: ABNORMAL /HPF
SALICYLATE LEVEL: <1 MG/DL (ref 3–10)
SODIUM BLD-SCNC: 138 MMOL/L (ref 135–144)
SPECIFIC GRAVITY UA: 1.02 (ref 1–1.03)
TOTAL PROTEIN: 8.1 G/DL (ref 6.4–8.3)
TRICHOMONAS: ABNORMAL
TROPONIN INTERP: NORMAL
TROPONIN INTERP: NORMAL
TROPONIN T: NORMAL NG/ML
TROPONIN T: NORMAL NG/ML
TROPONIN, HIGH SENSITIVITY: 11 NG/L (ref 0–14)
TROPONIN, HIGH SENSITIVITY: 11 NG/L (ref 0–14)
TURBIDITY: CLEAR
URINE HGB: NEGATIVE
UROBILINOGEN, URINE: NORMAL
WBC UA: ABNORMAL /HPF
YEAST: ABNORMAL

## 2020-09-22 PROCEDURE — 96376 TX/PRO/DX INJ SAME DRUG ADON: CPT

## 2020-09-22 PROCEDURE — 76770 US EXAM ABDO BACK WALL COMP: CPT

## 2020-09-22 PROCEDURE — 6370000000 HC RX 637 (ALT 250 FOR IP): Performed by: FAMILY MEDICINE

## 2020-09-22 PROCEDURE — 74174 CTA ABD&PLVS W/CONTRAST: CPT

## 2020-09-22 PROCEDURE — 2700000000 HC OXYGEN THERAPY PER DAY

## 2020-09-22 PROCEDURE — 87086 URINE CULTURE/COLONY COUNT: CPT

## 2020-09-22 PROCEDURE — 82947 ASSAY GLUCOSE BLOOD QUANT: CPT

## 2020-09-22 PROCEDURE — 96374 THER/PROPH/DIAG INJ IV PUSH: CPT

## 2020-09-22 PROCEDURE — 36415 COLL VENOUS BLD VENIPUNCTURE: CPT

## 2020-09-22 PROCEDURE — 84484 ASSAY OF TROPONIN QUANT: CPT

## 2020-09-22 PROCEDURE — 6360000004 HC RX CONTRAST MEDICATION: Performed by: GENERAL PRACTICE

## 2020-09-22 PROCEDURE — 94640 AIRWAY INHALATION TREATMENT: CPT

## 2020-09-22 PROCEDURE — 6360000002 HC RX W HCPCS: Performed by: FAMILY MEDICINE

## 2020-09-22 PROCEDURE — 99222 1ST HOSP IP/OBS MODERATE 55: CPT | Performed by: INTERNAL MEDICINE

## 2020-09-22 PROCEDURE — 70450 CT HEAD/BRAIN W/O DYE: CPT

## 2020-09-22 PROCEDURE — 94761 N-INVAS EAR/PLS OXIMETRY MLT: CPT

## 2020-09-22 PROCEDURE — 72100 X-RAY EXAM L-S SPINE 2/3 VWS: CPT

## 2020-09-22 PROCEDURE — 2580000003 HC RX 258: Performed by: FAMILY MEDICINE

## 2020-09-22 PROCEDURE — G0378 HOSPITAL OBSERVATION PER HR: HCPCS

## 2020-09-22 PROCEDURE — 2580000003 HC RX 258: Performed by: GENERAL PRACTICE

## 2020-09-22 PROCEDURE — 96372 THER/PROPH/DIAG INJ SC/IM: CPT

## 2020-09-22 PROCEDURE — 6360000002 HC RX W HCPCS: Performed by: EMERGENCY MEDICINE

## 2020-09-22 PROCEDURE — 1200000000 HC SEMI PRIVATE

## 2020-09-22 PROCEDURE — APPNB30 APP NON BILLABLE TIME 0-30 MINS: Performed by: NURSE PRACTITIONER

## 2020-09-22 RX ORDER — OXYCODONE HYDROCHLORIDE AND ACETAMINOPHEN 5; 325 MG/1; MG/1
1 TABLET ORAL EVERY 6 HOURS PRN
Status: DISCONTINUED | OUTPATIENT
Start: 2020-09-22 | End: 2020-09-23 | Stop reason: HOSPADM

## 2020-09-22 RX ORDER — POTASSIUM CHLORIDE 20 MEQ/1
40 TABLET, EXTENDED RELEASE ORAL PRN
Status: DISCONTINUED | OUTPATIENT
Start: 2020-09-22 | End: 2020-09-23 | Stop reason: HOSPADM

## 2020-09-22 RX ORDER — ONDANSETRON 2 MG/ML
4 INJECTION INTRAMUSCULAR; INTRAVENOUS ONCE
Status: COMPLETED | OUTPATIENT
Start: 2020-09-22 | End: 2020-09-22

## 2020-09-22 RX ORDER — BISACODYL 10 MG
10 SUPPOSITORY, RECTAL RECTAL ONCE
Status: DISCONTINUED | OUTPATIENT
Start: 2020-09-22 | End: 2020-09-23 | Stop reason: HOSPADM

## 2020-09-22 RX ORDER — POLYETHYLENE GLYCOL 3350 17 G/17G
17 POWDER, FOR SOLUTION ORAL DAILY PRN
Status: DISCONTINUED | OUTPATIENT
Start: 2020-09-22 | End: 2020-09-23 | Stop reason: HOSPADM

## 2020-09-22 RX ORDER — CARVEDILOL 12.5 MG/1
12.5 TABLET ORAL 2 TIMES DAILY WITH MEALS
Status: DISCONTINUED | OUTPATIENT
Start: 2020-09-22 | End: 2020-09-23 | Stop reason: HOSPADM

## 2020-09-22 RX ORDER — AMLODIPINE BESYLATE 5 MG/1
10 TABLET ORAL DAILY
Status: DISCONTINUED | OUTPATIENT
Start: 2020-09-22 | End: 2020-09-23 | Stop reason: HOSPADM

## 2020-09-22 RX ORDER — FAMOTIDINE 20 MG/1
20 TABLET, FILM COATED ORAL DAILY
Status: DISCONTINUED | OUTPATIENT
Start: 2020-09-22 | End: 2020-09-23 | Stop reason: HOSPADM

## 2020-09-22 RX ORDER — DEXTROSE MONOHYDRATE 25 G/50ML
12.5 INJECTION, SOLUTION INTRAVENOUS PRN
Status: DISCONTINUED | OUTPATIENT
Start: 2020-09-22 | End: 2020-09-23 | Stop reason: HOSPADM

## 2020-09-22 RX ORDER — POTASSIUM CHLORIDE 7.45 MG/ML
10 INJECTION INTRAVENOUS PRN
Status: DISCONTINUED | OUTPATIENT
Start: 2020-09-22 | End: 2020-09-23 | Stop reason: HOSPADM

## 2020-09-22 RX ORDER — PROMETHAZINE HYDROCHLORIDE 25 MG/1
12.5 TABLET ORAL EVERY 4 HOURS PRN
Status: DISCONTINUED | OUTPATIENT
Start: 2020-09-22 | End: 2020-09-23 | Stop reason: HOSPADM

## 2020-09-22 RX ORDER — NICOTINE POLACRILEX 4 MG
15 LOZENGE BUCCAL PRN
Status: DISCONTINUED | OUTPATIENT
Start: 2020-09-22 | End: 2020-09-23 | Stop reason: HOSPADM

## 2020-09-22 RX ORDER — ATORVASTATIN CALCIUM 40 MG/1
40 TABLET, FILM COATED ORAL NIGHTLY
Status: DISCONTINUED | OUTPATIENT
Start: 2020-09-22 | End: 2020-09-23 | Stop reason: HOSPADM

## 2020-09-22 RX ORDER — PREGABALIN 150 MG/1
150 CAPSULE ORAL 2 TIMES DAILY
Status: DISCONTINUED | OUTPATIENT
Start: 2020-09-22 | End: 2020-09-23 | Stop reason: HOSPADM

## 2020-09-22 RX ORDER — 0.9 % SODIUM CHLORIDE 0.9 %
80 INTRAVENOUS SOLUTION INTRAVENOUS ONCE
Status: COMPLETED | OUTPATIENT
Start: 2020-09-22 | End: 2020-09-22

## 2020-09-22 RX ORDER — METOPROLOL TARTRATE 5 MG/5ML
5 INJECTION INTRAVENOUS EVERY 6 HOURS PRN
Status: DISCONTINUED | OUTPATIENT
Start: 2020-09-22 | End: 2020-09-23 | Stop reason: HOSPADM

## 2020-09-22 RX ORDER — VENLAFAXINE HYDROCHLORIDE 37.5 MG/1
37.5 CAPSULE, EXTENDED RELEASE ORAL
Status: DISCONTINUED | OUTPATIENT
Start: 2020-09-22 | End: 2020-09-23 | Stop reason: HOSPADM

## 2020-09-22 RX ORDER — ALBUTEROL SULFATE 2.5 MG/3ML
2.5 SOLUTION RESPIRATORY (INHALATION) 2 TIMES DAILY
Status: DISCONTINUED | OUTPATIENT
Start: 2020-09-22 | End: 2020-09-23 | Stop reason: HOSPADM

## 2020-09-22 RX ORDER — SODIUM CHLORIDE 0.9 % (FLUSH) 0.9 %
10 SYRINGE (ML) INJECTION EVERY 12 HOURS SCHEDULED
Status: DISCONTINUED | OUTPATIENT
Start: 2020-09-22 | End: 2020-09-23 | Stop reason: HOSPADM

## 2020-09-22 RX ORDER — SODIUM CHLORIDE 0.9 % (FLUSH) 0.9 %
10 SYRINGE (ML) INJECTION PRN
Status: DISCONTINUED | OUTPATIENT
Start: 2020-09-22 | End: 2020-09-23 | Stop reason: HOSPADM

## 2020-09-22 RX ORDER — ONDANSETRON 2 MG/ML
4 INJECTION INTRAMUSCULAR; INTRAVENOUS EVERY 4 HOURS PRN
Status: DISCONTINUED | OUTPATIENT
Start: 2020-09-22 | End: 2020-09-23 | Stop reason: HOSPADM

## 2020-09-22 RX ORDER — OXYCODONE HYDROCHLORIDE 15 MG/1
30 TABLET, FILM COATED, EXTENDED RELEASE ORAL EVERY 8 HOURS SCHEDULED
Status: DISCONTINUED | OUTPATIENT
Start: 2020-09-22 | End: 2020-09-23 | Stop reason: HOSPADM

## 2020-09-22 RX ORDER — ACETAMINOPHEN 650 MG/1
650 SUPPOSITORY RECTAL EVERY 6 HOURS PRN
Status: DISCONTINUED | OUTPATIENT
Start: 2020-09-22 | End: 2020-09-23 | Stop reason: HOSPADM

## 2020-09-22 RX ORDER — ASPIRIN 81 MG/1
81 TABLET ORAL
Status: DISCONTINUED | OUTPATIENT
Start: 2020-09-22 | End: 2020-09-23 | Stop reason: HOSPADM

## 2020-09-22 RX ORDER — CLONAZEPAM 0.5 MG/1
0.5 TABLET ORAL 2 TIMES DAILY PRN
Status: DISCONTINUED | OUTPATIENT
Start: 2020-09-22 | End: 2020-09-23 | Stop reason: HOSPADM

## 2020-09-22 RX ORDER — ACETAMINOPHEN 325 MG/1
650 TABLET ORAL EVERY 6 HOURS PRN
Status: DISCONTINUED | OUTPATIENT
Start: 2020-09-22 | End: 2020-09-23 | Stop reason: HOSPADM

## 2020-09-22 RX ORDER — DEXTROSE MONOHYDRATE 50 MG/ML
100 INJECTION, SOLUTION INTRAVENOUS PRN
Status: DISCONTINUED | OUTPATIENT
Start: 2020-09-22 | End: 2020-09-23 | Stop reason: HOSPADM

## 2020-09-22 RX ADMIN — ALBUTEROL SULFATE 2.5 MG: 2.5 SOLUTION RESPIRATORY (INHALATION) at 09:25

## 2020-09-22 RX ADMIN — ASPIRIN 81 MG: 81 TABLET, COATED ORAL at 08:39

## 2020-09-22 RX ADMIN — ONDANSETRON 4 MG: 2 INJECTION INTRAMUSCULAR; INTRAVENOUS at 01:59

## 2020-09-22 RX ADMIN — ENOXAPARIN SODIUM 30 MG: 30 INJECTION SUBCUTANEOUS at 08:40

## 2020-09-22 RX ADMIN — IOVERSOL 100 ML: 741 INJECTION INTRA-ARTERIAL; INTRAVENOUS at 01:18

## 2020-09-22 RX ADMIN — CARVEDILOL 12.5 MG: 12.5 TABLET, FILM COATED ORAL at 16:42

## 2020-09-22 RX ADMIN — AMLODIPINE BESYLATE 10 MG: 5 TABLET ORAL at 08:39

## 2020-09-22 RX ADMIN — PREGABALIN 150 MG: 150 CAPSULE ORAL at 08:39

## 2020-09-22 RX ADMIN — ENOXAPARIN SODIUM 30 MG: 30 INJECTION SUBCUTANEOUS at 20:32

## 2020-09-22 RX ADMIN — ALBUTEROL SULFATE 2.5 MG: 2.5 SOLUTION RESPIRATORY (INHALATION) at 19:02

## 2020-09-22 RX ADMIN — LINACLOTIDE 145 MCG: 145 CAPSULE, GELATIN COATED ORAL at 07:32

## 2020-09-22 RX ADMIN — Medication 10 ML: at 20:33

## 2020-09-22 RX ADMIN — INSULIN LISPRO 1 UNITS: 100 INJECTION, SOLUTION INTRAVENOUS; SUBCUTANEOUS at 21:30

## 2020-09-22 RX ADMIN — CARVEDILOL 12.5 MG: 12.5 TABLET, FILM COATED ORAL at 08:39

## 2020-09-22 RX ADMIN — SODIUM CHLORIDE 80 ML: 9 INJECTION, SOLUTION INTRAVENOUS at 01:18

## 2020-09-22 RX ADMIN — Medication 10 ML: at 01:18

## 2020-09-22 RX ADMIN — ATORVASTATIN CALCIUM 40 MG: 40 TABLET, FILM COATED ORAL at 20:33

## 2020-09-22 RX ADMIN — OXYCODONE HYDROCHLORIDE 30 MG: 15 TABLET, FILM COATED, EXTENDED RELEASE ORAL at 09:08

## 2020-09-22 RX ADMIN — Medication 10 ML: at 08:39

## 2020-09-22 RX ADMIN — ONDANSETRON 4 MG: 2 INJECTION INTRAMUSCULAR; INTRAVENOUS at 13:05

## 2020-09-22 RX ADMIN — OXYCODONE HYDROCHLORIDE 30 MG: 15 TABLET, FILM COATED, EXTENDED RELEASE ORAL at 16:33

## 2020-09-22 RX ADMIN — INSULIN LISPRO 2 UNITS: 100 INJECTION, SOLUTION INTRAVENOUS; SUBCUTANEOUS at 13:05

## 2020-09-22 RX ADMIN — PREGABALIN 150 MG: 150 CAPSULE ORAL at 20:33

## 2020-09-22 RX ADMIN — OXYCODONE HYDROCHLORIDE AND ACETAMINOPHEN 1 TABLET: 5; 325 TABLET ORAL at 06:35

## 2020-09-22 RX ADMIN — VENLAFAXINE HYDROCHLORIDE 37.5 MG: 37.5 CAPSULE, EXTENDED RELEASE ORAL at 09:07

## 2020-09-22 RX ADMIN — FAMOTIDINE 20 MG: 20 TABLET, FILM COATED ORAL at 08:39

## 2020-09-22 RX ADMIN — DICLOFENAC 2 G: 10 GEL TOPICAL at 09:08

## 2020-09-22 ASSESSMENT — PAIN DESCRIPTION - LOCATION
LOCATION: ABDOMEN
LOCATION: ABDOMEN
LOCATION: ABDOMEN;BACK

## 2020-09-22 ASSESSMENT — PAIN DESCRIPTION - PAIN TYPE
TYPE: ACUTE PAIN
TYPE: ACUTE PAIN;CHRONIC PAIN
TYPE: ACUTE PAIN;CHRONIC PAIN

## 2020-09-22 ASSESSMENT — PAIN SCALES - GENERAL
PAINLEVEL_OUTOF10: 10
PAINLEVEL_OUTOF10: 2
PAINLEVEL_OUTOF10: 5
PAINLEVEL_OUTOF10: 5
PAINLEVEL_OUTOF10: 3

## 2020-09-22 NOTE — ED NOTES
Mode of arrival (squad #, walk in, police, etc) : [facility] complaint(s): Abdominal pain, N/V        Arrival Note (brief scenario, treatment PTA, etc). : Pt brought in by squad c/o abdominal pain with N/V. Pt states she was seen in Dr. Saintclair Blue office today for the same symptoms and was advised if symptoms persists or worsens to go to the ER to get further medical evaluation. Upon arrival, pt appeared confused and having hallucinations. Pt talking to things that were not there. C= \"Have you ever felt that you should Cut down on your drinking? \"  No  A= \"Have people Annoyed you by criticizing your drinking? \"  No  G= \"Have you ever felt bad or Guilty about your drinking? \"  No  E= \"Have you ever had a drink as an Eye-opener first thing in the morning to steady your nerves or to help a hangover? \"  No      Deferred []      Reason for deferring: N/A    *If yes to two or more: probable alcohol abuse. Jeana Joshi RN  09/22/20 5341

## 2020-09-22 NOTE — ED NOTES
Pt straight cath for urine. 75 mL of dark yellow urine returned. Specimen collected and sent to lab.       Ger Cespedes RN  09/22/20 8837

## 2020-09-22 NOTE — ED NOTES
, Tristan Puga, walked back to room and updated with plan of care.       Yaa Torres RN  09/22/20 7585

## 2020-09-22 NOTE — ED NOTES
Admission Dx:Generalized abdominal pain    Pts Chief Complaints on Arrival: abdominal pain, nausea    ADL's - Partial assistance    Pending Diagnostics:  n/a    Residence PTA: single story home    Special Considerations/Circumstances:  n/a    Vitals: Current vital signs:  BP (!) 174/85   Pulse 81   Temp 98.7 °F (37.1 °C) (Oral)   Resp 16   Ht 5' (1.524 m)   Wt 266 lb (120.7 kg)   LMP  (LMP Unknown)   SpO2 99%   BMI 51.95 kg/m²                MEWS Score: 8651 Eliana Cunningham RN  09/22/20 4276

## 2020-09-22 NOTE — CONSULTS
INITIAL CONSULTATION     HISTORY OF PRESENT ILLNESS: Erasmo Galdamez is a 63 y.o. female admitted to the hospital on 9/21/2020 for abdominal pain. She reports diffuse abdominal pain for the past 2 weeks. Intermittent. Severe at times. No clear triggers. Sometimes radiates to the back. She reports altered bowel habits with constipation and diarrhea. No blood in the stool or melena. She has been taking Motrin for back pain. She reports multiple back surgeries. She has been on iron infusions for iron deficiency. Recently she was started on Linzess. She thinks this was too strong for her. She reports around 35 pounds of weight loss over the past 2 weeks. CT did not show any acute pathology to explain her symptoms. She denies any history of glaucoma. She had EGD and colonoscopy in October 2019. Fair prep was noted. Small ulcer was noted in pylorus. No family history of GI pathology. She denies any smoking or drug use. No alcohol.      PAST MEDICAL HISTORY:     Past Medical History:   Diagnosis Date    Acute hypoxemic respiratory failure (HCC)     Arthritis     CHF (congestive heart failure) (HCC)     Chronic back pain     Diabetes mellitus type II, controlled ([address] 2/14/2012    Fibromyalgia     Hyperlipidemia LDL goal < 70 2/14/2012    Hypertension, benign 02/14/2012    Morbid obesity with BMI of 60.0-69.9, adult ([address] 8/28/2015    Pain of toe of right foot     morgans cyst    Right wrist pain     rate 8    Skin cancer     skin under lt eye,face    Sleep apnea     Bipap does not work    Wears glasses         Past Surgical History:   Procedure Laterality Date    CARPAL TUNNEL RELEASE Right 09/05/2014    CATARACT REMOVAL WITH IMPLANT Bilateral 2013    CHOLECYSTECTOMY, LAPAROSCOPIC  1998    COLONOSCOPY N/A 10/17/2019    COLONOSCOPY DIAGNOSTIC performed by Jonn Ellsworth MD at [facility] 238 cyst from base of tail bone   [facility] [address] HERNIA REPAIR Bilateral 01/20/2017    abcess removed    LUMBAR FUSION  10/2010; 03/2011    L4/L5    LUMBAR FUSION  2009    L4-S1    OTHER SURGICAL HISTORY  05/02/2018    Lumbar decompression laminectomy at L3-L4 bilaterally wiith complete facetomies at L3-L4 bilaterally;  diskectomy L3-L4: posterior lumbar interbody fusion; placement of Kevin-type graft; local harvest of bone; microsurgical dissection.  ROTATOR CUFF REPAIR Left 2012    SKIN CANCER EXCISION  2005    Basal Cell Carcinoma, Curly size from Lt.  face    TUBAL LIGATION  1981    UPPER GASTROINTESTINAL ENDOSCOPY N/A 10/16/2019    EGD BIOPSY performed by Michael Lenz MD at [address] [place] [address] Dr:       Current Facility-Administered Medications:     sodium chloride flush 0.9 % injection 10 mL, 10 mL, Intravenous, PRN, Claudia Armenta MD, 10 mL at 09/22/20 0118    albuterol (PROVENTIL) nebulizer solution 2.5 mg, 2.5 mg, Nebulization, BID, Claudia Armenta MD, 2.5 mg at 09/22/20 0925    amLODIPine (NORVASC) tablet 10 mg, 10 mg, Oral, Daily, Claudia Armenta MD, 10 mg at 09/22/20 0839    aspirin EC tablet 81 mg, 81 mg, Oral, Daily with breakfast, Claudia Armenta MD, 81 mg at 09/22/20 0839    atorvastatin (LIPITOR) tablet 40 mg, 40 mg, Oral, Nightly, Claudia Armenta MD    carvedilol (COREG) tablet 12.5 mg, 12.5 mg, Oral, BID WC, Claudia Armenta MD, 12.5 mg at 09/22/20 1642    clonazePAM (KLONOPIN) tablet 0.5 mg, 0.5 mg, Oral, BID PRN, Claudia Armenta MD    diclofenac sodium (VOLTAREN) 1 % gel 2 g, 2 g, Topical, TID, Claudia Armenta MD, 2 g at 09/22/20 0908    famotidine (PEPCID) tablet 20 mg, 20 mg, Oral, Daily, Claudia Armenta MD, 20 mg at 09/22/20 0839    linaclotide (LINZESS) capsule 145 mcg, 145 mcg, Oral, QAM AC, Claudia Armenta MD, 145 mcg at 09/22/20 0732    oxyCODONE-acetaminophen (PERCOCET) 5-325 MG per tablet 1 tablet, 1 tablet, Oral, Q6H PRN, Kathren Kind, MD, 1 tablet at 09/22/20 0635    pregabalin (LYRICA) capsule 150 mg, 150 mg, Oral, BID, Kaveh Guillory MD, 150 mg at 09/22/20 0839    venlafaxine (EFFEXOR XR) extended release capsule 37.5 mg, 37.5 mg, Oral, Daily with breakfast, Kaveh Guillory MD, 37.5 mg at 09/22/20 0907    sodium chloride flush 0.9 % injection 10 mL, 10 mL, Intravenous, 2 times per day, Kaveh Guillory MD, 10 mL at 09/22/20 0839    sodium chloride flush 0.9 % injection 10 mL, 10 mL, Intravenous, PRN, Kaveh Guillory MD    acetaminophen (TYLENOL) tablet 650 mg, 650 mg, Oral, Q6H PRN **OR** acetaminophen (TYLENOL) suppository 650 mg, 650 mg, Rectal, Q6H PRN, Kaveh Guillory MD    polyethylene glycol [facility]) packet 17 g, 17 g, Oral, Daily PRN, Kaveh Guillory MD    promethazine (PHENERGAN) tablet 12.5 mg, 12.5 mg, Oral, Q4H PRN **OR** ondansetron (ZOFRAN) injection 4 mg, 4 mg, Intravenous, Q4H PRN, Kaveh Guillory MD, 4 mg at 09/22/20 1305    enoxaparin (LOVENOX) injection 30 mg, 30 mg, Subcutaneous, BID, Kaveh Guillory MD, 30 mg at 09/22/20 0840    glucose (GLUTOSE) 40 % oral gel 15 g, 15 g, Oral, PRN, Kaveh Guillory MD    dextrose 50 % IV solution, 12.5 g, Intravenous, PRN, Kaveh Guillory MD    glucagon (rDNA) injection 1 mg, 1 mg, Intramuscular, PRN, Kaveh Guillory MD    dextrose 5 % solution, 100 mL/hr, Intravenous, PRN, Kaveh Guillory MD    insulin lispro (HUMALOG) injection vial 0-6 Units, 0-6 Units, Subcutaneous, TID WC, Kaveh Guillory MD, 2 Units at 09/22/20 1305    insulin lispro (HUMALOG) injection vial 0-3 Units, 0-3 Units, Subcutaneous, Nightly, Kaveh Guillory MD    oxyCODONE (OXYCONTIN) extended release tablet 30 mg, 30 mg, Oral, 3 times per day, Kaveh Guillory MD, 30 mg at 09/22/20 2624    potassium chloride (KLOR-CON M) extended release tablet 40 mEq, 40 mEq, Oral, PRN **OR** potassium bicarb-citric acid (EFFER-K) effervescent tablet 40 mEq, 40 mEq, Oral, PRN **OR** potassium chloride 10 mEq/100 mL IVPB (Peripheral Line), 10 mEq, Intravenous, PRN, Nelda Payne MD    metoprolol (LOPRESSOR) injection 5 mg, 5 mg, Intravenous, Q6H PRN, Nelda Payne MD    bisacodyl (DULCOLAX) suppository 10 mg, 10 mg, Rectal, Once, VIDHYA Jesus CNP    hyoscyamine (LEVSIN/SL) sublingual tablet 125 mcg, 125 mcg, Sublingual, Q4H PRN, VIDHYA Jesus CNP       ALLERGIES:   Allergies   Allergen Reactions    Adhesive Tape Other (See Comments)     Skin blisters severe skin tearing    Morphine Other (See Comments)     Severe Headache    Iron Other (See Comments)     Stomach cramps    Lisinopril      Elevated creatinine    Metformin And Related      Elevated creatinine      Pcn [Penicillins] Swelling     Injection site swelling          FAMILY HISTORY: The patient's family history was reviewed.         SOCIAL HISTORY:   Social History     Socioeconomic History    Marital status:      Spouse name: Rip Strong Number of children: 3    Years of education: Not on file    Highest education level: Not on file   Occupational History    Not on file   Social Needs    Financial resource strain: Not on file    Food insecurity     Worry: Not on file     Inability: Not on file   [employer] needs     Medical: Not on file     Non-medical: Not on file   Tobacco Use    Smoking status: Never Smoker    Smokeless tobacco: Never Used   Substance and Sexual Activity    Alcohol use: Not Currently     Alcohol/week: 0.0 standard drinks     Comment: rarely    Drug use: No    Sexual activity: Yes     Partners: Male   Lifestyle    Physical activity     Days per week: Not on file     Minutes per session: Not on file    Stress: Not on file   Relationships    Social connections     Talks on phone: Not on file     Gets together: Not on file     Attends Synagogue service: Not on file     Active member of club or organization: Not on file     Attends meetings of clubs or organizations: Not on file     Relationship status: Not on file    Intimate partner violence     Fear of current or ex partner: Not on file     Emotionally abused: Not on file     Physically abused: Not on file     Forced sexual activity: Not on file   Other Topics Concern    Not on file   Social History Narrative    Not on file         REVIEW OF SYSTEMS: A 14-point review of systems was obtained and pertinent positives andnegatives were enumerated above in the history of present illness. All other reviewed systems / symptoms were negative. Review of Systems      PHYSICAL EXAMINATION: Vital signs reviewed per the nursing documentation. BP (!) 148/57   Pulse 63   Temp 97.9 °F (36.6 °C) (Oral)   Resp 16   Ht 5' (1.524 m)   Wt 247 lb (112 kg)   LMP  (LMP Unknown)   SpO2 97%   BMI 48.24 kg/m²    [unfilled]   Body mass index is 48.24 kg/m². General:  A O x 3 in NAD   Psych: . Normal affect. Mentation normal   HEENT: PERRLA. Clear conjunctivae and sclerae. Moist oral mucosae, no lesions orulcers. The neck is supple, without lymphadenopathy or jugular venous distension. No masses. Normal thyroid. Cardiovascular: S1 S2 RRR no rubs or murmurs. Pulmonary: clear BL. No accessory muscle usage. Abd Exam: Soft, mild scattered tenderness ND, no hepato or spleno megaly, +BS, no ascites. No groin masses or lymphadenopathy. Extremities: No edema. Skin: Warm skin. No skin rash. No spider nevi palmar erythema naildystrophy. Joint: No joint swelling or deformity. Neurological: intact sensory. DTR+.  No asterixis     LABORATORY DATA: Reviewed   Lab Results   Component Value Date    WBC 13.5 (H) 09/21/2020    HGB 12.6 09/21/2020    HCT 38.2 09/21/2020    MCV 83.9 09/21/2020     (H) 09/21/2020     09/21/2020    K 3.6 (L) 09/21/2020    CL 99 09/21/2020    CO2 24 09/21/2020    BUN 15 09/21/2020    CREATININE 1.21 (H) 09/21/2020    LABPROT 7.5 03/07/2012    LABALBU 3.9 09/21/2020    BILITOT 0.43 09/21/2020    ALKPHOS 116 (H) 09/21/2020    AST 24 09/21/2020    ALT 21 09/21/2020    INR 1.1 09/21/2020      Lab Results   Component Value Date    RBC 4.55 09/21/2020    HGB 12.6 09/21/2020    MCV 83.9 09/21/2020    MCH 27.7 09/21/2020    MCHC 33.0 09/21/2020    RDW 18.4 (H) 09/21/2020    MPV 8.4 09/21/2020    BASOPCT 1 09/21/2020    LYMPHSABS 1.40 09/21/2020    MONOSABS 0.70 09/21/2020    NEUTROABS 11.20 (H) 09/21/2020    EOSABS 0.10 09/21/2020    BASOSABS 0.10 09/21/2020          DIAGNOSTIC TESTING:   Ct Abdomen Pelvis Wo Contrast Additional Contrast? None    Result Date: 9/18/2020  EXAMINATION: CT OF THE ABDOMEN AND PELVIS WITHOUT CONTRAST 9/18/2020 5:21 pm TECHNIQUE: CT of the abdomen and pelvis was performed without the administration of intravenous contrast. Multiplanar reformatted images are provided for review. Dose modulation, iterative reconstruction, and/or weight based adjustment of the mA/kV was utilized to reduce the radiation dose to as low as reasonably achievable. COMPARISON: 10/15/2019 HISTORY: ORDERING SYSTEM PROVIDED HISTORY: flank pain TECHNOLOGIST PROVIDED HISTORY: flank pain Reason for Exam: pt c/o bilateral flank pain x4 days Acuity: Acute Type of Exam: Initial Relevant Medical/Surgical History: surg - hyst, tubal, gallbladder, hernia, lumbar fusion FINDINGS: Lower Chest: No acute abnormality within the visualized lung bases. Trace pericardial effusion. Organs: Within the limitations of a noncontrast examination, no acute abnormality within the liver, spleen, pancreas, or adrenal glands. Prior cholecystectomy. No nephrolithiasis or hydronephrosis. GI/Bowel: Stomach is nondistended. The small bowel is nondilated. The colon is normal in caliber. Pelvis: Bladder is nondistended. No vesicular stone. Prior hysterectomy. Peritoneum/Retroperitoneum: No ascites or pneumoperitoneum. Abdominal aorta is normal in caliber.  Bones/Soft Tissues: No acute osseous abnormality. Multilevel postoperative changes in the lumbar spine. Small, fat containing ventral abdominal wall hernia. 1. No acute intra-abdominal abnormality. No nephrolithiasis or hydronephrosis. 2. Prior cholecystectomy. 3. Trace pericardial effusion. Xr Lumbar Spine (2-3 Views)    Result Date: 9/22/2020  EXAMINATION: THREE XRAY VIEWS OF THE LUMBAR SPINE 9/22/2020 10:32 am COMPARISON: CT today, radiographs 02/14/2012. HISTORY: ORDERING SYSTEM PROVIDED HISTORY: upper lumbar pain TECHNOLOGIST PROVIDED HISTORY: upper lumbar pain Reason for Exam: Patient states Lumbar pain Acuity: Unknown Type of Exam: Unknown FINDINGS: Postoperative changes are present from posterior lumbar fusion with interspinous device at L3-4. Additional postoperative changes are present with interbody device at L3-4. Remote postoperative changes from L4-5 posterior fusion with interbody device. Additional interbody device at L5-S1. Lumbar vertebral body heights and alignment are maintained. No acute fracture. No spondylolisthesis. Mild degenerative changes in the lower thoracic spine and at L2-3.     1. No acute abnormality in the lumbar spine. Status post L3-S1 fusion as described. Ct Head Wo Contrast    Result Date: 9/22/2020  EXAMINATION: CT OF THE HEAD WITHOUT CONTRAST  9/22/2020 12:42 am TECHNIQUE: CT of the head was performed without the administration of intravenous contrast. Dose modulation, iterative reconstruction, and/or weight based adjustment of the mA/kV was utilized to reduce the radiation dose to as low as reasonably achievable. COMPARISON: None.  HISTORY: ORDERING SYSTEM PROVIDED HISTORY: AMS TECHNOLOGIST PROVIDED HISTORY: AMS Reason for Exam: patient is very confused and altered, does not know where she is currently at or why she's at the hospital. denies any falls or injuries to her head Acuity: Unknown Type of Exam: Initial FINDINGS: BRAIN/VENTRICLES: There is no acute intracranial hemorrhage, mass effect or midline shift. No abnormal extra-axial fluid collection. Hypoattenuation of the periventricular and subcortical white matter is suggestive of chronic small vessel ischemic disease. Mild diffuse parenchymal volume loss is noted. the gray-white differentiation is maintained without evidence of an acute infarct. There is no evidence of hydrocephalus. ORBITS: The visualized portion of the orbits demonstrate no acute abnormality. SINUSES: The visualized paranasal sinuses and mastoid air cells demonstrate no acute abnormality. SOFT TISSUES/SKULL:  No acute abnormality of the visualized skull or soft tissues. No acute intracranial abnormality. MRI may be obtained if clinically indicated. Ct Chest Wo Contrast    Result Date: 9/14/2020  EXAMINATION: CT OF THE CHEST WITHOUT CONTRAST 9/14/2020 10:19 am TECHNIQUE: CT of the chest was performed without the administration of intravenous contrast. Multiplanar reformatted images are provided for review. Dose modulation, iterative reconstruction, and/or weight based adjustment of the mA/kV was utilized to reduce the radiation dose to as low as reasonably achievable. COMPARISON: Chest radiograph 02/10/2020. Chest CT 01/08/2020 HISTORY: ORDERING SYSTEM PROVIDED HISTORY: Pneumonia of both lower lobes due to infectious organism [facility]) TECHNOLOGIST PROVIDED HISTORY: Reason for Exam: Pneumonia of both lower lobes due to infectious organism [facility]) Acuity: Unknown Type of Exam: Unknown Relevant Medical/Surgical History: hypertension, diabetes FINDINGS: Mediastinum: The heart and great vessels are normal in size. No pericardial effusion. No enlarged or suspicious-appearing lymph nodes are identified. Lungs/pleura: No acute airspace disease identified. No evidence for underlying chronic lung disease. No effusion. The central airway is patent. Upper Abdomen: No acute findings. Status post cholecystectomy. Soft Tissues/Bones: No acute abnormality. No acute airspace disease identified. Previously noted ground-glass opacities have resolved. Xr Chest Portable    Result Date: 9/22/2020  EXAMINATION: ONE XRAY VIEW OF THE CHEST 9/22/2020 12:17 am COMPARISON: February 10, 2020 HISTORY: ORDERING SYSTEM PROVIDED HISTORY: AMS TECHNOLOGIST PROVIDED HISTORY: AMS Reason for Exam: Altered mental status, hallucinations. Acuity: Acute Type of Exam: Initial Additional signs and symptoms: Altered mental status, hallucinations. FINDINGS: Marginal inspiration is noted. No focal area of consolidation or pneumothorax is present. Cardiomegaly is present. Vascular markings are distinct. Osseous structures are stable. Cardiomegaly, without evidence of CHF     Cta Abdomen Pelvis W Contrast    Result Date: 9/22/2020  EXAMINATION: CTA OF THE ABDOMEN AND PELVIS WITH CONTRAST 9/22/2020 12:42 am TECHNIQUE: CTA of the abdomen and pelvis was performed with the administration of intravenous contrast. Multiplanar reformatted images are provided for review. MIP images are provided for review. Dose modulation, iterative reconstruction, and/or weight based adjustment of the mA/kV was utilized to reduce the radiation dose to as low as reasonably achievable. COMPARISON: None. HISTORY: ORDERING SYSTEM PROVIDED HISTORY: AMS, abd pain TECHNOLOGIST PROVIDED HISTORY: AMS, abd pain Reason for Exam: patient is confused and altered, doesn't know where she's at, but is pointing to her lower abdomen and saying it hurts Acuity: Unknown Type of Exam: Unknown FINDINGS: Lower Chest:  Visualized portion of the lower chest demonstrates no acute abnormality. Organs: The visualized portions of the liver, spleen, pancreas and adrenal glands demonstrate no acute abnormality in the arterial enhancement phase. No biliary ductal dilatation. Cholecystectomy. The kidneys appear normal in size and demonstrate symmetric enhancement. No focal renal mass. No hydronephrosis. No perinephric stranding. GI/Bowel: No bowel dilatation to suggest obstruction. No evidence of acute appendicitis. Pelvis: The urinary bladder appears unremarkable. The pelvic organs demonstrate no acute abnormality. Peritoneum/Retroperitoneum: Normal enhancement and caliber of the aorta and its branches including celiac axis, SMA, right and left renal arteries, PONCHO, iliac vasculature. No fluid collection or free air. No gross lymphadenopathy. Bones/Soft Tissues: No acute findings. Previous lumbar spine fusion. No acute findings. IMPRESSION: Ms. Maude Pavon is a 63 y.o. female with abdominal pain. Very likely functional.  Altered bowel habits. Negative prior EGD and colonoscopy. We will try conservative management. We discussed with the patient the option of EGD and colonoscopy. She wants to hold off on that for now. We will try Levsin. She denies any history of glaucoma. Thank you for allowing me to participate in the care of Ms. Beltran. For any further questions please do not hesitate to contact me.        MD Shakila [facility]arez [facility]

## 2020-09-22 NOTE — ED PROVIDER NOTES
[facility] [place] [facility]  Emergency Department Encounter  EmergencyMedicine Resident     Pt Name:Camelia Rouse  MRN: [id number]  Birthdate 1955  Date of evaluation: 9/21/20  PCP:  Dawna Lanza MD    CHIEF COMPLAINT       Chief Complaint   Patient presents with    Abdominal Pain    Nausea    Emesis       HISTORY OF PRESENT ILLNESS  (Location/Symptom, Timing/Onset, Context/Setting, Quality, Duration, Modifying Factors, Severity.)      Juana Willett is a 63 y.o. female who presents with complaints of abdominal pain and nausea. Patient is altered at this time and is not talking coherently, forgetting her conversation and hallucinating, thus complete HPI difficult to obtain. She can relate to me that her pain is sharp, located on the left side of her abdomen without radiation. He does endorse a headache but is unable to tell me when the headache came on or the quality of the headache. Patient was seen here in the emergency department 3 days ago with flank pain and received a normal CT abdomen and pelvis and fairly unremarkable laboratory work-up. Patient states that she went home and her abdominal pain returned today. Dr. Paige Maher is patient's PCP who she saw earlier today. We called Dr. Guido Ward who states that patient's current presentation is not typical for the patient and she was acting her usual self this morning. PAST MEDICAL / SURGICAL / SOCIAL / FAMILY HISTORY      has a past medical history of Acute hypoxemic respiratory failure ([address] Arthritis, CHF (congestive heart failure) ([address] Chronic back pain, Diabetes mellitus type II, controlled ([address] Fibromyalgia, Hyperlipidemia LDL goal < 70, Hypertension, benign, Morbid obesity with BMI of 60.0-69.9, adult ([address] Pain of toe of right foot, Right wrist pain, Skin cancer, Sleep apnea, and Wears glasses. Denies further past medical hx     has a past surgical history that includes Tubal ligation (1981);  Hysterectomy (1998); lumbar fusion Mother         Multiple Myeloma    Lung Cancer Father     Diabetes Sister     Diabetes Brother     Stomach Cancer Brother     Diabetes Maternal Grandmother     High Blood Pressure Paternal Grandmother     Heart Disease Paternal Grandmother     Stroke Paternal Grandmother     Heart Attack Paternal Grandfather     COPD Sister     High Blood Pressure Son     High Blood Pressure Daughter     Heart Disease Sister         stents in       Allergies:  Adhesive tape; Morphine; Iron; Lisinopril; Metformin and related; and Pcn [penicillins]    Home Medications:  Prior to Admission medications    Medication Sig Start Date End Date Taking? Authorizing Provider   ondansetron (ZOFRAN) 4 MG tablet Take 1 tablet by mouth every 4 hours as needed for Nausea or Vomiting 9/21/20   Claudene Grandchild, MD   ondansetron [facility]) 4 MG tablet Take 1 tablet by mouth every 8 hours as needed for Nausea or Vomiting 9/18/20   Amanda Alvarez DO   atorvastatin (LIPITOR) 40 MG tablet TAKE 1 TABLET BY MOUTH EVERY DAY AT NIGHT 9/2/20   Claudene Grandchild, MD   oxyCODONE-acetaminophen (PERCOCET) 5-325 MG per tablet Take 1-2 tablets by mouth every 6 hours as needed for Pain. 8/7/20 8/7/21  Claudene Grandchild, MD   amLODIPine (NORVASC) 10 MG tablet Take 1 tablet by mouth daily Takes with dinner 7/17/20   Claudene Grandchild, MD   famotidine (PEPCID) 20 MG tablet Take 1 tablet by mouth 2 times daily 7/17/20   Esme Gutiérrez MD   linaclotide [facility]) 145 MCG capsule Take 1 capsule by mouth every morning (before breakfast) 7/17/20   Esme Gutiérrez MD   venlafaxine (EFFEXOR XR) 37.5 MG extended release capsule TAKE 1 CAPSULE BY MOUTH EVERY DAY 7/5/20   Claudene Grandchild, MD   clonazePAM (KLONOPIN) 0.5 MG tablet Take 1 tablet by mouth 2 times daily as needed (tremor) for up to 30 doses. 6/16/20 7/16/20  Claudene Grandchild, MD   pregabalin (LYRICA) 150 MG capsule 2 times daily.   6/8/20   Historical Provider, MD   MULTIPLE VITAMIN PO Take by mouth nightly    Historical Provider, MD   glimepiride (AMARYL) 4 MG tablet TAKE 2 TABLETS BY MOUTH EVERY MORNING (BEFORE BREAKFAST) 6/2/20   Vick Evans MD   furosemide (LASIX) 40 MG tablet May increase to 40 mg BID as physician orders. Patient taking differently: 40 mg May increase to 40 mg BID as physician orders. 4/24/20   Vick Evans MD   aspirin 81 MG tablet Take 1 tablet by mouth daily (with breakfast) 4/21/20   Vick Evans MD   linagliptin (TRADJENTA) 5 MG tablet Take 1 tablet by mouth daily 3/10/20   VIDHYA Bauer NP   carvedilol (COREG) 12.5 MG tablet Take 1 tablet by mouth 2 times daily (with meals) 3/10/20   VIDHYA Bauer NP   blood glucose test strips (ASCENSIA AUTODISC VI;ONE TOUCH ULTRA TEST VI) strip Accucheck Solange. Check daily; dx E11.9 2/14/20   Vick Evans MD   Lancets MISC 1 each by Does not apply route daily Accucheck Solange dx E11.9 check daily 2/14/20   Vick Evans MD   albuterol (PROVENTIL) (2.5 MG/3ML) 0.083% nebulizer solution Take 3 mLs by nebulization 2 times daily 1/11/20 7/16/20  Tarun Aguilera MD   diclofenac sodium 1 % GEL Apply 2 g topically 3 times daily 2/20/19   Vick Evans MD   Calcium Carb-Cholecalciferol (CALCIUM 600 + D PO) Take 1 tablet by mouth 2 times daily    Historical Provider, MD   Multiple Vitamins-Iron (ONE DAILY MULTIVITAMIN/IRON PO) Take 1 tablet by mouth nightly    Historical Provider, MD   Coenzyme Q10 (COQ10 PO) Take 200 mg by mouth nightly     Historical Provider, MD   Loratadine-Pseudoephedrine (CLARITIN-D 12 HOUR PO) Take 1 tablet by mouth daily.     Historical Provider, MD       REVIEW OF SYSTEMS    (2-9 systems for level 4, 10 or more for level 5)        Due to patient's altered mental status, review of systems is unreliable at this time    PHYSICAL EXAM   (up to 7 for level 4, 8 or more for level 5)      INITIAL VITALS:   /61   Pulse 55   Temp 97.7 °F (36.5 °C) (Oral) HYPOGLYCEMIA TREATMENT: blood glucose less than 70 mg/dL and patient NOT ALERT or NPO    Notify physician    Up as tolerated    Full Code    Inpatient consult to [facility] Inpatient consult to GI    Dietary Nutrition Supplements: Clear Liquid Oral Supplement    Initiate Oxygen Therapy Protocol    POCT Glucose    POC Glucose Fingerstick    POCT glucose    POCT Glucose    POC Glucose Fingerstick    POC Glucose Fingerstick    POC Glucose Fingerstick    POC Glucose Fingerstick    EKG 12 Lead    PATIENT STATUS (FROM ED OR OR/PROCEDURAL) Inpatient       MEDICATIONS ORDERED:  Orders Placed This Encounter   Medications    ioversol (OPTIRAY) 74 % injection 100 mL    sodium chloride flush 0.9 % injection 10 mL    0.9 % sodium chloride bolus    ondansetron (ZOFRAN) injection 4 mg    albuterol (PROVENTIL) nebulizer solution 2.5 mg    amLODIPine (NORVASC) tablet 10 mg    aspirin EC tablet 81 mg    atorvastatin (LIPITOR) tablet 40 mg    carvedilol (COREG) tablet 12.5 mg    clonazePAM (KLONOPIN) tablet 0.5 mg    diclofenac sodium (VOLTAREN) 1 % gel 2 g    famotidine (PEPCID) tablet 20 mg    linaclotide (LINZESS) capsule 145 mcg    oxyCODONE-acetaminophen (PERCOCET) 5-325 MG per tablet 1 tablet    pregabalin (LYRICA) capsule 150 mg    venlafaxine (EFFEXOR XR) extended release capsule 37.5 mg    sodium chloride flush 0.9 % injection 10 mL    sodium chloride flush 0.9 % injection 10 mL    OR Linked Order Group     acetaminophen (TYLENOL) tablet 650 mg     acetaminophen (TYLENOL) suppository 650 mg    polyethylene glycol (GLYCOLAX) packet 17 g    OR Linked Order Group     promethazine (PHENERGAN) tablet 12.5 mg     ondansetron (ZOFRAN) injection 4 mg    enoxaparin (LOVENOX) injection 30 mg    glucose (GLUTOSE) 40 % oral gel 15 g    dextrose 50 % IV solution    glucagon (rDNA) injection 1 mg    dextrose 5 % solution    insulin lispro (HUMALOG) injection vial 0-6 Units    insulin status, hallucinations and complaints of abdominal pain. Per patient's PCP, patient was acting her usual self earlier this morning. Will obtain broad work-up. Turned over to oncoming physician             PROCEDURES:  None    CONSULTS:  IP CONSULT TO FAMILY MEDICINE  IP CONSULT TO GI    CRITICAL CARE:  None    FINAL IMPRESSION      1.  Altered mental status, unspecified altered mental status type            DISPOSITION / PLAN     DISPOSITION Admitted 09/22/2020 02:47:57 AM      PATIENT REFERRED TO:  Lyndon Matos, [address]  [address] [address] [address] [facility] [phone]            DISCHARGE MEDICATIONS:  Current Discharge Medication List          Chin Kidd DO  Emergency Medicine Resident    (Please note that portions of thisnote were completed with a voice recognition program.  Efforts were made to edit the dictations but occasionally words are mis-transcribed.)       Chin Kidd DO  Resident  09/22/20 2649

## 2020-09-22 NOTE — ED PROVIDER NOTES
hostile toward me. I tried to reassure the patient's  but he would not listen to me. I told him that I was concerned about his hostility toward me and he said that he should know why I am hostile toward him with the amount of ER visits that he she has had. I made my charge nurse aware he has not been hostile toward nurses just the physicians. If he becomes hostile again he will be removed from the emergency department. CRITICAL CARE: There was a high probability of clinically significant/life threatening deterioration in this patient's condition which required my urgent intervention. Total critical care time was 20 minutes. This excludes any time for separately reportable procedures. Vitals:   Vitals:    09/21/20 2317   BP: (!) 162/91   Pulse: 80   Resp: 15   Temp: 98.4 °F (36.9 °C)   TempSrc: Oral   SpO2: 99%   Weight: 266 lb (120.7 kg)   Height: 5' (1.524 m)         I personally evaluated and examined the patient in conjunction with the resident and agree with the assessment, treatment plan, and disposition of the patient as recorded by the resident. I performed a history and physical examination of the patient and discussed management with the resident. I reviewed the residents note and agree with the documented findings and plan of care. Any areas of disagreement are noted on the chart. I was personally present for the key portions of any procedures. I have documented in the chart those procedures where I was not present during the key portions. I have personally reviewed all images and agree with the resident's interpretation. I have reviewed the emergency nurses triage note. I agree with the chief complaint, past medical history, past surgical history, allergies, medications, social and family history as documented unless otherwise noted.     Yosi Jovel MD  Attending Emergency Physician           Yosi Jovel MD  09/22/20 [id number]       Yosi Jovel MD  09/22/20 [address] [place] [address],[address]

## 2020-09-22 NOTE — PLAN OF CARE
PRE CONSULT ROUNDING NOTE  HPI  63year old female with pmh of CHF DM chronic back pain, HTN skin cancer sleep apnea who presented to the ED for abdominal pain and AMS. Our service was consulted for abdominal pain. Apparently the pt was hallucinating in the ED; she is currently alert and oriented. Reports nausea with generalized abdominal pain that radiates to her back for one week. No fevers but reports being \"cold\" all the time. Has not had a BM in the last 3-4 days, normally goes every other day. No hx of melena or hematochezia. She states the pain is intermittent and she does not know what increases it. She was taking motrin at home for her normal back pain but cannot state how much or how often. She has had multiple back surgeries including an anterior approach back surgery. She has seen dr Iram Arzola as an outpatient for PUD slow transit constipation and has had iron infusions for iron deficiency anemia. She was started on Linzess but does not know if she is taking it. She does take percocet at home. Reports a 35# weight loss in 2 weeks. WBC 13.5 plt 457 alk phos 116 CTA abd pelvis shows no acute findings. She denies dysphagia odynophagia hematemesis rash new joint pain.     Endoscopy 10/17/19 colonoscopy (f ahmad) IH, fair prep, 10/16/19 egd (f ahmad) 4mm ulcer close to the pylorus    Family reports no hx of liver pancreatic stomach or colon cancer no UC/crohns  Social no smoking no etoh or illicit drugs   BP (!) 155/83   Pulse 83   Temp 97.8 °F (36.6 °C) (Oral)   Resp 14   Ht 5' (1.524 m)   Wt 247 lb (112 kg)   LMP  (LMP Unknown)   SpO2 98%   BMI 48.24 kg/m²     ROS as above meds labs imaging and past medical records were reviewed    Exam  General Appearance: alert and oriented to person, place and time, well-developed and well-nourished, in no acute distress  Skin: warm and dry, no rash or erythema  Head: normocephalic and atraumatic  Eyes: pupils equal, round, and reactive to light, extraocular eye

## 2020-09-22 NOTE — PLAN OF CARE
Nutrition Problem #1: Altered GI function  Intervention: Food and/or Nutrient Delivery: Continue Current Diet, Start Oral Nutrition Supplement  Nutritional Goals:  To provide adequate nutrition and meet % of estimated needs

## 2020-09-22 NOTE — CARE COORDINATION
Writer attempted to speak to Pt in regards to DC needs/Plans. Male Gentleman at the bedside,? ,states\" She was just here 3 days ago in ER, the information is already in the computer, this is the Computer age, it hasn't changed, you can go away\". Writer did attempt to clarify, the information that I had & he stated \"go away\". Wife is up in chair, eating jello, informed Gentleman \"Don't be so mean\". Nursing informed. Writer will take information form Admission of 6/11/20.

## 2020-09-22 NOTE — CARE COORDINATION
CASE MANAGEMENT NOTE:    Admission Date:  9/21/2020 Audi Venegas is a 63 y.o.  female    Admitted for : Generalized abdominal pain [R10.84]    Met with:  ? at the bedside, Very nasty, did not want to have Wife answer questions, Bud Writer to go away. Information taken from notes from 6/11/20 admission. PCP:  Coty Velazquez                                Insurance:  [facility]      Current Residence/ Living Arrangements:  independently at home  W/            Current Services PTA:  No    Is patient agreeable to VNS: Unsure    Freedom of choice provided:  No    List of [address] provided: No    VNS chosen:  Has Had Services w/ [facility] in Past, Call placed to Sharmila Mason    DME:  straight cane and shower chair, GB, Rollator, Glucometer    Home Oxygen: Yes, Has Concentrator, Wears at HS, 2.5 LNC    Nebulizer: Yes    CPAP/BIPAP: Yes, Bipap    Supplier: [employer] from [address] Needed: Unsure    SNF needed: Unsure    Freedom of choice and list provided: NA    Pharmacy:  [facility] in [address] [place] [address]       Does Patient want to use MEDS to BEDS? No    Is patient currently receiving oral anticoagulation therapy? No    Is the Patient an [facility] with Readmission Risk Score greater than 14%? No  If yes, pt needs a follow up appointment made within 7 days. Family Members/Caregivers that pt would like involved in their care:    Yes    If yes, list name here:  , Ji Chavez, Daughter, Kath Maher    Transportation Provider:               Is patient in Isolation/One on One/Altered Mental Status? No  If yes, skip next question. If no, would they like an I-Pad to  use? No  If yes, call [phone]. Discharge Plan:  9/22/20, Information taken from Notes from 6/11/20 for Pt's , was very nasty & refused to answer writer's questions. Lives in 2 story home, Liveable 1st floor w/ . DME cane, SC, GG, Rollator, Glucometer.  Oxygen, 2.5 LNC, at HS, Concentrator, BIPAP from [facility], Neb from [place]. Has Had Interim , VNS, in past, Unsure if they want or are current. Call placed to mayela. Renal Us, Cl liq, GI, Lipase 44, WBC 13.5, Unsure of needs.  Cecilio would need signed/completed, if needed, will follow//KB                Electronically signed by: Eloisa Martinez, RN on 9/22/2020 at 12:06 PM

## 2020-09-22 NOTE — PLAN OF CARE
Problem: Falls - Risk of:  Goal: Will remain free from falls  Description: Will remain free from falls  Outcome: Ongoing  Note: No falls this shift. Call light is within reach, side rails up x2, and bed in lowest position. Pt safety is maintained. Goal: Absence of physical injury  Description: Absence of physical injury  Outcome: Ongoing  Note: No injury this shift. Pt safety maintained. Problem: Safety:  Goal: Free from accidental physical injury  Description: Free from accidental physical injury  Outcome: Ongoing  Note: No injury this shift. Pt safety maintained. Goal: Free from intentional harm  Description: Free from intentional harm  Outcome: Ongoing  Note: No injury this shift. Pt safety maintained. Problem: Daily Care:  Goal: Daily care needs are met  Description: Daily care needs are met  Outcome: Ongoing     Problem: Pain:  Goal: Patient's pain/discomfort is manageable  Description: Patient's pain/discomfort is manageable  Outcome: Ongoing  Note: Pt pain is controlled with PRN pain medication as needed. Goal: Pain level will decrease  Description: Pain level will decrease  Outcome: Ongoing  Note: Pt pain is controlled with PRN pain medication as needed. Goal: Control of acute pain  Description: Control of acute pain  Outcome: Ongoing  Note: Pt pain is controlled with PRN pain medication as needed. Goal: Control of chronic pain  Description: Control of chronic pain  Outcome: Ongoing  Note: Pt pain is controlled with PRN pain medication as needed. Problem: Skin Integrity:  Goal: Skin integrity will stabilize  Description: Skin integrity will stabilize  Outcome: Ongoing  Note: Skin assessment as charted.        Problem: Discharge Planning:  Goal: Patients continuum of care needs are met  Description: Patients continuum of care needs are met  Outcome: Ongoing     Problem: Skin Integrity:  Goal: Will show no infection signs and symptoms  Description: Will show no infection signs and symptoms  Outcome: Ongoing  Note: Skin assessment as charted. Goal: Absence of new skin breakdown  Description: Absence of new skin breakdown  Outcome: Ongoing  Note: Skin assessment as charted.

## 2020-09-22 NOTE — FLOWSHEET NOTE
Sheng De Los Santos at bedside, patient was reclining in chair. They expressed frustration with her continued pain and \"not getting any answers. \"  Pt said she cannot live like this and that she's \"not leaving this time until I have answers. \"  complained of all the people coming in asking \"same dumb questions when answers are in the stupid computer. \"  He was especially angry about the financial clearance process. They also shared that Sheng De Los Santos had driven to [place] recently to bring pt's uncle to live with them; they just moved him to [facility] on Friday as they couldn't care for his needs. My observations are they are worried and tired. 09/22/20 1354   Encounter Summary   Services provided to: Patient and family together   Referral/Consult From: [facility] [place]   Support System Spouse   Continue Visiting   (9-22-20)   Complexity of Encounter High   Length of Encounter 15 minutes   Spiritual Assessment Completed Yes   Routine   Type Initial   Spiritual/Nondenominational   Type Spiritual support   Assessment Anxious; Approachable; Angry   Intervention Active listening;Explored feelings, thoughts, concerns;Explored coping resources;Prayer;Sustaining presence/ Ministry of presence; Discussed illness/injury and it's impact   Outcome Expressed gratitude;Engaged in conversation;Expressed feelings/needs/concerns;Venting emotion;Receptive

## 2020-09-22 NOTE — H&P
by mouth daily (with breakfast) 4/21/20   Estrada Hyde MD   linagliptin (TRADJENTA) 5 MG tablet Take 1 tablet by mouth daily 3/10/20   VIDHYA Rowe NP   carvedilol (COREG) 12.5 MG tablet Take 1 tablet by mouth 2 times daily (with meals) 3/10/20   VIDHYA Rowe NP   blood glucose test strips (ASCENSIA AUTODISC VI;ONE TOUCH ULTRA TEST VI) strip Accucheck Solange. Check daily; dx E11.9 2/14/20   Estrada Hyde MD   Lancets MISC 1 each by Does not apply route daily Accucheck Solange dx E11.9 check daily 2/14/20   Estrada Hyde MD   albuterol (PROVENTIL) (2.5 MG/3ML) 0.083% nebulizer solution Take 3 mLs by nebulization 2 times daily 1/11/20 7/16/20  Shaina Hodgson MD   diclofenac sodium 1 % GEL Apply 2 g topically 3 times daily 2/20/19   Estrada Hyde MD   Calcium Carb-Cholecalciferol (CALCIUM 600 + D PO) Take 1 tablet by mouth 2 times daily    Historical Provider, MD   Multiple Vitamins-Iron (ONE DAILY MULTIVITAMIN/IRON PO) Take 1 tablet by mouth nightly    Historical Provider, MD   Coenzyme Q10 (COQ10 PO) Take 200 mg by mouth nightly     Historical Provider, MD   Loratadine-Pseudoephedrine (CLARITIN-D 12 HOUR PO) Take 1 tablet by mouth daily. Historical Provider, MD       Allergies:  Adhesive tape; Morphine; Iron; Lisinopril; Metformin and related; and Pcn [penicillins]    Social History:  The patient currently lives at home with spouse    TOBACCO:   reports that she has never smoked. She has never used smokeless tobacco.  ETOH:   reports previous alcohol use.       Family History:          Problem Relation Age of Onset    Diabetes Mother     Cancer Mother         Multiple Myeloma    Lung Cancer Father     Diabetes Sister     Diabetes Brother     Stomach Cancer Brother     Diabetes Maternal Grandmother     High Blood Pressure Paternal Grandmother     Heart Disease Paternal Grandmother     Stroke Paternal Grandmother     Heart Attack Paternal [place] COPD Sister     High Blood Pressure Son     High Blood Pressure Daughter     Heart Disease Sister         stents in       PHYSICAL EXAM:    BP (!) 180/66   Pulse 79   Temp 97.5 °F (36.4 °C) (Oral)   Resp 18   Ht 5' (1.524 m)   Wt 247 lb (112 kg)   LMP  (LMP Unknown)   SpO2 100%   BMI 48.24 kg/m²     General appearance: No apparent distress appears stated age and cooperative. HEENT Normal cephalic, atraumatic without obvious deformity. Neck: Supple, No jugular venous distention/bruits. Lungs: Clear to auscultation, bilaterally without rales/wheezes/rhonchi with good respiratory effort. Heart: Regular rate and rhythm with Normal S1/S2 without murmurs, rubs or gallops  Abdomen: Soft, tender throughout  MS: tender upper lumbar spine, right flank. Skin: Skin color, texture, turgor normal.  No rashes or lesions. Mental status: Alert, oriented, thought content appropriate. CXR:  I have reviewed the CXR with the following interpretation: Cardiomegaly, without evidence of CHF     EKG:  I have reviewed the EKG with the following interpretation: scan not available    CTA abd/pelvis: No acute findings. CT brain:No acute intracranial abnormality.  MRI may be obtained if clinically   indicated. CBC   Recent Labs     09/21/20  2330   WBC 13.5*   HGB 12.6   HCT 38.2   *      RENAL  Recent Labs     09/21/20  2330      K 3.6*   CL 99   CO2 24   BUN 15   CREATININE 1.21*     LFT'S  Recent Labs     09/21/20  2330   AST 24   ALT 21   BILITOT 0.43   ALKPHOS 116*     COAG  Recent Labs     09/21/20  2330   INR 1.1     CARDIAC ENZYMES  No results for input(s): CKTOTAL, CKMB, CKMBINDEX, TROPONINI in the last 72 hours.     U/A:    Lab Results   Component Value Date    COLORU YELLOW 09/22/2020    WBCUA 2 TO 5 09/22/2020    RBCUA 0 TO 2 09/22/2020    MUCUS NOT REPORTED 09/22/2020    BACTERIA NOT REPORTED 09/22/2020    SPECGRAV 1.019 09/22/2020    LEUKOCYTESUR NEGATIVE 09/22/2020    GLUCOSEU NEGATIVE

## 2020-09-22 NOTE — PROGRESS NOTES
Comprehensive Nutrition Assessment    Type and Reason for Visit:  (weight loss, poor appetite poor intake)    Nutrition Recommendations/Plan: Continue Clear Liquid Diet. Start Ensure Clear with each meal.     Nutrition Assessment:  Patient admission is related to abdominal pain with nausea and vomiting. Patient reports 35 lbs weight loss in last 2 weeks. Has a history of CHF, diabetes and skin cancer. Patient reports no BM for 3-4 days, she normally goes every other day. currently on Clear Liquid diet. Will start Ensure Clear with each meal and monitor nutrition progression. Malnutrition Assessment:  Malnutrition Status: At risk for malnutrition (Comment)    Context:  Acute Illness     Findings of the 6 clinical characteristics of malnutrition:  Energy Intake:  7 - 50% or less of estimated energy requirements for 5 or more days  Weight Loss:  7 - Greater than 2% over 1 week     Body Fat Loss:  Unable to assess     Muscle Mass Loss:  Unable to assess    Fluid Accumulation:  1 - Mild Extremities   Strength:  Not Performed    Estimated Daily Nutrient Needs:  Energy (kcal):  1916 kcal based on [place] using 1.2 factor; Weight Used for Energy Requirements:  Current     Protein (g):  117-126 gm based on 2.6-2.8 gm/kg of ideal; Weight Used for Protein Requirements:             Nutrition Related Findings:  Edema: +2 pitting. Bowel sounds active. Wounds:  None       Current Nutrition Therapies:    DIET CLEAR LIQUID;     Anthropometric Measures:  · Height: 5' (152.4 cm)  · Current Body Weight: 247 lb (112 kg)   · Admission Body Weight: 247 lb (112 kg)    · Usual Body Weight: 282 lb (127.9 kg)     · Ideal Body Weight: 100 lbs; % Ideal Body Weight 247 %   · BMI: 48.2  · BMI Categories: Obese Class 3 (BMI 40.0 or greater)       Nutrition Diagnosis:   · Altered GI function related to altered GI function, inadequate protein-energy intake as evidenced by NPO or clear liquid status due to medical condition, intake 0-25%, weight loss, weight loss greater than or equal to 5% in 1 month, GI abnormality, nausea, vomiting, constipation, BMI    Nutrition Interventions:   Food and/or Nutrient Delivery:  Continue Current Diet, Start Oral Nutrition Supplement  Nutrition Education/Counseling:  Education not indicated   Coordination of Nutrition Care:  Continued Inpatient Monitoring    Goals: To provide adequate nutrition and meet % of estimated needs       Nutrition Monitoring and Evaluation:   Food/Nutrient Intake Outcomes:  Diet Advancement/Tolerance, Food and Nutrient Intake, Supplement Intake  Physical Signs/Symptoms Outcomes:  Biochemical Data, GI Status, Constipation, Nausea or Vomiting, Weight     Discharge Planning:     Too soon to determine       Some areas of assessment may be incomplete due to COVID-19 precautions    Eran Cleaning RD, LD  Office phone [phone]

## 2020-09-22 NOTE — PLAN OF CARE
Problem: Falls - Risk of:  Goal: Will remain free from falls  Description: Will remain free from falls  9/22/2020 1841 by Salo Estevez RN  Outcome: Ongoing  Note: No falls this shift. Call light within reach and siderails x2. Bed in lowest position. Patient safety maintained. Problem: Falls - Risk of:  Goal: Absence of physical injury  Description: Absence of physical injury  9/22/2020 1841 by Salo Estevez RN  Outcome: Ongoing  Note: No falls this shift. Call light within reach and siderails x2. Bed in lowest position. Patient safety maintained. Problem: Safety:  Goal: Free from accidental physical injury  Description: Free from accidental physical injury  9/22/2020 1841 by Salo Estevez RN  Outcome: Ongoing  Note: No falls this shift. Call light within reach and siderails x2. Bed in lowest position. Patient safety maintained. Problem: Safety:  Goal: Free from intentional harm  Description: Free from intentional harm  9/22/2020 1841 by Salo Estevez RN  Outcome: Ongoing  Note: No falls this shift. Call light within reach and siderails x2. Bed in lowest position. Patient safety maintained. Problem: Daily Care:  Goal: Daily care needs are met  Description: Daily care needs are met  9/22/2020 1841 by Salo Estevez RN  Outcome: Ongoing     Problem: Pain:  Goal: Patient's pain/discomfort is manageable  Description: Patient's pain/discomfort is manageable  9/22/2020 1841 by Salo Estevez RN  Outcome: Ongoing  Note: Pain decreased with prescribed medication.        Problem: Pain:  Goal: Pain level will decrease  Description: Pain level will decrease  9/22/2020 1841 by Salo Estevez RN  Outcome: Ongoing     Problem: Pain:  Goal: Control of acute pain  Description: Control of acute pain  9/22/2020 1841 by Salo Estevez RN  Outcome: Ongoing     Problem: Pain:  Goal: Control of chronic pain  Description: Control of chronic pain  9/22/2020 1841 by Salo Estevez RN  Outcome:

## 2020-09-23 VITALS
HEIGHT: 60 IN | HEART RATE: 70 BPM | TEMPERATURE: 98.5 F | WEIGHT: 263.67 LBS | OXYGEN SATURATION: 94 % | RESPIRATION RATE: 16 BRPM | BODY MASS INDEX: 51.77 KG/M2 | DIASTOLIC BLOOD PRESSURE: 62 MMHG | SYSTOLIC BLOOD PRESSURE: 142 MMHG

## 2020-09-23 LAB
ALBUMIN SERPL-MCNC: 3.8 G/DL (ref 3.5–5.2)
ALBUMIN/GLOBULIN RATIO: ABNORMAL (ref 1–2.5)
ALP BLD-CCNC: 104 U/L (ref 35–104)
ALT SERPL-CCNC: 22 U/L (ref 5–33)
ANION GAP SERPL CALCULATED.3IONS-SCNC: 14 MMOL/L (ref 9–17)
AST SERPL-CCNC: 26 U/L
BILIRUB SERPL-MCNC: 0.28 MG/DL (ref 0.3–1.2)
BUN BLDV-MCNC: 14 MG/DL (ref 8–23)
BUN/CREAT BLD: ABNORMAL (ref 9–20)
CALCIUM SERPL-MCNC: 9 MG/DL (ref 8.6–10.4)
CHLORIDE BLD-SCNC: 97 MMOL/L (ref 98–107)
CO2: 26 MMOL/L (ref 20–31)
CREAT SERPL-MCNC: 1.39 MG/DL (ref 0.5–0.9)
CULTURE: NO GROWTH
GFR AFRICAN AMERICAN: 46 ML/MIN
GFR NON-AFRICAN AMERICAN: 38 ML/MIN
GFR SERPL CREATININE-BSD FRML MDRD: ABNORMAL ML/MIN/{1.73_M2}
GFR SERPL CREATININE-BSD FRML MDRD: ABNORMAL ML/MIN/{1.73_M2}
GLUCOSE BLD-MCNC: 117 MG/DL (ref 65–105)
GLUCOSE BLD-MCNC: 120 MG/DL (ref 70–99)
GLUCOSE BLD-MCNC: 167 MG/DL (ref 65–105)
Lab: NORMAL
POTASSIUM SERPL-SCNC: 3.6 MMOL/L (ref 3.7–5.3)
SODIUM BLD-SCNC: 137 MMOL/L (ref 135–144)
SPECIMEN DESCRIPTION: NORMAL
TOTAL PROTEIN: 7.2 G/DL (ref 6.4–8.3)

## 2020-09-23 PROCEDURE — 82947 ASSAY GLUCOSE BLOOD QUANT: CPT

## 2020-09-23 PROCEDURE — 2700000000 HC OXYGEN THERAPY PER DAY

## 2020-09-23 PROCEDURE — 94640 AIRWAY INHALATION TREATMENT: CPT

## 2020-09-23 PROCEDURE — 6370000000 HC RX 637 (ALT 250 FOR IP): Performed by: NURSE PRACTITIONER

## 2020-09-23 PROCEDURE — APPNB30 APP NON BILLABLE TIME 0-30 MINS: Performed by: NURSE PRACTITIONER

## 2020-09-23 PROCEDURE — 2580000003 HC RX 258: Performed by: FAMILY MEDICINE

## 2020-09-23 PROCEDURE — 6370000000 HC RX 637 (ALT 250 FOR IP): Performed by: FAMILY MEDICINE

## 2020-09-23 PROCEDURE — G0378 HOSPITAL OBSERVATION PER HR: HCPCS

## 2020-09-23 PROCEDURE — 96372 THER/PROPH/DIAG INJ SC/IM: CPT

## 2020-09-23 PROCEDURE — 6360000002 HC RX W HCPCS: Performed by: FAMILY MEDICINE

## 2020-09-23 PROCEDURE — 94761 N-INVAS EAR/PLS OXIMETRY MLT: CPT

## 2020-09-23 PROCEDURE — 80053 COMPREHEN METABOLIC PANEL: CPT

## 2020-09-23 PROCEDURE — 99238 HOSP IP/OBS DSCHRG MGMT 30/<: CPT | Performed by: FAMILY MEDICINE

## 2020-09-23 PROCEDURE — 99232 SBSQ HOSP IP/OBS MODERATE 35: CPT | Performed by: INTERNAL MEDICINE

## 2020-09-23 PROCEDURE — 36415 COLL VENOUS BLD VENIPUNCTURE: CPT

## 2020-09-23 RX ORDER — OXYCODONE HYDROCHLORIDE 15 MG/1
30 TABLET, FILM COATED, EXTENDED RELEASE ORAL EVERY 8 HOURS SCHEDULED
Qty: 60 TABLET | Refills: 0
Start: 2020-09-23 | End: 2020-09-23

## 2020-09-23 RX ORDER — FUROSEMIDE 40 MG/1
40 TABLET ORAL DAILY
Qty: 30 TABLET | Refills: 0
Start: 2020-09-23 | End: 2021-04-01

## 2020-09-23 RX ADMIN — PREGABALIN 150 MG: 150 CAPSULE ORAL at 08:48

## 2020-09-23 RX ADMIN — VENLAFAXINE HYDROCHLORIDE 37.5 MG: 37.5 CAPSULE, EXTENDED RELEASE ORAL at 08:48

## 2020-09-23 RX ADMIN — AMLODIPINE BESYLATE 10 MG: 5 TABLET ORAL at 08:48

## 2020-09-23 RX ADMIN — HYOSCYAMINE SULFATE 125 MCG: 0.12 TABLET ORAL; SUBLINGUAL at 06:41

## 2020-09-23 RX ADMIN — OXYCODONE HYDROCHLORIDE 30 MG: 15 TABLET, FILM COATED, EXTENDED RELEASE ORAL at 08:48

## 2020-09-23 RX ADMIN — LINACLOTIDE 145 MCG: 145 CAPSULE, GELATIN COATED ORAL at 06:41

## 2020-09-23 RX ADMIN — DICLOFENAC 2 G: 10 GEL TOPICAL at 08:48

## 2020-09-23 RX ADMIN — ASPIRIN 81 MG: 81 TABLET, COATED ORAL at 08:48

## 2020-09-23 RX ADMIN — INSULIN LISPRO 1 UNITS: 100 INJECTION, SOLUTION INTRAVENOUS; SUBCUTANEOUS at 12:06

## 2020-09-23 RX ADMIN — OXYCODONE HYDROCHLORIDE 30 MG: 15 TABLET, FILM COATED, EXTENDED RELEASE ORAL at 00:42

## 2020-09-23 RX ADMIN — Medication 10 ML: at 08:51

## 2020-09-23 RX ADMIN — ENOXAPARIN SODIUM 30 MG: 30 INJECTION SUBCUTANEOUS at 08:48

## 2020-09-23 RX ADMIN — CARVEDILOL 12.5 MG: 12.5 TABLET, FILM COATED ORAL at 08:48

## 2020-09-23 RX ADMIN — ALBUTEROL SULFATE 2.5 MG: 2.5 SOLUTION RESPIRATORY (INHALATION) at 07:18

## 2020-09-23 RX ADMIN — FAMOTIDINE 20 MG: 20 TABLET, FILM COATED ORAL at 08:48

## 2020-09-23 ASSESSMENT — PAIN DESCRIPTION - PAIN TYPE
TYPE: CHRONIC PAIN
TYPE: CHRONIC PAIN

## 2020-09-23 ASSESSMENT — PAIN SCALES - GENERAL
PAINLEVEL_OUTOF10: 2
PAINLEVEL_OUTOF10: 8
PAINLEVEL_OUTOF10: 6
PAINLEVEL_OUTOF10: 0
PAINLEVEL_OUTOF10: 2
PAINLEVEL_OUTOF10: 0

## 2020-09-23 ASSESSMENT — PAIN DESCRIPTION - LOCATION
LOCATION: BACK
LOCATION: BACK

## 2020-09-23 NOTE — PLAN OF CARE
Problem: Falls - Risk of:  Goal: Will remain free from falls  Description: Will remain free from falls  9/23/2020 1343 by Jovan Jovel RN  Outcome: Completed  9/23/2020 0027 by Deborah Perales RN  Outcome: Met This Shift  Goal: Absence of physical injury  Description: Absence of physical injury  9/23/2020 1343 by Jovan Jovel RN  Outcome: Completed  9/23/2020 0027 by Deborah Perales RN  Outcome: Met This Shift     Problem: Safety:  Goal: Free from accidental physical injury  Description: Free from accidental physical injury  9/23/2020 1343 by Jovan Jovel RN  Outcome: Completed  9/23/2020 0027 by Deborah Perales RN  Outcome: Met This Shift  Goal: Free from intentional harm  Description: Free from intentional harm  9/23/2020 1343 by Jovan Jovel RN  Outcome: Completed  9/23/2020 0027 by Deborah Perales RN  Outcome: Met This Shift     Problem: Daily Care:  Goal: Daily care needs are met  Description: Daily care needs are met  9/23/2020 1343 by Jovan Jovel RN  Outcome: Completed  9/23/2020 0027 by Deborah Perales RN  Outcome: Ongoing     Problem: Pain:  Goal: Patient's pain/discomfort is manageable  Description: Patient's pain/discomfort is manageable  9/23/2020 1343 by Jovan Jovel RN  Outcome: Completed  9/23/2020 0027 by Deborah Perales RN  Outcome: Ongoing  Goal: Pain level will decrease  Description: Pain level will decrease  9/23/2020 1343 by Jovan Jovel RN  Outcome: Completed  9/23/2020 0027 by Deborah Perales RN  Outcome: Ongoing  Goal: Control of acute pain  Description: Control of acute pain  9/23/2020 1343 by Jovan Jovel RN  Outcome: Completed  9/23/2020 0027 by Deborah Perales RN  Outcome: Ongoing  Goal: Control of chronic pain  Description: Control of chronic pain  9/23/2020 1343 by Jovan Jovel RN  Outcome: Completed  9/23/2020 0027 by Deborah Perales RN  Outcome: Ongoing     Problem: Skin Integrity:  Goal: Skin integrity will stabilize  Description: Skin integrity will stabilize  9/23/2020 1343 by Daniel Montanez RN  Outcome: Completed  9/23/2020 0027 by Raji Ortiz RN  Outcome: Ongoing     Problem: Discharge Planning:  Goal: Patients continuum of care needs are met  Description: Patients continuum of care needs are met  9/23/2020 1343 by Daniel Montanez RN  Outcome: Completed  9/23/2020 0027 by Raji Ortiz RN  Outcome: Ongoing     Problem: Skin Integrity:  Goal: Will show no infection signs and symptoms  Description: Will show no infection signs and symptoms  9/23/2020 1343 by Daniel Montanez RN  Outcome: Completed  9/23/2020 0027 by Raji Ortiz RN  Outcome: Ongoing  Goal: Absence of new skin breakdown  Description: Absence of new skin breakdown  9/23/2020 1343 by Daniel Montanez RN  Outcome: Completed  9/23/2020 0027 by Raji Ortiz RN  Outcome: Ongoing     Problem: Nutrition  Goal: Optimal nutrition therapy  9/23/2020 1343 by Daniel Montanez RN  Outcome: Completed  9/23/2020 0027 by Raji Ortiz RN  Outcome: Ongoing

## 2020-09-23 NOTE — CARE COORDINATION
ONGOING DISCHARGE PLAN:    Spoke with patient regarding discharge plan and patient confirms that plan is still home. Declined VNS. Pt is currently refusing EGD and colonoscopy. Low fiber diet. Anticipate home soon. Will continue to follow for additional discharge needs.     Electronically signed by Nish Castellanos RN on 9/23/2020 at 11:19 AM

## 2020-09-23 NOTE — PROGRESS NOTES
[facility]    Gastroenterology Daily Progress Note      Patient:   Jessika    :    1955   Facility:   [facility]  Date:     2020  Consultant:   Bladimir Orellana CNP      SUBJECTIVE  63 y.o. female admitted 2020 with Generalized abdominal pain [R10.84] and seen for abdominal pain. The pt was seen and examined. Currently denies abdominal pain and nausea. Did not require any levsin overnight. Tolerating clear liquid diet.          OBJECTIVE  Scheduled Meds:   albuterol  2.5 mg Nebulization BID    amLODIPine  10 mg Oral Daily    aspirin  81 mg Oral Daily with breakfast    atorvastatin  40 mg Oral Nightly    carvedilol  12.5 mg Oral BID WC    diclofenac sodium  2 g Topical TID    famotidine  20 mg Oral Daily    linaclotide  145 mcg Oral QAM AC    pregabalin  150 mg Oral BID    venlafaxine  37.5 mg Oral Daily with breakfast    sodium chloride flush  10 mL Intravenous 2 times per day    enoxaparin  30 mg Subcutaneous BID    insulin lispro  0-6 Units Subcutaneous TID WC    insulin lispro  0-3 Units Subcutaneous Nightly    oxyCODONE  30 mg Oral 3 times per day    bisacodyl  10 mg Rectal Once       Vital Signs:  BP (!) 156/79   Pulse 62   Temp 98.2 °F (36.8 °C) (Oral)   Resp 16   Ht 5' (1.524 m)   Wt 263 lb 10.7 oz (119.6 kg)   LMP  (LMP Unknown)   SpO2 97%   BMI 51.49 kg/m²      Physical Exam:     General Appearance: alert and oriented to person, place and time, well-developed and well-nourished, in no acute distress  Skin: warm and dry, no rash or erythema  Head: normocephalic and atraumatic  Eyes: pupils equal, round, and reactive to light, extraocular eye movements intact, conjunctivae normal  ENT: hearing grossly normal bilaterally  Neck: neck supple and non tender without mass, no thyromegaly or thyroid nodules, no cervical lymphadenopathy   Pulmonary/Chest: clear to auscultation bilaterally- no wheezes, rales or rhonchi, normal air movement, no respiratory distress  Cardiovascular: normal rate, regular rhythm, normal S1 and S2, no murmurs, rubs, clicks or gallops, distal pulses intact, no carotid bruits  Abdomen: soft, obese non-tender, non-distended, normal bowel sounds, no masses or organomegaly  Extremities: no cyanosis, clubbing or edema  Musculoskeletal: normal range of motion, no joint swelling, deformity or tenderness  Neurologic: no cranial nerve deficit and muscle strength normal    Lab and Imaging Review     CBC  Recent Labs     09/21/20 2330   WBC 13.5*   HGB 12.6   HCT 38.2   MCV 83.9   *       BMP  Recent Labs     09/21/20 2330 09/22/20  0005 09/23/20  0534     --  137   K 3.6*  --  3.6*   CL 99  --  97*   CO2 24  --  26   BUN 15  --  14   CREATININE 1.21*  --  1.39*   GLUCOSE 127* 103 120*   CALCIUM 9.9  --  9.0       LFTS  Recent Labs     09/21/20 2330 09/23/20  0534   ALKPHOS 116* 104   ALT 21 22   AST 24 26   PROT 8.1 7.2   BILITOT 0.43 0.28*   LABALBU 3.9 3.8       AMYLASE/LIPASE/AMMONIA  Recent Labs     09/21/20 2330   LIPASE 44   AMMONIA 29       PT/INR  Recent Labs     09/21/20 2330   PROTIME 14.1   INR 1.1     Ct Abdomen Pelvis Wo Contrast Additional Contrast? None     Result Date: 9/18/2020  EXAMINATION: CT OF THE ABDOMEN AND PELVIS WITHOUT CONTRAST 9/18/2020 5:21 pm FINDINGS: Lower Chest: No acute abnormality within the visualized lung bases. Trace pericardial effusion. Organs: Within the limitations of a noncontrast examination, no acute abnormality within the liver, spleen, pancreas, or adrenal glands. Prior cholecystectomy. No nephrolithiasis or hydronephrosis. GI/Bowel: Stomach is nondistended. The small bowel is nondilated. The colon is normal in caliber. Pelvis: Bladder is nondistended. No vesicular stone. Prior hysterectomy. Peritoneum/Retroperitoneum: No ascites or pneumoperitoneum. Abdominal aorta is normal in caliber. Bones/Soft Tissues: No acute osseous abnormality.   Multilevel postoperative changes in the lumbar spine. Small, fat containing ventral abdominal wall hernia.      1. No acute intra-abdominal abnormality. No nephrolithiasis or hydronephrosis. 2. Prior cholecystectomy. 3. Trace pericardial effusion.      Cta Abdomen Pelvis W Contrast     Result Date: 9/22/2020  EXAMINATION: CTA OF THE ABDOMEN AND PELVIS WITH CONTRAST 9/22/2020 12:42 am FINDINGS: Lower Chest:  Visualized portion of the lower chest demonstrates no acute abnormality. Organs: The visualized portions of the liver, spleen, pancreas and adrenal glands demonstrate no acute abnormality in the arterial enhancement phase. No biliary ductal dilatation. Cholecystectomy. The kidneys appear normal in size and demonstrate symmetric enhancement. No focal renal mass. No hydronephrosis. No perinephric stranding. GI/Bowel: No bowel dilatation to suggest obstruction. No evidence of acute appendicitis. Pelvis: The urinary bladder appears unremarkable. The pelvic organs demonstrate no acute abnormality. Peritoneum/Retroperitoneum: Normal enhancement and caliber of the aorta and its branches including celiac axis, SMA, right and left renal arteries, PONCHO, iliac vasculature. No fluid collection or free air. No gross lymphadenopathy. Bones/Soft Tissues: No acute findings. Previous lumbar spine fusion.      No acute findings.         ASSESSMENT/PLAN:  1. Abdominal pain, currently resolved pain is likely functional  -prn levsin  -outpt egd and colonoscopy if the symptoms persist  -will advance diet to soft    D/w dr Yahaira Linn ok ot go home from gi standpoint gi signing off    This plan was formulated in collaboration with .     Electronically signed by: VIDHYA Alba - CNP on 9/23/2020 at 9:37 AM

## 2020-09-24 ENCOUNTER — CARE COORDINATION (OUTPATIENT)
Dept: CASE MANAGEMENT | Age: 65
End: 2020-09-24

## 2020-09-24 LAB
EKG ATRIAL RATE: 70 BPM
EKG P AXIS: 78 DEGREES
EKG P-R INTERVAL: 190 MS
EKG Q-T INTERVAL: 402 MS
EKG QRS DURATION: 74 MS
EKG QTC CALCULATION (BAZETT): 434 MS
EKG R AXIS: 46 DEGREES
EKG T AXIS: 80 DEGREES
EKG VENTRICULAR RATE: 70 BPM

## 2020-09-24 NOTE — CARE COORDINATION
-2020 [facility] Abd pain, Poor nutritional intake. NR  No CV-19 lab performed this admit  Onc appt  1:15, Advised to schedule PCP and GI appts. Med rec/1111F order completed. Challenges to be reviewed by the provider   Additional needs identified to be addressed with provider No  none    Discussed COVID-19 related testing which was not done at this time. Test results were not done. Patient informed of results, if available? NA         Method of communication with provider : none    Advance Care Planning:   Does patient have an Advance Directive:  not on file. Was this a readmission? No  Patient stated reason for admission: Abd pain  Patients top risk factors for readmission: medical condition    Care Transition Nurse (CTN) contacted the patient by telephone to perform post hospital discharge assessment. Verified name and  with patient as identifiers. Provided introduction to self, and explanation of the CTN role. CTN reviewed discharge instructions, medical action plan and red flags with patient who verbalized understanding. Patient given an opportunity to ask questions and does not have any further questions or concerns at this time. Were discharge instructions available to patient? Yes. Reviewed appropriate site of care based on symptoms and resources available to patient including: When to call 911. The patient agrees to contact the PCP office for questions related to their healthcare. Medication reconciliation was performed with patient, who verbalizes understanding of administration of home medications. Advised obtaining a 90-day supply of all daily and as-needed medications. Covid Risk Education    Patient has following risk factors of: diabetes. Education provided regarding infection prevention, and signs and symptoms of COVID-19 and when to seek medical attention with patient who verbalized understanding.  Discussed exposure protocols and quarantine From CDC: Are you at higher risk for severe illness?   and given an opportunity for questions and concerns. The patient agrees to contact the COVID-19 hotline [phone] or PCP office for questions related to COVID-19. For more information on steps you can take to protect yourself, see CDC's How to Protect Yourself     Patient/family/caregiver given information for [employer] [facility] and agrees to enroll no  Patient's preferred e-mail: declines  Patient's preferred phone number: declines    Discussed follow-up appointments. If no appointment was previously scheduled, appointment scheduling offered: Reviewed appts for pt to self schedule. Onc appt scheduled. Is follow up appointment scheduled within 7 days of discharge? Pt to schedule. Non-[facility] follow up appointment(s):     Plan for follow-up call in 5-7 days based on severity of symptoms and risk factors. Plan for next call: symptom management-Pt denies any current abd pain/cramp/distention. Admits she continues to have poor appetite. Reviewed to drink fluids and eat bland food for GI tolerance, v/u. Last normal BM 9/23. reports fasting  today. Continues to decline need for VNS. CTN provided contact information for future needs.

## 2020-09-29 LAB
EKG ATRIAL RATE: 79 BPM
EKG P AXIS: 34 DEGREES
EKG P-R INTERVAL: 166 MS
EKG Q-T INTERVAL: 384 MS
EKG QRS DURATION: 78 MS
EKG QTC CALCULATION (BAZETT): 440 MS
EKG R AXIS: 19 DEGREES
EKG T AXIS: 40 DEGREES
EKG VENTRICULAR RATE: 79 BPM

## 2020-09-30 ENCOUNTER — CARE COORDINATION (OUTPATIENT)
Dept: CASE MANAGEMENT | Age: 65
End: 2020-09-30

## 2020-09-30 NOTE — DISCHARGE SUMMARY
Family Medicine Discharge Summary    Mckenzie Clarke  :  1955  MRN:  [id number]    Admit date:  2020  Discharge date:  2020    Admitting Physician:  Koko Umanzor MD    Discharge Diagnoses:    Patient Active Problem List   Diagnosis    Hypertension, benign    [facility]    Other hyperlipidemia    Degeneration of cervical intervertebral disc    Cervicalgia    BRIANNA (obstructive sleep apnea)    Anemia    Hepatic steatosis    Lumbar radiculopathy    Morbid obesity (HCC)    Chronic pain syndrome    H/O: GI bleed    Iron deficiency anemia    Fibromyalgia    Class 3 severe obesity due to excess calories with serious comorbidity and body mass index (BMI) of 50.0 to 59.9 in adult (HCC)    Tremor    Fatigue    Chronic renal insufficiency, stage III (moderate) ([facility])    Cellulitis of right leg    Lymphedema of both lower extremities    PUD (peptic ulcer disease)    Constipation    Generalized abdominal pain    Altered mental status       Admission Condition:  guarded  Discharged Condition:  fair    Hospital Course:   64 yo presented to [facility] with altered mental status and intermittent abdominal pain and flank pain. Labs and CTs of brain and abdomen showed no evidence of abnormalities that would explain the complaint; labs were non contributory. Pain was relieved with anti spasmodics.      Discharge Medications:       Clarisa Schroeder   Home Medication Instructions [id number]    Printed on:20 8123   Medication Information                      albuterol (PROVENTIL) (2.5 MG/3ML) 0.083% nebulizer solution  Take 3 mLs by nebulization 2 times daily             amLODIPine (NORVASC) 10 MG tablet  Take 1 tablet by mouth daily Takes with dinner             aspirin 81 MG tablet  Take 1 tablet by mouth daily (with breakfast)             atorvastatin (LIPITOR) 40 MG tablet  TAKE 1 TABLET BY MOUTH EVERY DAY AT NIGHT             blood glucose test strips (ASCENSIA AUTODISC VI;ONE TOUCH ULTRA TEST VI) strip  Accucheck Solange. Check daily; dx E11.9             Calcium Carb-Cholecalciferol (CALCIUM 600 + D PO)  Take 1 tablet by mouth 2 times daily             carvedilol (COREG) 12.5 MG tablet  Take 1 tablet by mouth 2 times daily (with meals)             clonazePAM (KLONOPIN) 0.5 MG tablet  Take 1 tablet by mouth 2 times daily as needed (tremor) for up to 30 doses. Coenzyme Q10 (COQ10 PO)  Take 200 mg by mouth nightly              diclofenac sodium 1 % GEL  Apply 2 g topically 3 times daily             famotidine (PEPCID) 20 MG tablet  Take 1 tablet by mouth 2 times daily             furosemide (LASIX) 40 MG tablet  Take 1 tablet by mouth daily May increase to 40 mg BID as physician orders. Hold while po intake is less than normal.             glimepiride (AMARYL) 4 MG tablet  TAKE 2 TABLETS BY MOUTH EVERY MORNING (BEFORE BREAKFAST)             Lancets MISC  1 each by Does not apply route daily Accucheck Solange dx E11.9 check daily             linaclotide (LINZESS) 145 MCG capsule  Take 1 capsule by mouth every morning (before breakfast)             linagliptin (TRADJENTA) 5 MG tablet  Take 1 tablet by mouth daily             Loratadine-Pseudoephedrine (CLARITIN-D 12 HOUR PO)  Take 1 tablet by mouth daily. Multiple Vitamins-Iron (ONE DAILY MULTIVITAMIN/IRON PO)  Take 1 tablet by mouth nightly             ondansetron (ZOFRAN) 4 MG tablet  Take 1 tablet by mouth every 8 hours as needed for Nausea or Vomiting             oxyCODONE-acetaminophen (PERCOCET) 5-325 MG per tablet  Take 1-2 tablets by mouth every 6 hours as needed for Pain. venlafaxine (EFFEXOR XR) 37.5 MG extended release capsule  TAKE 1 CAPSULE BY MOUTH EVERY DAY                 Consults:  GI    Significant Diagnostic Studies:  CT    Treatments:   Supportive therapies    Disposition:   home  Follow up with Denise Chavez MD in 1-2 weeks. Signed:   Mynor James MD, FAA  9/30/2020, 4:56 PM

## 2020-09-30 NOTE — CARE COORDINATION
[facility] Transitions Follow Up Call    2020    Patient: Aden Tabor  Patient : 1955   MRN: <[id number]>  Reason for Admission: Abd. Pain  Discharge Date: 20 RARS: Readmission Risk Score: 25         Spoke with: Patient    Method of communication with provider : none    Care Transition Nurse (CTN) contacted the patient by telephone to follow up after admission on 2020. Verified name and  with patient as identifiers. Addressed changes since last contact: symptom management-Abdominal Pain  Discharged needs reviewed: Reviewed meds  Follow up appointment completed? Yes    Advance Care Planning:   Does patient have an Advance Directive:  not on file. CTN reviewed discharge instructions, medical action plan and red flags with patient and discussed any barriers to care and/or understanding of plan of care after discharge. Discussed appropriate site of care based on symptoms and resources available to patient including: PCP, Specialist and When to call 911. The patient agrees to contact the PCP office for questions related to their healthcare. Patients top risk factors for readmission: medical condition  Interventions to address risk factors: Scheduled appointment with PCP-2020    Discussed follow-up appointments. If no appointment was previously scheduled, appointment scheduling offered: Yes     Is follow up appointment scheduled within 7 days of discharge? Yes  Non-[facility] follow up appointment(s): 2020    Plan for follow-up call in 7-10 days based on severity of symptoms and risk factors. Plan for next call: symptom management-Abdominal Pain  CTN provided contact information for future needs. Patient states she is doing fine. Still has back pain. No abdominal pain. No further Nausea or vomiting. Has normal bowel habits with stool softener. Appetite fair - eating non spicy foods in small amounts. Adequate fluid intake.  Patient states has small amount ankle swelling. Encouraged to elevate legs as much as she can. Patient expresses understanding. Patient has all medications is taking medications as directed and has no questions concerning medications at this time. Patient knows when to contact physician or report to ED with worsening or severe symptoms, changes, or concerns. Will Follow up at later time. Bela Lara LPN     [address] [facility] Transitions Subsequent and Final Call    Subsequent and Final Calls  Do you have any ongoing symptoms?:  No  Have your medications changed?:  No  Do you have any questions related to your medications?:  No  Do you currently have any active services?:  No  Do you have any needs or concerns that I can assist you with?:  No  Identified Barriers:  None  Care Transitions Interventions  Other Interventions:             Follow Up  Future Appointments   Date Time Provider Melida Bella   11/9/2020 10:50 AM Rogers Fernandez MD [address]   11/19/2020  1:15 PM Bryson Mcdonough MD [facility] Radha Rodríguez LPN

## 2020-10-21 ENCOUNTER — HOSPITAL ENCOUNTER (OUTPATIENT)
Dept: GENERAL RADIOLOGY | Age: 65
Discharge: HOME | End: 2020-10-23
Payer: COMMERCIAL

## 2020-10-21 ENCOUNTER — HOSPITAL ENCOUNTER (OUTPATIENT)
Age: 65
Discharge: HOME | End: 2020-10-23
Payer: COMMERCIAL

## 2020-10-21 PROCEDURE — 72110 X-RAY EXAM L-2 SPINE 4/>VWS: CPT

## 2020-10-21 PROCEDURE — 72072 X-RAY EXAM THORAC SPINE 3VWS: CPT

## 2020-11-08 PROBLEM — M19.90 ARTHRITIS: Status: ACTIVE | Noted: 2020-11-08

## 2020-11-08 PROBLEM — J30.9 ALLERGIC RHINITIS: Status: ACTIVE | Noted: 2020-11-08

## 2020-11-08 PROBLEM — G47.31 PRIMARY CENTRAL SLEEP APNEA: Status: ACTIVE | Noted: 2020-11-08

## 2020-11-08 PROBLEM — M53.2X6 LUMBAR SPINE INSTABILITY: Status: ACTIVE | Noted: 2020-11-08

## 2020-11-08 PROBLEM — M47.819 SPONDYLOSIS WITHOUT MYELOPATHY: Status: ACTIVE | Noted: 2020-11-08

## 2020-11-08 PROBLEM — E11.42 POLYNEUROPATHY DUE TO TYPE 2 DIABETES MELLITUS (HCC): Status: ACTIVE | Noted: 2020-11-08

## 2020-11-08 PROBLEM — C44.90 SKIN CANCER: Status: ACTIVE | Noted: 2020-11-08

## 2020-11-18 ENCOUNTER — HOSPITAL ENCOUNTER (OUTPATIENT)
Age: 65
Discharge: HOME | End: 2020-11-18
Payer: COMMERCIAL

## 2020-11-18 LAB
ALBUMIN SERPL-MCNC: 4 G/DL (ref 3.5–5.2)
ALBUMIN/GLOBULIN RATIO: ABNORMAL (ref 1–2.5)
ALP BLD-CCNC: 117 U/L (ref 35–104)
ALT SERPL-CCNC: 41 U/L (ref 5–33)
ANION GAP SERPL CALCULATED.3IONS-SCNC: 11 MMOL/L (ref 9–17)
AST SERPL-CCNC: 30 U/L
BILIRUB SERPL-MCNC: 0.42 MG/DL (ref 0.3–1.2)
BUN BLDV-MCNC: 24 MG/DL (ref 8–23)
BUN/CREAT BLD: ABNORMAL (ref 9–20)
CALCIUM SERPL-MCNC: 9.9 MG/DL (ref 8.6–10.4)
CHLORIDE BLD-SCNC: 98 MMOL/L (ref 98–107)
CHOLESTEROL, FASTING: 192 MG/DL
CHOLESTEROL/HDL RATIO: 3.1
CO2: 31 MMOL/L (ref 20–31)
CREAT SERPL-MCNC: 1.17 MG/DL (ref 0.5–0.9)
CREATININE URINE: 65.6 MG/DL (ref 28–217)
ESTIMATED AVERAGE GLUCOSE: 186 MG/DL
GFR AFRICAN AMERICAN: 57 ML/MIN
GFR NON-AFRICAN AMERICAN: 47 ML/MIN
GFR SERPL CREATININE-BSD FRML MDRD: ABNORMAL ML/MIN/{1.73_M2}
GFR SERPL CREATININE-BSD FRML MDRD: ABNORMAL ML/MIN/{1.73_M2}
GLUCOSE BLD-MCNC: 163 MG/DL (ref 70–99)
HBA1C MFR BLD: 8.1 % (ref 4–6)
HDLC SERPL-MCNC: 62 MG/DL
LDL CHOLESTEROL: 85 MG/DL (ref 0–130)
MICROALBUMIN/CREAT 24H UR: 19 MG/L
MICROALBUMIN/CREAT UR-RTO: 29 MCG/MG CREAT
POTASSIUM SERPL-SCNC: 4.4 MMOL/L (ref 3.7–5.3)
SODIUM BLD-SCNC: 140 MMOL/L (ref 135–144)
TOTAL PROTEIN: 7.7 G/DL (ref 6.4–8.3)
TRIGLYCERIDE, FASTING: 227 MG/DL
VLDLC SERPL CALC-MCNC: ABNORMAL MG/DL (ref 1–30)

## 2020-11-18 PROCEDURE — 83036 HEMOGLOBIN GLYCOSYLATED A1C: CPT

## 2020-11-18 PROCEDURE — 82570 ASSAY OF URINE CREATININE: CPT

## 2020-11-18 PROCEDURE — 36415 COLL VENOUS BLD VENIPUNCTURE: CPT

## 2020-11-18 PROCEDURE — 82043 UR ALBUMIN QUANTITATIVE: CPT

## 2020-11-18 PROCEDURE — 80061 LIPID PANEL: CPT

## 2020-11-18 PROCEDURE — 80053 COMPREHEN METABOLIC PANEL: CPT

## 2020-11-19 ENCOUNTER — OFFICE VISIT (OUTPATIENT)
Dept: ONCOLOGY | Age: 65
End: 2020-11-19
Payer: COMMERCIAL

## 2020-11-19 ENCOUNTER — HOSPITAL ENCOUNTER (OUTPATIENT)
Age: 65
Setting detail: SPECIMEN
Discharge: HOME | End: 2020-11-19
Payer: COMMERCIAL

## 2020-11-19 VITALS
TEMPERATURE: 97.5 F | WEIGHT: 274 LBS | BODY MASS INDEX: 53.79 KG/M2 | HEART RATE: 66 BPM | HEIGHT: 60 IN | SYSTOLIC BLOOD PRESSURE: 144 MMHG | DIASTOLIC BLOOD PRESSURE: 87 MMHG

## 2020-11-19 LAB
ABSOLUTE EOS #: 0.26 K/UL (ref 0–0.44)
ABSOLUTE IMMATURE GRANULOCYTE: 0.1 K/UL (ref 0–0.3)
ABSOLUTE LYMPH #: 2.39 K/UL (ref 1.1–3.7)
ABSOLUTE MONO #: 0.94 K/UL (ref 0.1–1.2)
ANION GAP SERPL CALCULATED.3IONS-SCNC: 13 MMOL/L (ref 9–17)
BASOPHILS # BLD: 1 % (ref 0–2)
BASOPHILS ABSOLUTE: 0.1 K/UL (ref 0–0.2)
BUN BLDV-MCNC: 22 MG/DL (ref 8–23)
BUN/CREAT BLD: ABNORMAL (ref 9–20)
CALCIUM SERPL-MCNC: 9.7 MG/DL (ref 8.6–10.4)
CHLORIDE BLD-SCNC: 99 MMOL/L (ref 98–107)
CO2: 30 MMOL/L (ref 20–31)
CREAT SERPL-MCNC: 1.14 MG/DL (ref 0.5–0.9)
DIFFERENTIAL TYPE: ABNORMAL
EOSINOPHILS RELATIVE PERCENT: 2 % (ref 1–4)
FERRITIN: 151 UG/L (ref 13–150)
FOLATE: >20 NG/ML
GFR AFRICAN AMERICAN: 58 ML/MIN
GFR NON-AFRICAN AMERICAN: 48 ML/MIN
GFR SERPL CREATININE-BSD FRML MDRD: ABNORMAL ML/MIN/{1.73_M2}
GFR SERPL CREATININE-BSD FRML MDRD: ABNORMAL ML/MIN/{1.73_M2}
GLUCOSE BLD-MCNC: 122 MG/DL (ref 70–99)
HCT VFR BLD CALC: 41.1 % (ref 36.3–47.1)
HEMOGLOBIN: 12.3 G/DL (ref 11.9–15.1)
IMMATURE GRANULOCYTES: 1 %
IRON SATURATION: 24 % (ref 20–55)
IRON: 79 UG/DL (ref 37–145)
LYMPHOCYTES # BLD: 16 % (ref 24–43)
MCH RBC QN AUTO: 28.7 PG (ref 25.2–33.5)
MCHC RBC AUTO-ENTMCNC: 29.9 G/DL (ref 28.4–34.8)
MCV RBC AUTO: 96 FL (ref 82.6–102.9)
MONOCYTES # BLD: 6 % (ref 3–12)
NRBC AUTOMATED: 0 PER 100 WBC
PDW BLD-RTO: 16.8 % (ref 11.8–14.4)
PLATELET # BLD: 460 K/UL (ref 138–453)
PLATELET ESTIMATE: ABNORMAL
PMV BLD AUTO: 10.7 FL (ref 8.1–13.5)
POTASSIUM SERPL-SCNC: 5.1 MMOL/L (ref 3.7–5.3)
RBC # BLD: 4.28 M/UL (ref 3.95–5.11)
RBC # BLD: ABNORMAL 10*6/UL
SEG NEUTROPHILS: 74 % (ref 36–65)
SEGMENTED NEUTROPHILS ABSOLUTE COUNT: 11.51 K/UL (ref 1.5–8.1)
SODIUM BLD-SCNC: 142 MMOL/L (ref 135–144)
TOTAL IRON BINDING CAPACITY: 328 UG/DL (ref 250–450)
UNSATURATED IRON BINDING CAPACITY: 249 UG/DL (ref 112–347)
VITAMIN B-12: 356 PG/ML (ref 232–1245)
WBC # BLD: 15.3 K/UL (ref 3.5–11.3)
WBC # BLD: ABNORMAL 10*3/UL

## 2020-11-19 PROCEDURE — G8417 CALC BMI ABV UP PARAM F/U: HCPCS | Performed by: INTERNAL MEDICINE

## 2020-11-19 PROCEDURE — 99214 OFFICE O/P EST MOD 30 MIN: CPT | Performed by: INTERNAL MEDICINE

## 2020-11-19 PROCEDURE — 3017F COLORECTAL CA SCREEN DOC REV: CPT | Performed by: INTERNAL MEDICINE

## 2020-11-19 PROCEDURE — 1036F TOBACCO NON-USER: CPT | Performed by: INTERNAL MEDICINE

## 2020-11-19 PROCEDURE — G8427 DOCREV CUR MEDS BY ELIG CLIN: HCPCS | Performed by: INTERNAL MEDICINE

## 2020-11-19 PROCEDURE — G8482 FLU IMMUNIZE ORDER/ADMIN: HCPCS | Performed by: INTERNAL MEDICINE

## 2020-11-19 NOTE — PROGRESS NOTES
Today's Date: 11/19/2020  Patient Name: Natalee Farrell  Date of admission: No admission date for patient encounter. Patient's age: 63 y.o., 1955  Admission Dx: No admission diagnoses are documented for this encounter. Reason for Consult: management recommendations  Requesting Physician: No admitting provider for patient encounter. CHIEF COMPLAINT: Fatigue. Anemia. Weakness    History Obtained From:  patient, electronic medical record    Interval history:    Patient presents to the clinic for follow-up visit and to discuss results of her lab work-up. Since last office visit patient was hospitalized with complaints of altered mental status. Work-up done during hospitalization was unremarkable. Patient is under a lot of emotional stress as her son passed away recently. We do not have any recent labs. Last CBC from September 2020 showed a hemoglobin of 12.6. During this visit patient's allergy, social, medical, surgical history and medications were reviewed and updated. hISTORY OF PRESENT ILLNESS:      The patient is a 63 y.o.  female who is admitted to the hospital for chief complaints of fatigue patient was earlier seen in the office with complaints of progressive fatigue and leg edema. Work-up also revealed microcytic anemia. Patient has longstanding history of anemia. Iron studies done in November showed iron saturation of only 6%. Patient at that time had borderline B12 stores. Patient stool occult blood testing on 6-12 is negative. Patient denies any gross bleeding. Patient has not had thyroid testing in recent past.  Patient had EGD and colonoscopy in October 2019. Colonoscopy was unremarkable other than findings of internal hemorrhoids and sticky pasty stools. EGD showed a small 4 mm deep ulcer close to the pylorus with no active bleeding. Biopsies were taken. There was no evidence of H. pylori infection. Patient also has history of CKD.     Patient denies any bleeding vaginally per rectum or bloody vomiting or cough. Patient is intolerant to oral iron and has not been taking any iron. Past Medical History:   has a past medical history of Acute hypoxemic respiratory failure ([facility] [address] Arthritis, CHF (congestive heart failure) ([facility] [address] Chronic back pain, Diabetes mellitus type II, controlled ([facility] [address] Fibromyalgia, Hyperlipidemia LDL goal < 70, Hypertension, benign, Morbid obesity with BMI of 60.0-69.9, adult ([address] Pain of toe of right foot, Right wrist pain, Skin cancer, Sleep apnea, and Wears glasses. Past Surgical History:   has a past surgical history that includes Tubal ligation (1981); Hysterectomy (1998); lumbar fusion (10/2010; 03/2011); Carpal tunnel release (Right, 09/05/2014); cyst removal (1985); Cholecystectomy, laparoscopic (1998); lumbar fusion (2009); Rotator cuff repair (Left, 2012); Cataract removal with implant (Bilateral, 2013); Skin cancer excision (2005); Inguinal hernia repair (Bilateral, 01/20/2017); other surgical history (05/02/2018); Upper gastrointestinal endoscopy (N/A, 10/16/2019); and Colonoscopy (N/A, 10/17/2019). Medications:    Prior to Admission medications    Medication Sig Start Date End Date Taking? Authorizing Provider   linaclotide Alexis Pee) 145 MCG capsule Take 1 capsule by mouth every morning (before breakfast) 11/12/20  Yes Jackie Boyd MD   predniSONE (DELTASONE) 20 MG tablet Take 1 tablet by mouth daily for 10 days 11/9/20 11/19/20 Yes Sandra Luu MD   oxyCODONE (OXY-IR) 30 MG immediate release tablet Take 1 tablet by mouth every 8 hours as needed for Pain for up to 30 days. 10/21/20 11/20/20 Yes Sandra Luu MD   oxyCODONE-acetaminophen (PERCOCET) 5-325 MG per tablet Take 1 tablet by mouth every 6 hours as needed for Pain.  10/21/20 10/21/21 Yes Sandra Luu MD   hyoscyamine (LEVSIN/SL) 125 MCG sublingual tablet Place 1 tablet under the tongue every 4 hours as needed for Cramping 9/30/20  Yes Vergia Bety Vic English MD   furosemide (LASIX) 40 MG tablet Take 1 tablet by mouth daily May increase to 40 mg BID as physician orders. Hold while po intake is less than normal. 9/23/20  Yes Yany Bingham MD   ondansetron (ZOFRAN) 4 MG tablet Take 1 tablet by mouth every 8 hours as needed for Nausea or Vomiting 9/18/20  Yes Mayank Alvarez DO   atorvastatin (LIPITOR) 40 MG tablet TAKE 1 TABLET BY MOUTH EVERY DAY AT NIGHT 9/2/20  Yes Yany Bingham MD   amLODIPine (NORVASC) 10 MG tablet Take 1 tablet by mouth daily Takes with dinner 7/17/20  Yes Yany Bingham MD   famotidine (PEPCID) 20 MG tablet Take 1 tablet by mouth 2 times daily 7/17/20  Yes Alex King MD   venlafaxine (EFFEXOR XR) 37.5 MG extended release capsule TAKE 1 CAPSULE BY MOUTH EVERY DAY 7/5/20  Yes Yany Bingham MD   clonazePAM (KLONOPIN) 0.5 MG tablet Take 1 tablet by mouth 2 times daily as needed (tremor) for up to 30 doses. 6/16/20  Yes Yany Bingham MD   glimepiride (AMARYL) 4 MG tablet TAKE 2 TABLETS BY MOUTH EVERY MORNING (BEFORE BREAKFAST) 6/2/20  Yes Yany Bingham MD   aspirin 81 MG tablet Take 1 tablet by mouth daily (with breakfast) 4/21/20  Yes Yany Bingham MD   linagliptin (TRADJENTA) 5 MG tablet Take 1 tablet by mouth daily 3/10/20  Yes VIDHYA Gomez NP   carvedilol (COREG) 12.5 MG tablet Take 1 tablet by mouth 2 times daily (with meals) 3/10/20  Yes VIDHYA Gomez NP   blood glucose test strips (ASCENSIA AUTODISC VI;ONE TOUCH ULTRA TEST VI) strip Accucheck Solange.  Check daily; dx E11.9 2/14/20  Yes Yany Bingham MD   Lancets MISC 1 each by Does not apply route daily Accucheck Solange dx E11.9 check daily 2/14/20  Yes Yany Bingham MD   albuterol (PROVENTIL) (2.5 MG/3ML) 0.083% nebulizer solution Take 3 mLs by nebulization 2 times daily 1/11/20  Yes Anayeli Mendiola MD   diclofenac sodium 1 % GEL Apply 2 g topically 3 times daily 2/20/19  Yes Yany Bingham MD Calcium Carb-Cholecalciferol (CALCIUM 600 + D PO) Take 1 tablet by mouth 2 times daily   Yes Historical Provider, MD   Multiple Vitamins-Iron (ONE DAILY MULTIVITAMIN/IRON PO) Take 1 tablet by mouth nightly   Yes Historical Provider, MD   Coenzyme Q10 (COQ10 PO) Take 200 mg by mouth nightly    Yes Historical Provider, MD   Loratadine-Pseudoephedrine (CLARITIN-D 12 HOUR PO) Take 1 tablet by mouth daily. Yes Historical Provider, MD   pregabalin (LYRICA) 150 MG capsule Take 1 capsule by mouth 2 times daily for 30 days. 9/28/20 10/28/20  Noel Orourke MD     Current Outpatient Medications   Medication Sig Dispense Refill    linaclotide (LINZESS) 145 MCG capsule Take 1 capsule by mouth every morning (before breakfast) 30 capsule 3    predniSONE (DELTASONE) 20 MG tablet Take 1 tablet by mouth daily for 10 days 10 tablet 0    oxyCODONE (OXY-IR) 30 MG immediate release tablet Take 1 tablet by mouth every 8 hours as needed for Pain for up to 30 days. 90 tablet 0    oxyCODONE-acetaminophen (PERCOCET) 5-325 MG per tablet Take 1 tablet by mouth every 6 hours as needed for Pain. 120 tablet 0    hyoscyamine (LEVSIN/SL) 125 MCG sublingual tablet Place 1 tablet under the tongue every 4 hours as needed for Cramping 540 tablet 3    furosemide (LASIX) 40 MG tablet Take 1 tablet by mouth daily May increase to 40 mg BID as physician orders.  Hold while po intake is less than normal. 30 tablet 0    ondansetron (ZOFRAN) 4 MG tablet Take 1 tablet by mouth every 8 hours as needed for Nausea or Vomiting 20 tablet 0    atorvastatin (LIPITOR) 40 MG tablet TAKE 1 TABLET BY MOUTH EVERY DAY AT NIGHT 90 tablet 3    amLODIPine (NORVASC) 10 MG tablet Take 1 tablet by mouth daily Takes with dinner 90 tablet 3    famotidine (PEPCID) 20 MG tablet Take 1 tablet by mouth 2 times daily 180 tablet 1    venlafaxine (EFFEXOR XR) 37.5 MG extended release capsule TAKE 1 CAPSULE BY MOUTH EVERY DAY 90 capsule 3    clonazePAM (KLONOPIN) 0.5 Blood Pressure in her daughter, paternal grandmother, and son; Leila Brown in her father; Stomach Cancer in her brother; Stroke in her paternal grandmother. REVIEW OF SYSTEMS:      Constitutional: No fever or chills. No night sweats, no weight loss. Positive fatigue  Eyes: No eye discharge, double vision, or eye pain   HEENT: negative for sore mouth, sore throat, hoarseness and voice change   Respiratory: negative for cough , sputum, dyspnea, wheezing, hemoptysis, chest pain   Cardiovascular: negative for chest pain, dyspnea, palpitations, orthopnea, PND   Gastrointestinal: negative for nausea, vomiting, diarrhea, constipation, abdominal pain, Dysphagia, hematemesis and hematochezia   Genitourinary: negative for frequency, dysuria, nocturia, urinary incontinence, and hematuria   Integument: negative for rash, skin lesions, bruises.    Hematologic/Lymphatic: negative for easy bruising, bleeding, lymphadenopathy, or petechiae   Endocrine: negative for heat or cold intolerance,weight changes, change in bowel habits and hair loss   Musculoskeletal: negative for myalgias, arthralgias, pain, joint swelling,and bone pain   Neurological: negative for headaches, dizziness, seizures, weakness, numbness    PHYSICAL EXAM:        BP (!) 144/87 (Site: Right Wrist, Position: Sitting, Cuff Size: Large Adult)   Pulse 66   Temp 97.5 °F (36.4 °C)   Ht 5' (1.524 m)   Wt 274 lb (124.3 kg)   LMP  (LMP Unknown)   BMI 53.51 kg/m²    Temp (24hrs), Av.7 °F (36.5 °C), Min:97.7 °F (36.5 °C), Max:97.8 °F (36.6 °C)    General appearance - well appearing, no in pain or distress   Mental status - alert and cooperative   Eyes - pupils equal and reactive, extraocular eye movements intact   Ears - bilateral TM's and external ear canals normal   Mouth - mucous membranes moist, pharynx normal without lesions   Neck - supple, no significant adenopathy   Lymphatics - no palpable lymphadenopathy, no hepatosplenomegaly   Chest - clear to auscultation, no wheezes, rales or rhonchi, symmetric air entry   Heart - normal rate, regular rhythm, normal S1, S2, no murmurs  Abdomen - soft, nontender, nondistended, no masses or organomegaly   Neurological - alert, oriented, normal speech, no focal findings or movement disorder noted   Musculoskeletal - no joint tenderness, deformity or swelling   Extremities - peripheral pulses normal, no pedal edema, no clubbing or cyanosis   Skin - normal coloration and turgor, no rashes, no suspicious skin lesions noted ,      DATA:      Labs:     Results for orders placed or performed during the hospital encounter of 11/18/20   Microalbumin, Ur   Result Value Ref Range    Microalb, Ur 19 <21 mg/L    Creatinine, Ur 65.6 28.0 - 217.0 mg/dL    Microalb/Crt.  Ratio 29 (H) <25 mcg/mg creat   Hemoglobin A1C   Result Value Ref Range    Hemoglobin A1C 8.1 (H) 4.0 - 6.0 %    Estimated Avg Glucose 186 mg/dL   Comprehensive Metabolic Panel   Result Value Ref Range    Glucose 163 (H) 70 - 99 mg/dL    BUN 24 (H) 8 - 23 mg/dL    CREATININE 1.17 (H) 0.50 - 0.90 mg/dL    Bun/Cre Ratio NOT REPORTED 9 - 20    Calcium 9.9 8.6 - 10.4 mg/dL    Sodium 140 135 - 144 mmol/L    Potassium 4.4 3.7 - 5.3 mmol/L    Chloride 98 98 - 107 mmol/L    CO2 31 20 - 31 mmol/L    Anion Gap 11 9 - 17 mmol/L    Alkaline Phosphatase 117 (H) 35 - 104 U/L    ALT 41 (H) 5 - 33 U/L    AST 30 <32 U/L    Total Bilirubin 0.42 0.3 - 1.2 mg/dL    Total Protein 7.7 6.4 - 8.3 g/dL    Alb 4.0 3.5 - 5.2 g/dL    Albumin/Globulin Ratio NOT REPORTED 1.0 - 2.5    GFR Non- 47 (L) >60 mL/min    GFR  57 (L) >60 mL/min    GFR Comment          GFR Staging NOT REPORTED    Lipid, Fasting   Result Value Ref Range    Cholesterol, Fasting 192 <200 mg/dL    HDL 62 >40 mg/dL    LDL Cholesterol 85 0 - 130 mg/dL    Chol/HDL Ratio 3.1 <5    Triglyceride, Fasting 227 (H) <150 mg/dL    VLDL NOT REPORTED (H) 1 - 30 mg/dL         IMAGING DATA:    Ultrasound of kidney       Impression    1. Fatty liver. 2. Otherwise, unremarkable renal ultrasound.  No hydronephrosis.           Narrative    EXAMINATION:    CTA OF THE ABDOMEN AND PELVIS WITH CONTRAST 9/22/2020 12:42 am         TECHNIQUE:    CTA of the abdomen and pelvis was performed with the administration of    intravenous contrast. Multiplanar reformatted images are provided for review. MIP images are provided for review. Dose modulation, iterative    reconstruction, and/or weight based adjustment of the mA/kV was utilized to    reduce the radiation dose to as low as reasonably achievable.         COMPARISON:    None.         HISTORY:    ORDERING SYSTEM PROVIDED HISTORY: AMS, abd pain    TECHNOLOGIST PROVIDED HISTORY:    AMS, abd pain    Reason for Exam: patient is confused and altered, doesn't know where she's    at, but is pointing to her lower abdomen and saying it hurts    Acuity: Unknown    Type of Exam: Unknown         FINDINGS:    Lower Chest:  Visualized portion of the lower chest demonstrates no acute    abnormality.         Organs: The visualized portions of the liver, spleen, pancreas and adrenal    glands demonstrate no acute abnormality in the arterial enhancement phase. No biliary ductal dilatation.  Cholecystectomy.  The kidneys appear normal in    size and demonstrate symmetric enhancement.  No focal renal mass.  No    hydronephrosis. No perinephric stranding.         GI/Bowel: No bowel dilatation to suggest obstruction.  No evidence of acute    appendicitis.         Pelvis:  The urinary bladder appears unremarkable.  The pelvic organs    demonstrate no acute abnormality.         Peritoneum/Retroperitoneum: Normal enhancement and caliber of the aorta and    its branches including celiac axis, SMA, right and left renal arteries, PONCHO,    iliac vasculature.  No fluid collection or free air.  No gross    lymphadenopathy.         Bones/Soft Tissues: No acute findings.  Previous lumbar spine fusion.           Impression    No acute findings.               IMPRESSION:   Chronic anemia, hypoproliferative  History of GI bleed with peptic ulcer disease/pyloric ulcer, negative for H. pylori-10/2019  iron deficiency  Borderline B12 stores  CKD  Morbid obesity  Multiple comorbidities      RECOMMENDATIONS:    And available clinical data. ReviewedReviewed results of lab work-up records from hospitalization. We do not have any recent labs we will obtain labs today. Patient is intolerant to oral iron previously she tolerated IV iron well. Work-up previously was suggestive of hypoproliferative anemia. Patient does have history of GI bleed. I believe CKD is also contributing to the anemia    Addendum:    Patient CBC from today shows hemoglobin of 12.6. With sufficient iron stores. Recommend continued monitoring with serial CBC and iron studies. If patient iron stores start dropping will rechallenge patient with IV iron. Patient is intolerant to oral iron. Patient encouraged to obtain labs before her next office visit    Thank you for asking us to see this patient. Cas Allison MD          I spent more than 25 minutes examining, evaluating, reviewing data, counseling the patient and coordinating care. Greater than 50% of time was spent face-to-face with the patient this note is created with the assistance of a speech recognition program.  While intending to generate a document that actually reflects the content of the visit, the document can still have some errors including those of syntax and sound a like substitutions which may escape proof reading. It such instances, actual meaning can be extrapolated by contextual diversion.

## 2021-01-13 RX ORDER — FAMOTIDINE 20 MG/1
20 TABLET, FILM COATED ORAL 2 TIMES DAILY
Qty: 180 TABLET | Refills: 3 | Status: SHIPPED | OUTPATIENT
Start: 2021-01-13 | End: 2022-01-20 | Stop reason: SDUPTHER

## 2021-01-26 ENCOUNTER — HOSPITAL ENCOUNTER (OUTPATIENT)
Dept: GENERAL RADIOLOGY | Facility: CLINIC | Age: 66
Discharge: HOME | End: 2021-01-28
Payer: COMMERCIAL

## 2021-01-26 ENCOUNTER — HOSPITAL ENCOUNTER (OUTPATIENT)
Age: 66
Setting detail: SPECIMEN
Discharge: HOME | End: 2021-01-26
Payer: COMMERCIAL

## 2021-01-26 ENCOUNTER — HOSPITAL ENCOUNTER (OUTPATIENT)
Facility: CLINIC | Age: 66
Discharge: HOME | End: 2021-01-28
Payer: COMMERCIAL

## 2021-01-26 DIAGNOSIS — R10.84 GENERALIZED ABDOMINAL PAIN: ICD-10-CM

## 2021-01-26 LAB
ALBUMIN SERPL-MCNC: 3.9 G/DL (ref 3.5–5.2)
ALBUMIN/GLOBULIN RATIO: 0.9 (ref 1–2.5)
ALP BLD-CCNC: 129 U/L (ref 35–104)
ALT SERPL-CCNC: 31 U/L (ref 5–33)
AMYLASE: 47 U/L (ref 28–100)
ANION GAP SERPL CALCULATED.3IONS-SCNC: 17 MMOL/L (ref 9–17)
AST SERPL-CCNC: 36 U/L
BILIRUB SERPL-MCNC: 0.28 MG/DL (ref 0.3–1.2)
BUN BLDV-MCNC: 18 MG/DL (ref 8–23)
BUN/CREAT BLD: ABNORMAL (ref 9–20)
CALCIUM SERPL-MCNC: 9.9 MG/DL (ref 8.6–10.4)
CHLORIDE BLD-SCNC: 97 MMOL/L (ref 98–107)
CO2: 25 MMOL/L (ref 20–31)
CREAT SERPL-MCNC: 1.41 MG/DL (ref 0.5–0.9)
GFR AFRICAN AMERICAN: 46 ML/MIN
GFR NON-AFRICAN AMERICAN: 38 ML/MIN
GFR SERPL CREATININE-BSD FRML MDRD: ABNORMAL ML/MIN/{1.73_M2}
GFR SERPL CREATININE-BSD FRML MDRD: ABNORMAL ML/MIN/{1.73_M2}
GLUCOSE BLD-MCNC: 190 MG/DL (ref 70–99)
LIPASE: 35 U/L (ref 13–60)
POTASSIUM SERPL-SCNC: 4 MMOL/L (ref 3.7–5.3)
SODIUM BLD-SCNC: 139 MMOL/L (ref 135–144)
TOTAL PROTEIN: 8.4 G/DL (ref 6.4–8.3)

## 2021-01-26 PROCEDURE — 74018 RADEX ABDOMEN 1 VIEW: CPT

## 2021-03-18 ENCOUNTER — TELEPHONE (OUTPATIENT)
Dept: ONCOLOGY | Age: 66
End: 2021-03-18

## 2021-03-18 NOTE — TELEPHONE ENCOUNTER
Left message for patient to remind of lab work needed prior to upcoming appointment. Advised that if the labs are done a at [facility] the orders are in the chart, or if the labs were already done or orders needed faxed to the lab of their choice to call our office.

## 2021-03-24 ENCOUNTER — HOSPITAL ENCOUNTER (OUTPATIENT)
Age: 66
Discharge: HOME | End: 2021-03-24
Payer: COMMERCIAL

## 2021-03-24 DIAGNOSIS — I89.0 LYMPHEDEMA OF BOTH LOWER EXTREMITIES: ICD-10-CM

## 2021-03-24 DIAGNOSIS — N18.31 CHRONIC RENAL IMPAIRMENT, STAGE 3A (HCC): ICD-10-CM

## 2021-03-24 DIAGNOSIS — E61.1 IRON DEFICIENCY: ICD-10-CM

## 2021-03-24 DIAGNOSIS — D64.9 ANEMIA, UNSPECIFIED TYPE: ICD-10-CM

## 2021-03-24 LAB
ABSOLUTE EOS #: 0.3 K/UL (ref 0–0.4)
ABSOLUTE IMMATURE GRANULOCYTE: ABNORMAL K/UL (ref 0–0.3)
ABSOLUTE LYMPH #: 1.9 K/UL (ref 1–4.8)
ABSOLUTE MONO #: 0.6 K/UL (ref 0.1–1.3)
ANION GAP SERPL CALCULATED.3IONS-SCNC: 11 MMOL/L (ref 9–17)
BASOPHILS # BLD: 1 % (ref 0–2)
BASOPHILS ABSOLUTE: 0.2 K/UL (ref 0–0.2)
BUN BLDV-MCNC: 16 MG/DL (ref 8–23)
BUN/CREAT BLD: ABNORMAL (ref 9–20)
CALCIUM SERPL-MCNC: 9.5 MG/DL (ref 8.6–10.4)
CHLORIDE BLD-SCNC: 99 MMOL/L (ref 98–107)
CO2: 31 MMOL/L (ref 20–31)
CREAT SERPL-MCNC: 1.24 MG/DL (ref 0.5–0.9)
DIFFERENTIAL TYPE: ABNORMAL
EOSINOPHILS RELATIVE PERCENT: 2 % (ref 0–4)
FERRITIN: 116 UG/L (ref 13–150)
GFR AFRICAN AMERICAN: 53 ML/MIN
GFR NON-AFRICAN AMERICAN: 44 ML/MIN
GFR SERPL CREATININE-BSD FRML MDRD: ABNORMAL ML/MIN/{1.73_M2}
GFR SERPL CREATININE-BSD FRML MDRD: ABNORMAL ML/MIN/{1.73_M2}
GLUCOSE BLD-MCNC: 157 MG/DL (ref 70–99)
HCT VFR BLD CALC: 36.3 % (ref 36–46)
HEMOGLOBIN: 11.8 G/DL (ref 12–16)
IMMATURE GRANULOCYTES: ABNORMAL %
IRON SATURATION: 16 % (ref 20–55)
IRON: 47 UG/DL (ref 37–145)
LYMPHOCYTES # BLD: 13 % (ref 24–44)
MCH RBC QN AUTO: 29.8 PG (ref 26–34)
MCHC RBC AUTO-ENTMCNC: 32.5 G/DL (ref 31–37)
MCV RBC AUTO: 91.8 FL (ref 80–100)
MONOCYTES # BLD: 4 % (ref 1–7)
NRBC AUTOMATED: ABNORMAL PER 100 WBC
PDW BLD-RTO: 14.9 % (ref 11.5–14.9)
PLATELET # BLD: 389 K/UL (ref 150–450)
PLATELET ESTIMATE: ABNORMAL
PMV BLD AUTO: 8.5 FL (ref 6–12)
POTASSIUM SERPL-SCNC: 4.8 MMOL/L (ref 3.7–5.3)
RBC # BLD: 3.96 M/UL (ref 4–5.2)
RBC # BLD: ABNORMAL 10*6/UL
SEG NEUTROPHILS: 80 % (ref 36–66)
SEGMENTED NEUTROPHILS ABSOLUTE COUNT: 11.5 K/UL (ref 1.3–9.1)
SODIUM BLD-SCNC: 141 MMOL/L (ref 135–144)
TOTAL IRON BINDING CAPACITY: 290 UG/DL (ref 250–450)
UNSATURATED IRON BINDING CAPACITY: 243 UG/DL (ref 112–347)
WBC # BLD: 14.5 K/UL (ref 3.5–11)
WBC # BLD: ABNORMAL 10*3/UL

## 2021-03-24 PROCEDURE — 83550 IRON BINDING TEST: CPT

## 2021-03-24 PROCEDURE — 82728 ASSAY OF FERRITIN: CPT

## 2021-03-24 PROCEDURE — 80048 BASIC METABOLIC PNL TOTAL CA: CPT

## 2021-03-24 PROCEDURE — 36415 COLL VENOUS BLD VENIPUNCTURE: CPT

## 2021-03-24 PROCEDURE — 83540 ASSAY OF IRON: CPT

## 2021-03-24 PROCEDURE — 85025 COMPLETE CBC W/AUTO DIFF WBC: CPT

## 2021-03-25 ENCOUNTER — OFFICE VISIT (OUTPATIENT)
Dept: ONCOLOGY | Age: 66
End: 2021-03-25
Payer: COMMERCIAL

## 2021-03-25 VITALS
WEIGHT: 285 LBS | DIASTOLIC BLOOD PRESSURE: 60 MMHG | SYSTOLIC BLOOD PRESSURE: 130 MMHG | TEMPERATURE: 97.2 F | HEIGHT: 60 IN | BODY MASS INDEX: 55.95 KG/M2 | HEART RATE: 76 BPM | RESPIRATION RATE: 18 BRPM | OXYGEN SATURATION: 97 %

## 2021-03-25 DIAGNOSIS — E61.1 IRON DEFICIENCY: ICD-10-CM

## 2021-03-25 DIAGNOSIS — I89.0 LYMPHEDEMA OF BOTH LOWER EXTREMITIES: ICD-10-CM

## 2021-03-25 DIAGNOSIS — N18.31 CHRONIC RENAL IMPAIRMENT, STAGE 3A (HCC): ICD-10-CM

## 2021-03-25 DIAGNOSIS — D64.9 ANEMIA, UNSPECIFIED TYPE: Primary | ICD-10-CM

## 2021-03-25 PROCEDURE — G8427 DOCREV CUR MEDS BY ELIG CLIN: HCPCS | Performed by: INTERNAL MEDICINE

## 2021-03-25 PROCEDURE — G8417 CALC BMI ABV UP PARAM F/U: HCPCS | Performed by: INTERNAL MEDICINE

## 2021-03-25 PROCEDURE — 1036F TOBACCO NON-USER: CPT | Performed by: INTERNAL MEDICINE

## 2021-03-25 PROCEDURE — 3017F COLORECTAL CA SCREEN DOC REV: CPT | Performed by: INTERNAL MEDICINE

## 2021-03-25 PROCEDURE — G8482 FLU IMMUNIZE ORDER/ADMIN: HCPCS | Performed by: INTERNAL MEDICINE

## 2021-03-25 PROCEDURE — 99214 OFFICE O/P EST MOD 30 MIN: CPT | Performed by: INTERNAL MEDICINE

## 2021-03-25 NOTE — PROGRESS NOTES
Today's Date: 3/25/2021  Patient Name: May Shrestha  Date of admission: No admission date for patient encounter. Patient's age: 63 y.o., 1955  Admission Dx: No admission diagnoses are documented for this encounter. Reason for Consult: management recommendations  Requesting Physician: No admitting provider for patient encounter. CHIEF COMPLAINT: Fatigue. Anemia. Weakness    History Obtained From:  patient, electronic medical record    Interval history:    Patient presents to the clinic for a follow-up visit and to discuss results of her lab work-up and other relevant clinical data. Overall patient has been doing well. Patient is not able to take any iron by mouth. Leg swelling is overall stable. Patient does complain of mild fatigue. Denies any bloody bowel movements. Latest CBC shows slightly low iron saturation and mild anemia. Denies hospitalization. During this visit patient's allergy, social, medical, surgical history and medications were reviewed and updated. hISTORY OF PRESENT ILLNESS:      The patient is a 63 y.o.  female who is admitted to the hospital for chief complaints of fatigue patient was earlier seen in the office with complaints of progressive fatigue and leg edema. Work-up also revealed microcytic anemia. Patient has longstanding history of anemia. Iron studies done in November showed iron saturation of only 6%. Patient at that time had borderline B12 stores. Patient stool occult blood testing on 612 is negative. Patient denies any gross bleeding. Patient has not had thyroid testing in recent past.  Patient had EGD and colonoscopy in October 2019. Colonoscopy was unremarkable other than findings of internal hemorrhoids and sticky pasty stools. EGD showed a small 4 mm deep ulcer close to the pylorus with no active bleeding. Biopsies were taken. There was no evidence of H. pylori infection. Patient also has history of CKD.     Patient denies any bleeding vaginally per rectum or bloody vomiting or cough. Patient is intolerant to oral iron and has not been taking any iron. Past Medical History:   has a past medical history of Acute hypoxemic respiratory failure ([facility] [address] Arthritis, CHF (congestive heart failure) ([facility] [address] Chronic back pain, Diabetes mellitus type II, controlled ([facility] [address] Fibromyalgia, Hyperlipidemia LDL goal < 70, Hypertension, benign, Morbid obesity with BMI of 60.0-69.9, adult ([facility] [address] Pain of toe of right foot, Right wrist pain, Skin cancer, Sleep apnea, and Wears glasses. Past Surgical History:   has a past surgical history that includes Tubal ligation (1981); Hysterectomy (1998); lumbar fusion (10/2010; 03/2011); Carpal tunnel release (Right, 09/05/2014); cyst removal (1985); Cholecystectomy, laparoscopic (1998); lumbar fusion (2009); Rotator cuff repair (Left, 2012); Cataract removal with implant (Bilateral, 2013); Skin cancer excision (2005); Inguinal hernia repair (Bilateral, 01/20/2017); other surgical history (05/02/2018); Upper gastrointestinal endoscopy (N/A, 10/16/2019); and Colonoscopy (N/A, 10/17/2019). Medications:    Prior to Admission medications    Medication Sig Start Date End Date Taking? Authorizing Provider   LINZESS 145 MCG capsule TAKE 1 CAPSULE BY MOUTH EVERY DAY IN THE MORNING BEFORE BREAKFAST 3/19/21  Yes Yvonne Muller MD   oxyCODONE-acetaminophen (PERCOCET) 5-325 MG per tablet Take 1 tablet by mouth every 6 hours as needed for Pain.  3/19/21 3/19/22 Yes Silvana Matthew MD   TRADJENTA 5 MG tablet TAKE 1 TABLET BY MOUTH EVERY DAY 2/22/21  Yes VIDHYA Hinds NP   carvedilol (COREG) 12.5 MG tablet TAKE 1 TABLET BY MOUTH TWICE A DAY WITH MEALS 2/22/21  Yes VIDHYA Hinds NP   famotidine (PEPCID) 20 MG tablet Take 1 tablet by mouth 2 times daily 1/13/21  Yes Yvonne Muller MD   hyoscyamine (LEVSIN/SL) 125 MCG sublingual tablet Place 1 tablet under the tongue every 4 hours as needed for Cramping 9/30/20 Yes Jossie Loredo MD   furosemide (LASIX) 40 MG tablet Take 1 tablet by mouth daily May increase to 40 mg BID as physician orders. Hold while po intake is less than normal. 9/23/20  Yes Jossie Loredo MD   ondansetron (ZOFRAN) 4 MG tablet Take 1 tablet by mouth every 8 hours as needed for Nausea or Vomiting 9/18/20  Yes Mayank Alvarez DO   atorvastatin (LIPITOR) 40 MG tablet TAKE 1 TABLET BY MOUTH EVERY DAY AT NIGHT 9/2/20  Yes Jossie Loredo MD   amLODIPine (NORVASC) 10 MG tablet Take 1 tablet by mouth daily Takes with dinner 7/17/20  Yes Jossie Loredo MD   venlafaxine (EFFEXOR XR) 37.5 MG extended release capsule TAKE 1 CAPSULE BY MOUTH EVERY DAY 7/5/20  Yes Jossie Loredo MD   clonazePAM (KLONOPIN) 0.5 MG tablet Take 1 tablet by mouth 2 times daily as needed (tremor) for up to 30 doses. 6/16/20  Yes Jossie Loredo MD   glimepiride (AMARYL) 4 MG tablet TAKE 2 TABLETS BY MOUTH EVERY MORNING (BEFORE BREAKFAST) 6/2/20  Yes Jossie Loredo MD   aspirin 81 MG tablet Take 1 tablet by mouth daily (with breakfast) 4/21/20  Yes Jossie Loredo MD   blood glucose test strips (ASCENSIA AUTODISC VI;ONE TOUCH ULTRA TEST VI) strip Accucheck Solange.  Check daily; dx E11.9 2/14/20  Yes Jossie Loredo MD   Lancets MISC 1 each by Does not apply route daily Accucheck Solange dx E11.9 check daily 2/14/20  Yes Jossie Loredo MD   albuterol (PROVENTIL) (2.5 MG/3ML) 0.083% nebulizer solution Take 3 mLs by nebulization 2 times daily 1/11/20  Yes Jordan Delgadillo MD   diclofenac sodium 1 % GEL Apply 2 g topically 3 times daily 2/20/19  Yes Jossie Loredo MD   Calcium Carb-Cholecalciferol (CALCIUM 600 + D PO) Take 1 tablet by mouth 2 times daily   Yes Ismael Allen MD   Multiple Vitamins-Iron (ONE DAILY MULTIVITAMIN/IRON PO) Take 1 tablet by mouth nightly   Yes Historical Provider, MD   Coenzyme Q10 (COQ10 PO) Take 200 mg by mouth nightly    Yes Historical Provider, MD   Loratadine-Pseudoephedrine (CLARITIN-D 12 HOUR PO) Take 1 tablet by mouth daily. Yes Historical Provider, MD   pregabalin (LYRICA) 150 MG capsule Take 1 capsule by mouth 2 times daily for 30 days. 9/28/20 10/28/20  Pallavi Wylie MD     Current Outpatient Medications   Medication Sig Dispense Refill    LINZESS 145 MCG capsule TAKE 1 CAPSULE BY MOUTH EVERY DAY IN THE MORNING BEFORE BREAKFAST 30 capsule 3    oxyCODONE-acetaminophen (PERCOCET) 5-325 MG per tablet Take 1 tablet by mouth every 6 hours as needed for Pain. 120 tablet 0    TRADJENTA 5 MG tablet TAKE 1 TABLET BY MOUTH EVERY DAY 90 tablet 3    carvedilol (COREG) 12.5 MG tablet TAKE 1 TABLET BY MOUTH TWICE A DAY WITH MEALS 180 tablet 3    famotidine (PEPCID) 20 MG tablet Take 1 tablet by mouth 2 times daily 180 tablet 3    hyoscyamine (LEVSIN/SL) 125 MCG sublingual tablet Place 1 tablet under the tongue every 4 hours as needed for Cramping 540 tablet 3    furosemide (LASIX) 40 MG tablet Take 1 tablet by mouth daily May increase to 40 mg BID as physician orders. Hold while po intake is less than normal. 30 tablet 0    ondansetron (ZOFRAN) 4 MG tablet Take 1 tablet by mouth every 8 hours as needed for Nausea or Vomiting 20 tablet 0    atorvastatin (LIPITOR) 40 MG tablet TAKE 1 TABLET BY MOUTH EVERY DAY AT NIGHT 90 tablet 3    amLODIPine (NORVASC) 10 MG tablet Take 1 tablet by mouth daily Takes with dinner 90 tablet 3    venlafaxine (EFFEXOR XR) 37.5 MG extended release capsule TAKE 1 CAPSULE BY MOUTH EVERY DAY 90 capsule 3    clonazePAM (KLONOPIN) 0.5 MG tablet Take 1 tablet by mouth 2 times daily as needed (tremor) for up to 30 doses. 30 tablet 0    glimepiride (AMARYL) 4 MG tablet TAKE 2 TABLETS BY MOUTH EVERY MORNING (BEFORE BREAKFAST) 180 tablet 3    aspirin 81 MG tablet Take 1 tablet by mouth daily (with breakfast) 30 tablet 11    blood glucose test strips (ASCENSIA AUTODISC VI;ONE TOUCH ULTRA TEST VI) strip Accucheck Solange. Gastrointestinal: negative for nausea, vomiting, diarrhea, constipation, abdominal pain, Dysphagia, hematemesis and hematochezia   Genitourinary: negative for frequency, dysuria, nocturia, urinary incontinence, and hematuria   Integument: negative for rash, skin lesions, bruises.    Hematologic/Lymphatic: negative for easy bruising, bleeding, lymphadenopathy, or petechiae   Endocrine: negative for heat or cold intolerance,weight changes, change in bowel habits and hair loss   Musculoskeletal: negative for myalgias, arthralgias, pain, joint swelling,and bone pain   Neurological: negative for headaches, dizziness, seizures, weakness, numbness    PHYSICAL EXAM:        /60   Pulse 76   Temp 97.2 °F (36.2 °C)   Resp 18   Ht 5' (1.524 m)   Wt 285 lb (129.3 kg)   LMP  (LMP Unknown)   SpO2 97%   BMI 55.66 kg/m²    Temp (24hrs), Av.7 °F (36.5 °C), Min:97.7 °F (36.5 °C), Max:97.8 °F (36.6 °C)    General appearance - well appearing, no in pain or distress   Mental status - alert and cooperative   Eyes - pupils equal and reactive, extraocular eye movements intact   Ears - bilateral TM's and external ear canals normal   Mouth - mucous membranes moist, pharynx normal without lesions   Neck - supple, no significant adenopathy   Lymphatics - no palpable lymphadenopathy, no hepatosplenomegaly   Chest - clear to auscultation, no wheezes, rales or rhonchi, symmetric air entry   Heart - normal rate, regular rhythm, normal S1, S2, no murmurs  Abdomen - soft, nontender, nondistended, no masses or organomegaly   Neurological - alert, oriented, normal speech, no focal findings or movement disorder noted   Musculoskeletal - no joint tenderness, deformity or swelling   Extremities - peripheral pulses normal, no pedal edema, no clubbing or cyanosis   Skin - normal coloration and turgor, no rashes, no suspicious skin lesions noted ,      DATA:      Labs:     Results for orders placed or performed during the hospital encounter of 03/24/21   CBC Auto Differential   Result Value Ref Range    WBC 14.5 (H) 3.5 - 11.0 k/uL    RBC 3.96 (L) 4.0 - 5.2 m/uL    Hemoglobin 11.8 (L) 12.0 - 16.0 g/dL    Hematocrit 36.3 36 - 46 %    MCV 91.8 80 - 100 fL    MCH 29.8 26 - 34 pg    MCHC 32.5 31 - 37 g/dL    RDW 14.9 11.5 - 14.9 %    Platelets 905 189 - 833 k/uL    MPV 8.5 6.0 - 12.0 fL    NRBC Automated NOT REPORTED per 100 WBC    Differential Type NOT REPORTED     Seg Neutrophils 80 (H) 36 - 66 %    Lymphocytes 13 (L) 24 - 44 %    Monocytes 4 1 - 7 %    Eosinophils % 2 0 - 4 %    Basophils 1 0 - 2 %    Immature Granulocytes NOT REPORTED 0 %    Segs Absolute 11.50 (H) 1.3 - 9.1 k/uL    Absolute Lymph # 1.90 1.0 - 4.8 k/uL    Absolute Mono # 0.60 0.1 - 1.3 k/uL    Absolute Eos # 0.30 0.0 - 0.4 k/uL    Basophils Absolute 0.20 0.0 - 0.2 k/uL    Absolute Immature Granulocyte NOT REPORTED 0.00 - 0.30 k/uL    WBC Morphology NOT REPORTED     RBC Morphology NOT REPORTED     Platelet Estimate NOT REPORTED    Ferritin   Result Value Ref Range    Ferritin 116 13 - 150 ug/L   Basic Metabolic Panel   Result Value Ref Range    Glucose 157 (H) 70 - 99 mg/dL    BUN 16 8 - 23 mg/dL    CREATININE 1.24 (H) 0.50 - 0.90 mg/dL    Bun/Cre Ratio NOT REPORTED 9 - 20    Calcium 9.5 8.6 - 10.4 mg/dL    Sodium 141 135 - 144 mmol/L    Potassium 4.8 3.7 - 5.3 mmol/L    Chloride 99 98 - 107 mmol/L    CO2 31 20 - 31 mmol/L    Anion Gap 11 9 - 17 mmol/L    GFR Non-African American 44 (L) >60 mL/min    GFR  53 (L) >60 mL/min    GFR Comment          GFR Staging NOT REPORTED    Iron and TIBC   Result Value Ref Range    Iron 47 37 - 145 ug/dL    TIBC 290 250 - 450 ug/dL    Iron Saturation 16 (L) 20 - 55 %    UIBC 243 112 - 347 ug/dL         IMAGING DATA:    Ultrasound of kidney       Impression    1. Fatty liver.     2. Otherwise, unremarkable renal ultrasound.  No hydronephrosis.           Narrative    EXAMINATION:    CTA OF THE ABDOMEN AND PELVIS WITH CONTRAST pylori10/2019  iron deficiency  Borderline B12 stores  CKD  Morbid obesity  Multiple comorbidities      RECOMMENDATIONS:  Reviewed results of lab work-up and other relevant clinical data  Iron saturation has dropped to 18%. Patient is intolerant to oral iron. Patient at this point is not interested in getting IV iron. She patient is starting to get anemic with hemoglobin of 11.8 today. I provided patient list of medication with high iron content. Continue to monitor CKD. If hemoglobin drops below 10 we will discuss RAJESH therapy with the patient  Lymphedema is stable. Patient encouraged to keep legs elevated whenever she can. Recommend continued surveillance for anemia and iron studies. We will see patient back in office in 4 months or sooner if clinically indicated. Thank you for asking us to see this patient. Judy Allisno MD          This note is created with the assistance of a speech recognition program.  While intending to generate a document that actually reflects the content of the visit, the document can still have some errors including those of syntax and sound a like substitutions which may escape proof reading. It such instances, actual meaning can be extrapolated by contextual diversion.

## 2021-04-26 ENCOUNTER — HOSPITAL ENCOUNTER (OUTPATIENT)
Dept: INFUSION THERAPY | Age: 66
Setting detail: INFUSION SERIES
Discharge: HOME | End: 2021-04-26
Payer: COMMERCIAL

## 2021-04-26 ENCOUNTER — HOSPITAL ENCOUNTER (EMERGENCY)
Age: 66
Discharge: HOME | End: 2021-04-26
Attending: EMERGENCY MEDICINE
Payer: COMMERCIAL

## 2021-04-26 ENCOUNTER — APPOINTMENT (OUTPATIENT)
Dept: GENERAL RADIOLOGY | Age: 66
End: 2021-04-26
Payer: COMMERCIAL

## 2021-04-26 VITALS
TEMPERATURE: 96.4 F | OXYGEN SATURATION: 99 % | DIASTOLIC BLOOD PRESSURE: 74 MMHG | RESPIRATION RATE: 20 BRPM | HEART RATE: 78 BPM | SYSTOLIC BLOOD PRESSURE: 171 MMHG

## 2021-04-26 VITALS
SYSTOLIC BLOOD PRESSURE: 203 MMHG | RESPIRATION RATE: 10 BRPM | TEMPERATURE: 96.4 F | HEIGHT: 60 IN | BODY MASS INDEX: 55.95 KG/M2 | DIASTOLIC BLOOD PRESSURE: 80 MMHG | WEIGHT: 285 LBS | HEART RATE: 69 BPM | OXYGEN SATURATION: 97 %

## 2021-04-26 DIAGNOSIS — U07.1 UPPER RESPIRATORY TRACT INFECTION DUE TO COVID-19 VIRUS: Primary | ICD-10-CM

## 2021-04-26 DIAGNOSIS — J06.9 UPPER RESPIRATORY TRACT INFECTION DUE TO COVID-19 VIRUS: Primary | ICD-10-CM

## 2021-04-26 LAB
ABSOLUTE EOS #: 0.1 K/UL (ref 0–0.4)
ABSOLUTE IMMATURE GRANULOCYTE: ABNORMAL K/UL (ref 0–0.3)
ABSOLUTE LYMPH #: 1.5 K/UL (ref 1–4.8)
ABSOLUTE MONO #: 0.6 K/UL (ref 0.1–1.3)
ALBUMIN SERPL-MCNC: 3.9 G/DL (ref 3.5–5.2)
ALBUMIN/GLOBULIN RATIO: ABNORMAL (ref 1–2.5)
ALP BLD-CCNC: 155 U/L (ref 35–104)
ALT SERPL-CCNC: 24 U/L (ref 5–33)
ANION GAP SERPL CALCULATED.3IONS-SCNC: 13 MMOL/L (ref 9–17)
AST SERPL-CCNC: 25 U/L
BASOPHILS # BLD: 1 % (ref 0–2)
BASOPHILS ABSOLUTE: 0.1 K/UL (ref 0–0.2)
BILIRUB SERPL-MCNC: 0.32 MG/DL (ref 0.3–1.2)
BILIRUBIN URINE: NEGATIVE
BUN BLDV-MCNC: 13 MG/DL (ref 8–23)
BUN/CREAT BLD: ABNORMAL (ref 9–20)
C-REACTIVE PROTEIN: 17 MG/L (ref 0–5)
CALCIUM SERPL-MCNC: 9.4 MG/DL (ref 8.6–10.4)
CHLORIDE BLD-SCNC: 95 MMOL/L (ref 98–107)
CO2: 30 MMOL/L (ref 20–31)
COLOR: YELLOW
COMMENT UA: NORMAL
CREAT SERPL-MCNC: 1.08 MG/DL (ref 0.5–0.9)
DIFFERENTIAL TYPE: ABNORMAL
EOSINOPHILS RELATIVE PERCENT: 1 % (ref 0–4)
FERRITIN: 96 UG/L (ref 13–150)
GFR AFRICAN AMERICAN: >60 ML/MIN
GFR NON-AFRICAN AMERICAN: 51 ML/MIN
GFR SERPL CREATININE-BSD FRML MDRD: ABNORMAL ML/MIN/{1.73_M2}
GFR SERPL CREATININE-BSD FRML MDRD: ABNORMAL ML/MIN/{1.73_M2}
GLUCOSE BLD-MCNC: 233 MG/DL (ref 70–99)
GLUCOSE URINE: NEGATIVE
HCT VFR BLD CALC: 37.8 % (ref 36–46)
HEMOGLOBIN: 12.1 G/DL (ref 12–16)
IMMATURE GRANULOCYTES: ABNORMAL %
KETONES, URINE: NEGATIVE
LACTATE DEHYDROGENASE: 334 U/L (ref 135–214)
LEUKOCYTE ESTERASE, URINE: NEGATIVE
LIPASE: 25 U/L (ref 13–60)
LYMPHOCYTES # BLD: 12 % (ref 24–44)
MCH RBC QN AUTO: 29.1 PG (ref 26–34)
MCHC RBC AUTO-ENTMCNC: 32.1 G/DL (ref 31–37)
MCV RBC AUTO: 90.7 FL (ref 80–100)
MONOCYTES # BLD: 4 % (ref 1–7)
NITRITE, URINE: NEGATIVE
NRBC AUTOMATED: ABNORMAL PER 100 WBC
PDW BLD-RTO: 15 % (ref 11.5–14.9)
PH UA: 8 (ref 5–8)
PLATELET # BLD: 367 K/UL (ref 150–450)
PLATELET ESTIMATE: ABNORMAL
PMV BLD AUTO: 8.7 FL (ref 6–12)
POTASSIUM SERPL-SCNC: 3.4 MMOL/L (ref 3.7–5.3)
PROCALCITONIN: 0.06 NG/ML
PROTEIN UA: NEGATIVE
RBC # BLD: 4.17 M/UL (ref 4–5.2)
RBC # BLD: ABNORMAL 10*6/UL
SARS-COV-2, RAPID: DETECTED
SEG NEUTROPHILS: 82 % (ref 36–66)
SEGMENTED NEUTROPHILS ABSOLUTE COUNT: 11.1 K/UL (ref 1.3–9.1)
SODIUM BLD-SCNC: 138 MMOL/L (ref 135–144)
SPECIFIC GRAVITY UA: 1.01 (ref 1–1.03)
SPECIMEN DESCRIPTION: ABNORMAL
TOTAL PROTEIN: 8.2 G/DL (ref 6.4–8.3)
TURBIDITY: CLEAR
URINE HGB: NEGATIVE
UROBILINOGEN, URINE: NORMAL
WBC # BLD: 13.4 K/UL (ref 3.5–11)
WBC # BLD: ABNORMAL 10*3/UL

## 2021-04-26 PROCEDURE — 83690 ASSAY OF LIPASE: CPT

## 2021-04-26 PROCEDURE — M0245 HC RX W HCPCS: HCPCS | Performed by: EMERGENCY MEDICINE

## 2021-04-26 PROCEDURE — 2580000003 HC RX 258: Performed by: EMERGENCY MEDICINE

## 2021-04-26 PROCEDURE — 99285 EMERGENCY DEPT VISIT HI MDM: CPT

## 2021-04-26 PROCEDURE — 86140 C-REACTIVE PROTEIN: CPT

## 2021-04-26 PROCEDURE — 80053 COMPREHEN METABOLIC PANEL: CPT

## 2021-04-26 PROCEDURE — 83615 LACTATE (LD) (LDH) ENZYME: CPT

## 2021-04-26 PROCEDURE — 73502 X-RAY EXAM HIP UNI 2-3 VIEWS: CPT

## 2021-04-26 PROCEDURE — 85025 COMPLETE CBC W/AUTO DIFF WBC: CPT

## 2021-04-26 PROCEDURE — 84145 PROCALCITONIN (PCT): CPT

## 2021-04-26 PROCEDURE — 96365 THER/PROPH/DIAG IV INF INIT: CPT

## 2021-04-26 PROCEDURE — 71045 X-RAY EXAM CHEST 1 VIEW: CPT

## 2021-04-26 PROCEDURE — 81003 URINALYSIS AUTO W/O SCOPE: CPT

## 2021-04-26 PROCEDURE — 6360000002 HC RX W HCPCS: Performed by: EMERGENCY MEDICINE

## 2021-04-26 PROCEDURE — 36415 COLL VENOUS BLD VENIPUNCTURE: CPT

## 2021-04-26 PROCEDURE — 82728 ASSAY OF FERRITIN: CPT

## 2021-04-26 PROCEDURE — 87635 SARS-COV-2 COVID-19 AMP PRB: CPT

## 2021-04-26 RX ORDER — DIPHENHYDRAMINE HYDROCHLORIDE 50 MG/ML
50 INJECTION INTRAMUSCULAR; INTRAVENOUS
Status: DISCONTINUED | OUTPATIENT
Start: 2021-04-26 | End: 2021-04-26 | Stop reason: HOSPADM

## 2021-04-26 RX ORDER — SODIUM CHLORIDE 9 MG/ML
INJECTION, SOLUTION INTRAVENOUS CONTINUOUS
Status: CANCELLED | OUTPATIENT
Start: 2021-04-26 | End: 2021-04-27

## 2021-04-26 RX ORDER — EPINEPHRINE 1 MG/ML
0.3 INJECTION, SOLUTION, CONCENTRATE INTRAVENOUS PRN
Status: DISCONTINUED | OUTPATIENT
Start: 2021-04-26 | End: 2021-04-26 | Stop reason: HOSPADM

## 2021-04-26 RX ORDER — DIPHENHYDRAMINE HYDROCHLORIDE 50 MG/ML
50 INJECTION INTRAMUSCULAR; INTRAVENOUS
Status: CANCELLED | OUTPATIENT
Start: 2021-04-26 | End: 2021-04-26

## 2021-04-26 RX ORDER — EPINEPHRINE 1 MG/ML
0.3 INJECTION, SOLUTION, CONCENTRATE INTRAVENOUS PRN
Status: CANCELLED | OUTPATIENT
Start: 2021-04-26

## 2021-04-26 RX ORDER — METHYLPREDNISOLONE SODIUM SUCCINATE 125 MG/2ML
125 INJECTION, POWDER, LYOPHILIZED, FOR SOLUTION INTRAMUSCULAR; INTRAVENOUS
Status: CANCELLED | OUTPATIENT
Start: 2021-04-26 | End: 2021-04-26

## 2021-04-26 RX ORDER — SODIUM CHLORIDE 9 MG/ML
INJECTION, SOLUTION INTRAVENOUS CONTINUOUS
Status: DISCONTINUED | OUTPATIENT
Start: 2021-04-26 | End: 2021-04-26 | Stop reason: HOSPADM

## 2021-04-26 RX ORDER — METHYLPREDNISOLONE SODIUM SUCCINATE 125 MG/2ML
125 INJECTION, POWDER, LYOPHILIZED, FOR SOLUTION INTRAMUSCULAR; INTRAVENOUS
Status: DISCONTINUED | OUTPATIENT
Start: 2021-04-26 | End: 2021-04-26 | Stop reason: HOSPADM

## 2021-04-26 RX ADMIN — SODIUM CHLORIDE: 9 INJECTION, SOLUTION INTRAVENOUS at 14:47

## 2021-04-26 RX ADMIN — SODIUM CHLORIDE: 9 INJECTION, SOLUTION INTRAVENOUS at 14:46

## 2021-04-26 ASSESSMENT — ENCOUNTER SYMPTOMS
COUGH: 0
ABDOMINAL PAIN: 0
EYE REDNESS: 0
RHINORRHEA: 0
CONSTIPATION: 0
NAUSEA: 0
COLOR CHANGE: 0
SORE THROAT: 0
VOMITING: 0
DIARRHEA: 0
BLOOD IN STOOL: 0
SHORTNESS OF BREATH: 1
EYE DISCHARGE: 0
EYE PAIN: 0
SINUS PRESSURE: 0
TROUBLE SWALLOWING: 0
WHEEZING: 0
FACIAL SWELLING: 0
CHEST TIGHTNESS: 0
BACK PAIN: 1

## 2021-04-26 ASSESSMENT — PAIN DESCRIPTION - FREQUENCY: FREQUENCY: CONTINUOUS

## 2021-04-26 ASSESSMENT — PAIN DESCRIPTION - PAIN TYPE: TYPE: CHRONIC PAIN

## 2021-04-26 NOTE — PROGRESS NOTES
Patient refuses last 25 minutes of monitoring. Ordering provider notifies. Reviewed FDA EUA Fact sheet and After Infusion Information sheet for discharge. Written copies provided to patient. Verbalized understanding. Encouraged PO fluids and rest. DC home with spouse. BAMLANIVIMAB/ESTESEVIMAB     You received an infusion of BAMLANIVIMAB/ESTESEVIMAB authorized for emergency use by the FDA. You should continue to self-isolate and use infection control measures (wear mask, isolate, social distance, avoid sharing personal items, clean and disinfect \"high touch\" surfaces, and frequent handwashing) according to CDC guidelines. Report all changes in your health to your doctors as soon as possible. Symptoms to report to your physician immediately or seek emergency medical care:   ·  Fever, Chills, Shakes, Hives, Rash, Itching, Swelling of lips, mouth or throat   ·  Shortness of breath, difficulty breathing or wheezing, Chest pain   ·  Cough, Sneezing   ·  Fatigue, muscle or joint pain/aching,   ·  Headache   ·  Weakness, numbness, tingling is any part of your body   ·  Low blood pressure/Oxygen saturation levels  ·  Dizziness, feeling faint   ·  Nausea        These are not all of the possible side effects. Serious and unexpected side effects may happen.

## 2021-04-26 NOTE — ED PROVIDER NOTES
difficulty, weakness, numbness and headaches. Psychiatric/Behavioral: Negative for confusion, decreased concentration, hallucinations, self-injury, sleep disturbance and suicidal ideas. PAST MEDICAL HISTORY     Past Medical History:   Diagnosis Date    Acute hypoxemic respiratory failure ([facility]     Arthritis     CHF (congestive heart failure) (HCC)     Chronic back pain     Diabetes mellitus type II, controlled ([facility] 2/14/2012    Fibromyalgia     Hyperlipidemia LDL goal < 70 2/14/2012    Hypertension, benign 02/14/2012    Morbid obesity with BMI of 60.0-69.9, adult ([facility] 8/28/2015    Pain of toe of right foot     morgans cyst    Right wrist pain     rate 8    Skin cancer     skin under lt eye,face    Sleep apnea     Bipap does not work    Wears glasses        SURGICAL HISTORY       Past Surgical History:   Procedure Laterality Date    CARPAL TUNNEL RELEASE Right 09/05/2014    CATARACT REMOVAL WITH IMPLANT Bilateral 2013    CHOLECYSTECTOMY, LAPAROSCOPIC  1998    COLONOSCOPY N/A 10/17/2019    COLONOSCOPY DIAGNOSTIC performed by Jeni Guevara MD at [facility] 238 cyst from base of tail bone   [id number] [facility] Bilateral 01/20/2017    abcess removed    LUMBAR FUSION  10/2010; 03/2011    L4/L5    LUMBAR FUSION  2009    L4-S1    OTHER SURGICAL HISTORY  05/02/2018    Lumbar decompression laminectomy at L3-L4 bilaterally wiith complete facetomies at L3-L4 bilaterally;  diskectomy L3-L4: posterior lumbar interbody fusion; placement of [place]-type graft; local harvest of bone; microsurgical dissection.  ROTATOR CUFF REPAIR Left 2012    SKIN CANCER EXCISION  2005    Basal Cell Carcinoma, Curly size from Lt.  face    TUBAL LIGATION  1981    UPPER GASTROINTESTINAL ENDOSCOPY N/A 10/16/2019    EGD BIOPSY performed by Jeni Guevara MD at [address] [place] [address]       Current Discharge Medication List      CONTINUE these medications which have NOT CHANGED    Details   oxyCODONE-acetaminophen (PERCOCET) 5-325 MG per tablet Take 1 tablet by mouth every 6 hours as needed for Pain. Qty: 120 tablet, Refills: 0    Comments: Reduce doses taken as pain becomes manageable  Associated Diagnoses: Cervicalgia; Chronic pain syndrome; Fibromyalgia      blood glucose test strips (ACCU-CHEK POOJA PLUS) strip Check glucose AC BID, dx E11.9, may sub covered product  Qty: 100 strip, Refills: 11    Comments: DX Code Needed  .   Associated Diagnoses: Controlled type 2 diabetes mellitus without complication, without long-term current use of insulin (HCC)      furosemide (LASIX) 40 MG tablet TAKE 1/2 TABLET BY MOUTH EVERY DAY  Qty: 45 tablet, Refills: 3      LINZESS 145 MCG capsule TAKE 1 CAPSULE BY MOUTH EVERY DAY IN THE MORNING BEFORE BREAKFAST  Qty: 30 capsule, Refills: 3      TRADJENTA 5 MG tablet TAKE 1 TABLET BY MOUTH EVERY DAY  Qty: 90 tablet, Refills: 3      carvedilol (COREG) 12.5 MG tablet TAKE 1 TABLET BY MOUTH TWICE A DAY WITH MEALS  Qty: 180 tablet, Refills: 3      famotidine (PEPCID) 20 MG tablet Take 1 tablet by mouth 2 times daily  Qty: 180 tablet, Refills: 3      hyoscyamine (LEVSIN/SL) 125 MCG sublingual tablet Place 1 tablet under the tongue every 4 hours as needed for Cramping  Qty: 540 tablet, Refills: 3      ondansetron (ZOFRAN) 4 MG tablet Take 1 tablet by mouth every 8 hours as needed for Nausea or Vomiting  Qty: 20 tablet, Refills: 0      atorvastatin (LIPITOR) 40 MG tablet TAKE 1 TABLET BY MOUTH EVERY DAY AT NIGHT  Qty: 90 tablet, Refills: 3      amLODIPine (NORVASC) 10 MG tablet Take 1 tablet by mouth daily Takes with dinner  Qty: 90 tablet, Refills: 3    Associated Diagnoses: Hypertension, benign      venlafaxine (EFFEXOR XR) 37.5 MG extended release capsule TAKE 1 CAPSULE BY MOUTH EVERY DAY  Qty: 90 capsule, Refills: 3      clonazePAM (KLONOPIN) 0.5 MG tablet Take 1 tablet by mouth 2 times daily as needed (tremor) for up to 30 doses.  Qty: 30 tablet, Refills: 0    Associated Diagnoses: Myoclonus      glimepiride (AMARYL) 4 MG tablet TAKE 2 TABLETS BY MOUTH EVERY MORNING (BEFORE BREAKFAST)  Qty: 180 tablet, Refills: 3      aspirin 81 MG tablet Take 1 tablet by mouth daily (with breakfast)  Qty: 30 tablet, Refills: 11      Lancets MISC 1 each by Does not apply route daily Accucheck Solange dx E11.9 check daily  Qty: 100 each, Refills: 11      albuterol (PROVENTIL) (2.5 MG/3ML) 0.083% nebulizer solution Take 3 mLs by nebulization 2 times daily  Qty: 120 each, Refills: 3      diclofenac sodium 1 % GEL Apply 2 g topically 3 times daily  Qty: 1 Tube, Refills: 11      Calcium Carb-Cholecalciferol (CALCIUM 600 + D PO) Take 1 tablet by mouth 2 times daily      Multiple Vitamins-Iron (ONE DAILY MULTIVITAMIN/IRON PO) Take 1 tablet by mouth nightly      Coenzyme Q10 (COQ10 PO) Take 200 mg by mouth nightly       Loratadine-Pseudoephedrine (CLARITIN-D 12 HOUR PO) Take 1 tablet by mouth daily. pregabalin (LYRICA) 150 MG capsule Take 1 capsule by mouth 2 times daily for 30 days. Qty: 180 capsule, Refills: 5    Associated Diagnoses: Fibromyalgia             ALLERGIES     is allergic to adhesive tape; morphine; iron; lisinopril; metformin and related; molds & smuts; and pcn [penicillins]. SOCIAL HISTORY      reports that she has never smoked. She has never used smokeless tobacco. She reports previous alcohol use. She reports that she does not use drugs. PHYSICAL EXAM     INITIAL VITALS: BP (!) 203/80   Pulse 69   Temp 96.4 °F (35.8 °C)   Resp 10   Ht 5' (1.524 m)   Wt 285 lb (129.3 kg)   LMP  (LMP Unknown)   SpO2 97%   BMI 55.66 kg/m²      Physical Exam  Vitals signs and nursing note reviewed. Constitutional:       General: She is not in acute distress. Appearance: She is well-developed. She is not diaphoretic. HENT:      Head: Normocephalic and atraumatic. Eyes:      General: No scleral icterus. Right eye: No discharge. Left eye: No discharge. Conjunctiva/sclera: Conjunctivae normal.      Pupils: Pupils are equal, round, and reactive to light. Cardiovascular:      Rate and Rhythm: Normal rate and regular rhythm. Heart sounds: Normal heart sounds. No murmur. No friction rub. No gallop. Pulmonary:      Effort: Pulmonary effort is normal. No respiratory distress. Breath sounds: Normal breath sounds. No wheezing or rales. Chest:      Chest wall: No tenderness. Abdominal:      General: Bowel sounds are normal. There is no distension. Palpations: Abdomen is soft. There is no mass. Tenderness: There is abdominal tenderness in the right lower quadrant. There is no guarding or rebound. Musculoskeletal: Normal range of motion. General: No tenderness. Skin:     General: Skin is warm and dry. Coloration: Skin is not pale. Findings: No erythema or rash. Neurological:      Mental Status: She is alert and oriented to person, place, and time. Cranial Nerves: No cranial nerve deficit. Sensory: No sensory deficit. Motor: No abnormal muscle tone. Coordination: Coordination normal.      Deep Tendon Reflexes: Reflexes normal.   Psychiatric:         Behavior: Behavior normal.         Thought Content: Thought content normal.         Judgment: Judgment normal.         DIAGNOSTIC RESULTS     RADIOLOGY:All plain film, CT,MRI, and formal ultrasound images (except ED bedside ultrasound) are read by the radiologist and the interpretations are directly viewed by the emergency physician. Xr Hip Left (2-3 Views)    Result Date: 4/26/2021  EXAMINATION: TWO XRAY VIEWS OF THE LEFT HIP 4/26/2021 10:01 am COMPARISON: None. HISTORY: ORDERING SYSTEM PROVIDED HISTORY: fall, pain TECHNOLOGIST PROVIDED HISTORY: fall, pain Reason for Exam: left hip pain following fall x 1 week prior. Acuity: Acute Type of Exam: Initial FINDINGS: The hip demonstrates normal alignment.  No evidence of acute displaced fracture. No suspicious focal osseus lesion. Soft tissues are radiographically within normal limits. No gross fracture. If clinical concern persists, CT could further evaluate. Xr Chest Portable    Result Date: 4/26/2021  EXAMINATION: ONE XRAY VIEW OF THE CHEST 4/26/2021 11:40 am COMPARISON: 09/22/2020 HISTORY: ORDERING SYSTEM PROVIDED HISTORY: shortness of breath TECHNOLOGIST PROVIDED HISTORY: shortness of breath Reason for Exam: Droplet precautions Acuity: Unknown Type of Exam: Unknown FINDINGS: The cardiomediastinal silhouette is unchanged in appearance. Cardiac silhouette size at the upper limits of normal.  There is no consolidation, pneumothorax, or evidence of edema. No effusion is appreciated. The osseous structures are unchanged in appearance. Unchanged appearance of the chest without acute airspace disease identified. LABS: All lab results were reviewed by myself, and all abnormals are listed below.   Labs Reviewed   COVID-19, RAPID - Abnormal; Notable for the following components:       Result Value    SARS-CoV-2, Rapid DETECTED (*)     All other components within normal limits   CBC WITH AUTO DIFFERENTIAL - Abnormal; Notable for the following components:    WBC 13.4 (*)     RDW 15.0 (*)     Seg Neutrophils 82 (*)     Lymphocytes 12 (*)     Segs Absolute 11.10 (*)     All other components within normal limits   COMPREHENSIVE METABOLIC PANEL - Abnormal; Notable for the following components:    Glucose 233 (*)     CREATININE 1.08 (*)     Potassium 3.4 (*)     Chloride 95 (*)     Alkaline Phosphatase 155 (*)     GFR Non- 51 (*)     All other components within normal limits   C-REACTIVE PROTEIN - Abnormal; Notable for the following components:    CRP 17.0 (*)     All other components within normal limits   LACTATE DEHYDROGENASE - Abnormal; Notable for the following components:     (*)     All other components within normal limits   LIPASE   PROCALCITONIN URINE RT REFLEX TO CULTURE   FERRITIN         MEDICAL DECISION MAKING:     Patient symptoms could just be related to an infection and with her pain in the lower abdomen could be a UTI. But I also make sure that she did not get Covid around the time of the vaccine. We will do some basic labs. EMERGENCY DEPARTMENT COURSE:   Vitals:    Vitals:    04/26/21 1200 04/26/21 1201 04/26/21 1215 04/26/21 1230   BP: (!) 184/79 (!) 196/86 (!) 197/84 (!) 203/80   Pulse:  68 71 69   Resp:  16 15 10   Temp:  96.4 °F (35.8 °C)     SpO2: 98% 99% 100% 97%   Weight:  285 lb (129.3 kg)     Height:  5' (1.524 m)         The patient was given the following medications while in the emergency department:  Orders Placed This Encounter   Medications    0.9 % sodium chloride infusion    bamlanivimab 700 mg, etesevimab 1,400 mg in sodium chloride 0.9 % 310 mL IVPB     Order Specific Question:   Does this patient qualify for COVID-19 antibody therapy based on criteria for treatment? Answer: Yes    diphenhydrAMINE (BENADRYL) injection 50 mg    EPINEPHrine PF 1 MG/ML injection 0.3 mg    famotidine (PEPCID) injection 20 mg    hydrocortisone sodium succinate PF (SOLU-CORTEF) injection 100 mg    methylPREDNISolone sodium (SOLU-MEDROL) injection 125 mg       -------------------------  2:06 PM EDT  Patient was updated on the results and was noted to be Covid positive. Because of her risk factors putting her in high risk category and the fact that she is not requiring any extra oxygen she is a candidate for monoclonal antibody. We did discuss this with her and she is agreeable to do this. We are going to go ahead and get that done before she leaves today. CONSULTS:  None    PROCEDURES:  None    FINAL IMPRESSION      1.  Upper respiratory tract infection due to COVID-19 virus          DISPOSITION/PLAN   DISPOSITION Decision To Discharge 04/26/2021 02:04:49 PM      PATIENT REFERREDTO:  Nilay Velez [address]    Schedule an appointment as soon as possible for a visit in 3 days  Follow up within 3 days, Return to ED sooner if symptoms worsen    [facility]  [facility] [address] [phone]    If symptoms worsen      DISCHARGEMEDICATIONS:  Current Discharge Medication List          (Please note that portions of this note were completed with a voice recognition program.  Efforts were made to edit thedictations but occasionally words are mis-transcribed.)    Willis Ledesma MD  Attending Emergency Physician                        Willis Ledesma MD  04/26/21 [phone]

## 2021-04-26 NOTE — ED NOTES
Discharge instructions reviewed with patient with patient verbalizing understanding. Patient provided a copy of instructions with copy placed with personal items for patient to have after transfusion is completed.       Britton Kennedy RN  04/26/21 1611

## 2021-04-26 NOTE — ED NOTES
Patient ambulated at bedside with SpO2 reading from 98% at rest on room air to 99% with activity. Patient ambulated 6-8 feet towards door from bedside, pausing and then returning to bed without incident.       Eliodoro Lanes, RN  04/26/21 [phone]

## 2021-04-27 ENCOUNTER — CARE COORDINATION (OUTPATIENT)
Dept: CARE COORDINATION | Age: 66
End: 2021-04-27

## 2021-04-27 NOTE — CARE COORDINATION
Patient contacted regarding [id number] diagnosis\". Discussed COVID-19 related testing which was available at this time. Test results were positive. Patient informed of results, if available? Patient learned of results while in ED    [facility] Care Coordinator contacted the patient by telephone to perform post discharge assessment. Call within 2 business days of discharge: Yes. Verified name and  with patient as identifiers. Provided introduction to self, and explanation of the CTN/ACM role, and reason for call due to risk factors for infection and/or exposure to COVID-19. Symptoms reviewed with patient who verbalized the following symptoms: fever, no new symptoms and no worsening symptoms. Due to no new or worsening symptoms encounter was not routed to provider for escalation. Discussed follow-up appointments. If no appointment was previously scheduled, appointment scheduling offered: Patient to call PCP for ED follow up and any worsening symptoms  [facility] follow up appointment(s):   Future Appointments   Date Time Provider Melida Bella   [facility]15[facility]2021  [facility]     [facility] follow up appointment(s): no    Non-face-to-face services provided:  N/A     Advance Care Planning:   Does patient have an Advance Directive:  reviewed and current. Patient has following risk factors of: heart failure and diabetes. LPN CC reviewed discharge instructions, medical action plan and red flags such as increased shortness of breath, increasing fever and signs of decompensation with patient who verbalized understanding. Discussed exposure protocols and quarantine with CDC Guidelines What to do if you are sick with coronavirus disease .  Patient was given an opportunity for questions and concerns.  The patient agrees to contact the [facility] exposure line [phone], [facility] department [place] [facility]: ([phone]) and PCP office for questions related to their healthcare. LPN CC provided contact information for future needs. Reviewed and educated patient on any new and changed medications related to discharge diagnosis     Was patient discharged with a pulse oximeter? No Discussed and confirmed pulse oximeter discharge instructions and when to notify provider or seek emergency care. Patient/family/caregiver given information for [employer] and agrees to enroll no  Patient's preferred e-mail:    Patient's preferred phone number:   Based on Loop alert triggers, patient will be contacted by nurse care manager for worsening symptoms. Plan for follow-up call in 5-7 days based on severity of symptoms and risk factors.

## 2021-04-29 NOTE — PROGRESS NOTES
Follow up call [phone]. Patient reports improved appetite and energy level since antibody infusion.

## 2021-05-04 ENCOUNTER — CARE COORDINATION (OUTPATIENT)
Dept: CARE COORDINATION | Age: 66
End: 2021-05-04

## 2021-05-04 NOTE — CARE COORDINATION
Patient contacted regarding COVID-19 risk and screening. Discussed COVID-19 related testing which was not done at this time. Test results were not done. Patient informed of results, if available? N/A.    [facility] Care Coordinator contacted the patient by telephone to perform follow-up assessment. Verified name and  with patient as identifiers. Patient has following risk factors of: heart failure and diabetes. Symptoms reviewed with patient who verbalized the following symptoms: no new symptoms. Was patient discharged with a pulse oximeter? No Discussed and confirmed pulse oximeter discharge instructions and when to notify provider or seek emergency care. Due to no new or worsening symptoms encounter was not routed to provider for escalation. Education provided regarding infection prevention, and signs and symptoms of COVID-19 and when to seek medical attention with patient who verbalized understanding. Discussed exposure protocols and quarantine from [address] you at higher risk for severe illness  and given an opportunity for questions and concerns. The patient agrees to contact the COVID-19 hotline [phone] or PCP office for questions related to their healthcare. LPN CC provided contact information for future reference. From CDC: Are you at higher risk for severe illness?  Wash your hands often.  Avoid close contact (6 feet, which is about two arm lengths) with people who are sick.  Put distance between yourself and other people if COVID-19 is spreading in your community.  Clean and disinfect frequently touched surfaces.  Avoid all cruise travel and non-essential air travel.  Call your healthcare professional if you have concerns about COVID-19 and your underlying condition or if you are sick.     For more information on steps you can take to protect yourself, see CDC's How to Protect Yourself      No further plan to follow up, patient is feeling good, has had a f/u with PCP  based on severity of symptoms and risk factors.

## 2021-07-09 ENCOUNTER — TELEPHONE (OUTPATIENT)
Dept: ONCOLOGY | Age: 66
End: 2021-07-09

## 2021-07-13 ENCOUNTER — HOSPITAL ENCOUNTER (OUTPATIENT)
Facility: MEDICAL CENTER | Age: 66
End: 2021-07-13
Payer: COMMERCIAL

## 2021-07-14 ENCOUNTER — HOSPITAL ENCOUNTER (OUTPATIENT)
Age: 66
Setting detail: SPECIMEN
Discharge: HOME | End: 2021-07-14
Payer: COMMERCIAL

## 2021-07-14 DIAGNOSIS — D64.9 ANEMIA, UNSPECIFIED TYPE: ICD-10-CM

## 2021-07-14 DIAGNOSIS — I89.0 LYMPHEDEMA OF BOTH LOWER EXTREMITIES: ICD-10-CM

## 2021-07-14 DIAGNOSIS — N18.31 CHRONIC RENAL IMPAIRMENT, STAGE 3A (HCC): ICD-10-CM

## 2021-07-14 DIAGNOSIS — E61.1 IRON DEFICIENCY: ICD-10-CM

## 2021-07-14 LAB
ABSOLUTE EOS #: 0.31 K/UL (ref 0–0.44)
ABSOLUTE IMMATURE GRANULOCYTE: 0.08 K/UL (ref 0–0.3)
ABSOLUTE LYMPH #: 2.16 K/UL (ref 1.1–3.7)
ABSOLUTE MONO #: 0.78 K/UL (ref 0.1–1.2)
BASOPHILS # BLD: 1 % (ref 0–2)
BASOPHILS ABSOLUTE: 0.08 K/UL (ref 0–0.2)
DIFFERENTIAL TYPE: ABNORMAL
EOSINOPHILS RELATIVE PERCENT: 2 % (ref 1–4)
FERRITIN: 107 UG/L (ref 13–150)
FOLATE: >20 NG/ML
HCT VFR BLD CALC: 37.5 % (ref 36.3–47.1)
HEMOGLOBIN: 10.7 G/DL (ref 11.9–15.1)
IMMATURE GRANULOCYTES: 1 %
IRON SATURATION: 16 % (ref 20–55)
IRON: 45 UG/DL (ref 37–145)
LYMPHOCYTES # BLD: 16 % (ref 24–43)
MCH RBC QN AUTO: 28.2 PG (ref 25.2–33.5)
MCHC RBC AUTO-ENTMCNC: 28.5 G/DL (ref 28.4–34.8)
MCV RBC AUTO: 98.9 FL (ref 82.6–102.9)
MONOCYTES # BLD: 6 % (ref 3–12)
NRBC AUTOMATED: 0 PER 100 WBC
PDW BLD-RTO: 15.1 % (ref 11.8–14.4)
PLATELET # BLD: 440 K/UL (ref 138–453)
PLATELET ESTIMATE: ABNORMAL
PMV BLD AUTO: 11.2 FL (ref 8.1–13.5)
RBC # BLD: 3.79 M/UL (ref 3.95–5.11)
RBC # BLD: ABNORMAL 10*6/UL
SEG NEUTROPHILS: 74 % (ref 36–65)
SEGMENTED NEUTROPHILS ABSOLUTE COUNT: 10.5 K/UL (ref 1.5–8.1)
TOTAL IRON BINDING CAPACITY: 286 UG/DL (ref 250–450)
UNSATURATED IRON BINDING CAPACITY: 241 UG/DL (ref 112–347)
VITAMIN B-12: 381 PG/ML (ref 232–1245)
WBC # BLD: 13.9 K/UL (ref 3.5–11.3)
WBC # BLD: ABNORMAL 10*3/UL

## 2021-07-15 ENCOUNTER — TELEPHONE (OUTPATIENT)
Dept: ONCOLOGY | Age: 66
End: 2021-07-15

## 2021-07-15 ENCOUNTER — OFFICE VISIT (OUTPATIENT)
Dept: ONCOLOGY | Age: 66
End: 2021-07-15
Payer: COMMERCIAL

## 2021-07-15 VITALS
DIASTOLIC BLOOD PRESSURE: 80 MMHG | TEMPERATURE: 97.7 F | SYSTOLIC BLOOD PRESSURE: 138 MMHG | HEART RATE: 64 BPM | HEIGHT: 60 IN | OXYGEN SATURATION: 92 % | WEIGHT: 293 LBS | RESPIRATION RATE: 16 BRPM | BODY MASS INDEX: 57.52 KG/M2

## 2021-07-15 DIAGNOSIS — N18.30 CHRONIC RENAL IMPAIRMENT, STAGE 3 (MODERATE), UNSPECIFIED WHETHER STAGE 3A OR 3B CKD (HCC): ICD-10-CM

## 2021-07-15 DIAGNOSIS — K90.9 IRON MALABSORPTION: ICD-10-CM

## 2021-07-15 DIAGNOSIS — I89.0 LYMPHEDEMA OF BOTH LOWER EXTREMITIES: ICD-10-CM

## 2021-07-15 DIAGNOSIS — D64.9 ANEMIA, UNSPECIFIED TYPE: Primary | ICD-10-CM

## 2021-07-15 DIAGNOSIS — E61.1 IRON DEFICIENCY: ICD-10-CM

## 2021-07-15 PROCEDURE — 99214 OFFICE O/P EST MOD 30 MIN: CPT | Performed by: INTERNAL MEDICINE

## 2021-07-15 PROCEDURE — G8427 DOCREV CUR MEDS BY ELIG CLIN: HCPCS | Performed by: INTERNAL MEDICINE

## 2021-07-15 PROCEDURE — 3017F COLORECTAL CA SCREEN DOC REV: CPT | Performed by: INTERNAL MEDICINE

## 2021-07-15 PROCEDURE — G8417 CALC BMI ABV UP PARAM F/U: HCPCS | Performed by: INTERNAL MEDICINE

## 2021-07-15 PROCEDURE — 1036F TOBACCO NON-USER: CPT | Performed by: INTERNAL MEDICINE

## 2021-07-15 NOTE — PROGRESS NOTES
Today's Date: 7/15/2021  Patient Name: Mary Donovan  Date of admission: No admission date for patient encounter. Patient's age: 63 y.o., 1955  Admission Dx: No admission diagnoses are documented for this encounter. Reason for Consult: management recommendations  Requesting Physician: No admitting provider for patient encounter. CHIEF COMPLAINT: Fatigue. Anemia. Weakness    History Obtained From:  patient, electronic medical record    Interval history:    Patient presents to the clinic for a follow-up visit and to discuss results of her lab work-up and other relevant clinical data. Overall patient has been doing well. Denies any hospitalization. She did bump her left foot into a door and has a big bruise which is getting better according to the patient. Patient does complain of being tired. Complains of increased fatigue. Patient is intolerant to oral iron. Hemoglobin has dropped. Iron saturation is also decreased. During this visit patient's allergy, social, medical, surgical history and medications were reviewed and updated. hISTORY OF PRESENT ILLNESS:      The patient is a 63 y.o.  female who is admitted to the hospital for chief complaints of fatigue patient was earlier seen in the office with complaints of progressive fatigue and leg edema. Work-up also revealed microcytic anemia. Patient has longstanding history of anemia. Iron studies done in November showed iron saturation of only 6%. Patient at that time had borderline B12 stores. Patient stool occult blood testing on 612 is negative. Patient denies any gross bleeding. Patient has not had thyroid testing in recent past.  Patient had EGD and colonoscopy in October 2019. Colonoscopy was unremarkable other than findings of internal hemorrhoids and sticky pasty stools. EGD showed a small 4 mm deep ulcer close to the pylorus with no active bleeding. Biopsies were taken. There was no evidence of H. pylori infection. Patient also has history of CKD. Patient denies any bleeding vaginally per rectum or bloody vomiting or cough. Patient is intolerant to oral iron and has not been taking any iron. Past Medical History:   has a past medical history of Acute hypoxemic respiratory failure ([facility] [address] Arthritis, CHF (congestive heart failure) ([facility] [address] Chronic back pain, Diabetes mellitus type II, controlled ([facility] [address] Fibromyalgia, Hyperlipidemia LDL goal < 70, Hypertension, benign, Morbid obesity with BMI of 60.0-69.9, adult ([facility] [address] Pain of toe of right foot, Right wrist pain, Skin cancer, Sleep apnea, and Wears glasses. Past Surgical History:   has a past surgical history that includes Tubal ligation (1981); Hysterectomy (1998); lumbar fusion (10/2010; 03/2011); Carpal tunnel release (Right, 09/05/2014); cyst removal (1985); Cholecystectomy, laparoscopic (1998); lumbar fusion (2009); Rotator cuff repair (Left, 2012); Cataract removal with implant (Bilateral, 2013); Skin cancer excision (2005); Inguinal hernia repair (Bilateral, 01/20/2017); other surgical history (05/02/2018); Upper gastrointestinal endoscopy (N/A, 10/16/2019); and Colonoscopy (N/A, 10/17/2019). Medications:    Prior to Admission medications    Medication Sig Start Date End Date Taking? Authorizing Provider   glimepiride (AMARYL) 4 MG tablet TAKE 2 TABLETS BY MOUTH EVERY MORNING (BEFORE BREAKFAST) 7/9/21  Yes Thaddeus Husain MD   oxyCODONE (OXY-IR) 30 MG immediate release tablet Take 1 tablet by mouth every 8 hours as needed for Pain for up to 30 days. 6/29/21 7/29/21 Yes Thaddeus Huasin MD   oxyCODONE-acetaminophen (PERCOCET) 5-325 MG per tablet Take 1 tablet by mouth every 6 hours as needed for Pain.  6/25/21 6/25/22 Yes VIDHYA Hinkle NP   clotrimazole (LOTRIMIN) 1 % cream APPLY TO AFFECTED AREA TWICE A DAY 5/25/21  Yes Thaddeus Husain MD   amLODIPine (NORVASC) 10 MG tablet TAKE 1 TABLET BY MOUTH DAILY WITH DINNER 5/5/21  Yes Bernarda Juarez Brenda Jenkins MD   venlafaxine (EFFEXOR XR) 37.5 MG extended release capsule TAKE 1 CAPSULE BY MOUTH EVERY DAY 5/3/21  Yes Brandt Mcknight MD   blood glucose test strips (ACCU-CHEK SOLANGE PLUS) strip Check glucose AC BID, dx E11.9, may sub covered product 4/7/21  Yes Brandt Mcknight MD   furosemide (LASIX) 40 MG tablet TAKE 1/2 TABLET BY MOUTH EVERY DAY 4/1/21  Yes VIDHYA Connors NP   LINZESS 145 MCG capsule TAKE 1 CAPSULE BY MOUTH EVERY DAY IN THE MORNING BEFORE BREAKFAST 3/19/21  Yes Reyna Galvan MD   TRADJENTA 5 MG tablet TAKE 1 TABLET BY MOUTH EVERY DAY 2/22/21  Yes VIDHYA Connors NP   carvedilol (COREG) 12.5 MG tablet TAKE 1 TABLET BY MOUTH TWICE A DAY WITH MEALS 2/22/21  Yes VIDHYA Connors NP   famotidine (PEPCID) 20 MG tablet Take 1 tablet by mouth 2 times daily 1/13/21  Yes Reyna Galvan MD   hyoscyamine (LEVSIN/SL) 125 MCG sublingual tablet Place 1 tablet under the tongue every 4 hours as needed for Cramping 9/30/20  Yes Brandt Mcknight MD   ondansetron (ZOFRAN) 4 MG tablet Take 1 tablet by mouth every 8 hours as needed for Nausea or Vomiting 9/18/20  Yes Mayank Alvarez DO   atorvastatin (LIPITOR) 40 MG tablet TAKE 1 TABLET BY MOUTH EVERY DAY AT NIGHT 9/2/20  Yes Brandt Mcknight MD   clonazePAM (KLONOPIN) 0.5 MG tablet Take 1 tablet by mouth 2 times daily as needed (tremor) for up to 30 doses.  6/16/20  Yes Brandt Mcknight MD   aspirin 81 MG tablet Take 1 tablet by mouth daily (with breakfast) 4/21/20  Yes Brandt Mcknight MD   Lancets MISC 1 each by Does not apply route daily Accucheck Solange dx E11.9 check daily 2/14/20  Yes Brandt Mcknight MD   albuterol (PROVENTIL) (2.5 MG/3ML) 0.083% nebulizer solution Take 3 mLs by nebulization 2 times daily 1/11/20  Yes Edith Bliss MD   diclofenac sodium 1 % GEL Apply 2 g topically 3 times daily 2/20/19  Yes Brandt Mckinght MD   Calcium Carb-Cholecalciferol (CALCIUM 600 + D PO) Take 1 tablet by mouth 2 times daily   Yes Historical Provider, MD   Multiple Vitamins-Iron (ONE DAILY MULTIVITAMIN/IRON PO) Take 1 tablet by mouth nightly   Yes Historical Provider, MD   Coenzyme Q10 (COQ10 PO) Take 200 mg by mouth nightly    Yes Historical Provider, MD   Loratadine-Pseudoephedrine (CLARITIN-D 12 HOUR PO) Take 1 tablet by mouth daily. Yes Historical Provider, MD   pregabalin (LYRICA) 150 MG capsule Take 1 capsule by mouth 2 times daily for 30 days. 5/3/21 6/2/21  Gerald Yee MD     Current Outpatient Medications   Medication Sig Dispense Refill    glimepiride (AMARYL) 4 MG tablet TAKE 2 TABLETS BY MOUTH EVERY MORNING (BEFORE BREAKFAST) 180 tablet 3    oxyCODONE (OXY-IR) 30 MG immediate release tablet Take 1 tablet by mouth every 8 hours as needed for Pain for up to 30 days. 90 tablet 0    oxyCODONE-acetaminophen (PERCOCET) 5-325 MG per tablet Take 1 tablet by mouth every 6 hours as needed for Pain.  120 tablet 0    clotrimazole (LOTRIMIN) 1 % cream APPLY TO AFFECTED AREA TWICE A DAY 45 g 11    amLODIPine (NORVASC) 10 MG tablet TAKE 1 TABLET BY MOUTH DAILY WITH DINNER 90 tablet 3    venlafaxine (EFFEXOR XR) 37.5 MG extended release capsule TAKE 1 CAPSULE BY MOUTH EVERY DAY 90 capsule 3    blood glucose test strips (ACCU-CHEK POOJA PLUS) strip Check glucose AC BID, dx E11.9, may sub covered product 100 strip 11    furosemide (LASIX) 40 MG tablet TAKE 1/2 TABLET BY MOUTH EVERY DAY 45 tablet 3    LINZESS 145 MCG capsule TAKE 1 CAPSULE BY MOUTH EVERY DAY IN THE MORNING BEFORE BREAKFAST 30 capsule 3    TRADJENTA 5 MG tablet TAKE 1 TABLET BY MOUTH EVERY DAY 90 tablet 3    carvedilol (COREG) 12.5 MG tablet TAKE 1 TABLET BY MOUTH TWICE A DAY WITH MEALS 180 tablet 3    famotidine (PEPCID) 20 MG tablet Take 1 tablet by mouth 2 times daily 180 tablet 3    hyoscyamine (LEVSIN/SL) 125 MCG sublingual tablet Place 1 tablet under the tongue every 4 hours as needed for Cramping 540 tablet 3    ondansetron (ZOFRAN) 4 MG tablet Take 1 tablet by mouth every 8 hours as needed for Nausea or Vomiting 20 tablet 0    atorvastatin (LIPITOR) 40 MG tablet TAKE 1 TABLET BY MOUTH EVERY DAY AT NIGHT 90 tablet 3    clonazePAM (KLONOPIN) 0.5 MG tablet Take 1 tablet by mouth 2 times daily as needed (tremor) for up to 30 doses. 30 tablet 0    aspirin 81 MG tablet Take 1 tablet by mouth daily (with breakfast) 30 tablet 11    Lancets MISC 1 each by Does not apply route daily Accucheck Solange dx E11.9 check daily 100 each 11    albuterol (PROVENTIL) (2.5 MG/3ML) 0.083% nebulizer solution Take 3 mLs by nebulization 2 times daily 120 each 3    diclofenac sodium 1 % GEL Apply 2 g topically 3 times daily 1 Tube 11    Calcium Carb-Cholecalciferol (CALCIUM 600 + D PO) Take 1 tablet by mouth 2 times daily      Multiple Vitamins-Iron (ONE DAILY MULTIVITAMIN/IRON PO) Take 1 tablet by mouth nightly      Coenzyme Q10 (COQ10 PO) Take 200 mg by mouth nightly       Loratadine-Pseudoephedrine (CLARITIN-D 12 HOUR PO) Take 1 tablet by mouth daily.  pregabalin (LYRICA) 150 MG capsule Take 1 capsule by mouth 2 times daily for 30 days. 180 capsule 5     No current facility-administered medications for this visit. Allergies:  Adhesive tape, Morphine, Iron, Lisinopril, Metformin and related, Molds & smuts, and Pcn [penicillins]    Social History:   reports that she has never smoked. She has never used smokeless tobacco. She reports previous alcohol use. She reports that she does not use drugs. Family History: family history includes COPD in her sister; Cancer in her mother; Diabetes in her brother, maternal grandmother, mother, and sister; Heart Attack in her paternal grandfather; Heart Disease in her paternal grandmother and sister; High Blood Pressure in her daughter, paternal grandmother, and son; [place] in her father; Stomach Cancer in her brother; Stroke in her paternal grandmother.     REVIEW OF SYSTEMS:      Constitutional: No fever or chills. No night sweats, no weight loss. Positive fatigue, worsened  Eyes: No eye discharge, double vision, or eye pain   HEENT: negative for sore mouth, sore throat, hoarseness and voice change   Respiratory: negative for cough , sputum, dyspnea, wheezing, hemoptysis, chest pain   Cardiovascular: negative for chest pain, dyspnea, palpitations, orthopnea, PND   Gastrointestinal: negative for nausea, vomiting, diarrhea, constipation, abdominal pain, Dysphagia, hematemesis and hematochezia   Genitourinary: negative for frequency, dysuria, nocturia, urinary incontinence, and hematuria   Integument: negative for rash, skin lesions, bruises.    Hematologic/Lymphatic: negative for easy bruising, bleeding, lymphadenopathy, or petechiae   Endocrine: negative for heat or cold intolerance,weight changes, change in bowel habits and hair loss   Musculoskeletal: negative for myalgias, arthralgias, pain, joint swelling,and bone pain   Neurological: negative for headaches, dizziness, seizures, weakness, numbness    PHYSICAL EXAM:        /80   Pulse 64   Temp 97.7 °F (36.5 °C)   Resp 16   Ht 5' (1.524 m)   Wt 295 lb (133.8 kg)   LMP  (LMP Unknown)   SpO2 92%   BMI 57.61 kg/m²    Temp (24hrs), Av.7 °F (36.5 °C), Min:97.7 °F (36.5 °C), Max:97.8 °F (36.6 °C)    General appearance - well appearing, no in pain or distress   Mental status - alert and cooperative   Eyes - pupils equal and reactive, extraocular eye movements intact   Ears - bilateral TM's and external ear canals normal   Mouth - mucous membranes moist, pharynx normal without lesions   Neck - supple, no significant adenopathy   Lymphatics - no palpable lymphadenopathy, no hepatosplenomegaly   Chest - clear to auscultation, no wheezes, rales or rhonchi, symmetric air entry   Heart - normal rate, regular rhythm, normal S1, S2, no murmurs  Abdomen - soft, nontender, nondistended, no masses or organomegaly   Neurological - alert, oriented, normal speech, no focal findings or movement disorder noted   Musculoskeletal - no joint tenderness, deformity or swelling   Extremities - peripheral pulses normal, no pedal edema, no clubbing or cyanosis   Skin - normal coloration and turgor, no rashes, no suspicious skin lesions noted ,      DATA:      Labs:     Results for orders placed or performed during the hospital encounter of 07/14/21   Vitamin B12 & Folate   Result Value Ref Range    Vitamin B-12 381 232 - 1245 pg/mL    Folate >20.0 >4.8 ng/mL   Iron and TIBC   Result Value Ref Range    Iron 45 37 - 145 ug/dL    TIBC 286 250 - 450 ug/dL    Iron Saturation 16 (L) 20 - 55 %    UIBC 241 112 - 347 ug/dL   CBC Auto Differential   Result Value Ref Range    WBC 13.9 (H) 3.5 - 11.3 k/uL    RBC 3.79 (L) 3.95 - 5.11 m/uL    Hemoglobin 10.7 (L) 11.9 - 15.1 g/dL    Hematocrit 37.5 36.3 - 47.1 %    MCV 98.9 82.6 - 102.9 fL    MCH 28.2 25.2 - 33.5 pg    MCHC 28.5 28.4 - 34.8 g/dL    RDW 15.1 (H) 11.8 - 14.4 %    Platelets 833 565 - 087 k/uL    MPV 11.2 8.1 - 13.5 fL    NRBC Automated 0.0 0.0 per 100 WBC    Differential Type NOT REPORTED     Seg Neutrophils 74 (H) 36 - 65 %    Lymphocytes 16 (L) 24 - 43 %    Monocytes 6 3 - 12 %    Eosinophils % 2 1 - 4 %    Basophils 1 0 - 2 %    Immature Granulocytes 1 (H) 0 %    Segs Absolute 10.50 (H) 1.50 - 8.10 k/uL    Absolute Lymph # 2.16 1.10 - 3.70 k/uL    Absolute Mono # 0.78 0.10 - 1.20 k/uL    Absolute Eos # 0.31 0.00 - 0.44 k/uL    Basophils Absolute 0.08 0.00 - 0.20 k/uL    Absolute Immature Granulocyte 0.08 0.00 - 0.30 k/uL    WBC Morphology NOT REPORTED     RBC Morphology ANISOCYTOSIS PRESENT     Platelet Estimate NOT REPORTED    Ferritin   Result Value Ref Range    Ferritin 107 13 - 150 ug/L         IMAGING DATA:    Ultrasound of kidney       Impression    1. Fatty liver.     2. Otherwise, unremarkable renal ultrasound.  No hydronephrosis.           Narrative    EXAMINATION:    CTA OF THE ABDOMEN AND PELVIS WITH CONTRAST 9/22/2020 12:42 am         TECHNIQUE:    CTA of the abdomen and pelvis was performed with the administration of    intravenous contrast. Multiplanar reformatted images are provided for review. MIP images are provided for review. Dose modulation, iterative    reconstruction, and/or weight based adjustment of the mA/kV was utilized to    reduce the radiation dose to as low as reasonably achievable.         COMPARISON:    None.         HISTORY:    ORDERING SYSTEM PROVIDED HISTORY: AMS, abd pain    TECHNOLOGIST PROVIDED HISTORY:    AMS, abd pain    Reason for Exam: patient is confused and altered, doesn't know where she's    at, but is pointing to her lower abdomen and saying it hurts    Acuity: Unknown    Type of Exam: Unknown         FINDINGS:    Lower Chest:  Visualized portion of the lower chest demonstrates no acute    abnormality.         Organs: The visualized portions of the liver, spleen, pancreas and adrenal    glands demonstrate no acute abnormality in the arterial enhancement phase. No biliary ductal dilatation.  Cholecystectomy.  The kidneys appear normal in    size and demonstrate symmetric enhancement.  No focal renal mass.  No    hydronephrosis. No perinephric stranding.         GI/Bowel: No bowel dilatation to suggest obstruction.  No evidence of acute    appendicitis.         Pelvis:  The urinary bladder appears unremarkable.  The pelvic organs    demonstrate no acute abnormality.         Peritoneum/Retroperitoneum: Normal enhancement and caliber of the aorta and    its branches including celiac axis, SMA, right and left renal arteries, PONCHO,    iliac vasculature.  No fluid collection or free air.  No gross    lymphadenopathy.         Bones/Soft Tissues: No acute findings.  Previous lumbar spine fusion.              Impression    No acute findings.               IMPRESSION:   Chronic anemia, hypoproliferative  History of GI bleed with peptic ulcer disease/pyloric ulcer, negative for H. pylori10/2019  iron deficiency  Borderline B12 stores  CKD  Morbid obesity  Multiple comorbidities      RECOMMENDATIONS:  Reviewed results of lab work-up and other relevant clinical data  Reviewed iron studies. Iron saturation has dropped further  Hemoglobin is now down to approximately 10  Patient is complaining being tired. She is intolerant to oral iron  Given B12 deficiency as well as iron deficiency I am concerned about malabsorption. Will order for 1 infusion of Feraheme  Patient also given prescription for B12 supplements. Continue to monitor CKD. If hemoglobin drops below 10 we will discuss RAJESH therapy with the patient  Lymphedema is stable. Patient encouraged to keep legs elevated whenever she can. Recommend continued surveillance for anemia and iron studies. We will see patient back in office in 4 months or sooner if clinically indicated. Thank you for asking us to see this patient. Regina Allison MD          This note is created with the assistance of a speech recognition program.  While intending to generate a document that actually reflects the content of the visit, the document can still have some errors including those of syntax and sound a like substitutions which may escape proof reading. It such instances, actual meaning can be extrapolated by contextual diversion.

## 2021-07-22 DIAGNOSIS — K90.9 IRON MALABSORPTION: Primary | ICD-10-CM

## 2021-07-22 RX ORDER — SODIUM CHLORIDE 9 MG/ML
INJECTION, SOLUTION INTRAVENOUS CONTINUOUS
Status: CANCELLED | OUTPATIENT
Start: 2021-07-22

## 2021-07-22 RX ORDER — EPINEPHRINE 1 MG/ML
0.3 INJECTION, SOLUTION, CONCENTRATE INTRAVENOUS PRN
Status: CANCELLED | OUTPATIENT
Start: 2021-07-22

## 2021-07-22 RX ORDER — DIPHENHYDRAMINE HYDROCHLORIDE 50 MG/ML
50 INJECTION INTRAMUSCULAR; INTRAVENOUS ONCE
Status: CANCELLED | OUTPATIENT
Start: 2021-07-22 | End: 2021-07-22

## 2021-07-22 RX ORDER — SODIUM CHLORIDE 0.9 % (FLUSH) 0.9 %
5-40 SYRINGE (ML) INJECTION PRN
Status: CANCELLED | OUTPATIENT
Start: 2021-07-22

## 2021-07-22 RX ORDER — SODIUM CHLORIDE 9 MG/ML
25 INJECTION, SOLUTION INTRAVENOUS PRN
Status: CANCELLED | OUTPATIENT
Start: 2021-07-22

## 2021-07-22 RX ORDER — HEPARIN SODIUM (PORCINE) LOCK FLUSH IV SOLN 100 UNIT/ML 100 UNIT/ML
500 SOLUTION INTRAVENOUS PRN
Status: CANCELLED | OUTPATIENT
Start: 2021-07-22

## 2021-07-22 RX ORDER — METHYLPREDNISOLONE SODIUM SUCCINATE 125 MG/2ML
125 INJECTION, POWDER, LYOPHILIZED, FOR SOLUTION INTRAMUSCULAR; INTRAVENOUS ONCE
Status: CANCELLED | OUTPATIENT
Start: 2021-07-22 | End: 2021-07-22

## 2021-07-26 ENCOUNTER — HOSPITAL ENCOUNTER (OUTPATIENT)
Dept: INFUSION THERAPY | Age: 66
Setting detail: INFUSION SERIES
Discharge: HOME | End: 2021-07-26
Payer: COMMERCIAL

## 2021-07-26 VITALS
OXYGEN SATURATION: 91 % | TEMPERATURE: 97.9 F | SYSTOLIC BLOOD PRESSURE: 175 MMHG | HEART RATE: 63 BPM | DIASTOLIC BLOOD PRESSURE: 53 MMHG | RESPIRATION RATE: 18 BRPM

## 2021-07-26 DIAGNOSIS — K90.9 IRON MALABSORPTION: Primary | ICD-10-CM

## 2021-07-26 DIAGNOSIS — E61.1 IRON DEFICIENCY: ICD-10-CM

## 2021-07-26 PROCEDURE — 2580000003 HC RX 258: Performed by: INTERNAL MEDICINE

## 2021-07-26 PROCEDURE — 6360000002 HC RX W HCPCS: Performed by: INTERNAL MEDICINE

## 2021-07-26 PROCEDURE — 96365 THER/PROPH/DIAG IV INF INIT: CPT

## 2021-07-26 RX ORDER — SODIUM CHLORIDE 9 MG/ML
INJECTION, SOLUTION INTRAVENOUS CONTINUOUS
Status: CANCELLED | OUTPATIENT
Start: 2021-08-02

## 2021-07-26 RX ORDER — SODIUM CHLORIDE 9 MG/ML
25 INJECTION, SOLUTION INTRAVENOUS PRN
Status: CANCELLED | OUTPATIENT
Start: 2021-08-02

## 2021-07-26 RX ORDER — DIPHENHYDRAMINE HYDROCHLORIDE 50 MG/ML
50 INJECTION INTRAMUSCULAR; INTRAVENOUS ONCE
Status: CANCELLED | OUTPATIENT
Start: 2021-08-02 | End: 2021-08-02

## 2021-07-26 RX ORDER — EPINEPHRINE 1 MG/ML
0.3 INJECTION, SOLUTION, CONCENTRATE INTRAVENOUS PRN
Status: CANCELLED | OUTPATIENT
Start: 2021-08-02

## 2021-07-26 RX ORDER — HEPARIN SODIUM (PORCINE) LOCK FLUSH IV SOLN 100 UNIT/ML 100 UNIT/ML
500 SOLUTION INTRAVENOUS PRN
Status: CANCELLED | OUTPATIENT
Start: 2021-08-02

## 2021-07-26 RX ORDER — METHYLPREDNISOLONE SODIUM SUCCINATE 125 MG/2ML
125 INJECTION, POWDER, LYOPHILIZED, FOR SOLUTION INTRAMUSCULAR; INTRAVENOUS ONCE
Status: CANCELLED | OUTPATIENT
Start: 2021-08-02 | End: 2021-08-02

## 2021-07-26 RX ORDER — SODIUM CHLORIDE 0.9 % (FLUSH) 0.9 %
5-40 SYRINGE (ML) INJECTION PRN
Status: CANCELLED | OUTPATIENT
Start: 2021-08-02

## 2021-07-26 RX ORDER — SODIUM CHLORIDE 9 MG/ML
INJECTION, SOLUTION INTRAVENOUS CONTINUOUS
Status: DISCONTINUED | OUTPATIENT
Start: 2021-07-26 | End: 2021-07-27 | Stop reason: HOSPADM

## 2021-07-26 RX ADMIN — SODIUM CHLORIDE: 9 INJECTION, SOLUTION INTRAVENOUS at 13:34

## 2021-07-26 RX ADMIN — FERUMOXYTOL 510 MG: 510 INJECTION INTRAVENOUS at 13:34

## 2021-07-26 NOTE — PROGRESS NOTES
Pt seen this date for feraheme infusion. VS obtained and IV started in L AC. Infusion began at 1334 and ended at 46. Pt tolerated infusion well and discharged home.

## 2021-08-02 ENCOUNTER — HOSPITAL ENCOUNTER (OUTPATIENT)
Dept: INFUSION THERAPY | Age: 66
Setting detail: INFUSION SERIES
Discharge: HOME | End: 2021-08-02
Payer: COMMERCIAL

## 2021-08-02 RX ORDER — SODIUM CHLORIDE 9 MG/ML
INJECTION, SOLUTION INTRAVENOUS CONTINUOUS
Status: CANCELLED | OUTPATIENT
Start: 2021-08-02

## 2021-08-02 RX ORDER — DIPHENHYDRAMINE HYDROCHLORIDE 50 MG/ML
50 INJECTION INTRAMUSCULAR; INTRAVENOUS ONCE
Status: CANCELLED | OUTPATIENT
Start: 2021-08-02 | End: 2021-08-02

## 2021-08-02 RX ORDER — SODIUM CHLORIDE 0.9 % (FLUSH) 0.9 %
5-40 SYRINGE (ML) INJECTION PRN
Status: CANCELLED | OUTPATIENT
Start: 2021-08-02

## 2021-08-02 RX ORDER — METHYLPREDNISOLONE SODIUM SUCCINATE 125 MG/2ML
125 INJECTION, POWDER, LYOPHILIZED, FOR SOLUTION INTRAMUSCULAR; INTRAVENOUS ONCE
Status: CANCELLED | OUTPATIENT
Start: 2021-08-02 | End: 2021-08-02

## 2021-08-02 RX ORDER — EPINEPHRINE 1 MG/ML
0.3 INJECTION, SOLUTION, CONCENTRATE INTRAVENOUS PRN
Status: CANCELLED | OUTPATIENT
Start: 2021-08-02

## 2021-08-02 RX ORDER — HEPARIN SODIUM (PORCINE) LOCK FLUSH IV SOLN 100 UNIT/ML 100 UNIT/ML
500 SOLUTION INTRAVENOUS PRN
Status: CANCELLED | OUTPATIENT
Start: 2021-08-02

## 2021-08-02 RX ORDER — SODIUM CHLORIDE 9 MG/ML
25 INJECTION, SOLUTION INTRAVENOUS PRN
Status: CANCELLED | OUTPATIENT
Start: 2021-08-02

## 2021-08-02 NOTE — PROGRESS NOTES
Pt arrived for [facility] infusion. Therapy plan doesn't have second dose of iron ordered, but has second dose of flush and maintenance fluids. Message sent to MD to clarify. Only one dose of iron needed per Dr Justin Rogers. Pt discharged home without any interventions via wheelchair with .

## 2021-09-09 ENCOUNTER — APPOINTMENT (OUTPATIENT)
Dept: CT IMAGING | Age: 66
End: 2021-09-09
Payer: COMMERCIAL

## 2021-09-09 ENCOUNTER — APPOINTMENT (OUTPATIENT)
Dept: GENERAL RADIOLOGY | Age: 66
End: 2021-09-09
Payer: COMMERCIAL

## 2021-09-09 ENCOUNTER — HOSPITAL ENCOUNTER (EMERGENCY)
Age: 66
Discharge: HOME | End: 2021-09-10
Attending: EMERGENCY MEDICINE
Payer: COMMERCIAL
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
## 2021-09-09 DIAGNOSIS — T40.601A OPIATE OVERDOSE, ACCIDENTAL OR UNINTENTIONAL, INITIAL ENCOUNTER (HCC): ICD-10-CM

## 2021-09-09 LAB
-: ABNORMAL
ALLEN TEST: ABNORMAL
AMORPHOUS: ABNORMAL
ANION GAP SERPL CALCULATED.3IONS-SCNC: 9 MMOL/L (ref 9–17)
BACTERIA: ABNORMAL
BILIRUBIN URINE: NEGATIVE
BLOOD BANK SPECIMEN: ABNORMAL
BUN BLDV-MCNC: 18 MG/DL (ref 8–23)
CARBOXYHEMOGLOBIN: ABNORMAL %
CASTS UA: ABNORMAL /LPF
CHLORIDE BLD-SCNC: 93 MMOL/L (ref 98–107)
CO2: 32 MMOL/L (ref 20–31)
COLOR: YELLOW
COMMENT UA: ABNORMAL
CREAT SERPL-MCNC: 1.26 MG/DL (ref 0.5–0.9)
CRYSTALS, UA: ABNORMAL /HPF
EPITHELIAL CELLS UA: ABNORMAL /HPF
ETHANOL PERCENT: <0.01 %
ETHANOL: <10 MG/DL
FIO2: ABNORMAL
GFR AFRICAN AMERICAN: 52 ML/MIN
GFR NON-AFRICAN AMERICAN: 43 ML/MIN
GFR NON-AFRICAN AMERICAN: >60 ML/MIN
GFR SERPL CREATININE-BSD FRML MDRD: >60 ML/MIN
GFR SERPL CREATININE-BSD FRML MDRD: ABNORMAL ML/MIN/{1.73_M2}
GFR SERPL CREATININE-BSD FRML MDRD: ABNORMAL ML/MIN/{1.73_M2}
GFR SERPL CREATININE-BSD FRML MDRD: NORMAL ML/MIN/{1.73_M2}
GLUCOSE BLD-MCNC: 236 MG/DL (ref 70–99)
GLUCOSE URINE: ABNORMAL
HCG QUALITATIVE: NEGATIVE
HCO3 VENOUS: ABNORMAL MMOL/L (ref 24–30)
HCT VFR BLD CALC: 31.7 % (ref 36–46)
HEMOGLOBIN: 10.2 G/DL (ref 12–16)
INR BLD: 1.2
KETONES, URINE: NEGATIVE
LEUKOCYTE ESTERASE, URINE: NEGATIVE
MCH RBC QN AUTO: 29.3 PG (ref 26–34)
MCHC RBC AUTO-ENTMCNC: 32 G/DL (ref 31–37)
MCV RBC AUTO: 91.6 FL (ref 80–100)
METHEMOGLOBIN: ABNORMAL %
MODE: ABNORMAL
MUCUS: ABNORMAL
NEGATIVE BASE EXCESS, VEN: ABNORMAL MMOL/L (ref 0–2)
NITRITE, URINE: NEGATIVE
NOTIFICATION TIME: ABNORMAL
NOTIFICATION: ABNORMAL
NRBC AUTOMATED: ABNORMAL PER 100 WBC
O2 DEVICE/FLOW/%: ABNORMAL
O2 SAT, VEN: ABNORMAL %
OTHER OBSERVATIONS UA: ABNORMAL
OXYHEMOGLOBIN: ABNORMAL % (ref 95–98)
PARTIAL THROMBOPLASTIN TIME: 34.6 SEC (ref 24–36)
PATIENT TEMP: ABNORMAL
PCO2, VEN, TEMP ADJ: ABNORMAL MMHG (ref 39–55)
PCO2, VEN: ABNORMAL (ref 39–55)
PDW BLD-RTO: 17 % (ref 11.5–14.9)
PEEP/CPAP: ABNORMAL
PH UA: 7 (ref 5–8)
PH VENOUS: ABNORMAL (ref 7.32–7.42)
PH, VEN, TEMP ADJ: ABNORMAL (ref 7.32–7.42)
PLATELET # BLD: 410 K/UL (ref 150–450)
PMV BLD AUTO: 8.3 FL (ref 6–12)
PO2, VEN, TEMP ADJ: ABNORMAL MMHG (ref 30–50)
PO2, VEN: ABNORMAL (ref 30–50)
POC CREATININE: 0.8 MG/DL (ref 0.51–1.19)
POSITIVE BASE EXCESS, VEN: ABNORMAL MMOL/L (ref 0–2)
POTASSIUM SERPL-SCNC: 4.1 MMOL/L (ref 3.7–5.3)
PROTEIN UA: NEGATIVE
PROTHROMBIN TIME: 14.8 SEC (ref 11.8–14.6)
PSV: ABNORMAL
PT. POSITION: ABNORMAL
RBC # BLD: 3.46 M/UL (ref 4–5.2)
RBC UA: ABNORMAL /HPF
RENAL EPITHELIAL, UA: ABNORMAL /HPF
RESPIRATORY RATE: ABNORMAL
SAMPLE SITE: ABNORMAL
SET RATE: ABNORMAL
SODIUM BLD-SCNC: 134 MMOL/L (ref 135–144)
SPECIFIC GRAVITY UA: 1.01 (ref 1–1.03)
TEXT FOR RESPIRATORY: ABNORMAL
TOTAL CK: 403 U/L (ref 26–192)
TOTAL HB: ABNORMAL G/DL (ref 12–16)
TOTAL RATE: ABNORMAL
TRICHOMONAS: ABNORMAL
TURBIDITY: CLEAR
URINE HGB: ABNORMAL
UROBILINOGEN, URINE: NORMAL
VT: ABNORMAL
WBC # BLD: 17.8 K/UL (ref 3.5–11)
WBC UA: ABNORMAL /HPF
YEAST: ABNORMAL

## 2021-09-09 PROCEDURE — 84520 ASSAY OF UREA NITROGEN: CPT

## 2021-09-09 PROCEDURE — 81001 URINALYSIS AUTO W/SCOPE: CPT

## 2021-09-09 PROCEDURE — 2580000003 HC RX 258: Performed by: EMERGENCY MEDICINE

## 2021-09-09 PROCEDURE — 82800 BLOOD PH: CPT

## 2021-09-09 PROCEDURE — 70450 CT HEAD/BRAIN W/O DYE: CPT

## 2021-09-09 PROCEDURE — 82805 BLOOD GASES W/O2 SATURATION: CPT

## 2021-09-09 PROCEDURE — 80051 ELECTROLYTE PANEL: CPT

## 2021-09-09 PROCEDURE — 72131 CT LUMBAR SPINE W/O DYE: CPT

## 2021-09-09 PROCEDURE — 99285 EMERGENCY DEPT VISIT HI MDM: CPT

## 2021-09-09 PROCEDURE — 85027 COMPLETE CBC AUTOMATED: CPT

## 2021-09-09 PROCEDURE — 82550 ASSAY OF CK (CPK): CPT

## 2021-09-09 PROCEDURE — 71045 X-RAY EXAM CHEST 1 VIEW: CPT

## 2021-09-09 PROCEDURE — G0480 DRUG TEST DEF 1-7 CLASSES: HCPCS

## 2021-09-09 PROCEDURE — 74177 CT ABD & PELVIS W/CONTRAST: CPT

## 2021-09-09 PROCEDURE — 36415 COLL VENOUS BLD VENIPUNCTURE: CPT

## 2021-09-09 PROCEDURE — 82947 ASSAY GLUCOSE BLOOD QUANT: CPT

## 2021-09-09 PROCEDURE — 72125 CT NECK SPINE W/O DYE: CPT

## 2021-09-09 PROCEDURE — 93005 ELECTROCARDIOGRAM TRACING: CPT | Performed by: EMERGENCY MEDICINE

## 2021-09-09 PROCEDURE — 82565 ASSAY OF CREATININE: CPT

## 2021-09-09 PROCEDURE — 6360000004 HC RX CONTRAST MEDICATION: Performed by: EMERGENCY MEDICINE

## 2021-09-09 PROCEDURE — 85610 PROTHROMBIN TIME: CPT

## 2021-09-09 PROCEDURE — 85730 THROMBOPLASTIN TIME PARTIAL: CPT

## 2021-09-09 PROCEDURE — 84703 CHORIONIC GONADOTROPIN ASSAY: CPT

## 2021-09-09 RX ORDER — 0.9 % SODIUM CHLORIDE 0.9 %
80 INTRAVENOUS SOLUTION INTRAVENOUS ONCE
Status: COMPLETED | OUTPATIENT
Start: 2021-09-09 | End: 2021-09-09

## 2021-09-09 RX ORDER — SODIUM CHLORIDE 0.9 % (FLUSH) 0.9 %
10 SYRINGE (ML) INJECTION PRN
Status: DISCONTINUED | OUTPATIENT
Start: 2021-09-09 | End: 2021-09-10 | Stop reason: HOSPADM

## 2021-09-09 RX ADMIN — SODIUM CHLORIDE, PRESERVATIVE FREE 10 ML: 5 INJECTION INTRAVENOUS at 21:58

## 2021-09-09 RX ADMIN — SODIUM CHLORIDE 80 ML: 9 INJECTION, SOLUTION INTRAVENOUS at 21:58

## 2021-09-09 RX ADMIN — IOPAMIDOL 100 ML: 755 INJECTION, SOLUTION INTRAVENOUS at 21:58

## 2021-09-09 ASSESSMENT — ENCOUNTER SYMPTOMS
SORE THROAT: 0
VOMITING: 0
DIARRHEA: 0
EYE PAIN: 0
SHORTNESS OF BREATH: 0
BACK PAIN: 1
NAUSEA: 0
COUGH: 0
ABDOMINAL PAIN: 0

## 2021-09-09 ASSESSMENT — PAIN DESCRIPTION - PAIN TYPE: TYPE: ACUTE PAIN

## 2021-09-09 ASSESSMENT — PAIN DESCRIPTION - LOCATION: LOCATION: BACK

## 2021-09-09 ASSESSMENT — PAIN SCALES - GENERAL: PAINLEVEL_OUTOF10: 8

## 2021-09-09 NOTE — ED NOTES
Bed: 09  Expected date:   Expected time:   Means of arrival:   Comments:  [facility], [facility]  09/09/21 [id number]

## 2021-09-10 ENCOUNTER — HOSPITAL ENCOUNTER (INPATIENT)
Age: 66
LOS: 4 days | Discharge: HOME | DRG: 074 | End: 2021-09-14
Attending: EMERGENCY MEDICINE | Admitting: FAMILY MEDICINE
Payer: COMMERCIAL

## 2021-09-10 VITALS
OXYGEN SATURATION: 99 % | DIASTOLIC BLOOD PRESSURE: 71 MMHG | TEMPERATURE: 97.5 F | RESPIRATION RATE: 18 BRPM | WEIGHT: 292 LBS | BODY MASS INDEX: 57.03 KG/M2 | HEART RATE: 76 BPM | SYSTOLIC BLOOD PRESSURE: 164 MMHG

## 2021-09-10 DIAGNOSIS — R29.6 MULTIPLE FALLS: Primary | ICD-10-CM

## 2021-09-10 PROBLEM — R53.1 GENERAL WEAKNESS: Status: ACTIVE | Noted: 2021-09-10

## 2021-09-10 LAB
-: NORMAL
ABSOLUTE EOS #: 0.3 K/UL (ref 0–0.4)
ABSOLUTE IMMATURE GRANULOCYTE: ABNORMAL K/UL (ref 0–0.3)
ABSOLUTE LYMPH #: 1.3 K/UL (ref 1–4.8)
ABSOLUTE MONO #: 0.5 K/UL (ref 0.1–1.3)
ALLEN TEST: ABNORMAL
ANION GAP SERPL CALCULATED.3IONS-SCNC: 9 MMOL/L (ref 9–17)
BASOPHILS # BLD: 0 % (ref 0–2)
BASOPHILS ABSOLUTE: 0.1 K/UL (ref 0–0.2)
BUN BLDV-MCNC: 13 MG/DL (ref 8–23)
BUN/CREAT BLD: ABNORMAL (ref 9–20)
CALCIUM SERPL-MCNC: 9.2 MG/DL (ref 8.6–10.4)
CARBOXYHEMOGLOBIN: 2.9 % (ref 0–5)
CHLORIDE BLD-SCNC: 98 MMOL/L (ref 98–107)
CO2: 33 MMOL/L (ref 20–31)
CREAT SERPL-MCNC: 1.13 MG/DL (ref 0.5–0.9)
DIFFERENTIAL TYPE: ABNORMAL
EOSINOPHILS RELATIVE PERCENT: 2 % (ref 0–4)
FIO2: ABNORMAL
GFR AFRICAN AMERICAN: 59 ML/MIN
GFR NON-AFRICAN AMERICAN: 48 ML/MIN
GFR SERPL CREATININE-BSD FRML MDRD: ABNORMAL ML/MIN/{1.73_M2}
GFR SERPL CREATININE-BSD FRML MDRD: ABNORMAL ML/MIN/{1.73_M2}
GLUCOSE BLD-MCNC: 150 MG/DL (ref 70–99)
HCO3 VENOUS: 34.8 MMOL/L (ref 24–30)
HCT VFR BLD CALC: 30.7 % (ref 36–46)
HEMOGLOBIN: 9.8 G/DL (ref 12–16)
IMMATURE GRANULOCYTES: ABNORMAL %
LYMPHOCYTES # BLD: 10 % (ref 24–44)
MCH RBC QN AUTO: 29.1 PG (ref 26–34)
MCHC RBC AUTO-ENTMCNC: 31.8 G/DL (ref 31–37)
MCV RBC AUTO: 91.5 FL (ref 80–100)
METHEMOGLOBIN: 0 % (ref 0–1.9)
MODE: ABNORMAL
MONOCYTES # BLD: 4 % (ref 1–7)
NEGATIVE BASE EXCESS, VEN: ABNORMAL MMOL/L (ref 0–2)
NOTIFICATION TIME: ABNORMAL
NOTIFICATION: ABNORMAL
NRBC AUTOMATED: ABNORMAL PER 100 WBC
O2 DEVICE/FLOW/%: ABNORMAL
O2 SAT, VEN: 70.3 % (ref 60–85)
OXYHEMOGLOBIN: ABNORMAL % (ref 95–98)
PATIENT TEMP: 37
PCO2, VEN, TEMP ADJ: ABNORMAL MMHG (ref 39–55)
PCO2, VEN: 69.9 (ref 39–55)
PDW BLD-RTO: 16.9 % (ref 11.5–14.9)
PEEP/CPAP: ABNORMAL
PH VENOUS: 7.3 (ref 7.32–7.42)
PH, VEN, TEMP ADJ: ABNORMAL (ref 7.32–7.42)
PLATELET # BLD: 429 K/UL (ref 150–450)
PLATELET ESTIMATE: ABNORMAL
PMV BLD AUTO: 7.5 FL (ref 6–12)
PO2, VEN, TEMP ADJ: ABNORMAL MMHG (ref 30–50)
PO2, VEN: 44 (ref 30–50)
POSITIVE BASE EXCESS, VEN: 8.5 MMOL/L (ref 0–2)
POTASSIUM SERPL-SCNC: 4.3 MMOL/L (ref 3.7–5.3)
PSV: ABNORMAL
PT. POSITION: ABNORMAL
RBC # BLD: 3.36 M/UL (ref 4–5.2)
RBC # BLD: ABNORMAL 10*6/UL
REASON FOR REJECTION: NORMAL
RESPIRATORY RATE: ABNORMAL
SAMPLE SITE: ABNORMAL
SEG NEUTROPHILS: 84 % (ref 36–66)
SEGMENTED NEUTROPHILS ABSOLUTE COUNT: 10.7 K/UL (ref 1.3–9.1)
SET RATE: ABNORMAL
SODIUM BLD-SCNC: 140 MMOL/L (ref 135–144)
TEXT FOR RESPIRATORY: ABNORMAL
TOTAL CK: 258 U/L (ref 26–192)
TOTAL HB: ABNORMAL G/DL (ref 12–16)
TOTAL RATE: ABNORMAL
VT: ABNORMAL
WBC # BLD: 12.8 K/UL (ref 3.5–11)
WBC # BLD: ABNORMAL 10*3/UL
ZZ NTE CLEAN UP: ORDERED TEST: NORMAL
ZZ NTE WITH NAME CLEAN UP: SPECIMEN SOURCE: NORMAL

## 2021-09-10 PROCEDURE — 6370000000 HC RX 637 (ALT 250 FOR IP): Performed by: FAMILY MEDICINE

## 2021-09-10 PROCEDURE — G0378 HOSPITAL OBSERVATION PER HR: HCPCS

## 2021-09-10 PROCEDURE — 83036 HEMOGLOBIN GLYCOSYLATED A1C: CPT

## 2021-09-10 PROCEDURE — 2700000000 HC OXYGEN THERAPY PER DAY

## 2021-09-10 PROCEDURE — 80048 BASIC METABOLIC PNL TOTAL CA: CPT

## 2021-09-10 PROCEDURE — 36415 COLL VENOUS BLD VENIPUNCTURE: CPT

## 2021-09-10 PROCEDURE — 6360000002 HC RX W HCPCS: Performed by: FAMILY MEDICINE

## 2021-09-10 PROCEDURE — 1200000000 HC SEMI PRIVATE

## 2021-09-10 PROCEDURE — 82550 ASSAY OF CK (CPK): CPT

## 2021-09-10 PROCEDURE — 85025 COMPLETE CBC W/AUTO DIFF WBC: CPT

## 2021-09-10 PROCEDURE — 94640 AIRWAY INHALATION TREATMENT: CPT

## 2021-09-10 PROCEDURE — 94761 N-INVAS EAR/PLS OXIMETRY MLT: CPT

## 2021-09-10 PROCEDURE — 99284 EMERGENCY DEPT VISIT MOD MDM: CPT

## 2021-09-10 RX ORDER — SODIUM CHLORIDE 0.9 % (FLUSH) 0.9 %
5-40 SYRINGE (ML) INJECTION EVERY 12 HOURS SCHEDULED
Status: DISCONTINUED | OUTPATIENT
Start: 2021-09-10 | End: 2021-09-14 | Stop reason: HOSPADM

## 2021-09-10 RX ORDER — CARVEDILOL 12.5 MG/1
12.5 TABLET ORAL 2 TIMES DAILY
Status: DISCONTINUED | OUTPATIENT
Start: 2021-09-10 | End: 2021-09-14 | Stop reason: HOSPADM

## 2021-09-10 RX ORDER — CLOTRIMAZOLE 1 %
CREAM (GRAM) TOPICAL 2 TIMES DAILY
Status: DISCONTINUED | OUTPATIENT
Start: 2021-09-10 | End: 2021-09-14 | Stop reason: HOSPADM

## 2021-09-10 RX ORDER — FAMOTIDINE 20 MG/1
20 TABLET, FILM COATED ORAL 2 TIMES DAILY
Status: DISCONTINUED | OUTPATIENT
Start: 2021-09-10 | End: 2021-09-14 | Stop reason: HOSPADM

## 2021-09-10 RX ORDER — LANOLIN ALCOHOL/MO/W.PET/CERES
1000 CREAM (GRAM) TOPICAL DAILY
Status: DISCONTINUED | OUTPATIENT
Start: 2021-09-11 | End: 2021-09-14 | Stop reason: HOSPADM

## 2021-09-10 RX ORDER — CETIRIZINE HYDROCHLORIDE 10 MG/1
5 TABLET ORAL DAILY
Status: DISCONTINUED | OUTPATIENT
Start: 2021-09-11 | End: 2021-09-14 | Stop reason: HOSPADM

## 2021-09-10 RX ORDER — M-VIT,TX,IRON,MINS/CALC/FOLIC 27MG-0.4MG
1 TABLET ORAL DAILY
Status: DISCONTINUED | OUTPATIENT
Start: 2021-09-11 | End: 2021-09-14 | Stop reason: HOSPADM

## 2021-09-10 RX ORDER — NICOTINE POLACRILEX 4 MG
15 LOZENGE BUCCAL PRN
Status: DISCONTINUED | OUTPATIENT
Start: 2021-09-10 | End: 2021-09-14 | Stop reason: HOSPADM

## 2021-09-10 RX ORDER — DEXTROSE MONOHYDRATE 50 MG/ML
100 INJECTION, SOLUTION INTRAVENOUS PRN
Status: DISCONTINUED | OUTPATIENT
Start: 2021-09-10 | End: 2021-09-14 | Stop reason: HOSPADM

## 2021-09-10 RX ORDER — AMLODIPINE BESYLATE 5 MG/1
10 TABLET ORAL DAILY
Status: DISCONTINUED | OUTPATIENT
Start: 2021-09-11 | End: 2021-09-14 | Stop reason: HOSPADM

## 2021-09-10 RX ORDER — ATORVASTATIN CALCIUM 40 MG/1
40 TABLET, FILM COATED ORAL NIGHTLY
Status: DISCONTINUED | OUTPATIENT
Start: 2021-09-10 | End: 2021-09-14 | Stop reason: HOSPADM

## 2021-09-10 RX ORDER — ACETAMINOPHEN 325 MG/1
650 TABLET ORAL EVERY 6 HOURS PRN
Status: DISCONTINUED | OUTPATIENT
Start: 2021-09-10 | End: 2021-09-14 | Stop reason: HOSPADM

## 2021-09-10 RX ORDER — PREGABALIN 150 MG/1
150 CAPSULE ORAL 2 TIMES DAILY
Status: DISCONTINUED | OUTPATIENT
Start: 2021-09-10 | End: 2021-09-14 | Stop reason: HOSPADM

## 2021-09-10 RX ORDER — ACETAMINOPHEN 650 MG/1
650 SUPPOSITORY RECTAL EVERY 6 HOURS PRN
Status: DISCONTINUED | OUTPATIENT
Start: 2021-09-10 | End: 2021-09-14 | Stop reason: HOSPADM

## 2021-09-10 RX ORDER — ALBUTEROL SULFATE 2.5 MG/3ML
2.5 SOLUTION RESPIRATORY (INHALATION) 2 TIMES DAILY
Status: DISCONTINUED | OUTPATIENT
Start: 2021-09-10 | End: 2021-09-14 | Stop reason: HOSPADM

## 2021-09-10 RX ORDER — ONDANSETRON 4 MG/1
4 TABLET, ORALLY DISINTEGRATING ORAL EVERY 8 HOURS PRN
Status: DISCONTINUED | OUTPATIENT
Start: 2021-09-10 | End: 2021-09-14 | Stop reason: HOSPADM

## 2021-09-10 RX ORDER — PSEUDOEPHEDRINE HCL 120 MG/1
120 TABLET, FILM COATED, EXTENDED RELEASE ORAL DAILY
Status: DISCONTINUED | OUTPATIENT
Start: 2021-09-11 | End: 2021-09-14 | Stop reason: HOSPADM

## 2021-09-10 RX ORDER — GLIPIZIDE 10 MG/1
10 TABLET ORAL
Status: DISCONTINUED | OUTPATIENT
Start: 2021-09-11 | End: 2021-09-14 | Stop reason: HOSPADM

## 2021-09-10 RX ORDER — FUROSEMIDE 20 MG/1
20 TABLET ORAL DAILY
Status: DISCONTINUED | OUTPATIENT
Start: 2021-09-11 | End: 2021-09-14 | Stop reason: HOSPADM

## 2021-09-10 RX ORDER — POLYETHYLENE GLYCOL 3350 17 G/17G
17 POWDER, FOR SOLUTION ORAL DAILY PRN
Status: DISCONTINUED | OUTPATIENT
Start: 2021-09-10 | End: 2021-09-14 | Stop reason: HOSPADM

## 2021-09-10 RX ORDER — SODIUM CHLORIDE 9 MG/ML
25 INJECTION, SOLUTION INTRAVENOUS PRN
Status: DISCONTINUED | OUTPATIENT
Start: 2021-09-10 | End: 2021-09-14 | Stop reason: HOSPADM

## 2021-09-10 RX ORDER — ONDANSETRON 4 MG/1
4 TABLET, FILM COATED ORAL EVERY 8 HOURS PRN
Status: DISCONTINUED | OUTPATIENT
Start: 2021-09-10 | End: 2021-09-14 | Stop reason: HOSPADM

## 2021-09-10 RX ORDER — CLONAZEPAM 1 MG/1
0.5 TABLET ORAL 2 TIMES DAILY PRN
Status: DISCONTINUED | OUTPATIENT
Start: 2021-09-10 | End: 2021-09-14 | Stop reason: HOSPADM

## 2021-09-10 RX ORDER — OXYCODONE HYDROCHLORIDE AND ACETAMINOPHEN 5; 325 MG/1; MG/1
1 TABLET ORAL EVERY 6 HOURS PRN
Status: DISCONTINUED | OUTPATIENT
Start: 2021-09-10 | End: 2021-09-14 | Stop reason: HOSPADM

## 2021-09-10 RX ORDER — UBIDECARENONE 100 MG
200 CAPSULE ORAL NIGHTLY
Status: DISCONTINUED | OUTPATIENT
Start: 2021-09-10 | End: 2021-09-14 | Stop reason: HOSPADM

## 2021-09-10 RX ORDER — SODIUM CHLORIDE 0.9 % (FLUSH) 0.9 %
5-40 SYRINGE (ML) INJECTION PRN
Status: DISCONTINUED | OUTPATIENT
Start: 2021-09-10 | End: 2021-09-14 | Stop reason: HOSPADM

## 2021-09-10 RX ORDER — ONDANSETRON 2 MG/ML
4 INJECTION INTRAMUSCULAR; INTRAVENOUS EVERY 6 HOURS PRN
Status: DISCONTINUED | OUTPATIENT
Start: 2021-09-10 | End: 2021-09-14 | Stop reason: HOSPADM

## 2021-09-10 RX ORDER — DEXTROSE MONOHYDRATE 25 G/50ML
12.5 INJECTION, SOLUTION INTRAVENOUS PRN
Status: DISCONTINUED | OUTPATIENT
Start: 2021-09-10 | End: 2021-09-14 | Stop reason: HOSPADM

## 2021-09-10 RX ORDER — VENLAFAXINE HYDROCHLORIDE 37.5 MG/1
37.5 CAPSULE, EXTENDED RELEASE ORAL
Status: DISCONTINUED | OUTPATIENT
Start: 2021-09-11 | End: 2021-09-14 | Stop reason: HOSPADM

## 2021-09-10 RX ORDER — OXYCODONE HYDROCHLORIDE 30 MG/1
30 TABLET ORAL EVERY 8 HOURS PRN
COMMUNITY
End: 2021-10-06 | Stop reason: SDUPTHER

## 2021-09-10 RX ORDER — ASPIRIN 325 MG
1 TABLET ORAL NIGHTLY
Status: DISCONTINUED | OUTPATIENT
Start: 2021-09-10 | End: 2021-09-10 | Stop reason: CLARIF

## 2021-09-10 RX ORDER — ASPIRIN 81 MG/1
81 TABLET ORAL
Status: DISCONTINUED | OUTPATIENT
Start: 2021-09-11 | End: 2021-09-14 | Stop reason: HOSPADM

## 2021-09-10 RX ORDER — ALOGLIPTIN 25 MG/1
25 TABLET, FILM COATED ORAL DAILY
Status: DISCONTINUED | OUTPATIENT
Start: 2021-09-11 | End: 2021-09-14 | Stop reason: HOSPADM

## 2021-09-10 RX ADMIN — ATORVASTATIN CALCIUM 40 MG: 40 TABLET, FILM COATED ORAL at 22:40

## 2021-09-10 RX ADMIN — Medication 200 MG: at 22:41

## 2021-09-10 RX ADMIN — CALCIUM CARBONATE 600 MG (1,500 MG)-VITAMIN D3 400 UNIT TABLET 1 TABLET: at 22:40

## 2021-09-10 RX ADMIN — ALBUTEROL SULFATE 2.5 MG: 2.5 SOLUTION RESPIRATORY (INHALATION) at 21:30

## 2021-09-10 RX ADMIN — CARVEDILOL 12.5 MG: 12.5 TABLET, FILM COATED ORAL at 22:41

## 2021-09-10 ASSESSMENT — ENCOUNTER SYMPTOMS
VOICE CHANGE: 0
NAUSEA: 0
ABDOMINAL PAIN: 0
BACK PAIN: 1
CHEST TIGHTNESS: 0
VOMITING: 0
DIARRHEA: 0
SORE THROAT: 0
SHORTNESS OF BREATH: 1
ABDOMINAL DISTENTION: 1
APNEA: 0
TROUBLE SWALLOWING: 0

## 2021-09-10 NOTE — ED NOTES
Discharge papers reviewed with pt. Pt instructed to follow-up with PCP/specialist and to return to ED if symptoms worsen or have any concerns. Pt verbalizes understanding. Pt wheeled out of ED with all belongings.        Valentino Berkshire, RN  09/10/21 [id number]

## 2021-09-10 NOTE — ED PROVIDER NOTES
[address]  EMERGENCY DEPARTMENT ENCOUNTER      Pt Name: Mary Funes  MRN: [id number]  [place] 1955  Date of evaluation: 9/9/21      CHIEF COMPLAINT:  Chief Complaint   Patient presents with   [address] is a 61 y.o. female who presents with history of fall that occurred this morning, patient states that she has been feeling very sleepy lately, and states that she fell asleep standing up today in her kitchen and then woke up on the floor, was not able to get up, she was there for several hours because she states that she fell asleep around 12:30 in the evening her  called EMS. Patient is complaining of back pain, but is sharp in nature, although not midline more off to the right side, concentration, worse with movement better with rest, moderate severity. Patient is poor historian she cannot recall the exact circumstances although she denies striking her head she is not quite clear. REVIEW OF SYSTEMS       Review of Systems   Constitutional: Negative for chills and fever. HENT: Negative for ear pain and sore throat. Eyes: Negative for pain and visual disturbance. Respiratory: Negative for cough and shortness of breath. Cardiovascular: Negative for chest pain. Gastrointestinal: Negative for abdominal pain, diarrhea, nausea and vomiting. Endocrine: Negative for polydipsia and polyuria. Genitourinary: Negative for dysuria, hematuria and vaginal discharge. Musculoskeletal: Positive for back pain. Negative for arthralgias. Skin: Negative for rash. Allergic/Immunologic: Negative for food allergies. Neurological: Negative for weakness, numbness and headaches. Hematological: Does not bruise/bleed easily. Psychiatric/Behavioral: Negative for self-injury and suicidal ideas. The patient is not nervous/anxious.         PAST MEDICAL HISTORY   :  has a past medical history of Acute hypoxemic respiratory failure ([facility] [address] Arthritis, CHF (congestive heart failure) ([facility] [address] Chronic back pain, Diabetes mellitus type II, controlled ([facility] [address] Fibromyalgia, Hyperlipidemia LDL goal < 70, Hypertension, benign, Morbid obesity with BMI of 60.0-69.9, adult ([facility] [address] Pain of toe of right foot, Right wrist pain, Skin cancer, Sleep apnea, and Wears glasses. Surgical History:  has a past surgical history that includes Tubal ligation (1981); Hysterectomy (1998); lumbar fusion (10/2010; 03/2011); Carpal tunnel release (Right, 09/05/2014); cyst removal (1985); Cholecystectomy, laparoscopic (1998); lumbar fusion (2009); Rotator cuff repair (Left, 2012); Cataract removal with implant (Bilateral, 2013); Skin cancer excision (2005); Inguinal hernia repair (Bilateral, 01/20/2017); other surgical history (05/02/2018); Upper gastrointestinal endoscopy (N/A, 10/16/2019); and Colonoscopy (N/A, 10/17/2019). Social History:  reports that she has never smoked. She has never used smokeless tobacco. She reports previous alcohol use. She reports that she does not use drugs. Family History: Noncontributory at this time  Psychiatric History: Noncontributoryat this time    Allergies:is allergic to adhesive tape, morphine, iron, lisinopril, metformin and related, molds & smuts, and pcn [penicillins]. PHYSICAL EXAM     INITIAL VITALS: BP (!) 166/73   Pulse 69   Temp 97.5 °F (36.4 °C) (Oral)   Resp 10   Wt 292 lb (132.5 kg)   LMP  (LMP Unknown)   SpO2 99%   BMI 57.03 kg/m²     Physical Exam  Constitutional:       Appearance: She is well-developed. She is not diaphoretic. HENT:      Head: Normocephalic and atraumatic. Right Ear: External ear normal.      Left Ear: External ear normal.      Nose: Nose normal.   Eyes:      General: No scleral icterus. Right eye: No discharge. Left eye: No discharge. Conjunctiva/sclera: Conjunctivae normal.      Pupils: Pupils are equal, round, and reactive to light. Neck:      Trachea: No tracheal deviation. Cardiovascular:      Rate and Rhythm: Normal rate and regular rhythm. Heart sounds: Normal heart sounds. No murmur heard. No friction rub. No gallop. Pulmonary:      Effort: Pulmonary effort is normal. No respiratory distress. Breath sounds: Normal breath sounds. No wheezing or rales. Chest:      Chest wall: No tenderness. Abdominal:      General: Bowel sounds are normal. There is distension. Palpations: Abdomen is soft. There is no mass. Tenderness: There is no abdominal tenderness. There is no guarding or rebound. Comments: Morbidly obese   Musculoskeletal:         General: No tenderness. Normal range of motion. Cervical back: Normal range of motion and neck supple. No tenderness. Skin:     Findings: No rash. Neurological:      Mental Status: She is alert and oriented to person, place, and time. Cranial Nerves: No cranial nerve deficit. Motor: No abnormal muscle tone. Deep Tendon Reflexes: Reflexes are normal and symmetric. Psychiatric:         Behavior: Behavior normal.         Thought Content: Thought content normal.       No midline CT LS tenderness, there is right parathoracic and paralumbar tenderness. DIAGNOSTIC RESULTS     EKG: All EKG's areinterpreted by the Emergency Department Physician who either signs or Co-signs this chart in the absence of a cardiologist.    EKG Interpretation    Interpreted by emergency department physician    Rhythm: normal sinus   Rate: normal  Axis: normal  Ectopy: none  Conduction: normal  ST Segments: normal  T Waves: no acute change  Q Waves: none    EKG  Impression: non-specific EKG      RADIOLOGY:   All plain film, CT,MRI, and formal ultrasound images (except ED bedside ultrasound) are read by the radiologist, see reports below, unless otherwise noted in the medical decision-making or here.     XR CHEST PORTABLE   Final Result   Interstitial prominence and bilateral pulmonary opacities, which could be   seen with edema. No large pleural effusion or pneumothorax. CT ABDOMEN PELVIS W IV CONTRAST   Final Result   No acute intraabdominal or intrapelvic abnormality. CT LUMBAR SPINE WO CONTRAST   Final Result   1 postsurgical changes and multilevel degenerative changes. No acute osseous   abnormality on limited evaluation due to technical factors and positioning. CT CERVICAL SPINE WO CONTRAST   Final Result   Multilevel degenerative changes. No acute osseous abnormality on slightly   limited evaluation due to rotation/positioning. Minimal patchy airspace disease in the left lung apex. Findings may be   infectious. Some of the areas are somewhat nodular in appearance and   short-term follow-up to resolution recommended. CT HEAD WO CONTRAST   Final Result   No acute intracranial abnormality. LABS:  Labs Reviewed   TRAUMA PANEL - Abnormal; Notable for the following components:       Result Value    WBC 17.8 (*)     RBC 3.46 (*)     Hemoglobin 10.2 (*)     Hematocrit 31.7 (*)     RDW 17.0 (*)     CREATININE 1.26 (*)     GFR Non- 43 (*)     GFR African American 52 (*)     Glucose 236 (*)     Sodium 134 (*)     Chloride 93 (*)     CO2 32 (*)     Protime 14.8 (*)     All other components within normal limits   BLOOD GAS, VENOUS - Abnormal; Notable for the following components:    pH, Garth 7.305 (*)     pCO2, Garth 69.9 (*)     HCO3, Venous 34.8 (*)     Positive Base Excess, Garth 8.5 (*)     All other components within normal limits   CK - Abnormal; Notable for the following components:     Total  (*)     All other components within normal limits   URINE RT REFLEX TO CULTURE - Abnormal; Notable for the following components:    Glucose, Ur TRACE (*)     Urine Hgb TRACE (*)     All other components within normal limits   MICROSCOPIC URINALYSIS - Abnormal; Notable for the following components:    Bacteria, UA FEW (*)     All other components within normal limits CREATININE W/GFR POINT OF CARE     #628 - Emergency Medicine: Utilization of CT for Minor Blunt Head Trauma (Adult)   [ ] Patient is 25 or older, presenting with minor blunt head trauma.  Head CT (including cosigned orders) was ordered by an emergency care clinician for trauma because (select one or more): [SATISFIES MIPS PERFORMANCE]  Reasons:  [ ] Patient is 72 or older  [ ] Patient GCS < 13  [ ] Patient has focal neurologic deficit  [ ] Patient has severe headache  [ ] Patient is vomiting  [ ] Severe/dangerous mechanism of injury was identified (select one or more):  [ ]MVA with: patient ejection, death of another passenger, rollover, speed > 40mph, airbag deployment,  or passenger on ATV or motorcycle  [ ] pedestrian or bicyclist without helmet: struck my motorized vehicle, in bicycle crash  [ ] fall > 3 feet or 5 stairs  [ ] head struck by high-impact object (hammer, baseball, baseball bat, heavy object such as falling brick)  [ ] Other: [] (ie. assault description)  [ ] Patient has physical signs of basilar skull fracture present (including hemotympanum, raccoon eyes, CSF leakage from ear or nose, Bradley's sign)  [ ] Patient suspected of taking anticoagulant medication  [ ] Patient has thrombocytopenia  [ ] Patient has coagulopathy   [ ] Patient has loss of consciousness and (must select one of the following):  [ ] Headache  [ ] Short term memory deficit  [ ] Alcohol/drug intoxication  [ ] Evidence of trauma above the clavicles  [ ] Age 61 or older  [ ] Post-traumatic seizure  [x ] Patient has post-traumatic amnesia and (must select one of the following):  [ ] Headache  [ ] Short term memory deficit  [x ] Alcohol/drug intoxication  [ ] Evidence of trauma above the clavicles  [ ] Age 61 or older  [ ] Post-traumatic seizure  [ ] Patient conditions that are excluded (select all that apply):  [ ] Patient has ventricular shunt  [ ] Patient has brain tumor  [ ] Patient is pregnant  [ ] Patient has multi-system trauma    [ ] Patient taking an antiplatelet medication (excluding aspirin)  [ ] Head CT not ordered by emergency care clinician  [ ] Head CT ordered for reasons other than trauma  [ ] Patient is 25 or older, presenting with minor blunt head trauma. Head CT (including cosigned orders) was ordered by an emergency care clinician for trauma, no indication specified. [DOES NOT SATISFY MIPS PERFORMANCE]    EMERGENCY DEPARTMENTCOURSE:   Medical Decision Making:  Patient presents with back pain after fall, unclear mechanism as patient does not recall exact events her GCS is 15 at time of evaluation, she is moving all extremities, she has diffuse back pain although none the specifically midline. We will get CT scan of head and neck based on unclear mechanism the fact that patient does admit to taking OxyContin chronically I feel that this may have played a role in her fall, her only complaint is back pain therefore we will get plain films of thoracic lumbar sacral as well as pelvis and chest based on mechanism. Will obtain CPK as patient may have been down for some time. Check urinalysis for infection. Also form screening EKG. Trauma panel. Patient uses oxygen 2 half liters at home chronically, has a history of diabetes. Lab work shows mild leukocytosis but I believe is reactionary, low clinical suspicion for pneumonia, urinary tract infection or sepsis. Chest x-ray does show some opacities that may be consistent with edema low suspicion for infectious cause however patient has no complaint of chest pain or shortness of breath, cough or fevers. imaging is also nondiagnostic no acute traumatic findings such ich, skull fx, ptx or vertebral fx. Patient was able ambulate at her baseline, I believe she is safe to be discharged home. When I explained use of opiate pain relievers she does admit to using excessive amounts yesterday.   I counseled her regarding this inappropriate use and how this can lead to symptoms that she experienced today as well as apnea that can lead to death. Patient instructed to follow-up with PCP or return to the nearest emergency department for uncontrolled fevers, vomiting, shortness of breath, chest pain, or any other concerns. Patient acknowledges plan, voices understanding of it and is in agreement with it. Pre-hypertension/Hypertension: Patient has been informed thatthey may have pre-hypertension or Hypertension based on a blood pressure reading in the Emergency Department. It was recommended patient calls the primary care provider listed on discharge instructions or a physician of patient's choice this week to arrange follow up for further evaluation of possible pre-hypertension or Hypertension. CRITICAL CARE:       CONSULTS:  None      FINAL IMPRESSION      1. Fall from standing, initial encounter    2.  Opiate overdose, accidental or unintentional, initial encounter [facility])          DISPOSITION/PLAN:  DISPOSITION    Discharged      PATIENT REFERRED TO:  Nuvia Alarcon, [address]  [address] [facility] [address]  [phone]    Call today  Re-Evaluation    [facility] ED  [place] [address]  [phone]    As needed      DISCHARGE MEDICATIONS:  New Prescriptions    No medications on file       (Please note that portions of this note werecompleted with a voice recognition program.  Efforts were made to edit the dictations but occasionally words are mis-transcribed.)    Darren Sun MD  Attending Emergency Physician            Darren Sun MD  09/17/21 [id number]

## 2021-09-10 NOTE — ED TRIAGE NOTES
Pt brought into ED by EMS for fall. Pt states that she fell twice, once this morning around 0700 and then again around 1230. Pt states that her first fall she was trying to find her remote on the chair and slipped off of it, pt denies hitting her head on any LOC. Pt states that for her second fall she was standing in the kitchen when she fell asleep. Pt unsure if she hit her head during the fall. Pt states that she occasionally falls asleep. Pt denies any pain of her head.

## 2021-09-10 NOTE — ED PROVIDER NOTES
[address]  eMERGENCY dEPARTMENT eNCOUnter   Attending Attestation     Pt Name: Laure Chase  MRN: [id number]  [place] 1955  Date of evaluation: 9/10/21       Laure Chase is a 61 y.o. female who presents with Fall (Pt with multiple recent falls )      History:   Patient has had multiple falls in the last 48 hours. Patient states this is new for her and she just is falling she cannot explain why. Patient got a pretty extensive work-up last night from her fall that did not show any abnormalities. Patient states she did not get any new injuries from this fall today. Exam: Vitals:   Vitals:    09/10/21 1601   BP: (!) 147/66   Pulse: 60   Resp: 18   Temp: 98 °F (36.7 °C)   TempSrc: Oral   SpO2: 93%     Heart regular rate rhythm no murmurs. Lungs clear to auscultation bilaterally. I performed a history and physical examination of the patient and discussed management with the resident. I reviewed the residents note and agree with the documented findings and plan of care. Any areas of disagreement are noted on the chart. I was personally present for the key portions of any procedures. I have documented in the chart those procedures where I was not present during the key portions. I have personally reviewed all images and agree with the resident's interpretation. I have reviewed the emergency nurses triage note. I agree with the chief complaint, past medical history, past surgical history, allergies, medications, social and family history as documented unless otherwise noted below. Documentation of the HPI, Physical Exam and Medical Decision Making performed by medical students or scribes is based on my personal performance of the HPI, PE and MDM. I personally evaluated and examined the patient in conjunction with the APC and agree with the assessment, treatment plan, and disposition of the patient as recorded by the APC. Additional findings are as noted.     Laney Magallanes MD  Attending Emergency  Physician             Thomas Dykes MD  09/10/21 2936

## 2021-09-10 NOTE — ED TRIAGE NOTES
Mode of arrival (squad #, walk in, police, etc) : Ambulance        Chief complaint(s): Fall        Arrival Note (brief scenario, treatment PTA, etc). : Pt BIB ambulance after multiple recent falls x2 days now reporting right arm and knee pain. Pt denies LOC or dizziness. \"My knees just give out. \" Pt A&Ox4, NAD, respirations even and unlabored with no increased WOB or SOB on 2.5L home O2.         C= \"Have you ever felt that you should Cut down on your drinking? \"  No  A= \"Have people Annoyed you by criticizing your drinking? \"  No  G= \"Have you ever felt bad or Guilty about your drinking? \"  No  E= \"Have you ever had a drink as an Eye-opener first thing in the morning to steady your nerves or to help a hangover? \"  No      Deferred []      Reason for deferring: N/A    *If yes to two or more: probable alcohol abuse. *

## 2021-09-10 NOTE — ED NOTES
Bed: 11  Expected date:   Expected time:   Means of arrival:   Comments:  5 [facility] JOSH Bryan  09/10/21 4848

## 2021-09-10 NOTE — ED PROVIDER NOTES
[address]  Emergency Department Encounter  Emergency Medicine Resident     Pt Name: Tyrel Woods  BNX:[id number]  [place] 1955  Date of evaluation: 9/10/21  PCP:  Graciela Gonzalez MD    CHIEF COMPLAINT       Chief Complaint   Patient presents with    Fall     Pt with multiple recent falls        HISTORY OF PRESENT ILLNESS  (Location/Symptom, Timing/Onset, Context/Setting, Quality, Duration, ModifyingFactors, Severity.)      Tyrel Woods is a 61 y.o. female with PMH of the and osteoarthritis presents the emergency department after a fall. Patient states that she was at home in her kitchen when she fell down onto her bottom and fell backwards afterwards. Patient denies hitting her head or losing consciousness and states that she is not on any blood thinner. Patient was evaluated emergency department last evening for a similar incident but she was on the ground for approximately 6 hours. Patient's work-up that was unremarkable. Patient states that she is having residual pain all over her body from the fall but otherwise states that she feels like her normal self. PAST MEDICAL / SURGICAL / SOCIAL /FAMILY HISTORY      has a past medical history of Acute hypoxemic respiratory failure (HCC), Arthritis, CHF (congestive heart failure) ([address] Chronic back pain, Diabetes mellitus type II, controlled ([address] Fibromyalgia, Hyperlipidemia LDL goal < 70, Hypertension, benign, Morbid obesity with BMI of 60.0-69.9, adult ([address] Pain of toe of right foot, Right wrist pain, Skin cancer, Sleep apnea, and Wears glasses. No other pertinent PMH on review with patient/guardian. has a past surgical history that includes Tubal ligation (1981); Hysterectomy (1998); lumbar fusion (10/2010; 03/2011); Carpal tunnel release (Right, 09/05/2014); cyst removal (1985); Cholecystectomy, laparoscopic (1998); lumbar fusion (2009); Rotator cuff repair (Left, 2012);  Cataract removal with implant (Bilateral, 2013); Skin cancer excision (2005); Inguinal hernia repair (Bilateral, 01/20/2017); other surgical history (05/02/2018); Upper gastrointestinal endoscopy (N/A, 10/16/2019); and Colonoscopy (N/A, 10/17/2019). No other pertinent PSH on review with patient/guardian. Social History     Socioeconomic History    Marital status:      Spouse name: Sirena Scott Number of children: 3    Years of education: Not on file    Highest education level: Not on file   Occupational History    Not on file   Tobacco Use    Smoking status: Never Smoker    Smokeless tobacco: Never Used   Vaping Use    Vaping Use: Never assessed   Substance and Sexual Activity    Alcohol use: Not Currently     Alcohol/week: 0.0 standard drinks     Comment: rarely    Drug use: No    Sexual activity: Yes     Partners: Male   Other Topics Concern    Not on file   Social History Narrative    Not on file     Social Determinants of Health     Financial Resource Strain:     Difficulty of Paying Living Expenses:    Food Insecurity:     Worried About Running Out of Food in the Last Year:     Ran Out of Food in the Last Year:    Transportation Needs:     Lack of Transportation (Medical):  Lack of Transportation (Non-Medical):    Physical Activity:     Days of Exercise per Week:     Minutes of Exercise per Session:    Stress:     Feeling of Stress :    Social Connections:     Frequency of Communication with Friends and Family:     Frequency of Social Gatherings with Friends and Family:     Attends Mu-ism Services:     Active Member of Clubs or Organizations:     Attends Club or Organization Meetings:     Marital Status:    Intimate Partner Violence:     Fear of Current or Ex-Partner:     Emotionally Abused:     Physically Abused:     Sexually Abused:        I counseled the patient against using tobacco products.     Family History   Problem Relation Age of Onset    Diabetes Mother     Cancer Mother         Multiple Myeloma    Lung Cancer Father     Diabetes Sister     Diabetes Brother     Stomach Cancer Brother     Diabetes Maternal Grandmother     High Blood Pressure Paternal Grandmother     Heart Disease Paternal Grandmother     Stroke Paternal Grandmother     Heart Attack Paternal Grandfather     COPD Sister     High Blood Pressure Son     High Blood Pressure Daughter     Heart Disease Sister         stents in     No other pertinent FamHx on review with patient/guardian. Allergies:  Adhesive tape, Morphine, Iron, Lisinopril, Metformin and related, Molds & smuts, and Pcn [penicillins]    Home Medications:  Prior to Admission medications    Medication Sig Start Date End Date Taking? Authorizing Provider   atorvastatin (LIPITOR) 40 MG tablet TAKE 1 TABLET BY MOUTH EVERY DAY AT NIGHT 8/20/21   Graciela Gonzalez MD   oxyCODONE-acetaminophen (PERCOCET) 5-325 MG per tablet Take 1 tablet by mouth every 6 hours as needed for Pain. 8/5/21 8/5/22  Graciela Gonzalez MD   ondansetron (ZOFRAN) 4 MG tablet Take 1 tablet by mouth every 8 hours as needed for Nausea or Vomiting 8/2/21   Graciela Gonzalez MD   linaclotide [facility]) 145 MCG capsule Take 1 capsule by mouth every morning (before breakfast) 8/2/21   Graciela Gonzalez MD   cyanocobalamin ([facility] VITAMIN B12) 1000 MCG tablet Take 1 tablet by mouth daily 7/15/21   Izaiah Leong MD   glimepiride (AMARYL) 4 MG tablet TAKE 2 TABLETS BY MOUTH EVERY MORNING (BEFORE BREAKFAST) 7/9/21   Graciela Gonzalez MD   clotrimazole (LOTRIMIN) 1 % cream APPLY TO AFFECTED AREA TWICE A DAY 5/25/21   Graciela oGnzalez MD   amLODIPine (NORVASC) 10 MG tablet TAKE 1 TABLET BY MOUTH DAILY WITH DINNER 5/5/21   Graciela Gonzalez MD   venlafaxine (EFFEXOR XR) 37.5 MG extended release capsule TAKE 1 CAPSULE BY MOUTH EVERY DAY 5/3/21   Graciela Gonzalez MD   pregabalin (LYRICA) 150 MG capsule Take 1 capsule by mouth 2 times daily for 30 days.  5/3/21 6/2/21  Graciela Gonzalez MD blood glucose test strips (ACCU-CHEK SOLANGE PLUS) strip Check glucose AC BID, dx E11.9, may sub covered product 4/7/21   Yesica Huizar MD   furosemide (LASIX) 40 MG tablet TAKE 1/2 TABLET BY MOUTH EVERY DAY 4/1/21   VIDHYA South NP   TRADJENTA 5 MG tablet TAKE 1 TABLET BY MOUTH EVERY DAY 2/22/21   VIDHYA Piper NP   carvedilol (COREG) 12.5 MG tablet TAKE 1 TABLET BY MOUTH TWICE A DAY WITH MEALS 2/22/21   VIDHYA Piper NP   famotidine (PEPCID) 20 MG tablet Take 1 tablet by mouth 2 times daily 1/13/21   Edison Perry MD   hyoscyamine (LEVSIN/SL) 125 MCG sublingual tablet Place 1 tablet under the tongue every 4 hours as needed for Cramping 9/30/20   Yesica Huizar MD   clonazePAM (KLONOPIN) 0.5 MG tablet Take 1 tablet by mouth 2 times daily as needed (tremor) for up to 30 doses. 6/16/20   Yesica Huizar MD   aspirin 81 MG tablet Take 1 tablet by mouth daily (with breakfast) 4/21/20   Yesica Huizar MD   Lancets MISC 1 each by Does not apply route daily Accucheck Solange dx E11.9 check daily 2/14/20   Yesica Huizar MD   albuterol (PROVENTIL) (2.5 MG/3ML) 0.083% nebulizer solution Take 3 mLs by nebulization 2 times daily 1/11/20   Raul Page MD   diclofenac sodium 1 % GEL Apply 2 g topically 3 times daily 2/20/19   Yesica Huizar MD   Calcium Carb-Cholecalciferol (CALCIUM 600 + D PO) Take 1 tablet by mouth 2 times daily    Historical Provider, MD   Multiple Vitamins-Iron (ONE DAILY MULTIVITAMIN/IRON PO) Take 1 tablet by mouth nightly    Historical Provider, MD   Coenzyme Q10 (COQ10 PO) Take 200 mg by mouth nightly     Historical Provider, MD   Loratadine-Pseudoephedrine (CLARITIN-D 12 HOUR PO) Take 1 tablet by mouth daily. Historical Provider, MD       REVIEW OF SYSTEMS    (2-9 systems for level 4, 10 ormore for level 5)      Review of Systems   Constitutional: Positive for fatigue. Negative for activity change, appetite change, diaphoresis and fever. HENT: Negative for congestion, sore throat, trouble swallowing and voice change. Respiratory: Positive for shortness of breath (Baseline). Negative for apnea and chest tightness. Cardiovascular: Positive for leg swelling. Negative for chest pain and palpitations. Gastrointestinal: Positive for abdominal distention. Negative for abdominal pain, diarrhea, nausea and vomiting. Genitourinary: Positive for difficulty urinating. Negative for dysuria, frequency and urgency. Musculoskeletal: Positive for arthralgias, back pain, gait problem, joint swelling and myalgias. Negative for neck pain and neck stiffness. Skin: Negative. Neurological: Negative for dizziness, facial asymmetry, speech difficulty and headaches. Psychiatric/Behavioral: Negative for agitation and behavioral problems. PHYSICAL EXAM   (up to 7 for level 4, 8 or more for level 5)      INITIAL VITALS:   BP (!) 147/66   Pulse 58   Temp 98 °F (36.7 °C) (Oral)   Resp 16   LMP  (LMP Unknown)   SpO2 96%     Physical Exam  Vitals reviewed. Constitutional:       General: She is not in acute distress. Appearance: She is obese. She is not ill-appearing. HENT:      Head: Normocephalic and atraumatic. Nose: Nose normal.      Mouth/Throat:      Mouth: Mucous membranes are moist.      Pharynx: Oropharynx is clear. Eyes:      Extraocular Movements: Extraocular movements intact. Conjunctiva/sclera: Conjunctivae normal.      Pupils: Pupils are equal, round, and reactive to light. Cardiovascular:      Rate and Rhythm: Normal rate and regular rhythm. Pulses: Normal pulses. Heart sounds: Normal heart sounds. Pulmonary:      Effort: Pulmonary effort is normal.      Breath sounds: Normal breath sounds. Abdominal:      General: Abdomen is flat. Palpations: Abdomen is soft. Musculoskeletal:         General: Swelling and tenderness present. No deformity.       Cervical back: Normal range of motion and neck supple. Right lower leg: Edema present. Left lower leg: Edema present. Skin:     General: Skin is warm and dry. Capillary Refill: Capillary refill takes less than 2 seconds. Coloration: Skin is not jaundiced. Findings: No bruising or lesion. Neurological:      General: No focal deficit present. Mental Status: She is alert. Mental status is at baseline. Psychiatric:         Mood and Affect: Mood normal.         DIFFERENTIAL  DIAGNOSIS     DDX: Mechanical falls    PLAN (LABS / IMAGING / EKG):  Orders Placed This Encounter   Procedures    CBC Auto Differential    Basic Metabolic Panel w/ Reflex to MG    CK    SPECIMEN REJECTION    Inpatient consult to Primary Care Provider       MEDICATIONS ORDERED:  No orders of the defined types were placed in this encounter.           DIAGNOSTIC RESULTS / EMERGENCY DEPARTMENT COURSE / MDM     LABS:  Results for orders placed or performed during the hospital encounter of 09/10/21   CBC Auto Differential   Result Value Ref Range    WBC 12.8 (H) 3.5 - 11.0 k/uL    RBC 3.36 (L) 4.0 - 5.2 m/uL    Hemoglobin 9.8 (L) 12.0 - 16.0 g/dL    Hematocrit 30.7 (L) 36 - 46 %    MCV 91.5 80 - 100 fL    MCH 29.1 26 - 34 pg    MCHC 31.8 31 - 37 g/dL    RDW 16.9 (H) 11.5 - 14.9 %    Platelets 034 575 - 095 k/uL    MPV 7.5 6.0 - 12.0 fL    NRBC Automated NOT REPORTED per 100 WBC    Differential Type NOT REPORTED     Seg Neutrophils 84 (H) 36 - 66 %    Lymphocytes 10 (L) 24 - 44 %    Monocytes 4 1 - 7 %    Eosinophils % 2 0 - 4 %    Basophils 0 0 - 2 %    Immature Granulocytes NOT REPORTED 0 %    Segs Absolute 10.70 (H) 1.3 - 9.1 k/uL    Absolute Lymph # 1.30 1.0 - 4.8 k/uL    Absolute Mono # 0.50 0.1 - 1.3 k/uL    Absolute Eos # 0.30 0.0 - 0.4 k/uL    Basophils Absolute 0.10 0.0 - 0.2 k/uL    Absolute Immature Granulocyte NOT REPORTED 0.00 - 0.30 k/uL    WBC Morphology NOT REPORTED     RBC Morphology NOT REPORTED     Platelet Estimate NOT REPORTED    Basic Metabolic Panel w/ Reflex to MG   Result Value Ref Range    Glucose 150 (H) 70 - 99 mg/dL    BUN 13 8 - 23 mg/dL    CREATININE 1.13 (H) 0.50 - 0.90 mg/dL    Bun/Cre Ratio NOT REPORTED 9 - 20    Calcium 9.2 8.6 - 10.4 mg/dL    Sodium 140 135 - 144 mmol/L    Potassium 4.3 3.7 - 5.3 mmol/L    Chloride 98 98 - 107 mmol/L    CO2 33 (H) 20 - 31 mmol/L    Anion Gap 9 9 - 17 mmol/L    GFR Non-African American 48 (L) >60 mL/min    GFR  59 (L) >60 mL/min    GFR Comment          GFR Staging NOT REPORTED    CK   Result Value Ref Range    Total  (H) 26 - 192 U/L   SPECIMEN REJECTION   Result Value Ref Range    Specimen Source . BLOOD     Ordered Test BMPX, CK, CBC, DIFF     Reason for Rejection       Unable to perform testing: Specimen quantity not sufficient.    - NOT REPORTED          IMPRESSION/MDM/ED COURSE:  61 y.o. female presented with 2 mechanical falls within 2 days. Patient resting comfortably in the room, physical exam is reassuring without new findings. Will draw repeat CBC, BMP and CPK. Once lab test to return will admit to the hospital to primary care for evaluation by PT/OT as well as possible placement. CPK returned. Improvement since yesterday. ED Course as of Sep 10 1741   Fri Sep 10, 2021   1741 Spoke with patient's primary care physician on the phone. Stated they would admit the patient for continuing work-up and evaluation and possible placement. [ES]      ED Course User Index  [ES] Carli Pedersen MD       RADIOLOGY:  No orders to display         EKG  None    All EKG's are interpreted by the Emergency Department Physician who either signs or Co-signs this chart in the absence of a cardiologist.      PROCEDURES:  None    CONSULTS:  IP CONSULT TO PRIMARY CARE PROVIDER        FINAL IMPRESSION      1. Multiple falls          DISPOSITION / PLAN     DISPOSITION Decision To Admit 09/10/2021 05:36:27 PM      PATIENT REFERREDTO:  No follow-up provider specified.     DISCHARGE MEDICATIONS:  New Prescriptions    No medications on file       Emily Rodriguez MD  PGY 2  Resident Physician Emergency Medicine  09/10/21 5:41 PM        (Please note that portions of this note were completed with a voice recognition program.Efforts were made to edit the dictations but occasionally words are mis-transcribed.)       Emily Rodriguez MD  Resident  09/10/21 [id number]

## 2021-09-10 NOTE — PROGRESS NOTES
Medication History completed:    New medications: oxycodone IR    Medications discontinued: ondansetron, hyoscyamine SL tablets, diclofenac gel, clonazepam    Changes to dosing: none    Stated allergies: As listed    Other pertinent information: Medications confirmed with [facility].     Thank you,  Ivy Valencia, PharmD, BCPS  [phone]

## 2021-09-11 LAB
EKG ATRIAL RATE: 66 BPM
EKG P AXIS: 13 DEGREES
EKG P-R INTERVAL: 154 MS
EKG Q-T INTERVAL: 432 MS
EKG QRS DURATION: 86 MS
EKG QTC CALCULATION (BAZETT): 452 MS
EKG R AXIS: 57 DEGREES
EKG T AXIS: 66 DEGREES
EKG VENTRICULAR RATE: 66 BPM
ESTIMATED AVERAGE GLUCOSE: 229 MG/DL
GLUCOSE BLD-MCNC: 181 MG/DL (ref 65–105)
GLUCOSE BLD-MCNC: 185 MG/DL (ref 65–105)
GLUCOSE BLD-MCNC: 188 MG/DL (ref 65–105)
HBA1C MFR BLD: 9.6 % (ref 4–6)
SARS-COV-2, RAPID: NOT DETECTED
SPECIMEN DESCRIPTION: NORMAL

## 2021-09-11 PROCEDURE — 6370000000 HC RX 637 (ALT 250 FOR IP): Performed by: FAMILY MEDICINE

## 2021-09-11 PROCEDURE — 94761 N-INVAS EAR/PLS OXIMETRY MLT: CPT

## 2021-09-11 PROCEDURE — 99223 1ST HOSP IP/OBS HIGH 75: CPT | Performed by: FAMILY MEDICINE

## 2021-09-11 PROCEDURE — 82947 ASSAY GLUCOSE BLOOD QUANT: CPT

## 2021-09-11 PROCEDURE — 87635 SARS-COV-2 COVID-19 AMP PRB: CPT

## 2021-09-11 PROCEDURE — G0378 HOSPITAL OBSERVATION PER HR: HCPCS

## 2021-09-11 PROCEDURE — 97161 PT EVAL LOW COMPLEX 20 MIN: CPT

## 2021-09-11 PROCEDURE — 2580000003 HC RX 258: Performed by: FAMILY MEDICINE

## 2021-09-11 PROCEDURE — 93010 ELECTROCARDIOGRAM REPORT: CPT | Performed by: INTERNAL MEDICINE

## 2021-09-11 PROCEDURE — 1200000000 HC SEMI PRIVATE

## 2021-09-11 PROCEDURE — 2700000000 HC OXYGEN THERAPY PER DAY

## 2021-09-11 PROCEDURE — 97166 OT EVAL MOD COMPLEX 45 MIN: CPT

## 2021-09-11 PROCEDURE — 96372 THER/PROPH/DIAG INJ SC/IM: CPT

## 2021-09-11 PROCEDURE — 6360000002 HC RX W HCPCS: Performed by: FAMILY MEDICINE

## 2021-09-11 PROCEDURE — 94640 AIRWAY INHALATION TREATMENT: CPT

## 2021-09-11 RX ORDER — OXYCODONE HYDROCHLORIDE 15 MG/1
30 TABLET, FILM COATED, EXTENDED RELEASE ORAL EVERY 8 HOURS PRN
Status: DISCONTINUED | OUTPATIENT
Start: 2021-09-11 | End: 2021-09-14 | Stop reason: HOSPADM

## 2021-09-11 RX ADMIN — Medication 200 MG: at 20:17

## 2021-09-11 RX ADMIN — ENOXAPARIN SODIUM 30 MG: 30 INJECTION SUBCUTANEOUS at 20:14

## 2021-09-11 RX ADMIN — OXYCODONE HYDROCHLORIDE 30 MG: 15 TABLET, FILM COATED, EXTENDED RELEASE ORAL at 23:12

## 2021-09-11 RX ADMIN — GLIPIZIDE 10 MG: 10 TABLET ORAL at 16:37

## 2021-09-11 RX ADMIN — CARVEDILOL 12.5 MG: 12.5 TABLET, FILM COATED ORAL at 20:14

## 2021-09-11 RX ADMIN — ASPIRIN 81 MG: 81 TABLET, COATED ORAL at 08:57

## 2021-09-11 RX ADMIN — AMLODIPINE BESYLATE 10 MG: 5 TABLET ORAL at 08:57

## 2021-09-11 RX ADMIN — VENLAFAXINE HYDROCHLORIDE 37.5 MG: 37.5 CAPSULE, EXTENDED RELEASE ORAL at 11:23

## 2021-09-11 RX ADMIN — MULTIPLE VITAMINS W/ MINERALS TAB 1 TABLET: TAB at 08:57

## 2021-09-11 RX ADMIN — FAMOTIDINE 20 MG: 20 TABLET, FILM COATED ORAL at 20:13

## 2021-09-11 RX ADMIN — Medication 10 ML: at 09:04

## 2021-09-11 RX ADMIN — ALOGLIPTIN 25 MG: 25 TABLET, FILM COATED ORAL at 08:57

## 2021-09-11 RX ADMIN — PREGABALIN 150 MG: 150 CAPSULE ORAL at 01:30

## 2021-09-11 RX ADMIN — Medication 10 ML: at 20:14

## 2021-09-11 RX ADMIN — CALCIUM CARBONATE 600 MG (1,500 MG)-VITAMIN D3 400 UNIT TABLET 1 TABLET: at 20:13

## 2021-09-11 RX ADMIN — CARVEDILOL 12.5 MG: 12.5 TABLET, FILM COATED ORAL at 08:57

## 2021-09-11 RX ADMIN — PREGABALIN 150 MG: 150 CAPSULE ORAL at 20:14

## 2021-09-11 RX ADMIN — CYANOCOBALAMIN TAB 1000 MCG 1000 MCG: 1000 TAB at 08:57

## 2021-09-11 RX ADMIN — GLIPIZIDE 10 MG: 10 TABLET ORAL at 11:24

## 2021-09-11 RX ADMIN — PSEUDOEPHEDRINE HYDROCHLORIDE 120 MG: 120 TABLET, FILM COATED, EXTENDED RELEASE ORAL at 11:24

## 2021-09-11 RX ADMIN — FUROSEMIDE 20 MG: 20 TABLET ORAL at 08:57

## 2021-09-11 RX ADMIN — FAMOTIDINE 20 MG: 20 TABLET, FILM COATED ORAL at 08:57

## 2021-09-11 RX ADMIN — PREGABALIN 150 MG: 150 CAPSULE ORAL at 08:57

## 2021-09-11 RX ADMIN — CETIRIZINE HYDROCHLORIDE 5 MG: 10 TABLET, FILM COATED ORAL at 08:57

## 2021-09-11 RX ADMIN — ENOXAPARIN SODIUM 30 MG: 30 INJECTION SUBCUTANEOUS at 08:57

## 2021-09-11 RX ADMIN — ATORVASTATIN CALCIUM 40 MG: 40 TABLET, FILM COATED ORAL at 20:13

## 2021-09-11 RX ADMIN — ALBUTEROL SULFATE 2.5 MG: 2.5 SOLUTION RESPIRATORY (INHALATION) at 08:33

## 2021-09-11 RX ADMIN — CALCIUM CARBONATE 600 MG (1,500 MG)-VITAMIN D3 400 UNIT TABLET 1 TABLET: at 08:57

## 2021-09-11 ASSESSMENT — PAIN DESCRIPTION - PAIN TYPE
TYPE: CHRONIC PAIN
TYPE: ACUTE PAIN

## 2021-09-11 ASSESSMENT — PAIN DESCRIPTION - LOCATION
LOCATION: GENERALIZED

## 2021-09-11 ASSESSMENT — PAIN SCALES - GENERAL
PAINLEVEL_OUTOF10: 5
PAINLEVEL_OUTOF10: 5
PAINLEVEL_OUTOF10: 10
PAINLEVEL_OUTOF10: 5

## 2021-09-11 NOTE — H&P
Family Medicine Admit Note    PCP: Deborah Sarah MD    Date of Admission: 9/10/2021    Date of Service: Pt seen/examined on 9/11/2021 and Admitted to Inpatient     Chief Complaint:  falls      History Of Present Illness: The patient is a 61 y.o. female who presents to [facility] with multiple falls in the 48 hours before admission. She states that her legs gave out from under her. One fall resulted in her being on the floor for several hours. She complains of severe pain as she has not received her oxycontin since admission. Past Medical History:        Diagnosis Date    Acute hypoxemic respiratory failure (HCC)     Arthritis     CH[facility]congest[facility] failure) ([facility])     Chronic back pain     Diabetes mellitus type II, controlled ([facility] 2/14/2012    Fibromyalgia     Hyperlipidemia LDL goal < 70 2/14/2012    Hypertension, benign 02/14/2012    Morbid obesity with BMI of 60.0-69.9, adult ([facility] 8/28/2015    Pain of toe of right foot     morgans cyst    Right wrist pain     rate 8    Skin cancer     skin under lt eye,face    Sleep apnea     Bipap does not work    Wears glasses        Past Surgical History:        Procedure Laterality Date    CARPAL TUNNEL RELEASE Right 09/05/2014    CATARACT REMOVAL WITH IMPLANT Bilateral 2013    CHOLECYSTECTOMY, LAPAROSCOPIC  1998    COLONOSCOPY N/A 10/17/2019    COLONOSCOPY DIAGNOSTIC performed by Hal Romberg, MD at [facility] 238 cyst from base of tail bone   [id number] [facility] Bilateral 01/20/2017    abcess removed    LUMBAR FUSION  10/2010; 03/2011    L4/L5    LUMBAR FUSION  2009    L4-S1    OTHER SURGICAL HISTORY  05/02/2018    Lumbar decompression laminectomy at L3-L4 bilaterally wiith complete facetomies at L3-L4 bilaterally;  diskectomy L3-L4: posterior lumbar interbody fusion; placement of [place]-type graft; local harvest of bone; microsurgical dissection.     ROTATOR APRN - NP   famotidine (PEPCID) 20 MG tablet Take 1 tablet by mouth 2 times daily 1/13/21  Yes Luanna Hamman, MD   aspirin 81 MG tablet Take 1 tablet by mouth daily (with breakfast) 4/21/20  Yes Abby Rodriguez MD   albuterol (PROVENTIL) (2.5 MG/3ML) 0.083% nebulizer solution Take 3 mLs by nebulization 2 times daily 1/11/20  Yes Destini Stanton MD   Calcium Carb-Cholecalciferol (CALCIUM 600 + D PO) Take 1 tablet by mouth 2 times daily   Yes Historical Provider, MD   Multiple Vitamins-Iron (ONE DAILY MULTIVITAMIN/IRON PO) Take 1 tablet by mouth nightly   Yes Historical Provider, MD   Coenzyme Q10 (COQ10 PO) Take 200 mg by mouth nightly    Yes Historical Provider, MD   loratadine-pseudoephedrine (CLARITIN-D 12 HOUR) 5-120 MG per extended release tablet Take 1 tablet by mouth daily as needed    Yes Historical Provider, MD   blood glucose test strips (ACCU-CHEK SOLANGE PLUS) strip Check glucose AC BID, dx E11.9, may sub covered product 4/7/21   Abby Rodriguez MD   Lancets MISC 1 each by Does not apply route daily Accucheck Solange dx E11.9 check daily 2/14/20   Abby Rodriguez MD       Allergies:  Adhesive tape, Morphine, Iron, Lisinopril, Metformin and related, Molds & smuts, and Pcn [penicillins]    Social History:  The patient currently lives at home with spouse    TOBACCO:   reports that she has never smoked. She has never used smokeless tobacco.  ETOH:   reports previous alcohol use.       Family History:          Problem Relation Age of Onset    Diabetes Mother     Cancer Mother         Multiple Myeloma    Lung Cancer Father     Diabetes Sister     Diabetes Brother     Stomach Cancer Brother     Diabetes Maternal Grandmother     High Blood Pressure Paternal Grandmother     Heart Disease Paternal Grandmother     Stroke Paternal Grandmother     Heart Attack Paternal Grandfather     COPD Sister     High Blood Pressure Son     High Blood Pressure Daughter     Heart Disease Sister stents in       PHYSICAL EXAM:    BP (!) 188/79   Pulse 67   Temp 98.7 °F (37.1 °C) (Oral)   Resp 16   Ht 5' (1.524 m)   Wt 297 lb 2.9 oz (134.8 kg)   LMP  (LMP Unknown)   SpO2 96%   BMI 58.04 kg/m²     General appearance: No apparent distress appears stated age and cooperative. HEENT Normal cephalic, atraumatic without obvious deformity. Neck: Supple, No jugular venous distention/bruits. Lungs: Clear to auscultation, bilaterally without rales/wheezes/rhonchi with good respiratory effort. Heart: Regular rate and rhythm with Normal S1/S2 without murmurs, rubs or gallops  Mental status: Alert, oriented, thought content appropriate. CBC   Recent Labs     09/09/21  2047 09/10/21  1706   WBC 17.8* 12.8*   HGB 10.2* 9.8*   HCT 31.7* 30.7*    429      RENAL  Recent Labs     09/09/21 2047 09/09/21  2150 09/10/21  1706   *  --  140   K 4.1  --  4.3   CL 93*  --  98   CO2 32*  --  33*   BUN 18  --  13   CREATININE 1.26* 0.80 1.13*     LFT'S  No results for input(s): AST, ALT, ALB, BILIDIR, BILITOT, ALKPHOS in the last 72 hours.   COAG  Recent Labs     09/09/21 2047   INR 1.2     CARDIAC ENZYMES  Recent Labs     09/09/21  2047 09/10/21  1706   CKTOTAL 403* 258*       U/A:    Lab Results   Component Value Date    COLORU YELLOW 09/09/2021    WBCUA 2 TO 5 09/09/2021    RBCUA 2 TO 5 09/09/2021    MUCUS NOT REPORTED 09/09/2021    BACTERIA FEW 09/09/2021    SPECGRAV 1.015 09/09/2021    LEUKOCYTESUR NEGATIVE 09/09/2021    GLUCOSEU TRACE 09/09/2021    AMORPHOUS NOT REPORTED 09/09/2021       ABG    Lab Results   Component Value Date    WMH1GHW 32.2 01/06/2020    W7TVVJDZ 87.7 01/06/2020    PHART 7.416 01/06/2020    AXY0NCU 50.2 01/06/2020    PO2ART 58.0 01/06/2020           Active Hospital Problems    Diagnosis Date Noted    General weakness [R53.1] 09/10/2021    Spondylosis without myelopathy [M47.819] 11/08/2020    Polyneuropathy due to type 2 diabetes mellitus ([facility] [E11.42] 11/08/2020    PUD (peptic ulcer disease) [K27.9] 07/17/2020    Tremor [R25.1] 01/06/2020    Iron deficiency anemia [D50.9] 11/22/2019    Fibromyalgia [M79.7] 11/22/2019    Chronic pain syndrome [G89.4] 10/17/2019    Mor[facility] [E66.01]     Lumbar radiculopathy [M54.16] 09/05/2018    BRIANNA (obstructive sleep apnea) [G47.33] 09/15/2015    Other hyperlipidemia [E78.49] 02/14/2012    Hypertension, benign [I10] 02/14/2012    Diabetes mellitus type II, controlled ([facility] [E11.9] 02/14/2012         ASSESSMENT/PLAN:  Generalized weakness and falls with known lumbar radiculopathy, diabetic neuropathy and fibromyalgia - restart Oxycontin, PT/OT, consider rehab/SNF for strengthening    Diabetes - controlled    Slight rhabdomyolysis d/t prolonged time on floor - CK improved           DVT Prophylaxis: enoxaparin  Diet: ADULT DIET;  Regular; 4 carb choices (60 gm/meal)  Code Status: Full Code      Dispo - admitted      Madeline Calderon MD, FAAFP  9/11/2021, 10:00 AM

## 2021-09-11 NOTE — CARE COORDINATION
CASE MANAGEMENT NOTE:    Admission Date:  9/10/2021 Zan Drummond is a 61 y.o.  female    Admitted for : General weakness [R53.1]  Multiple falls [R29.6]    Met with:  Patient    PCP:  Dr. Corbin Arambula:  [facility]      Is patient alert and oriented at time of discussion:  Yes    Current Residence/ Living Arrangements:  at home dependent on family care             Current Services PTA:  No    Does patient go to outpatient dialysis: No  If yes, location and chair time: N/A    Is patient agreeable to VNS: Yes    Freedom of choice provided:  Yes    List of [address] provided: Yes    VNS chosen:  No    DME:  straight cane, wheelchair and shower chair, GB, Rollator, glucometer and supplies    Home Oxygen: Yes @ 2.5L @ HS    Nebulizer: Yes    CPAP/BIPAP: Yes - Bipap    Supplier: [employer]    Potential Assistance Needed: Yes    SNF needed: Yes    Freedom of choice and list provided: Yes Chaz Galvez of [address] [place] [address]:  [facility] [employer]       Does Patient want to use MEDS to BEDS? No    Is patient currently receiving oral anticoagulation therapy? No    Is the Patient an [facility] with Readmission Risk Score greater than 14%? No  If yes, pt needs a follow up appointment made within 7 days. Family Members/Caregivers that pt would like involved in their care:    Yes    If yes, list name here:   Michael Morris and daughter Mily Peter    Transportation Provider:  Family             Discharge Plan:  9/11: [facility] - From 2-story home with livable 1st floor with . Patient is normally pretty independent, however multiple falls at home. DME - Cane, SC, GB, rollator, WC, glucometer, neb, Bipap and Home O2 @ 2.5L @ HS ([employer]). Open to VNS. Also open to [facility]. notified. Awaiting PT/OT recs.  MARY LOU NEEDS SIGNED/COMPLETED. Buck Harris                 Electronically signed by: Alice Hung RN on 9/11/2021 at 11:51 AM

## 2021-09-11 NOTE — DISCHARGE INSTR - COC
Continuity of Care Form    Patient Name: Juan Pablo Bentley   :  1955  MRN:  [id number]    Admit date:  9/10/2021  Discharge date:      Code Status Order: Full Code   Advance Directives:      Admitting Physician:  Gus Lesch, MD  PCP: Gus Lesch, MD    Discharging Nurse: Karely [facility] Unit/Room#: 2130/2266-15  Discharging Unit Phone Number: [phone]    Emergency Contact:   Extended Emergency Contact Information  Primary Emergency Contact: Eduar Baez  Address: [address] Phone: [phone]  Mobile Phone: [phone]  Relation: Spouse  Hearing or visual needs: None  Preferred language: English   needed? No  Secondary Emergency Contact: Denise Beltran  Address: Annie Price, [address] [place] [address] Phone: [phone]  Mobile Phone: [phone]  Relation: Child  Hearing or visual needs: None  Other needs: None  Preferred language: English   needed? No    Past Surgical History:  Past Surgical History:   Procedure Laterality Date    CARPAL TUNNEL RELEASE Right 2014    CATARACT REMOVAL WITH IMPLANT Bilateral 2013    CHOLECYSTECTOMY, LAPAROSCOPIC  1998    COLONOSCOPY N/A 10/17/2019    COLONOSCOPY DIAGNOSTIC performed by Sharmila Garcia MD at [facility] 58 cyst from base of tail bone    [place] Bilateral 2017    abcess removed    LUMBAR FUSION  10/2010; 2011    L4/L5    LUMBAR FUSION  2009    L4-S1    OTHER SURGICAL HISTORY  2018    Lumbar decompression laminectomy at L3-L4 bilaterally wiith complete facetomies at L3-L4 bilaterally;  diskectomy L3-L4: posterior lumbar interbody fusion; placement of [place]-type graft; local harvest of bone; microsurgical dissection. ROTATOR CUFF REPAIR Left 2012    SKIN CANCER EXCISION  2005    Basal Cell Carcinoma, Curly size from Lt.  face TUBAL LIGATION  1981    UPPER GASTROINTESTINAL ENDOSCOPY N/A 10/16/2019    EGD BIOPSY performed by Guanako Rogers MD at [facility]       Immunization History:   Immunization History   Administered Date(s) Administered    COVID-19, Pfizer, PF, 30mcg/0.3mL 03/19/2021, 04/06/2021    Influenza Virus Vaccine 12/18/2015    Influenza, Tisha Jessy, IM, (6 mo and older Fluzone, Flulaval, Fluarix and 3 yrs and older Afluria) 12/15/2016, 12/10/2018    Influenza, Quadv, IM, PF (6 mo and older Fluzone, Flulaval, Fluarix, and 3 yrs and older Afluria) 10/10/2019, 09/28/2020    Pneumococcal Polysaccharide ([id number]) 12/15/2016       Active Problems:  Patient Active Problem List   Diagnosis Code    Hypertension, benign I10    Diabetes mellitus type II, controlled ([facility] [address] E11.9    Other hyperlipidemia E78.49    Degeneration of cervical intervertebral disc M50.30    Cervicalgia M54.2    BRIANNA (obstructive sleep apnea) G47.33    Anemia D64.9    Hepatic steatosis K76.0    Lumbar radiculopathy M54.16    Morbid obesity (HCC) E66.01    Chronic pain syndrome G89.4    H/O: GI bleed Z87.19    Iron deficiency anemia D50.9    Fibromyalgia M79.7    Class 3 severe obesity due to excess calories with serious comorbidity and body mass index (BMI) of 50.0 to 59.9 in adult (HCC) E66.01, Z68.43    Tremor R25.1    Fatigue R53.83    Chronic renal insufficiency, stage III (moderate) (HCC) N18.30    Cellulitis of right leg L03.115    Lymphedema of both lower extremities I89.0    PUD (peptic ulcer disease) K27.9    Constipation K59.00    Generalized abdominal pain R10.84    Altered mental status R41.82    Allergic rhinitis J30.9    Arthritis M19.90    Lumbar spine instability M53.2X6    Polyneuropathy due to type 2 diabetes mellitus (HCC) E11.42    Primary central sleep apnea G47.31    Skin cancer C44.90    Spondylosis without myelopathy M47.819    Iron malabsorption K90.9    Iron deficiency E61.1    General weakness R53.1       Isolation/Infection:   Isolation No Isolation          Patient Infection Status       Infection Onset Added Last Indicated Last Indicated By Review Planned Expiration Resolved Resolved By    None active    Resolved    COVID-19 Rule Out 21 COVID-19, Rapid (Ordered)   21 Rule-Out Test Resulted    COVID-19 21 COVID-19, Rapid   05/10/21     COVID-19 Rule Out 21 COVID-19, Rapid (Ordered)   21 Rule-Out Test Resulted            Nurse Assessment:  Last Vital Signs: /81   Pulse 80   Temp 98 °F (36.7 °C) (Oral)   Resp 18   Ht 5' (1.524 m)   Wt 297 lb 2.9 oz (134.8 kg)   LMP  (LMP Unknown)   SpO2 96%   BMI 58.04 kg/m²     Last documented pain score (0-10 scale): Pain Level: 5  Last Weight:   Wt Readings from Last 1 Encounters:   21 297 lb 2.9 oz (134.8 kg)     Mental Status:  oriented and alert    IV Access:  - None    Nursing Mobility/ADLs:  Walking   Assisted  Transfer  Assisted  Bathing  Assisted  Dressing  Assisted  Toileting  Assisted  Feeding  Independent  Med Admin  Independent  Med Delivery   whole    Wound Care Documentation and Therapy:  Wound 06/10/20 Pretibial Right (Active)   Number of days: 458       Wound 06/10/20 Leg Posterior;Right; Lower (Active)   Number of days: 458       Wound 06/10/20 Leg Left;Lateral;Lower (Active)   Number of days: 458        Elimination:  Continence: Bowel: Yes  Bladder: Yes  Urinary Catheter: None   Colostomy/Ileostomy/Ileal Conduit: No       Date of Last BM:     Intake/Output Summary (Last 24 hours) at 2021 1805  Last data filed at 2021 1750  Gross per 24 hour   Intake 460 ml   Output 2850 ml   Net -2390 ml     I/O last 3 completed shifts: In: 26 [P.O.:450; I.V.:10]  Out: 1650 [Urine:1650]    Safety Concerns:      At Risk for Falls; frequent falls    Impairments/Disabilities:      None    Nutrition Therapy:  Current Nutrition Therapy:   - Oral Diet:  General    Routes of Feeding: Oral  Liquids: No Restrictions  Daily Fluid Restriction: no  Last Modified Barium Swallow with Video (Video Swallowing Test): not done    Treatments at the Time of Hospital Discharge:   Respiratory Treatments: SEE MAR  Oxygen Therapy:  is not on home oxygen therapy. Ventilator:    - No ventilator support    Rehab Therapies: Physical Therapy and Occupational Therapy  Weight Bearing Status/Restrictions: No weight bearing restirctions  Other Medical Equipment (for information only, NOT a DME order):  walker  Other Treatments: skilled nursing assessment, medication education, continued monitoring. Patient's personal belongings (please select all that are sent with patient):  None    RN SIGNATURE:  Electronically signed by Usha Morales RN on 9/11/21 at 6:47 PM EDT    CASE MANAGEMENT/SOCIAL WORK SECTION    Inpatient Status Date: 9/10/2021    Readmission Risk Assessment Score:  Readmission Risk              Risk of Unplanned Readmission:  20           Discharging to Facility/ [address]  Phone: [phone]  Fax [phone]    / signature: Electronically signed by Zechariah Huynh RN on 9/12/21 at 11:21 AM EDT    PHYSICIAN SECTION    Prognosis: Guarded    Condition at Discharge: Stable    Rehab Potential (if transferring to Rehab): Fair    Recommended Labs or Other Treatments After Discharge:     Physician Certification: I certify the above information and transfer of Shay Hawthorne  is necessary for the continuing treatment of the diagnosis listed and that she requires [address] for greater 30 days.      Update Admission H&P: No change in H&P    PHYSICIAN SIGNATURE:  Electronically signed by Maryetta Seip, MD on 9/14/21 at 8:40 AM EDT

## 2021-09-11 NOTE — PROGRESS NOTES
Physical Therapy    Facility/Department: [facility] MED SURG  Initial Assessment    NAME: Olvin Taylor  : 1955  MRN: [id number]    Date of Service: 2021    Discharge Recommendations:  Continue to assess pending progress      Assessment   Body structures, Functions, Activity limitations: Decreased functional mobility ; Decreased strength  Treatment Diagnosis: Dec function  Prognosis: Good  Decision Making: Low Complexity  REQUIRES PT FOLLOW UP: Yes  Activity Tolerance  Activity Tolerance: Patient Tolerated treatment well       Patient Diagnosis(es): The encounter diagnosis was Multiple falls. has a past medical history of Acute hypoxemic respiratory failure ([facility] [address] Arthritis, CHF (congestive heart failure) ([facility] [address] Chronic back pain, Diabetes mellitus type II, controlled ([facility] [address] Fibromyalgia, Hyperlipidemia LDL goal < 70, Hypertension, benign, Morbid obesity with BMI of 60.0-69.9, adult ([address] Pain of toe of right foot, Right wrist pain, Skin cancer, Sleep apnea, and Wears glasses. has a past surgical history that includes Tubal ligation (); Hysterectomy (); lumbar fusion (10/2010; 2011); Carpal tunnel release (Right, 2014); cyst removal (); Cholecystectomy, laparoscopic (); lumbar fusion (); Rotator cuff repair (Left, ); Cataract removal with implant (Bilateral, ); Skin cancer excision (); Inguinal hernia repair (Bilateral, 2017); other surgical history (2018); Upper gastrointestinal endoscopy (N/A, 10/16/2019); and Colonoscopy (N/A, 10/17/2019).     Restrictions  Restrictions/Precautions  Restrictions/Precautions: General Precautions, Fall Risk  Required Braces or Orthoses?: No  Implants present? : Metal implants (Lumbar ) 5 lumbar surgeries    Vision/Hearing  Vision: Impaired  Vision Exceptions: Wears glasses for reading  Hearing: Within functional limits     Subjective  General  Patient assessed for rehabilitation services?: Yes  Pain Screening  Patient Yes  More Ambulation?: No  Ambulation 1  Surface: level tile  Device: Rolling Walker  Assistance: Contact guard assistance (SBA  due to frequent falling and bilateral knees unsteady)  Gait Deviations: Slow Odette  Distance: 5ft up to [facility]  Stairs/Curb  Stairs?: No     Balance  Posture: Fair  Sitting - Static: Fair;+  Sitting - Dynamic: Fair;+  Standing - Static: Fair;+  Standing - Dynamic: Fair;+        Plan   Plan  Times per week: 5-6  Times per day: Daily  Current Treatment Recommendations: Strengthening, Transfer Training, Endurance Training, Balance Training, Gait Training, Home Exercise Program  Safety Devices  Type of devices: Gait belt, Call light within reach, Left in bed    Goals  Short term goals  Time Frame for Short term goals: 7 visits  Short term goal 1: Patient to demonstrate bed mobility MIN x1A  Short term goal 2: Patient to demosntrate transfers SBA  Short term goal 3: Patient to demonstrate GOOD standing balance  Short term goal 4: PAtient to demonstrate 50ft ambulation CGA     Therapy Time   Individual Concurrent Group Co-treatment   Time In 0929         Time Out 1006         Minutes [address] Brenner, PT

## 2021-09-11 NOTE — ED NOTES
Report given to MENDOZA MCDANIEL RN from ED. Report method BY PHONE   The following was reviewed with receiving RN:   Current vital signs:  BP (!) 162/64   Pulse 64   Temp 98 °F (36.7 °C) (Oral)   Resp 18   LMP  (LMP Unknown)   SpO2 97%                MEWS Score: 1     Any medication or safety alerts were reviewed. Any pending diagnostics and notifications were also reviewed, as well as any safety concerns or issues, abnormal labs, abnormal imaging, and abnormal assessment findings. Questions were answered.             Paradise Pollack, [facility]  09/11/21 3340

## 2021-09-11 NOTE — ED NOTES
Pt up to bedside commode with 2 assist. Able to stand without knees feeling like they was going to buckle. Pt was unable to assist to wipe herself. Pt repositioned back to bed. Denies needs. Will continue to monitor.       Nikita Lujan, [facility]  09/10/21 4061

## 2021-09-11 NOTE — CARE COORDINATION
Social work; following along with PT/OT to see what pt will need. Faxed referral to where pt selected in the past to follow along as pt is a managed care and must go in network and will need a precert. [facility] was in network last time pt needed assistance. Will continue to work with pt, snf and make a plan for care. Will need du, Rx and discharge order stating snf if that is pt's final choice at discharge. Wale carpenter    Social work; started [employer].   Wale carpenter

## 2021-09-11 NOTE — PROGRESS NOTES
[address] [facility]    Occupational Therapy Evaluation  Date: 21  Patient Name: Alethea Emery       Room: [facility]/6532-93  MRN: [id number]  Account: [de-identified]   : 1955  (64 y.o.) Gender: female     Discharge Recommendations:  Further Occupational Therapy is recommended upon facility discharge. Referring Practitioner: Dr. Alex Hahn   Diagnosis: General weakness        Treatment Diagnosis: Impaired self-care status   Past Medical History:  has a past medical history of Acute hypoxemic respiratory failure ([address] Arthritis, CHF (congestive heart failure) ([address] Chronic back pain, Diabetes mellitus type II, controlled ([facility] [address] Fibromyalgia, Hyperlipidemia LDL goal < 70, Hypertension, benign, Morbid obesity with BMI of 60.0-69.9, adult ([address] Pain of toe of right foot, Right wrist pain, Skin cancer, Sleep apnea, and Wears glasses. Past Surgical History:   has a past surgical history that includes Tubal ligation (); Hysterectomy (); lumbar fusion (10/2010; 2011); Carpal tunnel release (Right, 2014); cyst removal (); Cholecystectomy, laparoscopic (); lumbar fusion (); Rotator cuff repair (Left, ); Cataract removal with implant (Bilateral, ); Skin cancer excision (); Inguinal hernia repair (Bilateral, 2017); other surgical history (2018); Upper gastrointestinal endoscopy (N/A, 10/16/2019); and Colonoscopy (N/A, 10/17/2019).     Restrictions  Restrictions/Precautions: General Precautions, Fall Risk  Implants present? : Metal implants (Lumbar )  Required Braces or Orthoses?: No     Vitals  Temp: 98.7 °F (37.1 °C)  Pulse: 67  Resp: 16  BP: (!) 188/79  Height: 5' (152.4 cm)  Weight: 297 lb 2.9 oz (134.8 kg)  BMI (Calculated): 58.2  Oxygen Therapy  SpO2: 96 %  Pulse Oximeter Device Mode: Intermittent  Pulse Oximeter Device Location: Finger  O2 Device: Nasal cannula  O2 Flow Rate (L/min): 2.5 L/min (wears 2.5 L O2 at home )  Level of Consciousness: Alert (0)    Subjective  Subjective: \"My knees just buckled, the doctors cannot figure out what is wrong\" \"First fall I was on the floor for 6 hours and drug myself around and scraped my knees on the carpet, second fall hit my head on the floor, third time I was in the bathroom and went backwards over the toilet\". Overall Orientation Status: Within Functional Limits  Vision  Vision: Impaired  Vision Exceptions: Wears glasses for reading  Hearing  Hearing: Within functional limits  Social/Functional History  Lives With: Spouse  Type of Home: House  Home Layout: Two level, Able to Live on Main level with bedroom/bathroom (has not been upstairs in over 5 years )  Home Access: Stairs to enter with rails  Entrance Stairs - Number of Steps: 3 (4inch steps through front door )  Entrance Stairs - Rails: Left  Bathroom Shower/Tub: Tub/Shower unit, Curtain, Shower chair with back  Bathroom Toilet: Standard  Bathroom Equipment: Grab bars around toilet, Hand-held shower  Bathroom Accessibility: Walker accessible  Home Equipment: 4 wheeled walker, Rolling walker, Cane, [employer], [address] Help From: Family ( mostly; daughters occasionally )  ADL Assistance: Needs assistance (usually independent; occassionally needs help )  Homemaking Assistance: Needs assistance ( does it all)  Homemaking Responsibilities: No  Ambulation Assistance: Independent  Transfer Assistance: Independent  Active : No  Occupation: Retired  Additional Comments: Sleeps in lift chair; pt reports having severe sleep apnea;   Pain Assessment  Pain Assessment: 0-10  Pain Level: 5  Pain Type: Chronic pain  Pain Location: Generalized    Objective      Cognition  Overall Cognitive Status: WFL   Perception  Overall Perceptual Status: WFL  Sensation  Overall Sensation Status: Impaired (numbness in both feet (toes))   ADL  Feeding: Setup  Grooming: Setup  UE Bathing: Stand by assistance  LE Bathing:  Moderate assistance  UE Dressing: Setup  LE Dressing: Moderate assistance  Toileting: Minimal assistance  Additional Comments: The above levels are per pt report, observation, and clinical reasoning. Pt has generalized weakness/pain and is expected to need assistance. She was unable to demonstrate donning/doffing her socks and states her  did it for her; she would be expected to need assistance to manage bathing/clothing below the knees. She has been using a BSC with assist from nursing staff; she currently is not wearing pants and did not need to perform clothing managment during toileting tasks. UE Function           LUE Strength  Gross LUE Strength: Exceptions to [facility]  L Hand General: 4/5  LUE Strength Comment: General weakness throughout      LUE Tone: Normotonic     LUE AROM (degrees)  LUE AROM : WFL     Left Hand AROM (degrees)  Left Hand AROM: WFL  RUE Strength  Gross RUE Strength: Exceptions to [facility]  R Hand General: 4/5  RUE Strength Comment: General weakness throughout       RUE Tone: Normotonic     RUE AROM (degrees)  RUE AROM : WFL     Right Hand AROM (degrees)  Right Hand AROM: WFL    Fine Motor Skills  Coordination  Movements Are Fluid And Coordinated:  Yes                           Mobility  Supine to Sit: Maximum assistance, 2 Person assistance  Sit to Supine: Maximum assistance (for legs )       Balance  Sitting Balance: Stand by assistance  Standing Balance: Contact guard assistance  Standing Balance  Time: ~1 min   Activity: standing EOB and taking side steps   Comment: CGA and RW; knees were trembling   Functional Mobility  Functional - Mobility Device: Rolling Walker  Activity: Other (side steps at EOB)  Assist Level: Contact guard assistance (VC for foot and walker placement )  Bed mobility  Rolling to Right: Maximum assistance  Supine to Sit: Maximum assistance;2 Person assistance  Sit to Supine: Maximum assistance (for legs )  Comment: HOB elevated and grab bars present      Transfers  Sit to stand: Contact guard assistance  Stand to sit: Contact guard assistance  Transfer Comments: 2nd person SBA to watch knees 2* to hx of buckling and weakness   Functional Activity Tolerance  Functional Activity Tolerance: Tolerates 10 - 20 min exercise with multiple rests   Assessment  Performance deficits / Impairments: Decreased functional mobility , Decreased ADL status, Decreased strength, Decreased safe awareness, Decreased balance, Decreased endurance  Treatment Diagnosis: Impaired self-care status   Prognosis: Good  Decision Making: Medium Complexity  REQUIRES OT FOLLOW UP: Yes  Discharge Recommendations: Patient would benefit from continued therapy after discharge  Activity Tolerance: Patient Tolerated treatment well, Patient limited by fatigue, Patient limited by pain       Goals  Short term goals  Time Frame for Short term goals: By discharge, pt will  Short term goal 1: complete UB ADLs with Modified independence   Short term goal 2: complete LB ADLs with Min A and the use of adaptive equipment as needed   Short term goal 3: complete toileting tasks with SBA  Short term goal 4: perform a functional task while standing for 3+ min with 0-1 UE supports and SBA  Short term goal 5: perform functional mobility/transfers during ADL tasks with SBA   Short term goal 6: participate in 10+ min of therapeutic exercises/activities to increase strength and endurance for ADL tasks.      Plan  Safety Devices  Safety Devices in place: Yes  Type of devices: Left in bed, Gait belt, Call light within reach ( present )     Plan  Times per week: 5-7  Times per day: Daily  Current Treatment Recommendations: Strengthening, ROM, Balance Training, Functional Mobility Training, Endurance Training, Pain Management, Safety Education & Training, Patient/Caregiver Education & Training, Self-Care / ADL        OT Individual Minutes  Time In: 8869  Time Out: 1006  Minutes: 37    Electronically signed by Alejandro Villatoro on 9/11/21 at 12:32 PM EDT

## 2021-09-12 LAB
GLUCOSE BLD-MCNC: 127 MG/DL (ref 65–105)
GLUCOSE BLD-MCNC: 165 MG/DL (ref 65–105)
GLUCOSE BLD-MCNC: 242 MG/DL (ref 65–105)
GLUCOSE BLD-MCNC: 94 MG/DL (ref 65–105)

## 2021-09-12 PROCEDURE — 2500000003 HC RX 250 WO HCPCS: Performed by: FAMILY MEDICINE

## 2021-09-12 PROCEDURE — 94761 N-INVAS EAR/PLS OXIMETRY MLT: CPT

## 2021-09-12 PROCEDURE — G0378 HOSPITAL OBSERVATION PER HR: HCPCS

## 2021-09-12 PROCEDURE — 82947 ASSAY GLUCOSE BLOOD QUANT: CPT

## 2021-09-12 PROCEDURE — 94640 AIRWAY INHALATION TREATMENT: CPT

## 2021-09-12 PROCEDURE — 96372 THER/PROPH/DIAG INJ SC/IM: CPT

## 2021-09-12 PROCEDURE — 6360000002 HC RX W HCPCS: Performed by: FAMILY MEDICINE

## 2021-09-12 PROCEDURE — 6370000000 HC RX 637 (ALT 250 FOR IP): Performed by: FAMILY MEDICINE

## 2021-09-12 PROCEDURE — 2580000003 HC RX 258: Performed by: FAMILY MEDICINE

## 2021-09-12 PROCEDURE — 97530 THERAPEUTIC ACTIVITIES: CPT

## 2021-09-12 PROCEDURE — 99232 SBSQ HOSP IP/OBS MODERATE 35: CPT | Performed by: FAMILY MEDICINE

## 2021-09-12 PROCEDURE — 97116 GAIT TRAINING THERAPY: CPT

## 2021-09-12 PROCEDURE — 1200000000 HC SEMI PRIVATE

## 2021-09-12 RX ADMIN — VENLAFAXINE HYDROCHLORIDE 37.5 MG: 37.5 CAPSULE, EXTENDED RELEASE ORAL at 09:26

## 2021-09-12 RX ADMIN — PREGABALIN 150 MG: 150 CAPSULE ORAL at 21:51

## 2021-09-12 RX ADMIN — CALCIUM CARBONATE 600 MG (1,500 MG)-VITAMIN D3 400 UNIT TABLET 1 TABLET: at 09:23

## 2021-09-12 RX ADMIN — PSEUDOEPHEDRINE HYDROCHLORIDE 120 MG: 120 TABLET, FILM COATED, EXTENDED RELEASE ORAL at 09:27

## 2021-09-12 RX ADMIN — ATORVASTATIN CALCIUM 40 MG: 40 TABLET, FILM COATED ORAL at 21:51

## 2021-09-12 RX ADMIN — ENOXAPARIN SODIUM 30 MG: 30 INJECTION SUBCUTANEOUS at 09:22

## 2021-09-12 RX ADMIN — ALBUTEROL SULFATE 2.5 MG: 2.5 SOLUTION RESPIRATORY (INHALATION) at 06:50

## 2021-09-12 RX ADMIN — Medication 10 ML: at 09:36

## 2021-09-12 RX ADMIN — MICONAZOLE NITRATE: 20 POWDER TOPICAL at 22:03

## 2021-09-12 RX ADMIN — MULTIPLE VITAMINS W/ MINERALS TAB 1 TABLET: TAB at 09:23

## 2021-09-12 RX ADMIN — DICLOFENAC SODIUM 2 G: 10 GEL TOPICAL at 09:26

## 2021-09-12 RX ADMIN — FUROSEMIDE 20 MG: 20 TABLET ORAL at 09:22

## 2021-09-12 RX ADMIN — ALBUTEROL SULFATE 2.5 MG: 2.5 SOLUTION RESPIRATORY (INHALATION) at 18:53

## 2021-09-12 RX ADMIN — ASPIRIN 81 MG: 81 TABLET, COATED ORAL at 09:22

## 2021-09-12 RX ADMIN — CARVEDILOL 12.5 MG: 12.5 TABLET, FILM COATED ORAL at 21:51

## 2021-09-12 RX ADMIN — CLOTRIMAZOLE: 10 CREAM TOPICAL at 09:26

## 2021-09-12 RX ADMIN — CALCIUM CARBONATE 600 MG (1,500 MG)-VITAMIN D3 400 UNIT TABLET 1 TABLET: at 21:51

## 2021-09-12 RX ADMIN — MICONAZOLE NITRATE: 20 POWDER TOPICAL at 09:37

## 2021-09-12 RX ADMIN — CARVEDILOL 12.5 MG: 12.5 TABLET, FILM COATED ORAL at 09:23

## 2021-09-12 RX ADMIN — Medication 10 ML: at 21:50

## 2021-09-12 RX ADMIN — CYANOCOBALAMIN TAB 1000 MCG 1000 MCG: 1000 TAB at 09:22

## 2021-09-12 RX ADMIN — ALOGLIPTIN 25 MG: 25 TABLET, FILM COATED ORAL at 09:22

## 2021-09-12 RX ADMIN — OXYCODONE HYDROCHLORIDE 30 MG: 15 TABLET, FILM COATED, EXTENDED RELEASE ORAL at 16:01

## 2021-09-12 RX ADMIN — LINACLOTIDE 145 MCG: 145 CAPSULE, GELATIN COATED ORAL at 07:07

## 2021-09-12 RX ADMIN — FAMOTIDINE 20 MG: 20 TABLET, FILM COATED ORAL at 21:51

## 2021-09-12 RX ADMIN — DICLOFENAC SODIUM 2 G: 10 GEL TOPICAL at 14:01

## 2021-09-12 RX ADMIN — PREGABALIN 150 MG: 150 CAPSULE ORAL at 09:22

## 2021-09-12 RX ADMIN — AMLODIPINE BESYLATE 10 MG: 5 TABLET ORAL at 09:23

## 2021-09-12 RX ADMIN — GLIPIZIDE 10 MG: 10 TABLET ORAL at 07:07

## 2021-09-12 RX ADMIN — Medication 200 MG: at 21:50

## 2021-09-12 RX ADMIN — ENOXAPARIN SODIUM 30 MG: 30 INJECTION SUBCUTANEOUS at 21:50

## 2021-09-12 ASSESSMENT — PAIN SCALES - GENERAL
PAINLEVEL_OUTOF10: 0
PAINLEVEL_OUTOF10: 7
PAINLEVEL_OUTOF10: 2

## 2021-09-12 ASSESSMENT — PAIN DESCRIPTION - LOCATION: LOCATION: GENERALIZED

## 2021-09-12 ASSESSMENT — PAIN DESCRIPTION - PAIN TYPE: TYPE: CHRONIC PAIN

## 2021-09-12 NOTE — PROGRESS NOTES
approach due to conversing about the lost of her son this past year. General Comment  Comments: JOSH Alfaro ok's pt for PT. Pain Screening  Patient Currently in Pain: Yes  Pain Assessment  Response to Pain Intervention: Patient Satisfied  Vital Signs  BP Location: Right lower arm  Level of Consciousness: Alert (0)  Patient Currently in Pain: Yes  Oxygen Therapy  O2 Device: None (Room air)       Orientation  Orientation  Overall Orientation Status: Within Normal Limits  Objective      Transfers  Sit to Stand: Contact guard assistance  Stand to sit: Contact guard assistance  Lateral Transfers: Contact guard assistance  Comment: Transfers completed with RW. Cues for hand placement with fair - carryover. Pt demos doffing gait belt prior to returning to [facility]. Edu on safety. Ambulation  Ambulation?: Yes  More Ambulation?: No  Ambulation 1  Surface: level tile  Device: Rolling Walker  Assistance: Contact guard assistance (would benefit from additional SBA  due to frequent falling and bilateral knees unsteady)  Quality of Gait: Decrease heri, antalgic gait pattern due to B knee pain. Pt demos a very forward flexed posture with pushing RW out of LISY. Cues for technique and posture, poor carryover. Gait Deviations: Slow Heri  Distance: 10'x2  Stairs/Curb  Stairs?: No     Balance  Posture: Fair  Sitting - Static: Fair;+  Sitting - Dynamic: Fair;+  Standing - Static: Fair;+  Standing - Dynamic: Fair;+  Comments: standing balance assessed with RW. Exercises  Comments: Pt refuses all ther ex this date due to \"not feeling well\" JOSH Alfaro informed. Pt is receptive to edu on seated ther ex with lime green thera band. PT and pt's  V good understanding at this time.                       Goals  Short term goals  Time Frame for Short term goals: 7 visits  Short term goal 1: Patient to demonstrate bed mobility MIN x1A  Short term goal 2: Patient to demosntrate transfers SBA  Short term goal 3: Patient to demonstrate GOOD standing balance  Short term goal 4: PAtient to demonstrate 50ft ambulation CGA    Plan    Plan  Times per week: 5-6  Times per day: Daily  Current Treatment Recommendations: Strengthening, Transfer Training, Endurance Training, Balance Training, Gait Training, Home Exercise Program  Safety Devices  Type of devices: Gait belt, Call light within reach, Left in bed     Therapy Time   Individual Concurrent Group Co-treatment   Time In 1511         Time Out 1540         Minutes [place], [place]

## 2021-09-12 NOTE — PLAN OF CARE
Problem: Falls - Risk of:  Goal: Will remain free from falls  Description: Will remain free from falls  9/12/2021 1418 by José Manuel Shaw RN  Outcome: Ongoing  Note: Patient had no falls this shift. Call light within reach. Side rails up x2. Bed in lowest position. Patient safety is maintain. 9/12/2021 0301 by Lisa Mendez RN  Outcome: Met This Shift  Goal: Absence of physical injury  Description: Absence of physical injury  9/12/2021 1418 by José Manuel Shaw RN  Outcome: Ongoing  9/12/2021 0301 by Lisa Mendez RN  Outcome: Met This Shift     Problem: Pain:  Goal: Pain level will decrease  Description: Pain level will decrease  9/12/2021 1418 by José Manuel Shaw RN  Outcome: Ongoing  Note: Pain was tolerable with PRN pain meds. Pain was decreased with pain meds. 9/12/2021 0301 by Lisa Mendez RN  Outcome: Met This Shift  Goal: Control of acute pain  Description: Control of acute pain  9/12/2021 1418 by José Manuel Shaw RN  Outcome: Ongoing  Note: Pain was tolerable with PRN pain meds. Pain was decreased with pain meds.     9/12/2021 0301 by Lisa Mendez RN  Outcome: Met This Shift  Goal: Control of chronic pain  Description: Control of chronic pain  9/12/2021 1418 by José Manuel Shaw RN  Outcome: Ongoing  9/12/2021 0301 by Lisa Mendez RN  Outcome: Met This Shift

## 2021-09-12 NOTE — PROGRESS NOTES
Progress Note  Date:2021       Room:  Patient Name:Camelia Lepe     YOB: 1955     Age:66 y.o. Subjective    Subjective:  Symptoms:  (Pain control improved. ). Diet:  Adequate intake. Activity level: Impaired due to weakness. Pain:  She complains of pain that is mild. Review of Systems  Objective         Vitals Last 24 Hours:  TEMPERATURE:  Temp  Av.1 °F (36.7 °C)  Min: 97.9 °F (36.6 °C)  Max: 98.5 °F (36.9 °C)  RESPIRATIONS RANGE: Resp  Av  Min: 16  Max: 18  PULSE OXIMETRY RANGE: SpO2  Av.2 %  Min: 92 %  Max: 98 %  PULSE RANGE: Pulse  Av.3  Min: 66  Max: 80  BLOOD PRESSURE RANGE: Systolic (79OUH), DKN:057 , Min:138 , IDX:470   ; Diastolic (97GSM), EXK:92, Min:68, Max:81    I/O (24Hr): Intake/Output Summary (Last 24 hours) at 2021 0824  Last data filed at 2021 2158  Gross per 24 hour   Intake 720 ml   Output 4250 ml   Net -3530 ml     Objective:  General Appearance:  Comfortable. Vital signs: (most recent): Blood pressure (!) 180/79, pulse 68, temperature 97.9 °F (36.6 °C), temperature source Oral, resp. rate 18, height 5' (1.524 m), weight 297 lb 2.9 oz (134.8 kg), SpO2 92 %, not currently breastfeeding.  (BP elevated). Labs/Imaging/Diagnostics    Labs:  CBC:  Recent Labs     09/09/21  2047 09/10/21  1706   WBC 17.8* 12.8*   RBC 3.46* 3.36*   HGB 10.2* 9.8*   HCT 31.7* 30.7*   MCV 91.6 91.5   RDW 17.0* 16.9*    429     CHEMISTRIES:  Recent Labs     09/09/21  2047 09/09/21  2150 09/10/21  1706   *  --  140   K 4.1  --  4.3   CL 93*  --  98   CO2 32*  --  33*   BUN 18  --  13   CREATININE 1.26* 0.80 1.13*   GLUCOSE 236*  --  150*     PT/INR:  Recent Labs     21   PROTIME 14.8*   INR 1.2     APTT:  Recent Labs     21   APTT 34.6     LIVER PROFILE:No results for input(s): AST, ALT, BILIDIR, BILITOT, ALKPHOS in the last 72 hours.     Imaging Last 24 Hours:  No results found.  Assessment//Plan           Hospital Problems         Last Modified POA    * (Principal) General weakness 9/11/2021 Yes    Hypertension, benign (Chronic) 9/11/2021 Yes    Diabetes mellitus type II, controlled ([address] (Chronic) 9/11/2021 Yes    Other hyperlipidemia (Chronic) 9/11/2021 Yes    Overview Signed 9/7/2015  4:27 AM by [facility]     replace inactive diagnosis         BRIANNA (obstructive sleep apnea) 9/11/2021 Yes    Lumbar radiculopathy 9/11/2021 Yes    Morbid obesity ([address] 9/11/2021 Yes    Chronic pain syndrome (Chronic) 9/11/2021 Yes    Iron deficiency anemia 9/11/2021 Yes    Fibromyalgia 9/11/2021 Yes    Tremor 9/11/2021 Yes    PUD (peptic ulcer disease) 9/11/2021 Yes    Polyneuropathy due to type 2 diabetes mellitus ([address] 9/11/2021 Yes    Spondylosis without myelopathy 9/11/2021 Yes        Assessment & Plan  Continue PT/OT    Repeat labs tomorrow    Electronically signed by Yesica Huizar MD on 9/12/21 at 8:24 AM EDT

## 2021-09-12 NOTE — CARE COORDINATION
ONGOING DISCHARGE PLAN:    Patient is alert and oriented x4. Spoke with patient regarding discharge plan and patient confirms that plan is still to return to home with VNS services is her preferred choice - [facility] list provided and patient chose [facility] - Referral sent. PT/OT states recommendations are pending patient progress - VNS  Vs SNF - If SNF, then patient is open to [facility] - Referral sent. Will continue to follow for additional discharge needs.     Electronically signed by Alexsandra Morgan RN on 9/12/2021 at 11:18 AM

## 2021-09-13 LAB
-: NORMAL
ABSOLUTE EOS #: 0.3 K/UL (ref 0–0.4)
ABSOLUTE IMMATURE GRANULOCYTE: ABNORMAL K/UL (ref 0–0.3)
ABSOLUTE LYMPH #: 1.6 K/UL (ref 1–4.8)
ABSOLUTE MONO #: 0.7 K/UL (ref 0.1–1.3)
AMORPHOUS: NORMAL
ANION GAP SERPL CALCULATED.3IONS-SCNC: 10 MMOL/L (ref 9–17)
BACTERIA: NORMAL
BASOPHILS # BLD: 2 % (ref 0–2)
BASOPHILS ABSOLUTE: 0.1 K/UL (ref 0–0.2)
BILIRUBIN URINE: NEGATIVE
BUN BLDV-MCNC: 15 MG/DL (ref 8–23)
BUN/CREAT BLD: ABNORMAL (ref 9–20)
CALCIUM SERPL-MCNC: 9.5 MG/DL (ref 8.6–10.4)
CASTS UA: NORMAL /LPF
CHLORIDE BLD-SCNC: 100 MMOL/L (ref 98–107)
CO2: 30 MMOL/L (ref 20–31)
COLOR: YELLOW
COMMENT UA: ABNORMAL
CREAT SERPL-MCNC: 1.19 MG/DL (ref 0.5–0.9)
CRYSTALS, UA: NORMAL /HPF
DIFFERENTIAL TYPE: ABNORMAL
EOSINOPHILS RELATIVE PERCENT: 3 % (ref 0–4)
EPITHELIAL CELLS UA: NORMAL /HPF
GFR AFRICAN AMERICAN: 55 ML/MIN
GFR NON-AFRICAN AMERICAN: 46 ML/MIN
GFR SERPL CREATININE-BSD FRML MDRD: ABNORMAL ML/MIN/{1.73_M2}
GFR SERPL CREATININE-BSD FRML MDRD: ABNORMAL ML/MIN/{1.73_M2}
GLUCOSE BLD-MCNC: 122 MG/DL (ref 65–105)
GLUCOSE BLD-MCNC: 126 MG/DL (ref 70–99)
GLUCOSE BLD-MCNC: 159 MG/DL (ref 65–105)
GLUCOSE BLD-MCNC: 165 MG/DL (ref 65–105)
GLUCOSE BLD-MCNC: 186 MG/DL (ref 65–105)
GLUCOSE URINE: NEGATIVE
HCT VFR BLD CALC: 32.4 % (ref 36–46)
HEMOGLOBIN: 10.4 G/DL (ref 12–16)
IMMATURE GRANULOCYTES: ABNORMAL %
KETONES, URINE: NEGATIVE
LEUKOCYTE ESTERASE, URINE: ABNORMAL
LYMPHOCYTES # BLD: 17 % (ref 24–44)
MCH RBC QN AUTO: 29.2 PG (ref 26–34)
MCHC RBC AUTO-ENTMCNC: 32.2 G/DL (ref 31–37)
MCV RBC AUTO: 90.7 FL (ref 80–100)
MONOCYTES # BLD: 7 % (ref 1–7)
MUCUS: NORMAL
MYOGLOBIN: 43 NG/ML (ref 25–58)
NITRITE, URINE: NEGATIVE
NRBC AUTOMATED: ABNORMAL PER 100 WBC
OTHER OBSERVATIONS UA: NORMAL
PDW BLD-RTO: 17.8 % (ref 11.5–14.9)
PH UA: 8 (ref 5–8)
PLATELET # BLD: 396 K/UL (ref 150–450)
PLATELET ESTIMATE: ABNORMAL
PMV BLD AUTO: 7.6 FL (ref 6–12)
POTASSIUM SERPL-SCNC: 4.1 MMOL/L (ref 3.7–5.3)
PROTEIN UA: NEGATIVE
RBC # BLD: 3.57 M/UL (ref 4–5.2)
RBC # BLD: ABNORMAL 10*6/UL
RBC UA: NORMAL /HPF
RENAL EPITHELIAL, UA: NORMAL /HPF
SEG NEUTROPHILS: 71 % (ref 36–66)
SEGMENTED NEUTROPHILS ABSOLUTE COUNT: 7.2 K/UL (ref 1.3–9.1)
SODIUM BLD-SCNC: 140 MMOL/L (ref 135–144)
SPECIFIC GRAVITY UA: 1.01 (ref 1–1.03)
TOTAL CK: 71 U/L (ref 26–192)
TRICHOMONAS: NORMAL
TURBIDITY: ABNORMAL
URINE HGB: NEGATIVE
UROBILINOGEN, URINE: NORMAL
WBC # BLD: 9.9 K/UL (ref 3.5–11)
WBC # BLD: ABNORMAL 10*3/UL
WBC UA: NORMAL /HPF
YEAST: NORMAL

## 2021-09-13 PROCEDURE — 6360000002 HC RX W HCPCS: Performed by: FAMILY MEDICINE

## 2021-09-13 PROCEDURE — 1200000000 HC SEMI PRIVATE

## 2021-09-13 PROCEDURE — 83874 ASSAY OF MYOGLOBIN: CPT

## 2021-09-13 PROCEDURE — 97110 THERAPEUTIC EXERCISES: CPT

## 2021-09-13 PROCEDURE — 82947 ASSAY GLUCOSE BLOOD QUANT: CPT

## 2021-09-13 PROCEDURE — 94761 N-INVAS EAR/PLS OXIMETRY MLT: CPT

## 2021-09-13 PROCEDURE — 94640 AIRWAY INHALATION TREATMENT: CPT

## 2021-09-13 PROCEDURE — 2580000003 HC RX 258: Performed by: FAMILY MEDICINE

## 2021-09-13 PROCEDURE — 85025 COMPLETE CBC W/AUTO DIFF WBC: CPT

## 2021-09-13 PROCEDURE — 97116 GAIT TRAINING THERAPY: CPT

## 2021-09-13 PROCEDURE — 6370000000 HC RX 637 (ALT 250 FOR IP): Performed by: FAMILY MEDICINE

## 2021-09-13 PROCEDURE — 97535 SELF CARE MNGMENT TRAINING: CPT

## 2021-09-13 PROCEDURE — 36415 COLL VENOUS BLD VENIPUNCTURE: CPT

## 2021-09-13 PROCEDURE — 81001 URINALYSIS AUTO W/SCOPE: CPT

## 2021-09-13 PROCEDURE — 97530 THERAPEUTIC ACTIVITIES: CPT

## 2021-09-13 PROCEDURE — G0378 HOSPITAL OBSERVATION PER HR: HCPCS

## 2021-09-13 PROCEDURE — 96372 THER/PROPH/DIAG INJ SC/IM: CPT

## 2021-09-13 PROCEDURE — 80048 BASIC METABOLIC PNL TOTAL CA: CPT

## 2021-09-13 PROCEDURE — 82550 ASSAY OF CK (CPK): CPT

## 2021-09-13 RX ADMIN — VENLAFAXINE HYDROCHLORIDE 37.5 MG: 37.5 CAPSULE, EXTENDED RELEASE ORAL at 08:14

## 2021-09-13 RX ADMIN — ATORVASTATIN CALCIUM 40 MG: 40 TABLET, FILM COATED ORAL at 19:58

## 2021-09-13 RX ADMIN — ALBUTEROL SULFATE 2.5 MG: 2.5 SOLUTION RESPIRATORY (INHALATION) at 20:04

## 2021-09-13 RX ADMIN — ASPIRIN 81 MG: 81 TABLET, COATED ORAL at 08:02

## 2021-09-13 RX ADMIN — FAMOTIDINE 20 MG: 20 TABLET, FILM COATED ORAL at 19:58

## 2021-09-13 RX ADMIN — CYANOCOBALAMIN TAB 1000 MCG 1000 MCG: 1000 TAB at 08:12

## 2021-09-13 RX ADMIN — OXYCODONE HYDROCHLORIDE AND ACETAMINOPHEN 1 TABLET: 5; 325 TABLET ORAL at 13:30

## 2021-09-13 RX ADMIN — OXYCODONE HYDROCHLORIDE 30 MG: 15 TABLET, FILM COATED, EXTENDED RELEASE ORAL at 21:47

## 2021-09-13 RX ADMIN — LINACLOTIDE 145 MCG: 145 CAPSULE, GELATIN COATED ORAL at 06:09

## 2021-09-13 RX ADMIN — DICLOFENAC SODIUM 2 G: 10 GEL TOPICAL at 13:23

## 2021-09-13 RX ADMIN — Medication 200 MG: at 19:58

## 2021-09-13 RX ADMIN — Medication 10 ML: at 20:02

## 2021-09-13 RX ADMIN — CALCIUM CARBONATE 600 MG (1,500 MG)-VITAMIN D3 400 UNIT TABLET 1 TABLET: at 19:58

## 2021-09-13 RX ADMIN — CLOTRIMAZOLE: 10 CREAM TOPICAL at 08:17

## 2021-09-13 RX ADMIN — FAMOTIDINE 20 MG: 20 TABLET, FILM COATED ORAL at 08:07

## 2021-09-13 RX ADMIN — GLIPIZIDE 10 MG: 10 TABLET ORAL at 06:09

## 2021-09-13 RX ADMIN — ENOXAPARIN SODIUM 30 MG: 30 INJECTION SUBCUTANEOUS at 08:08

## 2021-09-13 RX ADMIN — MULTIPLE VITAMINS W/ MINERALS TAB 1 TABLET: TAB at 08:11

## 2021-09-13 RX ADMIN — ALBUTEROL SULFATE 2.5 MG: 2.5 SOLUTION RESPIRATORY (INHALATION) at 06:49

## 2021-09-13 RX ADMIN — Medication 10 ML: at 08:16

## 2021-09-13 RX ADMIN — GLIPIZIDE 10 MG: 10 TABLET ORAL at 17:05

## 2021-09-13 RX ADMIN — OXYCODONE HYDROCHLORIDE AND ACETAMINOPHEN 1 TABLET: 5; 325 TABLET ORAL at 00:13

## 2021-09-13 RX ADMIN — PREGABALIN 150 MG: 150 CAPSULE ORAL at 08:11

## 2021-09-13 RX ADMIN — MICONAZOLE NITRATE: 20 POWDER TOPICAL at 19:59

## 2021-09-13 RX ADMIN — ALOGLIPTIN 25 MG: 25 TABLET, FILM COATED ORAL at 08:02

## 2021-09-13 RX ADMIN — PSEUDOEPHEDRINE HYDROCHLORIDE 120 MG: 120 TABLET, FILM COATED, EXTENDED RELEASE ORAL at 08:14

## 2021-09-13 RX ADMIN — DICLOFENAC SODIUM 2 G: 10 GEL TOPICAL at 08:16

## 2021-09-13 RX ADMIN — MICONAZOLE NITRATE: 20 POWDER TOPICAL at 08:10

## 2021-09-13 RX ADMIN — PREGABALIN 150 MG: 150 CAPSULE ORAL at 19:58

## 2021-09-13 RX ADMIN — CARVEDILOL 12.5 MG: 12.5 TABLET, FILM COATED ORAL at 08:06

## 2021-09-13 RX ADMIN — AMLODIPINE BESYLATE 10 MG: 5 TABLET ORAL at 08:06

## 2021-09-13 RX ADMIN — CARVEDILOL 12.5 MG: 12.5 TABLET, FILM COATED ORAL at 19:58

## 2021-09-13 RX ADMIN — FUROSEMIDE 20 MG: 20 TABLET ORAL at 08:07

## 2021-09-13 RX ADMIN — CETIRIZINE HYDROCHLORIDE 5 MG: 10 TABLET, FILM COATED ORAL at 08:02

## 2021-09-13 RX ADMIN — CALCIUM CARBONATE 600 MG (1,500 MG)-VITAMIN D3 400 UNIT TABLET 1 TABLET: at 08:12

## 2021-09-13 RX ADMIN — ENOXAPARIN SODIUM 30 MG: 30 INJECTION SUBCUTANEOUS at 19:58

## 2021-09-13 ASSESSMENT — PAIN SCALES - GENERAL
PAINLEVEL_OUTOF10: 3
PAINLEVEL_OUTOF10: 9
PAINLEVEL_OUTOF10: 1
PAINLEVEL_OUTOF10: 8

## 2021-09-13 ASSESSMENT — PAIN DESCRIPTION - PAIN TYPE: TYPE: CHRONIC PAIN

## 2021-09-13 NOTE — PROGRESS NOTES
SW spoke to pt regarding discharge. Pt said she was never at [facility] is her SNF choice. SW sent referral to [facility] and spoke to Ernestina Ferguson. She will review referral now. Pt hopes to go home and said SNF is the back up plan.

## 2021-09-13 NOTE — FLOWSHEET NOTE
Patient shared medical update and frustration that she keeps falling and no answers for what's wrong. She said she is not able to do very much which doesn't help her emotional state. She lost a son almost a year ago, as well as 2 other close relatives and she is struggling with her grief. She expressed gratitude for a close relationship with her  but acknowledged that they grieve differently. Writer provided listening presence, emotional support, and prayer. 09/13/21 1700   Encounter Summary   Services provided to: Patient   Referral/Consult From: [facility]   Support System Spouse; Children   Continue Visiting   (9-13-21)   Complexity of Encounter High   Length of Encounter 30 minutes   Spiritual Assessment Completed Yes   Spiritual/Orthodoxy   Type Spiritual support   Assessment Approachable;Tearful;Grieving   Intervention Active listening;Explored feelings, thoughts, concerns;Explored coping resources;Prayer;Sustaining presence/ Ministry of presence;Grief care; Discussed illness/injury and it's impact; Discussed meaning/purpose   Outcome Expressed gratitude;Engaged in conversation;Expressed feelings/needs/concerns; Shared reminiscences;Grieving;Receptive;Venting emotion

## 2021-09-13 NOTE — PROGRESS NOTES
Progress Note  Date:2021       Room:  Patient Name:Camelia Schwab     YOB: 1955     Age:66 y.o. Subjective    Subjective:  Symptoms:  Improved. Diet:  Adequate intake. Activity level: Impaired due to weakness. Pain:  She complains of pain that is mild. Review of Systems  Objective         Vitals Last 24 Hours:  TEMPERATURE:  Temp  Av.1 °F (36.7 °C)  Min: 97.9 °F (36.6 °C)  Max: 98.2 °F (36.8 °C)  RESPIRATIONS RANGE: Resp  Av.2  Min: 16  Max: 18  PULSE OXIMETRY RANGE: SpO2  Av.5 %  Min: 90 %  Max: 98 %  PULSE RANGE: Pulse  Av.8  Min: 63  Max: 70  BLOOD PRESSURE RANGE: Systolic (26DBA), YGT:801 , Min:153 , UCO:393   ; Diastolic (05UAR), BXU:74, Min:64, Max:78    I/O (24Hr): Intake/Output Summary (Last 24 hours) at 2021 0831  Last data filed at 2021 0615  Gross per 24 hour   Intake 800 ml   Output 2500 ml   Net -1700 ml     Objective:  General Appearance:  Comfortable. Vital signs: (most recent): Blood pressure (!) 156/68, pulse 64, temperature 98.2 °F (36.8 °C), resp. rate 17, height 5' (1.524 m), weight 297 lb 2.9 oz (134.8 kg), SpO2 91 %, not currently breastfeeding.  (BP slightly elevated). Labs/Imaging/Diagnostics    Labs:  CBC:  Recent Labs     09/10/21  1706 09/13/21  0513   WBC 12.8* 9.9   RBC 3.36* 3.57*   HGB 9.8* 10.4*   HCT 30.7* 32.4*   MCV 91.5 90.7   RDW 16.9* 17.8*    396     CHEMISTRIES:  Recent Labs     09/10/21  17021  0513    140   K 4.3 4.1   CL 98 100   CO2 33* 30   BUN 13 15   CREATININE 1.13* 1.19*   GLUCOSE 150* 126*     PT/INR:No results for input(s): PROTIME, INR in the last 72 hours. APTT:No results for input(s): APTT in the last 72 hours. LIVER PROFILE:No results for input(s): AST, ALT, BILIDIR, BILITOT, ALKPHOS in the last 72 hours. Imaging Last 24 Hours:  No results found.   Assessment//Plan           Hospital Problems         Last Modified POA    * (Principal) General weakness 9/11/2021 Yes    Hypertension, benign (Chronic) 9/11/2021 Yes    Diabetes mellitus type II, controlled ([address] (Chronic) 9/11/2021 Yes    Other hyperlipidemia (Chronic) 9/11/2021 Yes    Overview Signed 9/7/2015  4:27 AM by [facility]     replace inactive diagnosis         BRIANNA (obstructive sleep apnea) 9/11/2021 Yes    Lumbar radiculopathy 9/11/2021 Yes    Morbid obesity ([address] 9/11/2021 Yes    Chronic pain syndrome (Chronic) 9/11/2021 Yes    Iron deficiency anemia 9/11/2021 Yes    Fibromyalgia 9/11/2021 Yes    Tremor 9/11/2021 Yes    PUD (peptic ulcer disease) 9/11/2021 Yes    Polyneuropathy due to type 2 diabetes mellitus ([facility] 9/11/2021 Yes    Spondylosis without myelopathy 9/11/2021 Yes    Multiple falls 9/12/2021 Yes        Assessment & Plan    PT/OT    Discharge planning    Electronically signed by Brandt Mcknight MD on 9/13/21 at 8:31 AM EDT

## 2021-09-13 NOTE — PROGRESS NOTES
[facility]: [facility] LOOMIS   INPATIENT OCCUPATIONAL THERAPY  PROGRESS NOTE  Date: 2021  Patient Name: Ethan Dowling      Room: 2981/4784-11  MRN: [id number]    : 1955  (64 y.o.) Gender: female     Discharge Recommendations:  Further Occupational Therapy is recommended upon facility discharge. Referring Practitioner: Dr. Oksana Reed   Diagnosis: General weakness   General  Chart Reviewed: Yes  Patient assessed for rehabilitation services?: Yes  Family / Caregiver Present: No  Referring Practitioner: Dr. Oksana Reed   Diagnosis: General weakness     Restrictions  Restrictions/Precautions: General Precautions, Fall Risk  Implants present? : Metal implants (lumbar)  Required Braces or Orthoses?: No      Subjective  Subjective: \"My shoulders already feel like jello\" pt states following x2 reps of exercises. Comments: Pt pleasant and cooperative. Call light on upon entry, pt requesting to use commode. Patient Currently in Pain: Yes  Pain Level: 1  Overall Orientation Status: Within Functional Limits     Pain Assessment  Pain Assessment: 0-10  Pain Level: 1  Pain Type: Chronic pain    Objective  Cognition  Overall Cognitive Status: WFL     Balance  Sitting Balance: Independent  Standing Balance: Contact guard assistance (with toileting hygiene task, SBA otherwise. )  Standing Balance  Time: ~1 min x 2   Activity: toileting transfer and task      ADL  UE Bathing: Setup (per pt report. )  LE Bathing: Minimal assistance (Assist with tomas area and top of feet per pt report. )  UE Dressing: Setup (per pt report. )  LE Dressing: Dependent/Total (no lower body garments on except socks)  Toileting: Minimal assistance (CGA while pt attempts tomas hygiene, Assist for throughness.  Writer encouraged pt independence initially, pt reports home is easier because her feet can touch the floor from a sitting position, utilized RW for support in standing/leaning, discomfort verbalized-fear of falling)  Additional Comments: pt reports never wearing socks at home. Pt recommended use of sock aide and reacher, writer reports being familiar however never thought a priority for self. Transfers  Stand Pivot Transfers: Stand by assistance (recliner chair to commode)  Sit to stand: Contact guard assistance  Stand to sit: Stand by assistance  Transfer Comments: no device. Toilet Transfers  Toilet - Technique: Stand pivot  Equipment Used: Extra wide bedside commode  Toilet Transfer: Stand by assistance  Type of ROM/Therapeutic Exercise  Type of ROM/Therapeutic Exercise: Resistive Bands (green )  Comment: Pt engaged in UB ex's to promote overall strength and endurance for functional tasks and transfers. x15-20 reps completed as tolerated. Exercises  Shoulder Flexion: x  Shoulder Extension: x  Horizontal ABduction: x  Horizontal ADduction: x  Elbow Flexion: x  Elbow Extension: x     Assessment  Performance deficits / Impairments: Decreased functional mobility ; Decreased ADL status; Decreased strength;Decreased safe awareness;Decreased balance;Decreased endurance  Prognosis: Good  Discharge Recommendations: Patient would benefit from continued therapy after discharge  Activity Tolerance: Patient Tolerated treatment well;Patient limited by fatigue;Patient limited by pain  Safety Devices in place: Yes  Type of devices: Call light within reach; Left in chair; All fall risk precautions in place           Plan  Safety Devices  Safety Devices in place: Yes  Type of devices: Call light within reach, Left in chair, All fall risk precautions in place  Plan  Times per week: 5-7  Times per day: Daily  Current Treatment Recommendations: Strengthening, ROM, Balance Training, Functional Mobility Training, Endurance Training, Pain Management, Safety Education & Training, Patient/Caregiver Education & Training, Self-Care / ADL      Goals  Short term goals  Time Frame for Short term goals: By discharge, pt will  Short term goal 1: complete UB ADLs with Modified independence   Short term goal 2: complete LB ADLs with Min A and the use of adaptive equipment as needed   Short term goal 3: complete toileting tasks with SBA  Short term goal 4: perform a functional task while standing for 3+ min with 0-1 UE supports and SBA  Short term goal 5: perform functional mobility/transfers during ADL tasks with SBA   Short term goal 6: participate in 10+ min of therapeutic exercises/activities to increase strength and endurance for ADL tasks.      OT Individual Minutes  Time In: 7209  Time Out: 9644  Minutes: 25    Electronically signed by STEPHEN Munoz on 9/13/21 at 1:31 PM EDT

## 2021-09-13 NOTE — PROGRESS NOTES
Physical Therapy  Facility/Department: [facility] MED SURG  Daily Treatment Note  NAME: Leonardo Garcia  : 1955  MRN: [id number]    Date of Service: 2021    Discharge Recommendations:  Continue to assess pending progress        Assessment   Body structures, Functions, Activity limitations: Decreased functional mobility ; Decreased strength  Assessment: PT would benefit from continued PT to increase strength, endurance, and safety with functional mobility. Treatment Diagnosis: Dec function  Prognosis: Good  Decision Making: Low Complexity  REQUIRES PT FOLLOW UP: Yes  Activity Tolerance  Activity Tolerance: Patient Tolerated treatment well     Patient Diagnosis(es): The encounter diagnosis was Multiple falls. has a past medical history of Acute hypoxemic respiratory failure ([facility] [address] Arthritis, CHF (congestive heart failure) ([facility] [address] Chronic back pain, Diabetes mellitus type II, controlled ([facility] [address] [facility] Fibromyalgia, Hyperlipidemia LDL goal < 70, Hypertension, benign, Morbid obesity with BMI of 60.0-69.9, adult ([facility] [address] Pain of toe of right foot, Right wrist pain, Skin cancer, Sleep apnea, and Wears glasses. has a past surgical history that includes Tubal ligation (); Hysterectomy (); lumbar fusion (10/2010; 2011); Carpal tunnel release (Right, 2014); cyst removal (); Cholecystectomy, laparoscopic (); lumbar fusion (); Rotator cuff repair (Left, ); Cataract removal with implant (Bilateral, ); Skin cancer excision (); Inguinal hernia repair (Bilateral, 2017); other surgical history (2018); Upper gastrointestinal endoscopy (N/A, 10/16/2019); and Colonoscopy (N/A, 10/17/2019). Restrictions  Restrictions/Precautions  Restrictions/Precautions: General Precautions, Fall Risk  Required Braces or Orthoses?: No  Implants present? : Metal implants (Lumbar )  Subjective   Subjective  Subjective: Pt is pleasant and agreeable to PT. Pt demos taking O2 off to amb this date.  Pt's SPO2 is 94-95% on RA. RN informed and ok's pt to stay on RA post tx. Pt prefers to return home however will need to attempt x4 4\" stairs to ensure safety. General Comment  Comments: RN [facility] ok's pt for PT. Pain Screening  Patient Currently in Pain: Yes  Pain Assessment  Response to Pain Intervention: Patient Satisfied  Vital Signs  BP Location: Right lower arm  Level of Consciousness: Alert (0)  Patient Currently in Pain: Yes  Oxygen Therapy  O2 Device: Nasal cannula       Orientation  Orientation  Overall Orientation Status: Within Normal Limits  Objective      Transfers  Sit to Stand: Contact guard assistance  Stand to sit: Contact guard assistance  Comment: Transfers completed with RW. Cues for hand placement with fair - carryover. Ambulation  Ambulation?: Yes  More Ambulation?: No  Ambulation 1  Surface: level tile  Device: Rolling Walker  Assistance: Contact guard assistance (SBA  due to frequent falling and bilateral knees unsteady)  Quality of Gait: Decrease heri, antalgic gait pattern due to B knee pain. Pt demos a very forward flexed posture with pushing RW out of LISY. Cues for technique and posture, poor carryover. Gait Deviations: Slow Heri  Distance: 30'x1 and 10'x1  Comments: Pt requires a standing rest break. Cues for safety due to pt resting forearms on RW. Stairs/Curb  Stairs?: No (reports she has 4 4\" stairs to enter home)     Balance  Posture: Fair  Sitting - Static: Fair;+  Sitting - Dynamic: Fair;+  Standing - Static: Fair;+  Standing - Dynamic: Fair;+  Comments: standing balance assessed with RW. Other exercises  Other exercises?: Yes  Other exercises 1: STS x2  Other exercises 2: Seated BLE ex's with lime green thera band for light resistance. Reps: 10-15 each. \"I don't want to overwork my legs\". Pt requires edu on strengthening techniques to improve functional mobility.    Other exercises 3: Edu on the importance of continued PT, safety awareness with functional mobility, and energy conservation techniques. Other exercises 4: RN ok's pt to be on RA post tx. Comment: rest breaks PRN.                      Goals  Short term goals  Time Frame for Short term goals: 7 visits  Short term goal 1: Patient to demonstrate bed mobility MIN x1A  Short term goal 2: Patient to demosntrate transfers SBA  Short term goal 3: Patient to demonstrate GOOD standing balance  Short term goal 4: PAtient to demonstrate 50ft ambulation CGA    Plan    Plan  Times per week: 5-6  Times per day: Daily  Current Treatment Recommendations: Strengthening, Transfer Training, Endurance Training, Balance Training, Gait Training, Home Exercise Program  Safety Devices  Type of devices: Gait belt, Call light within reach, Left in chair  Therapy Time   Individual Concurrent Group Co-treatment   Time In 1108         Time Out 1148         Minutes [address] [place]

## 2021-09-13 NOTE — PROGRESS NOTES
Andrew Porch obtained  Pre-cert this day. Await discharge order. 7000 started in [facility].

## 2021-09-14 VITALS
BODY MASS INDEX: 57.52 KG/M2 | TEMPERATURE: 97.8 F | DIASTOLIC BLOOD PRESSURE: 71 MMHG | WEIGHT: 293 LBS | HEIGHT: 60 IN | SYSTOLIC BLOOD PRESSURE: 164 MMHG | RESPIRATION RATE: 14 BRPM | HEART RATE: 65 BPM | OXYGEN SATURATION: 95 %

## 2021-09-14 LAB
GLUCOSE BLD-MCNC: 106 MG/DL (ref 65–105)
GLUCOSE BLD-MCNC: 167 MG/DL (ref 65–105)

## 2021-09-14 PROCEDURE — 6370000000 HC RX 637 (ALT 250 FOR IP): Performed by: FAMILY MEDICINE

## 2021-09-14 PROCEDURE — G0378 HOSPITAL OBSERVATION PER HR: HCPCS

## 2021-09-14 PROCEDURE — 94761 N-INVAS EAR/PLS OXIMETRY MLT: CPT

## 2021-09-14 PROCEDURE — 6360000002 HC RX W HCPCS: Performed by: FAMILY MEDICINE

## 2021-09-14 PROCEDURE — 97110 THERAPEUTIC EXERCISES: CPT

## 2021-09-14 PROCEDURE — 97116 GAIT TRAINING THERAPY: CPT

## 2021-09-14 PROCEDURE — 94640 AIRWAY INHALATION TREATMENT: CPT

## 2021-09-14 PROCEDURE — 82947 ASSAY GLUCOSE BLOOD QUANT: CPT

## 2021-09-14 RX ORDER — SULFAMETHOXAZOLE AND TRIMETHOPRIM 800; 160 MG/1; MG/1
1 TABLET ORAL EVERY 12 HOURS SCHEDULED
Qty: 20 TABLET | Refills: 0 | Status: SHIPPED | OUTPATIENT
Start: 2021-09-14 | End: 2021-09-24

## 2021-09-14 RX ORDER — SULFAMETHOXAZOLE AND TRIMETHOPRIM 800; 160 MG/1; MG/1
1 TABLET ORAL EVERY 12 HOURS SCHEDULED
Status: DISCONTINUED | OUTPATIENT
Start: 2021-09-14 | End: 2021-09-14 | Stop reason: HOSPADM

## 2021-09-14 RX ADMIN — ALBUTEROL SULFATE 2.5 MG: 2.5 SOLUTION RESPIRATORY (INHALATION) at 07:11

## 2021-09-14 RX ADMIN — PSEUDOEPHEDRINE HYDROCHLORIDE 120 MG: 120 TABLET, FILM COATED, EXTENDED RELEASE ORAL at 09:46

## 2021-09-14 RX ADMIN — LINACLOTIDE 145 MCG: 145 CAPSULE, GELATIN COATED ORAL at 06:26

## 2021-09-14 RX ADMIN — ASPIRIN 81 MG: 81 TABLET, COATED ORAL at 09:47

## 2021-09-14 RX ADMIN — MULTIPLE VITAMINS W/ MINERALS TAB 1 TABLET: TAB at 09:44

## 2021-09-14 RX ADMIN — CYANOCOBALAMIN TAB 1000 MCG 1000 MCG: 1000 TAB at 09:44

## 2021-09-14 RX ADMIN — CETIRIZINE HYDROCHLORIDE 5 MG: 10 TABLET, FILM COATED ORAL at 09:44

## 2021-09-14 RX ADMIN — GLIPIZIDE 10 MG: 10 TABLET ORAL at 06:26

## 2021-09-14 RX ADMIN — AMLODIPINE BESYLATE 10 MG: 5 TABLET ORAL at 09:43

## 2021-09-14 RX ADMIN — CARVEDILOL 12.5 MG: 12.5 TABLET, FILM COATED ORAL at 09:44

## 2021-09-14 RX ADMIN — VENLAFAXINE HYDROCHLORIDE 37.5 MG: 37.5 CAPSULE, EXTENDED RELEASE ORAL at 09:47

## 2021-09-14 RX ADMIN — FAMOTIDINE 20 MG: 20 TABLET, FILM COATED ORAL at 09:44

## 2021-09-14 RX ADMIN — ALOGLIPTIN 25 MG: 25 TABLET, FILM COATED ORAL at 09:43

## 2021-09-14 RX ADMIN — PREGABALIN 150 MG: 150 CAPSULE ORAL at 09:43

## 2021-09-14 RX ADMIN — FUROSEMIDE 20 MG: 20 TABLET ORAL at 09:44

## 2021-09-14 RX ADMIN — SULFAMETHOXAZOLE AND TRIMETHOPRIM 1 TABLET: 800; 160 TABLET ORAL at 09:43

## 2021-09-14 RX ADMIN — CALCIUM CARBONATE 600 MG (1,500 MG)-VITAMIN D3 400 UNIT TABLET 1 TABLET: at 09:44

## 2021-09-14 NOTE — PROGRESS NOTES
SW spoke to pt regarding approval for SNF. Pt said she does not want to go to rehab and she will go home with home care. Britney Martin from [facility] also met with pt who again insisted on going home. Britney Martin left pt with information to contact her if things do not work out at home.

## 2021-09-14 NOTE — PROGRESS NOTES
Yes    Diabetes mellitus type II, controlled ([address] (Chronic) 9/11/2021 Yes    Other hyperlipidemia (Chronic) 9/11/2021 Yes    Overview Signed 9/7/2015  4:27 AM by [facility]     replace inactive diagnosis         BRIANNA (obstructive sleep apnea) 9/11/2021 Yes    Lumbar radiculopathy 9/11/2021 Yes    Morbid obesity ([address] 9/11/2021 Yes    Chronic pain syndrome (Chronic) 9/11/2021 Yes    Iron deficiency anemia 9/11/2021 Yes    Fibromyalgia 9/11/2021 Yes    Tremor 9/11/2021 Yes    PUD (peptic ulcer disease) 9/11/2021 Yes    Polyneuropathy due to type 2 diabetes mellitus ([address] 9/11/2021 Yes    Spondylosis without myelopathy 9/11/2021 Yes    Multiple falls 9/12/2021 Yes        Assessment & Plan  D/c home with home care    Acute cystitis - start bactrim, follow cultures as OP for sensitivities.    Electronically signed by Amie Nixon MD on 9/14/21 at 8:37 AM EDT

## 2021-09-14 NOTE — PLAN OF CARE
Problem: Falls - Risk of:  Goal: Will remain free from falls  Description: Will remain free from falls  9/14/2021 0447 by Seb Fam RN  Outcome: Ongoing     Problem: Falls - Risk of:  Goal: Absence of physical injury  Description: Absence of physical injury  9/14/2021 0447 by Seb Fam RN  Outcome: Ongoing     Problem: Pain:  Goal: Pain level will decrease  Description: Pain level will decrease  9/14/2021 0447 by Seb Fam RN  Outcome: Ongoing     Problem: Pain:  Goal: Control of acute pain  Description: Control of acute pain  9/14/2021 0447 by Seb Fam RN  Outcome: Ongoing     Problem: Pain:  Goal: Control of chronic pain  Description: Control of chronic pain  9/14/2021 0447 by Seb Fam RN  Outcome: Ongoing

## 2021-09-14 NOTE — CARE COORDINATION
ONGOING DISCHARGE PLAN:    Patient is alert and oriented x4. Spoke with patient regarding discharge plan and patient is now refusing SNF - [facility]. States she is only going home and would like [facility] - [facility]. Message left for Sarah Kaba from [facility] to make sure they are able to accept as referral was sent on Sunday. Pt states she feels safe at home and does not feel she will be falling anymore. She states the falls were due to stress at home, and she will not be at home alone anymore. Pt will be discharged home today on PO Bactrim. Will continue to follow for additional discharge needs. Electronically signed by Lorie Abdullahi RN on 9/14/2021 at 10:08 AM    Received message from Sarah Kaba at [facility] stating they are able to accept for [facility]. Notified Sarah Kaba that pt will be discharged home today.     Electronically signed by Lorie Abdullahi RN on 9/14/2021 at 11:14 AM

## 2021-09-14 NOTE — PROGRESS NOTES
CLINICAL PHARMACY NOTE: MEDS TO BEDS    Total # of Prescriptions Filled: 1   The following medications were delivered to the patient:  · Bactrim -160mg    Additional Documentation:  Delivered medication to patients room

## 2021-09-14 NOTE — PROGRESS NOTES
Physical Therapy  Facility/Department: [facility] MED SURG  Daily Treatment Note  NAME: Jeremie Espinoza  : 1955  MRN: [id number]    Date of Service: 2021    Discharge Recommendations:  Continue to assess pending progress     Assessment   Body structures, Functions, Activity limitations: Decreased functional mobility ; Decreased strength  Treatment Diagnosis: Dec function  Prognosis: Good  Decision Making: Low Complexity  REQUIRES PT FOLLOW UP: Yes  Activity Tolerance  Activity Tolerance: Patient Tolerated treatment well;Patient limited by endurance     Patient Diagnosis(es): The encounter diagnosis was Multiple falls. has a past medical history of Acute hypoxemic respiratory failure ([facility] [address] Arthritis, CHF (congestive heart failure) ([facility] [address] Chronic back pain, Diabetes mellitus type II, controlled ([facility] [address] Fibromyalgia, Hyperlipidemia LDL goal < 70, Hypertension, benign, Morbid obesity with BMI of 60.0-69.9, adult ([address] Pain of toe of right foot, Right wrist pain, Skin cancer, Sleep apnea, and Wears glasses. has a past surgical history that includes Tubal ligation (); Hysterectomy (); lumbar fusion (10/2010; 2011); Carpal tunnel release (Right, 2014); cyst removal (); Cholecystectomy, laparoscopic (); lumbar fusion (); Rotator cuff repair (Left, ); Cataract removal with implant (Bilateral, ); Skin cancer excision (); Inguinal hernia repair (Bilateral, 2017); other surgical history (2018); Upper gastrointestinal endoscopy (N/A, 10/16/2019); and Colonoscopy (N/A, 10/17/2019). Restrictions  Restrictions/Precautions  Restrictions/Precautions: General Precautions, Fall Risk  Required Braces or Orthoses?: No  Implants present? : Metal implants (Lumbar )     Subjective   Subjective  Subjective: Patient sitting up in recliner start and end of session. Reports sleeping in one at home. States she is refusing SNF and prefers to go home.   General Comment  Comments: RN Salvador mckay's pt for PT. Pain Screening  Patient Currently in Pain: Denies  Vital Signs  Patient Currently in Pain: Denies  Oxygen Therapy  SpO2: 95 %  O2 Device: None (Room air)       Orientation  Orientation  Overall Orientation Status: Within Normal Limits    Objective   Bed mobility  Comment: Unable to assess - Pt seated in recliner start and end of session. Transfers  Sit to Stand: Contact guard assistance  Stand to sit: Contact guard assistance  Comment: Transfers completed with RW. Cues for hand placement with fair - carryover. Ambulation  Ambulation?: Yes  Ambulation 1  Surface: level tile  Device: Rolling Walker  Assistance: Contact guard assistance  Quality of Gait: Decrease odette, antalgic gait pattern due to B knee pain. Pt demos a very forward flexed posture with pushing RW out of LISY. Cues for technique and posture, fair carryover. Gait Deviations: Slow Odette  Distance: 64' x1  Comments: One short standing rest break. O2 95%, HR 86 post amb attempt. Stairs/Curb  Stairs?: Yes  Stairs  # Steps : 4  Stairs Height: 4\"  Rails: Bilateral  Device: Rolling walker  Assistance: Contact guard assistance;2 Person assistance (2nd person CGA for safety)  Comment: Pt educ of step-to pattern. No LOB. Balance  Posture: Fair  Sitting - Static: Fair;+  Sitting - Dynamic: Fair;+  Standing - Static: Fair;+  Standing - Dynamic: Fair;+  Comments: standing balance assessed with RW. Other exercises  Other exercises?: Yes  Other exercises 1: STS x2  Other exercises 2: Seated BLE ex's with lime green thera band for light resistance. Reps: 10-15 each. Pt requires edu on strengthening techniques to improve functional mobility. Other exercises 3: Edu on the importance of continued PT, safety awareness with functional mobility, and energy conservation techniques. Other exercises 4: HEP handout provided for B LE strengthening. All questions on patient answered at this time.      Goals  Short term goals  Time

## 2021-09-21 NOTE — DISCHARGE SUMMARY
Family Medicine Discharge Summary    Juan Pablo Bentley  :  1955  MRN:  [id number]    Admit date:  9/10/2021  Discharge date:  2021    Admitting Physician:  Gus Lesch, MD    Discharge Diagnoses:    Patient Active Problem List   Diagnosis    Hypertension, benign    Diabetes mellitus type II, controlled ([facility] [address]    Other hyperlipidemia    Degeneration of cervical intervertebral disc    Cervicalgia    BRIANNA (obstructive sleep apnea)    Anemia    Hepatic steatosis    Lumbar radiculopathy    Morbid obesity (HCC)    Chronic pain syndrome    H/O: GI bleed    Iron deficiency anemia    Fibromyalgia    Class 3 severe obesity due to excess calories with serious comorbidity and body mass index (BMI) of 50.0 to 59.9 in adult (HCC)    Tremor    Fatigue    [facility] renal insufficiency, stage III (moderate) ([facility])    Cellulitis of right leg    Lymphedema of both lower extremities    PUD (peptic ulcer disease)    Constipation    Generalized abdominal pain    Altered mental status    Allergic rhinitis    Arthritis    Lumbar spine instability    Polyneuropathy due to type 2 diabetes mellitus ([facility] [address]    Primary central sleep apnea    Skin cancer    Spondylosis without myelopathy    Iron malabsorption    Iron deficiency    General weakness    Multiple falls       Admission Condition:  fair  Discharged Condition:  fair    Hospital Course:   60 yo admitted after 2 episodes of fall in a 2 day period. She was treated with supportive care and PT/OT.     Discharge Medications:       Cari Lands   Home Medication Instructions PVE:[id number]    Printed on:21 0802   Medication Information                      albuterol (PROVENTIL) (2.5 MG/3ML) 0.083% nebulizer solution  Take 3 mLs by nebulization 2 times daily             amLODIPine (NORVASC) 10 MG tablet  TAKE 1 TABLET BY MOUTH DAILY WITH DINNER             aspirin 81 MG tablet  Take 1 tablet by mouth daily (with breakfast) atorvastatin (LIPITOR) 40 MG tablet  TAKE 1 TABLET BY MOUTH EVERY DAY AT NIGHT             blood glucose test strips (ACCU-CHEK POOJA PLUS) strip  Check glucose AC BID, dx E11.9, may sub covered product             Calcium Carb-Cholecalciferol (CALCIUM 600 + D PO)  Take 1 tablet by mouth 2 times daily             carvedilol (COREG) 12.5 MG tablet  TAKE 1 TABLET BY MOUTH TWICE A DAY WITH MEALS             clotrimazole (LOTRIMIN) 1 % cream  APPLY TO AFFECTED AREA TWICE A DAY             Coenzyme Q10 (COQ10 PO)  Take 200 mg by mouth nightly              cyanocobalamin ([employer] VITAMIN B12) 1000 MCG tablet  Take 1 tablet by mouth daily             famotidine (PEPCID) 20 MG tablet  Take 1 tablet by mouth 2 times daily             furosemide (LASIX) 40 MG tablet  TAKE 1/2 TABLET BY MOUTH EVERY DAY             glimepiride (AMARYL) 4 MG tablet  TAKE 2 TABLETS BY MOUTH EVERY MORNING (BEFORE BREAKFAST)             Lancets MISC  1 each by Does not apply route daily Accucheck Pooja dx E11.9 check daily             linaclotide (LINZESS) 145 MCG capsule  Take 1 capsule by mouth every morning (before breakfast)             loratadine-pseudoephedrine (CLARITIN-D 12 HOUR) 5-120 MG per extended release tablet  Take 1 tablet by mouth daily as needed              Multiple Vitamins-Iron (ONE DAILY MULTIVITAMIN/IRON PO)  Take 1 tablet by mouth nightly             oxyCODONE (OXY-IR) 30 MG immediate release tablet  Take 30 mg by mouth every 8 hours as needed for Pain. oxyCODONE-acetaminophen (PERCOCET) 5-325 MG per tablet  Take 1 tablet by mouth every 6 hours as needed for Pain. pregabalin (LYRICA) 150 MG capsule  Take 1 capsule by mouth 2 times daily for 30 days.              sulfamethoxazole-trimethoprim (BACTRIM DS;SEPTRA DS) 800-160 MG per tablet  Take 1 tablet by mouth every 12 hours for 10 days             TRADJENTA 5 MG tablet  TAKE 1 TABLET BY MOUTH EVERY DAY             venlafaxine (EFFEXOR XR) 37.5 MG

## 2021-11-04 ENCOUNTER — HOSPITAL ENCOUNTER (OUTPATIENT)
Age: 66
Setting detail: SPECIMEN
Discharge: HOME | End: 2021-11-04
Payer: COMMERCIAL

## 2021-11-04 ENCOUNTER — OFFICE VISIT (OUTPATIENT)
Dept: ONCOLOGY | Age: 66
End: 2021-11-04
Payer: COMMERCIAL

## 2021-11-04 VITALS
RESPIRATION RATE: 16 BRPM | WEIGHT: 282 LBS | HEART RATE: 67 BPM | SYSTOLIC BLOOD PRESSURE: 130 MMHG | OXYGEN SATURATION: 93 % | DIASTOLIC BLOOD PRESSURE: 79 MMHG | BODY MASS INDEX: 55.36 KG/M2 | TEMPERATURE: 97.2 F | HEIGHT: 60 IN

## 2021-11-04 DIAGNOSIS — E61.1 IRON DEFICIENCY: ICD-10-CM

## 2021-11-04 DIAGNOSIS — E53.8 B12 DEFICIENCY: ICD-10-CM

## 2021-11-04 DIAGNOSIS — D64.9 ANEMIA, UNSPECIFIED TYPE: ICD-10-CM

## 2021-11-04 DIAGNOSIS — K90.9 IRON MALABSORPTION: ICD-10-CM

## 2021-11-04 DIAGNOSIS — K90.9 IRON MALABSORPTION: Primary | ICD-10-CM

## 2021-11-04 LAB
ABSOLUTE EOS #: 0.41 K/UL (ref 0–0.44)
ABSOLUTE IMMATURE GRANULOCYTE: 0.06 K/UL (ref 0–0.3)
ABSOLUTE LYMPH #: 2.21 K/UL (ref 1.1–3.7)
ABSOLUTE MONO #: 0.78 K/UL (ref 0.1–1.2)
BASOPHILS # BLD: 1 % (ref 0–2)
BASOPHILS ABSOLUTE: 0.11 K/UL (ref 0–0.2)
DIFFERENTIAL TYPE: ABNORMAL
EOSINOPHILS RELATIVE PERCENT: 3 % (ref 1–4)
FERRITIN: 211 UG/L (ref 13–150)
FOLATE: >20 NG/ML
HCT VFR BLD CALC: 40.1 % (ref 36.3–47.1)
HEMOGLOBIN: 12 G/DL (ref 11.9–15.1)
IMMATURE GRANULOCYTES: 0 %
IRON SATURATION: 16 % (ref 20–55)
IRON: 40 UG/DL (ref 37–145)
LYMPHOCYTES # BLD: 15 % (ref 24–43)
MCH RBC QN AUTO: 28.9 PG (ref 25.2–33.5)
MCHC RBC AUTO-ENTMCNC: 29.9 G/DL (ref 28.4–34.8)
MCV RBC AUTO: 96.6 FL (ref 82.6–102.9)
MONOCYTES # BLD: 5 % (ref 3–12)
NRBC AUTOMATED: 0 PER 100 WBC
PDW BLD-RTO: 15.8 % (ref 11.8–14.4)
PLATELET # BLD: 417 K/UL (ref 138–453)
PLATELET ESTIMATE: ABNORMAL
PMV BLD AUTO: 11.2 FL (ref 8.1–13.5)
RBC # BLD: 4.15 M/UL (ref 3.95–5.11)
RBC # BLD: ABNORMAL 10*6/UL
SEG NEUTROPHILS: 76 % (ref 36–65)
SEGMENTED NEUTROPHILS ABSOLUTE COUNT: 11.23 K/UL (ref 1.5–8.1)
TOTAL IRON BINDING CAPACITY: 248 UG/DL (ref 250–450)
UNSATURATED IRON BINDING CAPACITY: 208 UG/DL (ref 112–347)
VITAMIN B-12: 1017 PG/ML (ref 232–1245)
WBC # BLD: 14.8 K/UL (ref 3.5–11.3)
WBC # BLD: ABNORMAL 10*3/UL

## 2021-11-04 PROCEDURE — G8484 FLU IMMUNIZE NO ADMIN: HCPCS | Performed by: INTERNAL MEDICINE

## 2021-11-04 PROCEDURE — G8417 CALC BMI ABV UP PARAM F/U: HCPCS | Performed by: INTERNAL MEDICINE

## 2021-11-04 PROCEDURE — 3017F COLORECTAL CA SCREEN DOC REV: CPT | Performed by: INTERNAL MEDICINE

## 2021-11-04 PROCEDURE — G8427 DOCREV CUR MEDS BY ELIG CLIN: HCPCS | Performed by: INTERNAL MEDICINE

## 2021-11-04 PROCEDURE — 1036F TOBACCO NON-USER: CPT | Performed by: INTERNAL MEDICINE

## 2021-11-04 PROCEDURE — 99214 OFFICE O/P EST MOD 30 MIN: CPT | Performed by: INTERNAL MEDICINE

## 2021-11-04 NOTE — PROGRESS NOTES
Today's Date: 11/4/2021  Patient Name: Guzman Knott  Date of admission: No admission date for patient encounter. Patient's age: 61 y.o., 1955  Admission Dx: No admission diagnoses are documented for this encounter. Reason for Consult: management recommendations  Requesting Physician: No admitting provider for patient encounter. CHIEF COMPLAINT: Fatigue. Anemia. Weakness    History Obtained From:  patient, electronic medical record    Interval history:    Patient presents to the clinic accompanied by her  to discuss results of lab work-up and further treatment plan. Patient does not have any recent iron studies done. Since last office visit patient did receive 1 infusion of Feraheme tolerated well. Patient was also hospitalized with a UTI now doing better with spread to the UTI. Patient is intolerant to oral iron    During this visit patient's allergy, social, medical, surgical history and medications were reviewed and updated. hISTORY OF PRESENT ILLNESS:      The patient is a 61 y.o.  female who is admitted to the hospital for chief complaints of fatigue patient was earlier seen in the office with complaints of progressive fatigue and leg edema. Work-up also revealed microcytic anemia. Patient has longstanding history of anemia. Iron studies done in November showed iron saturation of only 6%. Patient at that time had borderline B12 stores. Patient stool occult blood testing on 612 is negative. Patient denies any gross bleeding. Patient has not had thyroid testing in recent past.  Patient had EGD and colonoscopy in October 2019. Colonoscopy was unremarkable other than findings of internal hemorrhoids and sticky pasty stools. EGD showed a small 4 mm deep ulcer close to the pylorus with no active bleeding. Biopsies were taken. There was no evidence of H. pylori infection. Patient also has history of CKD.     Patient denies any bleeding vaginally per rectum or bloody vomiting or cough. Patient is intolerant to oral iron and has not been taking any iron. Past Medical History:   has a past medical history of Acute hypoxemic respiratory failure ([facility] [address] Arthritis, CHF (congestive heart failure) ([facility] [address] Chronic back pain, Diabetes mellitus type II, controlled ([facility] [address] Fibromyalgia, Hyperlipidemia LDL goal < 70, Hypertension, benign, Morbid obesity with BMI of 60.0-69.9, adult ([facility] [address] Pain of toe of right foot, Right wrist pain, Skin cancer, Sleep apnea, and Wears glasses. Past Surgical History:   has a past surgical history that includes Tubal ligation (1981); Hysterectomy (1998); lumbar fusion (10/2010; 03/2011); Carpal tunnel release (Right, 09/05/2014); cyst removal (1985); Cholecystectomy, laparoscopic (1998); lumbar fusion (2009); Rotator cuff repair (Left, 2012); Cataract removal with implant (Bilateral, 2013); Skin cancer excision (2005); Inguinal hernia repair (Bilateral, 01/20/2017); other surgical history (05/02/2018); Upper gastrointestinal endoscopy (N/A, 10/16/2019); and Colonoscopy (N/A, 10/17/2019). Medications:    Prior to Admission medications    Medication Sig Start Date End Date Taking? Authorizing Provider   pregabalin (LYRICA) 150 MG capsule TAKE 1 CAPSULE BY MOUTH 2 TIMES DAILY FOR 30 DAYS. 10/31/21 11/30/21 Yes Lennox Salinas MD   atorvastatin (LIPITOR) 40 MG tablet Take 1 tablet by mouth nightly 10/20/21  Yes Lennox Salinas MD   oxyCODONE (OXY-IR) 30 MG immediate release tablet Take 1 tablet by mouth every 8 hours as needed for Pain for up to 30 days. 10/6/21 11/5/21 Yes Lennox Salinas MD   glimepiride (AMARYL) 4 MG tablet TAKE 2 TABLETS BY MOUTH EVERY MORNING (BEFORE BREAKFAST) 10/4/21  Yes VIDHYA Sorensen NP   oxyCODONE-acetaminophen (PERCOCET) 5-325 MG per tablet Take 1 tablet by mouth every 6 hours as needed for Pain.  10/4/21 10/4/22 Yes Rickie Dumbuya, APRN - NP   linaclotide (LINZESS) 145 MCG capsule Take 1 capsule by mouth every morning (before breakfast) 8/2/21  Yes Dipak Caldera MD   cyanocobalamin ([employer] VITAMIN B12) 1000 MCG tablet Take 1 tablet by mouth daily 7/15/21  Yes Henry Dudley MD   clotrimazole (LOTRIMIN) 1 % cream APPLY TO AFFECTED AREA TWICE A DAY 5/25/21  Yes Dipak Caldera MD   amLODIPine (NORVASC) 10 MG tablet TAKE 1 TABLET BY MOUTH DAILY WITH DINNER 5/5/21  Yes Dipak Caldera MD   venlafaxine (EFFEXOR XR) 37.5 MG extended release capsule TAKE 1 CAPSULE BY MOUTH EVERY DAY 5/3/21  Yes Dipak Caldera MD   blood glucose test strips (ACCU-CHEK POOJA PLUS) strip Check glucose AC BID, dx E11.9, may sub covered product 4/7/21  Yes Dipak Caldera MD   furosemide (LASIX) 40 MG tablet TAKE 1/2 TABLET BY MOUTH EVERY DAY 4/1/21  Yes VIDHYA South NP   TRADJENTA 5 MG tablet TAKE 1 TABLET BY MOUTH EVERY DAY 2/22/21  Yes VIDHYA Araya NP   carvedilol (COREG) 12.5 MG tablet TAKE 1 TABLET BY MOUTH TWICE A DAY WITH MEALS 2/22/21  Yes VIDHYA Araya NP   famotidine (PEPCID) 20 MG tablet Take 1 tablet by mouth 2 times daily 1/13/21  Yes Ambar Escalante MD   aspirin 81 MG tablet Take 1 tablet by mouth daily (with breakfast) 4/21/20  Yes Dipak Caldera MD   Lancets MISC 1 each by Does not apply route daily Accucheck Pooja dx E11.9 check daily 2/14/20  Yes Dipak Caldera MD   albuterol (PROVENTIL) (2.5 MG/3ML) 0.083% nebulizer solution Take 3 mLs by nebulization 2 times daily 1/11/20  Yes Nicholas Torres MD   Calcium Carb-Cholecalciferol (CALCIUM 600 + D PO) Take 1 tablet by mouth 2 times daily   Yes Historical Provider, MD   Multiple Vitamins-Iron (ONE DAILY MULTIVITAMIN/IRON PO) Take 1 tablet by mouth nightly   Yes Historical Provider, MD   Coenzyme Q10 (COQ10 PO) Take 200 mg by mouth nightly    Yes Historical Provider, MD   loratadine-pseudoephedrine (CLARITIN-D 12 HOUR) 5-120 MG per extended release tablet Take 1 tablet by mouth daily as needed    Yes Historical Provider, MD     Current Outpatient Medications   Medication Sig Dispense Refill    pregabalin (LYRICA) 150 MG capsule TAKE 1 CAPSULE BY MOUTH 2 TIMES DAILY FOR 30 DAYS. 180 capsule 3    atorvastatin (LIPITOR) 40 MG tablet Take 1 tablet by mouth nightly 90 tablet 3    oxyCODONE (OXY-IR) 30 MG immediate release tablet Take 1 tablet by mouth every 8 hours as needed for Pain for up to 30 days. 90 tablet 0    glimepiride (AMARYL) 4 MG tablet TAKE 2 TABLETS BY MOUTH EVERY MORNING (BEFORE BREAKFAST) 180 tablet 3    oxyCODONE-acetaminophen (PERCOCET) 5-325 MG per tablet Take 1 tablet by mouth every 6 hours as needed for Pain.  120 tablet 0    linaclotide (LINZESS) 145 MCG capsule Take 1 capsule by mouth every morning (before breakfast) 30 capsule 11    cyanocobalamin ([employer] VITAMIN B12) 1000 MCG tablet Take 1 tablet by mouth daily 90 tablet 1    clotrimazole (LOTRIMIN) 1 % cream APPLY TO AFFECTED AREA TWICE A DAY 45 g 11    amLODIPine (NORVASC) 10 MG tablet TAKE 1 TABLET BY MOUTH DAILY WITH DINNER 90 tablet 3    venlafaxine (EFFEXOR XR) 37.5 MG extended release capsule TAKE 1 CAPSULE BY MOUTH EVERY DAY 90 capsule 3    blood glucose test strips (ACCU-CHEK POOJA PLUS) strip Check glucose AC BID, dx E11.9, may sub covered product 100 strip 11    furosemide (LASIX) 40 MG tablet TAKE 1/2 TABLET BY MOUTH EVERY DAY 45 tablet 3    TRADJENTA 5 MG tablet TAKE 1 TABLET BY MOUTH EVERY DAY 90 tablet 3    carvedilol (COREG) 12.5 MG tablet TAKE 1 TABLET BY MOUTH TWICE A DAY WITH MEALS 180 tablet 3    famotidine (PEPCID) 20 MG tablet Take 1 tablet by mouth 2 times daily 180 tablet 3    aspirin 81 MG tablet Take 1 tablet by mouth daily (with breakfast) 30 tablet 11    Lancets MISC 1 each by Does not apply route daily Accucheck Pooja dx E11.9 check daily 100 each 11    albuterol (PROVENTIL) (2.5 MG/3ML) 0.083% nebulizer solution Take 3 mLs by nebulization 2 times daily 120 each 3    Calcium Carb-Cholecalciferol (CALCIUM 600 + D PO) Take 1 tablet by mouth 2 times daily      Multiple Vitamins-Iron (ONE DAILY MULTIVITAMIN/IRON PO) Take 1 tablet by mouth nightly      Coenzyme Q10 (COQ10 PO) Take 200 mg by mouth nightly       loratadine-pseudoephedrine (CLARITIN-D 12 HOUR) 5-120 MG per extended release tablet Take 1 tablet by mouth daily as needed        No current facility-administered medications for this visit. Allergies:  Adhesive tape, Morphine, Iron, Lisinopril, Metformin and related, Molds & smuts, and Pcn [penicillins]    Social History:   reports that she has never smoked. She has never used smokeless tobacco. She reports previous alcohol use. She reports that she does not use drugs. Family History: family history includes COPD in her sister; Cancer in her mother; Diabetes in her brother, maternal grandmother, mother, and sister; Heart Attack in her paternal grandfather; Heart Disease in her paternal grandmother and sister; High Blood Pressure in her daughter, paternal grandmother, and son; Duanne Susanne in her father; Stomach Cancer in her brother; Stroke in her paternal grandmother. REVIEW OF SYSTEMS:      Constitutional: No fever or chills. No night sweats, no weight loss. Positive fatigue, manageable  Eyes: No eye discharge, double vision, or eye pain   HEENT: negative for sore mouth, sore throat, hoarseness and voice change   Respiratory: negative for cough , sputum, dyspnea, wheezing, hemoptysis, chest pain   Cardiovascular: negative for chest pain, dyspnea, palpitations, orthopnea, PND   Gastrointestinal: negative for nausea, vomiting, diarrhea, constipation, abdominal pain, Dysphagia, hematemesis and hematochezia   Genitourinary: negative for frequency, dysuria, nocturia, urinary incontinence, and hematuria   Integument: negative for rash, skin lesions, bruises.    Hematologic/Lymphatic: negative for easy bruising, bleeding, lymphadenopathy, or petechiae   Endocrine: negative for heat or cold intolerance,weight changes, change in bowel habits and hair loss   Musculoskeletal: negative for myalgias, arthralgias, pain, joint swelling,and bone pain   Neurological: negative for headaches, dizziness, seizures, weakness, numbness    PHYSICAL EXAM:        /79   Pulse 67   Temp 97.2 °F (36.2 °C)   Resp 16   Ht 5' (1.524 m)   Wt 282 lb (127.9 kg)   LMP  (LMP Unknown)   SpO2 93%   BMI 55.07 kg/m²    Temp (24hrs), Av.7 °F (36.5 °C), Min:97.7 °F (36.5 °C), Max:97.8 °F (36.6 °C)    General appearance - well appearing, no in pain or distress   Mental status - alert and cooperative   Eyes - pupils equal and reactive, extraocular eye movements intact   Ears - bilateral TM's and external ear canals normal   Mouth - mucous membranes moist, pharynx normal without lesions   Neck - supple, no significant adenopathy   Lymphatics - no palpable lymphadenopathy, no hepatosplenomegaly   Chest - clear to auscultation, no wheezes, rales or rhonchi, symmetric air entry   Heart - normal rate, regular rhythm, normal S1, S2, no murmurs  Abdomen - soft, nontender, nondistended, no masses or organomegaly   Neurological - alert, oriented, normal speech, no focal findings or movement disorder noted   Musculoskeletal - no joint tenderness, deformity or swelling   Extremities - peripheral pulses normal, no pedal edema, no clubbing or cyanosis   Skin - normal coloration and turgor, no rashes, no suspicious skin lesions noted ,      DATA:      Labs:     Results for orders placed or performed during the hospital encounter of 09/10/21   COVID-19, Rapid    Specimen: Nasopharyngeal Swab   Result Value Ref Range    Specimen Description . NASOPHARYNGEAL SWAB     SARS-CoV-2, Rapid Not Detected Not Detected   CBC Auto Differential   Result Value Ref Range    WBC 12.8 (H) 3.5 - 11.0 k/uL    RBC 3.36 (L) 4.0 - 5.2 m/uL    Hemoglobin 9.8 (L) 12.0 - 16.0 g/dL    Hematocrit 30.7 (L) 36 - 46 %    MCV 91.5 80 - 100 fL    MCH 29.1 26 - 34 pg MCHC 31.8 31 - 37 g/dL    RDW 16.9 (H) 11.5 - 14.9 %    Platelets 014 254 - 168 k/uL    MPV 7.5 6.0 - 12.0 fL    NRBC Automated NOT REPORTED per 100 WBC    Differential Type NOT REPORTED     Seg Neutrophils 84 (H) 36 - 66 %    Lymphocytes 10 (L) 24 - 44 %    Monocytes 4 1 - 7 %    Eosinophils % 2 0 - 4 %    Basophils 0 0 - 2 %    Immature Granulocytes NOT REPORTED 0 %    Segs Absolute 10.70 (H) 1.3 - 9.1 k/uL    Absolute Lymph # 1.30 1.0 - 4.8 k/uL    Absolute Mono # 0.50 0.1 - 1.3 k/uL    Absolute Eos # 0.30 0.0 - 0.4 k/uL    Basophils Absolute 0.10 0.0 - 0.2 k/uL    Absolute Immature Granulocyte NOT REPORTED 0.00 - 0.30 k/uL    WBC Morphology NOT REPORTED     RBC Morphology NOT REPORTED     Platelet Estimate NOT REPORTED    Basic Metabolic Panel w/ Reflex to MG   Result Value Ref Range    Glucose 150 (H) 70 - 99 mg/dL    BUN 13 8 - 23 mg/dL    CREATININE 1.13 (H) 0.50 - 0.90 mg/dL    Bun/Cre Ratio NOT REPORTED 9 - 20    Calcium 9.2 8.6 - 10.4 mg/dL    Sodium 140 135 - 144 mmol/L    Potassium 4.3 3.7 - 5.3 mmol/L    Chloride 98 98 - 107 mmol/L    CO2 33 (H) 20 - 31 mmol/L    Anion Gap 9 9 - 17 mmol/L    GFR Non-African American 48 (L) >60 mL/min    GFR  59 (L) >60 mL/min    GFR Comment          GFR Staging NOT REPORTED    CK   Result Value Ref Range    Total  (H) 26 - 192 U/L   SPECIMEN REJECTION   Result Value Ref Range    Specimen Source . BLOOD     Ordered Test BMPX, CK, CBC, DIFF     Reason for Rejection       Unable to perform testing: Specimen quantity not sufficient.    - NOT REPORTED    Hemoglobin A1c   Result Value Ref Range    Hemoglobin A1C 9.6 (H) 4.0 - 6.0 %    Estimated Avg Glucose 229 mg/dL   CBC with DIFF   Result Value Ref Range    WBC 9.9 3.5 - 11.0 k/uL    RBC 3.57 (L) 4.0 - 5.2 m/uL    Hemoglobin 10.4 (L) 12.0 - 16.0 g/dL    Hematocrit 32.4 (L) 36 - 46 %    MCV 90.7 80 - 100 fL    MCH 29.2 26 - 34 pg    MCHC 32.2 31 - 37 g/dL    RDW 17.8 (H) 11.5 - 14.9 %    Platelets 701 150 - 450 k/uL    MPV 7.6 6.0 - 12.0 fL    NRBC Automated NOT REPORTED per 100 WBC    Differential Type NOT REPORTED     Seg Neutrophils 71 (H) 36 - 66 %    Lymphocytes 17 (L) 24 - 44 %    Monocytes 7 1 - 7 %    Eosinophils % 3 0 - 4 %    Basophils 2 0 - 2 %    Immature Granulocytes NOT REPORTED 0 %    Segs Absolute 7.20 1.3 - 9.1 k/uL    Absolute Lymph # 1.60 1.0 - 4.8 k/uL    Absolute Mono # 0.70 0.1 - 1.3 k/uL    Absolute Eos # 0.30 0.0 - 0.4 k/uL    Basophils Absolute 0.10 0.0 - 0.2 k/uL    Absolute Immature Granulocyte NOT REPORTED 0.00 - 0.30 k/uL    WBC Morphology NOT REPORTED     RBC Morphology NOT REPORTED     Platelet Estimate NOT REPORTED    BASIC METABOLIC PANEL   Result Value Ref Range    Glucose 126 (H) 70 - 99 mg/dL    BUN 15 8 - 23 mg/dL    CREATININE 1.19 (H) 0.50 - 0.90 mg/dL    Bun/Cre Ratio NOT REPORTED 9 - 20    Calcium 9.5 8.6 - 10.4 mg/dL    Sodium 140 135 - 144 mmol/L    Potassium 4.1 3.7 - 5.3 mmol/L    Chloride 100 98 - 107 mmol/L    CO2 30 20 - 31 mmol/L    Anion Gap 10 9 - 17 mmol/L    GFR Non-African American 46 (L) >60 mL/min    GFR  55 (L) >60 mL/min    GFR Comment          GFR Staging NOT REPORTED    Creatine Kinase   Result Value Ref Range    Total CK 71 26 - 192 U/L   Myoglobin   Result Value Ref Range    Myoglobin 43 25 - 58 ng/mL   Urinalysis, Routine   Result Value Ref Range    Color, UA YELLOW YELLOW    Turbidity UA CLOUDY (A) CLEAR    Glucose, Ur NEGATIVE NEGATIVE    Bilirubin Urine NEGATIVE NEGATIVE    Ketones, Urine NEGATIVE NEGATIVE    Specific Gravity, UA 1.008 1.000 - 1.030    Urine Hgb NEGATIVE NEGATIVE    pH, UA 8.0 5.0 - 8.0    Protein, UA NEGATIVE NEGATIVE    Urobilinogen, Urine Normal Normal    Nitrite, Urine NEGATIVE NEGATIVE    Leukocyte Esterase, Urine LARGE (A) NEGATIVE    Urinalysis Comments NOT REPORTED    Microscopic Urinalysis   Result Value Ref Range    -          WBC, UA 50  /HPF    RBC, UA 2 TO 5 /HPF    Casts UA NOT REPORTED /LPF Crystals, UA NOT REPORTED None /HPF    Epithelial Cells UA 5 TO 10 /HPF    Renal Epithelial, UA NOT REPORTED 0 /HPF    Bacteria, UA NOT REPORTED None    Mucus, UA NOT REPORTED None    Trichomonas, UA NOT REPORTED None    Amorphous, UA NOT REPORTED None    Other Observations UA NOT REPORTED NOT REQ. Yeast, UA NOT REPORTED None   POC Glucose Fingerstick   Result Value Ref Range    POC Glucose 181 (H) 65 - 105 mg/dL   POC Glucose Fingerstick   Result Value Ref Range    POC Glucose 188 (H) 65 - 105 mg/dL   POC Glucose Fingerstick   Result Value Ref Range    POC Glucose 185 (H) 65 - 105 mg/dL   POC Glucose Fingerstick   Result Value Ref Range    POC Glucose 94 65 - 105 mg/dL   POC Glucose Fingerstick   Result Value Ref Range    POC Glucose 127 (H) 65 - 105 mg/dL   POC Glucose Fingerstick   Result Value Ref Range    POC Glucose 165 (H) 65 - 105 mg/dL   POC Glucose Fingerstick   Result Value Ref Range    POC Glucose 242 (H) 65 - 105 mg/dL   POC Glucose Fingerstick   Result Value Ref Range    POC Glucose 122 (H) 65 - 105 mg/dL   POC Glucose Fingerstick   Result Value Ref Range    POC Glucose 159 (H) 65 - 105 mg/dL   POC Glucose Fingerstick   Result Value Ref Range    POC Glucose 165 (H) 65 - 105 mg/dL   POC Glucose Fingerstick   Result Value Ref Range    POC Glucose 186 (H) 65 - 105 mg/dL   POC Glucose Fingerstick   Result Value Ref Range    POC Glucose 106 (H) 65 - 105 mg/dL   POC Glucose Fingerstick   Result Value Ref Range    POC Glucose 167 (H) 65 - 105 mg/dL         IMAGING DATA:    Ultrasound of kidney       Impression    1. Fatty liver. 2. Otherwise, unremarkable renal ultrasound.  No hydronephrosis.           Narrative    EXAMINATION:    CTA OF THE ABDOMEN AND PELVIS WITH CONTRAST 9/22/2020 12:42 am         TECHNIQUE:    CTA of the abdomen and pelvis was performed with the administration of    intravenous contrast. Multiplanar reformatted images are provided for review. MIP images are provided for review. Dose modulation, iterative    reconstruction, and/or weight based adjustment of the mA/kV was utilized to    reduce the radiation dose to as low as reasonably achievable.         COMPARISON:    None.         HISTORY:    ORDERING SYSTEM PROVIDED HISTORY: AMS, abd pain    TECHNOLOGIST PROVIDED HISTORY:    AMS, abd pain    Reason for Exam: patient is confused and altered, doesn't know where she's    at, but is pointing to her lower abdomen and saying it hurts    Acuity: Unknown    Type of Exam: Unknown         FINDINGS:    Lower Chest:  Visualized portion of the lower chest demonstrates no acute    abnormality.         Organs: The visualized portions of the liver, spleen, pancreas and adrenal    glands demonstrate no acute abnormality in the arterial enhancement phase. No biliary ductal dilatation.  Cholecystectomy.  The kidneys appear normal in    size and demonstrate symmetric enhancement.  No focal renal mass.  No    hydronephrosis. No perinephric stranding.         GI/Bowel: No bowel dilatation to suggest obstruction.  No evidence of acute    appendicitis.         Pelvis:  The urinary bladder appears unremarkable.  The pelvic organs    demonstrate no acute abnormality.         Peritoneum/Retroperitoneum: Normal enhancement and caliber of the aorta and    its branches including celiac axis, SMA, right and left renal arteries, PONCHO,    iliac vasculature.  No fluid collection or free air.  No gross    lymphadenopathy.         Bones/Soft Tissues: No acute findings.  Previous lumbar spine fusion.              Impression    No acute findings.               IMPRESSION:   Chronic anemia, hypoproliferative  History of GI bleed with peptic ulcer disease/pyloric ulcer, negative for H. pylori10/2019  iron deficiency  Borderline B12 stores  CKD  Morbid obesity  Multiple comorbidities      RECOMMENDATIONS:  Reviewed results of lab work-up and other relevant clinical data  Reviewed records hospitalization  Will obtain labs

## 2021-11-16 DIAGNOSIS — I89.0 LYMPHEDEMA OF BOTH LOWER EXTREMITIES: ICD-10-CM

## 2021-11-16 DIAGNOSIS — D64.9 ANEMIA, UNSPECIFIED TYPE: ICD-10-CM

## 2021-11-16 DIAGNOSIS — K90.9 IRON MALABSORPTION: ICD-10-CM

## 2021-11-16 DIAGNOSIS — N18.30 CHRONIC RENAL IMPAIRMENT, STAGE 3 (MODERATE), UNSPECIFIED WHETHER STAGE 3A OR 3B CKD (HCC): ICD-10-CM

## 2021-11-16 DIAGNOSIS — E61.1 IRON DEFICIENCY: ICD-10-CM

## 2021-11-16 RX ORDER — LANOLIN ALCOHOL/MO/W.PET/CERES
CREAM (GRAM) TOPICAL
Qty: 90 TABLET | Refills: 1 | Status: SHIPPED | OUTPATIENT
Start: 2021-11-16 | End: 2022-01-20 | Stop reason: SDUPTHER

## 2022-01-12 RX ORDER — FAMOTIDINE 20 MG/1
TABLET, FILM COATED ORAL
Qty: 180 TABLET | Refills: 3 | OUTPATIENT
Start: 2022-01-12

## 2022-02-13 ENCOUNTER — APPOINTMENT (OUTPATIENT)
Dept: GENERAL RADIOLOGY | Age: 67
End: 2022-02-13
Payer: COMMERCIAL

## 2022-02-13 ENCOUNTER — HOSPITAL ENCOUNTER (EMERGENCY)
Age: 67
Discharge: HOME | End: 2022-02-13
Attending: EMERGENCY MEDICINE
Payer: COMMERCIAL

## 2022-02-13 VITALS
RESPIRATION RATE: 20 BRPM | WEIGHT: 287 LBS | BODY MASS INDEX: 56.35 KG/M2 | HEIGHT: 60 IN | SYSTOLIC BLOOD PRESSURE: 168 MMHG | OXYGEN SATURATION: 98 % | HEART RATE: 72 BPM | TEMPERATURE: 97.8 F | DIASTOLIC BLOOD PRESSURE: 72 MMHG

## 2022-02-13 DIAGNOSIS — M25.562 ACUTE PAIN OF LEFT KNEE: Primary | ICD-10-CM

## 2022-02-13 PROCEDURE — 99285 EMERGENCY DEPT VISIT HI MDM: CPT

## 2022-02-13 PROCEDURE — 73562 X-RAY EXAM OF KNEE 3: CPT

## 2022-02-13 PROCEDURE — 73030 X-RAY EXAM OF SHOULDER: CPT

## 2022-02-13 ASSESSMENT — PAIN DESCRIPTION - LOCATION
LOCATION: LEG
LOCATION: SHOULDER;KNEE

## 2022-02-13 ASSESSMENT — ENCOUNTER SYMPTOMS
COUGH: 0
DIARRHEA: 0
EYE ITCHING: 0
SINUS PAIN: 0
SINUS PRESSURE: 0
NAUSEA: 0
SHORTNESS OF BREATH: 0
ABDOMINAL DISTENTION: 0
SORE THROAT: 0
ABDOMINAL PAIN: 0
CONSTIPATION: 0
EYE PAIN: 0

## 2022-02-13 ASSESSMENT — PAIN SCALES - GENERAL
PAINLEVEL_OUTOF10: 4
PAINLEVEL_OUTOF10: 8

## 2022-02-13 ASSESSMENT — PAIN DESCRIPTION - PAIN TYPE
TYPE: ACUTE PAIN
TYPE: ACUTE PAIN

## 2022-02-13 ASSESSMENT — PAIN DESCRIPTION - ORIENTATION
ORIENTATION: LEFT
ORIENTATION: LEFT

## 2022-02-13 NOTE — ED NOTES
Bed: 02  Expected date:   Expected time:   Means of arrival:   Comments:  [facility] [address] Augustine Marquez RN  02/13/22 [id number]

## 2022-02-13 NOTE — ED PROVIDER NOTES
[address]  Emergency Department Encounter  EmergencyMedicine Resident     Pt Name:Camelia Livingston  MRN: [id number]  [place] 1955  Date of evaluation: 2/13/22  PCP:  Jose De León MD    This patient was evaluated in the Emergency Department for symptoms described in the history of present illness. The patient was evaluated in the context of the global COVID-19 pandemic, which necessitated consideration that the patient might be at risk for infection with the SARS-CoV-2 virus that causes COVID-19. Institutional protocols and algorithms that pertain to the evaluation of patients at risk for COVID-19 are in a state of rapid change based on information released by regulatory bodies including the CDC and federal and state organizations. These policies and algorithms were followed during the patient's care in the ED. CHIEF COMPLAINT       Chief Complaint   Patient presents with    Fall    Leg Pain       HISTORY OF PRESENT ILLNESS  (Location/Symptom, Timing/Onset, Context/Setting, Quality, Duration, Modifying Factors, Severity.)      Allegra Joy is a 61 y.o. female who presents with left knee pain from a mechanical fall. States she was walking at home and her slipper caught on uneven linoleum and she fell forward hitting her left knee onto the washer and then falling onto her knee on the ground. States she took her home oxycodone prior to arrival which has helped her pain. Medical history includes diabetes, CHF. States that she did hit her head but denies loss of consciousness. No headache or nausea or vomiting since the event. Denies taking any blood thinners at home.     PAST MEDICAL / SURGICAL / SOCIAL / FAMILY HISTORY      has a past medical history of Acute hypoxemic respiratory failure ([address] Arthritis, CHF (congestive heart failure) ([address] Chronic back pain, Diabetes mellitus type II, controlled ([address] Fibromyalgia, Hyperlipidemia LDL goal < 70, Hypertension, benign, Morbid linagliptin (TRADJENTA) 5 MG tablet Take 1 tablet by mouth daily 12/21/21   Kenisha Mendes APRN - NP   atorvastatin (LIPITOR) 40 MG tablet Take 1 tablet by mouth nightly 10/20/21   Shae Mendoza MD   glimepiride (AMARYL) 4 MG tablet TAKE 2 TABLETS BY MOUTH EVERY MORNING (BEFORE BREAKFAST) 10/4/21   Kenisha Mendes APRN - GRISEL   oxyCODONE-acetaminophen (PERCOCET) 5-325 MG per tablet Take 1 tablet by mouth every 6 hours as needed for Pain.  10/4/21 10/4/22  VIDHYA Toussaint - GRISEL   linaclotide Mauro Poor) 145 MCG capsule Take 1 capsule by mouth every morning (before breakfast) 8/2/21   Shae Mendoza MD   clotrimazole (LOTRIMIN) 1 % cream APPLY TO AFFECTED AREA TWICE A DAY 5/25/21   Shae Mendoza MD   amLODIPine (NORVASC) 10 MG tablet TAKE 1 TABLET BY MOUTH DAILY WITH DINNER 5/5/21   Shae Mendoza MD   venlafaxine (EFFEXOR XR) 37.5 MG extended release capsule TAKE 1 CAPSULE BY MOUTH EVERY DAY 5/3/21   Shae Mendoza MD   blood glucose test strips (ACCU-CHEK SOLANGE PLUS) strip Check glucose AC BID, dx E11.9, may sub covered product 4/7/21   Shae Mendoza MD   furosemide (LASIX) 40 MG tablet TAKE 1/2 TABLET BY MOUTH EVERY DAY 4/1/21   VIDHYA Toussaint - NP   aspirin 81 MG tablet Take 1 tablet by mouth daily (with breakfast) 4/21/20   Shae Mendoza MD   Lancets MISC 1 each by Does not apply route daily Accucheck Solange dx E11.9 check daily 2/14/20   Shae Mendoza MD   albuterol (PROVENTIL) (2.5 MG/3ML) 0.083% nebulizer solution Take 3 mLs by nebulization 2 times daily  Patient not taking: Reported on 1/20/2022 1/11/20   Rosaura Jovel MD   Calcium Carb-Cholecalciferol (CALCIUM 600 + D PO) Take 1 tablet by mouth 2 times daily    Historical Provider, MD   Multiple Vitamins-Iron (ONE DAILY MULTIVITAMIN/IRON PO) Take 1 tablet by mouth nightly    Historical Provider, MD   Coenzyme Q10 (COQ10 PO) Take 200 mg by mouth nightly     Historical Provider, MD loratadine-pseudoephedrine (CLARITIN-D 12 HOUR) 5-120 MG per extended release tablet Take 1 tablet by mouth daily as needed     Historical Provider, MD       REVIEW OF SYSTEMS    (2-9 systems for level 4, 10 or more for level 5)      Review of Systems   Constitutional: Negative for activity change, chills and fever. HENT: Negative for congestion, sinus pressure, sinus pain and sore throat. Eyes: Negative for pain and itching. Respiratory: Negative for cough and shortness of breath. Cardiovascular: Negative for chest pain. Gastrointestinal: Negative for abdominal distention, abdominal pain, constipation, diarrhea and nausea. Endocrine: Negative for polyuria. Genitourinary: Negative for dysuria and frequency. Musculoskeletal: Negative for arthralgias. Left knee pain   Skin: Negative for rash. Neurological: Negative for light-headedness and headaches. PHYSICAL EXAM   (up to 7 for level 4, 8 or more for level 5)      INITIAL VITALS:   BP (!) 168/72   Pulse 72   Temp 97.8 °F (36.6 °C) (Oral)   Resp 20   Ht 5' 0.06\" (1.526 m)   Wt 287 lb (130.2 kg)   LMP  (LMP Unknown)   SpO2 98%   BMI 55.94 kg/m²     Physical Exam  Vitals reviewed. Constitutional:       General: She is not in acute distress. HENT:      Head: Normocephalic and atraumatic. Ears:      Comments: Hearing grossly normal     Nose: Nose normal.      Mouth/Throat:      Mouth: Mucous membranes are moist.      Pharynx: Oropharynx is clear. Eyes:      General: No scleral icterus. Conjunctiva/sclera: Conjunctivae normal.      Pupils: Pupils are equal, round, and reactive to light. Cardiovascular:      Rate and Rhythm: Normal rate and regular rhythm. Pulses: Normal pulses. Pulmonary:      Effort: Pulmonary effort is normal. No respiratory distress. Breath sounds: Normal breath sounds. Abdominal:      General: There is no distension. Tenderness: There is no abdominal tenderness.  There is no guarding. Musculoskeletal:      Cervical back: No muscular tenderness. Right lower leg: No edema. Left lower leg: No edema. Comments: Left knee: significant ecchymosis and tenderness to palpation along anterior joint line. No gross deformity, mild joint swelling, no effusion. Skin intact w/o laceration. Distal sensation intact, pedal pulses palpable, <2s cap refill. ROM limited by pain  Compartments soft     Left shoulder: generalized tenderness to palpation, No gross deformity, no joint swelling, no effusion. Skin intact w/o laceration. Distal sensation intact, radial pulses palpable, <2s cap refill. ROM limited by pain    Compartments soft    Skin:     General: Skin is warm and dry. Capillary Refill: Capillary refill takes less than 2 seconds. Neurological:      General: No focal deficit present. Mental Status: She is alert and oriented to person, place, and time. Mental status is at baseline. DIFFERENTIAL  DIAGNOSIS     PLAN (LABS / IMAGING / EKG):  Orders Placed This Encounter   Procedures    XR KNEE LEFT (3 VIEWS)    XR SHOULDER LEFT (MIN 2 VIEWS)   Jaswant Hopkins MD, Orthopedic Surgery, [place]    Apply ice    [facility] [employer] [facility] Knee Immobilizer, Left; 16\"       MEDICATIONS ORDERED:  No orders of the defined types were placed in this encounter. DIAGNOSTIC RESULTS / EMERGENCY DEPARTMENT COURSE / MDM   LAB RESULTS:  No results found for this visit on 02/13/22. IMPRESSION: Robbie Snyder is a 61 y.o. woman presenting after a mechanical fall for left-sided knee pain. There is obvious bruising and tenderness but no clear deformity. Also complaining of left shoulder tenderness. Will obtain x-ray imaging. If no fractures will immobilize if indicated and recommend follow-up and analgesia. RADIOLOGY:  XR KNEE LEFT (3 VIEWS)    Result Date: 2/13/2022  Mild to moderate osteoarthritis of the knee.  No acute bony abnormalities are noted XR SHOULDER LEFT (MIN 2 VIEWS)    Result Date: 2/13/2022  No acute osseous abnormality. EKG  none    All EKG's are interpreted by the Emergency Department Physician who either signs or Co-signs this chart in the absence of a cardiologist.    EMERGENCY DEPARTMENT COURSE:  Patient seen and evaluated, VSS and nontoxic in appearance. X-ray imaging negative as documented above. Analgesia offered but declined by patient. Discussed findings with patient she was recommended for discharge. Advised to follow-up with orthopedics for any persistent or worsening symptoms. She agrees and also understands that she may return to the emergency department for any new or worsening symptoms. Knee immobilizer provided. PROCEDURES:  none    CONSULTS:  None    CRITICAL CARE:  See attending note    FINAL IMPRESSION      1.  Acute pain of left knee          DISPOSITION / PLAN     DISPOSITION Decision To Discharge 02/13/2022 07:27:57 PM      PATIENT REFERRED TO:  Jocelin Mcfadden, [address]  [address] [facility] [address]  [phone]      As needed, If symptoms worsen    Goyo Casanova MD  [place] [address], [address] [place] [address]  [phone]      As needed, If symptoms worsen      DISCHARGE MEDICATIONS:  Discharge Medication List as of 2/13/2022  7:29 PM          Angus Rodgers DO  Emergency Medicine Resident    (Please note that portions of thisnote were completed with a voice recognition program.  Efforts were made to edit the dictations but occasionally words are mis-transcribed.)       Angus Rodgers DO  Resident  02/14/22 [id number]

## 2022-02-14 NOTE — ED PROVIDER NOTES
EMERGENCY DEPARTMENT ENCOUNTER   ATTENDING ATTESTATION     Pt Name: Kailee Neely  MRN: [id number]  [place] 1955  Date of evaluation: 2/13/22       Kailee Neely is a 61 y.o. female who presents with Fall and Leg Pain      MDM:   Earlie Breech onto left knee today  Xray neg for fx  She has percocet at home  KI, fu ortho    Vitals:   Vitals:    02/13/22 1830   BP: (!) 185/70   Pulse: 70   Resp: 18   Temp: 97.8 °F (36.6 °C)   TempSrc: Oral   SpO2: 98%   Weight: 287 lb (130.2 kg)   Height: 5' 0.06\" (1.526 m)         I personally saw and examined the patient. I have reviewed and agree with the resident's findings, including all diagnostic interpretations and treatment plan as written. I was present for the key portions of any procedures performed and the inclusive time noted for any critical care statement. The care is provided during an unprecedented national emergency due to the novel coronavirus, COVID 19.   Keyonna Andrew MD  Attending Emergency Physician           Keyonna Andrew MD  02/13/22 [id number]

## 2022-02-14 NOTE — ED NOTES
Upon getting patient prepared for discharge, Pt complained of left shoulder pain from fall. Physician notified, and left shoulder xray ordered.      Elva Ambrose RN  02/13/22 2012

## 2022-03-15 ENCOUNTER — HOSPITAL ENCOUNTER (OUTPATIENT)
Age: 67
Setting detail: SPECIMEN
Discharge: HOME | End: 2022-03-15
Payer: COMMERCIAL

## 2022-03-15 DIAGNOSIS — E11.21 CONTROLLED TYPE 2 DIABETES MELLITUS WITH DIABETIC NEPHROPATHY, UNSPECIFIED WHETHER LONG TERM INSULIN USE (HCC): ICD-10-CM

## 2022-03-15 LAB
ALBUMIN SERPL-MCNC: 4.1 G/DL (ref 3.5–5.2)
ALP BLD-CCNC: 117 U/L (ref 35–104)
ALT SERPL-CCNC: 17 U/L (ref 5–33)
ANION GAP SERPL CALCULATED.3IONS-SCNC: 11 MMOL/L (ref 9–17)
AST SERPL-CCNC: 17 U/L
BILIRUB SERPL-MCNC: 0.26 MG/DL (ref 0.3–1.2)
BUN BLDV-MCNC: 19 MG/DL (ref 8–23)
CALCIUM SERPL-MCNC: 9.4 MG/DL (ref 8.6–10.4)
CHLORIDE BLD-SCNC: 95 MMOL/L (ref 98–107)
CHOLESTEROL/HDL RATIO: 3.3
CHOLESTEROL: 140 MG/DL
CO2: 33 MMOL/L (ref 20–31)
CREAT SERPL-MCNC: 1.12 MG/DL (ref 0.5–0.9)
CREATININE URINE: 113 MG/DL (ref 28–217)
FERRITIN: 164 UG/L (ref 13–150)
GFR AFRICAN AMERICAN: 59 ML/MIN
GFR NON-AFRICAN AMERICAN: 49 ML/MIN
GFR SERPL CREATININE-BSD FRML MDRD: ABNORMAL ML/MIN/{1.73_M2}
GLUCOSE BLD-MCNC: 133 MG/DL (ref 70–99)
HDLC SERPL-MCNC: 42 MG/DL
IRON: 41 UG/DL (ref 37–145)
LDL CHOLESTEROL: 66 MG/DL (ref 0–130)
MICROALBUMIN/CREAT 24H UR: 73 MG/L
MICROALBUMIN/CREAT UR-RTO: 65 MCG/MG CREAT
POTASSIUM SERPL-SCNC: 5 MMOL/L (ref 3.7–5.3)
SODIUM BLD-SCNC: 139 MMOL/L (ref 135–144)
TOTAL PROTEIN: 8.1 G/DL (ref 6.4–8.3)
TRIGL SERPL-MCNC: 158 MG/DL
VITAMIN B-12: 1936 PG/ML (ref 232–1245)

## 2022-03-15 PROCEDURE — 36415 COLL VENOUS BLD VENIPUNCTURE: CPT

## 2022-03-15 PROCEDURE — 83550 IRON BINDING TEST: CPT

## 2022-03-15 PROCEDURE — 82728 ASSAY OF FERRITIN: CPT

## 2022-03-15 PROCEDURE — 80061 LIPID PANEL: CPT

## 2022-03-15 PROCEDURE — 82607 VITAMIN B-12: CPT

## 2022-03-15 PROCEDURE — 82570 ASSAY OF URINE CREATININE: CPT

## 2022-03-15 PROCEDURE — 80053 COMPREHEN METABOLIC PANEL: CPT

## 2022-03-15 PROCEDURE — 82043 UR ALBUMIN QUANTITATIVE: CPT

## 2022-03-15 PROCEDURE — 83540 ASSAY OF IRON: CPT

## 2022-03-16 LAB
IRON SATURATION: 13 % (ref 20–55)
IRON: 41 UG/DL (ref 37–145)
TOTAL IRON BINDING CAPACITY: 316 UG/DL (ref 250–450)
UNSATURATED IRON BINDING CAPACITY: 275 UG/DL (ref 112–347)

## 2022-03-31 ENCOUNTER — OFFICE VISIT (OUTPATIENT)
Dept: ONCOLOGY | Age: 67
End: 2022-03-31
Payer: COMMERCIAL

## 2022-03-31 ENCOUNTER — TELEPHONE (OUTPATIENT)
Dept: ONCOLOGY | Age: 67
End: 2022-03-31

## 2022-03-31 ENCOUNTER — HOSPITAL ENCOUNTER (OUTPATIENT)
Age: 67
Discharge: HOME | End: 2022-03-31
Payer: COMMERCIAL

## 2022-03-31 VITALS
TEMPERATURE: 84 F | SYSTOLIC BLOOD PRESSURE: 188 MMHG | DIASTOLIC BLOOD PRESSURE: 80 MMHG | BODY MASS INDEX: 56.64 KG/M2 | WEIGHT: 290.6 LBS | HEART RATE: 72 BPM

## 2022-03-31 DIAGNOSIS — E53.8 B12 DEFICIENCY: ICD-10-CM

## 2022-03-31 DIAGNOSIS — E61.1 IRON DEFICIENCY: ICD-10-CM

## 2022-03-31 DIAGNOSIS — D64.9 ANEMIA, UNSPECIFIED TYPE: ICD-10-CM

## 2022-03-31 DIAGNOSIS — D64.9 ANEMIA, UNSPECIFIED TYPE: Primary | ICD-10-CM

## 2022-03-31 DIAGNOSIS — N18.30 CHRONIC RENAL IMPAIRMENT, STAGE 3 (MODERATE), UNSPECIFIED WHETHER STAGE 3A OR 3B CKD (HCC): ICD-10-CM

## 2022-03-31 LAB
ABSOLUTE EOS #: 0.4 K/UL (ref 0–0.4)
ABSOLUTE LYMPH #: 2 K/UL (ref 1–4.8)
ABSOLUTE MONO #: 0.8 K/UL (ref 0.1–1.3)
BASOPHILS # BLD: 1 % (ref 0–2)
BASOPHILS ABSOLUTE: 0.1 K/UL (ref 0–0.2)
EOSINOPHILS RELATIVE PERCENT: 2 % (ref 0–4)
HCT VFR BLD CALC: 32.8 % (ref 36–46)
HEMOGLOBIN: 10.6 G/DL (ref 12–16)
LYMPHOCYTES # BLD: 14 % (ref 24–44)
MCH RBC QN AUTO: 29.2 PG (ref 26–34)
MCHC RBC AUTO-ENTMCNC: 32.5 G/DL (ref 31–37)
MCV RBC AUTO: 89.8 FL (ref 80–100)
MONOCYTES # BLD: 6 % (ref 1–7)
PDW BLD-RTO: 16.3 % (ref 11.5–14.9)
PLATELET # BLD: 435 K/UL (ref 150–450)
PMV BLD AUTO: 7.6 FL (ref 6–12)
RBC # BLD: 3.65 M/UL (ref 4–5.2)
SEG NEUTROPHILS: 77 % (ref 36–66)
SEGMENTED NEUTROPHILS ABSOLUTE COUNT: 11.3 K/UL (ref 1.3–9.1)
WBC # BLD: 14.6 K/UL (ref 3.5–11)

## 2022-03-31 PROCEDURE — 99214 OFFICE O/P EST MOD 30 MIN: CPT | Performed by: INTERNAL MEDICINE

## 2022-03-31 PROCEDURE — G8428 CUR MEDS NOT DOCUMENT: HCPCS | Performed by: INTERNAL MEDICINE

## 2022-03-31 PROCEDURE — 85025 COMPLETE CBC W/AUTO DIFF WBC: CPT

## 2022-03-31 PROCEDURE — G8482 FLU IMMUNIZE ORDER/ADMIN: HCPCS | Performed by: INTERNAL MEDICINE

## 2022-03-31 PROCEDURE — G8417 CALC BMI ABV UP PARAM F/U: HCPCS | Performed by: INTERNAL MEDICINE

## 2022-03-31 PROCEDURE — 1036F TOBACCO NON-USER: CPT | Performed by: INTERNAL MEDICINE

## 2022-03-31 PROCEDURE — 3017F COLORECTAL CA SCREEN DOC REV: CPT | Performed by: INTERNAL MEDICINE

## 2022-03-31 PROCEDURE — 36415 COLL VENOUS BLD VENIPUNCTURE: CPT

## 2022-03-31 RX ORDER — METHYLDOPA 500 MG
160 TABLET ORAL DAILY
Qty: 90 TABLET | Refills: 1 | Status: SHIPPED | OUTPATIENT
Start: 2022-03-31 | End: 2022-08-09

## 2022-03-31 NOTE — PROGRESS NOTES
Today's Date: 3/31/2022  Patient Name: Hermelinda Pack  Date of admission: No admission date for patient encounter. Patient's age: 61 y.o., 1955  Admission Dx: No admission diagnoses are documented for this encounter. Reason for Consult: management recommendations  Requesting Physician: No admitting provider for patient encounter. CHIEF COMPLAINT: Fatigue. Anemia. Weakness    History Obtained From:  patient, electronic medical record    Interval history:    Patient presents to the clinic for follow-up visit and to discuss results of her lab work-up and the relevant clinical data. Patient iron saturation continues to decline. Patient has been intolerant to oral iron in the past.  We do not have a CBC from today. Ferritin is however within range. Patient did go to the ER back in February with a fall. But she has not been admitted to the hospital recently. During this visit patient's allergy, social, medical, surgical history and medications were reviewed and updated. hISTORY OF PRESENT ILLNESS:      The patient is a 61 y.o.  female who is admitted to the hospital for chief complaints of fatigue patient was earlier seen in the office with complaints of progressive fatigue and leg edema. Work-up also revealed microcytic anemia. Patient has longstanding history of anemia. Iron studies done in November showed iron saturation of only 6%. Patient at that time had borderline B12 stores. Patient stool occult blood testing on 6-12 is negative. Patient denies any gross bleeding. Patient has not had thyroid testing in recent past.  Patient had EGD and colonoscopy in October 2019. Colonoscopy was unremarkable other than findings of internal hemorrhoids and sticky pasty stools. EGD showed a small 4 mm deep ulcer close to the pylorus with no active bleeding. Biopsies were taken. There was no evidence of H. pylori infection. Patient also has history of CKD.     Patient denies any bleeding vaginally per rectum or bloody vomiting or cough. Patient is intolerant to oral iron and has not been taking any iron. Past Medical History:   has a past medical history of Acute hypoxemic respiratory failure ([facility] [address] Arthritis, CHF (congestive heart failure) ([facility] [address] Chronic back pain, Diabetes mellitus type II, controlled ([facility] [address] Fibromyalgia, Hyperlipidemia LDL goal < 70, Hypertension, benign, Morbid obesity with BMI of 60.0-69.9, adult ([facility] [address] Pain of toe of right foot, Right wrist pain, Skin cancer, Sleep apnea, and Wears glasses. Past Surgical History:   has a past surgical history that includes Tubal ligation (1981); Hysterectomy (1998); lumbar fusion (10/2010; 03/2011); Carpal tunnel release (Right, 09/05/2014); cyst removal (1985); Cholecystectomy, laparoscopic (1998); lumbar fusion (2009); Rotator cuff repair (Left, 2012); Cataract removal with implant (Bilateral, 2013); Skin cancer excision (2005); Inguinal hernia repair (Bilateral, 01/20/2017); other surgical history (05/02/2018); Upper gastrointestinal endoscopy (N/A, 10/16/2019); and Colonoscopy (N/A, 10/17/2019). Medications:    Prior to Admission medications    Medication Sig Start Date End Date Taking? Authorizing Provider   oxyCODONE (OXY-IR) 30 MG immediate release tablet Take 1 tablet by mouth every 8 hours as needed for Pain for up to 30 days. 3/7/22 4/6/22  Deborah Sarah MD   oxyCODONE-acetaminophen (PERCOCET) 5-325 MG per tablet Take 1 tablet by mouth every 6 hours as needed for Pain.  2/24/22 2/24/23  VIDHYA Diaz - NP   ondansetron (ZOFRAN) 4 MG tablet Take 1 tablet by mouth 3 times daily as needed for Nausea or Vomiting 2/24/22   Deborah Sarah MD   vitamin B-12 (CYANOCOBALAMIN) 1000 MCG tablet Take 1 tablet by mouth daily 1/20/22   Deborah Sarah MD   famotidine (PEPCID) 20 MG tablet Take 1 tablet by mouth 2 times daily 1/20/22   Deborah Sarah MD   pioglitazone (ACTOS) 45 MG tablet Take 1 tablet by mouth daily 1/20/22   Deborah Sarah MD   pregabalin (LYRICA) 150 MG capsule Take 1 capsule by mouth 2 times daily for 30 days.  1/14/22 2/13/22  Deborah Sarah MD   carvedilol (COREG) 12.5 MG tablet TAKE 1 TABLET BY MOUTH TWICE A DAY WITH MEALS 12/21/21   VIDHYA Diaz - NP   linagliptin (TRADJENTA) 5 MG tablet Take 1 tablet by mouth daily 12/21/21   VIDHYA Diaz - NP   atorvastatin (LIPITOR) 40 MG tablet Take 1 tablet by mouth nightly 10/20/21   Deborah Sarah MD   glimepiride (AMARYL) 4 MG tablet TAKE 2 TABLETS BY MOUTH EVERY MORNING (BEFORE BREAKFAST) 10/4/21   VIDHYA Diaz - GRISEL   linaclotide Lavone Fam) 145 MCG capsule Take 1 capsule by mouth every morning (before breakfast) 8/2/21   Deborah Sarah MD   clotrimazole (LOTRIMIN) 1 % cream APPLY TO AFFECTED AREA TWICE A DAY 5/25/21   Deborah Sarah MD   amLODIPine (NORVASC) 10 MG tablet TAKE 1 TABLET BY MOUTH DAILY WITH DINNER 5/5/21   Deborah Sarah MD   venlafaxine (EFFEXOR XR) 37.5 MG extended release capsule TAKE 1 CAPSULE BY MOUTH EVERY DAY 5/3/21   Deborah Sarah MD   blood glucose test strips (ACCU-CHEK SOLANGE PLUS) strip Check glucose AC BID, dx E11.9, may sub covered product 4/7/21   Deborah Sarah MD   furosemide (LASIX) 40 MG tablet TAKE 1/2 TABLET BY MOUTH EVERY DAY 4/1/21   VIDHYA Diaz - NP   aspirin 81 MG tablet Take 1 tablet by mouth daily (with breakfast) 4/21/20   Deborah Sarah MD   Lancets MISC 1 each by Does not apply route daily Accucheck Solange dx E11.9 check daily 2/14/20   Deborah Sarah MD   albuterol (PROVENTIL) (2.5 MG/3ML) 0.083% nebulizer solution Take 3 mLs by nebulization 2 times daily  Patient not taking: Reported on 1/20/2022 1/11/20   Valentín Calixto MD   Calcium Carb-Cholecalciferol (CALCIUM 600 + D PO) Take 1 tablet by mouth 2 times daily    Historical Provider, MD   Multiple Vitamins-Iron (ONE DAILY MULTIVITAMIN/IRON PO) Take 1 tablet by mouth nightly    Historical Provider, MD   Coenzyme Q10 (COQ10 PO) Take 200 mg by mouth nightly     Historical Provider, MD   loratadine-pseudoephedrine (CLARITIN-D 12 HOUR) 5-120 MG per extended release tablet Take 1 tablet by mouth daily as needed     Historical Provider, MD     Current Outpatient Medications   Medication Sig Dispense Refill    oxyCODONE (OXY-IR) 30 MG immediate release tablet Take 1 tablet by mouth every 8 hours as needed for Pain for up to 30 days. 90 tablet 0    oxyCODONE-acetaminophen (PERCOCET) 5-325 MG per tablet Take 1 tablet by mouth every 6 hours as needed for Pain. 120 tablet 0    ondansetron (ZOFRAN) 4 MG tablet Take 1 tablet by mouth 3 times daily as needed for Nausea or Vomiting 30 tablet 0    vitamin B-12 (CYANOCOBALAMIN) 1000 MCG tablet Take 1 tablet by mouth daily 90 tablet 3    famotidine (PEPCID) 20 MG tablet Take 1 tablet by mouth 2 times daily 180 tablet 3    pioglitazone (ACTOS) 45 MG tablet Take 1 tablet by mouth daily 90 tablet 3    pregabalin (LYRICA) 150 MG capsule Take 1 capsule by mouth 2 times daily for 30 days.  180 capsule 3    carvedilol (COREG) 12.5 MG tablet TAKE 1 TABLET BY MOUTH TWICE A DAY WITH MEALS 180 tablet 3    linagliptin (TRADJENTA) 5 MG tablet Take 1 tablet by mouth daily 90 tablet 3    atorvastatin (LIPITOR) 40 MG tablet Take 1 tablet by mouth nightly 90 tablet 3    glimepiride (AMARYL) 4 MG tablet TAKE 2 TABLETS BY MOUTH EVERY MORNING (BEFORE BREAKFAST) 180 tablet 3    linaclotide (LINZESS) 145 MCG capsule Take 1 capsule by mouth every morning (before breakfast) 30 capsule 11    clotrimazole (LOTRIMIN) 1 % cream APPLY TO AFFECTED AREA TWICE A DAY 45 g 11    amLODIPine (NORVASC) 10 MG tablet TAKE 1 TABLET BY MOUTH DAILY WITH DINNER 90 tablet 3    venlafaxine (EFFEXOR XR) 37.5 MG extended release capsule TAKE 1 CAPSULE BY MOUTH EVERY DAY 90 capsule 3    blood glucose test strips (ACCU-CHEK POOJA PLUS) strip Check glucose AC BID, dx E11.9, may sub covered product 100 strip 11    furosemide (LASIX) 40 MG tablet TAKE 1/2 TABLET BY MOUTH EVERY DAY 45 tablet 3    aspirin 81 MG tablet Take 1 tablet by mouth daily (with breakfast) 30 tablet 11    Lancets MISC 1 each by Does not apply route daily Accucheck Solange dx E11.9 check daily 100 each 11    albuterol (PROVENTIL) (2.5 MG/3ML) 0.083% nebulizer solution Take 3 mLs by nebulization 2 times daily (Patient not taking: Reported on 1/20/2022) 120 each 3    Calcium Carb-Cholecalciferol (CALCIUM 600 + D PO) Take 1 tablet by mouth 2 times daily      Multiple Vitamins-Iron (ONE DAILY MULTIVITAMIN/IRON PO) Take 1 tablet by mouth nightly      Coenzyme Q10 (COQ10 PO) Take 200 mg by mouth nightly       loratadine-pseudoephedrine (CLARITIN-D 12 HOUR) 5-120 MG per extended release tablet Take 1 tablet by mouth daily as needed        No current facility-administered medications for this visit. Allergies:  Adhesive tape, Morphine, Iron, Lisinopril, Metformin and related, Molds & smuts, and Pcn [penicillins]    Social History:   reports that she has never smoked. She has never used smokeless tobacco. She reports previous alcohol use. She reports that she does not use drugs. Family History: family history includes COPD in her sister; Cancer in her mother; Diabetes in her brother, maternal grandmother, mother, and sister; Heart Attack in her paternal grandfather; Heart Disease in her paternal grandmother and sister; High Blood Pressure in her daughter, paternal grandmother, and son; [place] in her father; Stomach Cancer in her brother; Stroke in her paternal grandmother. REVIEW OF SYSTEMS:      Constitutional: No fever or chills. No night sweats, no weight loss.   Positive fatigue, manageable  Eyes: No eye discharge, double vision, or eye pain   HEENT: negative for sore mouth, sore throat, hoarseness and voice change   Respiratory: negative for cough , sputum, dyspnea, wheezing, hemoptysis, chest pain Cardiovascular: negative for chest pain, dyspnea, palpitations, orthopnea, PND   Gastrointestinal: negative for nausea, vomiting, diarrhea, constipation, abdominal pain, Dysphagia, hematemesis and hematochezia   Genitourinary: negative for frequency, dysuria, nocturia, urinary incontinence, and hematuria   Integument: negative for rash, skin lesions, bruises.    Hematologic/Lymphatic: negative for easy bruising, bleeding, lymphadenopathy, or petechiae   Endocrine: negative for heat or cold intolerance,weight changes, change in bowel habits and hair loss   Musculoskeletal: negative for myalgias, arthralgias, pain, joint swelling,and bone pain   Neurological: negative for headaches, dizziness, seizures, weakness, numbness    PHYSICAL EXAM:        BP (!) 188/80   Pulse 72   Temp (!) 84 °F (28.9 °C)   Wt 290 lb 9.6 oz (131.8 kg)   LMP  (LMP Unknown)   BMI 56.64 kg/m²    Temp (24hrs), Av.7 °F (36.5 °C), Min:97.7 °F (36.5 °C), Max:97.8 °F (36.6 °C)    General appearance - well appearing, no in pain or distress   Mental status - alert and cooperative   Eyes - pupils equal and reactive, extraocular eye movements intact   Ears - bilateral TM's and external ear canals normal   Mouth - mucous membranes moist, pharynx normal without lesions   Neck - supple, no significant adenopathy   Lymphatics - no palpable lymphadenopathy, no hepatosplenomegaly   Chest - clear to auscultation, no wheezes, rales or rhonchi, symmetric air entry   Heart - normal rate, regular rhythm, normal S1, S2, no murmurs  Abdomen - soft, nontender, nondistended, no masses or organomegaly   Neurological - alert, oriented, normal speech, no focal findings or movement disorder noted   Musculoskeletal - no joint tenderness, deformity or swelling   Extremities - peripheral pulses normal, no pedal edema, no clubbing or cyanosis   Skin - normal coloration and turgor, no rashes, no suspicious skin lesions noted ,      DATA:      Labs:     Results for orders placed or performed during the hospital encounter of 03/15/22   Comprehensive Metabolic Panel   Result Value Ref Range    Glucose 133 (H) 70 - 99 mg/dL    BUN 19 8 - 23 mg/dL    CREATININE 1.12 (H) 0.50 - 0.90 mg/dL    Calcium 9.4 8.6 - 10.4 mg/dL    Sodium 139 135 - 144 mmol/L    Potassium 5.0 3.7 - 5.3 mmol/L    Chloride 95 (L) 98 - 107 mmol/L    CO2 33 (H) 20 - 31 mmol/L    Anion Gap 11 9 - 17 mmol/L    Alkaline Phosphatase 117 (H) 35 - 104 U/L    ALT 17 5 - 33 U/L    AST 17 <32 U/L    Total Bilirubin 0.26 (L) 0.3 - 1.2 mg/dL    Total Protein 8.1 6.4 - 8.3 g/dL    Albumin 4.1 3.5 - 5.2 g/dL    GFR Non- 49 (L) >60 mL/min    GFR  59 (L) >60 mL/min    GFR Comment         Lipid Panel   Result Value Ref Range    Cholesterol 140 <200 mg/dL    HDL 42 >40 mg/dL    LDL Cholesterol 66 0 - 130 mg/dL    Chol/HDL Ratio 3.3 <5    Triglycerides 158 (H) <150 mg/dL   Microalbumin, Ur   Result Value Ref Range    Microalb, Ur 73 (H) <21 mg/L    Creatinine, Ur 113.0 28.0 - 217.0 mg/dL    Microalb/Crt. Ratio 65 (H) <25 mcg/mg creat   Vitamin B12   Result Value Ref Range    Vitamin B-12 1936 (H) 232 - 1245 pg/mL   Iron   Result Value Ref Range    Iron 41 37 - 145 ug/dL   Ferritin   Result Value Ref Range    Ferritin 164 (H) 13 - 150 ug/L   Iron and TIBC   Result Value Ref Range    Iron 41 37 - 145 ug/dL    TIBC 316 250 - 450 ug/dL    Iron Saturation 13 (L) 20 - 55 %    UIBC 275 112 - 347 ug/dL         IMAGING DATA:    Ultrasound of kidney       Impression    1. Fatty liver. 2. Otherwise, unremarkable renal ultrasound.  No hydronephrosis.           Narrative    EXAMINATION:    CTA OF THE ABDOMEN AND PELVIS WITH CONTRAST 9/22/2020 12:42 am         TECHNIQUE:    CTA of the abdomen and pelvis was performed with the administration of    intravenous contrast. Multiplanar reformatted images are provided for review. MIP images are provided for review.  Dose modulation, iterative    reconstruction, and/or weight based adjustment of the mA/kV was utilized to    reduce the radiation dose to as low as reasonably achievable.         COMPARISON:    None.         HISTORY:    ORDERING SYSTEM PROVIDED HISTORY: AMS, abd pain    TECHNOLOGIST PROVIDED HISTORY:    AMS, abd pain    Reason for Exam: patient is confused and altered, doesn't know where she's    at, but is pointing to her lower abdomen and saying it hurts    Acuity: Unknown    Type of Exam: Unknown         FINDINGS:    Lower Chest:  Visualized portion of the lower chest demonstrates no acute    abnormality.         Organs: The visualized portions of the liver, spleen, pancreas and adrenal    glands demonstrate no acute abnormality in the arterial enhancement phase. No biliary ductal dilatation.  Cholecystectomy.  The kidneys appear normal in    size and demonstrate symmetric enhancement.  No focal renal mass.  No    hydronephrosis. No perinephric stranding.         GI/Bowel: No bowel dilatation to suggest obstruction.  No evidence of acute    appendicitis.         Pelvis: The urinary bladder appears unremarkable.  The pelvic organs    demonstrate no acute abnormality.         Peritoneum/Retroperitoneum: Normal enhancement and caliber of the aorta and    its branches including celiac axis, SMA, right and left renal arteries, PONCHO,    iliac vasculature.  No fluid collection or free air.  No gross    lymphadenopathy.         Bones/Soft Tissues: No acute findings.  Previous lumbar spine fusion.              Impression    No acute findings.               IMPRESSION:   Chronic anemia, hypoproliferative  History of GI bleed with peptic ulcer disease/pyloric ulcer, negative for H. pylori-10/2019  iron deficiency  Borderline B12 stores  CKD  Morbid obesity  Multiple comorbidities      RECOMMENDATIONS:  Reviewed results of lab work-up and other relevant clinical data  Patient iron saturation continues to drop. We do not have a CBC.   I will order a CBC from today  Again discussed iron deficiency and treatment. Patient is willing to try oral iron. I will order a slow release iron for the patient. We will start out with 1 pill I advised the patient to increase to 2 pills a day if she is able to tolerate. Patient if is intolerant to oral iron will challenge with IV iron  Continue B12 supplementation  Continue monitor CKD. Continue to monitor CKD. If hemoglobin drops below 10 we will discuss RAJESH therapy with the patient  Lymphedema is stable. Patient encouraged to keep legs elevated whenever she can. Recommend continued surveillance for anemia and iron studies. We will see patient back in office in 4 months or sooner if clinically indicated. Thank you for asking us to see this patient. Addendum:    Patient's CBC from today came back at 10.6 with MCV 89. Abdias Allison MD          This note is created with the assistance of a speech recognition program.  While intending to generate a document that actually reflects the content of the visit, the document can still have some errors including those of syntax and sound a like substitutions which may escape proof reading. It such instances, actual meaning can be extrapolated by contextual diversion.

## 2022-03-31 NOTE — TELEPHONE ENCOUNTER
AVS from 3/31/22     Cbc now    rv in 4 months with labs prior ( including cbc at that time )      Cbc drawn today  rv scheduled for 8/18/22 @ 1:30am  Pt will have cbc at that time    Pt was given AVS and appt schedule

## 2022-05-18 ENCOUNTER — HOSPITAL ENCOUNTER (OUTPATIENT)
Age: 67
Setting detail: SPECIMEN
Discharge: HOME | End: 2022-05-18

## 2022-05-18 DIAGNOSIS — R11.0 NAUSEA: ICD-10-CM

## 2022-05-18 LAB
ALBUMIN SERPL-MCNC: 3.9 G/DL (ref 3.5–5.2)
ALBUMIN/GLOBULIN RATIO: 0.9 (ref 1–2.5)
ALP BLD-CCNC: 119 U/L (ref 35–104)
ALT SERPL-CCNC: 14 U/L (ref 5–33)
AMYLASE: 55 U/L (ref 28–100)
ANION GAP SERPL CALCULATED.3IONS-SCNC: 13 MMOL/L (ref 9–17)
AST SERPL-CCNC: 16 U/L
BILIRUB SERPL-MCNC: 0.23 MG/DL (ref 0.3–1.2)
BUN BLDV-MCNC: 15 MG/DL (ref 8–23)
CALCIUM SERPL-MCNC: 9.8 MG/DL (ref 8.6–10.4)
CHLORIDE BLD-SCNC: 96 MMOL/L (ref 98–107)
CO2: 30 MMOL/L (ref 20–31)
CREAT SERPL-MCNC: 1.05 MG/DL (ref 0.5–0.9)
GFR AFRICAN AMERICAN: >60 ML/MIN
GFR NON-AFRICAN AMERICAN: 53 ML/MIN
GFR SERPL CREATININE-BSD FRML MDRD: ABNORMAL ML/MIN/{1.73_M2}
GLUCOSE BLD-MCNC: 164 MG/DL (ref 70–99)
LIPASE: 52 U/L (ref 13–60)
POTASSIUM SERPL-SCNC: 4.7 MMOL/L (ref 3.7–5.3)
SODIUM BLD-SCNC: 139 MMOL/L (ref 135–144)
TOTAL PROTEIN: 8.3 G/DL (ref 6.4–8.3)

## 2022-09-21 ENCOUNTER — HOSPITAL ENCOUNTER (OUTPATIENT)
Age: 67
Setting detail: SPECIMEN
Discharge: HOME | End: 2022-09-21

## 2022-09-21 DIAGNOSIS — N18.30 CHRONIC RENAL IMPAIRMENT, STAGE 3 (MODERATE), UNSPECIFIED WHETHER STAGE 3A OR 3B CKD (HCC): ICD-10-CM

## 2022-09-21 DIAGNOSIS — D64.9 ANEMIA, UNSPECIFIED TYPE: ICD-10-CM

## 2022-09-21 DIAGNOSIS — E53.8 B12 DEFICIENCY: ICD-10-CM

## 2022-09-21 DIAGNOSIS — E53.8 B12 DEFICIENCY: Primary | ICD-10-CM

## 2022-09-21 DIAGNOSIS — N18.31 CHRONIC RENAL IMPAIRMENT, STAGE 3A (HCC): ICD-10-CM

## 2022-09-21 DIAGNOSIS — E61.1 IRON DEFICIENCY: ICD-10-CM

## 2022-09-21 LAB
ABSOLUTE EOS #: 0.26 K/UL (ref 0–0.44)
ABSOLUTE IMMATURE GRANULOCYTE: 0.07 K/UL (ref 0–0.3)
ABSOLUTE LYMPH #: 1.45 K/UL (ref 1.1–3.7)
ABSOLUTE MONO #: 0.69 K/UL (ref 0.1–1.2)
ANION GAP SERPL CALCULATED.3IONS-SCNC: 12 MMOL/L (ref 9–17)
BASOPHILS # BLD: 1 % (ref 0–2)
BASOPHILS ABSOLUTE: 0.08 K/UL (ref 0–0.2)
BUN BLDV-MCNC: 20 MG/DL (ref 8–23)
CALCIUM SERPL-MCNC: 9.1 MG/DL (ref 8.6–10.4)
CHLORIDE BLD-SCNC: 99 MMOL/L (ref 98–107)
CO2: 29 MMOL/L (ref 20–31)
CREAT SERPL-MCNC: 1.16 MG/DL (ref 0.5–0.9)
EOSINOPHILS RELATIVE PERCENT: 2 % (ref 1–4)
FERRITIN: 143 NG/ML (ref 13–150)
FOLATE: >20 NG/ML
GFR AFRICAN AMERICAN: 57 ML/MIN
GFR NON-AFRICAN AMERICAN: 47 ML/MIN
GFR SERPL CREATININE-BSD FRML MDRD: ABNORMAL ML/MIN/{1.73_M2}
GLUCOSE BLD-MCNC: 121 MG/DL (ref 70–99)
HCT VFR BLD CALC: 33.9 % (ref 36.3–47.1)
HEMOGLOBIN: 9.7 G/DL (ref 11.9–15.1)
IMMATURE GRANULOCYTES: 1 %
IRON SATURATION: 12 % (ref 20–55)
IRON: 30 UG/DL (ref 37–145)
LYMPHOCYTES # BLD: 10 % (ref 24–43)
MCH RBC QN AUTO: 26.8 PG (ref 25.2–33.5)
MCHC RBC AUTO-ENTMCNC: 28.6 G/DL (ref 28.4–34.8)
MCV RBC AUTO: 93.6 FL (ref 82.6–102.9)
MONOCYTES # BLD: 5 % (ref 3–12)
NRBC AUTOMATED: 0 PER 100 WBC
PDW BLD-RTO: 16.7 % (ref 11.8–14.4)
PLATELET # BLD: 365 K/UL (ref 138–453)
PMV BLD AUTO: 11.2 FL (ref 8.1–13.5)
POTASSIUM SERPL-SCNC: 4.4 MMOL/L (ref 3.7–5.3)
RBC # BLD: 3.62 M/UL (ref 3.95–5.11)
RBC # BLD: ABNORMAL 10*6/UL
SEG NEUTROPHILS: 81 % (ref 36–65)
SEGMENTED NEUTROPHILS ABSOLUTE COUNT: 11.74 K/UL (ref 1.5–8.1)
SODIUM BLD-SCNC: 140 MMOL/L (ref 135–144)
TOTAL IRON BINDING CAPACITY: 253 UG/DL (ref 250–450)
UNSATURATED IRON BINDING CAPACITY: 223 UG/DL (ref 112–347)
VITAMIN B-12: 1825 PG/ML (ref 232–1245)
WBC # BLD: 14.3 K/UL (ref 3.5–11.3)

## 2022-09-22 ENCOUNTER — TELEPHONE (OUTPATIENT)
Dept: ONCOLOGY | Age: 67
End: 2022-09-22

## 2022-09-22 ENCOUNTER — OFFICE VISIT (OUTPATIENT)
Dept: ONCOLOGY | Age: 67
End: 2022-09-22
Payer: COMMERCIAL

## 2022-09-22 VITALS
HEART RATE: 73 BPM | TEMPERATURE: 97.4 F | BODY MASS INDEX: 59.88 KG/M2 | DIASTOLIC BLOOD PRESSURE: 68 MMHG | WEIGHT: 293 LBS | SYSTOLIC BLOOD PRESSURE: 148 MMHG

## 2022-09-22 DIAGNOSIS — K90.9 IRON MALABSORPTION: Primary | ICD-10-CM

## 2022-09-22 DIAGNOSIS — D64.9 ANEMIA, UNSPECIFIED TYPE: ICD-10-CM

## 2022-09-22 PROCEDURE — G8417 CALC BMI ABV UP PARAM F/U: HCPCS | Performed by: INTERNAL MEDICINE

## 2022-09-22 PROCEDURE — 1036F TOBACCO NON-USER: CPT | Performed by: INTERNAL MEDICINE

## 2022-09-22 PROCEDURE — 1090F PRES/ABSN URINE INCON ASSESS: CPT | Performed by: INTERNAL MEDICINE

## 2022-09-22 PROCEDURE — G8400 PT W/DXA NO RESULTS DOC: HCPCS | Performed by: INTERNAL MEDICINE

## 2022-09-22 PROCEDURE — 3017F COLORECTAL CA SCREEN DOC REV: CPT | Performed by: INTERNAL MEDICINE

## 2022-09-22 PROCEDURE — 1123F ACP DISCUSS/DSCN MKR DOCD: CPT | Performed by: INTERNAL MEDICINE

## 2022-09-22 PROCEDURE — 99214 OFFICE O/P EST MOD 30 MIN: CPT | Performed by: INTERNAL MEDICINE

## 2022-09-22 PROCEDURE — G8427 DOCREV CUR MEDS BY ELIG CLIN: HCPCS | Performed by: INTERNAL MEDICINE

## 2022-09-22 RX ORDER — HEPARIN SODIUM (PORCINE) LOCK FLUSH IV SOLN 100 UNIT/ML 100 UNIT/ML
500 SOLUTION INTRAVENOUS PRN
Status: CANCELLED | OUTPATIENT
Start: 2022-09-22

## 2022-09-22 RX ORDER — SODIUM CHLORIDE 9 MG/ML
5-250 INJECTION, SOLUTION INTRAVENOUS PRN
Status: CANCELLED | OUTPATIENT
Start: 2022-09-22

## 2022-09-22 RX ORDER — DIPHENHYDRAMINE HYDROCHLORIDE 50 MG/ML
50 INJECTION INTRAMUSCULAR; INTRAVENOUS
Status: CANCELLED | OUTPATIENT
Start: 2022-09-22

## 2022-09-22 RX ORDER — EPINEPHRINE 1 MG/ML
0.3 INJECTION, SOLUTION, CONCENTRATE INTRAVENOUS PRN
Status: CANCELLED | OUTPATIENT
Start: 2022-09-22

## 2022-09-22 RX ORDER — ALBUTEROL SULFATE 90 UG/1
4 AEROSOL, METERED RESPIRATORY (INHALATION) PRN
Status: CANCELLED | OUTPATIENT
Start: 2022-09-22

## 2022-09-22 RX ORDER — SODIUM CHLORIDE 0.9 % (FLUSH) 0.9 %
5-40 SYRINGE (ML) INJECTION PRN
Status: CANCELLED | OUTPATIENT
Start: 2022-09-22

## 2022-09-22 RX ORDER — ONDANSETRON 2 MG/ML
8 INJECTION INTRAMUSCULAR; INTRAVENOUS
Status: CANCELLED | OUTPATIENT
Start: 2022-09-22

## 2022-09-22 RX ORDER — FAMOTIDINE 10 MG/ML
20 INJECTION, SOLUTION INTRAVENOUS
Status: CANCELLED | OUTPATIENT
Start: 2022-09-22

## 2022-09-22 RX ORDER — SODIUM CHLORIDE 9 MG/ML
INJECTION, SOLUTION INTRAVENOUS CONTINUOUS
Status: CANCELLED | OUTPATIENT
Start: 2022-09-22

## 2022-09-22 RX ORDER — ACETAMINOPHEN 325 MG/1
650 TABLET ORAL
Status: CANCELLED | OUTPATIENT
Start: 2022-09-22

## 2022-09-22 NOTE — PROGRESS NOTES
Today's Date: 9/22/2022  Patient Name: Nathanael Oconnor  Date of admission: No admission date for patient encounter. Patient's age: 74 y.o., 1955  Admission Dx: No admission diagnoses are documented for this encounter. Reason for Consult: management recommendations  Requesting Physician: No admitting provider for patient encounter. CHIEF COMPLAINT: Fatigue. Anemia. Weakness    History Obtained From:  patient, electronic medical record    Interval history:    Patient presents to the clinic for a follow-up visit and to discuss results of lab work-up and other relevant clinical data. Patient was not able to tolerate oral iron. Complains of being tired. Complains of sleeping more. Patient  also has been sick with pneumonia and she is doing more at home. During this visit patient's allergy, social, medical, surgical history and medications were reviewed and updated. hISTORY OF PRESENT ILLNESS:      The patient is a 74 y.o.  female who is admitted to the hospital for chief complaints of fatigue patient was earlier seen in the office with complaints of progressive fatigue and leg edema. Work-up also revealed microcytic anemia. Patient has longstanding history of anemia. Iron studies done in November showed iron saturation of only 6%. Patient at that time had borderline B12 stores. Patient stool occult blood testing on 6-12 is negative. Patient denies any gross bleeding. Patient has not had thyroid testing in recent past.  Patient had EGD and colonoscopy in October 2019. Colonoscopy was unremarkable other than findings of internal hemorrhoids and sticky pasty stools. EGD showed a small 4 mm deep ulcer close to the pylorus with no active bleeding. Biopsies were taken. There was no evidence of H. pylori infection. Patient also has history of CKD. Patient denies any bleeding vaginally per rectum or bloody vomiting or cough.   Patient is intolerant to oral iron and has not strip CHECK GLUCOSE LEVELS TWICE A DAY 6/13/22  Yes Lennox Salinas MD   venlafaxine (EFFEXOR XR) 37.5 MG extended release capsule TAKE 1 CAPSULE BY MOUTH EVERY DAY 5/30/22  Yes Lennox Salinas MD   ondansetron [facility]) 4 MG tablet Take 1 tablet by mouth 3 times daily as needed for Nausea or Vomiting 5/18/22  Yes Lennox Salinas MD   furosemide (LASIX) 40 MG tablet Take 1.5 tablets by mouth daily 4/18/22  Yes Lennox Salinas MD   famotidine (PEPCID) 20 MG tablet Take 1 tablet by mouth 2 times daily 1/20/22  Yes Lennox Salinas MD   pioglitazone (ACTOS) 45 MG tablet Take 1 tablet by mouth daily 1/20/22  Yes Lennox Salinas MD   carvedilol (COREG) 12.5 MG tablet TAKE 1 TABLET BY MOUTH TWICE A DAY WITH MEALS 12/21/21  Yes Jeannie Kidney, APRN - NP   linagliptin (TRADJENTA) 5 MG tablet Take 1 tablet by mouth daily 12/21/21  Yes Jeannie Kidney, APRN - NP   atorvastatin (LIPITOR) 40 MG tablet Take 1 tablet by mouth nightly 10/20/21  Yes Lennox Salinas MD   glimepiride (AMARYL) 4 MG tablet TAKE 2 TABLETS BY MOUTH EVERY MORNING (BEFORE BREAKFAST) 10/4/21  Yes Jeannie Kidney, APRN - NP   clotrimazole (LOTRIMIN) 1 % cream APPLY TO AFFECTED AREA TWICE A DAY 5/25/21  Yes Lennox Salinas MD   aspirin 81 MG tablet Take 1 tablet by mouth daily (with breakfast) 4/21/20  Yes Lennox Salinas MD   Lancets MISC 1 each by Does not apply route daily Accucheck Solange dx E11.9 check daily 2/14/20  Yes Lennox Salinas MD   Coenzyme Q10 (COQ10 PO) Take 200 mg by mouth nightly    Yes Historical Provider, MD   loratadine-pseudoephedrine (CLARITIN-D 12HR) 5-120 MG per extended release tablet Take 1 tablet by mouth daily as needed    Yes Historical Provider, MD   clonazePAM (KLONOPIN) 0.5 MG tablet Take 1 tablet by mouth 2 times daily as needed (tremor) for up to 30 doses.  8/23/22 9/21/22  Lennox Salinas MD     Current Outpatient Medications   Medication Sig Dispense Refill    Handicap Placard MISC by Does not apply route 5 years, cannot walk over 200 ft. 1 each 0    oxyCODONE (OXY-IR) 30 MG immediate release tablet Take 1 tablet by mouth every 8 hours as needed for Pain for up to 30 days. 90 tablet 0    vitamin B-12 (CYANOCOBALAMIN) 1000 MCG tablet Take 1 tablet by mouth daily 90 tablet 3    pregabalin (LYRICA) 150 MG capsule Take 1 capsule by mouth 2 times daily for 90 days.  180 capsule 3    LINZESS 145 MCG capsule TAKE 1 CAPSULE BY MOUTH EVERY DAY IN THE MORNING BEFORE BREAKFAST 30 capsule 11    amLODIPine (NORVASC) 10 MG tablet TAKE 1 TABLET BY MOUTH DAILY WITH DINNER 90 tablet 3    blood glucose test strips (ACCU-CHEK POOJA PLUS) strip CHECK GLUCOSE LEVELS TWICE A  strip 5    venlafaxine (EFFEXOR XR) 37.5 MG extended release capsule TAKE 1 CAPSULE BY MOUTH EVERY DAY 90 capsule 3    ondansetron (ZOFRAN) 4 MG tablet Take 1 tablet by mouth 3 times daily as needed for Nausea or Vomiting 30 tablet 0    furosemide (LASIX) 40 MG tablet Take 1.5 tablets by mouth daily 45 tablet 3    famotidine (PEPCID) 20 MG tablet Take 1 tablet by mouth 2 times daily 180 tablet 3    pioglitazone (ACTOS) 45 MG tablet Take 1 tablet by mouth daily 90 tablet 3    carvedilol (COREG) 12.5 MG tablet TAKE 1 TABLET BY MOUTH TWICE A DAY WITH MEALS 180 tablet 3    linagliptin (TRADJENTA) 5 MG tablet Take 1 tablet by mouth daily 90 tablet 3    atorvastatin (LIPITOR) 40 MG tablet Take 1 tablet by mouth nightly 90 tablet 3    glimepiride (AMARYL) 4 MG tablet TAKE 2 TABLETS BY MOUTH EVERY MORNING (BEFORE BREAKFAST) 180 tablet 3    clotrimazole (LOTRIMIN) 1 % cream APPLY TO AFFECTED AREA TWICE A DAY 45 g 11    aspirin 81 MG tablet Take 1 tablet by mouth daily (with breakfast) 30 tablet 11    Lancets MISC 1 each by Does not apply route daily Accucheck Pooja dx E11.9 check daily 100 each 11    Coenzyme Q10 (COQ10 PO) Take 200 mg by mouth nightly       loratadine-pseudoephedrine (CLARITIN-D 12HR) 5-120 MG per extended release tablet Take 1 tablet by mouth daily as needed       clonazePAM (KLONOPIN) 0.5 MG tablet Take 1 tablet by mouth 2 times daily as needed (tremor) for up to 30 doses. 30 tablet 0     No current facility-administered medications for this visit. Allergies:  Adhesive tape, Morphine, Iron, Lisinopril, Metformin and related, Molds & smuts, and Pcn [penicillins]    Social History:   reports that she has never smoked. She has never used smokeless tobacco. She reports that she does not currently use alcohol. She reports that she does not use drugs. Family History: family history includes COPD in her sister; Cancer in her mother; Diabetes in her brother, maternal grandmother, mother, and sister; Heart Attack in her paternal grandfather; Heart Disease in her paternal grandmother and sister; High Blood Pressure in her daughter, paternal grandmother, and son; Dene Ganser in her father; Stomach Cancer in her brother; Stroke in her paternal grandmother. REVIEW OF SYSTEMS:      Constitutional: No fever or chills. No night sweats, no weight loss. Positive fatigue, manageable  Eyes: No eye discharge, double vision, or eye pain   HEENT: negative for sore mouth, sore throat, hoarseness and voice change   Respiratory: negative for cough , sputum, dyspnea, wheezing, hemoptysis, chest pain   Cardiovascular: negative for chest pain, dyspnea, palpitations, orthopnea, PND   Gastrointestinal: negative for nausea, vomiting, diarrhea, constipation, abdominal pain, Dysphagia, hematemesis and hematochezia   Genitourinary: negative for frequency, dysuria, nocturia, urinary incontinence, and hematuria   Integument: negative for rash, skin lesions, bruises.    Hematologic/Lymphatic: negative for easy bruising, bleeding, lymphadenopathy, or petechiae   Endocrine: negative for heat or cold intolerance,weight changes, change in bowel habits and hair loss   Musculoskeletal: negative for myalgias, arthralgias, pain, joint swelling,and bone pain 3.62 (L) 3.95 - 5.11 m/uL    Hemoglobin 9.7 (L) 11.9 - 15.1 g/dL    Hematocrit 33.9 (L) 36.3 - 47.1 %    MCV 93.6 82.6 - 102.9 fL    MCH 26.8 25.2 - 33.5 pg    MCHC 28.6 28.4 - 34.8 g/dL    RDW 16.7 (H) 11.8 - 14.4 %    Platelets 911 488 - 329 k/uL    MPV 11.2 8.1 - 13.5 fL    NRBC Automated 0.0 0.0 per 100 WBC    Seg Neutrophils 81 (H) 36 - 65 %    Lymphocytes 10 (L) 24 - 43 %    Monocytes 5 3 - 12 %    Eosinophils % 2 1 - 4 %    Basophils 1 0 - 2 %    Immature Granulocytes 1 (H) 0 %    Segs Absolute 11.74 (H) 1.50 - 8.10 k/uL    Absolute Lymph # 1.45 1.10 - 3.70 k/uL    Absolute Mono # 0.69 0.10 - 1.20 k/uL    Absolute Eos # 0.26 0.00 - 0.44 k/uL    Basophils Absolute 0.08 0.00 - 0.20 k/uL    Absolute Immature Granulocyte 0.07 0.00 - 0.30 k/uL    RBC Morphology ANISOCYTOSIS PRESENT    Ferritin   Result Value Ref Range    Ferritin 143 13 - 150 ng/mL   Iron and TIBC   Result Value Ref Range    Iron 30 (L) 37 - 145 ug/dL    TIBC 253 250 - 450 ug/dL    Iron Saturation 12 (L) 20 - 55 %    UIBC 223 112 - 347 ug/dL   Vitamin B12 & Folate   Result Value Ref Range    Vitamin B-12 1825 (H) 232 - 1245 pg/mL    Folate >20.0 >4.8 ng/mL         IMAGING DATA:    Ultrasound of kidney       Impression    1. Fatty liver. 2. Otherwise, unremarkable renal ultrasound. No hydronephrosis. Narrative    EXAMINATION:    CTA OF THE ABDOMEN AND PELVIS WITH CONTRAST 9/22/2020 12:42 am         TECHNIQUE:    CTA of the abdomen and pelvis was performed with the administration of    intravenous contrast. Multiplanar reformatted images are provided for review. MIP images are provided for review. Dose modulation, iterative    reconstruction, and/or weight based adjustment of the mA/kV was utilized to    reduce the radiation dose to as low as reasonably achievable. COMPARISON:    None.          HISTORY:    ORDERING SYSTEM PROVIDED HISTORY: AMS, abd pain    TECHNOLOGIST PROVIDED HISTORY:    AMS, abd pain    Reason for Exam: patient is confused and altered, doesn't know where she's    at, but is pointing to her lower abdomen and saying it hurts    Acuity: Unknown    Type of Exam: Unknown         FINDINGS:    Lower Chest:  Visualized portion of the lower chest demonstrates no acute    abnormality. Organs: The visualized portions of the liver, spleen, pancreas and adrenal    glands demonstrate no acute abnormality in the arterial enhancement phase. No biliary ductal dilatation. Cholecystectomy. The kidneys appear normal in    size and demonstrate symmetric enhancement. No focal renal mass. No    hydronephrosis. No perinephric stranding. GI/Bowel: No bowel dilatation to suggest obstruction. No evidence of acute    appendicitis. Pelvis: The urinary bladder appears unremarkable. The pelvic organs    demonstrate no acute abnormality. Peritoneum/Retroperitoneum: Normal enhancement and caliber of the aorta and    its branches including celiac axis, SMA, right and left renal arteries, PONCHO,    iliac vasculature. No fluid collection or free air. No gross    lymphadenopathy. Bones/Soft Tissues: No acute findings. Previous lumbar spine fusion. Impression    No acute findings. IMPRESSION:   Chronic anemia, hypoproliferative  History of GI bleed with peptic ulcer disease/pyloric ulcer, negative for H. pylori-10/2019  iron deficiency  Borderline B12 stores  CKD  Morbid obesity  Multiple comorbidities      RECOMMENDATIONS:  Reviewed results of lab work-up and other relevant clinical data  Patient lab work-up shows anemia which has worsened. She was iron deficiency as suggested by low iron saturation. Patient is intolerant to oral iron. Patient is symptomatic. Recommend IV iron.   Target iron saturation above 20% ferritin around 50  We will order for [facility]  We will see patient back in office in 6 months or sooner if needed  I advised the patient to discontinue B12 supplementation  Continue to monitor CKD. If hemoglobin drops below 10 we will discuss RAJESH therapy with the patient  Lymphedema is stable. Patient encouraged to keep legs elevated whenever she can. Recommend continued surveillance for anemia and iron studies. Return to clinic in 6 months. Thank you for asking us to see this patient. Karen Allison MD          This note is created with the assistance of a speech recognition program.  While intending to generate a document that actually reflects the content of the visit, the document can still have some errors including those of syntax and sound a like substitutions which may escape proof reading. It such instances, actual meaning can be extrapolated by contextual diversion.

## 2022-09-22 NOTE — TELEPHONE ENCOUNTER
AVS FROM 9/22/22    injectafer infusions x 2   Rv in 6 months with labs prior     I will follow and once approved by precert writer will call and schedule injectafter. RV on  3/16/23 @ 12:45pm     Labs will be done week prior to appt.  (Ferritin, iron & tibc, vitamin b12 & folate, cbc)     Electronically signed by Chace Farfan on 9/22/2022 at 2:09 PM    PT was given AVS and appt schedule

## 2022-10-03 DIAGNOSIS — K90.9 IRON MALABSORPTION: Primary | ICD-10-CM

## 2022-10-03 RX ORDER — SODIUM CHLORIDE 9 MG/ML
INJECTION, SOLUTION INTRAVENOUS CONTINUOUS
Status: CANCELLED | OUTPATIENT
Start: 2022-10-03

## 2022-10-03 RX ORDER — SODIUM CHLORIDE 9 MG/ML
5-250 INJECTION, SOLUTION INTRAVENOUS PRN
Status: CANCELLED | OUTPATIENT
Start: 2022-10-03

## 2022-10-03 RX ORDER — HEPARIN SODIUM (PORCINE) LOCK FLUSH IV SOLN 100 UNIT/ML 100 UNIT/ML
500 SOLUTION INTRAVENOUS PRN
Status: CANCELLED | OUTPATIENT
Start: 2022-10-03

## 2022-10-03 RX ORDER — DIPHENHYDRAMINE HYDROCHLORIDE 50 MG/ML
50 INJECTION INTRAMUSCULAR; INTRAVENOUS
Status: CANCELLED | OUTPATIENT
Start: 2022-10-03

## 2022-10-03 RX ORDER — MEPERIDINE HYDROCHLORIDE 50 MG/ML
12.5 INJECTION INTRAMUSCULAR; INTRAVENOUS; SUBCUTANEOUS PRN
Status: CANCELLED | OUTPATIENT
Start: 2022-10-03

## 2022-10-03 RX ORDER — ACETAMINOPHEN 325 MG/1
650 TABLET ORAL
Status: CANCELLED | OUTPATIENT
Start: 2022-10-03

## 2022-10-03 RX ORDER — ALBUTEROL SULFATE 90 UG/1
4 AEROSOL, METERED RESPIRATORY (INHALATION) PRN
Status: CANCELLED | OUTPATIENT
Start: 2022-10-03

## 2022-10-03 RX ORDER — ONDANSETRON 2 MG/ML
8 INJECTION INTRAMUSCULAR; INTRAVENOUS
Status: CANCELLED | OUTPATIENT
Start: 2022-10-03

## 2022-10-03 RX ORDER — EPINEPHRINE 1 MG/ML
0.3 INJECTION, SOLUTION, CONCENTRATE INTRAVENOUS PRN
Status: CANCELLED | OUTPATIENT
Start: 2022-10-03

## 2022-10-03 RX ORDER — SODIUM CHLORIDE 0.9 % (FLUSH) 0.9 %
5-40 SYRINGE (ML) INJECTION PRN
Status: CANCELLED | OUTPATIENT
Start: 2022-10-03

## 2022-10-03 RX ORDER — FAMOTIDINE 10 MG/ML
20 INJECTION, SOLUTION INTRAVENOUS
Status: CANCELLED | OUTPATIENT
Start: 2022-10-03

## 2022-10-19 ENCOUNTER — HOSPITAL ENCOUNTER (OUTPATIENT)
Dept: INFUSION THERAPY | Age: 67
Setting detail: INFUSION SERIES
Discharge: HOME | End: 2022-10-19
Payer: COMMERCIAL

## 2022-10-19 VITALS
SYSTOLIC BLOOD PRESSURE: 153 MMHG | HEART RATE: 62 BPM | OXYGEN SATURATION: 90 % | TEMPERATURE: 97.3 F | DIASTOLIC BLOOD PRESSURE: 56 MMHG | RESPIRATION RATE: 18 BRPM

## 2022-10-19 DIAGNOSIS — E61.1 IRON DEFICIENCY: ICD-10-CM

## 2022-10-19 DIAGNOSIS — K90.9 IRON MALABSORPTION: Primary | ICD-10-CM

## 2022-10-19 PROCEDURE — 96365 THER/PROPH/DIAG IV INF INIT: CPT

## 2022-10-19 PROCEDURE — 2580000003 HC RX 258: Performed by: INTERNAL MEDICINE

## 2022-10-19 PROCEDURE — 6360000002 HC RX W HCPCS: Performed by: INTERNAL MEDICINE

## 2022-10-19 RX ORDER — SODIUM CHLORIDE 9 MG/ML
INJECTION, SOLUTION INTRAVENOUS CONTINUOUS
Status: ACTIVE | OUTPATIENT
Start: 2022-10-19 | End: 2022-10-19

## 2022-10-19 RX ORDER — ACETAMINOPHEN 325 MG/1
650 TABLET ORAL
OUTPATIENT
Start: 2022-10-26

## 2022-10-19 RX ORDER — HEPARIN SODIUM (PORCINE) LOCK FLUSH IV SOLN 100 UNIT/ML 100 UNIT/ML
500 SOLUTION INTRAVENOUS PRN
OUTPATIENT
Start: 2022-10-26

## 2022-10-19 RX ORDER — SODIUM CHLORIDE 9 MG/ML
5-250 INJECTION, SOLUTION INTRAVENOUS PRN
OUTPATIENT
Start: 2022-10-26

## 2022-10-19 RX ORDER — EPINEPHRINE 1 MG/ML
0.3 INJECTION, SOLUTION, CONCENTRATE INTRAVENOUS PRN
OUTPATIENT
Start: 2022-10-26

## 2022-10-19 RX ORDER — DIPHENHYDRAMINE HYDROCHLORIDE 50 MG/ML
50 INJECTION INTRAMUSCULAR; INTRAVENOUS
OUTPATIENT
Start: 2022-10-26

## 2022-10-19 RX ORDER — SODIUM CHLORIDE 9 MG/ML
INJECTION, SOLUTION INTRAVENOUS CONTINUOUS
Status: CANCELLED | OUTPATIENT
Start: 2022-10-26

## 2022-10-19 RX ORDER — ONDANSETRON 2 MG/ML
8 INJECTION INTRAMUSCULAR; INTRAVENOUS
OUTPATIENT
Start: 2022-10-26

## 2022-10-19 RX ORDER — ALBUTEROL SULFATE 90 UG/1
4 AEROSOL, METERED RESPIRATORY (INHALATION) PRN
OUTPATIENT
Start: 2022-10-26

## 2022-10-19 RX ORDER — SODIUM CHLORIDE 9 MG/ML
INJECTION, SOLUTION INTRAVENOUS CONTINUOUS
OUTPATIENT
Start: 2022-10-26

## 2022-10-19 RX ORDER — SODIUM CHLORIDE 0.9 % (FLUSH) 0.9 %
5-40 SYRINGE (ML) INJECTION PRN
OUTPATIENT
Start: 2022-10-26

## 2022-10-19 RX ORDER — MEPERIDINE HYDROCHLORIDE 25 MG/ML
12.5 INJECTION INTRAMUSCULAR; INTRAVENOUS; SUBCUTANEOUS PRN
OUTPATIENT
Start: 2022-10-26

## 2022-10-19 RX ADMIN — IRON SUCROSE 200 MG: 20 INJECTION, SOLUTION INTRAVENOUS at 11:13

## 2022-10-19 RX ADMIN — SODIUM CHLORIDE: 9 INJECTION, SOLUTION INTRAVENOUS at 10:52

## 2022-10-26 ENCOUNTER — HOSPITAL ENCOUNTER (OUTPATIENT)
Dept: INFUSION THERAPY | Age: 67
Setting detail: INFUSION SERIES
Discharge: HOME | End: 2022-10-26
Payer: COMMERCIAL

## 2022-10-26 VITALS
TEMPERATURE: 97.6 F | DIASTOLIC BLOOD PRESSURE: 71 MMHG | HEART RATE: 62 BPM | OXYGEN SATURATION: 98 % | SYSTOLIC BLOOD PRESSURE: 146 MMHG | RESPIRATION RATE: 17 BRPM

## 2022-10-26 DIAGNOSIS — E61.1 IRON DEFICIENCY: ICD-10-CM

## 2022-10-26 DIAGNOSIS — K90.9 IRON MALABSORPTION: Primary | ICD-10-CM

## 2022-10-26 PROCEDURE — 96365 THER/PROPH/DIAG IV INF INIT: CPT

## 2022-10-26 PROCEDURE — 6360000002 HC RX W HCPCS: Performed by: INTERNAL MEDICINE

## 2022-10-26 PROCEDURE — 2580000003 HC RX 258: Performed by: INTERNAL MEDICINE

## 2022-10-26 RX ORDER — SODIUM CHLORIDE 9 MG/ML
INJECTION, SOLUTION INTRAVENOUS CONTINUOUS
OUTPATIENT
Start: 2022-11-02

## 2022-10-26 RX ORDER — ACETAMINOPHEN 325 MG/1
650 TABLET ORAL
OUTPATIENT
Start: 2022-11-02

## 2022-10-26 RX ORDER — MEPERIDINE HYDROCHLORIDE 25 MG/ML
12.5 INJECTION INTRAMUSCULAR; INTRAVENOUS; SUBCUTANEOUS PRN
OUTPATIENT
Start: 2022-11-02

## 2022-10-26 RX ORDER — HEPARIN SODIUM (PORCINE) LOCK FLUSH IV SOLN 100 UNIT/ML 100 UNIT/ML
500 SOLUTION INTRAVENOUS PRN
OUTPATIENT
Start: 2022-11-02

## 2022-10-26 RX ORDER — EPINEPHRINE 1 MG/ML
0.3 INJECTION, SOLUTION, CONCENTRATE INTRAVENOUS PRN
OUTPATIENT
Start: 2022-11-02

## 2022-10-26 RX ORDER — SODIUM CHLORIDE 0.9 % (FLUSH) 0.9 %
5-40 SYRINGE (ML) INJECTION PRN
OUTPATIENT
Start: 2022-11-02

## 2022-10-26 RX ORDER — ALBUTEROL SULFATE 90 UG/1
4 AEROSOL, METERED RESPIRATORY (INHALATION) PRN
OUTPATIENT
Start: 2022-11-02

## 2022-10-26 RX ORDER — ONDANSETRON 2 MG/ML
8 INJECTION INTRAMUSCULAR; INTRAVENOUS
OUTPATIENT
Start: 2022-11-02

## 2022-10-26 RX ORDER — SODIUM CHLORIDE 9 MG/ML
INJECTION, SOLUTION INTRAVENOUS CONTINUOUS
Status: ACTIVE | OUTPATIENT
Start: 2022-10-26 | End: 2022-10-26

## 2022-10-26 RX ORDER — DIPHENHYDRAMINE HYDROCHLORIDE 50 MG/ML
50 INJECTION INTRAMUSCULAR; INTRAVENOUS
OUTPATIENT
Start: 2022-11-02

## 2022-10-26 RX ORDER — SODIUM CHLORIDE 9 MG/ML
5-250 INJECTION, SOLUTION INTRAVENOUS PRN
OUTPATIENT
Start: 2022-11-02

## 2022-10-26 RX ADMIN — SODIUM CHLORIDE: 9 INJECTION, SOLUTION INTRAVENOUS at 11:31

## 2022-10-26 RX ADMIN — IRON SUCROSE 200 MG: 20 INJECTION, SOLUTION INTRAVENOUS at 11:32

## 2022-11-01 ENCOUNTER — TELEPHONE (OUTPATIENT)
Dept: ONCOLOGY | Age: 67
End: 2022-11-01

## 2022-11-01 DIAGNOSIS — K90.9 IRON MALABSORPTION: Primary | ICD-10-CM

## 2022-11-01 RX ORDER — SODIUM CHLORIDE 9 MG/ML
5-250 INJECTION, SOLUTION INTRAVENOUS PRN
OUTPATIENT
Start: 2022-11-01

## 2022-11-01 RX ORDER — ONDANSETRON 2 MG/ML
8 INJECTION INTRAMUSCULAR; INTRAVENOUS
OUTPATIENT
Start: 2022-11-01

## 2022-11-01 RX ORDER — SODIUM CHLORIDE 0.9 % (FLUSH) 0.9 %
5-40 SYRINGE (ML) INJECTION PRN
OUTPATIENT
Start: 2022-11-01

## 2022-11-01 RX ORDER — SODIUM CHLORIDE 9 MG/ML
INJECTION, SOLUTION INTRAVENOUS CONTINUOUS
OUTPATIENT
Start: 2022-11-01

## 2022-11-01 RX ORDER — HEPARIN SODIUM (PORCINE) LOCK FLUSH IV SOLN 100 UNIT/ML 100 UNIT/ML
500 SOLUTION INTRAVENOUS PRN
OUTPATIENT
Start: 2022-11-01

## 2022-11-01 RX ORDER — EPINEPHRINE 1 MG/ML
0.3 INJECTION, SOLUTION, CONCENTRATE INTRAVENOUS PRN
OUTPATIENT
Start: 2022-11-01

## 2022-11-01 RX ORDER — FAMOTIDINE 10 MG/ML
20 INJECTION, SOLUTION INTRAVENOUS
OUTPATIENT
Start: 2022-11-01

## 2022-11-01 RX ORDER — ALBUTEROL SULFATE 90 UG/1
4 AEROSOL, METERED RESPIRATORY (INHALATION) PRN
OUTPATIENT
Start: 2022-11-01

## 2022-11-01 RX ORDER — DIPHENHYDRAMINE HYDROCHLORIDE 50 MG/ML
50 INJECTION INTRAMUSCULAR; INTRAVENOUS
OUTPATIENT
Start: 2022-11-01

## 2022-11-01 RX ORDER — ACETAMINOPHEN 325 MG/1
650 TABLET ORAL
OUTPATIENT
Start: 2022-11-01

## 2022-11-01 NOTE — TELEPHONE ENCOUNTER
Pt called to let us know she wanted to stop all appointments for her venofer Md ordered and that she is feeling unwell like she was before with medication Md prescribed before. Pt don't think she can take iron supplements she felt fine after the first one after her second injection she started to feel nausea and some pain in her stomach.      Please Advise     Electronically signed by Argelia Nascimento on 11/1/2022 at 1:27 PM

## 2022-11-09 ENCOUNTER — TELEPHONE (OUTPATIENT)
Dept: INFUSION THERAPY | Age: 67
End: 2022-11-09

## 2022-11-09 NOTE — TELEPHONE ENCOUNTER
Per precert, pt needs updated cbc, ferritin, and iron tibc labs for feraheme. Pt notified and she will get labs done this week. Precert updated. Orders already in chart.

## 2022-11-30 ENCOUNTER — TELEPHONE (OUTPATIENT)
Dept: ONCOLOGY | Age: 67
End: 2022-11-30

## 2022-11-30 NOTE — TELEPHONE ENCOUNTER
Dr. Khadra Abernathy called and states that pt needs to have labs drawn week of 12/5/22 and then he will review labs and then he will decide if pt's will need to need to have feraheme infusions    Called pt and LM

## 2022-12-06 ENCOUNTER — HOSPITAL ENCOUNTER (OUTPATIENT)
Age: 67
Setting detail: SPECIMEN
Discharge: HOME | End: 2022-12-06

## 2022-12-06 DIAGNOSIS — D64.9 ANEMIA, UNSPECIFIED TYPE: ICD-10-CM

## 2022-12-06 DIAGNOSIS — K90.9 IRON MALABSORPTION: ICD-10-CM

## 2022-12-06 LAB
ABSOLUTE EOS #: 0.36 K/UL (ref 0–0.44)
ABSOLUTE IMMATURE GRANULOCYTE: 0.06 K/UL (ref 0–0.3)
ABSOLUTE LYMPH #: 1.28 K/UL (ref 1.1–3.7)
ABSOLUTE MONO #: 0.69 K/UL (ref 0.1–1.2)
BASOPHILS # BLD: 1 % (ref 0–2)
BASOPHILS ABSOLUTE: 0.1 K/UL (ref 0–0.2)
EOSINOPHILS RELATIVE PERCENT: 3 % (ref 1–4)
FERRITIN: 252 NG/ML (ref 13–150)
FOLATE: 19.7 NG/ML
HCT VFR BLD CALC: 34.9 % (ref 36.3–47.1)
HEMOGLOBIN: 10.1 G/DL (ref 11.9–15.1)
IMMATURE GRANULOCYTES: 0 %
IRON SATURATION: 7 % (ref 20–55)
IRON: 20 UG/DL (ref 37–145)
LYMPHOCYTES # BLD: 9 % (ref 24–43)
MCH RBC QN AUTO: 25.6 PG (ref 25.2–33.5)
MCHC RBC AUTO-ENTMCNC: 28.9 G/DL (ref 28.4–34.8)
MCV RBC AUTO: 88.4 FL (ref 82.6–102.9)
MONOCYTES # BLD: 5 % (ref 3–12)
NRBC AUTOMATED: 0 PER 100 WBC
PDW BLD-RTO: 19.2 % (ref 11.8–14.4)
PLATELET # BLD: 380 K/UL (ref 138–453)
PMV BLD AUTO: 11.3 FL (ref 8.1–13.5)
RBC # BLD: 3.95 M/UL (ref 3.95–5.11)
RBC # BLD: ABNORMAL 10*6/UL
SEG NEUTROPHILS: 82 % (ref 36–65)
SEGMENTED NEUTROPHILS ABSOLUTE COUNT: 11.37 K/UL (ref 1.5–8.1)
TOTAL IRON BINDING CAPACITY: 270 UG/DL (ref 250–450)
UNSATURATED IRON BINDING CAPACITY: 250 UG/DL (ref 112–347)
VITAMIN B-12: 1366 PG/ML (ref 232–1245)
WBC # BLD: 13.9 K/UL (ref 3.5–11.3)

## 2023-03-06 DIAGNOSIS — E61.1 IRON DEFICIENCY: ICD-10-CM

## 2023-03-06 DIAGNOSIS — K90.9 IRON MALABSORPTION: ICD-10-CM

## 2023-03-06 DIAGNOSIS — D64.9 ANEMIA, UNSPECIFIED TYPE: Primary | ICD-10-CM

## 2023-03-07 ENCOUNTER — HOSPITAL ENCOUNTER (OUTPATIENT)
Age: 68
Setting detail: SPECIMEN
Discharge: HOME | End: 2023-03-07

## 2023-03-07 DIAGNOSIS — E61.1 IRON DEFICIENCY: ICD-10-CM

## 2023-03-07 DIAGNOSIS — K90.9 IRON MALABSORPTION: ICD-10-CM

## 2023-03-07 DIAGNOSIS — D64.9 ANEMIA, UNSPECIFIED TYPE: ICD-10-CM

## 2023-03-07 LAB
ABSOLUTE EOS #: 0.38 K/UL (ref 0–0.44)
ABSOLUTE IMMATURE GRANULOCYTE: 0.09 K/UL (ref 0–0.3)
ABSOLUTE LYMPH #: 1.58 K/UL (ref 1.1–3.7)
ABSOLUTE MONO #: 0.75 K/UL (ref 0.1–1.2)
BASOPHILS # BLD: 1 % (ref 0–2)
BASOPHILS ABSOLUTE: 0.13 K/UL (ref 0–0.2)
EOSINOPHILS RELATIVE PERCENT: 3 % (ref 1–4)
FERRITIN SERPL-MCNC: 201 NG/ML (ref 13–150)
FOLATE SERPL-MCNC: >20 NG/ML
HCT VFR BLD AUTO: 40.4 % (ref 36.3–47.1)
HGB BLD-MCNC: 11.7 G/DL (ref 11.9–15.1)
IMMATURE GRANULOCYTES: 1 %
IRON SATURATION: 15 % (ref 20–55)
IRON SERPL-MCNC: 50 UG/DL (ref 37–145)
LYMPHOCYTES # BLD: 11 % (ref 24–43)
MCH RBC QN AUTO: 28.1 PG (ref 25.2–33.5)
MCHC RBC AUTO-ENTMCNC: 29 G/DL (ref 28.4–34.8)
MCV RBC AUTO: 96.9 FL (ref 82.6–102.9)
MONOCYTES # BLD: 5 % (ref 3–12)
NRBC AUTOMATED: 0 PER 100 WBC
PDW BLD-RTO: 19.4 % (ref 11.8–14.4)
PLATELET # BLD AUTO: 402 K/UL (ref 138–453)
PMV BLD AUTO: 11.6 FL (ref 8.1–13.5)
RBC # BLD: 4.17 M/UL (ref 3.95–5.11)
RBC # BLD: ABNORMAL 10*6/UL
SEG NEUTROPHILS: 79 % (ref 36–65)
SEGMENTED NEUTROPHILS ABSOLUTE COUNT: 11.92 K/UL (ref 1.5–8.1)
TIBC SERPL-MCNC: 333 UG/DL (ref 250–450)
UNSATURATED IRON BINDING CAPACITY: 283 UG/DL (ref 112–347)
VIT B12 SERPL-MCNC: 978 PG/ML (ref 232–1245)
WBC # BLD AUTO: 14.9 K/UL (ref 3.5–11.3)

## 2023-03-16 ENCOUNTER — OFFICE VISIT (OUTPATIENT)
Dept: ONCOLOGY | Age: 68
End: 2023-03-16
Payer: MEDICARE

## 2023-03-16 ENCOUNTER — TELEPHONE (OUTPATIENT)
Dept: ONCOLOGY | Age: 68
End: 2023-03-16

## 2023-03-16 VITALS
DIASTOLIC BLOOD PRESSURE: 77 MMHG | HEART RATE: 62 BPM | TEMPERATURE: 97.6 F | BODY MASS INDEX: 56.05 KG/M2 | WEIGHT: 287 LBS | SYSTOLIC BLOOD PRESSURE: 151 MMHG

## 2023-03-16 DIAGNOSIS — D64.9 ANEMIA, UNSPECIFIED TYPE: ICD-10-CM

## 2023-03-16 DIAGNOSIS — K90.9 IRON MALABSORPTION: Primary | ICD-10-CM

## 2023-03-16 DIAGNOSIS — E61.1 IRON DEFICIENCY: ICD-10-CM

## 2023-03-16 DIAGNOSIS — E53.8 B12 DEFICIENCY: ICD-10-CM

## 2023-03-16 PROCEDURE — 99214 OFFICE O/P EST MOD 30 MIN: CPT | Performed by: INTERNAL MEDICINE

## 2023-03-16 PROCEDURE — 1123F ACP DISCUSS/DSCN MKR DOCD: CPT | Performed by: INTERNAL MEDICINE

## 2023-03-16 PROCEDURE — 3078F DIAST BP <80 MM HG: CPT | Performed by: INTERNAL MEDICINE

## 2023-03-16 PROCEDURE — 3077F SYST BP >= 140 MM HG: CPT | Performed by: INTERNAL MEDICINE

## 2023-03-16 NOTE — PROGRESS NOTES
Today's Date: 3/16/2023  Patient Name: Patience Soulier  Date of admission: No admission date for patient encounter. Patient's age: 74 y.o., 1955  Admission Dx: No admission diagnoses are documented for this encounter. Reason for Consult: management recommendations  Requesting Physician: No admitting provider for patient encounter. CHIEF COMPLAINT: Fatigue. Anemia. Weakness    History Obtained From:  patient, electronic medical record    Interval history:    Patient presents to the clinic for a follow-up visit and to discuss results of lab work-up and other relevant clinical data. Patient received IV iron back in October. Hemoglobin improved. Patient denies any hospitalization over the last 6 months. States that she feels of better than usual.  Hemoglobin has improved to 11.7. During this visit patient's allergy, social, medical, surgical history and medications were reviewed and updated. hISTORY OF PRESENT ILLNESS:      The patient is a 74 y.o.  female who is admitted to the hospital for chief complaints of fatigue patient was earlier seen in the office with complaints of progressive fatigue and leg edema. Work-up also revealed microcytic anemia. Patient has longstanding history of anemia. Iron studies done in November showed iron saturation of only 6%. Patient at that time had borderline B12 stores. Patient stool occult blood testing on 6-12 is negative. Patient denies any gross bleeding. Patient has not had thyroid testing in recent past.  Patient had EGD and colonoscopy in October 2019. Colonoscopy was unremarkable other than findings of internal hemorrhoids and sticky pasty stools. EGD showed a small 4 mm deep ulcer close to the pylorus with no active bleeding. Biopsies were taken. There was no evidence of H. pylori infection. Patient also has history of CKD. Patient denies any bleeding vaginally per rectum or bloody vomiting or cough.   Patient is intolerant to oral iron and has not been taking any iron. Past Medical History:   has a past medical history of Acute hypoxemic respiratory failure ([facility] [address] Arthritis, CHF (congestive heart failure) ([facility] [address] Chronic back pain, Diabetes mellitus type II, controlled ([facility] Fibromyalgia, Hyperlipidemia LDL goal < 70, Hypertension, benign, Morbid obesity with BMI of 60.0-69.9, adult ([facility] [address] Pain of toe of right foot, Right wrist pain, Skin cancer, Sleep apnea, and Wears glasses. Past Surgical History:   has a past surgical history that includes Tubal ligation (1981); Hysterectomy (1998); lumbar fusion (10/2010; 03/2011); Carpal tunnel release (Right, 09/05/2014); cyst removal (1985); Cholecystectomy, laparoscopic (1998); lumbar fusion (2009); Rotator cuff repair (Left, 2012); Cataract removal with implant (Bilateral, 2013); Skin cancer excision (2005); Inguinal hernia repair (Bilateral, 01/20/2017); other surgical history (05/02/2018); Upper gastrointestinal endoscopy (N/A, 10/16/2019); and Colonoscopy (N/A, 10/17/2019). Medications:    Prior to Admission medications    Medication Sig Start Date End Date Taking? Authorizing Provider   pioglitazone (ACTOS) 45 MG tablet TAKE 1 TABLET BY MOUTH EVERY DAY 2/14/23  Yes Darrell Fernandez MD   oxyCODONE-acetaminophen (PERCOCET) 5-325 MG per tablet Take 1 tablet by mouth every 6 hours as needed for Pain.  2/9/23 2/9/24 Yes Darrell Fernandez MD   famotidine (PEPCID) 20 MG tablet TAKE 1 TABLET BY MOUTH TWO TIMES A DAY 2/7/23  Yes Darrell Fernandez MD   carvedilol (COREG) 12.5 MG tablet TAKE 1 TABLET BY MOUTH TWO TIMES A DAY WITH MEALS 1/4/23  Yes Darrell Fernandez MD   TRADJENTA 5 MG tablet TAKE 1 TABLET BY MOUTH EVERY DAY 1/4/23  Yes Darrell Fernandez MD   atorvastatin (LIPITOR) 40 MG tablet Take 1 tablet by mouth at bedtime 11/19/22  Yes Darrell Fernandez MD   furosemide (LASIX) 40 MG tablet Take 1 tablet by mouth 2 times daily 11/8/22  Yes Darrell Fernandez MD ondansetron (ZOFRAN) 4 MG tablet Take 1 tablet by mouth 3 times daily as needed for Nausea or Vomiting 11/8/22  Yes Tahir Vincent MD   glimepiride (AMARYL) 4 MG tablet TAKE 2 TABLETS BY MOUTH EVERY MORNING (BEFORE BREAKFAST) 11/2/22  Yes MD Clemente Price Claudia [id number] [facility] by Does not apply route 5 years, cannot walk over 200 ft. 9/13/22  Yes Tahir Vincent MD   vitamin B-12 (CYANOCOBALAMIN) 1000 MCG tablet Take 1 tablet by mouth daily 8/23/22  Yes Tahir Vincent MD   LINZESS 145 MCG capsule TAKE 1 CAPSULE BY MOUTH EVERY DAY IN THE MORNING BEFORE BREAKFAST 8/16/22  Yes Tahir Vincent MD   amLODIPine (NORVASC) 10 MG tablet TAKE 1 TABLET BY MOUTH DAILY WITH DINNER 6/13/22  Yes Tahir Vincent MD   blood glucose test strips (ACCU-CHEK SOLANGE PLUS) strip CHECK GLUCOSE LEVELS TWICE A DAY 6/13/22  Yes Tahir Vincent MD   venlafaxine (EFFEXOR XR) 37.5 MG extended release capsule TAKE 1 CAPSULE BY MOUTH EVERY DAY 5/30/22  Yes Tahir Vincent MD   clotrimazole (LOTRIMIN) 1 % cream APPLY TO AFFECTED AREA TWICE A DAY 5/25/21  Yes Tahir Vincent MD   Lancets MISC 1 each by Does not apply route daily Accucheck Solange dx E11.9 check daily 2/14/20  Yes Tahir Vincent MD   Coenzyme Q10 (COQ10 PO) Take 200 mg by mouth nightly    Yes Historical Provider, MD   loratadine-pseudoephedrine (CLARITIN-D 12HR) 5-120 MG per extended release tablet Take 1 tablet by mouth daily as needed    Yes Historical Provider, MD   cephALEXin (KEFLEX) 500 MG capsule TAKE 1 CAPSULE BY MOUTH FOUR TIMES A DAY FOR 7 DAYS  Patient not taking: No sig reported 12/1/22   Historical Provider, MD   doxycycline hyclate (VIBRAMYCIN) 100 MG capsule TAKE 1 CAPSULE BY MOUTH IN THE MORNING AND AT BEDTIME FOR 7 DAYS  Patient not taking: No sig reported 12/1/22   Historical Provider, MD   fluconazole (DIFLUCAN) 150 MG tablet Take 1 tablet (150 mg total) by mouth once for 1 dose.   Repeat 2nd dose if 1st dose not effective after 72 hours. Patient not taking: No sig reported 12/1/22   Historical Provider, MD   meloxicam (MOBIC) 15 MG tablet Take 15 mg by mouth daily  Patient not taking: No sig reported 11/15/22   Historical Provider, MD   clonazePAM (KLONOPIN) 0.5 MG tablet Take 1 tablet by mouth 2 times daily as needed FOR TREMORS 12/14/22 2/9/23  Maria M George MD   pregabalin (LYRICA) 150 MG capsule Take 1 capsule by mouth 2 times daily for 90 days. 12/2/22 3/2/23  Maria M George MD   aspirin 81 MG tablet Take 1 tablet by mouth daily (with breakfast)  Patient not taking: No sig reported 4/21/20   Maria M George MD     Current Outpatient Medications   Medication Sig Dispense Refill    pioglitazone (ACTOS) 45 MG tablet TAKE 1 TABLET BY MOUTH EVERY DAY 90 tablet 3    oxyCODONE-acetaminophen (PERCOCET) 5-325 MG per tablet Take 1 tablet by mouth every 6 hours as needed for Pain.  120 tablet 0    famotidine (PEPCID) 20 MG tablet TAKE 1 TABLET BY MOUTH TWO TIMES A  tablet 3    carvedilol (COREG) 12.5 MG tablet TAKE 1 TABLET BY MOUTH TWO TIMES A DAY WITH MEALS 180 tablet 3    TRADJENTA 5 MG tablet TAKE 1 TABLET BY MOUTH EVERY  tablet 3    atorvastatin (LIPITOR) 40 MG tablet Take 1 tablet by mouth at bedtime 90 tablet 3    furosemide (LASIX) 40 MG tablet Take 1 tablet by mouth 2 times daily 60 tablet 11    ondansetron (ZOFRAN) 4 MG tablet Take 1 tablet by mouth 3 times daily as needed for Nausea or Vomiting 30 tablet 0    glimepiride (AMARYL) 4 MG tablet TAKE 2 TABLETS BY MOUTH EVERY MORNING (BEFORE BREAKFAST) 180 tablet 3    Handicap Placard MISC by Does not apply route 5 years, cannot walk over 200 ft. 1 each 0    vitamin B-12 (CYANOCOBALAMIN) 1000 MCG tablet Take 1 tablet by mouth daily 90 tablet 3    LINZESS 145 MCG capsule TAKE 1 CAPSULE BY MOUTH EVERY DAY IN THE MORNING BEFORE BREAKFAST 30 capsule 11    amLODIPine (NORVASC) 10 MG tablet TAKE 1 TABLET BY MOUTH DAILY WITH DINNER 90 tablet 3 blood glucose test strips (ACCU-CHEK POOJA PLUS) strip CHECK GLUCOSE LEVELS TWICE A  strip 5    venlafaxine (EFFEXOR XR) 37.5 MG extended release capsule TAKE 1 CAPSULE BY MOUTH EVERY DAY 90 capsule 3    clotrimazole (LOTRIMIN) 1 % cream APPLY TO AFFECTED AREA TWICE A DAY 45 g 11    Lancets MISC 1 each by Does not apply route daily Accucheck Pooja dx E11.9 check daily 100 each 11    Coenzyme Q10 (COQ10 PO) Take 200 mg by mouth nightly       loratadine-pseudoephedrine (CLARITIN-D 12HR) 5-120 MG per extended release tablet Take 1 tablet by mouth daily as needed       cephALEXin (KEFLEX) 500 MG capsule TAKE 1 CAPSULE BY MOUTH FOUR TIMES A DAY FOR 7 DAYS (Patient not taking: No sig reported)      doxycycline hyclate (VIBRAMYCIN) 100 MG capsule TAKE 1 CAPSULE BY MOUTH IN THE MORNING AND AT BEDTIME FOR 7 DAYS (Patient not taking: No sig reported)      fluconazole (DIFLUCAN) 150 MG tablet Take 1 tablet (150 mg total) by mouth once for 1 dose. Repeat 2nd dose if 1st dose not effective after 72 hours. (Patient not taking: No sig reported)      meloxicam (MOBIC) 15 MG tablet Take 15 mg by mouth daily (Patient not taking: No sig reported)      clonazePAM (KLONOPIN) 0.5 MG tablet Take 1 tablet by mouth 2 times daily as needed FOR TREMORS 30 tablet 0    pregabalin (LYRICA) 150 MG capsule Take 1 capsule by mouth 2 times daily for 90 days. 180 capsule 3    aspirin 81 MG tablet Take 1 tablet by mouth daily (with breakfast) (Patient not taking: No sig reported) 30 tablet 11     No current facility-administered medications for this visit. Allergies:  Adhesive tape, Morphine, Iron, Lisinopril, Metformin and related, Molds & smuts, and Pcn [penicillins]    Social History:   reports that she has never smoked. She has never used smokeless tobacco. She reports that she does not currently use alcohol. She reports that she does not use drugs.      Family History: family history includes COPD in her sister; Cancer in her mother; Diabetes in her brother, maternal grandmother, mother, and sister; Heart Attack in her paternal grandfather; Heart Disease in her paternal grandmother and sister; High Blood Pressure in her daughter, paternal grandmother, and son; Cj Baseman in her father; Stomach Cancer in her brother; Stroke in her paternal grandmother. REVIEW OF SYSTEMS:      Constitutional: No fever or chills. No night sweats, no weight loss. Positive fatigue, manageable  Eyes: No eye discharge, double vision, or eye pain   HEENT: negative for sore mouth, sore throat, hoarseness and voice change   Respiratory: negative for cough , sputum, dyspnea, wheezing, hemoptysis, chest pain   Cardiovascular: negative for chest pain, dyspnea, palpitations, orthopnea, PND   Gastrointestinal: negative for nausea, vomiting, diarrhea, constipation, abdominal pain, Dysphagia, hematemesis and hematochezia   Genitourinary: negative for frequency, dysuria, nocturia, urinary incontinence, and hematuria   Integument: negative for rash, skin lesions, bruises.    Hematologic/Lymphatic: negative for easy bruising, bleeding, lymphadenopathy, or petechiae   Endocrine: negative for heat or cold intolerance,weight changes, change in bowel habits and hair loss   Musculoskeletal: negative for myalgias, arthralgias, pain, joint swelling,and bone pain   Neurological: negative for headaches, dizziness, seizures, weakness, numbness    PHYSICAL EXAM:        BP (!) 151/77 (Site: Right Lower Arm, Position: Sitting)   Pulse 62   Temp 97.6 °F (36.4 °C) (Temporal)   Wt 287 lb (130.2 kg)   LMP  (LMP Unknown)   BMI 56.05 kg/m²    Temp (24hrs), Av.7 °F (36.5 °C), Min:97.7 °F (36.5 °C), Max:97.8 °F (36.6 °C)    General appearance - well appearing, no in pain or distress   Mental status - alert and cooperative   Eyes - pupils equal and reactive, extraocular eye movements intact   Ears - bilateral TM's and external ear canals normal   Mouth - mucous membranes moist, pharynx normal without lesions   Neck - supple, no significant adenopathy   Lymphatics - no palpable lymphadenopathy, no hepatosplenomegaly   Chest - clear to auscultation, no wheezes, rales or rhonchi, symmetric air entry   Heart - normal rate, regular rhythm, normal S1, S2, no murmurs  Abdomen - soft, nontender, nondistended, no masses or organomegaly   Neurological - alert, oriented, normal speech, no focal findings or movement disorder noted   Musculoskeletal - no joint tenderness, deformity or swelling   Extremities - peripheral pulses normal, no pedal edema, no clubbing or cyanosis   Skin - normal coloration and turgor, no rashes, no suspicious skin lesions noted ,      DATA:      Labs:     Results for orders placed or performed during the hospital encounter of 03/07/23   Ferritin   Result Value Ref Range    Ferritin 201 (H) 13 - 150 ng/mL   Iron and TIBC   Result Value Ref Range    Iron 50 37 - 145 ug/dL    TIBC 333 250 - 450 ug/dL    Iron Saturation 15 (L) 20 - 55 %    UIBC 283 112 - 347 ug/dL   Vitamin B12 & Folate   Result Value Ref Range    Vitamin B-12 978 232 - 1245 pg/mL    Folate >20.0 >4.8 ng/mL   CBC with Auto Differential   Result Value Ref Range    WBC 14.9 (H) 3.5 - 11.3 k/uL    RBC 4.17 3.95 - 5.11 m/uL    Hemoglobin 11.7 (L) 11.9 - 15.1 g/dL    Hematocrit 40.4 36.3 - 47.1 %    MCV 96.9 82.6 - 102.9 fL    MCH 28.1 25.2 - 33.5 pg    MCHC 29.0 28.4 - 34.8 g/dL    RDW 19.4 (H) 11.8 - 14.4 %    Platelets 361 883 - 511 k/uL    MPV 11.6 8.1 - 13.5 fL    NRBC Automated 0.0 0.0 per 100 WBC    Seg Neutrophils 79 (H) 36 - 65 %    Lymphocytes 11 (L) 24 - 43 %    Monocytes 5 3 - 12 %    Eosinophils % 3 1 - 4 %    Basophils 1 0 - 2 %    Immature Granulocytes 1 (H) 0 %    Segs Absolute 11.92 (H) 1.50 - 8.10 k/uL    Absolute Lymph # 1.58 1.10 - 3.70 k/uL    Absolute Mono # 0.75 0.10 - 1.20 k/uL    Absolute Eos # 0.38 0.00 - 0.44 k/uL    Basophils Absolute 0.13 0.00 - 0.20 k/uL    Absolute Immature Granulocyte 0. 09 0.00 - 0.30 k/uL    RBC Morphology ANISOCYTOSIS PRESENT          IMAGING DATA:    Ultrasound of kidney       Impression    1. Fatty liver. 2. Otherwise, unremarkable renal ultrasound. No hydronephrosis. Narrative    EXAMINATION:    CTA OF THE ABDOMEN AND PELVIS WITH CONTRAST 9/22/2020 12:42 am         TECHNIQUE:    CTA of the abdomen and pelvis was performed with the administration of    intravenous contrast. Multiplanar reformatted images are provided for review. MIP images are provided for review. Dose modulation, iterative    reconstruction, and/or weight based adjustment of the mA/kV was utilized to    reduce the radiation dose to as low as reasonably achievable. COMPARISON:    None. HISTORY:    ORDERING SYSTEM PROVIDED HISTORY: AMS, abd pain    TECHNOLOGIST PROVIDED HISTORY:    AMS, abd pain    Reason for Exam: patient is confused and altered, doesn't know where she's    at, but is pointing to her lower abdomen and saying it hurts    Acuity: Unknown    Type of Exam: Unknown         FINDINGS:    Lower Chest:  Visualized portion of the lower chest demonstrates no acute    abnormality. Organs: The visualized portions of the liver, spleen, pancreas and adrenal    glands demonstrate no acute abnormality in the arterial enhancement phase. No biliary ductal dilatation. Cholecystectomy. The kidneys appear normal in    size and demonstrate symmetric enhancement. No focal renal mass. No    hydronephrosis. No perinephric stranding. GI/Bowel: No bowel dilatation to suggest obstruction. No evidence of acute    appendicitis. Pelvis: The urinary bladder appears unremarkable. The pelvic organs    demonstrate no acute abnormality. Peritoneum/Retroperitoneum: Normal enhancement and caliber of the aorta and    its branches including celiac axis, SMA, right and left renal arteries, PONCHO,    iliac vasculature. No fluid collection or free air.   No gross lymphadenopathy. Bones/Soft Tissues: No acute findings. Previous lumbar spine fusion. Impression    No acute findings. IMPRESSION:   Chronic anemia, hypoproliferative  History of GI bleed with peptic ulcer disease/pyloric ulcer, negative for H. pylori-10/2019  iron deficiency  Borderline B12 stores  CKD  Morbid obesity  Multiple comorbidities      RECOMMENDATIONS:  Reviewed results of lab work-up and other relevant clinical data  Lab work-up shows improvement in hemoglobin. Iron stores have improved. Patient had a good response to Injectafer. Patient iron saturation is still below the target of 20%. Discussed giving 1 infusion of Venofer but patient would like to hold off. Patient is intolerant to oral iron. Target iron saturation above 20% and ferritin around 50  Return to clinic in 5 to 6 months  Continue to monitor CKD. If hemoglobin drops below 10 we will discuss RAJESH therapy with the patient  Lymphedema is stable. Patient encouraged to keep legs elevated whenever she can. Recommend continued surveillance for anemia and iron studies. Thank you for asking us to see this patient. Yari Allison MD          This note is created with the assistance of a speech recognition program.  While intending to generate a document that actually reflects the content of the visit, the document can still have some errors including those of syntax and sound a like substitutions which may escape proof reading. It such instances, actual meaning can be extrapolated by contextual diversion.

## 2023-04-28 NOTE — PROGRESS NOTES
[facility]   Daily Progress Note  PATIENT NAME: Arlene Munoz                     : 1955    Subjective: Patient was seen and examined at bedside. Patient reports that her shortness of breath has improved since yesterday, however she believes she is coughing and wheezing more. She notes that she slept well last night and has increased energy levels. She reports that her leg swelling is chronic and unchanged.       Objective:   Current Facility-Administered Medications   Medication Dose Route Frequency Provider Last Rate Last Dose    furosemide (LASIX) tablet 20 mg  20 mg Oral Daily Suzi Rocha MD   20 mg at 20 0933    methylPREDNISolone sodium (SOLU-MEDROL) injection 40 mg  40 mg Intravenous Q6H Ubaldo Doty MD        oxyCODONE HCl (OXY-IR) immediate release tablet 30 mg  30 mg Oral Q8H PRN Suzi Rocha MD   30 mg at 20 1518    sodium chloride flush 0.9 % injection 10 mL  10 mL Intravenous BID Ubaldo Doty MD        sodium chloride flush 0.9 % injection 10 mL  10 mL Intravenous BID Ubaldo Doty MD   10 mL at 20 0919    ipratropium-albuterol (DUONEB) nebulizer solution 1 ampule  1 ampule Inhalation Q4H WA Suzi Rocha MD   1 ampule at 20 153    amLODIPine (NORVASC) tablet 10 mg  10 mg Oral Daily Suzi Rocha MD   10 mg at 20 0916    atorvastatin (LIPITOR) tablet 40 mg  40 mg Oral Nightly Suzi Rocha MD   40 mg at 20 214    calcium carbonate-vitamin D (CALTRATE) 600-400 MG-UNIT per tab 1 tablet  1 tablet Oral BID Suzi Rocha MD   1 tablet at 20 0916    carvedilol (COREG) tablet 3.125 mg  3.125 mg Oral BID WC Suzi Rocha MD   3.125 mg at 20 660    coenzyme Q10 capsule 200 mg  200 mg Oral Nightly Suzi Rocha MD   200 mg at 20    diclofenac sodium 1 % gel 2 g  2 g Topical TID Suzi Rocha MD   2 g at 20 1514    Undressed for exam   glimepiride (AMARYL) tablet 4 mg  4 mg Oral QAM AC Manuel Lindsey MD   4 mg at 01/07/20 5249    therapeutic multivitamin-minerals 1 tablet  1 tablet Oral Nightly Manuel Lindsey MD   1 tablet at 01/06/20 2146    pregabalin (LYRICA) capsule 150 mg  150 mg Oral BID Manuel Lindsey MD   150 mg at 01/07/20 0916    venlafaxine (EFFEXOR XR) extended release capsule 37.5 mg  37.5 mg Oral Nightly Manuel Lindsey MD   37.5 mg at 01/06/20 2149    sodium chloride flush 0.9 % injection 10 mL  10 mL Intravenous 2 times per day Manuel Lindsey MD   10 mL at 01/06/20 2147    sodium chloride flush 0.9 % injection 10 mL  10 mL Intravenous PRN Manuel Lindsey MD   10 mL at 01/06/20 1730    magnesium hydroxide (MILK OF MAGNESIA) 400 MG/5ML suspension 30 mL  30 mL Oral Daily PRN Manuel Lindsey MD        ondansetron [facility]) injection 4 mg  4 mg Intravenous Q6H PRN Manuel Lindsey MD        enoxaparin (LOVENOX) injection 30 mg  30 mg Subcutaneous BID Manuel Lindsey MD   30 mg at 01/07/20 0917    glucose (GLUTOSE) 40 % oral gel 15 g  15 g Oral PRN Manuel Lindsey MD        dextrose 50 % IV solution  12.5 g Intravenous PRN Manuel Lindsey MD        glucagon (rDNA) injection 1 mg  1 mg Intramuscular PRN Manuel Lindsey MD        dextrose 5 % solution  100 mL/hr Intravenous PRN Manuel Lindsey MD        acetaminophen (TYLENOL) tablet 650 mg  650 mg Oral Q4H PRN Manuel Lindsey MD        insulin lispro (HUMALOG) injection vial 0-6 Units  0-6 Units Subcutaneous TID WC Manuel Lindsey MD   4 Units at 01/07/20 1157    insulin lispro (HUMALOG) injection vial 0-3 Units  0-3 Units Subcutaneous Nightly Manuel Lindsey MD   3 Units at 01/06/20 9170    cetirizine-psuedoephedrine (ZYRTEC-D) 5-120 MG per extended release tablet 1 tablet  1 tablet Oral Daily Manuel Lindsey MD   1 tablet at 01/07/20 1157    LORazepam (ATIVAN) injection 0.5 mg  0.5 mg Intravenous Q4H PRN Leydi Webb MD        Or    LORazepam (ATIVAN) tablet 0.5 mg  0.5 mg Oral Q4H PRN Leydi Webb MD        albuterol (PROVENTIL) nebulizer solution 2.5 mg  2.5 mg Nebulization Q6H PRN Leydi Webb MD            BP (!) 148/52   Pulse 87   Temp 98.4 °F (36.9 °C) (Oral)   Resp 16   Ht 5' 5\" (1.651 m)   Wt 297 lb 6.4 oz (134.9 kg)   LMP  (LMP Unknown)   SpO2 96%   BMI 49.49 kg/m²    I/O last 3 completed shifts: In: 2673 [P.O.:400; I.V.:919]  Out: -   No intake/output data recorded. General: Patient is alert, oriented, in no acute distress. HEENT: Head normocephalic, atraumatic. Lung Exam: End expiratory wheezes throughout lung fields, most prominent in RUL. Heart Exam: RRR, no murmurs, rubs or gallops. Extremity Exam: 1+ pitting edema in LE bilaterally. Mental Status: Cooperative, appropriate affect.     Recent Results (from the past 24 hour(s))   POC Glucose Fingerstick    Collection Time: 01/06/20 10:51 PM   Result Value Ref Range    POC Glucose 354 (H) 65 - 105 mg/dL   Basic Metabolic Panel w/ Reflex to MG    Collection Time: 01/07/20  5:00 AM   Result Value Ref Range    Glucose 289 (H) 70 - 99 mg/dL    BUN 27 (H) 8 - 23 mg/dL    CREATININE 1.10 (H) 0.50 - 0.90 mg/dL    Bun/Cre Ratio NOT REPORTED 9 - 20    Calcium 9.8 8.6 - 10.4 mg/dL    Sodium 134 (L) 135 - 144 mmol/L    Potassium 4.6 3.7 - 5.3 mmol/L    Chloride 93 (L) 98 - 107 mmol/L    CO2 27 20 - 31 mmol/L    Anion Gap 14 9 - 17 mmol/L    GFR Non-African American 50 (L) >60 mL/min    GFR African American >60 >60 mL/min    GFR Comment          GFR Staging NOT REPORTED    CBC auto differential    Collection Time: 01/07/20  5:00 AM   Result Value Ref Range    WBC 18.3 (H) 3.5 - 11.0 k/uL    RBC 3.89 (L) 4.0 - 5.2 m/uL    Hemoglobin 9.9 (L) 12.0 - 16.0 g/dL    Hematocrit 31.8 (L) 36 - 46 %    MCV 81.6 80 - 100 fL    MCH 25.4 (L) 26 - 34 pg    MCHC 31.1 31 - 37 g/dL    RDW 16.9 (H) 11.5 - 14.9 % Platelets 654 750 - 528 k/uL    MPV 8.3 6.0 - 12.0 fL    NRBC Automated NOT REPORTED per 100 WBC    Differential Type NOT REPORTED     Immature Granulocytes NOT REPORTED 0 %    Absolute Immature Granulocyte NOT REPORTED 0.00 - 0.30 k/uL    WBC Morphology NOT REPORTED     RBC Morphology NOT REPORTED     Platelet Estimate NOT REPORTED     Seg Neutrophils 91 (H) 36 - 66 %    Lymphocytes 2 (L) 24 - 44 %    Monocytes 3 1 - 7 %    Eosinophils % 0 0 - 4 %    Basophils 0 0 - 2 %    Bands 3 0 - 10 %    Metamyelocytes 1 (H) 0 %    Segs Absolute 16.65 (H) 1.3 - 9.1 k/uL    Absolute Lymph # 0.37 (L) 1.0 - 4.8 k/uL    Absolute Mono # 0.55 0.1 - 1.3 k/uL    Absolute Eos # 0.00 0.0 - 0.4 k/uL    Basophils Absolute 0.00 0.0 - 0.2 k/uL    Absolute Bands # 0.55 0.0 - 1.0 k/uL    Metamyelocytes Absolute 0.18 (H) 0 k/uL    Morphology ANISOCYTOSIS PRESENT     Morphology MICROCYTOSIS PRESENT    POC Glucose Fingerstick    Collection Time: 01/07/20  5:15 AM   Result Value Ref Range    POC Glucose 250 (H) 65 - 105 mg/dL   POC Glucose Fingerstick    Collection Time: 01/07/20  7:12 AM   Result Value Ref Range    POC Glucose 283 (H) 65 - 105 mg/dL   POC Glucose Fingerstick    Collection Time: 01/07/20 11:15 AM   Result Value Ref Range    POC Glucose 302 (H) 65 - 105 mg/dL   POC Glucose Fingerstick    Collection Time: 01/07/20  4:02 PM   Result Value Ref Range    POC Glucose 299 (H) 65 - 105 mg/dL     Assessment:   Principal Problem:    Hyperkalemia  Active Problems:    Hypertension, benign    Diabetes mellitus type II, controlled (HCC)    Chronic pain syndrome    MAMTA (acute kidney injury) (HCC)    Class 3 severe obesity due to excess calories with serious comorbidity and body mass index (BMI) of 45.0 to 49.9 in [facility])    Tremor  Resolved Problems:    * No resolved hospital problems. *       Plan:    Continue DVT prophylaxis. V/Q scan from yesterday shows low probability of PE. Continue Lasix.   We will continue Solu-Medrol but decrease

## 2023-07-26 ENCOUNTER — HOSPITAL ENCOUNTER (OUTPATIENT)
Age: 68
Setting detail: SPECIMEN
Discharge: HOME | End: 2023-07-26

## 2023-07-26 DIAGNOSIS — K90.9 IRON MALABSORPTION: ICD-10-CM

## 2023-07-26 DIAGNOSIS — E61.1 IRON DEFICIENCY: ICD-10-CM

## 2023-07-26 DIAGNOSIS — D64.9 ANEMIA, UNSPECIFIED TYPE: ICD-10-CM

## 2023-07-26 DIAGNOSIS — R60.0 BILATERAL LOWER EXTREMITY EDEMA: ICD-10-CM

## 2023-07-26 LAB
ANION GAP SERPL CALCULATED.3IONS-SCNC: 14 MMOL/L (ref 9–17)
BASOPHILS # BLD: 0.12 K/UL (ref 0–0.2)
BASOPHILS NFR BLD: 1 % (ref 0–2)
BUN SERPL-MCNC: 20 MG/DL (ref 8–23)
CALCIUM SERPL-MCNC: 9.4 MG/DL (ref 8.6–10.4)
CHLORIDE SERPL-SCNC: 98 MMOL/L (ref 98–107)
CO2 SERPL-SCNC: 31 MMOL/L (ref 20–31)
CREAT SERPL-MCNC: 1.6 MG/DL (ref 0.5–0.9)
EOSINOPHIL # BLD: 0.52 K/UL (ref 0–0.44)
EOSINOPHILS RELATIVE PERCENT: 4 % (ref 1–4)
ERYTHROCYTE [DISTWIDTH] IN BLOOD BY AUTOMATED COUNT: 16.8 % (ref 11.8–14.4)
FERRITIN SERPL-MCNC: 175 NG/ML (ref 13–150)
GFR SERPL CREATININE-BSD FRML MDRD: 36 ML/MIN/1.73M2
GLUCOSE SERPL-MCNC: 156 MG/DL (ref 70–99)
HCT VFR BLD AUTO: 33.1 % (ref 36.3–47.1)
HGB BLD-MCNC: 9.7 G/DL (ref 11.9–15.1)
IMM GRANULOCYTES # BLD AUTO: 0.06 K/UL (ref 0–0.3)
IMM GRANULOCYTES NFR BLD: 0 %
IRON SATN MFR SERPL: 14 % (ref 20–55)
IRON SERPL-MCNC: 37 UG/DL (ref 37–145)
LYMPHOCYTES NFR BLD: 1.44 K/UL (ref 1.1–3.7)
LYMPHOCYTES RELATIVE PERCENT: 11 % (ref 24–43)
MCH RBC QN AUTO: 29 PG (ref 25.2–33.5)
MCHC RBC AUTO-ENTMCNC: 29.3 G/DL (ref 28.4–34.8)
MCV RBC AUTO: 99.1 FL (ref 82.6–102.9)
MONOCYTES NFR BLD: 0.73 K/UL (ref 0.1–1.2)
MONOCYTES NFR BLD: 6 % (ref 3–12)
NEUTROPHILS NFR BLD: 78 % (ref 36–65)
NEUTS SEG NFR BLD: 10.47 K/UL (ref 1.5–8.1)
NRBC BLD-RTO: 0 PER 100 WBC
PLATELET # BLD AUTO: 355 K/UL (ref 138–453)
PMV BLD AUTO: 11.4 FL (ref 8.1–13.5)
POTASSIUM SERPL-SCNC: 5.4 MMOL/L (ref 3.7–5.3)
RBC # BLD AUTO: 3.34 M/UL (ref 3.95–5.11)
RBC # BLD: ABNORMAL 10*6/UL
SODIUM SERPL-SCNC: 143 MMOL/L (ref 135–144)
TIBC SERPL-MCNC: 265 UG/DL (ref 250–450)
UNSATURATED IRON BINDING CAPACITY: 228 UG/DL (ref 112–347)
WBC OTHER # BLD: 13.3 K/UL (ref 3.5–11.3)

## 2023-08-03 ENCOUNTER — APPOINTMENT (OUTPATIENT)
Dept: GENERAL RADIOLOGY | Age: 68
DRG: 292 | End: 2023-08-03
Payer: MEDICARE

## 2023-08-03 ENCOUNTER — HOSPITAL ENCOUNTER (INPATIENT)
Age: 68
LOS: 4 days | Discharge: HOME HEALTH | DRG: 292 | End: 2023-08-07
Attending: EMERGENCY MEDICINE | Admitting: FAMILY MEDICINE
Payer: MEDICARE

## 2023-08-03 DIAGNOSIS — I50.9 ACUTE CONGESTIVE HEART FAILURE, UNSPECIFIED HEART FAILURE TYPE (HCC): ICD-10-CM

## 2023-08-03 DIAGNOSIS — N30.00 ACUTE CYSTITIS WITHOUT HEMATURIA: Primary | ICD-10-CM

## 2023-08-03 DIAGNOSIS — R53.1 GENERAL WEAKNESS: ICD-10-CM

## 2023-08-03 DIAGNOSIS — R23.3 PETECHIAL RASH: ICD-10-CM

## 2023-08-03 LAB
ANION GAP SERPL CALCULATED.3IONS-SCNC: 9 MMOL/L (ref 9–17)
BACTERIA URNS QL MICRO: ABNORMAL
BASOPHILS # BLD: 0.1 K/UL (ref 0–0.2)
BASOPHILS NFR BLD: 1 % (ref 0–2)
BILIRUB UR QL STRIP: NEGATIVE
BNP SERPL-MCNC: 1056 PG/ML
BUN SERPL-MCNC: 24 MG/DL (ref 8–23)
CALCIUM SERPL-MCNC: 9 MG/DL (ref 8.6–10.4)
CASTS #/AREA URNS LPF: ABNORMAL /LPF
CHLORIDE SERPL-SCNC: 97 MMOL/L (ref 98–107)
CLARITY UR: ABNORMAL
CO2 SERPL-SCNC: 32 MMOL/L (ref 20–31)
COLOR UR: YELLOW
CREAT SERPL-MCNC: 1.5 MG/DL (ref 0.5–0.9)
EOSINOPHIL # BLD: 0.4 K/UL (ref 0–0.4)
EOSINOPHILS RELATIVE PERCENT: 3 % (ref 0–4)
EPI CELLS #/AREA URNS HPF: ABNORMAL /HPF
ERYTHROCYTE [DISTWIDTH] IN BLOOD BY AUTOMATED COUNT: 17.3 % (ref 11.5–14.9)
GFR SERPL CREATININE-BSD FRML MDRD: 38 ML/MIN/1.73M2
GLUCOSE SERPL-MCNC: 192 MG/DL (ref 70–99)
GLUCOSE UR STRIP-MCNC: NEGATIVE MG/DL
HCT VFR BLD AUTO: 26.6 % (ref 36–46)
HGB BLD-MCNC: 8.6 G/DL (ref 12–16)
HGB UR QL STRIP.AUTO: NEGATIVE
KETONES UR STRIP-MCNC: ABNORMAL MG/DL
LEUKOCYTE ESTERASE UR QL STRIP: ABNORMAL
LYMPHOCYTES NFR BLD: 0.9 K/UL (ref 1–4.8)
LYMPHOCYTES RELATIVE PERCENT: 9 % (ref 24–44)
MAGNESIUM SERPL-MCNC: 2.6 MG/DL (ref 1.6–2.6)
MCH RBC QN AUTO: 28.7 PG (ref 26–34)
MCHC RBC AUTO-ENTMCNC: 32.3 G/DL (ref 31–37)
MCV RBC AUTO: 89.1 FL (ref 80–100)
METER GLUCOSE: 160 MG/DL (ref 65–105)
METER GLUCOSE: 180 MG/DL (ref 65–105)
MONOCYTES NFR BLD: 0.6 K/UL (ref 0.1–1.3)
MONOCYTES NFR BLD: 6 % (ref 1–7)
MYOGLOBIN SERPL-MCNC: 33 NG/ML (ref 25–58)
MYOGLOBIN SERPL-MCNC: 34 NG/ML (ref 25–58)
NEUTROPHILS NFR BLD: 81 % (ref 36–66)
NEUTS SEG NFR BLD: 8.7 K/UL (ref 1.3–9.1)
NITRITE UR QL STRIP: NEGATIVE
PH UR STRIP: 5 [PH] (ref 5–8)
PLATELET # BLD AUTO: 282 K/UL (ref 150–450)
PMV BLD AUTO: 8.8 FL (ref 6–12)
POTASSIUM SERPL-SCNC: 4.6 MMOL/L (ref 3.7–5.3)
PROT UR STRIP-MCNC: ABNORMAL MG/DL
RBC # BLD AUTO: 2.99 M/UL (ref 4–5.2)
RBC #/AREA URNS HPF: ABNORMAL /HPF
SODIUM SERPL-SCNC: 138 MMOL/L (ref 135–144)
SP GR UR STRIP: 1.02 (ref 1–1.03)
T4 FREE SERPL-MCNC: 1 NG/DL (ref 0.9–1.7)
TROPONIN I SERPL HS-MCNC: 15 NG/L (ref 0–14)
TROPONIN I SERPL HS-MCNC: 17 NG/L (ref 0–14)
TSH SERPL DL<=0.05 MIU/L-ACNC: 2.65 UIU/ML (ref 0.3–5)
UROBILINOGEN UR STRIP-ACNC: NORMAL EU/DL (ref 0–1)
WBC #/AREA URNS HPF: ABNORMAL /HPF
WBC OTHER # BLD: 10.7 K/UL (ref 3.5–11)

## 2023-08-03 PROCEDURE — 83036 HEMOGLOBIN GLYCOSYLATED A1C: CPT

## 2023-08-03 PROCEDURE — 83735 ASSAY OF MAGNESIUM: CPT

## 2023-08-03 PROCEDURE — 80048 BASIC METABOLIC PNL TOTAL CA: CPT

## 2023-08-03 PROCEDURE — 6360000002 HC RX W HCPCS: Performed by: FAMILY MEDICINE

## 2023-08-03 PROCEDURE — 84439 ASSAY OF FREE THYROXINE: CPT

## 2023-08-03 PROCEDURE — 2580000003 HC RX 258: Performed by: FAMILY MEDICINE

## 2023-08-03 PROCEDURE — 93005 ELECTROCARDIOGRAM TRACING: CPT | Performed by: EMERGENCY MEDICINE

## 2023-08-03 PROCEDURE — 36415 COLL VENOUS BLD VENIPUNCTURE: CPT

## 2023-08-03 PROCEDURE — 82947 ASSAY GLUCOSE BLOOD QUANT: CPT

## 2023-08-03 PROCEDURE — 83880 ASSAY OF NATRIURETIC PEPTIDE: CPT

## 2023-08-03 PROCEDURE — 2580000003 HC RX 258: Performed by: EMERGENCY MEDICINE

## 2023-08-03 PROCEDURE — 6360000002 HC RX W HCPCS: Performed by: EMERGENCY MEDICINE

## 2023-08-03 PROCEDURE — 85025 COMPLETE CBC W/AUTO DIFF WBC: CPT

## 2023-08-03 PROCEDURE — 83874 ASSAY OF MYOGLOBIN: CPT

## 2023-08-03 PROCEDURE — 71045 X-RAY EXAM CHEST 1 VIEW: CPT

## 2023-08-03 PROCEDURE — 96375 TX/PRO/DX INJ NEW DRUG ADDON: CPT

## 2023-08-03 PROCEDURE — 99285 EMERGENCY DEPT VISIT HI MDM: CPT

## 2023-08-03 PROCEDURE — 1200000000 HC SEMI PRIVATE

## 2023-08-03 PROCEDURE — 84443 ASSAY THYROID STIM HORMONE: CPT

## 2023-08-03 PROCEDURE — 96374 THER/PROPH/DIAG INJ IV PUSH: CPT

## 2023-08-03 PROCEDURE — 81001 URINALYSIS AUTO W/SCOPE: CPT

## 2023-08-03 PROCEDURE — 84484 ASSAY OF TROPONIN QUANT: CPT

## 2023-08-03 PROCEDURE — 6370000000 HC RX 637 (ALT 250 FOR IP): Performed by: FAMILY MEDICINE

## 2023-08-03 RX ORDER — ONDANSETRON 4 MG/1
4 TABLET, ORALLY DISINTEGRATING ORAL EVERY 8 HOURS PRN
Status: DISCONTINUED | OUTPATIENT
Start: 2023-08-03 | End: 2023-08-07 | Stop reason: HOSPADM

## 2023-08-03 RX ORDER — PHENOL 1.4 %
1 AEROSOL, SPRAY (ML) MUCOUS MEMBRANE 2 TIMES DAILY
COMMUNITY

## 2023-08-03 RX ORDER — ACETAMINOPHEN 325 MG/1
650 TABLET ORAL EVERY 6 HOURS PRN
Status: DISCONTINUED | OUTPATIENT
Start: 2023-08-03 | End: 2023-08-07 | Stop reason: HOSPADM

## 2023-08-03 RX ORDER — ATORVASTATIN CALCIUM 40 MG/1
40 TABLET, FILM COATED ORAL NIGHTLY
Status: DISCONTINUED | OUTPATIENT
Start: 2023-08-03 | End: 2023-08-07 | Stop reason: HOSPADM

## 2023-08-03 RX ORDER — AMPICILLIN TRIHYDRATE 250 MG
200 CAPSULE ORAL NIGHTLY
Status: DISCONTINUED | OUTPATIENT
Start: 2023-08-03 | End: 2023-08-03

## 2023-08-03 RX ORDER — M-VIT,TX,IRON,MINS/CALC/FOLIC 27MG-0.4MG
1 TABLET ORAL DAILY
COMMUNITY

## 2023-08-03 RX ORDER — SODIUM CHLORIDE 0.9 % (FLUSH) 0.9 %
5-40 SYRINGE (ML) INJECTION PRN
Status: DISCONTINUED | OUTPATIENT
Start: 2023-08-03 | End: 2023-08-07 | Stop reason: HOSPADM

## 2023-08-03 RX ORDER — SODIUM CHLORIDE 9 MG/ML
INJECTION, SOLUTION INTRAVENOUS PRN
Status: DISCONTINUED | OUTPATIENT
Start: 2023-08-03 | End: 2023-08-07 | Stop reason: HOSPADM

## 2023-08-03 RX ORDER — OXYCODONE HYDROCHLORIDE 10 MG/1
30 TABLET ORAL EVERY 8 HOURS PRN
Status: DISCONTINUED | OUTPATIENT
Start: 2023-08-03 | End: 2023-08-07 | Stop reason: HOSPADM

## 2023-08-03 RX ORDER — DEXTROSE MONOHYDRATE 100 MG/ML
INJECTION, SOLUTION INTRAVENOUS CONTINUOUS PRN
Status: DISCONTINUED | OUTPATIENT
Start: 2023-08-03 | End: 2023-08-07 | Stop reason: HOSPADM

## 2023-08-03 RX ORDER — M-VIT,TX,IRON,MINS/CALC/FOLIC 27MG-0.4MG
1 TABLET ORAL DAILY
Status: DISCONTINUED | OUTPATIENT
Start: 2023-08-04 | End: 2023-08-07 | Stop reason: HOSPADM

## 2023-08-03 RX ORDER — INSULIN LISPRO 100 [IU]/ML
0-4 INJECTION, SOLUTION INTRAVENOUS; SUBCUTANEOUS NIGHTLY
Status: DISCONTINUED | OUTPATIENT
Start: 2023-08-03 | End: 2023-08-07 | Stop reason: HOSPADM

## 2023-08-03 RX ORDER — PIOGLITAZONEHYDROCHLORIDE 45 MG/1
45 TABLET ORAL DAILY
Status: DISCONTINUED | OUTPATIENT
Start: 2023-08-03 | End: 2023-08-07 | Stop reason: HOSPADM

## 2023-08-03 RX ORDER — AMLODIPINE BESYLATE 10 MG/1
10 TABLET ORAL
Status: DISCONTINUED | OUTPATIENT
Start: 2023-08-04 | End: 2023-08-06

## 2023-08-03 RX ORDER — CLONAZEPAM 0.5 MG/1
0.5 TABLET ORAL EVERY 12 HOURS PRN
Status: DISCONTINUED | OUTPATIENT
Start: 2023-08-03 | End: 2023-08-03

## 2023-08-03 RX ORDER — CARVEDILOL 12.5 MG/1
12.5 TABLET ORAL 2 TIMES DAILY WITH MEALS
Status: DISCONTINUED | OUTPATIENT
Start: 2023-08-03 | End: 2023-08-07 | Stop reason: HOSPADM

## 2023-08-03 RX ORDER — POLYETHYLENE GLYCOL 3350 17 G/17G
17 POWDER, FOR SOLUTION ORAL DAILY PRN
Status: DISCONTINUED | OUTPATIENT
Start: 2023-08-03 | End: 2023-08-07 | Stop reason: HOSPADM

## 2023-08-03 RX ORDER — PREGABALIN 150 MG/1
150 CAPSULE ORAL 2 TIMES DAILY
Status: DISCONTINUED | OUTPATIENT
Start: 2023-08-03 | End: 2023-08-07 | Stop reason: HOSPADM

## 2023-08-03 RX ORDER — FUROSEMIDE 10 MG/ML
40 INJECTION INTRAMUSCULAR; INTRAVENOUS 2 TIMES DAILY
Status: DISCONTINUED | OUTPATIENT
Start: 2023-08-03 | End: 2023-08-04

## 2023-08-03 RX ORDER — ONDANSETRON 4 MG/1
4 TABLET, FILM COATED ORAL 3 TIMES DAILY PRN
Status: DISCONTINUED | OUTPATIENT
Start: 2023-08-03 | End: 2023-08-03 | Stop reason: SDUPTHER

## 2023-08-03 RX ORDER — ENOXAPARIN SODIUM 100 MG/ML
30 INJECTION SUBCUTANEOUS 2 TIMES DAILY
Status: DISCONTINUED | OUTPATIENT
Start: 2023-08-03 | End: 2023-08-06

## 2023-08-03 RX ORDER — LANOLIN ALCOHOL/MO/W.PET/CERES
1000 CREAM (GRAM) TOPICAL DAILY
Status: DISCONTINUED | OUTPATIENT
Start: 2023-08-03 | End: 2023-08-07 | Stop reason: HOSPADM

## 2023-08-03 RX ORDER — FAMOTIDINE 20 MG/1
20 TABLET, FILM COATED ORAL 2 TIMES DAILY
Status: DISCONTINUED | OUTPATIENT
Start: 2023-08-03 | End: 2023-08-03

## 2023-08-03 RX ORDER — ONDANSETRON 2 MG/ML
4 INJECTION INTRAMUSCULAR; INTRAVENOUS EVERY 6 HOURS PRN
Status: DISCONTINUED | OUTPATIENT
Start: 2023-08-03 | End: 2023-08-07 | Stop reason: HOSPADM

## 2023-08-03 RX ORDER — OXYCODONE HYDROCHLORIDE 30 MG/1
30 TABLET ORAL EVERY 8 HOURS PRN
COMMUNITY
End: 2023-08-11 | Stop reason: SDUPTHER

## 2023-08-03 RX ORDER — ACETAMINOPHEN 650 MG/1
650 SUPPOSITORY RECTAL EVERY 6 HOURS PRN
Status: DISCONTINUED | OUTPATIENT
Start: 2023-08-03 | End: 2023-08-07 | Stop reason: HOSPADM

## 2023-08-03 RX ORDER — ALOGLIPTIN 12.5 MG/1
12.5 TABLET, FILM COATED ORAL DAILY
Status: DISCONTINUED | OUTPATIENT
Start: 2023-08-04 | End: 2023-08-07 | Stop reason: HOSPADM

## 2023-08-03 RX ORDER — VENLAFAXINE HYDROCHLORIDE 37.5 MG/1
37.5 CAPSULE, EXTENDED RELEASE ORAL DAILY
Status: DISCONTINUED | OUTPATIENT
Start: 2023-08-03 | End: 2023-08-07 | Stop reason: HOSPADM

## 2023-08-03 RX ORDER — GLIPIZIDE 5 MG/1
10 TABLET ORAL
Status: DISCONTINUED | OUTPATIENT
Start: 2023-08-04 | End: 2023-08-07 | Stop reason: HOSPADM

## 2023-08-03 RX ORDER — CALCIUM CARBONATE 500 MG/1
500 TABLET, CHEWABLE ORAL 2 TIMES DAILY
Status: DISCONTINUED | OUTPATIENT
Start: 2023-08-03 | End: 2023-08-07 | Stop reason: HOSPADM

## 2023-08-03 RX ORDER — OXYCODONE HYDROCHLORIDE AND ACETAMINOPHEN 5; 325 MG/1; MG/1
1 TABLET ORAL EVERY 6 HOURS PRN
Status: DISCONTINUED | OUTPATIENT
Start: 2023-08-03 | End: 2023-08-07 | Stop reason: HOSPADM

## 2023-08-03 RX ORDER — INSULIN LISPRO 100 [IU]/ML
0-4 INJECTION, SOLUTION INTRAVENOUS; SUBCUTANEOUS
Status: DISCONTINUED | OUTPATIENT
Start: 2023-08-03 | End: 2023-08-07 | Stop reason: HOSPADM

## 2023-08-03 RX ORDER — FAMOTIDINE 20 MG/1
20 TABLET, FILM COATED ORAL DAILY
Status: DISCONTINUED | OUTPATIENT
Start: 2023-08-04 | End: 2023-08-07 | Stop reason: HOSPADM

## 2023-08-03 RX ORDER — SODIUM CHLORIDE 0.9 % (FLUSH) 0.9 %
5-40 SYRINGE (ML) INJECTION EVERY 12 HOURS SCHEDULED
Status: DISCONTINUED | OUTPATIENT
Start: 2023-08-03 | End: 2023-08-07 | Stop reason: HOSPADM

## 2023-08-03 RX ORDER — FUROSEMIDE 10 MG/ML
40 INJECTION INTRAMUSCULAR; INTRAVENOUS ONCE
Status: COMPLETED | OUTPATIENT
Start: 2023-08-03 | End: 2023-08-03

## 2023-08-03 RX ADMIN — CARVEDILOL 12.5 MG: 12.5 TABLET, FILM COATED ORAL at 18:33

## 2023-08-03 RX ADMIN — VENLAFAXINE HYDROCHLORIDE 37.5 MG: 37.5 CAPSULE, EXTENDED RELEASE ORAL at 18:34

## 2023-08-03 RX ADMIN — FUROSEMIDE 40 MG: 10 INJECTION, SOLUTION INTRAMUSCULAR; INTRAVENOUS at 20:36

## 2023-08-03 RX ADMIN — ENOXAPARIN SODIUM 30 MG: 100 INJECTION SUBCUTANEOUS at 20:37

## 2023-08-03 RX ADMIN — PIOGLITAZONE HYDROCHLORIDE 45 MG: 45 TABLET ORAL at 18:34

## 2023-08-03 RX ADMIN — ATORVASTATIN CALCIUM 40 MG: 40 TABLET, FILM COATED ORAL at 20:36

## 2023-08-03 RX ADMIN — SODIUM CHLORIDE, PRESERVATIVE FREE 10 ML: 5 INJECTION INTRAVENOUS at 20:37

## 2023-08-03 RX ADMIN — CEFTRIAXONE SODIUM 1000 MG: 1 INJECTION, POWDER, FOR SOLUTION INTRAMUSCULAR; INTRAVENOUS at 14:57

## 2023-08-03 RX ADMIN — ANTACID TABLETS 500 MG: 500 TABLET, CHEWABLE ORAL at 20:36

## 2023-08-03 RX ADMIN — CYANOCOBALAMIN TAB 1000 MCG 1000 MCG: 1000 TAB at 18:33

## 2023-08-03 RX ADMIN — FUROSEMIDE 40 MG: 10 INJECTION, SOLUTION INTRAMUSCULAR; INTRAVENOUS at 14:17

## 2023-08-03 RX ADMIN — PREGABALIN 150 MG: 150 CAPSULE ORAL at 20:36

## 2023-08-03 ASSESSMENT — ENCOUNTER SYMPTOMS
FACIAL SWELLING: 0
RHINORRHEA: 0
CHEST TIGHTNESS: 0
VOMITING: 0
EYE REDNESS: 0
COLOR CHANGE: 0
EYE PAIN: 0
COUGH: 0
DIARRHEA: 0
ABDOMINAL PAIN: 0
WHEEZING: 0
CONSTIPATION: 0
EYE DISCHARGE: 0
SINUS PRESSURE: 0
BACK PAIN: 0
BLOOD IN STOOL: 0
NAUSEA: 0
TROUBLE SWALLOWING: 0
SORE THROAT: 0
SHORTNESS OF BREATH: 1

## 2023-08-03 ASSESSMENT — PAIN SCALES - GENERAL: PAINLEVEL_OUTOF10: 9

## 2023-08-03 ASSESSMENT — PAIN DESCRIPTION - LOCATION: LOCATION: HEAD

## 2023-08-03 ASSESSMENT — PAIN - FUNCTIONAL ASSESSMENT
PAIN_FUNCTIONAL_ASSESSMENT: 0-10
PAIN_FUNCTIONAL_ASSESSMENT: NONE - DENIES PAIN

## 2023-08-03 NOTE — ED TRIAGE NOTES
Mode of arrival (squad #, walk in, police, etc) : [address]        Chief complaint(s): WEAKNESS, TREMORS        Arrival Note (brief scenario, treatment PTA, etc). :  Pts  called 911 due to pts decreased level of mobility. Pt has been unable to get in and out of truck, has worsening leg edema, has had increased SOB but admits to not wearing her 2.5L at home as she is supposed to and pt also has had blurred vision and bilateral arm tremors going on for about 6 weeks now. Pt lethargic during triage        C= \"Have you ever felt that you should Cut down on your drinking? \"  No  A= \"Have people Annoyed you by criticizing your drinking? \"  No  G= \"Have you ever felt bad or Guilty about your drinking? \"  No  E= \"Have you ever had a drink as an Eye-opener first thing in the morning to steady your nerves or to help a hangover? \"  No      Deferred []      Reason for deferring: N/A    *If yes to two or more: probable alcohol abuse. *

## 2023-08-03 NOTE — ED PROVIDER NOTES
[facility]  eMERGENCY dEPARTMENT eNCOUnter      Pt Name: Kuldeep Gil  MRN: [id number]  [address] [place] 1955  Date of evaluation: 8/3/23      CHIEF COMPLAINT       Chief Complaint   Patient presents with    Extremity Weakness    Tremors         HISTORY OF PRESENT ILLNESS    Kuldeep Gil is a 79 y.o. female who presents complaining of generalized weakness. Patient is here by EMS after the  was concerned about her and wanted her to get checked out. Patient states she has been having issues for over a month now. Initially was just a little bit of blurry vision this became a little weak then she started noticing a lot of swelling in her legs and now she really just cannot take care of herself at home and do think she needs to do. Patient denies chest pain but does state that she has shortness of breath. Patient has a history of CHF and diabetes. Patient is on Lasix and said that her doctor did recently decrease her doses. Patient has no numbness or tingling. Patient also is complaining that she will get tremors in her arms that are sporadic. REVIEW OF SYSTEMS       Review of Systems   Constitutional:  Positive for fatigue. Negative for activity change, appetite change, chills, diaphoresis and fever. HENT:  Negative for congestion, ear pain, facial swelling, nosebleeds, rhinorrhea, sinus pressure, sore throat and trouble swallowing. Eyes:  Negative for pain, discharge and redness. Respiratory:  Positive for shortness of breath. Negative for cough, chest tightness and wheezing. Cardiovascular:  Positive for leg swelling. Negative for chest pain and palpitations. Gastrointestinal:  Negative for abdominal pain, blood in stool, constipation, diarrhea, nausea and vomiting. Genitourinary:  Negative for difficulty urinating, dysuria, flank pain, frequency, genital sores and hematuria.    Musculoskeletal:  Negative for arthralgias, back pain, gait problem, joint swelling, myalgias and neck Take antibiotics or prescriptions per instructions, if prescribed. Gargle with salt water 2-3 times daily. You  may use Tylenol and Motrin as directed on package. Seek emergency medical treatment for fever >101.5 for 3 days, unable to eat due to pain, increase in current symptoms or for new or worrisome symptoms. pain.   Skin:  Negative for color change, pallor, rash and wound. Neurological:  Positive for tremors. Negative for dizziness, seizures, syncope, speech difficulty, weakness, numbness and headaches. Psychiatric/Behavioral:  Negative for confusion, decreased concentration, hallucinations, self-injury, sleep disturbance and suicidal ideas. PAST MEDICAL HISTORY     Past Medical History:   Diagnosis Date    Acute hypoxemic respiratory failure (HCC)     Arthritis     CHF (congestive heart failure) (HCC)     Chronic back pain     Diabetes mellitus type II, controlled ([address]) 2/14/2012    Fibromyalgia     Hyperlipidemia LDL goal < 70 2/14/2012    Hypertension, benign 02/14/2012    Morbid obesity with BMI of 60.0-69.9, adult ([address]) 8/28/2015    Pain of toe of right foot     morgans cyst    Right wrist pain     rate 8    Skin cancer     skin under lt eye,face    Sleep apnea     Bipap does not work    Wears glasses        SURGICAL HISTORY       Past Surgical History:   Procedure Laterality Date    CARPAL TUNNEL RELEASE Right 09/05/2014    CATARACT REMOVAL WITH IMPLANT Bilateral 2013    CHOLECYSTECTOMY, LAPAROSCOPIC  1998    COLONOSCOPY N/A 10/17/2019    COLONOSCOPY DIAGNOSTIC performed by Maame Chilel MD at  [address] cyst from base of tail bone    HYSTERECTOMY (CERVIX STATUS UNKNOWN)  [address] [facility] Bilateral 01/20/2017    abcess removed    LUMBAR FUSION  10/2010; 03/2011    L4/L5    LUMBAR FUSION  2009    L4-S1    OTHER SURGICAL HISTORY  05/02/2018    Lumbar decompression laminectomy at L3-L4 bilaterally wiith complete facetomies at L3-L4 bilaterally;  diskectomy L3-L4: posterior lumbar interbody fusion; placement of [place]-type graft; local harvest of bone; microsurgical dissection. ROTATOR CUFF REPAIR Left 2012    SKIN CANCER EXCISION  2005    Basal Cell Carcinoma, Curly size from Lt.  face    TUBAL LIGATION  1981    UPPER GASTROINTESTINAL ENDOSCOPY N/A

## 2023-08-03 NOTE — PROGRESS NOTES
Patient arrives to room 2073 via bed. Vitals taken/stable, assessment complete, admission complete. Patient oriented to room, bed in lowest position with wheels locked and side rails up x2, table/call light within reach, no needs expressed at this time.

## 2023-08-03 NOTE — PROGRESS NOTES
Pharmacy Medication History Note      List of current medications patient is taking is complete. Source of information: patient, [address] dispense report  Changes made to medication list:  Medications flagged for removal (include reason, ex. noncompliance):  Clonazepam 0.5 mg tablet last filled 12/14/2022 for a 15 days supply  Clotrimazole cream 1%: patient reports she no longer takes medication. Last filled 05/25/2021. Medications removed (include reason, ex. therapy complete or physician discontinued):  Potassium chloride 20 MEQ: Patient reports physician discontinued medication because of high potassium levels    Medications added/doses adjusted:  Multivitamins tablet. Daily  Calcium 600 mg tablet morning and evening    Other notes (ex. Recent course of antibiotics, Coumadin dosing):  Patient reports taking percocet 5-325 mg as needed for breakthrough pain and oxycodone as scheduled medication for pain. [facility]ARRs reports was run and it indicates the following        07/19/2023 pregabalin 150 mg was filled for a quantity of 60 for 30 days       06/26/2023 oxycodone IR 30 mg quantity of 90 for 30 days       05/22/2023 percocet 5-325 mg quantity of 120 for 30 days. Denies use of other OTC or herbal medications.       Allergies clarified    Medication list provided to the patient:No  Medication education provided to the patient:No      Electronically signed by Dion Gaucher on 8/3/2023 at 3:21 PM

## 2023-08-04 ENCOUNTER — APPOINTMENT (OUTPATIENT)
Age: 68
DRG: 292 | End: 2023-08-04
Attending: FAMILY MEDICINE
Payer: MEDICARE

## 2023-08-04 PROBLEM — I50.9 ACUTE CHF (CONGESTIVE HEART FAILURE) (HCC): Status: ACTIVE | Noted: 2023-08-04

## 2023-08-04 LAB
ALBUMIN SERPL-MCNC: 3.4 G/DL (ref 3.5–5.2)
ALP SERPL-CCNC: 97 U/L (ref 35–104)
ALT SERPL-CCNC: 9 U/L (ref 5–33)
ANION GAP SERPL CALCULATED.3IONS-SCNC: 12 MMOL/L (ref 9–17)
AST SERPL-CCNC: 17 U/L
BILIRUB DIRECT SERPL-MCNC: <0.1 MG/DL
BILIRUB INDIRECT SERPL-MCNC: ABNORMAL MG/DL (ref 0–1)
BILIRUB SERPL-MCNC: 0.3 MG/DL (ref 0.3–1.2)
BUN SERPL-MCNC: 21 MG/DL (ref 8–23)
CALCIUM SERPL-MCNC: 9 MG/DL (ref 8.6–10.4)
CHLORIDE SERPL-SCNC: 98 MMOL/L (ref 98–107)
CO2 SERPL-SCNC: 32 MMOL/L (ref 20–31)
CREAT SERPL-MCNC: 1.3 MG/DL (ref 0.5–0.9)
EKG ATRIAL RATE: 57 BPM
EKG ATRIAL RATE: 58 BPM
EKG P AXIS: -8 DEGREES
EKG P AXIS: 10 DEGREES
EKG P-R INTERVAL: 164 MS
EKG P-R INTERVAL: 174 MS
EKG Q-T INTERVAL: 440 MS
EKG Q-T INTERVAL: 454 MS
EKG QRS DURATION: 84 MS
EKG QRS DURATION: 86 MS
EKG QTC CALCULATION (BAZETT): 428 MS
EKG QTC CALCULATION (BAZETT): 445 MS
EKG R AXIS: 46 DEGREES
EKG R AXIS: 47 DEGREES
EKG T AXIS: 53 DEGREES
EKG T AXIS: 69 DEGREES
EKG VENTRICULAR RATE: 57 BPM
EKG VENTRICULAR RATE: 58 BPM
EST. AVERAGE GLUCOSE BLD GHB EST-MCNC: 148 MG/DL
GFR SERPL CREATININE-BSD FRML MDRD: 45 ML/MIN/1.73M2
GLUCOSE SERPL-MCNC: 120 MG/DL (ref 70–99)
HBA1C MFR BLD: 6.8 % (ref 4–6)
METER GLUCOSE: 112 MG/DL (ref 65–105)
METER GLUCOSE: 130 MG/DL (ref 65–105)
METER GLUCOSE: 139 MG/DL (ref 65–105)
METER GLUCOSE: 175 MG/DL (ref 65–105)
POTASSIUM SERPL-SCNC: 4 MMOL/L (ref 3.7–5.3)
PROT SERPL-MCNC: 7.5 G/DL (ref 6.4–8.3)
SODIUM SERPL-SCNC: 142 MMOL/L (ref 135–144)

## 2023-08-04 PROCEDURE — 97530 THERAPEUTIC ACTIVITIES: CPT

## 2023-08-04 PROCEDURE — 93306 TTE W/DOPPLER COMPLETE: CPT | Performed by: INTERNAL MEDICINE

## 2023-08-04 PROCEDURE — 80076 HEPATIC FUNCTION PANEL: CPT

## 2023-08-04 PROCEDURE — 6360000002 HC RX W HCPCS: Performed by: FAMILY MEDICINE

## 2023-08-04 PROCEDURE — 93010 ELECTROCARDIOGRAM REPORT: CPT | Performed by: INTERNAL MEDICINE

## 2023-08-04 PROCEDURE — 80048 BASIC METABOLIC PNL TOTAL CA: CPT

## 2023-08-04 PROCEDURE — 82947 ASSAY GLUCOSE BLOOD QUANT: CPT

## 2023-08-04 PROCEDURE — 6360000004 HC RX CONTRAST MEDICATION: Performed by: INTERNAL MEDICINE

## 2023-08-04 PROCEDURE — 97535 SELF CARE MNGMENT TRAINING: CPT

## 2023-08-04 PROCEDURE — 6370000000 HC RX 637 (ALT 250 FOR IP): Performed by: FAMILY MEDICINE

## 2023-08-04 PROCEDURE — 1200000000 HC SEMI PRIVATE

## 2023-08-04 PROCEDURE — 2580000003 HC RX 258: Performed by: FAMILY MEDICINE

## 2023-08-04 PROCEDURE — 97166 OT EVAL MOD COMPLEX 45 MIN: CPT

## 2023-08-04 PROCEDURE — C8929 TTE W OR WO FOL WCON,DOPPLER: HCPCS

## 2023-08-04 PROCEDURE — 97162 PT EVAL MOD COMPLEX 30 MIN: CPT

## 2023-08-04 PROCEDURE — 36415 COLL VENOUS BLD VENIPUNCTURE: CPT

## 2023-08-04 RX ORDER — FUROSEMIDE 10 MG/ML
80 INJECTION INTRAMUSCULAR; INTRAVENOUS 2 TIMES DAILY
Status: DISCONTINUED | OUTPATIENT
Start: 2023-08-04 | End: 2023-08-06

## 2023-08-04 RX ADMIN — ATORVASTATIN CALCIUM 40 MG: 40 TABLET, FILM COATED ORAL at 21:56

## 2023-08-04 RX ADMIN — ALOGLIPTIN 12.5 MG: 12.5 TABLET, FILM COATED ORAL at 08:28

## 2023-08-04 RX ADMIN — FUROSEMIDE 80 MG: 10 INJECTION, SOLUTION INTRAMUSCULAR; INTRAVENOUS at 18:24

## 2023-08-04 RX ADMIN — GLIPIZIDE 10 MG: 5 TABLET ORAL at 06:46

## 2023-08-04 RX ADMIN — PERFLUTREN 4 ML: 6.52 INJECTION, SUSPENSION INTRAVENOUS at 16:21

## 2023-08-04 RX ADMIN — VENLAFAXINE HYDROCHLORIDE 37.5 MG: 37.5 CAPSULE, EXTENDED RELEASE ORAL at 08:29

## 2023-08-04 RX ADMIN — SODIUM CHLORIDE, PRESERVATIVE FREE 10 ML: 5 INJECTION INTRAVENOUS at 21:56

## 2023-08-04 RX ADMIN — FAMOTIDINE 20 MG: 20 TABLET, FILM COATED ORAL at 08:28

## 2023-08-04 RX ADMIN — ENOXAPARIN SODIUM 30 MG: 100 INJECTION SUBCUTANEOUS at 21:56

## 2023-08-04 RX ADMIN — FUROSEMIDE 40 MG: 10 INJECTION, SOLUTION INTRAMUSCULAR; INTRAVENOUS at 08:27

## 2023-08-04 RX ADMIN — CARVEDILOL 12.5 MG: 12.5 TABLET, FILM COATED ORAL at 08:28

## 2023-08-04 RX ADMIN — SODIUM CHLORIDE, PRESERVATIVE FREE 10 ML: 5 INJECTION INTRAVENOUS at 08:31

## 2023-08-04 RX ADMIN — ANTACID TABLETS 500 MG: 500 TABLET, CHEWABLE ORAL at 21:56

## 2023-08-04 RX ADMIN — ANTACID TABLETS 500 MG: 500 TABLET, CHEWABLE ORAL at 08:28

## 2023-08-04 RX ADMIN — MULTIPLE VITAMINS W/ MINERALS TAB 1 TABLET: TAB at 08:27

## 2023-08-04 RX ADMIN — ENOXAPARIN SODIUM 30 MG: 100 INJECTION SUBCUTANEOUS at 08:27

## 2023-08-04 RX ADMIN — CARVEDILOL 12.5 MG: 12.5 TABLET, FILM COATED ORAL at 18:25

## 2023-08-04 RX ADMIN — PREGABALIN 150 MG: 150 CAPSULE ORAL at 21:56

## 2023-08-04 RX ADMIN — LINACLOTIDE 145 MCG: 145 CAPSULE, GELATIN COATED ORAL at 06:46

## 2023-08-04 RX ADMIN — PREGABALIN 150 MG: 150 CAPSULE ORAL at 08:27

## 2023-08-04 RX ADMIN — PIOGLITAZONE HYDROCHLORIDE 45 MG: 45 TABLET ORAL at 08:29

## 2023-08-04 RX ADMIN — CYANOCOBALAMIN TAB 1000 MCG 1000 MCG: 1000 TAB at 08:28

## 2023-08-04 RX ADMIN — AMLODIPINE BESYLATE 10 MG: 10 TABLET ORAL at 18:25

## 2023-08-04 NOTE — DISCHARGE INSTR - COC
Continuity of Care Form    Patient Name: Lelia Torres   :  1955  MRN:  [id number]    Admit date:  8/3/2023  Discharge date:  23    Code Status Order: Full Code   Advance Directives:     Admitting Physician:  Ashish Gipson MD  PCP: Ashish Gipson MD    Discharging Nurse:   Marquez [facility] Unit/Room#: 2073/2073-01  Discharging Unit Phone Number: [phone]    Emergency Contact:   Extended Emergency Contact Information  Primary Emergency Contact: Bubba Conklin  Address: [address] [place] [address] Drive           [address] [facility], [address] [place] [address] of [address] [address] Phone: [phone]  Mobile Phone: [phone]  Relation: Spouse  Hearing or visual needs: None  Preferred language: English   needed? No  Secondary Emergency Contact: Denise Beltran  Address: [address] of [address] [employer] Phone: [phone]  Mobile Phone: [phone]  Relation: Child  Hearing or visual needs: None  Other needs: None  Preferred language: English   needed? No    Past Surgical History:  Past Surgical History:   Procedure Laterality Date    CARPAL TUNNEL RELEASE Right 2014    CATARACT REMOVAL WITH IMPLANT Bilateral     CHOLECYSTECTOMY, LAPAROSCOPIC  1998    COLONOSCOPY N/A 10/17/2019    COLONOSCOPY DIAGNOSTIC performed by Kodak Brown MD at [address] cyst from base of tail bone    HYSTERECTOMY (CERVIX STATUS UNKNOWN)  [address] Bilateral 2017    abcess removed    LUMBAR FUSION  10/2010; 2011    L4/L5    LUMBAR FUSION  2009    L4-S1    OTHER SURGICAL HISTORY  2018    Lumbar decompression laminectomy at L3-L4 bilaterally wiith complete facetomies at L3-L4 bilaterally;  diskectomy L3-L4: posterior lumbar interbody fusion; placement of [place]-type graft; local harvest of bone; microsurgical dissection.     ROTATOR CUFF REPAIR Left 2012    SKIN CANCER EXCISION  2005    Basal Cell Carcinoma, Curly size

## 2023-08-04 NOTE — CARE COORDINATION
Case Management Assessment  Initial Evaluation    Date/Time of Evaluation: 8/4/2023 3:06 PM  Assessment Completed by: Sudhakar Woodruff RN    If patient is discharged prior to next notation, then this note serves as note for discharge by case management. Patient Name: Lelia Torres                   YOB: 1955  Diagnosis: Petechial rash [R23.3]  General weakness [R53.1]  Acute cystitis without hematuria [N30.00]  Acute infective cystitis [N30.00]                   Date / Time: 8/3/2023 12:06 PM    Patient Admission Status: Inpatient   Readmission Risk (Low < 19, Mod (19-27), High > 27): Readmission Risk Score: 15.2    Current PCP: Ashish Gipson MD  PCP verified by CM? Yes    Chart Reviewed: Yes      History Provided by: Patient  Patient Orientation: Alert and Oriented    Patient Cognition: Alert    Hospitalization in the last 30 days (Readmission):  No    If yes, Readmission Assessment in  Navigator will be completed. Advance Directives:      Code Status: Full Code   Patient's Primary Decision Maker is: Legal Next of Kin    Primary Decision MakerJulius Purchase - Spouse - [phone]    Secondary Decision Maker: Shante Sickle Child - [phone]    Discharge Planning:    Patient lives with: Spouse/Significant Other Type of Home: House  Primary Care Giver: Self  Patient Support Systems include: Spouse/Significant Other   Current Financial resources: Medicare  Current community resources: None  Current services prior to admission: Durable Medical Equipment, Oxygen Therapy (O2 @ 2L NC 24/7 from [facility] (stationary concentrator and portable tanks) and [facility])            Current DME: Bedside Commode, Cane, Home Aerosol, Oxygen Therapy (Comment), Shower Chair, Walker, Wheelchair            Type of Home Care services:  PT, OT, Nursing Services    ADLS  Prior functional level: Independent in ADLs/IADLs  Current functional level:  Independent in ADLs/IADLs    PT AM-PAC:

## 2023-08-04 NOTE — ACP (ADVANCE CARE PLANNING)
Advance Care Planning     Advance Care Planning Activator (Inpatient)  Conversation Note      Date of ACP Conversation: 8/4/2023     Conversation Conducted with: Patient with Decision Making Capacity    ACP Activator: Colin Grant RN      Health Care Decision Maker:     Current Designated Health Care Decision Maker:     Primary Decision Maker: Loren Bravo - [phone]    Secondary Decision Maker: Mihir Dacosta - [phone]  Click here to complete Healthcare Decision Makers including section of the Healthcare Decision Maker Relationship (ie \"Primary\")  Today we documented Decision Maker(s) consistent with Legal Next of Kin hierarchy. Care Preferences    Ventilation: \"If you were in your present state of health and suddenly became very ill and were unable to breathe on your own, what would your preference be about the use of a ventilator (breathing machine) if it were available to you? \"      Would the patient desire the use of ventilator (breathing machine)?: no    \"If your health worsens and it becomes clear that your chance of recovery is unlikely, what would your preference be about the use of a ventilator (breathing machine) if it were available to you? \"     Would the patient desire the use of ventilator (breathing machine)?: No      Resuscitation  \"CPR works best to restart the heart when there is a sudden event, like a heart attack, in someone who is otherwise healthy. Unfortunately, CPR does not typically restart the heart for people who have serious health conditions or who are very sick. \"    \"In the event your heart stopped as a result of an underlying serious health condition, would you want attempts to be made to restart your heart (answer \"yes\" for attempt to resuscitate) or would you prefer a natural death (answer \"no\" for do not attempt to resuscitate)? \" yes       [] Yes   [] No   Educated Patient / Decision Maker regarding differences between Advance Directives and portable DNR orders.     Length of ACP Conversation in minutes:      Conversation Outcomes:  ACP discussion completed    Follow-up plan:    [] Schedule follow-up conversation to continue planning  [] Referred individual to Provider for additional questions/concerns   [] Advised patient/agent/surrogate to review completed ACP document and update if needed with changes in condition, patient preferences or care setting    [] This note routed to one or more involved healthcare providers

## 2023-08-04 NOTE — CARE COORDINATION
Writer met with pt regarding PT/OT recommendations for therapy upon discharge if pt was interested in SNF list.  Pt states she is not interested in going into a facility and plans to discharge to home.   Electronically signed by JERMAINE Griffin on 8/4/2023 at 3:54 PM

## 2023-08-04 NOTE — PLAN OF CARE
Problem: Pain  Goal: Verbalizes/displays adequate comfort level or baseline comfort level  8/4/2023 0447 by Royce Ma RN  Outcome: Progressing     Problem: Skin/Tissue Integrity  Goal: Absence of new skin breakdown  Description: 1. Monitor for areas of redness and/or skin breakdown  2. Assess vascular access sites hourly  3. Every 4-6 hours minimum:  Change oxygen saturation probe site  4. Every 4-6 hours:  If on nasal continuous positive airway pressure, respiratory therapy assess nares and determine need for appliance change or resting period. 8/4/2023 0447 by Royce Ma RN  Outcome: Progressing     Problem: Safety - Adult  Goal: Free from fall injury  Outcome: Progressing   Patient remains free of incidence/ injury. Bed remains in low position. Call light within reach. Side rails up x2.

## 2023-08-04 NOTE — CONSULTS
[facility] Cardiology Cardiology    Consult                        Today's Date: 8/4/2023  Patient Name: Maude Griffin  Date of admission: 8/3/2023 12:06 PM  Patient's age: 79 y.o., 1955  Admission Dx: Petechial rash [R23.3]  General weakness [R53.1]  Acute cystitis without hematuria [N30.00]  Acute infective cystitis [N30.00]    Reason for Consult:  Cardiac evaluation    Requesting Physician: Robert Pandey MD    CHIEF COMPLAINT:  weakness, leg swelling    History Obtained From:  patient, electronic medical record    HISTORY OF PRESENT ILLNESS:      The patient is a 79 y.o.  female who is admitted to the hospital for  weakness and worsening leg swelling. Patient denies any chest pain or dyspnea. She also reports tremors. Cr 1.5    HS trop 17-15  Pro BNP 1056    TSH normal    Hgb 8.6      Past Medical History:   has a past medical history of Acute hypoxemic respiratory failure ([address]), Arthritis, CHF (congestive heart failure) ([address]), Chronic back pain, Diabetes mellitus type II, controlled ([address]), Fibromyalgia, Hyperlipidemia LDL goal < 70, Hypertension, benign, Morbid obesity with BMI of 60.0-69.9, adult ([address]), Pain of toe of right foot, Right wrist pain, Skin cancer, Sleep apnea, and Wears glasses. Past Surgical History:   has a past surgical history that includes Tubal ligation (1981); Hysterectomy (1998); lumbar fusion (10/2010; 03/2011); Carpal tunnel release (Right, 09/05/2014); cyst removal (1985); Cholecystectomy, laparoscopic (1998); lumbar fusion (2009); Rotator cuff repair (Left, 2012); Cataract removal with implant (Bilateral, 2013); Skin cancer excision (2005); Inguinal hernia repair (Bilateral, 01/20/2017); other surgical history (05/02/2018); Upper gastrointestinal endoscopy (N/A, 10/16/2019); and Colonoscopy (N/A, 10/17/2019). Home Medications:    Prior to Admission medications    Medication Sig Start Date End Date Taking?  Authorizing Provider   oxyCODONE (OXY-IR) 30 MG patient and Nurse.     Ashwin Quiroz MD, MD [facility] Consult           [phone]

## 2023-08-04 NOTE — H&P
Family Medicine Admit Note    PCP: Regina Hoyt MD    Date of Admission: 8/3/2023    Date of Service: Pt seen/examined on 8/4/2023 and Admitted to Inpatient      Chief Complaint:  weakness and tremors      History Of Present Illness: The patient is a 79 y.o. female who presents to [facility] with worsening leg swelling, tremors and weakness for one month. Past Medical History:        Diagnosis Date    Acute hypoxemic respiratory failure (HCC)     Arthritis     CHF (congestive heart failure) (HCC)     Chronic back pain     Diabetes mellitus type II, controlled ([address]) 2/14/2012    Fibromyalgia     Hyperlipidemia LDL goal < 70 2/14/2012    Hypertension, benign 02/14/2012    Morbid obesity with BMI of 60.0-69.9, adult ([address]) 8/28/2015    Pain of toe of right foot     morgans cyst    Right wrist pain     rate 8    Skin cancer     skin under lt eye,face    Sleep apnea     Bipap does not work    Wears glasses        Past Surgical History:        Procedure Laterality Date    CARPAL TUNNEL RELEASE Right 09/05/2014    CATARACT REMOVAL WITH IMPLANT Bilateral 2013    CHOLECYSTECTOMY, LAPAROSCOPIC  1998    COLONOSCOPY N/A 10/17/2019    COLONOSCOPY DIAGNOSTIC performed by Estela Berumen MD at  [address] cyst from base of tail bone    HYSTERECTOMY (CERVIX STATUS UNKNOWN)  [address] Bilateral 01/20/2017    abcess removed    LUMBAR FUSION  10/2010; 03/2011    L4/L5    LUMBAR FUSION  2009    L4-S1    OTHER SURGICAL HISTORY  05/02/2018    Lumbar decompression laminectomy at L3-L4 bilaterally wiith complete facetomies at L3-L4 bilaterally;  diskectomy L3-L4: posterior lumbar interbody fusion; placement of York-type graft; local harvest of bone; microsurgical dissection. ROTATOR CUFF REPAIR Left 2012    SKIN CANCER EXCISION  2005    Basal Cell Carcinoma, Curly size from Lt.  face    TUBAL LIGATION  1981    UPPER GASTROINTESTINAL ENDOSCOPY N/A

## 2023-08-04 NOTE — PROGRESS NOTES
Physical Therapy  Facility/Department: [facility] MED SURG  Physical Therapy Initial Assessment    Name: Can Dejesus  : 1955  MRN: [id number]  Date of Service: 2023    Discharge Recommendations:  Patient would benefit from continued therapy after discharge, Therapy recommended at discharge   PT Equipment Recommendations  Equipment Needed:  (bariatric wheeled walker)      Patient Diagnosis(es): The primary encounter diagnosis was Acute cystitis without hematuria. Diagnoses of General weakness, Petechial rash, and Acute congestive heart failure, unspecified heart failure type [facility]) were also pertinent to this visit. Past Medical History:  has a past medical history of Acute hypoxemic respiratory failure ([address]), Arthritis, CHF (congestive heart failure) ([address]), Chronic back pain, Diabetes mellitus type II, controlled ([address]), Fibromyalgia, Hyperlipidemia LDL goal < 70, Hypertension, benign, Morbid obesity with BMI of 60.0-69.9, adult ([address]), Pain of toe of right foot, Right wrist pain, Skin cancer, Sleep apnea, and Wears glasses. Past Surgical History:  has a past surgical history that includes Tubal ligation (); Hysterectomy (); lumbar fusion (10/2010; 2011); Carpal tunnel release (Right, 2014); cyst removal (); Cholecystectomy, laparoscopic (); lumbar fusion (); Rotator cuff repair (Left, ); Cataract removal with implant (Bilateral, ); Skin cancer excision (); Inguinal hernia repair (Bilateral, 2017); other surgical history (2018); Upper gastrointestinal endoscopy (N/A, 10/16/2019); and Colonoscopy (N/A, 10/17/2019). Assessment   Body Structures, Functions, Activity Limitations Requiring Skilled Therapeutic Intervention: Decreased functional mobility ; Decreased endurance;Decreased balance;Decreased strength  Assessment: continue per POC. Pt would benefit from continued therapy at discharge. Pt remains a 2 assist for safety w/ transfers.   Treatment Diagnosis:

## 2023-08-04 NOTE — PROGRESS NOTES
[id number] [facility]   Occupational Therapy Evaluation  Date: 23  Patient Name: Sanford Kam       Room: 8056/1610-77  MRN: [id number]  Account: [de-identified]   : 1955  (70 y.o.) Gender: female     Discharge Recommendations:  Further Occupational Therapy is recommended upon facility discharge. OT Equipment Recommendations  Other: TBD    Referring Practitioner: Dr. Jes Barriga  Diagnosis: Petechial rash, general weakness, acute cystitis without hematuria, blurred vision (resolved), and BUE tremors (improved but still present) Additional Pertinent Hx: Pt reports symptoms have been progressive over the last year    Treatment Diagnosis: Impaired self-care status    Past Medical History:  has a past medical history of Acute hypoxemic respiratory failure ([address]), Arthritis, CHF (congestive heart failure) ([address]), Chronic back pain, Diabetes mellitus type II, controlled ([address]), Fibromyalgia, Hyperlipidemia LDL goal < 70, Hypertension, benign, Morbid obesity with BMI of 60.0-69.9, adult ([address]), Pain of toe of right foot, Right wrist pain, Skin cancer, Sleep apnea, and Wears glasses. Past Surgical History:   has a past surgical history that includes Tubal ligation (); Hysterectomy (); lumbar fusion (10/2010; 2011); Carpal tunnel release (Right, 2014); cyst removal (); Cholecystectomy, laparoscopic (); lumbar fusion (); Rotator cuff repair (Left, ); Cataract removal with implant (Bilateral, ); Skin cancer excision (); Inguinal hernia repair (Bilateral, 2017); other surgical history (2018); Upper gastrointestinal endoscopy (N/A, 10/16/2019); and Colonoscopy (N/A, 10/17/2019).     Restrictions  Restrictions/Precautions  Restrictions/Precautions: Fall Risk;Up as Tolerated (peripheral IV right antecubital, O2 at 3.5 L, 325 #, 5')  Required Braces or Orthoses?: No  Implants present? :  (low back surgery hardware x 5 (screws))      Vitals  Vitals  Pulse:

## 2023-08-05 PROBLEM — I50.23 ACUTE ON CHRONIC SYSTOLIC CONGESTIVE HEART FAILURE (HCC): Status: ACTIVE | Noted: 2023-08-05

## 2023-08-05 PROBLEM — I35.0 AORTIC VALVE STENOSIS: Status: ACTIVE | Noted: 2023-08-05

## 2023-08-05 PROBLEM — I15.9 SECONDARY HYPERTENSION: Status: ACTIVE | Noted: 2023-08-05

## 2023-08-05 LAB
ANION GAP SERPL CALCULATED.3IONS-SCNC: 10 MMOL/L (ref 9–17)
BASOPHILS # BLD: 0.1 K/UL (ref 0–0.2)
BASOPHILS NFR BLD: 1 % (ref 0–2)
BUN SERPL-MCNC: 15 MG/DL (ref 8–23)
CALCIUM SERPL-MCNC: 9.2 MG/DL (ref 8.6–10.4)
CHLORIDE SERPL-SCNC: 97 MMOL/L (ref 98–107)
CO2 SERPL-SCNC: 34 MMOL/L (ref 20–31)
CREAT SERPL-MCNC: 1.2 MG/DL (ref 0.5–0.9)
ECHO AO ROOT DIAM: 3.1 CM
ECHO AO ROOT INDEX: 1.35 CM/M2
ECHO AV AREA PEAK VELOCITY: 1.7 CM2
ECHO AV AREA VTI: 1.6 CM2
ECHO AV AREA/BSA PEAK VELOCITY: 0.7 CM2/M2
ECHO AV AREA/BSA VTI: 0.7 CM2/M2
ECHO AV MEAN GRADIENT: 8 MMHG
ECHO AV MEAN VELOCITY: 1.3 M/S
ECHO AV PEAK GRADIENT: 15 MMHG
ECHO AV PEAK VELOCITY: 2 M/S
ECHO AV VELOCITY RATIO: 0.75
ECHO AV VTI: 47.3 CM
ECHO BSA: 2.5 M2
ECHO LA AREA 4C: 16.2 CM2
ECHO LA DIAMETER INDEX: 2.01 CM/M2
ECHO LA DIAMETER: 4.6 CM
ECHO LA MAJOR AXIS: 5.5 CM
ECHO LA TO AORTIC ROOT RATIO: 1.48
ECHO LA VOL 4C: 38 ML (ref 22–52)
ECHO LA VOLUME INDEX A4C: 17 ML/M2 (ref 16–34)
ECHO LV E' LATERAL VELOCITY: 8 CM/S
ECHO LV E' SEPTAL VELOCITY: 14 CM/S
ECHO LV FRACTIONAL SHORTENING: 46 % (ref 28–44)
ECHO LV INTERNAL DIMENSION DIASTOLE INDEX: 2.18 CM/M2
ECHO LV INTERNAL DIMENSION DIASTOLIC: 5 CM (ref 3.9–5.3)
ECHO LV INTERNAL DIMENSION SYSTOLIC INDEX: 1.18 CM/M2
ECHO LV INTERNAL DIMENSION SYSTOLIC: 2.7 CM
ECHO LV IVSD: 1.7 CM (ref 0.6–0.9)
ECHO LV MASS 2D: 389.7 G (ref 67–162)
ECHO LV MASS INDEX 2D: 170.2 G/M2 (ref 43–95)
ECHO LV POSTERIOR WALL DIASTOLIC: 1.7 CM (ref 0.6–0.9)
ECHO LV RELATIVE WALL THICKNESS RATIO: 0.68
ECHO LVOT AREA: 2.3 CM2
ECHO LVOT AV VTI INDEX: 0.7
ECHO LVOT DIAM: 1.7 CM
ECHO LVOT MEAN GRADIENT: 5 MMHG
ECHO LVOT PEAK GRADIENT: 9 MMHG
ECHO LVOT PEAK VELOCITY: 1.5 M/S
ECHO LVOT STROKE VOLUME INDEX: 32.8 ML/M2
ECHO LVOT SV: 75.1 ML
ECHO LVOT VTI: 33.1 CM
ECHO MV A VELOCITY: 0.85 M/S
ECHO MV AREA VTI: 1.7 CM2
ECHO MV E DECELERATION TIME (DT): 208 MS
ECHO MV E VELOCITY: 1.14 M/S
ECHO MV E/A RATIO: 1.34
ECHO MV E/E' LATERAL: 14.25
ECHO MV E/E' RATIO (AVERAGED): 11.2
ECHO MV E/E' SEPTAL: 8.14
ECHO MV LVOT VTI INDEX: 1.33
ECHO MV MAX VELOCITY: 1.4 M/S
ECHO MV MEAN GRADIENT: 2 MMHG
ECHO MV MEAN VELOCITY: 0.6 M/S
ECHO MV PEAK GRADIENT: 8 MMHG
ECHO MV VTI: 44 CM
ECHO RV TAPSE: 2 CM (ref 1.7–?)
ECHO TV REGURGITANT MAX VELOCITY: 2.5 M/S
ECHO TV REGURGITANT PEAK GRADIENT: 25 MMHG
EOSINOPHIL # BLD: 0.3 K/UL (ref 0–0.4)
EOSINOPHILS RELATIVE PERCENT: 3 % (ref 0–4)
ERYTHROCYTE [DISTWIDTH] IN BLOOD BY AUTOMATED COUNT: 17.4 % (ref 11.5–14.9)
GFR SERPL CREATININE-BSD FRML MDRD: 50 ML/MIN/1.73M2
GLUCOSE SERPL-MCNC: 125 MG/DL (ref 70–99)
HCT VFR BLD AUTO: 29.3 % (ref 36–46)
HGB BLD-MCNC: 9.3 G/DL (ref 12–16)
LYMPHOCYTES NFR BLD: 1.1 K/UL (ref 1–4.8)
LYMPHOCYTES RELATIVE PERCENT: 9 % (ref 24–44)
MAGNESIUM SERPL-MCNC: 2.2 MG/DL (ref 1.6–2.6)
MCH RBC QN AUTO: 28.1 PG (ref 26–34)
MCHC RBC AUTO-ENTMCNC: 31.6 G/DL (ref 31–37)
MCV RBC AUTO: 88.8 FL (ref 80–100)
METER GLUCOSE: 122 MG/DL (ref 65–105)
METER GLUCOSE: 123 MG/DL (ref 65–105)
METER GLUCOSE: 161 MG/DL (ref 65–105)
MONOCYTES NFR BLD: 0.9 K/UL (ref 0.1–1.3)
MONOCYTES NFR BLD: 7 % (ref 1–7)
NEUTROPHILS NFR BLD: 80 % (ref 36–66)
NEUTS SEG NFR BLD: 9.9 K/UL (ref 1.3–9.1)
PLATELET # BLD AUTO: 323 K/UL (ref 150–450)
PMV BLD AUTO: 8.4 FL (ref 6–12)
POTASSIUM SERPL-SCNC: 3.3 MMOL/L (ref 3.7–5.3)
RBC # BLD AUTO: 3.3 M/UL (ref 4–5.2)
SODIUM SERPL-SCNC: 141 MMOL/L (ref 135–144)
WBC OTHER # BLD: 12.2 K/UL (ref 3.5–11)

## 2023-08-05 PROCEDURE — 82947 ASSAY GLUCOSE BLOOD QUANT: CPT

## 2023-08-05 PROCEDURE — 99233 SBSQ HOSP IP/OBS HIGH 50: CPT | Performed by: INTERNAL MEDICINE

## 2023-08-05 PROCEDURE — 6360000002 HC RX W HCPCS: Performed by: FAMILY MEDICINE

## 2023-08-05 PROCEDURE — 2580000003 HC RX 258: Performed by: FAMILY MEDICINE

## 2023-08-05 PROCEDURE — 80048 BASIC METABOLIC PNL TOTAL CA: CPT

## 2023-08-05 PROCEDURE — 6370000000 HC RX 637 (ALT 250 FOR IP): Performed by: FAMILY MEDICINE

## 2023-08-05 PROCEDURE — 6370000000 HC RX 637 (ALT 250 FOR IP): Performed by: INTERNAL MEDICINE

## 2023-08-05 PROCEDURE — 36415 COLL VENOUS BLD VENIPUNCTURE: CPT

## 2023-08-05 PROCEDURE — 1200000000 HC SEMI PRIVATE

## 2023-08-05 PROCEDURE — 85025 COMPLETE CBC W/AUTO DIFF WBC: CPT

## 2023-08-05 PROCEDURE — 83735 ASSAY OF MAGNESIUM: CPT

## 2023-08-05 RX ORDER — LOPERAMIDE HYDROCHLORIDE 2 MG/1
2 CAPSULE ORAL 4 TIMES DAILY PRN
Status: DISCONTINUED | OUTPATIENT
Start: 2023-08-05 | End: 2023-08-07 | Stop reason: HOSPADM

## 2023-08-05 RX ORDER — POTASSIUM CHLORIDE 7.45 MG/ML
10 INJECTION INTRAVENOUS PRN
Status: DISCONTINUED | OUTPATIENT
Start: 2023-08-05 | End: 2023-08-07 | Stop reason: HOSPADM

## 2023-08-05 RX ORDER — POTASSIUM CHLORIDE 20 MEQ/1
40 TABLET, EXTENDED RELEASE ORAL PRN
Status: DISCONTINUED | OUTPATIENT
Start: 2023-08-05 | End: 2023-08-07 | Stop reason: HOSPADM

## 2023-08-05 RX ADMIN — PIOGLITAZONE HYDROCHLORIDE 45 MG: 45 TABLET ORAL at 09:24

## 2023-08-05 RX ADMIN — ANTACID TABLETS 500 MG: 500 TABLET, CHEWABLE ORAL at 09:16

## 2023-08-05 RX ADMIN — LOPERAMIDE HYDROCHLORIDE 2 MG: 2 CAPSULE ORAL at 14:07

## 2023-08-05 RX ADMIN — VENLAFAXINE HYDROCHLORIDE 37.5 MG: 37.5 CAPSULE, EXTENDED RELEASE ORAL at 09:25

## 2023-08-05 RX ADMIN — SODIUM CHLORIDE, PRESERVATIVE FREE 10 ML: 5 INJECTION INTRAVENOUS at 20:48

## 2023-08-05 RX ADMIN — ENOXAPARIN SODIUM 30 MG: 100 INJECTION SUBCUTANEOUS at 20:48

## 2023-08-05 RX ADMIN — CARVEDILOL 12.5 MG: 12.5 TABLET, FILM COATED ORAL at 09:16

## 2023-08-05 RX ADMIN — FUROSEMIDE 80 MG: 10 INJECTION, SOLUTION INTRAMUSCULAR; INTRAVENOUS at 09:16

## 2023-08-05 RX ADMIN — CARVEDILOL 12.5 MG: 12.5 TABLET, FILM COATED ORAL at 17:26

## 2023-08-05 RX ADMIN — ALOGLIPTIN 12.5 MG: 12.5 TABLET, FILM COATED ORAL at 09:16

## 2023-08-05 RX ADMIN — PREGABALIN 150 MG: 150 CAPSULE ORAL at 20:49

## 2023-08-05 RX ADMIN — POTASSIUM CHLORIDE 40 MEQ: 1500 TABLET, EXTENDED RELEASE ORAL at 10:49

## 2023-08-05 RX ADMIN — ATORVASTATIN CALCIUM 40 MG: 40 TABLET, FILM COATED ORAL at 20:49

## 2023-08-05 RX ADMIN — ENOXAPARIN SODIUM 30 MG: 100 INJECTION SUBCUTANEOUS at 09:15

## 2023-08-05 RX ADMIN — MULTIPLE VITAMINS W/ MINERALS TAB 1 TABLET: TAB at 09:16

## 2023-08-05 RX ADMIN — ANTACID TABLETS 500 MG: 500 TABLET, CHEWABLE ORAL at 20:49

## 2023-08-05 RX ADMIN — LINACLOTIDE 145 MCG: 145 CAPSULE, GELATIN COATED ORAL at 09:25

## 2023-08-05 RX ADMIN — CYANOCOBALAMIN TAB 1000 MCG 1000 MCG: 1000 TAB at 09:16

## 2023-08-05 RX ADMIN — FAMOTIDINE 20 MG: 20 TABLET, FILM COATED ORAL at 09:16

## 2023-08-05 RX ADMIN — SODIUM CHLORIDE, PRESERVATIVE FREE 10 ML: 5 INJECTION INTRAVENOUS at 09:25

## 2023-08-05 RX ADMIN — AMLODIPINE BESYLATE 10 MG: 10 TABLET ORAL at 17:26

## 2023-08-05 RX ADMIN — FUROSEMIDE 80 MG: 10 INJECTION, SOLUTION INTRAMUSCULAR; INTRAVENOUS at 17:27

## 2023-08-05 RX ADMIN — OXYCODONE HYDROCHLORIDE AND ACETAMINOPHEN 1 TABLET: 5; 325 TABLET ORAL at 17:32

## 2023-08-05 RX ADMIN — GLIPIZIDE 10 MG: 5 TABLET ORAL at 09:16

## 2023-08-05 RX ADMIN — OXYCODONE HYDROCHLORIDE AND ACETAMINOPHEN 1 TABLET: 5; 325 TABLET ORAL at 11:20

## 2023-08-05 RX ADMIN — PREGABALIN 150 MG: 150 CAPSULE ORAL at 09:16

## 2023-08-05 ASSESSMENT — PAIN SCALES - GENERAL
PAINLEVEL_OUTOF10: 8
PAINLEVEL_OUTOF10: 7

## 2023-08-05 NOTE — PLAN OF CARE
Problem: Discharge Planning  Goal: Discharge to home or other facility with appropriate resources  8/5/2023 1604 by Eloisa Boyd RN  Outcome: Progressing  8/5/2023 0451 by Mo Mathur RN  Outcome: Progressing     Problem: Pain  Goal: Verbalizes/displays adequate comfort level or baseline comfort level  8/5/2023 1604 by Eloisa Boyd RN  Outcome: Progressing  8/5/2023 0451 by Mo Mathur RN  Outcome: Progressing     Problem: Skin/Tissue Integrity  Goal: Absence of new skin breakdown  Description: 1. Monitor for areas of redness and/or skin breakdown  2. Assess vascular access sites hourly  3. Every 4-6 hours minimum:  Change oxygen saturation probe site  4. Every 4-6 hours:  If on nasal continuous positive airway pressure, respiratory therapy assess nares and determine need for appliance change or resting period.   Outcome: Progressing     Problem: Safety - Adult  Goal: Free from fall injury  8/5/2023 1604 by Eloisa Boyd RN  Outcome: Progressing  8/5/2023 0451 by Mo Mathur RN  Outcome: Progressing

## 2023-08-05 NOTE — PLAN OF CARE
Problem: Discharge Planning  Goal: Discharge to home or other facility with appropriate resources  Outcome: Progressing     Problem: Pain  Goal: Verbalizes/displays adequate comfort level or baseline comfort level  8/5/2023 0451 by Isatu Diallo RN  Outcome: Progressing     Problem: Safety - Adult  Goal: Free from fall injury  Outcome: Progressing

## 2023-08-06 LAB
ANION GAP SERPL CALCULATED.3IONS-SCNC: 12 MMOL/L (ref 9–17)
BUN SERPL-MCNC: 16 MG/DL (ref 8–23)
CALCIUM SERPL-MCNC: 9.3 MG/DL (ref 8.6–10.4)
CHLORIDE SERPL-SCNC: 97 MMOL/L (ref 98–107)
CO2 SERPL-SCNC: 33 MMOL/L (ref 20–31)
CREAT SERPL-MCNC: 1.3 MG/DL (ref 0.5–0.9)
GFR SERPL CREATININE-BSD FRML MDRD: 45 ML/MIN/1.73M2
GLUCOSE SERPL-MCNC: 117 MG/DL (ref 70–99)
MAGNESIUM SERPL-MCNC: 2 MG/DL (ref 1.6–2.6)
METER GLUCOSE: 105 MG/DL (ref 65–105)
METER GLUCOSE: 135 MG/DL (ref 65–105)
METER GLUCOSE: 143 MG/DL (ref 65–105)
METER GLUCOSE: 168 MG/DL (ref 65–105)
POTASSIUM SERPL-SCNC: 3.4 MMOL/L (ref 3.7–5.3)
SODIUM SERPL-SCNC: 142 MMOL/L (ref 135–144)

## 2023-08-06 PROCEDURE — 6360000002 HC RX W HCPCS: Performed by: FAMILY MEDICINE

## 2023-08-06 PROCEDURE — 6370000000 HC RX 637 (ALT 250 FOR IP): Performed by: FAMILY MEDICINE

## 2023-08-06 PROCEDURE — 6370000000 HC RX 637 (ALT 250 FOR IP): Performed by: INTERNAL MEDICINE

## 2023-08-06 PROCEDURE — 83735 ASSAY OF MAGNESIUM: CPT

## 2023-08-06 PROCEDURE — 99238 HOSP IP/OBS DSCHRG MGMT 30/<: CPT | Performed by: FAMILY MEDICINE

## 2023-08-06 PROCEDURE — 99233 SBSQ HOSP IP/OBS HIGH 50: CPT | Performed by: INTERNAL MEDICINE

## 2023-08-06 PROCEDURE — 36415 COLL VENOUS BLD VENIPUNCTURE: CPT

## 2023-08-06 PROCEDURE — 1200000000 HC SEMI PRIVATE

## 2023-08-06 PROCEDURE — 80048 BASIC METABOLIC PNL TOTAL CA: CPT

## 2023-08-06 PROCEDURE — 2580000003 HC RX 258: Performed by: FAMILY MEDICINE

## 2023-08-06 PROCEDURE — 82947 ASSAY GLUCOSE BLOOD QUANT: CPT

## 2023-08-06 RX ORDER — FUROSEMIDE 40 MG/1
80 TABLET ORAL 2 TIMES DAILY
Qty: 360 TABLET | Refills: 3 | Status: SHIPPED | OUTPATIENT
Start: 2023-08-06 | End: 2023-08-07 | Stop reason: SDUPTHER

## 2023-08-06 RX ORDER — POTASSIUM CHLORIDE 20 MEQ/1
20 TABLET, EXTENDED RELEASE ORAL 2 TIMES DAILY
Qty: 180 TABLET | Refills: 3 | Status: SHIPPED | OUTPATIENT
Start: 2023-08-06

## 2023-08-06 RX ORDER — HYDROCHLOROTHIAZIDE 25 MG/1
25 TABLET ORAL DAILY
Status: DISCONTINUED | OUTPATIENT
Start: 2023-08-06 | End: 2023-08-07 | Stop reason: HOSPADM

## 2023-08-06 RX ORDER — LOSARTAN POTASSIUM 25 MG/1
25 TABLET ORAL DAILY
Qty: 30 TABLET | Refills: 3 | Status: SHIPPED | OUTPATIENT
Start: 2023-08-06 | End: 2023-08-07 | Stop reason: SDUPTHER

## 2023-08-06 RX ORDER — LOSARTAN POTASSIUM 25 MG/1
25 TABLET ORAL DAILY
Status: DISCONTINUED | OUTPATIENT
Start: 2023-08-06 | End: 2023-08-07 | Stop reason: HOSPADM

## 2023-08-06 RX ORDER — FUROSEMIDE 10 MG/ML
80 INJECTION INTRAMUSCULAR; INTRAVENOUS DAILY
Status: DISCONTINUED | OUTPATIENT
Start: 2023-08-07 | End: 2023-08-07 | Stop reason: HOSPADM

## 2023-08-06 RX ORDER — ENOXAPARIN SODIUM 100 MG/ML
40 INJECTION SUBCUTANEOUS 2 TIMES DAILY
Status: DISCONTINUED | OUTPATIENT
Start: 2023-08-06 | End: 2023-08-07 | Stop reason: HOSPADM

## 2023-08-06 RX ADMIN — PREGABALIN 150 MG: 150 CAPSULE ORAL at 21:24

## 2023-08-06 RX ADMIN — SODIUM CHLORIDE, PRESERVATIVE FREE 10 ML: 5 INJECTION INTRAVENOUS at 21:25

## 2023-08-06 RX ADMIN — VENLAFAXINE HYDROCHLORIDE 37.5 MG: 37.5 CAPSULE, EXTENDED RELEASE ORAL at 08:57

## 2023-08-06 RX ADMIN — FAMOTIDINE 20 MG: 20 TABLET, FILM COATED ORAL at 08:40

## 2023-08-06 RX ADMIN — OXYCODONE HYDROCHLORIDE AND ACETAMINOPHEN 1 TABLET: 5; 325 TABLET ORAL at 08:40

## 2023-08-06 RX ADMIN — PIOGLITAZONE HYDROCHLORIDE 45 MG: 45 TABLET ORAL at 08:57

## 2023-08-06 RX ADMIN — CYANOCOBALAMIN TAB 1000 MCG 1000 MCG: 1000 TAB at 08:40

## 2023-08-06 RX ADMIN — OXYCODONE HYDROCHLORIDE AND ACETAMINOPHEN 1 TABLET: 5; 325 TABLET ORAL at 14:31

## 2023-08-06 RX ADMIN — ANTACID TABLETS 500 MG: 500 TABLET, CHEWABLE ORAL at 08:40

## 2023-08-06 RX ADMIN — ENOXAPARIN SODIUM 30 MG: 100 INJECTION SUBCUTANEOUS at 08:40

## 2023-08-06 RX ADMIN — SODIUM CHLORIDE, PRESERVATIVE FREE 10 ML: 5 INJECTION INTRAVENOUS at 08:41

## 2023-08-06 RX ADMIN — LINACLOTIDE 145 MCG: 145 CAPSULE, GELATIN COATED ORAL at 08:57

## 2023-08-06 RX ADMIN — CARVEDILOL 12.5 MG: 12.5 TABLET, FILM COATED ORAL at 08:40

## 2023-08-06 RX ADMIN — ENOXAPARIN SODIUM 40 MG: 100 INJECTION SUBCUTANEOUS at 21:24

## 2023-08-06 RX ADMIN — PREGABALIN 150 MG: 150 CAPSULE ORAL at 08:40

## 2023-08-06 RX ADMIN — HYDROCHLOROTHIAZIDE 25 MG: 25 TABLET ORAL at 10:42

## 2023-08-06 RX ADMIN — MULTIPLE VITAMINS W/ MINERALS TAB 1 TABLET: TAB at 08:40

## 2023-08-06 RX ADMIN — LOPERAMIDE HYDROCHLORIDE 2 MG: 2 CAPSULE ORAL at 14:31

## 2023-08-06 RX ADMIN — OXYCODONE HYDROCHLORIDE AND ACETAMINOPHEN 1 TABLET: 5; 325 TABLET ORAL at 21:24

## 2023-08-06 RX ADMIN — CARVEDILOL 12.5 MG: 12.5 TABLET, FILM COATED ORAL at 17:29

## 2023-08-06 RX ADMIN — GLIPIZIDE 10 MG: 5 TABLET ORAL at 08:40

## 2023-08-06 RX ADMIN — ATORVASTATIN CALCIUM 40 MG: 40 TABLET, FILM COATED ORAL at 21:24

## 2023-08-06 RX ADMIN — ANTACID TABLETS 500 MG: 500 TABLET, CHEWABLE ORAL at 21:24

## 2023-08-06 RX ADMIN — POTASSIUM CHLORIDE 40 MEQ: 1500 TABLET, EXTENDED RELEASE ORAL at 08:57

## 2023-08-06 RX ADMIN — ALOGLIPTIN 12.5 MG: 12.5 TABLET, FILM COATED ORAL at 08:40

## 2023-08-06 RX ADMIN — FUROSEMIDE 80 MG: 10 INJECTION, SOLUTION INTRAMUSCULAR; INTRAVENOUS at 08:40

## 2023-08-06 RX ADMIN — LOPERAMIDE HYDROCHLORIDE 2 MG: 2 CAPSULE ORAL at 18:19

## 2023-08-06 RX ADMIN — OXYCODONE HYDROCHLORIDE AND ACETAMINOPHEN 1 TABLET: 5; 325 TABLET ORAL at 00:09

## 2023-08-06 RX ADMIN — LOSARTAN POTASSIUM 25 MG: 25 TABLET, FILM COATED ORAL at 08:57

## 2023-08-06 ASSESSMENT — PAIN SCALES - GENERAL
PAINLEVEL_OUTOF10: 6
PAINLEVEL_OUTOF10: 9
PAINLEVEL_OUTOF10: 7

## 2023-08-06 ASSESSMENT — PAIN DESCRIPTION - DESCRIPTORS: DESCRIPTORS: SQUEEZING

## 2023-08-06 ASSESSMENT — PAIN DESCRIPTION - LOCATION: LOCATION: LEG

## 2023-08-06 ASSESSMENT — PAIN DESCRIPTION - ORIENTATION: ORIENTATION: LEFT

## 2023-08-06 NOTE — PROGRESS NOTES
08/06/23 1535   Encounter Summary   Encounter Overview/Reason  Spiritual/Emotional Needs   Service Provided For: Patient   Referral/Consult From: Rounding   Support System Spouse   Last Encounter  08/06/23   Complexity of Encounter Low   Spiritual/Emotional needs   Type Spiritual Support   Assessment/Intervention/Outcome   Assessment Coping   Intervention Active listening;Explored/Affirmed feelings, thoughts, concerns;Prayer (assurance of)/[place];Sustaining Presence/Ministry of presence   Outcome Comfort;Coping;Encouraged;Engaged in conversation;Expressed Gratitude;Receptive

## 2023-08-06 NOTE — CARE COORDINATION
Pt is excessive pain and very uncomfortable when compression stockings were put on BLE. States it is \"cutting off her circulation\". There are no larger sizes available. Writer removed compression stockings and placed ace wraps on BLE in replace.

## 2023-08-06 NOTE — DISCHARGE SUMMARY
Family Medicine Discharge Summary    Calli Ndiaye  :  1955  MRN:  [id number]    Admit date:  8/3/2023  Discharge date:  2023    Admitting Physician:  Kaia Pate MD    Discharge Diagnoses:    Patient Active Problem List   Diagnosis    Hypertension, benign    Diabetes mellitus type II, controlled ([address])    Other hyperlipidemia    Degeneration of cervical intervertebral disc    Cervicalgia    Sleep apnea    Anemia    Hepatic steatosis    Lumbar radiculopathy    Morbid obesity ([address])    Chronic pain syndrome    H/O: GI bleed    Iron deficiency anemia    Fibromyalgia    Class 3 severe obesity due to excess calories with serious comorbidity and body mass index (BMI) of 50.0 to 59.9 in adult [facility])    Tremor    Fatigue    Chronic renal insufficiency, stage III (moderate) (HCC)    Cellulitis of right leg    Lymphedema of both lower extremities    PUD (peptic ulcer disease)    Constipation    Generalized abdominal pain    Altered mental status    Allergic rhinitis    Arthritis    Lumbar spine instability    Polyneuropathy due to type 2 diabetes mellitus ([address])    Primary central sleep apnea    Skin cancer    Spondylosis without myelopathy    Iron malabsorption    Iron deficiency    General weakness    Multiple falls    Acute cystitis without hematuria    Acute CHF (congestive heart failure) (HCC)    Acute on chronic systolic congestive heart failure (HCC)    Secondary hypertension    Aortic valve stenosis       Admission Condition:  guarded  Discharged Condition:  fair    Hospital Course:   80 yo admitted with weakness and tremors. Found to be in acute CHF and diuresed with improvement.      Discharge Medications:         Medication List        START taking these medications      losartan 25 MG tablet  Commonly known as: COZAAR  Take 1 tablet by mouth daily     potassium chloride 20 MEQ extended release tablet  Commonly known as: KLOR-CON M  Take 1 tablet by mouth 2 times daily            CHANGE how you take

## 2023-08-06 NOTE — PROGRESS NOTES
[facility]   Progress Note                   Date:   8/6/2023  Patient name: Alisson Swift  Date of admission:  8/3/2023 12:06 PM  MRN:   [id number]  YOB: 1955  PCP: Suzette Leong MD    Reason for Admission:      Subjective:       Clinical Changes / Abnormalities: Feeling much better  Shortness of breath is improving  Since admission patient is 2700 negative    Medications:   Scheduled Meds:   losartan  25 mg Oral Daily    hydroCHLOROthiazide  25 mg Oral Daily    enoxaparin  40 mg SubCUTAneous BID    [START ON 8/7/2023] furosemide  80 mg IntraVENous Daily    atorvastatin  40 mg Oral Nightly    calcium carbonate  500 mg Oral BID    carvedilol  12.5 mg Oral BID WC    glipiZIDE  10 mg Oral QAM AC    linaclotide  145 mcg Oral QAM AC    therapeutic multivitamin-minerals  1 tablet Oral Daily    pioglitazone  45 mg Oral Daily    pregabalin  150 mg Oral BID    alogliptin  12.5 mg Oral Daily    venlafaxine  37.5 mg Oral Daily    vitamin B-12  1,000 mcg Oral Daily    sodium chloride flush  5-40 mL IntraVENous 2 times per day    insulin lispro  0-4 Units SubCUTAneous TID WC    insulin lispro  0-4 Units SubCUTAneous Nightly    famotidine  20 mg Oral Daily     Continuous Infusions:   sodium chloride      dextrose       CBC:   Recent Labs     08/03/23  1250 08/05/23  1050   WBC 10.7 12.2*   HGB 8.6* 9.3*    323       BMP:    Recent Labs     08/04/23  0706 08/05/23  0715 08/06/23  0538    141 142   K 4.0 3.3* 3.4*   CL 98 97* 97*   CO2 32* 34* 33*   BUN 21 15 16   CREATININE 1.3* 1.2* 1.3*   GLUCOSE 120* 125* 117*       Hepatic:   Recent Labs     08/04/23  0706   AST 17   ALT 9   BILITOT 0.3   ALKPHOS 97       Troponin: No results for input(s): TROPONINI in the last 72 hours. BNP: No results for input(s): BNP in the last 72 hours. Lipids: No results for input(s): CHOL, HDL in the last 72 hours.     Invalid input(s): LDLCALCU  INR: No results for input(s): INR in the last 72

## 2023-08-06 NOTE — CARE COORDINATION
ONGOING DISCHARGE PLAN:    Patient is alert and oriented x4. Spoke with patient regarding discharge plan and patient confirms that plan is still home with [facility]. Refuses SNF. Active order for IV Lasix 80mg daily. Pt hopes to be discharged home tomorrow. Will continue to follow for additional discharge needs. If patient is discharged prior to next notation, then this note serves as note for discharge by case management.     Electronically signed by Celso Canas RN on 8/6/2023 at 11:33 AM

## 2023-08-07 VITALS
RESPIRATION RATE: 18 BRPM | DIASTOLIC BLOOD PRESSURE: 78 MMHG | OXYGEN SATURATION: 98 % | WEIGHT: 293 LBS | SYSTOLIC BLOOD PRESSURE: 154 MMHG | BODY MASS INDEX: 57.52 KG/M2 | HEIGHT: 60 IN | TEMPERATURE: 98.1 F | HEART RATE: 62 BPM

## 2023-08-07 LAB
ANION GAP SERPL CALCULATED.3IONS-SCNC: 13 MMOL/L (ref 9–17)
BUN SERPL-MCNC: 14 MG/DL (ref 8–23)
CALCIUM SERPL-MCNC: 9.8 MG/DL (ref 8.6–10.4)
CHLORIDE SERPL-SCNC: 99 MMOL/L (ref 98–107)
CO2 SERPL-SCNC: 31 MMOL/L (ref 20–31)
CREAT SERPL-MCNC: 1.2 MG/DL (ref 0.5–0.9)
GFR SERPL CREATININE-BSD FRML MDRD: 50 ML/MIN/1.73M2
GLUCOSE SERPL-MCNC: 115 MG/DL (ref 70–99)
METER GLUCOSE: 110 MG/DL (ref 65–105)
METER GLUCOSE: 114 MG/DL (ref 65–105)
METER GLUCOSE: 96 MG/DL (ref 65–105)
POTASSIUM SERPL-SCNC: 3.6 MMOL/L (ref 3.7–5.3)
SODIUM SERPL-SCNC: 143 MMOL/L (ref 135–144)

## 2023-08-07 PROCEDURE — 2580000003 HC RX 258: Performed by: FAMILY MEDICINE

## 2023-08-07 PROCEDURE — 36415 COLL VENOUS BLD VENIPUNCTURE: CPT

## 2023-08-07 PROCEDURE — 80048 BASIC METABOLIC PNL TOTAL CA: CPT

## 2023-08-07 PROCEDURE — 6360000002 HC RX W HCPCS: Performed by: INTERNAL MEDICINE

## 2023-08-07 PROCEDURE — 82947 ASSAY GLUCOSE BLOOD QUANT: CPT

## 2023-08-07 PROCEDURE — 6370000000 HC RX 637 (ALT 250 FOR IP): Performed by: INTERNAL MEDICINE

## 2023-08-07 PROCEDURE — 6370000000 HC RX 637 (ALT 250 FOR IP): Performed by: FAMILY MEDICINE

## 2023-08-07 PROCEDURE — 6360000002 HC RX W HCPCS: Performed by: FAMILY MEDICINE

## 2023-08-07 RX ORDER — FUROSEMIDE 40 MG/1
80 TABLET ORAL DAILY
Qty: 180 TABLET | Refills: 3 | Status: ON HOLD | OUTPATIENT
Start: 2023-08-07 | End: 2023-09-28 | Stop reason: HOSPADM

## 2023-08-07 RX ORDER — LOSARTAN POTASSIUM 25 MG/1
25 TABLET ORAL DAILY
Qty: 90 TABLET | Refills: 3 | Status: ON HOLD | OUTPATIENT
Start: 2023-08-07 | End: 2023-09-28 | Stop reason: HOSPADM

## 2023-08-07 RX ORDER — HYDROCHLOROTHIAZIDE 25 MG/1
25 TABLET ORAL DAILY
Qty: 90 TABLET | Refills: 3 | Status: ON HOLD | OUTPATIENT
Start: 2023-08-08 | End: 2023-09-04 | Stop reason: HOSPADM

## 2023-08-07 RX ADMIN — SODIUM CHLORIDE, PRESERVATIVE FREE 10 ML: 5 INJECTION INTRAVENOUS at 08:08

## 2023-08-07 RX ADMIN — ALOGLIPTIN 12.5 MG: 12.5 TABLET, FILM COATED ORAL at 07:53

## 2023-08-07 RX ADMIN — OXYCODONE HYDROCHLORIDE 30 MG: 10 TABLET ORAL at 16:44

## 2023-08-07 RX ADMIN — PREGABALIN 150 MG: 150 CAPSULE ORAL at 07:52

## 2023-08-07 RX ADMIN — VENLAFAXINE HYDROCHLORIDE 37.5 MG: 37.5 CAPSULE, EXTENDED RELEASE ORAL at 07:55

## 2023-08-07 RX ADMIN — CARVEDILOL 12.5 MG: 12.5 TABLET, FILM COATED ORAL at 16:48

## 2023-08-07 RX ADMIN — GLIPIZIDE 10 MG: 5 TABLET ORAL at 06:23

## 2023-08-07 RX ADMIN — PIOGLITAZONE HYDROCHLORIDE 45 MG: 45 TABLET ORAL at 07:55

## 2023-08-07 RX ADMIN — OXYCODONE HYDROCHLORIDE AND ACETAMINOPHEN 1 TABLET: 5; 325 TABLET ORAL at 13:18

## 2023-08-07 RX ADMIN — LOSARTAN POTASSIUM 25 MG: 25 TABLET, FILM COATED ORAL at 07:52

## 2023-08-07 RX ADMIN — MULTIPLE VITAMINS W/ MINERALS TAB 1 TABLET: TAB at 07:53

## 2023-08-07 RX ADMIN — CARVEDILOL 12.5 MG: 12.5 TABLET, FILM COATED ORAL at 07:53

## 2023-08-07 RX ADMIN — LINACLOTIDE 145 MCG: 145 CAPSULE, GELATIN COATED ORAL at 06:20

## 2023-08-07 RX ADMIN — ANTACID TABLETS 500 MG: 500 TABLET, CHEWABLE ORAL at 07:53

## 2023-08-07 RX ADMIN — ENOXAPARIN SODIUM 40 MG: 100 INJECTION SUBCUTANEOUS at 07:52

## 2023-08-07 RX ADMIN — FUROSEMIDE 80 MG: 10 INJECTION, SOLUTION INTRAMUSCULAR; INTRAVENOUS at 07:53

## 2023-08-07 RX ADMIN — FAMOTIDINE 20 MG: 20 TABLET, FILM COATED ORAL at 07:53

## 2023-08-07 RX ADMIN — HYDROCHLOROTHIAZIDE 25 MG: 25 TABLET ORAL at 07:53

## 2023-08-07 RX ADMIN — OXYCODONE HYDROCHLORIDE AND ACETAMINOPHEN 1 TABLET: 5; 325 TABLET ORAL at 06:20

## 2023-08-07 RX ADMIN — LOPERAMIDE HYDROCHLORIDE 2 MG: 2 CAPSULE ORAL at 09:03

## 2023-08-07 RX ADMIN — CYANOCOBALAMIN TAB 1000 MCG 1000 MCG: 1000 TAB at 07:53

## 2023-08-07 ASSESSMENT — PAIN DESCRIPTION - ORIENTATION
ORIENTATION: LEFT;RIGHT
ORIENTATION: LEFT;RIGHT

## 2023-08-07 ASSESSMENT — PAIN SCALES - GENERAL
PAINLEVEL_OUTOF10: 10
PAINLEVEL_OUTOF10: 7
PAINLEVEL_OUTOF10: 0
PAINLEVEL_OUTOF10: 10

## 2023-08-07 ASSESSMENT — PAIN DESCRIPTION - DESCRIPTORS
DESCRIPTORS: SHARP;THROBBING
DESCRIPTORS: SHARP;ACHING;DULL

## 2023-08-07 ASSESSMENT — PAIN DESCRIPTION - LOCATION
LOCATION: KNEE
LOCATION: KNEE

## 2023-08-07 NOTE — PROGRESS NOTES
Patient was given discharge instructions and questions were answered. IV was removed without complications. Personal belongings were gathered. Patient was taken down by wheelchair.

## 2023-08-07 NOTE — PLAN OF CARE
Problem: Discharge Planning  Goal: Discharge to home or other facility with appropriate resources  8/7/2023 1712 by Lelia Ruiz RN  Outcome: Completed     Problem: Pain  Goal: Verbalizes/displays adequate comfort level or baseline comfort level  8/7/2023 1712 by Lelia Ruiz RN  Outcome: Completed     Problem: Skin/Tissue Integrity  Goal: Absence of new skin breakdown  Description: 1. Monitor for areas of redness and/or skin breakdown  2. Assess vascular access sites hourly  3. Every 4-6 hours minimum:  Change oxygen saturation probe site  4. Every 4-6 hours:  If on nasal continuous positive airway pressure, respiratory therapy assess nares and determine need for appliance change or resting period.   8/7/2023 1712 by Lelia Ruiz RN  Outcome: Completed     Problem: Safety - Adult  Goal: Free from fall injury  8/7/2023 1712 by Lelia Ruiz RN  Outcome: Completed

## 2023-08-07 NOTE — PROGRESS NOTES
Dr. Jacquelyn Lindsey returned paged. Patient to take PO Lasix 80 mg daily, and hydrochlorothiazide 25 mg daily. Patient to follow up in office in 2-3 weeks.

## 2023-08-08 ENCOUNTER — CARE COORDINATION (OUTPATIENT)
Dept: CASE MANAGEMENT | Age: 68
End: 2023-08-08

## 2023-08-08 NOTE — CARE COORDINATION
Care Transitions Outreach Attempt    Call within 2 business days of discharge: Yes   Attempted to reach patient for transitions of care follow up. Unable to reach patient. 1st attempt. Patient also in acute CHF on admission. Patient: Candi Pedraza Patient : 1955 MRN: [id number]    Last Discharge [address]       Date Complaint Diagnosis Description Type Department Provider    8/3/23 Extremity Weakness; Tremors Acute cystitis without hematuria . .. ED to Hosp-Admission (Discharged) (ADMITTED) [facility] Ilsa Benson MD; [employer]. .. Was this an external facility discharge?  No Discharge Facility: [facility]    Noted following upcoming appointments from discharge chart review:   [facility] follow up appointment(s):   Future Appointments   Date Time Provider [address]   2023  2:00 PM Ilsa Benson MD [address] [facility] [address] [facility] follow up appointment(s): n/a

## 2023-08-09 ENCOUNTER — CARE COORDINATION (OUTPATIENT)
Dept: CASE MANAGEMENT | Age: 68
End: 2023-08-09

## 2023-08-09 DIAGNOSIS — I50.23 ACUTE ON CHRONIC SYSTOLIC CONGESTIVE HEART FAILURE (HCC): Primary | ICD-10-CM

## 2023-08-09 PROCEDURE — 1111F DSCHRG MED/CURRENT MED MERGE: CPT | Performed by: FAMILY MEDICINE

## 2023-08-09 NOTE — CARE COORDINATION
OK. Urinating wnl with no s/s of UTI. Agreeable to transitions calls. Care Transition Nurse reviewed discharge instructions with patient who verbalized understanding. The patient was given an opportunity to ask questions and does not have any further questions or concerns at this time. Were discharge instructions available to patient? Yes. Reviewed appropriate site of care based on symptoms and resources available to patient including: PCP  Specialist  Home health  When to call Bita Cunningham. The patient agrees to contact the PCP office for questions related to their healthcare. Medication reconciliation was performed with patient, who verbalizes understanding of administration of home medications. Medications reviewed, 1111F entered: yes    Was patient discharged with a pulse oximeter? no    Non-face-to-face services provided:  Obtained and reviewed discharge summary and/or continuity of care documents  Communication with home health agencies or other [facility]  Assessment and support for treatment adherence and medication management-1111f    Offered patient enrollment in the Remote Patient Monitoring (RPM) program for in-home monitoring:  did not discuss on initial call . Care Transitions 24 Hour Call    Care Transitions Interventions         Discussed follow-up appointments. If no appointment was previously scheduled, appointment scheduling offered: Yes. Is follow up appointment scheduled within 7 days of discharge? Yes. Follow Up  Future Appointments   Date Time Provider [address]   8/11/2023  2:00 PM Ilsa Benson MD [address] [place] Transition Nurse provided contact information. Plan for follow-up call in 5-7 days based on severity of symptoms and risk factors. Plan for next call: symptom management-weakness, vitals checked? Sugars? HHC-PT/OT? LE edema?  RPM?    Popeye Baez RN

## 2023-08-15 ENCOUNTER — CARE COORDINATION (OUTPATIENT)
Dept: CASE MANAGEMENT | Age: 68
End: 2023-08-15

## 2023-08-15 NOTE — CARE COORDINATION
[facility] Care [facility]itions Follow Up Call    Patient Current Location:  Home: [address] [place] [id number] Care Coordinator contacted the patient by telephone to follow up after admission on . Verified name and  with patient as identifiers. Patient: Marcianne Cooks  Patient : 1955   MRN: [id number]  Reason for Admission: Acute CHF Acute Cystitis   Discharge Date: 23 RARS: Readmission Risk Score: 15.8      Needs to be reviewed by the provider   Additional needs identified to be addressed with provider: No  none             Method of communication with provider: none. Subsequent transitional call. Spoke to :  Torrey Hunterbren today she reports she is doing okay. She still is receiving nursing through [facility]. Writer called and spoke with [facility] patient had a nurse visit yesterday and they are still awaiting PT eval.     Torrey Estrella denies HA, sob, chest pain palpitations , dizziness tremors or blurriness. Writer asked about swelling to bilat legs she reports they are doing really good . I am elevating them. Writer asked if she is taking her BP she sates no. Writer encouraged her to check and also daily weights . She states I have done that weight today 307.4 Writer asked her to check pulse oximeter while on the phone SP02 95% on 4.5 l of 02 per N/C. Appetite fine . B/B wnl. Offered RPM declined stated I don't need no one to gucci me around. Writer told her that no one has to come to her house . She said I'm not interested. Discussed  Annual visit she reports everything was fine. She was able to confirm what testing and lab work requested by pcp. No medication changes.        Addressed changes since last contact:   [facility]     Follow Up  Future Appointments   Date Time Provider [address]   2023  2:00 PM Jackie Huizar MD [facility] Coordinator reviewed discharge instructions, medical action plan, and red flags with patient and discussed any

## 2023-08-22 ENCOUNTER — CARE COORDINATION (OUTPATIENT)
Dept: CASE MANAGEMENT | Age: 68
End: 2023-08-22

## 2023-08-22 NOTE — CARE COORDINATION
Care Transitions Outreach Attempt    Attempted to reach patient for transitions of care follow up. Unable to reach patient. Caller left a HIPAA compliant message with contact information for a return call. Patient: Jg Elmore Patient : 1955 MRN: <[id number]>    Last Discharge [address] [facility]       Date Complaint Diagnosis Description Type Department Provider    8/3/23 Extremity Weakness; Tremors Acute cystitis without hematuria . .. ED to Hosp-Admission (Discharged) (ADMITTED) [facility] Gela Garcia MD; Ida Arredondo. .. Was this an external facility discharge?  No Dis[place] Facility[place] [facility]    Noted following upcoming appointments from discharge chart review:   [facility] follow up appointment(s):   Future Appointments   Date Time Provider [address]   2023  2:00 PM Gela Garcia MD [address]

## 2023-08-25 ENCOUNTER — CARE COORDINATION (OUTPATIENT)
Dept: CASE MANAGEMENT | Age: 68
End: 2023-08-25

## 2023-08-25 NOTE — CARE COORDINATION
REHABILITATION [facility] Care Transitions Follow Up Call    Patient Current Location:  Home: [address]    Care Transition Nurse contacted the patient by telephone to follow up after admission on 8/3/23. Verified name and  with patient as identifiers. Patient: Des Day  Patient : 1955   MRN: [id number]  Reason for Admission: Acute cystitis; Acute CHF  Discharge Date: 23 RARS: Readmission Risk Score: 15.8      Needs to be reviewed by the provider   Additional needs identified to be addressed with provider: No  none             Method of communication with provider: none. Spoke to [place] for transitions call. Stated sugars are running wnl, only checks rest of vitals when [address] is there. Pt declined RPM on 8/15. No increased SOB, swelling, CP/pressure, palpitations. Stated she has colo guard test, has not done it yet. Pt has referral for mammogram and DM foot exam. Stated she has trouble getting in and out of truck, fell recently getting out, caught toe in window, did not injure herself. Would like mammogram and foot exam on same days. Will check back next week to see if mammogram scheduled and attempt to schedule foot exam same day. Addressed changes since last contact:   needs to complete colo guard, needs mammogram and DM foot exam  Discussed follow-up appointments. Follow Up  Future Appointments   Date Time Provider [address] [place] [address]   2023  2:00 PM Reshma Phelan MD [address] [address] Transition Nurse reviewed discharge instructions with patient and discussed any barriers to care and/or understanding of plan of care after discharge. Discussed appropriate site of care based on symptoms and resources available to patient including: PCP  Specialist  Home health  When to call Bita Cunningham. The patient agrees to contact the PCP office for questions related to their healthcare.      Patients top risk factors for readmission: medical

## 2023-08-29 ENCOUNTER — CARE COORDINATION (OUTPATIENT)
Dept: CASE MANAGEMENT | Age: 68
End: 2023-08-29

## 2023-09-01 ENCOUNTER — APPOINTMENT (OUTPATIENT)
Dept: GENERAL RADIOLOGY | Age: 68
DRG: 683 | End: 2023-09-01
Attending: FAMILY MEDICINE
Payer: MEDICARE

## 2023-09-01 ENCOUNTER — APPOINTMENT (OUTPATIENT)
Dept: CT IMAGING | Age: 68
DRG: 683 | End: 2023-09-01
Attending: FAMILY MEDICINE
Payer: MEDICARE

## 2023-09-01 ENCOUNTER — HOSPITAL ENCOUNTER (INPATIENT)
Age: 68
LOS: 3 days | Discharge: HOME HEALTH | DRG: 683 | End: 2023-09-04
Attending: FAMILY MEDICINE | Admitting: FAMILY MEDICINE
Payer: MEDICARE

## 2023-09-01 ENCOUNTER — CARE COORDINATION (OUTPATIENT)
Dept: CASE MANAGEMENT | Age: 68
End: 2023-09-01

## 2023-09-01 PROBLEM — R10.9 RIGHT FLANK PAIN: Status: ACTIVE | Noted: 2023-09-01

## 2023-09-01 LAB
ANION GAP SERPL CALCULATED.3IONS-SCNC: 14 MMOL/L (ref 9–17)
BASOPHILS # BLD: 0 K/UL (ref 0–0.2)
BASOPHILS NFR BLD: 0 % (ref 0–2)
BILIRUB UR QL STRIP: NEGATIVE
BNP SERPL-MCNC: 802 PG/ML
BUN SERPL-MCNC: 24 MG/DL (ref 8–23)
CALCIUM SERPL-MCNC: 9.9 MG/DL (ref 8.6–10.4)
CHLORIDE SERPL-SCNC: 92 MMOL/L (ref 98–107)
CLARITY UR: CLEAR
CO2 SERPL-SCNC: 35 MMOL/L (ref 20–31)
COLOR UR: YELLOW
COMMENT: NORMAL
CREAT SERPL-MCNC: 1.6 MG/DL (ref 0.5–0.9)
D DIMER PPP FEU-MCNC: 2.72 UG/ML FEU (ref 0–0.59)
EOSINOPHIL # BLD: 0.5 K/UL (ref 0–0.4)
EOSINOPHILS RELATIVE PERCENT: 4 % (ref 0–4)
ERYTHROCYTE [DISTWIDTH] IN BLOOD BY AUTOMATED COUNT: 17.1 % (ref 11.5–14.9)
GFR SERPL CREATININE-BSD FRML MDRD: 35 ML/MIN/1.73M2
GLUCOSE BLD-MCNC: 126 MG/DL (ref 65–105)
GLUCOSE BLD-MCNC: 179 MG/DL (ref 65–105)
GLUCOSE SERPL-MCNC: 179 MG/DL (ref 70–99)
GLUCOSE UR STRIP-MCNC: NEGATIVE MG/DL
HCT VFR BLD AUTO: 30.8 % (ref 36–46)
HGB BLD-MCNC: 9.7 G/DL (ref 12–16)
HGB UR QL STRIP.AUTO: NEGATIVE
KETONES UR STRIP-MCNC: NEGATIVE MG/DL
LACTATE BLDV-SCNC: 1 MMOL/L (ref 0.5–2.2)
LEUKOCYTE ESTERASE UR QL STRIP: NEGATIVE
LYMPHOCYTES NFR BLD: 1.1 K/UL (ref 1–4.8)
LYMPHOCYTES RELATIVE PERCENT: 10 % (ref 24–44)
MAGNESIUM SERPL-MCNC: 2 MG/DL (ref 1.6–2.6)
MCH RBC QN AUTO: 27.9 PG (ref 26–34)
MCHC RBC AUTO-ENTMCNC: 31.5 G/DL (ref 31–37)
MCV RBC AUTO: 88.3 FL (ref 80–100)
MONOCYTES NFR BLD: 0.7 K/UL (ref 0.1–1.3)
MONOCYTES NFR BLD: 6 % (ref 1–7)
NEUTROPHILS NFR BLD: 80 % (ref 36–66)
NEUTS SEG NFR BLD: 8.7 K/UL (ref 1.3–9.1)
NITRITE UR QL STRIP: NEGATIVE
PH UR STRIP: 8 [PH] (ref 5–8)
PLATELET # BLD AUTO: 299 K/UL (ref 150–450)
PMV BLD AUTO: 8.3 FL (ref 6–12)
POTASSIUM SERPL-SCNC: 3.4 MMOL/L (ref 3.7–5.3)
PROT UR STRIP-MCNC: NEGATIVE MG/DL
RBC # BLD AUTO: 3.49 M/UL (ref 4–5.2)
SODIUM SERPL-SCNC: 141 MMOL/L (ref 135–144)
SP GR UR STRIP: 1.01 (ref 1–1.03)
TROPONIN I SERPL HS-MCNC: 19 NG/L (ref 0–14)
UROBILINOGEN UR STRIP-ACNC: NORMAL EU/DL (ref 0–1)
WBC OTHER # BLD: 10.9 K/UL (ref 3.5–11)

## 2023-09-01 PROCEDURE — 36415 COLL VENOUS BLD VENIPUNCTURE: CPT

## 2023-09-01 PROCEDURE — 83605 ASSAY OF LACTIC ACID: CPT

## 2023-09-01 PROCEDURE — 83735 ASSAY OF MAGNESIUM: CPT

## 2023-09-01 PROCEDURE — 87040 BLOOD CULTURE FOR BACTERIA: CPT

## 2023-09-01 PROCEDURE — 83880 ASSAY OF NATRIURETIC PEPTIDE: CPT

## 2023-09-01 PROCEDURE — 84484 ASSAY OF TROPONIN QUANT: CPT

## 2023-09-01 PROCEDURE — 82947 ASSAY GLUCOSE BLOOD QUANT: CPT

## 2023-09-01 PROCEDURE — 85025 COMPLETE CBC W/AUTO DIFF WBC: CPT

## 2023-09-01 PROCEDURE — 71046 X-RAY EXAM CHEST 2 VIEWS: CPT

## 2023-09-01 PROCEDURE — 85379 FIBRIN DEGRADATION QUANT: CPT

## 2023-09-01 PROCEDURE — 1200000000 HC SEMI PRIVATE

## 2023-09-01 PROCEDURE — 80048 BASIC METABOLIC PNL TOTAL CA: CPT

## 2023-09-01 PROCEDURE — 6360000002 HC RX W HCPCS: Performed by: FAMILY MEDICINE

## 2023-09-01 PROCEDURE — 83036 HEMOGLOBIN GLYCOSYLATED A1C: CPT

## 2023-09-01 PROCEDURE — 81003 URINALYSIS AUTO W/O SCOPE: CPT

## 2023-09-01 PROCEDURE — 74176 CT ABD & PELVIS W/O CONTRAST: CPT

## 2023-09-01 PROCEDURE — 6370000000 HC RX 637 (ALT 250 FOR IP): Performed by: FAMILY MEDICINE

## 2023-09-01 RX ORDER — ACETAMINOPHEN 650 MG/1
650 SUPPOSITORY RECTAL EVERY 6 HOURS PRN
Status: DISCONTINUED | OUTPATIENT
Start: 2023-09-01 | End: 2023-09-04 | Stop reason: HOSPADM

## 2023-09-01 RX ORDER — FUROSEMIDE 40 MG/1
80 TABLET ORAL DAILY
Status: DISCONTINUED | OUTPATIENT
Start: 2023-09-01 | End: 2023-09-04 | Stop reason: HOSPADM

## 2023-09-01 RX ORDER — SODIUM CHLORIDE 0.9 % (FLUSH) 0.9 %
5-40 SYRINGE (ML) INJECTION EVERY 12 HOURS SCHEDULED
Status: DISCONTINUED | OUTPATIENT
Start: 2023-09-01 | End: 2023-09-04 | Stop reason: HOSPADM

## 2023-09-01 RX ORDER — OXYCODONE HYDROCHLORIDE AND ACETAMINOPHEN 5; 325 MG/1; MG/1
1 TABLET ORAL EVERY 6 HOURS PRN
Status: DISCONTINUED | OUTPATIENT
Start: 2023-09-01 | End: 2023-09-04 | Stop reason: HOSPADM

## 2023-09-01 RX ORDER — LANOLIN ALCOHOL/MO/W.PET/CERES
1000 CREAM (GRAM) TOPICAL DAILY
Status: DISCONTINUED | OUTPATIENT
Start: 2023-09-01 | End: 2023-09-04 | Stop reason: HOSPADM

## 2023-09-01 RX ORDER — CALCIUM CARBONATE 500(1250)
1 TABLET ORAL 2 TIMES DAILY
Status: DISCONTINUED | OUTPATIENT
Start: 2023-09-01 | End: 2023-09-04 | Stop reason: HOSPADM

## 2023-09-01 RX ORDER — POTASSIUM CHLORIDE 20 MEQ/1
40 TABLET, EXTENDED RELEASE ORAL PRN
Status: DISCONTINUED | OUTPATIENT
Start: 2023-09-01 | End: 2023-09-04 | Stop reason: HOSPADM

## 2023-09-01 RX ORDER — INSULIN LISPRO 100 [IU]/ML
0-4 INJECTION, SOLUTION INTRAVENOUS; SUBCUTANEOUS
Status: DISCONTINUED | OUTPATIENT
Start: 2023-09-01 | End: 2023-09-04 | Stop reason: HOSPADM

## 2023-09-01 RX ORDER — POLYETHYLENE GLYCOL 3350 17 G/17G
17 POWDER, FOR SOLUTION ORAL DAILY PRN
Status: DISCONTINUED | OUTPATIENT
Start: 2023-09-01 | End: 2023-09-04 | Stop reason: HOSPADM

## 2023-09-01 RX ORDER — CLONAZEPAM 0.5 MG/1
0.5 TABLET ORAL EVERY 12 HOURS PRN
Status: DISCONTINUED | OUTPATIENT
Start: 2023-09-01 | End: 2023-09-04 | Stop reason: HOSPADM

## 2023-09-01 RX ORDER — CETIRIZINE HYDROCHLORIDE 10 MG/1
5 TABLET ORAL DAILY
Status: DISCONTINUED | OUTPATIENT
Start: 2023-09-02 | End: 2023-09-04 | Stop reason: HOSPADM

## 2023-09-01 RX ORDER — ALOGLIPTIN 12.5 MG/1
12.5 TABLET, FILM COATED ORAL DAILY
Status: DISCONTINUED | OUTPATIENT
Start: 2023-09-01 | End: 2023-09-04 | Stop reason: HOSPADM

## 2023-09-01 RX ORDER — ENOXAPARIN SODIUM 100 MG/ML
30 INJECTION SUBCUTANEOUS 2 TIMES DAILY
Status: DISCONTINUED | OUTPATIENT
Start: 2023-09-01 | End: 2023-09-04 | Stop reason: HOSPADM

## 2023-09-01 RX ORDER — CARVEDILOL 12.5 MG/1
12.5 TABLET ORAL 2 TIMES DAILY WITH MEALS
Status: DISCONTINUED | OUTPATIENT
Start: 2023-09-01 | End: 2023-09-04 | Stop reason: HOSPADM

## 2023-09-01 RX ORDER — ATORVASTATIN CALCIUM 40 MG/1
40 TABLET, FILM COATED ORAL NIGHTLY
Status: DISCONTINUED | OUTPATIENT
Start: 2023-09-01 | End: 2023-09-04 | Stop reason: HOSPADM

## 2023-09-01 RX ORDER — PREGABALIN 150 MG/1
150 CAPSULE ORAL 2 TIMES DAILY
Status: DISCONTINUED | OUTPATIENT
Start: 2023-09-01 | End: 2023-09-04 | Stop reason: HOSPADM

## 2023-09-01 RX ORDER — DEXTROSE MONOHYDRATE 100 MG/ML
INJECTION, SOLUTION INTRAVENOUS CONTINUOUS PRN
Status: DISCONTINUED | OUTPATIENT
Start: 2023-09-01 | End: 2023-09-04 | Stop reason: HOSPADM

## 2023-09-01 RX ORDER — VENLAFAXINE HYDROCHLORIDE 37.5 MG/1
37.5 CAPSULE, EXTENDED RELEASE ORAL DAILY
Status: DISCONTINUED | OUTPATIENT
Start: 2023-09-01 | End: 2023-09-04 | Stop reason: HOSPADM

## 2023-09-01 RX ORDER — POTASSIUM CHLORIDE 20 MEQ/1
20 TABLET, EXTENDED RELEASE ORAL 2 TIMES DAILY
Status: DISCONTINUED | OUTPATIENT
Start: 2023-09-01 | End: 2023-09-04 | Stop reason: HOSPADM

## 2023-09-01 RX ORDER — SODIUM CHLORIDE 0.9 % (FLUSH) 0.9 %
5-40 SYRINGE (ML) INJECTION PRN
Status: DISCONTINUED | OUTPATIENT
Start: 2023-09-01 | End: 2023-09-04 | Stop reason: HOSPADM

## 2023-09-01 RX ORDER — MAGNESIUM SULFATE HEPTAHYDRATE 40 MG/ML
2000 INJECTION, SOLUTION INTRAVENOUS PRN
Status: DISCONTINUED | OUTPATIENT
Start: 2023-09-01 | End: 2023-09-04 | Stop reason: HOSPADM

## 2023-09-01 RX ORDER — INSULIN LISPRO 100 [IU]/ML
0-4 INJECTION, SOLUTION INTRAVENOUS; SUBCUTANEOUS NIGHTLY
Status: DISCONTINUED | OUTPATIENT
Start: 2023-09-01 | End: 2023-09-04 | Stop reason: HOSPADM

## 2023-09-01 RX ORDER — DEXTROSE MONOHYDRATE 100 MG/ML
INJECTION, SOLUTION INTRAVENOUS CONTINUOUS PRN
Status: DISCONTINUED | OUTPATIENT
Start: 2023-09-01 | End: 2023-09-01 | Stop reason: SDUPTHER

## 2023-09-01 RX ORDER — FAMOTIDINE 20 MG/1
20 TABLET, FILM COATED ORAL 2 TIMES DAILY
Status: DISCONTINUED | OUTPATIENT
Start: 2023-09-01 | End: 2023-09-04 | Stop reason: HOSPADM

## 2023-09-01 RX ORDER — ONDANSETRON 4 MG/1
4 TABLET, ORALLY DISINTEGRATING ORAL EVERY 8 HOURS PRN
Status: DISCONTINUED | OUTPATIENT
Start: 2023-09-01 | End: 2023-09-04 | Stop reason: HOSPADM

## 2023-09-01 RX ORDER — ENOXAPARIN SODIUM 100 MG/ML
40 INJECTION SUBCUTANEOUS DAILY
Status: DISCONTINUED | OUTPATIENT
Start: 2023-09-01 | End: 2023-09-01 | Stop reason: DRUGHIGH

## 2023-09-01 RX ORDER — SODIUM CHLORIDE 9 MG/ML
INJECTION, SOLUTION INTRAVENOUS CONTINUOUS
Status: DISCONTINUED | OUTPATIENT
Start: 2023-09-01 | End: 2023-09-01

## 2023-09-01 RX ORDER — LOSARTAN POTASSIUM 25 MG/1
25 TABLET ORAL DAILY
Status: DISCONTINUED | OUTPATIENT
Start: 2023-09-01 | End: 2023-09-04 | Stop reason: HOSPADM

## 2023-09-01 RX ORDER — PSEUDOEPHEDRINE HCL 120 MG/1
120 TABLET, FILM COATED, EXTENDED RELEASE ORAL DAILY
Status: DISCONTINUED | OUTPATIENT
Start: 2023-09-02 | End: 2023-09-04 | Stop reason: HOSPADM

## 2023-09-01 RX ORDER — OXYCODONE HYDROCHLORIDE 10 MG/1
30 TABLET ORAL EVERY 8 HOURS PRN
Status: DISCONTINUED | OUTPATIENT
Start: 2023-09-01 | End: 2023-09-04 | Stop reason: HOSPADM

## 2023-09-01 RX ORDER — GLIPIZIDE 5 MG/1
10 TABLET ORAL
Status: DISCONTINUED | OUTPATIENT
Start: 2023-09-01 | End: 2023-09-04 | Stop reason: HOSPADM

## 2023-09-01 RX ORDER — SODIUM CHLORIDE 9 MG/ML
INJECTION, SOLUTION INTRAVENOUS PRN
Status: DISCONTINUED | OUTPATIENT
Start: 2023-09-01 | End: 2023-09-04 | Stop reason: HOSPADM

## 2023-09-01 RX ORDER — M-VIT,TX,IRON,MINS/CALC/FOLIC 27MG-0.4MG
1 TABLET ORAL DAILY
Status: DISCONTINUED | OUTPATIENT
Start: 2023-09-01 | End: 2023-09-04 | Stop reason: HOSPADM

## 2023-09-01 RX ORDER — PIOGLITAZONEHYDROCHLORIDE 15 MG/1
45 TABLET ORAL DAILY
Status: DISCONTINUED | OUTPATIENT
Start: 2023-09-01 | End: 2023-09-04 | Stop reason: HOSPADM

## 2023-09-01 RX ORDER — ONDANSETRON 2 MG/ML
4 INJECTION INTRAMUSCULAR; INTRAVENOUS EVERY 6 HOURS PRN
Status: DISCONTINUED | OUTPATIENT
Start: 2023-09-01 | End: 2023-09-04 | Stop reason: HOSPADM

## 2023-09-01 RX ORDER — ACETAMINOPHEN 325 MG/1
650 TABLET ORAL EVERY 6 HOURS PRN
Status: DISCONTINUED | OUTPATIENT
Start: 2023-09-01 | End: 2023-09-04 | Stop reason: HOSPADM

## 2023-09-01 RX ORDER — POTASSIUM CHLORIDE 7.45 MG/ML
10 INJECTION INTRAVENOUS PRN
Status: DISCONTINUED | OUTPATIENT
Start: 2023-09-01 | End: 2023-09-04 | Stop reason: HOSPADM

## 2023-09-01 RX ADMIN — POTASSIUM CHLORIDE 20 MEQ: 1500 TABLET, EXTENDED RELEASE ORAL at 21:11

## 2023-09-01 RX ADMIN — ENOXAPARIN SODIUM 30 MG: 100 INJECTION SUBCUTANEOUS at 21:12

## 2023-09-01 RX ADMIN — VENLAFAXINE HYDROCHLORIDE 37.5 MG: 37.5 CAPSULE, EXTENDED RELEASE ORAL at 18:02

## 2023-09-01 RX ADMIN — FAMOTIDINE 20 MG: 20 TABLET ORAL at 21:11

## 2023-09-01 RX ADMIN — PIOGLITAZONE HYDROCHLORIDE 45 MG: 45 TABLET ORAL at 18:02

## 2023-09-01 RX ADMIN — LOSARTAN POTASSIUM 25 MG: 25 TABLET, FILM COATED ORAL at 18:02

## 2023-09-01 RX ADMIN — ATORVASTATIN CALCIUM 40 MG: 40 TABLET, FILM COATED ORAL at 21:12

## 2023-09-01 RX ADMIN — CALCIUM 500 MG: 500 TABLET ORAL at 21:11

## 2023-09-01 RX ADMIN — CARVEDILOL 12.5 MG: 12.5 TABLET, FILM COATED ORAL at 18:02

## 2023-09-01 RX ADMIN — PREGABALIN 150 MG: 150 CAPSULE ORAL at 21:11

## 2023-09-01 ASSESSMENT — PAIN SCALES - GENERAL
PAINLEVEL_OUTOF10: 0
PAINLEVEL_OUTOF10: 0

## 2023-09-01 NOTE — CARE COORDINATION
Noted that patient was direct admitted today for flank pain at [facility]. Episode resolved.

## 2023-09-01 NOTE — PLAN OF CARE
Problem: Chronic Conditions and Co-morbidities  Goal: Patient's chronic conditions and co-morbidity symptoms are monitored and maintained or improved  Outcome: Progressing     Problem: Discharge Planning  Goal: Discharge to home or other facility with appropriate resources  Outcome: Progressing     Problem: Safety - Adult  Goal: Free from fall injury  Outcome: Progressing     Problem: Skin/Tissue Integrity  Goal: Absence of new skin breakdown  Description: 1. Monitor for areas of redness and/or skin breakdown  2. Assess vascular access sites hourly  3. Every 4-6 hours minimum:  Change oxygen saturation probe site  4. Every 4-6 hours:  If on nasal continuous positive airway pressure, respiratory therapy assess nares and determine need for appliance change or resting period.   Outcome: Progressing

## 2023-09-02 ENCOUNTER — APPOINTMENT (OUTPATIENT)
Dept: NUCLEAR MEDICINE | Age: 68
DRG: 683 | End: 2023-09-02
Attending: FAMILY MEDICINE
Payer: MEDICARE

## 2023-09-02 LAB
ANION GAP SERPL CALCULATED.3IONS-SCNC: 10 MMOL/L (ref 9–17)
BASOPHILS # BLD: 0.1 K/UL (ref 0–0.2)
BASOPHILS NFR BLD: 1 % (ref 0–2)
BUN SERPL-MCNC: 20 MG/DL (ref 8–23)
CALCIUM SERPL-MCNC: 9.1 MG/DL (ref 8.6–10.4)
CHLORIDE SERPL-SCNC: 95 MMOL/L (ref 98–107)
CO2 SERPL-SCNC: 36 MMOL/L (ref 20–31)
CREAT SERPL-MCNC: 1.4 MG/DL (ref 0.5–0.9)
EOSINOPHIL # BLD: 0.4 K/UL (ref 0–0.4)
EOSINOPHILS RELATIVE PERCENT: 4 % (ref 0–4)
ERYTHROCYTE [DISTWIDTH] IN BLOOD BY AUTOMATED COUNT: 17.4 % (ref 11.5–14.9)
GFR SERPL CREATININE-BSD FRML MDRD: 41 ML/MIN/1.73M2
GLUCOSE BLD-MCNC: 120 MG/DL (ref 65–105)
GLUCOSE BLD-MCNC: 132 MG/DL (ref 65–105)
GLUCOSE BLD-MCNC: 146 MG/DL (ref 65–105)
GLUCOSE BLD-MCNC: 180 MG/DL (ref 65–105)
GLUCOSE SERPL-MCNC: 133 MG/DL (ref 70–99)
HCT VFR BLD AUTO: 30.4 % (ref 36–46)
HGB BLD-MCNC: 9.7 G/DL (ref 12–16)
LYMPHOCYTES NFR BLD: 1.3 K/UL (ref 1–4.8)
LYMPHOCYTES RELATIVE PERCENT: 14 % (ref 24–44)
MCH RBC QN AUTO: 28.1 PG (ref 26–34)
MCHC RBC AUTO-ENTMCNC: 31.8 G/DL (ref 31–37)
MCV RBC AUTO: 88.5 FL (ref 80–100)
MONOCYTES NFR BLD: 0.7 K/UL (ref 0.1–1.3)
MONOCYTES NFR BLD: 7 % (ref 1–7)
NEUTROPHILS NFR BLD: 74 % (ref 36–66)
NEUTS SEG NFR BLD: 7.1 K/UL (ref 1.3–9.1)
PLATELET # BLD AUTO: 280 K/UL (ref 150–450)
PMV BLD AUTO: 8.7 FL (ref 6–12)
POTASSIUM SERPL-SCNC: 3.7 MMOL/L (ref 3.7–5.3)
RBC # BLD AUTO: 3.44 M/UL (ref 4–5.2)
SODIUM SERPL-SCNC: 141 MMOL/L (ref 135–144)
WBC OTHER # BLD: 9.6 K/UL (ref 3.5–11)

## 2023-09-02 PROCEDURE — 6370000000 HC RX 637 (ALT 250 FOR IP): Performed by: FAMILY MEDICINE

## 2023-09-02 PROCEDURE — 36415 COLL VENOUS BLD VENIPUNCTURE: CPT

## 2023-09-02 PROCEDURE — A9540 TC99M MAA: HCPCS | Performed by: FAMILY MEDICINE

## 2023-09-02 PROCEDURE — 82947 ASSAY GLUCOSE BLOOD QUANT: CPT

## 2023-09-02 PROCEDURE — 1200000000 HC SEMI PRIVATE

## 2023-09-02 PROCEDURE — 99223 1ST HOSP IP/OBS HIGH 75: CPT | Performed by: FAMILY MEDICINE

## 2023-09-02 PROCEDURE — 3430000000 HC RX DIAGNOSTIC RADIOPHARMACEUTICAL: Performed by: FAMILY MEDICINE

## 2023-09-02 PROCEDURE — 78582 LUNG VENTILAT&PERFUS IMAGING: CPT

## 2023-09-02 PROCEDURE — 2580000003 HC RX 258: Performed by: FAMILY MEDICINE

## 2023-09-02 PROCEDURE — A9539 TC99M PENTETATE: HCPCS | Performed by: FAMILY MEDICINE

## 2023-09-02 PROCEDURE — 80048 BASIC METABOLIC PNL TOTAL CA: CPT

## 2023-09-02 PROCEDURE — 85025 COMPLETE CBC W/AUTO DIFF WBC: CPT

## 2023-09-02 PROCEDURE — 6360000002 HC RX W HCPCS: Performed by: FAMILY MEDICINE

## 2023-09-02 RX ORDER — SODIUM CHLORIDE 0.9 % (FLUSH) 0.9 %
10 SYRINGE (ML) INJECTION PRN
Status: DISCONTINUED | OUTPATIENT
Start: 2023-09-02 | End: 2023-09-04 | Stop reason: HOSPADM

## 2023-09-02 RX ORDER — LOPERAMIDE HYDROCHLORIDE 2 MG/1
2 CAPSULE ORAL 4 TIMES DAILY PRN
Status: DISCONTINUED | OUTPATIENT
Start: 2023-09-02 | End: 2023-09-04 | Stop reason: HOSPADM

## 2023-09-02 RX ORDER — KIT FOR THE PREPARATION OF TECHNETIUM TC 99M PENTETATE 20 MG/1
44 INJECTION, POWDER, LYOPHILIZED, FOR SOLUTION INTRAVENOUS; RESPIRATORY (INHALATION)
Status: COMPLETED | OUTPATIENT
Start: 2023-09-02 | End: 2023-09-02

## 2023-09-02 RX ADMIN — OXYCODONE HYDROCHLORIDE 30 MG: 10 TABLET ORAL at 14:54

## 2023-09-02 RX ADMIN — LOSARTAN POTASSIUM 25 MG: 25 TABLET, FILM COATED ORAL at 09:31

## 2023-09-02 RX ADMIN — PIOGLITAZONE HYDROCHLORIDE 45 MG: 45 TABLET ORAL at 09:30

## 2023-09-02 RX ADMIN — Medication 8.7 MILLICURIE: at 09:10

## 2023-09-02 RX ADMIN — MULTIPLE VITAMINS W/ MINERALS TAB 1 TABLET: TAB at 09:31

## 2023-09-02 RX ADMIN — ALOGLIPTIN 12.5 MG: 12.5 TABLET, FILM COATED ORAL at 09:30

## 2023-09-02 RX ADMIN — KIT FOR THE PREPARATION OF TECHNETIUM TC 99M PENTETATE 52.4 MILLICURIE: 20 INJECTION, POWDER, LYOPHILIZED, FOR SOLUTION INTRAVENOUS; RESPIRATORY (INHALATION) at 08:50

## 2023-09-02 RX ADMIN — FUROSEMIDE 80 MG: 40 TABLET ORAL at 09:31

## 2023-09-02 RX ADMIN — ENOXAPARIN SODIUM 30 MG: 100 INJECTION SUBCUTANEOUS at 09:31

## 2023-09-02 RX ADMIN — CARVEDILOL 12.5 MG: 12.5 TABLET, FILM COATED ORAL at 17:18

## 2023-09-02 RX ADMIN — PREGABALIN 150 MG: 150 CAPSULE ORAL at 09:31

## 2023-09-02 RX ADMIN — PSEUDOEPHEDRINE HCL 120 MG: 120 TABLET, FILM COATED, EXTENDED RELEASE ORAL at 09:30

## 2023-09-02 RX ADMIN — ATORVASTATIN CALCIUM 40 MG: 40 TABLET, FILM COATED ORAL at 20:32

## 2023-09-02 RX ADMIN — LOPERAMIDE HYDROCHLORIDE 2 MG: 2 CAPSULE ORAL at 14:52

## 2023-09-02 RX ADMIN — FAMOTIDINE 20 MG: 20 TABLET ORAL at 09:31

## 2023-09-02 RX ADMIN — SODIUM CHLORIDE, PRESERVATIVE FREE 10 ML: 5 INJECTION INTRAVENOUS at 09:10

## 2023-09-02 RX ADMIN — POTASSIUM CHLORIDE 20 MEQ: 1500 TABLET, EXTENDED RELEASE ORAL at 09:31

## 2023-09-02 RX ADMIN — ENOXAPARIN SODIUM 30 MG: 100 INJECTION SUBCUTANEOUS at 20:32

## 2023-09-02 RX ADMIN — VENLAFAXINE HYDROCHLORIDE 37.5 MG: 37.5 CAPSULE, EXTENDED RELEASE ORAL at 09:30

## 2023-09-02 RX ADMIN — GLIPIZIDE 10 MG: 5 TABLET ORAL at 05:04

## 2023-09-02 RX ADMIN — CALCIUM 500 MG: 500 TABLET ORAL at 09:30

## 2023-09-02 RX ADMIN — CALCIUM 500 MG: 500 TABLET ORAL at 20:32

## 2023-09-02 RX ADMIN — CYANOCOBALAMIN TAB 1000 MCG 1000 MCG: 1000 TAB at 09:31

## 2023-09-02 RX ADMIN — FAMOTIDINE 20 MG: 20 TABLET ORAL at 20:32

## 2023-09-02 RX ADMIN — CETIRIZINE HYDROCHLORIDE 5 MG: 10 TABLET, FILM COATED ORAL at 09:30

## 2023-09-02 RX ADMIN — LINACLOTIDE 145 MCG: 145 CAPSULE, GELATIN COATED ORAL at 05:04

## 2023-09-02 RX ADMIN — SODIUM CHLORIDE, PRESERVATIVE FREE 10 ML: 5 INJECTION INTRAVENOUS at 09:36

## 2023-09-02 RX ADMIN — SODIUM CHLORIDE, PRESERVATIVE FREE 10 ML: 5 INJECTION INTRAVENOUS at 20:35

## 2023-09-02 RX ADMIN — GLIPIZIDE 10 MG: 5 TABLET ORAL at 17:18

## 2023-09-02 RX ADMIN — PREGABALIN 150 MG: 150 CAPSULE ORAL at 20:31

## 2023-09-02 RX ADMIN — CARVEDILOL 12.5 MG: 12.5 TABLET, FILM COATED ORAL at 09:31

## 2023-09-02 ASSESSMENT — PAIN DESCRIPTION - LOCATION: LOCATION: GENERALIZED

## 2023-09-02 ASSESSMENT — PAIN DESCRIPTION - DESCRIPTORS: DESCRIPTORS: ACHING

## 2023-09-02 ASSESSMENT — PAIN SCALES - GENERAL: PAINLEVEL_OUTOF10: 7

## 2023-09-02 NOTE — CARE COORDINATION
Case Management Assessment  Initial Evaluation    Date/Time of Evaluation: 9/2/2023 3:52 PM  Assessment Completed by: Elana Vivar RN    If patient is discharged prior to next notation, then this note serves as note for discharge by case management. Patient Name: Travis Ya                   YOB: 1955  Diagnosis: Right flank pain [R10.9]                   Date / Time: 9/1/2023  2:20 PM    Patient Admission Status: Inpatient   Readmission Risk (Low < 19, Mod (19-27), High > 27): Readmission Risk Score: 19.6    Current PCP: Kaleb Morel MD  PCP verified by CM? Yes    Chart Reviewed: Yes      History Provided by: Patient  Patient Orientation: Alert and Oriented    Patient Cognition: Alert    Hospitalization in the last 30 days (Readmission):  Yes    If yes, Readmission Assessment in  Navigator will be completed. Advance Directives:      Code Status: Full Code   Patient's Primary Decision Maker is: Legal Next of Kin    Primary Decision MakerMpamelaugo Paz  Spouse - [phone]    Secondary Decision Maker: Mihir Munoz  Child - [phone]    Discharge Planning:    Patient lives with: Spouse/Significant Other Type of Home: House  Primary Care Giver: Self  Patient Support Systems include: Spouse/Significant Other, Family Members   Current Financial resources: Medicare  Current community resources:    Current services prior to admission: Durable Medical Equipment            Current DME: Oxygen Therapy (Comment), Shower Chair, Bedside Commode, [place], [employer], Wheelchair, Walker (O2 at 2lpm [place] port tanks, conc thru [facility]  and POC thru [employer])            Type of Home Care services:  OT, PT, Nursing Services    ADLS  Prior functional level: Independent in ADLs/IADLs  Current functional level: Independent in ADLs/IADLs    PT AM-PAC:   /24  OT AM-PAC:   /24    Family can provide assistance at DC:  Yes  Would you like Case Management to discuss the discharge plan with any other family

## 2023-09-02 NOTE — PLAN OF CARE
Problem: Chronic Conditions and Co-morbidities  Goal: Patient's chronic conditions and co-morbidity symptoms are monitored and maintained or improved  9/1/2023 2304 by Ric Vasquez RN  Outcome: Progressing  9/1/2023 1921 by Radha Mcginnis RN  Outcome: Progressing     Problem: Discharge Planning  Goal: Discharge to home or other facility with appropriate resources  9/1/2023 2304 by Ric Vasquez RN  Outcome: Progressing  9/1/2023 1921 by Radha Mcginnis RN  Outcome: Progressing     Problem: Safety - Adult  Goal: Free from fall injury  9/1/2023 2304 by Ric Vasquez RN  Outcome: Progressing  9/1/2023 1921 by Radha Mcginnis RN  Outcome: Progressing     Problem: Skin/Tissue Integrity  Goal: Absence of new skin breakdown  Description: 1. Monitor for areas of redness and/or skin breakdown  2. Assess vascular access sites hourly  3. Every 4-6 hours minimum:  Change oxygen saturation probe site  4. Every 4-6 hours:  If on nasal continuous positive airway pressure, respiratory therapy assess nares and determine need for appliance change or resting period.   9/1/2023 2304 by Ric Vasquez RN  Outcome: Progressing  9/1/2023 1921 by Radha Mcginnis RN  Outcome: Progressing

## 2023-09-02 NOTE — H&P
ROTATOR CUFF REPAIR Left 2012    SKIN CANCER EXCISION  2005    Basal Cell Carcinoma, Curly size from Lt. face    TUBAL LIGATION  1981    UPPER GASTROINTESTINAL ENDOSCOPY N/A 10/16/2019    EGD BIOPSY performed by Robson Guerrero MD at [facility]       Medications Prior to Admission:    Prior to Admission medications    Medication Sig Start Date End Date Taking? Authorizing Provider   pregabalin (LYRICA) 150 MG capsule Take 1 capsule by mouth 2 times daily for 360 days. Max Daily Amount: 300 mg 8/24/23 8/18/24  Aroldo Mcdaniels MD   oxyCODONE-acetaminophen (PERCOCET) 5-325 MG per tablet Take 1 tablet by mouth every 6 hours as needed for Pain. 8/11/23 8/10/24  Aroldo Mcdaniels MD   oxyCODONE (OXY-IR) 30 MG immediate release tablet Take 1 tablet by mouth every 8 hours as needed for Pain for up to 30 days.  Indications: last filled 6/26/23 for 30 days Max Daily Amount: 90 mg 8/11/23 9/10/23  Aroldo Mcdaniels MD   hydroCHLOROthiazide (HYDRODIURIL) 25 MG tablet Take 1 tablet by mouth daily  Patient not taking: Reported on 8/11/2023 8/8/23   Aroldo Mcdaniels MD   losartan (COZAAR) 25 MG tablet Take 1 tablet by mouth daily 8/7/23   Aroldo Mcdaniels MD   furosemide (LASIX) 40 MG tablet Take 2 tablets by mouth daily 8/7/23   Aroldo Mcdaniels MD   potassium chloride (KLOR-CON M) 20 MEQ extended release tablet Take 1 tablet by mouth 2 times daily 8/6/23   Aroldo Mcdaniels MD   calcium carbonate 600 MG TABS tablet Take 1 tablet by mouth in the morning and at bedtime    Historical Provider, MD   Multiple Vitamins-Minerals (THERAPEUTIC MULTIVITAMIN-MINERALS) tablet Take 1 tablet by mouth daily    Historical Provider, MD   venlafaxine (EFFEXOR XR) 37.5 MG extended release capsule TAKE 1 CAPSULE BY MOUTH EVERY DAY 7/20/23   Aroldo Mcdaniels MD   blood glucose test strips (ACCU-CHEK POOJA PLUS) strip CHECK GLUCOSE LEVELS TWICE A DAY 6/26/23   Aroldo Mcdaniels MD   carvedilol (COREG) 12.5 MG tablet

## 2023-09-02 NOTE — PLAN OF CARE
Problem: Chronic Conditions and Co-morbidities  Goal: Patient's chronic conditions and co-morbidity symptoms are monitored and maintained or improved  Outcome: Progressing  Flowsheets (Taken 9/2/2023 1703)  Care Plan - Patient's Chronic Conditions and Co-Morbidity Symptoms are Monitored and Maintained or Improved:   Monitor and assess patient's chronic conditions and comorbid symptoms for stability, deterioration, or improvement   Collaborate with multidisciplinary team to address chronic and comorbid conditions and prevent exacerbation or deterioration   Update acute care plan with appropriate goals if chronic or comorbid symptoms are exacerbated and prevent overall improvement and discharge     Problem: Discharge Planning  Goal: Discharge to home or other facility with appropriate resources  Outcome: Progressing  Flowsheets (Taken 9/2/2023 1703)  Discharge to home or other facility with appropriate resources:   Identify barriers to discharge with patient and caregiver   Arrange for needed discharge resources and transportation as appropriate   Identify discharge learning needs (meds, wound care, etc)   Arrange for interpreters to assist at discharge as needed   Refer to discharge planning if patient needs post-hospital services based on physician order or complex needs related to functional status, cognitive ability or social support system  Note: Discharge planners following for further discharge needs      Problem: Safety - Adult  Goal: Free from fall injury  Outcome: Progressing  Flowsheets (Taken 9/2/2023 1703)  Free From Fall Injury:   Instruct family/caregiver on patient safety   Based on caregiver fall risk screen, instruct family/caregiver to ask for assistance with transferring infant if caregiver noted to have fall risk factors  Note: No falls this shift. Call light with in reach, side rails up x2, bed in lowest postion. Patients safety maintained.       Problem: Skin/Tissue Integrity  Goal: Absence

## 2023-09-02 NOTE — ACP (ADVANCE CARE PLANNING)
Advance Care Planning     Advance Care Planning Activator (Inpatient)  Conversation Note      Date of ACP Conversation: 9/2/2023     Conversation Conducted with: Patient with Decision Making Capacity    ACP Activator: Sadaf Forrest RN        Health Care Decision Maker:     Current Designated Health Care Decision Maker:     Primary Decision Maker: Mirta Ortiz - [phone]    Secondary Decision Maker: Radha Rios - Child - [phone]  Click here to complete Healthcare Decision Makers including section of the Healthcare Decision Maker Relationship (ie \"Primary\")  Today we documented Decision Maker(s) consistent with Legal Next of Kin hierarchy. Care Preferences    Ventilation: \"If you were in your present state of health and suddenly became very ill and were unable to breathe on your own, what would your preference be about the use of a ventilator (breathing machine) if it were available to you? \"  no    Would the patient desire the use of ventilator (breathing machine)?: no    \"If your health worsens and it becomes clear that your chance of recovery is unlikely, what would your preference be about the use of a ventilator (breathing machine) if it were available to you? \"     Would the patient desire the use of ventilator (breathing machine)?: No      Resuscitation  \"CPR works best to restart the heart when there is a sudden event, like a heart attack, in someone who is otherwise healthy. Unfortunately, CPR does not typically restart the heart for people who have serious health conditions or who are very sick. \"    \"In the event your heart stopped as a result of an underlying serious health condition, would you want attempts to be made to restart your heart (answer \"yes\" for attempt to resuscitate) or would you prefer a natural death (answer \"no\" for do not attempt to resuscitate)? \" no       [] Yes   [] No   Educated Patient / Decision Maker regarding differences between Advance Directives and

## 2023-09-03 LAB
ABSOLUTE BANDS #: 0.42 K/UL (ref 0–1)
ANION GAP SERPL CALCULATED.3IONS-SCNC: 9 MMOL/L (ref 9–17)
BANDS: 4 % (ref 0–10)
BASOPHILS # BLD: 0 K/UL (ref 0–0.2)
BASOPHILS NFR BLD: 0 % (ref 0–2)
BUN SERPL-MCNC: 20 MG/DL (ref 8–23)
CALCIUM SERPL-MCNC: 9.1 MG/DL (ref 8.6–10.4)
CHLORIDE SERPL-SCNC: 93 MMOL/L (ref 98–107)
CO2 SERPL-SCNC: 34 MMOL/L (ref 20–31)
CREAT SERPL-MCNC: 1.3 MG/DL (ref 0.5–0.9)
EOSINOPHIL # BLD: 0.63 K/UL (ref 0–0.4)
EOSINOPHILS RELATIVE PERCENT: 6 % (ref 0–4)
ERYTHROCYTE [DISTWIDTH] IN BLOOD BY AUTOMATED COUNT: 17.3 % (ref 11.5–14.9)
EST. AVERAGE GLUCOSE BLD GHB EST-MCNC: 154 MG/DL
GFR SERPL CREATININE-BSD FRML MDRD: 45 ML/MIN/1.73M2
GLUCOSE BLD-MCNC: 147 MG/DL (ref 65–105)
GLUCOSE BLD-MCNC: 159 MG/DL (ref 65–105)
GLUCOSE BLD-MCNC: 170 MG/DL (ref 65–105)
GLUCOSE BLD-MCNC: 75 MG/DL (ref 65–105)
GLUCOSE SERPL-MCNC: 83 MG/DL (ref 70–99)
HBA1C MFR BLD: 7 % (ref 4–6)
HCT VFR BLD AUTO: 31.3 % (ref 36–46)
HGB BLD-MCNC: 9.7 G/DL (ref 12–16)
LYMPHOCYTES NFR BLD: 1.89 K/UL (ref 1–4.8)
LYMPHOCYTES RELATIVE PERCENT: 18 % (ref 24–44)
MCH RBC QN AUTO: 28 PG (ref 26–34)
MCHC RBC AUTO-ENTMCNC: 31.2 G/DL (ref 31–37)
MCV RBC AUTO: 89.8 FL (ref 80–100)
MONOCYTES NFR BLD: 0.63 K/UL (ref 0.1–1.3)
MONOCYTES NFR BLD: 6 % (ref 1–7)
MORPHOLOGY: ABNORMAL
NEUTROPHILS NFR BLD: 66 % (ref 36–66)
NEUTS SEG NFR BLD: 6.93 K/UL (ref 1.3–9.1)
PLATELET # BLD AUTO: 275 K/UL (ref 150–450)
PMV BLD AUTO: 8.9 FL (ref 6–12)
POTASSIUM SERPL-SCNC: 3.8 MMOL/L (ref 3.7–5.3)
RBC # BLD AUTO: 3.48 M/UL (ref 4–5.2)
SODIUM SERPL-SCNC: 136 MMOL/L (ref 135–144)
WBC OTHER # BLD: 10.5 K/UL (ref 3.5–11)

## 2023-09-03 PROCEDURE — 97530 THERAPEUTIC ACTIVITIES: CPT

## 2023-09-03 PROCEDURE — 2580000003 HC RX 258: Performed by: FAMILY MEDICINE

## 2023-09-03 PROCEDURE — 80048 BASIC METABOLIC PNL TOTAL CA: CPT

## 2023-09-03 PROCEDURE — 85025 COMPLETE CBC W/AUTO DIFF WBC: CPT

## 2023-09-03 PROCEDURE — 6360000002 HC RX W HCPCS: Performed by: FAMILY MEDICINE

## 2023-09-03 PROCEDURE — 97116 GAIT TRAINING THERAPY: CPT

## 2023-09-03 PROCEDURE — 99232 SBSQ HOSP IP/OBS MODERATE 35: CPT | Performed by: FAMILY MEDICINE

## 2023-09-03 PROCEDURE — 6370000000 HC RX 637 (ALT 250 FOR IP): Performed by: FAMILY MEDICINE

## 2023-09-03 PROCEDURE — 1200000000 HC SEMI PRIVATE

## 2023-09-03 PROCEDURE — 82947 ASSAY GLUCOSE BLOOD QUANT: CPT

## 2023-09-03 PROCEDURE — 97166 OT EVAL MOD COMPLEX 45 MIN: CPT

## 2023-09-03 PROCEDURE — 36415 COLL VENOUS BLD VENIPUNCTURE: CPT

## 2023-09-03 PROCEDURE — 97162 PT EVAL MOD COMPLEX 30 MIN: CPT

## 2023-09-03 RX ORDER — SODIUM CHLORIDE 9 MG/ML
INJECTION, SOLUTION INTRAVENOUS CONTINUOUS
Status: DISCONTINUED | OUTPATIENT
Start: 2023-09-03 | End: 2023-09-04 | Stop reason: HOSPADM

## 2023-09-03 RX ADMIN — LINACLOTIDE 145 MCG: 145 CAPSULE, GELATIN COATED ORAL at 06:13

## 2023-09-03 RX ADMIN — ATORVASTATIN CALCIUM 40 MG: 40 TABLET, FILM COATED ORAL at 20:40

## 2023-09-03 RX ADMIN — SODIUM CHLORIDE, PRESERVATIVE FREE 10 ML: 5 INJECTION INTRAVENOUS at 08:15

## 2023-09-03 RX ADMIN — SODIUM CHLORIDE: 9 INJECTION, SOLUTION INTRAVENOUS at 14:40

## 2023-09-03 RX ADMIN — LOSARTAN POTASSIUM 25 MG: 25 TABLET, FILM COATED ORAL at 08:14

## 2023-09-03 RX ADMIN — MULTIPLE VITAMINS W/ MINERALS TAB 1 TABLET: TAB at 08:14

## 2023-09-03 RX ADMIN — GLIPIZIDE 10 MG: 5 TABLET ORAL at 17:23

## 2023-09-03 RX ADMIN — OXYCODONE HYDROCHLORIDE AND ACETAMINOPHEN 1 TABLET: 5; 325 TABLET ORAL at 10:10

## 2023-09-03 RX ADMIN — CARVEDILOL 12.5 MG: 12.5 TABLET, FILM COATED ORAL at 08:15

## 2023-09-03 RX ADMIN — OXYCODONE HYDROCHLORIDE 30 MG: 10 TABLET ORAL at 23:41

## 2023-09-03 RX ADMIN — OXYCODONE HYDROCHLORIDE AND ACETAMINOPHEN 1 TABLET: 5; 325 TABLET ORAL at 03:56

## 2023-09-03 RX ADMIN — ENOXAPARIN SODIUM 30 MG: 100 INJECTION SUBCUTANEOUS at 08:15

## 2023-09-03 RX ADMIN — PREGABALIN 150 MG: 150 CAPSULE ORAL at 08:14

## 2023-09-03 RX ADMIN — CALCIUM 500 MG: 500 TABLET ORAL at 20:40

## 2023-09-03 RX ADMIN — CETIRIZINE HYDROCHLORIDE 5 MG: 10 TABLET, FILM COATED ORAL at 08:15

## 2023-09-03 RX ADMIN — FAMOTIDINE 20 MG: 20 TABLET ORAL at 20:40

## 2023-09-03 RX ADMIN — PSEUDOEPHEDRINE HCL 120 MG: 120 TABLET, FILM COATED, EXTENDED RELEASE ORAL at 08:15

## 2023-09-03 RX ADMIN — POTASSIUM CHLORIDE 20 MEQ: 1500 TABLET, EXTENDED RELEASE ORAL at 20:40

## 2023-09-03 RX ADMIN — PREGABALIN 150 MG: 150 CAPSULE ORAL at 20:40

## 2023-09-03 RX ADMIN — ENOXAPARIN SODIUM 30 MG: 100 INJECTION SUBCUTANEOUS at 20:41

## 2023-09-03 RX ADMIN — FAMOTIDINE 20 MG: 20 TABLET ORAL at 08:14

## 2023-09-03 RX ADMIN — CALCIUM 500 MG: 500 TABLET ORAL at 08:15

## 2023-09-03 RX ADMIN — OXYCODONE HYDROCHLORIDE 30 MG: 10 TABLET ORAL at 00:17

## 2023-09-03 RX ADMIN — FUROSEMIDE 80 MG: 40 TABLET ORAL at 08:14

## 2023-09-03 RX ADMIN — GLIPIZIDE 10 MG: 5 TABLET ORAL at 08:14

## 2023-09-03 RX ADMIN — ALOGLIPTIN 12.5 MG: 12.5 TABLET, FILM COATED ORAL at 08:14

## 2023-09-03 RX ADMIN — CARVEDILOL 12.5 MG: 12.5 TABLET, FILM COATED ORAL at 17:24

## 2023-09-03 RX ADMIN — CYANOCOBALAMIN TAB 1000 MCG 1000 MCG: 1000 TAB at 08:14

## 2023-09-03 RX ADMIN — PIOGLITAZONE HYDROCHLORIDE 45 MG: 45 TABLET ORAL at 10:11

## 2023-09-03 RX ADMIN — VENLAFAXINE HYDROCHLORIDE 37.5 MG: 37.5 CAPSULE, EXTENDED RELEASE ORAL at 08:14

## 2023-09-03 RX ADMIN — POTASSIUM CHLORIDE 20 MEQ: 1500 TABLET, EXTENDED RELEASE ORAL at 08:13

## 2023-09-03 ASSESSMENT — PAIN DESCRIPTION - LOCATION
LOCATION: BACK
LOCATION: LEG
LOCATION: BACK
LOCATION: GENERALIZED

## 2023-09-03 ASSESSMENT — PAIN DESCRIPTION - DESCRIPTORS
DESCRIPTORS: STABBING
DESCRIPTORS: ACHING
DESCRIPTORS: THROBBING
DESCRIPTORS: ACHING

## 2023-09-03 ASSESSMENT — PAIN - FUNCTIONAL ASSESSMENT
PAIN_FUNCTIONAL_ASSESSMENT: PREVENTS OR INTERFERES SOME ACTIVE ACTIVITIES AND ADLS
PAIN_FUNCTIONAL_ASSESSMENT: PREVENTS OR INTERFERES SOME ACTIVE ACTIVITIES AND ADLS

## 2023-09-03 ASSESSMENT — PAIN SCALES - GENERAL
PAINLEVEL_OUTOF10: 9
PAINLEVEL_OUTOF10: 7
PAINLEVEL_OUTOF10: 5
PAINLEVEL_OUTOF10: 10

## 2023-09-03 ASSESSMENT — PAIN DESCRIPTION - ORIENTATION
ORIENTATION: LOWER
ORIENTATION: LOWER
ORIENTATION: RIGHT;LEFT;POSTERIOR

## 2023-09-03 NOTE — CARE COORDINATION
ONGOING DISCHARGE PLAN:    Patient is alert and oriented x4. Spoke with patient regarding discharge plan and patient confirms that plan is still to go home with spouse and [employer] [place] . Ddimer elevated, VQ scan low prob for PE     Will continue to follow for additional discharge needs. If patient is discharged prior to next notation, then this note serves as note for discharge by case management.     Electronically signed by Anshul Thompson RN on 9/3/2023 at 12:27 PM

## 2023-09-03 NOTE — DISCHARGE INSTR - COC
transfer of Page Board  is necessary for the continuing treatment of the diagnosis listed and that she requires {Admit to Appropriate Level of Care:59661} for {GREATER/LESS:723488162} 30 days.      Update Admission H&P: No change in H&P    PHYSICIAN SIGNATURE:  Electronically signed by Irwin Murrieta MD on 9/4/23 at 7:56 AM EDT

## 2023-09-03 NOTE — PLAN OF CARE
Problem: Chronic Conditions and Co-morbidities  Goal: Patient's chronic conditions and co-morbidity symptoms are monitored and maintained or improved  9/3/2023 0428 by Linh Ingram RN  Outcome: Progressing     Problem: Safety - Adult  Goal: Free from fall injury  9/3/2023 0428 by Linh Ingram RN  Outcome: Progressing     Problem: Skin/Tissue Integrity  Goal: Absence of new skin breakdown  Description: 1. Monitor for areas of redness and/or skin breakdown  2. Assess vascular access sites hourly  3. Every 4-6 hours minimum:  Change oxygen saturation probe site  4. Every 4-6 hours:  If on nasal continuous positive airway pressure, respiratory therapy assess nares and determine need for appliance change or resting period.   9/3/2023 0428 by Linh Ingram RN  Outcome: Progressing

## 2023-09-04 VITALS
BODY MASS INDEX: 57.52 KG/M2 | WEIGHT: 293 LBS | SYSTOLIC BLOOD PRESSURE: 140 MMHG | RESPIRATION RATE: 18 BRPM | HEART RATE: 60 BPM | TEMPERATURE: 98.6 F | DIASTOLIC BLOOD PRESSURE: 45 MMHG | HEIGHT: 60 IN | OXYGEN SATURATION: 100 %

## 2023-09-04 LAB
ANION GAP SERPL CALCULATED.3IONS-SCNC: 10 MMOL/L (ref 9–17)
BASOPHILS # BLD: 0.1 K/UL (ref 0–0.2)
BASOPHILS NFR BLD: 1 % (ref 0–2)
BUN SERPL-MCNC: 22 MG/DL (ref 8–23)
CALCIUM SERPL-MCNC: 9.5 MG/DL (ref 8.6–10.4)
CHLORIDE SERPL-SCNC: 95 MMOL/L (ref 98–107)
CO2 SERPL-SCNC: 30 MMOL/L (ref 20–31)
CREAT SERPL-MCNC: 1.5 MG/DL (ref 0.5–0.9)
EOSINOPHIL # BLD: 1 K/UL (ref 0–0.4)
EOSINOPHILS RELATIVE PERCENT: 7 % (ref 0–4)
ERYTHROCYTE [DISTWIDTH] IN BLOOD BY AUTOMATED COUNT: 17 % (ref 11.5–14.9)
GFR SERPL CREATININE-BSD FRML MDRD: 38 ML/MIN/1.73M2
GLUCOSE BLD-MCNC: 103 MG/DL (ref 65–105)
GLUCOSE BLD-MCNC: 82 MG/DL (ref 65–105)
GLUCOSE SERPL-MCNC: 103 MG/DL (ref 70–99)
HCT VFR BLD AUTO: 31.8 % (ref 36–46)
HGB BLD-MCNC: 10 G/DL (ref 12–16)
LYMPHOCYTES NFR BLD: 1.8 K/UL (ref 1–4.8)
LYMPHOCYTES RELATIVE PERCENT: 12 % (ref 24–44)
MCH RBC QN AUTO: 27.4 PG (ref 26–34)
MCHC RBC AUTO-ENTMCNC: 31.5 G/DL (ref 31–37)
MCV RBC AUTO: 87.2 FL (ref 80–100)
MONOCYTES NFR BLD: 0.9 K/UL (ref 0.1–1.3)
MONOCYTES NFR BLD: 6 % (ref 1–7)
NEUTROPHILS NFR BLD: 74 % (ref 36–66)
NEUTS SEG NFR BLD: 11 K/UL (ref 1.3–9.1)
PLATELET # BLD AUTO: 318 K/UL (ref 150–450)
PMV BLD AUTO: 9.6 FL (ref 6–12)
POTASSIUM SERPL-SCNC: 5 MMOL/L (ref 3.7–5.3)
RBC # BLD AUTO: 3.64 M/UL (ref 4–5.2)
SODIUM SERPL-SCNC: 135 MMOL/L (ref 135–144)
WBC OTHER # BLD: 11 K/UL (ref 3.5–11)

## 2023-09-04 PROCEDURE — 97110 THERAPEUTIC EXERCISES: CPT

## 2023-09-04 PROCEDURE — 80048 BASIC METABOLIC PNL TOTAL CA: CPT

## 2023-09-04 PROCEDURE — 85025 COMPLETE CBC W/AUTO DIFF WBC: CPT

## 2023-09-04 PROCEDURE — 6360000002 HC RX W HCPCS: Performed by: FAMILY MEDICINE

## 2023-09-04 PROCEDURE — 97116 GAIT TRAINING THERAPY: CPT

## 2023-09-04 PROCEDURE — 99239 HOSP IP/OBS DSCHRG MGMT >30: CPT | Performed by: FAMILY MEDICINE

## 2023-09-04 PROCEDURE — 82947 ASSAY GLUCOSE BLOOD QUANT: CPT

## 2023-09-04 PROCEDURE — 6370000000 HC RX 637 (ALT 250 FOR IP): Performed by: FAMILY MEDICINE

## 2023-09-04 PROCEDURE — 97530 THERAPEUTIC ACTIVITIES: CPT

## 2023-09-04 PROCEDURE — 36415 COLL VENOUS BLD VENIPUNCTURE: CPT

## 2023-09-04 RX ADMIN — MULTIPLE VITAMINS W/ MINERALS TAB 1 TABLET: TAB at 09:14

## 2023-09-04 RX ADMIN — ALOGLIPTIN 12.5 MG: 12.5 TABLET, FILM COATED ORAL at 09:14

## 2023-09-04 RX ADMIN — POTASSIUM CHLORIDE 20 MEQ: 1500 TABLET, EXTENDED RELEASE ORAL at 09:14

## 2023-09-04 RX ADMIN — CARVEDILOL 12.5 MG: 12.5 TABLET, FILM COATED ORAL at 09:14

## 2023-09-04 RX ADMIN — PREGABALIN 150 MG: 150 CAPSULE ORAL at 09:14

## 2023-09-04 RX ADMIN — CETIRIZINE HYDROCHLORIDE 5 MG: 10 TABLET, FILM COATED ORAL at 09:15

## 2023-09-04 RX ADMIN — PIOGLITAZONE HYDROCHLORIDE 45 MG: 45 TABLET ORAL at 12:51

## 2023-09-04 RX ADMIN — LOSARTAN POTASSIUM 25 MG: 25 TABLET, FILM COATED ORAL at 09:14

## 2023-09-04 RX ADMIN — CYANOCOBALAMIN TAB 1000 MCG 1000 MCG: 1000 TAB at 09:14

## 2023-09-04 RX ADMIN — GLIPIZIDE 10 MG: 5 TABLET ORAL at 06:20

## 2023-09-04 RX ADMIN — LINACLOTIDE 145 MCG: 145 CAPSULE, GELATIN COATED ORAL at 06:20

## 2023-09-04 RX ADMIN — ENOXAPARIN SODIUM 30 MG: 100 INJECTION SUBCUTANEOUS at 09:15

## 2023-09-04 RX ADMIN — CALCIUM 500 MG: 500 TABLET ORAL at 09:14

## 2023-09-04 RX ADMIN — FAMOTIDINE 20 MG: 20 TABLET ORAL at 09:14

## 2023-09-04 RX ADMIN — PSEUDOEPHEDRINE HCL 120 MG: 120 TABLET, FILM COATED, EXTENDED RELEASE ORAL at 09:16

## 2023-09-04 RX ADMIN — VENLAFAXINE HYDROCHLORIDE 37.5 MG: 37.5 CAPSULE, EXTENDED RELEASE ORAL at 09:16

## 2023-09-04 RX ADMIN — FUROSEMIDE 80 MG: 40 TABLET ORAL at 09:14

## 2023-09-04 ASSESSMENT — PAIN SCALES - GENERAL: PAINLEVEL_OUTOF10: 6

## 2023-09-04 ASSESSMENT — PAIN DESCRIPTION - LOCATION: LOCATION: GENERALIZED

## 2023-09-04 NOTE — PROGRESS NOTES
[address] [facility]   Occupational Therapy Evaluation  Date: 9/3/23  Patient Name: Africa Olivares       Room: 1287/2328-95  MRN: [id number]  Account: [de-identified]   : 1955  (70 y.o.) Gender: female     Discharge Recommendations:  Further Occupational Therapy is recommended upon facility discharge. OT Equipment Recommendations  Other: TBD    Referring Practitioner: Faviola Luque MD  Diagnosis: MAMTA      Treatment Diagnosis: Impaired self-care status    Past Medical History:  has a past medical history of Acute hypoxemic respiratory failure ([address]), Arthritis, CHF (congestive heart failure) ([address]), Chronic back pain, Diabetes mellitus type II, controlled ([address]), Fibromyalgia, Hyperlipidemia LDL goal < 70, Hypertension, benign, Morbid obesity with BMI of 60.0-69.9, adult ([address]), Pain of toe of right foot, Right wrist pain, Skin cancer, Sleep apnea, and Wears glasses. Past Surgical History:   has a past surgical history that includes Tubal ligation (); Hysterectomy (); lumbar fusion (10/2010; 2011); Carpal tunnel release (Right, 2014); cyst removal (); Cholecystectomy, laparoscopic (); lumbar fusion (); Rotator cuff repair (Left, ); Cataract removal with implant (Bilateral, ); Skin cancer excision (); Inguinal hernia repair (Bilateral, 2017); other surgical history (2018); Upper gastrointestinal endoscopy (N/A, 10/16/2019); and Colonoscopy (N/A, 10/17/2019). Restrictions  Restrictions/Precautions  Restrictions/Precautions: General Precautions; Up as Tolerated; Fall Risk  Required Braces or Orthoses?: No  Implants present? : Metal implants (Low back surgery hardware x 5 (screws))      Vitals  SpO2: 98 %  O2 Device: Nasal cannula (2.5 L O2 currently; on 2.5 at basline)     Subjective  Subjective: Pt pleasant and agreeable for therapy.   Comments: JOSH Hook ok'd OT/PT eval  Subjective  Pain: Pt reports Bilat knee, back, and
Patient arrived to room 2072. VS completed with head to toe. Patient wears 2.5 L oxygen. Patient in no distress. Bed rails x2 in lowest position. Call light in reach.
Patient was given discharge instructions and question answered. IV was removed without complications. Personal belongings were gathered. Patient was taken down by wheelchair.
Pharmacist Review and Automatic Dose Adjustment of Prophylactic Enoxaparin         The reviewing pharmacist has made an adjustment to the ordered enoxaparin dose or converted to UFH per the approved [address] [facility] protocol and table as identified below. Ivanna Tam is a 79 y.o. female. No results for input(s): CREATININE in the last 72 hours. Estimated Creatinine Clearance: 60 mL/min (A) (based on SCr of 1.2 mg/dL (H)). No results for input(s): HGB, HCT, PLT in the last 72 hours. No results for input(s): INR in the last 72 hours.     Height:   Ht Readings from Last 1 Encounters:   09/01/23 5' (1.524 m)     Weight:  Wt Readings from Last 1 Encounters:   09/01/23 (!) 305 lb 12.5 oz (138.7 kg)               Plan: Based upon the patient's weight and renal function    Ordered: Enoxaparin 40mg SUBQ Daily    Changed/converted to    New Order: Enoxaparin 30mg SUBQ BID      Thank you,  Ron[address], [address] [place] [address]  9/1/2023, 3:09 PM
Pt told writer she didn't feel up for visit and gave reasons why.       09/03/23 1407   Encounter Summary   Encounter Overview/Reason  Spiritual/Emotional Needs   Service Provided For: Patient   Referral/Consult From: Marti   Last Encounter  09/03/23   Complexity of Encounter Low   Spiritual/Emotional needs   Type Spiritual Support   Assessment/Intervention/Outcome   Assessment Anxious   Intervention Active listening;Sustaining Presence/Ministry of presence   Outcome Coping;Engaged in conversation
cough, congestion, chest pain, SOB, N/V/D, dysuria, headaches, numbness or tingling. Response To Previous Treatment: Patient with no complaints from previous session. Family / Caregiver Present: Yes Verlena Siemens)  Referring Practitioner: Mayra Herrera MD  Referral Date : 23  Diagnosis: Right flank pain  Follows Commands: Within Functional Limits  General Comment  Comments: Ok Per RN Lyndsey Warren to proceced with PT assessments  Subjective  Subjective: Pt is agreeable to therapy assessments and is motivated to discharge home. Pt's spouse admits to pt cancelling x2 home Health PT Tx appointments 2* not feeling well. Spouse Jeni Bennett agrees that the patient is requiring more assistance with ADLs and having greater difficulty with mobility in comparison to her baseline.          Social/Functional History  Social/Functional History  Lives With: Spouse  Type of Home: House  Home Layout: Two level, Performs ADL's on one level, Able to Live on Main level with bedroom/bathroom  Home Access: Stairs to enter with rails  Entrance Stairs - Number of Steps: 4 YARA w/ 2 rails  Entrance Stairs - Rails: Both  Bathroom Shower/Tub: Walk-in shower, Shower chair with back, Doors  Bathroom Toilet: Standard  Bathroom Equipment: Hand-held shower, Grab bars in shower, Shower chair  Bathroom Accessibility: Walker accessible  Home Equipment: Rollator, Oxygen, Walker, rolling, Cane, Lift chair, Elihu Atoka  Has the patient had two or more falls in the past year or any fall with injury in the past year?: Yes (2 falls, slide down the front of the recliner, and once pt lands on floor she reports being unable to recover from the floor)  Receives Help From: Family  ADL Assistance: Needs assistance (states she wears her nightgown all day long and slippers; spouse dresses her when they go out)  [address] Needs assistance  Homemaking Responsibilities: Yes (states they share duties but Jeni Bennett is
understanding; Unable to demonstrate understanding;Continued education needed     Safety measures utilized: Gait belt, Call light within reach, Sitting in chair, and Chair alarm      Therapy Time   Individual Concurrent Group Co-treatment   Time In 0846         Time Out 0939         Minutes [address] [place], [place]
MCV 89.8 80 - 100 fL    MCH 28.0 26 - 34 pg    MCHC 31.2 31 - 37 g/dL    RDW 17.3 (H) 11.5 - 14.9 %    Platelets 497 807 - 912 k/uL    MPV 8.9 6.0 - 12.0 fL    Neutrophils % 66 36 - 66 %    Lymphocytes % 18 (L) 24 - 44 %    Monocytes % 6 1 - 7 %    Eosinophils % 6 (H) 0 - 4 %    Basophils % 0 0 - 2 %    Bands 4 0 - 10 %    Neutrophils Absolute 6.93 1.3 - 9.1 k/uL    Lymphocytes Absolute 1.89 1.0 - 4.8 k/uL    Monocytes Absolute 0.63 0.1 - 1.3 k/uL    Eosinophils Absolute 0.63 (H) 0.0 - 0.4 k/uL    Basophils Absolute 0.00 0.0 - 0.2 k/uL    Absolute Bands # 0.42 0.0 - 1.0 k/uL    Morphology ANISOCYTOSIS PRESENT    POC Glucose Fingerstick    Collection Time: 09/03/23  6:05 AM   Result Value Ref Range    POC Glucose 75 65 - 105 mg/dL     -----------------------------------------------------------------  RAD:  EKG:  Micro:     Assessment & Plan:    Patient Active Problem List:     Hypertension, benign     Diabetes mellitus type II, controlled ([facility])     Other hyperlipidemia     Degeneration of cervical intervertebral disc     Cervicalgia     Sleep apnea     Anemia     Hepatic steatosis     Lumbar radiculopathy     Adult BMI 60.0-69.9 kg/sq m ([facility])     Chronic pain syndrome     MAMTA (acute kidney injury) ([facility])     H/O: GI bleed     Iron deficiency anemia     Fibromyalgia     Class 3 severe obesity due to excess calories with serious comorbidity and body mass index (BMI) of 50.0 to 59.9 in adult [facility])     Tremor     Fatigue     Chronic renal insufficiency, stage III (moderate) ([facility])     Cellulitis of right leg     Lymphedema of both lower extremities     PUD (peptic ulcer disease)     Constipation     Generalized abdominal pain     Altered mental status     Allergic rhinitis     Arthritis     Lumbar spine instability     Polyneuropathy due to type 2 diabetes mellitus ([facility])     Primary central sleep apnea     Skin cancer     Spondylosis without myelopathy     Iron malabsorption     Iron deficiency     General weakness

## 2023-09-04 NOTE — CARE COORDINATION
ONGOING DISCHARGE PLAN:     Patient is alert and oriented x4. Spoke with patient regarding discharge plan and patient confirms that plan is still to go home with spouse and [employer] [place] . DME: home O2 at 2lpm [place]      Ddimer elevated, VQ scan low prob for PE     Creat 1.5 BUN 22     Will continue to follow for additional discharge needs. If patient is discharged prior to next notation, then this note serves as note for discharge by case management.     Electronically signed by Alexander Diop RN on 9/4/2023 at 9:45 AM

## 2023-09-05 ENCOUNTER — CARE COORDINATION (OUTPATIENT)
Dept: CASE MANAGEMENT | Age: 68
End: 2023-09-05

## 2023-09-05 LAB
EKG ATRIAL RATE: 62 BPM
EKG P AXIS: -11 DEGREES
EKG P-R INTERVAL: 162 MS
EKG Q-T INTERVAL: 442 MS
EKG QRS DURATION: 88 MS
EKG QTC CALCULATION (BAZETT): 448 MS
EKG R AXIS: 45 DEGREES
EKG T AXIS: 44 DEGREES
EKG VENTRICULAR RATE: 62 BPM

## 2023-09-05 NOTE — CARE COORDINATION
Care Transitions Outreach Attempt    Call within 2 business days of discharge: Yes   Attempted to reach patient for transitions of care follow up. Unable to reach patient. 1st attempt. Patient: Gal Cardona Patient : 1955 MRN: [id number]    Last Discharge [address]       Date Complaint Diagnosis Description Type Department Provider    23   Admission (Discharged) Meagan Salas MD              Was this an external facility discharge?  No Discharge Facility: [facility]    Noted following upcoming appointments from discharge chart review:   [facility] follow up appointment(s):   Future Appointments   Date Time Provider [address]   2023  2:00 PM Lui Fulton MD [address] [facility] [address] [place] [address] follow up appointment(s): n/a

## 2023-09-06 ENCOUNTER — CARE COORDINATION (OUTPATIENT)
Dept: CASE MANAGEMENT | Age: 68
End: 2023-09-06

## 2023-09-06 LAB
MICROORGANISM SPEC CULT: NORMAL
SERVICE CMNT-IMP: NORMAL
SPECIMEN DESCRIPTION: NORMAL

## 2023-09-06 NOTE — CARE COORDINATION
Care Transitions Outreach Attempt    Call within 2 business days of discharge: Yes   Attempted to reach patient for transitions of care follow up. Unable to reach patient. Final attempt to reach. [facility] home care has resumed home care services. Patient was direct admitted by PCP for c/o flank pain and feeling unwell. Episode resolved. Patient: Alisson Swift Patient : 1955 MRN: [id number]    Last Discharge [address]       Date Complaint Diagnosis Description Type Department Provider    23   Admission (Discharged) Yesi Meza MD              Was this an external facility discharge?  No Discharge Facility: [facility]    Noted following upcoming appointments from discharge chart review:   [address] [facility] [address] follow up appointment(s):   Future Appointments   Date Time Provider [facility] [address]   2023  2:00 PM Suzette Leong MD [address] [facility] [address] [facility] follow up appointment(s): n/a

## 2023-09-06 NOTE — DISCHARGE SUMMARY
Plus strip  Generic drug: blood glucose test strips  CHECK GLUCOSE LEVELS TWICE A DAY     atorvastatin 40 MG tablet  Commonly known as: LIPITOR  Take 1 tablet by mouth at bedtime     calcium carbonate 600 MG Tabs tablet     carvedilol 12.5 MG tablet  Commonly known as: COREG  Take 1 tablet by mouth 2 times daily (with meals)     clonazePAM 0.5 MG tablet  Commonly known as: KLONOPIN  Take 1 tablet by mouth 2 times daily as needed FOR TREMORS     COQ10 PO     famotidine 20 MG tablet  Commonly known as: PEPCID  TAKE 1 TABLET BY MOUTH TWO TIMES A DAY     furosemide 40 MG tablet  Commonly known as: Lasix  Take 2 tablets by mouth daily     glimepiride 4 MG tablet  Commonly known as: AMARYL  TAKE 2 TABLETS BY MOUTH EVERY MORNING (BEFORE BREAKFAST)     Handicap Placard Misc  by Does not apply route 5 years, cannot walk over 200 ft. Lancets Misc  1 each by Does not apply route daily Accucheck Solange dx E11.9 check daily     Linzess 145 MCG capsule  Generic drug: linaclotide  TAKE 1 CAPSULE BY MOUTH EVERY DAY IN THE MORNING BEFORE BREAKFAST     loratadine-pseudoephedrine 5-120 MG per extended release tablet  Commonly known as: CLARITIN-D 12HR     losartan 25 MG tablet  Commonly known as: COZAAR  Take 1 tablet by mouth daily     ondansetron 4 MG tablet  Commonly known as: ZOFRAN  Take 1 tablet by mouth 3 times daily as needed for Nausea or Vomiting     oxyCODONE 30 MG immediate release tablet  Commonly known as: OXY-IR  Take 1 tablet by mouth every 8 hours as needed for Pain for up to 30 days.  Indications: last filled 6/26/23 for 30 days Max Daily Amount: 90 mg     oxyCODONE-acetaminophen 5-325 MG per tablet  Commonly known as: Percocet  Take 1 tablet by mouth every 6 hours as needed for Pain.     pioglitazone 45 MG tablet  Commonly known as: ACTOS  TAKE 1 TABLET BY MOUTH EVERY DAY     potassium chloride 20 MEQ extended release tablet  Commonly known as: KLOR-CON M  Take 1 tablet by mouth 2 times daily     pregabalin 150 MG

## 2023-09-13 ENCOUNTER — HOSPITAL ENCOUNTER (INPATIENT)
Age: 68
LOS: 4 days | Discharge: HOME HEALTH | DRG: 871 | End: 2023-09-17
Attending: EMERGENCY MEDICINE | Admitting: FAMILY MEDICINE
Payer: MEDICARE

## 2023-09-13 ENCOUNTER — APPOINTMENT (OUTPATIENT)
Dept: GENERAL RADIOLOGY | Age: 68
DRG: 871 | End: 2023-09-13
Payer: MEDICARE

## 2023-09-13 DIAGNOSIS — J96.21 ACUTE ON CHRONIC RESPIRATORY FAILURE WITH HYPOXIA (HCC): Primary | ICD-10-CM

## 2023-09-13 PROBLEM — J18.9 PNEUMONIA DUE TO INFECTIOUS ORGANISM, UNSPECIFIED LATERALITY, UNSPECIFIED PART OF LUNG: Status: ACTIVE | Noted: 2023-09-13

## 2023-09-13 LAB
ABSOLUTE BANDS #: 0.68 K/UL (ref 0–1)
ALBUMIN SERPL-MCNC: 3.5 G/DL (ref 3.5–5.2)
ALP SERPL-CCNC: 88 U/L (ref 35–104)
ALT SERPL-CCNC: 20 U/L (ref 5–33)
ANION GAP SERPL CALCULATED.3IONS-SCNC: 15 MMOL/L (ref 9–17)
AST SERPL-CCNC: 29 U/L
BANDS: 4 % (ref 0–10)
BASOPHILS # BLD: 0 K/UL (ref 0–0.2)
BASOPHILS NFR BLD: 0 % (ref 0–2)
BILIRUB SERPL-MCNC: 0.3 MG/DL (ref 0.3–1.2)
BUN SERPL-MCNC: 46 MG/DL (ref 8–23)
C DIFF GDH + TOXINS A+B STL QL IA.RAPID: ABNORMAL
CALCIUM SERPL-MCNC: 8.4 MG/DL (ref 8.6–10.4)
CHLORIDE SERPL-SCNC: 92 MMOL/L (ref 98–107)
CO2 SERPL-SCNC: 31 MMOL/L (ref 20–31)
CREAT SERPL-MCNC: 3.3 MG/DL (ref 0.5–0.9)
EOSINOPHIL # BLD: 0 K/UL (ref 0–0.4)
EOSINOPHILS RELATIVE PERCENT: 0 % (ref 0–4)
ERYTHROCYTE [DISTWIDTH] IN BLOOD BY AUTOMATED COUNT: 17.1 % (ref 11.5–14.9)
FLUAV RNA RESP QL NAA+PROBE: NOT DETECTED
FLUBV RNA RESP QL NAA+PROBE: NOT DETECTED
GFR SERPL CREATININE-BSD FRML MDRD: 15 ML/MIN/1.73M2
GLUCOSE SERPL-MCNC: 234 MG/DL (ref 70–99)
HCT VFR BLD AUTO: 29.3 % (ref 36–46)
HGB BLD-MCNC: 9.3 G/DL (ref 12–16)
LACTATE BLDV-SCNC: 1.4 MMOL/L (ref 0.5–1.9)
LACTATE BLDV-SCNC: 2.7 MMOL/L (ref 0.5–1.9)
LIPASE SERPL-CCNC: 20 U/L (ref 13–60)
LYMPHOCYTES NFR BLD: 0.68 K/UL (ref 1–4.8)
LYMPHOCYTES RELATIVE PERCENT: 4 % (ref 24–44)
MAGNESIUM SERPL-MCNC: 1.8 MG/DL (ref 1.6–2.6)
MCH RBC QN AUTO: 27.4 PG (ref 26–34)
MCHC RBC AUTO-ENTMCNC: 31.7 G/DL (ref 31–37)
MCV RBC AUTO: 86.5 FL (ref 80–100)
MONOCYTES NFR BLD: 0 % (ref 1–7)
MONOCYTES NFR BLD: 0 K/UL (ref 0.1–1.3)
MORPHOLOGY: ABNORMAL
NEUTROPHILS NFR BLD: 92 % (ref 36–66)
NEUTS SEG NFR BLD: 15.64 K/UL (ref 1.3–9.1)
PLATELET # BLD AUTO: 304 K/UL (ref 150–450)
PMV BLD AUTO: 8.4 FL (ref 6–12)
POTASSIUM SERPL-SCNC: 3.3 MMOL/L (ref 3.7–5.3)
PROT SERPL-MCNC: 7.6 G/DL (ref 6.4–8.3)
RBC # BLD AUTO: 3.39 M/UL (ref 4–5.2)
SARS-COV-2 RNA RESP QL NAA+PROBE: NOT DETECTED
SODIUM SERPL-SCNC: 138 MMOL/L (ref 135–144)
SOURCE: NORMAL
SPECIMEN DESCRIPTION: ABNORMAL
SPECIMEN DESCRIPTION: NORMAL
TROPONIN I SERPL HS-MCNC: 33 NG/L (ref 0–14)
WBC OTHER # BLD: 17 K/UL (ref 3.5–11)

## 2023-09-13 PROCEDURE — 96374 THER/PROPH/DIAG INJ IV PUSH: CPT

## 2023-09-13 PROCEDURE — 85025 COMPLETE CBC W/AUTO DIFF WBC: CPT

## 2023-09-13 PROCEDURE — 87329 GIARDIA AG IA: CPT

## 2023-09-13 PROCEDURE — 83690 ASSAY OF LIPASE: CPT

## 2023-09-13 PROCEDURE — 82805 BLOOD GASES W/O2 SATURATION: CPT

## 2023-09-13 PROCEDURE — 87506 IADNA-DNA/RNA PROBE TQ 6-11: CPT

## 2023-09-13 PROCEDURE — 80053 COMPREHEN METABOLIC PANEL: CPT

## 2023-09-13 PROCEDURE — 87324 CLOSTRIDIUM AG IA: CPT

## 2023-09-13 PROCEDURE — 87449 NOS EACH ORGANISM AG IA: CPT

## 2023-09-13 PROCEDURE — 93005 ELECTROCARDIOGRAM TRACING: CPT | Performed by: STUDENT IN AN ORGANIZED HEALTH CARE EDUCATION/TRAINING PROGRAM

## 2023-09-13 PROCEDURE — 84484 ASSAY OF TROPONIN QUANT: CPT

## 2023-09-13 PROCEDURE — 87328 CRYPTOSPORIDIUM AG IA: CPT

## 2023-09-13 PROCEDURE — 87493 C DIFF AMPLIFIED PROBE: CPT

## 2023-09-13 PROCEDURE — 6360000002 HC RX W HCPCS: Performed by: EMERGENCY MEDICINE

## 2023-09-13 PROCEDURE — 71045 X-RAY EXAM CHEST 1 VIEW: CPT

## 2023-09-13 PROCEDURE — 87636 SARSCOV2 & INF A&B AMP PRB: CPT

## 2023-09-13 PROCEDURE — 83735 ASSAY OF MAGNESIUM: CPT

## 2023-09-13 PROCEDURE — 87040 BLOOD CULTURE FOR BACTERIA: CPT

## 2023-09-13 PROCEDURE — 83605 ASSAY OF LACTIC ACID: CPT

## 2023-09-13 PROCEDURE — 99285 EMERGENCY DEPT VISIT HI MDM: CPT

## 2023-09-13 PROCEDURE — 96375 TX/PRO/DX INJ NEW DRUG ADDON: CPT

## 2023-09-13 PROCEDURE — 1200000000 HC SEMI PRIVATE

## 2023-09-13 PROCEDURE — 36415 COLL VENOUS BLD VENIPUNCTURE: CPT

## 2023-09-13 RX ORDER — LEVOFLOXACIN 5 MG/ML
750 INJECTION, SOLUTION INTRAVENOUS EVERY 24 HOURS
Status: DISCONTINUED | OUTPATIENT
Start: 2023-09-13 | End: 2023-09-14

## 2023-09-13 RX ORDER — 0.9 % SODIUM CHLORIDE 0.9 %
30 INTRAVENOUS SOLUTION INTRAVENOUS ONCE
Status: COMPLETED | OUTPATIENT
Start: 2023-09-13 | End: 2023-09-14

## 2023-09-13 RX ORDER — 0.9 % SODIUM CHLORIDE 0.9 %
30 INTRAVENOUS SOLUTION INTRAVENOUS ONCE
Status: DISCONTINUED | OUTPATIENT
Start: 2023-09-13 | End: 2023-09-17 | Stop reason: HOSPADM

## 2023-09-13 RX ORDER — METRONIDAZOLE 500 MG/100ML
500 INJECTION, SOLUTION INTRAVENOUS ONCE
Status: COMPLETED | OUTPATIENT
Start: 2023-09-13 | End: 2023-09-14

## 2023-09-13 RX ADMIN — LEVOFLOXACIN 750 MG: 5 INJECTION, SOLUTION INTRAVENOUS at 22:45

## 2023-09-13 RX ADMIN — METRONIDAZOLE 500 MG: 500 INJECTION, SOLUTION INTRAVENOUS at 22:44

## 2023-09-13 ASSESSMENT — PAIN - FUNCTIONAL ASSESSMENT: PAIN_FUNCTIONAL_ASSESSMENT: NONE - DENIES PAIN

## 2023-09-14 ENCOUNTER — APPOINTMENT (OUTPATIENT)
Dept: ULTRASOUND IMAGING | Age: 68
DRG: 871 | End: 2023-09-14
Payer: MEDICARE

## 2023-09-14 LAB
ANION GAP SERPL CALCULATED.3IONS-SCNC: 11 MMOL/L (ref 9–17)
BASOPHILS # BLD: 0 K/UL (ref 0–0.2)
BASOPHILS NFR BLD: 0 % (ref 0–2)
BNP SERPL-MCNC: 584 PG/ML
BUN SERPL-MCNC: 47 MG/DL (ref 8–23)
C DIFFICILE TOXINS, PCR: NORMAL
C PARVUM AG STL QL IA: NEGATIVE
CALCIUM SERPL-MCNC: 8 MG/DL (ref 8.6–10.4)
CAMPYLOBACTER DNA SPEC NAA+PROBE: NORMAL
CHLORIDE SERPL-SCNC: 93 MMOL/L (ref 98–107)
CHLORIDE UR-SCNC: <20 MMOL/L
CK SERPL-CCNC: 209 U/L (ref 26–192)
CO2 SERPL-SCNC: 32 MMOL/L (ref 20–31)
CREAT SERPL-MCNC: 2.9 MG/DL (ref 0.5–0.9)
EKG ATRIAL RATE: 64 BPM
EKG P-R INTERVAL: 154 MS
EKG Q-T INTERVAL: 618 MS
EKG QRS DURATION: 84 MS
EKG QTC CALCULATION (BAZETT): 637 MS
EKG R AXIS: 23 DEGREES
EKG T AXIS: 46 DEGREES
EKG VENTRICULAR RATE: 64 BPM
EOSINOPHIL # BLD: 0.2 K/UL (ref 0–0.4)
EOSINOPHIL,URINE: NORMAL
EOSINOPHILS RELATIVE PERCENT: 2 % (ref 0–4)
ERYTHROCYTE [DISTWIDTH] IN BLOOD BY AUTOMATED COUNT: 16.9 % (ref 11.5–14.9)
EST. AVERAGE GLUCOSE BLD GHB EST-MCNC: 157 MG/DL
ETEC ELTA+ESTB GENES STL QL NAA+PROBE: NORMAL
G LAMBLIA AG STL QL IA: NEGATIVE
GFR SERPL CREATININE-BSD FRML MDRD: 17 ML/MIN/1.73M2
GLUCOSE BLD-MCNC: 120 MG/DL (ref 65–105)
GLUCOSE BLD-MCNC: 125 MG/DL (ref 65–105)
GLUCOSE BLD-MCNC: 137 MG/DL (ref 65–105)
GLUCOSE BLD-MCNC: 171 MG/DL (ref 65–105)
GLUCOSE BLD-MCNC: 39 MG/DL (ref 65–105)
GLUCOSE BLD-MCNC: 71 MG/DL (ref 65–105)
GLUCOSE BLD-MCNC: 89 MG/DL (ref 65–105)
GLUCOSE SERPL-MCNC: 70 MG/DL (ref 70–99)
HBA1C MFR BLD: 7.1 % (ref 4–6)
HCT VFR BLD AUTO: 28 % (ref 36–46)
HGB BLD-MCNC: 8.8 G/DL (ref 12–16)
LACTATE BLDV-SCNC: 0.5 MMOL/L (ref 0.5–2.2)
LYMPHOCYTES NFR BLD: 1.1 K/UL (ref 1–4.8)
LYMPHOCYTES RELATIVE PERCENT: 8 % (ref 24–44)
MAGNESIUM SERPL-MCNC: 1.9 MG/DL (ref 1.6–2.6)
MCH RBC QN AUTO: 27.7 PG (ref 26–34)
MCHC RBC AUTO-ENTMCNC: 31.5 G/DL (ref 31–37)
MCV RBC AUTO: 87.9 FL (ref 80–100)
MONOCYTES NFR BLD: 1.1 K/UL (ref 0.1–1.3)
MONOCYTES NFR BLD: 8 % (ref 1–7)
NEUTROPHILS NFR BLD: 82 % (ref 36–66)
NEUTS SEG NFR BLD: 11.5 K/UL (ref 1.3–9.1)
P SHIGELLOIDES DNA STL QL NAA+PROBE: NORMAL
PLATELET # BLD AUTO: 296 K/UL (ref 150–450)
PMV BLD AUTO: 8.1 FL (ref 6–12)
POTASSIUM SERPL-SCNC: 3 MMOL/L (ref 3.7–5.3)
POTASSIUM SERPL-SCNC: 4.5 MMOL/L (ref 3.7–5.3)
RBC # BLD AUTO: 3.19 M/UL (ref 4–5.2)
SALMONELLA DNA SPEC QL NAA+PROBE: NORMAL
SHIGA TOXIN STX GENE SPEC NAA+PROBE: NORMAL
SHIGELLA DNA SPEC QL NAA+PROBE: NORMAL
SODIUM SERPL-SCNC: 136 MMOL/L (ref 135–144)
SODIUM UR-SCNC: <20 MMOL/L
SOURCE: NORMAL
SPECIMEN DESCRIPTION: NORMAL
V CHOL+PARA RFBL+TRKH+TNAA STL QL NAA+PR: NORMAL
WBC OTHER # BLD: 14 K/UL (ref 3.5–11)
Y ENTERO RECN STL QL NAA+PROBE: NORMAL

## 2023-09-14 PROCEDURE — 83036 HEMOGLOBIN GLYCOSYLATED A1C: CPT

## 2023-09-14 PROCEDURE — 83605 ASSAY OF LACTIC ACID: CPT

## 2023-09-14 PROCEDURE — 2580000003 HC RX 258: Performed by: FAMILY MEDICINE

## 2023-09-14 PROCEDURE — 82550 ASSAY OF CK (CPK): CPT

## 2023-09-14 PROCEDURE — 6370000000 HC RX 637 (ALT 250 FOR IP): Performed by: FAMILY MEDICINE

## 2023-09-14 PROCEDURE — 85025 COMPLETE CBC W/AUTO DIFF WBC: CPT

## 2023-09-14 PROCEDURE — 86225 DNA ANTIBODY NATIVE: CPT

## 2023-09-14 PROCEDURE — 82436 ASSAY OF URINE CHLORIDE: CPT

## 2023-09-14 PROCEDURE — 83540 ASSAY OF IRON: CPT

## 2023-09-14 PROCEDURE — 83735 ASSAY OF MAGNESIUM: CPT

## 2023-09-14 PROCEDURE — 99223 1ST HOSP IP/OBS HIGH 75: CPT | Performed by: FAMILY MEDICINE

## 2023-09-14 PROCEDURE — 93010 ELECTROCARDIOGRAM REPORT: CPT | Performed by: INTERNAL MEDICINE

## 2023-09-14 PROCEDURE — 80048 BASIC METABOLIC PNL TOTAL CA: CPT

## 2023-09-14 PROCEDURE — 87205 SMEAR GRAM STAIN: CPT

## 2023-09-14 PROCEDURE — 97162 PT EVAL MOD COMPLEX 30 MIN: CPT

## 2023-09-14 PROCEDURE — 82947 ASSAY GLUCOSE BLOOD QUANT: CPT

## 2023-09-14 PROCEDURE — 83521 IG LIGHT CHAINS FREE EACH: CPT

## 2023-09-14 PROCEDURE — 84132 ASSAY OF SERUM POTASSIUM: CPT

## 2023-09-14 PROCEDURE — 6370000000 HC RX 637 (ALT 250 FOR IP): Performed by: INTERNAL MEDICINE

## 2023-09-14 PROCEDURE — 97530 THERAPEUTIC ACTIVITIES: CPT

## 2023-09-14 PROCEDURE — 36415 COLL VENOUS BLD VENIPUNCTURE: CPT

## 2023-09-14 PROCEDURE — 97535 SELF CARE MNGMENT TRAINING: CPT

## 2023-09-14 PROCEDURE — 1200000000 HC SEMI PRIVATE

## 2023-09-14 PROCEDURE — 82728 ASSAY OF FERRITIN: CPT

## 2023-09-14 PROCEDURE — 97166 OT EVAL MOD COMPLEX 45 MIN: CPT

## 2023-09-14 PROCEDURE — 83880 ASSAY OF NATRIURETIC PEPTIDE: CPT

## 2023-09-14 PROCEDURE — 6360000002 HC RX W HCPCS: Performed by: FAMILY MEDICINE

## 2023-09-14 PROCEDURE — 86038 ANTINUCLEAR ANTIBODIES: CPT

## 2023-09-14 PROCEDURE — 84300 ASSAY OF URINE SODIUM: CPT

## 2023-09-14 PROCEDURE — 83550 IRON BINDING TEST: CPT

## 2023-09-14 PROCEDURE — 76770 US EXAM ABDO BACK WALL COMP: CPT

## 2023-09-14 PROCEDURE — 86160 COMPLEMENT ANTIGEN: CPT

## 2023-09-14 RX ORDER — CALCIUM CARBONATE/VITAMIN D3 600 MG-10
1 TABLET ORAL 2 TIMES DAILY
Status: DISCONTINUED | OUTPATIENT
Start: 2023-09-14 | End: 2023-09-17 | Stop reason: HOSPADM

## 2023-09-14 RX ORDER — CLONAZEPAM 0.5 MG/1
0.5 TABLET ORAL EVERY 12 HOURS PRN
Status: DISCONTINUED | OUTPATIENT
Start: 2023-09-14 | End: 2023-09-17 | Stop reason: HOSPADM

## 2023-09-14 RX ORDER — ENOXAPARIN SODIUM 100 MG/ML
30 INJECTION SUBCUTANEOUS DAILY
Status: DISCONTINUED | OUTPATIENT
Start: 2023-09-14 | End: 2023-09-17

## 2023-09-14 RX ORDER — ACETAMINOPHEN 325 MG/1
650 TABLET ORAL EVERY 6 HOURS PRN
Status: DISCONTINUED | OUTPATIENT
Start: 2023-09-14 | End: 2023-09-17 | Stop reason: HOSPADM

## 2023-09-14 RX ORDER — SODIUM CHLORIDE 9 MG/ML
INJECTION, SOLUTION INTRAVENOUS PRN
Status: DISCONTINUED | OUTPATIENT
Start: 2023-09-14 | End: 2023-09-17 | Stop reason: HOSPADM

## 2023-09-14 RX ORDER — LEVOFLOXACIN 5 MG/ML
750 INJECTION, SOLUTION INTRAVENOUS
Status: DISCONTINUED | OUTPATIENT
Start: 2023-09-14 | End: 2023-09-14

## 2023-09-14 RX ORDER — LEVOFLOXACIN 5 MG/ML
750 INJECTION, SOLUTION INTRAVENOUS
Status: DISCONTINUED | OUTPATIENT
Start: 2023-09-15 | End: 2023-09-16

## 2023-09-14 RX ORDER — ACETAMINOPHEN 650 MG/1
650 SUPPOSITORY RECTAL EVERY 6 HOURS PRN
Status: DISCONTINUED | OUTPATIENT
Start: 2023-09-14 | End: 2023-09-17 | Stop reason: HOSPADM

## 2023-09-14 RX ORDER — UBIDECARENONE 200 MG
200 CAPSULE ORAL NIGHTLY
Status: DISCONTINUED | OUTPATIENT
Start: 2023-09-14 | End: 2023-09-17 | Stop reason: HOSPADM

## 2023-09-14 RX ORDER — ATORVASTATIN CALCIUM 40 MG/1
40 TABLET, FILM COATED ORAL NIGHTLY
Status: DISCONTINUED | OUTPATIENT
Start: 2023-09-14 | End: 2023-09-17 | Stop reason: HOSPADM

## 2023-09-14 RX ORDER — POTASSIUM CHLORIDE 20 MEQ/1
40 TABLET, EXTENDED RELEASE ORAL PRN
Status: DISCONTINUED | OUTPATIENT
Start: 2023-09-14 | End: 2023-09-14

## 2023-09-14 RX ORDER — POTASSIUM CHLORIDE 20 MEQ/1
40 TABLET, EXTENDED RELEASE ORAL ONCE
Status: COMPLETED | OUTPATIENT
Start: 2023-09-14 | End: 2023-09-14

## 2023-09-14 RX ORDER — POLYETHYLENE GLYCOL 3350 17 G/17G
17 POWDER, FOR SOLUTION ORAL DAILY PRN
Status: DISCONTINUED | OUTPATIENT
Start: 2023-09-14 | End: 2023-09-17 | Stop reason: HOSPADM

## 2023-09-14 RX ORDER — SODIUM CHLORIDE 9 MG/ML
INJECTION, SOLUTION INTRAVENOUS CONTINUOUS
Status: DISCONTINUED | OUTPATIENT
Start: 2023-09-14 | End: 2023-09-17 | Stop reason: HOSPADM

## 2023-09-14 RX ORDER — ALOGLIPTIN 6.25 MG/1
6.25 TABLET, FILM COATED ORAL DAILY
Status: DISCONTINUED | OUTPATIENT
Start: 2023-09-14 | End: 2023-09-17 | Stop reason: HOSPADM

## 2023-09-14 RX ORDER — SODIUM CHLORIDE 0.9 % (FLUSH) 0.9 %
5-40 SYRINGE (ML) INJECTION PRN
Status: DISCONTINUED | OUTPATIENT
Start: 2023-09-14 | End: 2023-09-17 | Stop reason: HOSPADM

## 2023-09-14 RX ORDER — OXYCODONE HYDROCHLORIDE AND ACETAMINOPHEN 5; 325 MG/1; MG/1
1 TABLET ORAL EVERY 6 HOURS PRN
Status: DISCONTINUED | OUTPATIENT
Start: 2023-09-14 | End: 2023-09-17 | Stop reason: HOSPADM

## 2023-09-14 RX ORDER — VENLAFAXINE HYDROCHLORIDE 37.5 MG/1
37.5 CAPSULE, EXTENDED RELEASE ORAL DAILY
Status: DISCONTINUED | OUTPATIENT
Start: 2023-09-14 | End: 2023-09-17 | Stop reason: HOSPADM

## 2023-09-14 RX ORDER — POTASSIUM CHLORIDE 7.45 MG/ML
10 INJECTION INTRAVENOUS PRN
Status: DISCONTINUED | OUTPATIENT
Start: 2023-09-14 | End: 2023-09-14

## 2023-09-14 RX ORDER — INSULIN LISPRO 100 [IU]/ML
0-4 INJECTION, SOLUTION INTRAVENOUS; SUBCUTANEOUS
Status: DISCONTINUED | OUTPATIENT
Start: 2023-09-14 | End: 2023-09-17 | Stop reason: HOSPADM

## 2023-09-14 RX ORDER — POTASSIUM CHLORIDE 20 MEQ/1
20 TABLET, EXTENDED RELEASE ORAL 2 TIMES DAILY
Status: DISCONTINUED | OUTPATIENT
Start: 2023-09-14 | End: 2023-09-15

## 2023-09-14 RX ORDER — ALOGLIPTIN 6.25 MG/1
6.25 TABLET, FILM COATED ORAL DAILY
Status: CANCELLED | OUTPATIENT
Start: 2023-09-14

## 2023-09-14 RX ORDER — PREGABALIN 150 MG/1
150 CAPSULE ORAL 2 TIMES DAILY
Status: DISCONTINUED | OUTPATIENT
Start: 2023-09-14 | End: 2023-09-17 | Stop reason: HOSPADM

## 2023-09-14 RX ORDER — LANOLIN ALCOHOL/MO/W.PET/CERES
1000 CREAM (GRAM) TOPICAL DAILY
Status: DISCONTINUED | OUTPATIENT
Start: 2023-09-14 | End: 2023-09-17 | Stop reason: HOSPADM

## 2023-09-14 RX ORDER — ONDANSETRON 4 MG/1
4 TABLET, FILM COATED ORAL 3 TIMES DAILY PRN
Status: DISCONTINUED | OUTPATIENT
Start: 2023-09-14 | End: 2023-09-14

## 2023-09-14 RX ORDER — PIOGLITAZONEHYDROCHLORIDE 45 MG/1
45 TABLET ORAL DAILY
Status: DISCONTINUED | OUTPATIENT
Start: 2023-09-14 | End: 2023-09-17 | Stop reason: HOSPADM

## 2023-09-14 RX ORDER — M-VIT,TX,IRON,MINS/CALC/FOLIC 27MG-0.4MG
1 TABLET ORAL DAILY
Status: DISCONTINUED | OUTPATIENT
Start: 2023-09-14 | End: 2023-09-17 | Stop reason: HOSPADM

## 2023-09-14 RX ORDER — INSULIN LISPRO 100 [IU]/ML
0-4 INJECTION, SOLUTION INTRAVENOUS; SUBCUTANEOUS NIGHTLY
Status: DISCONTINUED | OUTPATIENT
Start: 2023-09-14 | End: 2023-09-17 | Stop reason: HOSPADM

## 2023-09-14 RX ORDER — FAMOTIDINE 20 MG/1
10 TABLET, FILM COATED ORAL DAILY
Status: DISCONTINUED | OUTPATIENT
Start: 2023-09-14 | End: 2023-09-17 | Stop reason: HOSPADM

## 2023-09-14 RX ORDER — DEXTROSE MONOHYDRATE 100 MG/ML
INJECTION, SOLUTION INTRAVENOUS CONTINUOUS PRN
Status: DISCONTINUED | OUTPATIENT
Start: 2023-09-14 | End: 2023-09-17 | Stop reason: HOSPADM

## 2023-09-14 RX ORDER — SODIUM CHLORIDE 0.9 % (FLUSH) 0.9 %
5-40 SYRINGE (ML) INJECTION EVERY 12 HOURS SCHEDULED
Status: DISCONTINUED | OUTPATIENT
Start: 2023-09-14 | End: 2023-09-17 | Stop reason: HOSPADM

## 2023-09-14 RX ORDER — PIOGLITAZONEHYDROCHLORIDE 45 MG/1
45 TABLET ORAL DAILY
Status: CANCELLED | OUTPATIENT
Start: 2023-09-14

## 2023-09-14 RX ORDER — CARVEDILOL 12.5 MG/1
12.5 TABLET ORAL 2 TIMES DAILY WITH MEALS
Status: DISCONTINUED | OUTPATIENT
Start: 2023-09-14 | End: 2023-09-17 | Stop reason: HOSPADM

## 2023-09-14 RX ADMIN — Medication 200 MG: at 01:23

## 2023-09-14 RX ADMIN — FAMOTIDINE 10 MG: 20 TABLET ORAL at 08:11

## 2023-09-14 RX ADMIN — CYANOCOBALAMIN TAB 1000 MCG 1000 MCG: 1000 TAB at 08:10

## 2023-09-14 RX ADMIN — POTASSIUM CHLORIDE 20 MEQ: 1500 TABLET, EXTENDED RELEASE ORAL at 08:10

## 2023-09-14 RX ADMIN — VENLAFAXINE HYDROCHLORIDE 37.5 MG: 37.5 CAPSULE, EXTENDED RELEASE ORAL at 11:24

## 2023-09-14 RX ADMIN — ENOXAPARIN SODIUM 30 MG: 100 INJECTION SUBCUTANEOUS at 08:10

## 2023-09-14 RX ADMIN — Medication 200 MG: at 23:09

## 2023-09-14 RX ADMIN — CALCIUM CARBONATE 600 MG (1,500 MG)-VITAMIN D3 400 UNIT TABLET 1 TABLET: at 08:10

## 2023-09-14 RX ADMIN — PREGABALIN 150 MG: 150 CAPSULE ORAL at 20:51

## 2023-09-14 RX ADMIN — POTASSIUM CHLORIDE 40 MEQ: 1500 TABLET, EXTENDED RELEASE ORAL at 11:24

## 2023-09-14 RX ADMIN — POTASSIUM CHLORIDE 20 MEQ: 1500 TABLET, EXTENDED RELEASE ORAL at 20:51

## 2023-09-14 RX ADMIN — CALCIUM CARBONATE 600 MG (1,500 MG)-VITAMIN D3 400 UNIT TABLET 1 TABLET: at 01:00

## 2023-09-14 RX ADMIN — CARVEDILOL 12.5 MG: 12.5 TABLET, FILM COATED ORAL at 08:10

## 2023-09-14 RX ADMIN — CARVEDILOL 12.5 MG: 12.5 TABLET, FILM COATED ORAL at 17:44

## 2023-09-14 RX ADMIN — Medication 16 G: at 06:24

## 2023-09-14 RX ADMIN — SODIUM CHLORIDE: 9 INJECTION, SOLUTION INTRAVENOUS at 03:08

## 2023-09-14 RX ADMIN — SODIUM CHLORIDE 1365 ML: 9 INJECTION, SOLUTION INTRAVENOUS at 00:56

## 2023-09-14 RX ADMIN — ATORVASTATIN CALCIUM 40 MG: 40 TABLET, FILM COATED ORAL at 01:00

## 2023-09-14 RX ADMIN — CALCIUM CARBONATE 600 MG (1,500 MG)-VITAMIN D3 400 UNIT TABLET 1 TABLET: at 20:51

## 2023-09-14 RX ADMIN — PREGABALIN 150 MG: 150 CAPSULE ORAL at 08:10

## 2023-09-14 RX ADMIN — ATORVASTATIN CALCIUM 40 MG: 40 TABLET, FILM COATED ORAL at 20:51

## 2023-09-14 RX ADMIN — POTASSIUM CHLORIDE 20 MEQ: 1500 TABLET, EXTENDED RELEASE ORAL at 01:05

## 2023-09-14 RX ADMIN — PREGABALIN 150 MG: 150 CAPSULE ORAL at 01:05

## 2023-09-14 RX ADMIN — MULTIPLE VITAMINS W/ MINERALS TAB 1 TABLET: TAB at 08:10

## 2023-09-14 NOTE — ACP (ADVANCE CARE PLANNING)
plan:    [] Schedule follow-up conversation to continue planning  [x] Referred individual to Provider for additional questions/concerns   [] Advised patient/agent/surrogate to review completed ACP document and update if needed with changes in condition, patient preferences or care setting    [] This note routed to one or more involved healthcare providers

## 2023-09-14 NOTE — PLAN OF CARE
Problem: Discharge Planning  Goal: Discharge to home or other facility with appropriate resources  Outcome: Progressing  Flowsheets (Taken 9/14/2023 0417)  Discharge to home or other facility with appropriate resources:   Identify barriers to discharge with patient and caregiver   Arrange for needed discharge resources and transportation as appropriate   Identify discharge learning needs (meds, wound care, etc)   Arrange for interpreters to assist at discharge as needed   Refer to discharge planning if patient needs post-hospital services based on physician order or complex needs related to functional status, cognitive ability or social support system     Problem: Safety - Adult  Goal: Free from fall injury  Outcome: Progressing  Flowsheets (Taken 9/14/2023 0417)  Free From Fall Injury: Instruct family/caregiver on patient safety     Problem: Skin/Tissue Integrity  Goal: Absence of new skin breakdown  Description: 1. Monitor for areas of redness and/or skin breakdown  2. Assess vascular access sites hourly  3. Every 4-6 hours minimum:  Change oxygen saturation probe site  4. Every 4-6 hours:  If on nasal continuous positive airway pressure, respiratory therapy assess nares and determine need for appliance change or resting period.   Outcome: Progressing  Note: Monitoring skin

## 2023-09-14 NOTE — CONSULTS
Department of Internal Medicine  Nephrology Daysi Matos MD   Consult Note      Reason for consultation: Management of acute kidney injury. Consulting physician: Agustin Rice MD.    History of present illness: This is a 79 y.o. female with a significant past medical history of  fibromyalgia, type 2 diabetes mellitus, obstructive sleep apnea, obesity, systemic hypertension and chronic kidney disease stage IIIb [baseline serum creatinine 1.3 to 1.5 mg/dL], who presented yesterday with complaints of general weakness, fatigue, nausea, vomiting and diarrhea. She also has had nausea with poor food and fluid intake for the last 1 week. Blood pressure at presentation was 106/33 mmHg and she was afebrile. Stool for C. Difficile was initially indeterminate but repeat was negative. Serum potassium was 3.3 mmol/L and BUN/creatinine were 46/3.3 mg/dL and hence nephrology consultation. Patient had been admitted earlier this month [September 1 to September 4, 2023 for treatment of MAMTA]. She is currently receiving IV fluid 0.9 normal saline at 50 mill per hour.     Adhesive tape, Morphine, Iron, Lisinopril, Metformin and related, Molds & smuts, and Pcn [penicillins]    Past Medical History:   Diagnosis Date    Acute hypoxemic respiratory failure (HCC)     Arthritis     CHF (congestive heart failure) (HCC)     Chronic back pain     Diabetes mellitus type II, controlled ([address]) 2/14/2012    Fibromyalgia     Hyperlipidemia LDL goal < 70 2/14/2012    Hypertension, benign 02/14/2012    Morbid obesity with BMI of 60.0-69.9, adult ([address]) 8/28/2015    Pain of toe of right foot     morgans cyst    Right wrist pain     rate 8    Skin cancer     skin under lt eye,face    Sleep apnea     Bipap does not work    Wears glasses      Scheduled Meds:   atorvastatin  40 mg Oral Nightly    calcium carb-cholecalciferol  1 tablet Oral BID    carvedilol  12.5 mg Oral BID WC    coenzyme Q10  200 mg Oral Nightly    famotidine  10

## 2023-09-14 NOTE — H&P
GLUCOSEU NEGATIVE 09/01/2023 07:55 PM    AMORPHOUS NOT REPORTED 09/13/2021 10:02 PM       ABG    Lab Results   Component Value Date/Time    BEA7DAH 32.2 01/06/2020 01:10 PM    I8WBZEDO 87.7 01/06/2020 01:10 PM    PHART 7.416 01/06/2020 01:10 PM    CNA6YCI 50.2 01/06/2020 01:10 PM    PO2ART 58.0 01/06/2020 01:10 PM           Active Hospital Problems    Diagnosis Date Noted    Pneumonia due to infectious organism, unspecified laterality, unspecified part of lung [J18.9] 09/13/2023         ASSESSMENT/PLAN:  Pneumonia - Levaquin. WBC improved. MAMTA d/t dehydration - per nephrology    Diabetes - stable    Weakness - PT/OT      DVT Prophylaxis: enoxaparin  Diet: ADULT DIET;  Full Liquid; 5 carb choices (75 gm/meal)  Code Status: Full Code      Dispo - admitted      Angus Sullivan MD, FAAFP  9/14/2023, 8:31 AM

## 2023-09-14 NOTE — ED NOTES
Neither EKG machine appears to be working. One has modem error and other was left unplugged.      Stephane Mcgee RN  09/13/23 1386

## 2023-09-14 NOTE — PLAN OF CARE
Problem: Discharge Planning  Goal: Discharge to home or other facility with appropriate resources  9/14/2023 1616 by Reginaldo Becerril RN  Outcome: Progressing     Problem: Safety - Adult  Goal: Free from fall injury  9/14/2023 1616 by Reginaldo Becerril RN  Outcome: Progressing     Problem: Skin/Tissue Integrity  Goal: Absence of new skin breakdown  Description: 1. Monitor for areas of redness and/or skin breakdown  2. Assess vascular access sites hourly  3. Every 4-6 hours minimum:  Change oxygen saturation probe site  4. Every 4-6 hours:  If on nasal continuous positive airway pressure, respiratory therapy assess nares and determine need for appliance change or resting period.   9/14/2023 1616 by Reginaldo Becerril RN  Outcome: Progressing     Problem: Chronic Conditions and Co-morbidities  Goal: Patient's chronic conditions and co-morbidity symptoms are monitored and maintained or improved  Outcome: Progressing     Problem: ABCDS Injury Assessment  Goal: Absence of physical injury  Outcome: Progressing

## 2023-09-14 NOTE — CARE COORDINATION
Case Management Assessment  Initial Evaluation    Date/Time of Evaluation: 9/14/2023 5:36 PM  Assessment Completed by: Marry Salter RN    If patient is discharged prior to next notation, then this note serves as note for discharge by case management. Patient Name: Roxanna Humphreys                   YOB: 1955  Diagnosis: Acute on chronic respiratory failure with hypoxia ([address]) [J96.21]  Pneumonia due to infectious organism, unspecified laterality, unspecified part of lung [J18.9]                   Date / Time: 9/13/2023  8:19 PM    Patient Admission Status: Inpatient   Readmission Risk (Low < 19, Mod (19-27), High > 27): Readmission Risk Score: 24.6    Current PCP: Regina Hoyt MD  PCP verified by ? Yes    Chart Reviewed: Yes      History Provided by: Patient  Patient Orientation: Alert and Oriented    Patient Cognition: Alert    Hospitalization in the last 30 days (Readmission):  Yes    If yes, Readmission Assessment in  Navigator will be completed. Advance Directives:      Code Status: Full Code   Patient's Primary Decision Maker is: Legal Next of Kin    Primary Decision MakerZayra Patino - Spouse - [phone]    Secondary Decision Maker: Carmen Arteaga Child - [phone]    Discharge Planning:    Patient lives with: Spouse/Significant Other Type of Home: House  Primary Care Giver: Family  Patient Support Systems include: Spouse/Significant Other, Family Members   Current Financial resources: Medicare  Current community resources: ECF/Home Care  Current services prior to admission: Durable Medical Equipment, Oxygen Therapy            Current DME: Oxygen Therapy (Comment), Home Aerosol, Wheelchair, Walker, Shower Chair, Cane, Glucometer, Bedside Commode            Type of Home Care services:  Nursing Services, [place], [place]    ADLS  Prior functional level:  Independent in ADLs/IADLs, Assistance with the following:, [address], Mobility, Toileting, Cooking, Shopping,

## 2023-09-14 NOTE — ED TRIAGE NOTES
Mode of arrival (squad #, walk in, police, etc) : Suzanne Gowers. Chief complaint(s): fatigue    Arrival Note (brief scenario, treatment PTA, etc). : Pt arrives to ED c/o fatigue x 1.5 weeks, diarrhea and bilateral LL edema. C= \"Have you ever felt that you should Cut down on your drinking? \"  No  A= \"Have people Annoyed you by criticizing your drinking? \"  No  G= \"Have you ever felt bad or Guilty about your drinking? \"  No  E= \"Have you ever had a drink as an Eye-opener first thing in the morning to steady your nerves or to help a hangover? \"  No      Deferred []      Reason for deferring: N/A    *If yes to two or more: probable alcohol abuse. *

## 2023-09-15 LAB
ANION GAP SERPL CALCULATED.3IONS-SCNC: 11 MMOL/L (ref 9–17)
BASOPHILS # BLD: 0.1 K/UL (ref 0–0.2)
BASOPHILS NFR BLD: 1 % (ref 0–2)
BUN SERPL-MCNC: 37 MG/DL (ref 8–23)
C3 SERPL-MCNC: 200 MG/DL (ref 90–180)
C4 SERPL-MCNC: 30 MG/DL (ref 10–40)
CALCIUM SERPL-MCNC: 9 MG/DL (ref 8.6–10.4)
CHLORIDE SERPL-SCNC: 96 MMOL/L (ref 98–107)
CO2 SERPL-SCNC: 32 MMOL/L (ref 20–31)
CREAT SERPL-MCNC: 2.2 MG/DL (ref 0.5–0.9)
EOSINOPHIL # BLD: 0.3 K/UL (ref 0–0.4)
EOSINOPHILS RELATIVE PERCENT: 3 % (ref 0–4)
ERYTHROCYTE [DISTWIDTH] IN BLOOD BY AUTOMATED COUNT: 17.7 % (ref 11.5–14.9)
GFR SERPL CREATININE-BSD FRML MDRD: 24 ML/MIN/1.73M2
GLUCOSE BLD-MCNC: 130 MG/DL (ref 65–105)
GLUCOSE BLD-MCNC: 172 MG/DL (ref 65–105)
GLUCOSE BLD-MCNC: 193 MG/DL (ref 65–105)
GLUCOSE BLD-MCNC: 199 MG/DL (ref 65–105)
GLUCOSE SERPL-MCNC: 129 MG/DL (ref 70–99)
HCT VFR BLD AUTO: 28.6 % (ref 36–46)
HGB BLD-MCNC: 9 G/DL (ref 12–16)
LYMPHOCYTES NFR BLD: 1.1 K/UL (ref 1–4.8)
LYMPHOCYTES RELATIVE PERCENT: 10 % (ref 24–44)
MCH RBC QN AUTO: 27.8 PG (ref 26–34)
MCHC RBC AUTO-ENTMCNC: 31.6 G/DL (ref 31–37)
MCV RBC AUTO: 88.1 FL (ref 80–100)
MONOCYTES NFR BLD: 0.8 K/UL (ref 0.1–1.3)
MONOCYTES NFR BLD: 7 % (ref 1–7)
NEUTROPHILS NFR BLD: 79 % (ref 36–66)
NEUTS SEG NFR BLD: 9.1 K/UL (ref 1.3–9.1)
PLATELET # BLD AUTO: 266 K/UL (ref 150–450)
PMV BLD AUTO: 8.5 FL (ref 6–12)
POTASSIUM SERPL-SCNC: 4.6 MMOL/L (ref 3.7–5.3)
RBC # BLD AUTO: 3.25 M/UL (ref 4–5.2)
SODIUM SERPL-SCNC: 139 MMOL/L (ref 135–144)
WBC OTHER # BLD: 11.4 K/UL (ref 3.5–11)

## 2023-09-15 PROCEDURE — 80048 BASIC METABOLIC PNL TOTAL CA: CPT

## 2023-09-15 PROCEDURE — 97110 THERAPEUTIC EXERCISES: CPT

## 2023-09-15 PROCEDURE — 6370000000 HC RX 637 (ALT 250 FOR IP): Performed by: FAMILY MEDICINE

## 2023-09-15 PROCEDURE — 97116 GAIT TRAINING THERAPY: CPT

## 2023-09-15 PROCEDURE — 6360000002 HC RX W HCPCS: Performed by: FAMILY MEDICINE

## 2023-09-15 PROCEDURE — 1200000000 HC SEMI PRIVATE

## 2023-09-15 PROCEDURE — 99232 SBSQ HOSP IP/OBS MODERATE 35: CPT | Performed by: FAMILY MEDICINE

## 2023-09-15 PROCEDURE — 85025 COMPLETE CBC W/AUTO DIFF WBC: CPT

## 2023-09-15 PROCEDURE — 36415 COLL VENOUS BLD VENIPUNCTURE: CPT

## 2023-09-15 PROCEDURE — 97530 THERAPEUTIC ACTIVITIES: CPT

## 2023-09-15 PROCEDURE — 82947 ASSAY GLUCOSE BLOOD QUANT: CPT

## 2023-09-15 PROCEDURE — 6360000002 HC RX W HCPCS: Performed by: EMERGENCY MEDICINE

## 2023-09-15 RX ORDER — POTASSIUM CHLORIDE 20 MEQ/1
20 TABLET, EXTENDED RELEASE ORAL
Status: DISCONTINUED | OUTPATIENT
Start: 2023-09-16 | End: 2023-09-17 | Stop reason: HOSPADM

## 2023-09-15 RX ADMIN — OXYCODONE HYDROCHLORIDE AND ACETAMINOPHEN 1 TABLET: 5; 325 TABLET ORAL at 21:23

## 2023-09-15 RX ADMIN — CYANOCOBALAMIN TAB 1000 MCG 1000 MCG: 1000 TAB at 07:38

## 2023-09-15 RX ADMIN — MULTIPLE VITAMINS W/ MINERALS TAB 1 TABLET: TAB at 07:38

## 2023-09-15 RX ADMIN — LINACLOTIDE 145 MCG: 145 CAPSULE, GELATIN COATED ORAL at 06:33

## 2023-09-15 RX ADMIN — POTASSIUM CHLORIDE 20 MEQ: 1500 TABLET, EXTENDED RELEASE ORAL at 07:38

## 2023-09-15 RX ADMIN — Medication 200 MG: at 21:25

## 2023-09-15 RX ADMIN — FAMOTIDINE 10 MG: 20 TABLET ORAL at 07:38

## 2023-09-15 RX ADMIN — OXYCODONE HYDROCHLORIDE AND ACETAMINOPHEN 1 TABLET: 5; 325 TABLET ORAL at 06:36

## 2023-09-15 RX ADMIN — CALCIUM CARBONATE 600 MG (1,500 MG)-VITAMIN D3 400 UNIT TABLET 1 TABLET: at 21:21

## 2023-09-15 RX ADMIN — PREGABALIN 150 MG: 150 CAPSULE ORAL at 21:22

## 2023-09-15 RX ADMIN — ATORVASTATIN CALCIUM 40 MG: 40 TABLET, FILM COATED ORAL at 21:21

## 2023-09-15 RX ADMIN — LEVOFLOXACIN 750 MG: 5 INJECTION, SOLUTION INTRAVENOUS at 23:18

## 2023-09-15 RX ADMIN — ENOXAPARIN SODIUM 30 MG: 100 INJECTION SUBCUTANEOUS at 07:37

## 2023-09-15 RX ADMIN — VENLAFAXINE HYDROCHLORIDE 37.5 MG: 37.5 CAPSULE, EXTENDED RELEASE ORAL at 07:45

## 2023-09-15 RX ADMIN — CALCIUM CARBONATE 600 MG (1,500 MG)-VITAMIN D3 400 UNIT TABLET 1 TABLET: at 07:38

## 2023-09-15 RX ADMIN — CARVEDILOL 12.5 MG: 12.5 TABLET, FILM COATED ORAL at 16:49

## 2023-09-15 RX ADMIN — PREGABALIN 150 MG: 150 CAPSULE ORAL at 08:00

## 2023-09-15 RX ADMIN — CARVEDILOL 12.5 MG: 12.5 TABLET, FILM COATED ORAL at 07:38

## 2023-09-15 ASSESSMENT — PAIN DESCRIPTION - ORIENTATION
ORIENTATION: LEFT;RIGHT
ORIENTATION: RIGHT;LEFT;LOWER

## 2023-09-15 ASSESSMENT — PAIN DESCRIPTION - LOCATION
LOCATION: KNEE
LOCATION: KNEE
LOCATION: BACK;KNEE

## 2023-09-15 ASSESSMENT — PAIN DESCRIPTION - DESCRIPTORS
DESCRIPTORS: ACHING;STABBING
DESCRIPTORS: ACHING

## 2023-09-15 ASSESSMENT — PAIN SCALES - GENERAL
PAINLEVEL_OUTOF10: 8
PAINLEVEL_OUTOF10: 10

## 2023-09-15 NOTE — CARE COORDINATION
Call placed placed to admissions for [facility] of  to see if they received referral. Writer spoke with Jay Flores and she stated Bello stepped out of the office. She will have someone call me back to see if they can accept. Electronically signed by Dominick Borja RN on 9/15/2023 at 3:27 PM     Writer spoke with Bello at [facility] of [address] She is reviewing patient. Faxed face sheet.  .Electronically signed by Dominick Borja RN on 9/15/2023 at [address] [place] [address] INFORMED CONSENT:  Obtained prior to procedure by Dr. Anai Mishra                                              Consent placed in chart. ASSESSMENT:  Patient alert and oriented and aware of procedure to be done. No distress noted. BARRIER PRECAUTIONS & STERILE TECHNIQUE:               Pt remains in in patient bed and placed on Vital Signs Monitor. Pt prepped and draped in a sterile fashion with chlorhexadine. PAIN/LOCAL ANESTHESIA/SEDATION MANAGEMENT:           Local: Lidocaine 1% given by Dr. Ciro Byersg:           Lali Hayes:  6953 with One Step          US/FLUORO: US guided with  3  Images taken    1240--cloudy yellow fluid returns    1259--Total of 1950cc of cloudy yellow fluid removed.             STERILE DRESSINGS: bandaid    SPECIMENS: 700cc cloudy yellow fluid sent to lab    EBL:      Less than 1 cc    FOLLOW- UP X-RAY: stat chest xray    REPORT CALLED TO: Delores PEREA RN

## 2023-09-15 NOTE — CARE COORDINATION
Patient's spouse and daughter, Dickson Diaz, present at bedside. She is agreeable to go to SNF for short stay. Choices given;  [facility] of [address]     Referral sent to [facility] of [address] [facility]. Will follow up to see if they can accept prior to sending to other choices. Electronically signed by Giorgi Oscar RN on 9/15/2023 at [address] received a call from Osbaldo at [facility] of [address]. They are OON and patient does not have OON benefits. Will send referral to 2nd choice.  Electronically signed by Giorgi Oscar RN on 9/15/2023 at 2:11 PM

## 2023-09-15 NOTE — PLAN OF CARE
Problem: Discharge Planning  Goal: Discharge to home or other facility with appropriate resources  Outcome: Progressing     Problem: Safety - Adult  Goal: Free from fall injury  Outcome: Progressing     Problem: Skin/Tissue Integrity  Goal: Absence of new skin breakdown  Description: 1. Monitor for areas of redness and/or skin breakdown  2. Assess vascular access sites hourly  3. Every 4-6 hours minimum:  Change oxygen saturation probe site  4. Every 4-6 hours:  If on nasal continuous positive airway pressure, respiratory therapy assess nares and determine need for appliance change or resting period.   Outcome: Progressing     Problem: Chronic Conditions and Co-morbidities  Goal: Patient's chronic conditions and co-morbidity symptoms are monitored and maintained or improved  Outcome: Progressing     Problem: ABCDS Injury Assessment  Goal: Absence of physical injury  Outcome: Progressing

## 2023-09-15 NOTE — CARE COORDINATION
ONGOING DISCHARGE PLAN:    Patient is alert and oriented x4. Spoke with patient regarding discharge plan and patient confirms that plan is still home with [facility]. Discussed PT/OT recommendations for SNF and max assist of 2, also readmit within 2 weeks. She will consider rehab at discharge. Post Acute Facility/Agency List     Provided patient with the following list, the list includes the overall star ratings obtained from CMS per the Medicare Web site (www.Medicare.gov):     [] [address]  [] Acute Inpatient Rehabilitation Facilities  [x] Skilled Nursing Facilities  [] Home Care    Provided verbal instructions on how to utilize the QR Code to obtain additional detailed star ratings from www. Medicare. gov     offered to print and provide the detailed list:    [x]Accepted   []Declined      IV levaquin-pneumonia    Cdiff negative    PT/OT    Creatinine improving 2.2, k 4.6, nephro consult    Will continue to follow for additional discharge needs. If patient is discharged prior to next notation, then this note serves as note for discharge by case management.     Electronically signed by Nathaly Ramsay RN on 9/15/2023 at 9:10 AM

## 2023-09-15 NOTE — PLAN OF CARE
Problem: Discharge Planning  Goal: Discharge to home or other facility with appropriate resources  9/14/2023 1616 by Kj Mendez RN  Outcome: Progressing     Problem: Safety - Adult  Goal: Free from fall injury  9/14/2023 2346 by Travis Hughes RN  Outcome: Progressing  9/14/2023 1616 by Kj Mendez RN  Outcome: Progressing     Problem: Skin/Tissue Integrity  Goal: Absence of new skin breakdown  Description: 1. Monitor for areas of redness and/or skin breakdown  2. Assess vascular access sites hourly  3. Every 4-6 hours minimum:  Change oxygen saturation probe site  4. Every 4-6 hours:  If on nasal continuous positive airway pressure, respiratory therapy assess nares and determine need for appliance change or resting period.   9/14/2023 2346 by Travis Hughes RN  Outcome: Progressing  9/14/2023 1616 by Kj Mendez RN  Outcome: Progressing     Problem: Chronic Conditions and Co-morbidities  Goal: Patient's chronic conditions and co-morbidity symptoms are monitored and maintained or improved  9/14/2023 2346 by Travis Hughes RN  Outcome: Progressing  9/14/2023 1616 by Kj Mendez RN  Outcome: Progressing     Problem: ABCDS Injury Assessment  Goal: Absence of physical injury  9/14/2023 2346 by Travis Hughes RN  Outcome: Progressing  9/14/2023 1616 by Kj Mendez RN  Outcome: Progressing

## 2023-09-16 ENCOUNTER — APPOINTMENT (OUTPATIENT)
Dept: GENERAL RADIOLOGY | Age: 68
DRG: 871 | End: 2023-09-16
Attending: FAMILY MEDICINE
Payer: MEDICARE

## 2023-09-16 PROBLEM — J96.21 ACUTE ON CHRONIC RESPIRATORY FAILURE WITH HYPOXIA (HCC): Status: ACTIVE | Noted: 2023-09-16

## 2023-09-16 LAB
ANION GAP SERPL CALCULATED.3IONS-SCNC: 9 MMOL/L (ref 9–17)
BASOPHILS # BLD: 0.1 K/UL (ref 0–0.2)
BASOPHILS NFR BLD: 1 % (ref 0–2)
BUN SERPL-MCNC: 26 MG/DL (ref 8–23)
CALCIUM SERPL-MCNC: 9.4 MG/DL (ref 8.6–10.4)
CHLORIDE SERPL-SCNC: 100 MMOL/L (ref 98–107)
CO2 SERPL-SCNC: 30 MMOL/L (ref 20–31)
CREAT SERPL-MCNC: 1.6 MG/DL (ref 0.5–0.9)
EOSINOPHIL # BLD: 0.3 K/UL (ref 0–0.4)
EOSINOPHILS RELATIVE PERCENT: 3 % (ref 0–4)
ERYTHROCYTE [DISTWIDTH] IN BLOOD BY AUTOMATED COUNT: 17 % (ref 11.5–14.9)
FERRITIN SERPL-MCNC: 241 NG/ML (ref 13–150)
FREE KAPPA/LAMBDA RATIO: 1.41 (ref 0.26–1.65)
GFR SERPL CREATININE-BSD FRML MDRD: 35 ML/MIN/1.73M2
GLUCOSE BLD-MCNC: 122 MG/DL (ref 65–105)
GLUCOSE BLD-MCNC: 145 MG/DL (ref 65–105)
GLUCOSE BLD-MCNC: 152 MG/DL (ref 65–105)
GLUCOSE BLD-MCNC: 155 MG/DL (ref 65–105)
GLUCOSE SERPL-MCNC: 124 MG/DL (ref 70–99)
HCT VFR BLD AUTO: 29 % (ref 36–46)
HGB BLD-MCNC: 9.1 G/DL (ref 12–16)
IRON SATN MFR SERPL: 12 % (ref 20–55)
IRON SERPL-MCNC: 29 UG/DL (ref 37–145)
KAPPA LC FREE SER-MCNC: 82.3 MG/L (ref 3.7–19.4)
LAMBDA LC FREE SERPL-MCNC: 58.5 MG/L (ref 5.7–26.3)
LYMPHOCYTES NFR BLD: 0.9 K/UL (ref 1–4.8)
LYMPHOCYTES RELATIVE PERCENT: 9 % (ref 24–44)
MCH RBC QN AUTO: 28 PG (ref 26–34)
MCHC RBC AUTO-ENTMCNC: 31.5 G/DL (ref 31–37)
MCV RBC AUTO: 89 FL (ref 80–100)
MONOCYTES NFR BLD: 0.6 K/UL (ref 0.1–1.3)
MONOCYTES NFR BLD: 6 % (ref 1–7)
NEUTROPHILS NFR BLD: 81 % (ref 36–66)
NEUTS SEG NFR BLD: 8.2 K/UL (ref 1.3–9.1)
PLATELET # BLD AUTO: 266 K/UL (ref 150–450)
PMV BLD AUTO: 8.5 FL (ref 6–12)
POTASSIUM SERPL-SCNC: 4.3 MMOL/L (ref 3.7–5.3)
RBC # BLD AUTO: 3.26 M/UL (ref 4–5.2)
SODIUM SERPL-SCNC: 139 MMOL/L (ref 135–144)
TIBC SERPL-MCNC: 247 UG/DL (ref 250–450)
UNSATURATED IRON BINDING CAPACITY: 218 UG/DL (ref 112–347)
WBC OTHER # BLD: 10.2 K/UL (ref 3.5–11)

## 2023-09-16 PROCEDURE — 99232 SBSQ HOSP IP/OBS MODERATE 35: CPT | Performed by: FAMILY MEDICINE

## 2023-09-16 PROCEDURE — 82947 ASSAY GLUCOSE BLOOD QUANT: CPT

## 2023-09-16 PROCEDURE — 6370000000 HC RX 637 (ALT 250 FOR IP): Performed by: INTERNAL MEDICINE

## 2023-09-16 PROCEDURE — 36415 COLL VENOUS BLD VENIPUNCTURE: CPT

## 2023-09-16 PROCEDURE — 6360000002 HC RX W HCPCS: Performed by: FAMILY MEDICINE

## 2023-09-16 PROCEDURE — 74018 RADEX ABDOMEN 1 VIEW: CPT

## 2023-09-16 PROCEDURE — 80048 BASIC METABOLIC PNL TOTAL CA: CPT

## 2023-09-16 PROCEDURE — 1200000000 HC SEMI PRIVATE

## 2023-09-16 PROCEDURE — 6370000000 HC RX 637 (ALT 250 FOR IP): Performed by: FAMILY MEDICINE

## 2023-09-16 PROCEDURE — 85025 COMPLETE CBC W/AUTO DIFF WBC: CPT

## 2023-09-16 PROCEDURE — 2580000003 HC RX 258: Performed by: FAMILY MEDICINE

## 2023-09-16 RX ORDER — LEVOFLOXACIN 500 MG/1
750 TABLET, FILM COATED ORAL EVERY OTHER DAY
Status: DISCONTINUED | OUTPATIENT
Start: 2023-09-16 | End: 2023-09-17 | Stop reason: HOSPADM

## 2023-09-16 RX ORDER — AMLODIPINE BESYLATE 5 MG/1
5 TABLET ORAL DAILY
Status: DISCONTINUED | OUTPATIENT
Start: 2023-09-16 | End: 2023-09-17 | Stop reason: HOSPADM

## 2023-09-16 RX ORDER — LOPERAMIDE HYDROCHLORIDE 2 MG/1
2 CAPSULE ORAL 4 TIMES DAILY PRN
Status: DISCONTINUED | OUTPATIENT
Start: 2023-09-16 | End: 2023-09-17 | Stop reason: HOSPADM

## 2023-09-16 RX ADMIN — VENLAFAXINE HYDROCHLORIDE 37.5 MG: 37.5 CAPSULE, EXTENDED RELEASE ORAL at 07:44

## 2023-09-16 RX ADMIN — FAMOTIDINE 10 MG: 20 TABLET ORAL at 07:37

## 2023-09-16 RX ADMIN — MULTIPLE VITAMINS W/ MINERALS TAB 1 TABLET: TAB at 07:36

## 2023-09-16 RX ADMIN — Medication 200 MG: at 21:32

## 2023-09-16 RX ADMIN — CARVEDILOL 12.5 MG: 12.5 TABLET, FILM COATED ORAL at 15:50

## 2023-09-16 RX ADMIN — LEVOFLOXACIN 750 MG: 500 TABLET, FILM COATED ORAL at 14:18

## 2023-09-16 RX ADMIN — OXYCODONE HYDROCHLORIDE AND ACETAMINOPHEN 1 TABLET: 5; 325 TABLET ORAL at 07:36

## 2023-09-16 RX ADMIN — CYANOCOBALAMIN TAB 1000 MCG 1000 MCG: 1000 TAB at 07:37

## 2023-09-16 RX ADMIN — ATORVASTATIN CALCIUM 40 MG: 40 TABLET, FILM COATED ORAL at 21:32

## 2023-09-16 RX ADMIN — LINACLOTIDE 145 MCG: 145 CAPSULE, GELATIN COATED ORAL at 05:34

## 2023-09-16 RX ADMIN — PREGABALIN 150 MG: 150 CAPSULE ORAL at 07:49

## 2023-09-16 RX ADMIN — ENOXAPARIN SODIUM 30 MG: 100 INJECTION SUBCUTANEOUS at 07:35

## 2023-09-16 RX ADMIN — CALCIUM CARBONATE 600 MG (1,500 MG)-VITAMIN D3 400 UNIT TABLET 1 TABLET: at 21:32

## 2023-09-16 RX ADMIN — OXYCODONE HYDROCHLORIDE AND ACETAMINOPHEN 1 TABLET: 5; 325 TABLET ORAL at 18:22

## 2023-09-16 RX ADMIN — POTASSIUM CHLORIDE 20 MEQ: 1500 TABLET, EXTENDED RELEASE ORAL at 07:36

## 2023-09-16 RX ADMIN — SODIUM CHLORIDE: 9 INJECTION, SOLUTION INTRAVENOUS at 05:35

## 2023-09-16 RX ADMIN — CARVEDILOL 12.5 MG: 12.5 TABLET, FILM COATED ORAL at 07:36

## 2023-09-16 RX ADMIN — LOPERAMIDE HYDROCHLORIDE 2 MG: 2 CAPSULE ORAL at 10:29

## 2023-09-16 RX ADMIN — AMLODIPINE BESYLATE 5 MG: 5 TABLET ORAL at 15:50

## 2023-09-16 RX ADMIN — PREGABALIN 150 MG: 150 CAPSULE ORAL at 21:32

## 2023-09-16 RX ADMIN — DICLOFENAC SODIUM 4 G: 10 GEL TOPICAL at 21:37

## 2023-09-16 RX ADMIN — CALCIUM CARBONATE 600 MG (1,500 MG)-VITAMIN D3 400 UNIT TABLET 1 TABLET: at 07:36

## 2023-09-16 ASSESSMENT — PAIN SCALES - GENERAL
PAINLEVEL_OUTOF10: 6
PAINLEVEL_OUTOF10: 9
PAINLEVEL_OUTOF10: 5

## 2023-09-16 ASSESSMENT — PAIN DESCRIPTION - LOCATION
LOCATION: BACK;KNEE
LOCATION: BACK;KNEE

## 2023-09-16 NOTE — PLAN OF CARE
Problem: Discharge Planning  Goal: Discharge to home or other facility with appropriate resources  9/16/2023 1559 by Regina Arndt RN  Outcome: Progressing  9/16/2023 0321 by Gallo Toscano RN  Outcome: Progressing  Flowsheets (Taken 9/16/2023 0321)  Discharge to home or other facility with appropriate resources:   Identify barriers to discharge with patient and caregiver   Arrange for needed discharge resources and transportation as appropriate   Identify discharge learning needs (meds, wound care, etc)   Arrange for interpreters to assist at discharge as needed   Refer to discharge planning if patient needs post-hospital services based on physician order or complex needs related to functional status, cognitive ability or social support system     Problem: Safety - Adult  Goal: Free from fall injury  9/16/2023 1559 by Regina Arndt RN  Outcome: Progressing  9/16/2023 0321 by Gallo Toscano RN  Outcome: Progressing  Flowsheets (Taken 9/16/2023 0321)  Free From Fall Injury: Instruct family/caregiver on patient safety     Problem: Skin/Tissue Integrity  Goal: Absence of new skin breakdown  Description: 1. Monitor for areas of redness and/or skin breakdown  2. Assess vascular access sites hourly  3. Every 4-6 hours minimum:  Change oxygen saturation probe site  4. Every 4-6 hours:  If on nasal continuous positive airway pressure, respiratory therapy assess nares and determine need for appliance change or resting period.   9/16/2023 1559 by Regina Arndt RN  Outcome: Progressing  9/16/2023 0321 by Gallo Toscano RN  Outcome: Progressing  Note: Monitoring skin     Problem: Chronic Conditions and Co-morbidities  Goal: Patient's chronic conditions and co-morbidity symptoms are monitored and maintained or improved  9/16/2023 1559 by Regina Arndt RN  Outcome: Progressing  9/16/2023 0321 by Gallo Toscano RN  Outcome: Progressing  Flowsheets (Taken

## 2023-09-16 NOTE — CARE COORDINATION
DISCHARGE PLANNING NOTE:    Plan remains for patient to be discharged to SNF. [facility]. Will need pre-cert to be started once accepted.      Electronically signed by Perla Pickens RN on 9/16/2023 at 3:15 PM

## 2023-09-16 NOTE — PLAN OF CARE
Problem: Discharge Planning  Goal: Discharge to home or other facility with appropriate resources  9/16/2023 0321 by Pastor Remington RN  Outcome: Progressing  Flowsheets (Taken 9/16/2023 0321)  Discharge to home or other facility with appropriate resources:   Identify barriers to discharge with patient and caregiver   Arrange for needed discharge resources and transportation as appropriate   Identify discharge learning needs (meds, wound care, etc)   Arrange for interpreters to assist at discharge as needed   Refer to discharge planning if patient needs post-hospital services based on physician order or complex needs related to functional status, cognitive ability or social support system  9/15/2023 1811 by Mendel Mclaughlin RN  Outcome: Progressing     Problem: Safety - Adult  Goal: Free from fall injury  9/16/2023 0321 by Pastor Remington RN  Outcome: Progressing  Flowsheets (Taken 9/16/2023 0321)  Free From Fall Injury: Instruct family/caregiver on patient safety  9/15/2023 1811 by Mendel Mclaughlin RN  Outcome: Progressing     Problem: Skin/Tissue Integrity  Goal: Absence of new skin breakdown  Description: 1. Monitor for areas of redness and/or skin breakdown  2. Assess vascular access sites hourly  3. Every 4-6 hours minimum:  Change oxygen saturation probe site  4. Every 4-6 hours:  If on nasal continuous positive airway pressure, respiratory therapy assess nares and determine need for appliance change or resting period.   9/16/2023 0321 by Pastor Remington RN  Outcome: Progressing  Note: Monitoring skin  9/15/2023 1811 by Mendel Mclaughlin RN  Outcome: Progressing     Problem: Chronic Conditions and Co-morbidities  Goal: Patient's chronic conditions and co-morbidity symptoms are monitored and maintained or improved  9/16/2023 0321 by Pastor Remington RN  Outcome: Progressing  Flowsheets (Taken 9/16/2023 0321)  Care Plan - Patient's Chronic Conditions and

## 2023-09-17 VITALS
HEART RATE: 59 BPM | SYSTOLIC BLOOD PRESSURE: 155 MMHG | HEIGHT: 60 IN | BODY MASS INDEX: 57.52 KG/M2 | RESPIRATION RATE: 16 BRPM | OXYGEN SATURATION: 100 % | TEMPERATURE: 97.7 F | WEIGHT: 293 LBS | DIASTOLIC BLOOD PRESSURE: 60 MMHG

## 2023-09-17 LAB
ANION GAP SERPL CALCULATED.3IONS-SCNC: 8 MMOL/L (ref 9–17)
BASOPHILS # BLD: 0.1 K/UL (ref 0–0.2)
BASOPHILS NFR BLD: 1 % (ref 0–2)
BUN SERPL-MCNC: 18 MG/DL (ref 8–23)
CALCIUM SERPL-MCNC: 9.8 MG/DL (ref 8.6–10.4)
CHLORIDE SERPL-SCNC: 100 MMOL/L (ref 98–107)
CO2 SERPL-SCNC: 29 MMOL/L (ref 20–31)
CREAT SERPL-MCNC: 1.4 MG/DL (ref 0.5–0.9)
EOSINOPHIL # BLD: 0.4 K/UL (ref 0–0.4)
EOSINOPHILS RELATIVE PERCENT: 3 % (ref 0–4)
ERYTHROCYTE [DISTWIDTH] IN BLOOD BY AUTOMATED COUNT: 17.3 % (ref 11.5–14.9)
GFR SERPL CREATININE-BSD FRML MDRD: 41 ML/MIN/1.73M2
GLUCOSE BLD-MCNC: 155 MG/DL (ref 65–105)
GLUCOSE BLD-MCNC: 155 MG/DL (ref 65–105)
GLUCOSE SERPL-MCNC: 163 MG/DL (ref 70–99)
HCT VFR BLD AUTO: 28.3 % (ref 36–46)
HGB BLD-MCNC: 8.8 G/DL (ref 12–16)
LYMPHOCYTES NFR BLD: 1 K/UL (ref 1–4.8)
LYMPHOCYTES RELATIVE PERCENT: 8 % (ref 24–44)
MCH RBC QN AUTO: 27.6 PG (ref 26–34)
MCHC RBC AUTO-ENTMCNC: 31.1 G/DL (ref 31–37)
MCV RBC AUTO: 88.8 FL (ref 80–100)
MONOCYTES NFR BLD: 0.7 K/UL (ref 0.1–1.3)
MONOCYTES NFR BLD: 6 % (ref 1–7)
NEUTROPHILS NFR BLD: 82 % (ref 36–66)
NEUTS SEG NFR BLD: 10.8 K/UL (ref 1.3–9.1)
PLATELET # BLD AUTO: 262 K/UL (ref 150–450)
PMV BLD AUTO: 9.4 FL (ref 6–12)
POTASSIUM SERPL-SCNC: 4.1 MMOL/L (ref 3.7–5.3)
RBC # BLD AUTO: 3.19 M/UL (ref 4–5.2)
SODIUM SERPL-SCNC: 137 MMOL/L (ref 135–144)
WBC OTHER # BLD: 13.1 K/UL (ref 3.5–11)

## 2023-09-17 PROCEDURE — 99239 HOSP IP/OBS DSCHRG MGMT >30: CPT | Performed by: FAMILY MEDICINE

## 2023-09-17 PROCEDURE — 85025 COMPLETE CBC W/AUTO DIFF WBC: CPT

## 2023-09-17 PROCEDURE — 36415 COLL VENOUS BLD VENIPUNCTURE: CPT

## 2023-09-17 PROCEDURE — 6360000002 HC RX W HCPCS: Performed by: FAMILY MEDICINE

## 2023-09-17 PROCEDURE — 6370000000 HC RX 637 (ALT 250 FOR IP): Performed by: INTERNAL MEDICINE

## 2023-09-17 PROCEDURE — 82947 ASSAY GLUCOSE BLOOD QUANT: CPT

## 2023-09-17 PROCEDURE — 80048 BASIC METABOLIC PNL TOTAL CA: CPT

## 2023-09-17 PROCEDURE — 6370000000 HC RX 637 (ALT 250 FOR IP): Performed by: FAMILY MEDICINE

## 2023-09-17 RX ORDER — ENOXAPARIN SODIUM 100 MG/ML
40 INJECTION SUBCUTANEOUS DAILY
Status: DISCONTINUED | OUTPATIENT
Start: 2023-09-18 | End: 2023-09-17

## 2023-09-17 RX ORDER — ENOXAPARIN SODIUM 100 MG/ML
30 INJECTION SUBCUTANEOUS 2 TIMES DAILY
Status: DISCONTINUED | OUTPATIENT
Start: 2023-09-17 | End: 2023-09-17 | Stop reason: HOSPADM

## 2023-09-17 RX ADMIN — PREGABALIN 150 MG: 150 CAPSULE ORAL at 08:37

## 2023-09-17 RX ADMIN — AMLODIPINE BESYLATE 5 MG: 5 TABLET ORAL at 08:36

## 2023-09-17 RX ADMIN — CYANOCOBALAMIN TAB 1000 MCG 1000 MCG: 1000 TAB at 08:37

## 2023-09-17 RX ADMIN — ENOXAPARIN SODIUM 30 MG: 100 INJECTION SUBCUTANEOUS at 08:36

## 2023-09-17 RX ADMIN — CALCIUM CARBONATE 600 MG (1,500 MG)-VITAMIN D3 400 UNIT TABLET 1 TABLET: at 08:37

## 2023-09-17 RX ADMIN — VENLAFAXINE HYDROCHLORIDE 37.5 MG: 37.5 CAPSULE, EXTENDED RELEASE ORAL at 08:41

## 2023-09-17 RX ADMIN — ENOXAPARIN SODIUM 30 MG: 100 INJECTION SUBCUTANEOUS at 09:32

## 2023-09-17 RX ADMIN — FAMOTIDINE 10 MG: 20 TABLET ORAL at 08:37

## 2023-09-17 RX ADMIN — ACETAMINOPHEN 650 MG: 325 TABLET ORAL at 05:27

## 2023-09-17 RX ADMIN — POTASSIUM CHLORIDE 20 MEQ: 1500 TABLET, EXTENDED RELEASE ORAL at 08:37

## 2023-09-17 RX ADMIN — OXYCODONE HYDROCHLORIDE AND ACETAMINOPHEN 1 TABLET: 5; 325 TABLET ORAL at 00:39

## 2023-09-17 RX ADMIN — CARVEDILOL 12.5 MG: 12.5 TABLET, FILM COATED ORAL at 08:36

## 2023-09-17 RX ADMIN — LINACLOTIDE 145 MCG: 145 CAPSULE, GELATIN COATED ORAL at 08:36

## 2023-09-17 RX ADMIN — OXYCODONE HYDROCHLORIDE AND ACETAMINOPHEN 1 TABLET: 5; 325 TABLET ORAL at 08:37

## 2023-09-17 RX ADMIN — MULTIPLE VITAMINS W/ MINERALS TAB 1 TABLET: TAB at 08:36

## 2023-09-17 ASSESSMENT — PAIN SCALES - GENERAL
PAINLEVEL_OUTOF10: 5
PAINLEVEL_OUTOF10: 5
PAINLEVEL_OUTOF10: 2
PAINLEVEL_OUTOF10: 8
PAINLEVEL_OUTOF10: 10

## 2023-09-17 ASSESSMENT — PAIN DESCRIPTION - ORIENTATION
ORIENTATION: LOWER
ORIENTATION: LEFT

## 2023-09-17 ASSESSMENT — PAIN DESCRIPTION - LOCATION
LOCATION: SHOULDER
LOCATION: BACK

## 2023-09-17 ASSESSMENT — PAIN DESCRIPTION - DESCRIPTORS
DESCRIPTORS: DULL;ACHING
DESCRIPTORS: DULL

## 2023-09-17 NOTE — CARE COORDINATION
DISCHARGE PLANNING NOTE:    Writer informed that pt wants to go home. Spoke with pt and she is aware that PT/OT recommending SNF as she is a max assist of 2, however pt states her grandson is taking time off work and will stay with her. Spouse is here in the hospital at this time. Pt denies SNF and wants to go home with [facility] as she is already current with them. Pt states her grandson will be transporting her home. Called [facility] and notified Rj Dubon that pt will be d/c'ed home today. Senias will pull MARY LOU and d/c med list from [address]. Electronically signed by Criselda Rosales RN on 9/17/2023 at 12:38 PM    Pt's son here to pick her up, however he spoke with pt's , Christohp/his father who is in room 2051 and  does not feel pt is safe to go home. Son would not take patient home. Pt very upset about this. Wants us to set up wheelchair transportation, however they are not open on Sunday. Pt refuses ambulance. Pt states she will just walk. Explained to patient that she cannot walk as she is max of 2 assist to get up. Asked pt if she is even able to walk to the door to her room. She states \"If I slip and fall there will be one hell of a lawsuit. \" Explained to patient that is why we cannot allow her to walk home. Pt states she will find someone to come pick her. Adamantly refuses SNF stating \"every time I come to this hospital you always want to send me somewhere and I am NOT going! I had a bad experience before. \"  Pt states she can go home in a cab. Electronically signed by Criselda Rosales RN on 9/17/2023 at 1:53 PM    Writer called back into room as pt is now stating she is agreeable to go to [address] as long as her  is willing to go. Writer spoke with  who states he is NOT going, only the patient is. Writer explained to patient that she and her  will need to figure things out. Writer cannot be in the middle of this.   Unsure what the plan is

## 2023-09-17 NOTE — CARE COORDINATION
DISCHARGE PLANNING NOTE:     Plan remains for patient to be discharged to SNF. [facility]. Will need pre-cert to be started once accepted. CASSIDY Eller. Will continue to follow for additional discharge needs.     Electronically signed by Perla Pickens RN on 9/17/2023 at 8:22 AM

## 2023-09-17 NOTE — PLAN OF CARE
Problem: Discharge Planning  Goal: Discharge to home or other facility with appropriate resources  9/17/2023 1538 by Geraldine Edmondson RN  Outcome: Completed  9/17/2023 1538 by Geraldine Edmondson RN  Outcome: Progressing     Problem: Safety - Adult  Goal: Free from fall injury  9/17/2023 1538 by Geraldine Edmondson RN  Outcome: Completed  9/17/2023 1538 by Geraldine Edmondson RN  Outcome: Progressing  Flowsheets (Taken 9/17/2023 0308 by Dipesh Alba, RN)  Free From Fall Injury: Instruct family/caregiver on patient safety     Problem: Skin/Tissue Integrity  Goal: Absence of new skin breakdown  Description: 1. Monitor for areas of redness and/or skin breakdown  2. Assess vascular access sites hourly  3. Every 4-6 hours minimum:  Change oxygen saturation probe site  4. Every 4-6 hours:  If on nasal continuous positive airway pressure, respiratory therapy assess nares and determine need for appliance change or resting period.   9/17/2023 1538 by Geraldine Edmondson RN  Outcome: Completed  9/17/2023 1538 by Geraldine Edmondson RN  Outcome: Progressing     Problem: Chronic Conditions and Co-morbidities  Goal: Patient's chronic conditions and co-morbidity symptoms are monitored and maintained or improved  9/17/2023 1538 by Geraldine Edmondson RN  Outcome: Completed  9/17/2023 1538 by Geraldine Edmondson RN  Outcome: Progressing     Problem: ABCDS Injury Assessment  Goal: Absence of physical injury  9/17/2023 1538 by Geraldine dEmondson RN  Outcome: Completed  9/17/2023 1538 by Geraldine Edmondson RN  Outcome: Progressing  Flowsheets (Taken 9/17/2023 0308 by Dipesh Alba, RN)  Absence of Physical Injury: Implement safety measures based on patient assessment     Problem: Pain  Goal: Verbalizes/displays adequate comfort level or baseline comfort level  9/17/2023 1538 by Geraldine Edmondson RN  Outcome: Completed  9/17/2023 1538 by Geraldine Edmondson RN  Outcome: Progressing

## 2023-09-17 NOTE — CARE COORDINATION
Pt states she is now wanting to discharge home. Writer was informed by PCT that pt got up from chair and walked to the bathroom by herself with no additional help. Dr. Cristofer Thompson is ok with pt going home with [facility]. Discharge paperwork given to patient and questions/concerns addressed. IV pulled and belongings packed. Pt's cab is here - she is now leaving facility.

## 2023-09-17 NOTE — CARE COORDINATION
Discharge order in. Pt denying to go to SNF and says she wants to go home. Pt states she already has [facility] set up on her own from her last admission. Pt will have a ride home from her son. Frannie Caballero RN notified and will address situation with Ayana Montenegro.

## 2023-09-17 NOTE — CARE COORDINATION
IMM letter provided to patient. Patient offered four hours to make informed decision regarding appeal process; patient agreeable to discharge.      Electronically signed by Perla Pickens RN on 9/17/2023 at 12:38 PM

## 2023-09-18 ENCOUNTER — CARE COORDINATION (OUTPATIENT)
Dept: CASE MANAGEMENT | Age: 68
End: 2023-09-18

## 2023-09-18 ENCOUNTER — ENROLLMENT (OUTPATIENT)
Dept: CASE MANAGEMENT | Age: 68
End: 2023-09-18

## 2023-09-18 NOTE — CARE COORDINATION
Care Transitions Outreach Attempt    Call within 2 business days of discharge: Yes   Attempted to reach patient for transitions of care follow up. Unable to reach patient. Patient: Erasmo Seth Patient : 1955 MRN: [id number]    Last Discharge [address]       Date Complaint Diagnosis Description Type Department Provider    23 Fatigue Acute on chronic respiratory failure with hypoxia [facility]) ED to Hosp-Admission (Discharged) (ADMITTED) [facility] MED MARY Caitlin Roth MD; Kit Ho ... # 1 attempt-Attempted initial 24 hour hospital follow up call. Left a Hipaa compliant message with name and call back information. Requested return call to [phone]. Writer contacted [facility] spoke to [facility], confirmed GARIMA was received//JU    Was this an external facility discharge?  No Discharge Facility: [facility]    Noted following upcoming appointments from discharge chart review:   [facility] follow up appointment(s):   Future Appointments   Date Time Provider [address]   2023  2:00 PM Caitlin Roth MD 0[address] [facility] 2600 [facility] follow up appointment(s):

## 2023-09-19 ENCOUNTER — CARE COORDINATION (OUTPATIENT)
Dept: CASE MANAGEMENT | Age: 68
End: 2023-09-19

## 2023-09-19 LAB
MICROORGANISM SPEC CULT: NORMAL
MICROORGANISM SPEC CULT: NORMAL
SERVICE CMNT-IMP: NORMAL
SERVICE CMNT-IMP: NORMAL
SPECIMEN DESCRIPTION: NORMAL
SPECIMEN DESCRIPTION: NORMAL

## 2023-09-19 NOTE — CARE COORDINATION
Care Transitions Outreach Attempt    Call within 2 business days of discharge: Yes   Attempted to reach patient for transitions of care follow up. Unable to reach patient. Patient: Carri Schulte Patient : 1955 MRN: [id number]    Last Discharge [address]       Date Complaint Diagnosis Description Type Department Provider    23 Fatigue Acute on chronic respiratory failure with hypoxia [facility]) ED to Hosp-Admission (Discharged) (ADMITTED) [facility] MARY Zaida Cornelius MD; Mariama Barnhart ... # 2 attempt-Attempted initial 24 hour hospital follow up call. Left a Hipaa compliant message with name and call back information. Requested return call to [phone].     2 unsuccessful attempts to reach patient, writer tried multiple times and #s, care transitions episode resolved//JU    Was this an external facility discharge?  No Discharge Facility: [address]    Noted following upcoming appointments from discharge chart review:   [facility] follow up appointment(s):   Future Appointments   Date Time Provider [address] [place] [address]   2023  2:00 PM Zaida Cornelius MD [address] [facility] 2600 [facility] follow up appointment(s):

## 2023-09-20 LAB
ANA SER QL IA: NEGATIVE
DSDNA IGG SER QL IA: 1.5 IU/ML
NUCLEAR IGG SER IA-RTO: 0.5 U/ML

## 2023-09-20 NOTE — DISCHARGE SUMMARY
Tradjenta 5 MG tablet  Generic drug: linagliptin  TAKE 1 TABLET BY MOUTH EVERY DAY     venlafaxine 37.5 MG extended release capsule  Commonly known as: EFFEXOR XR  TAKE 1 CAPSULE BY MOUTH EVERY DAY     vitamin B-12 1000 MCG tablet  Commonly known as: CYANOCOBALAMIN  Take 1 tablet by mouth daily            STOP taking these medications      pioglitazone 45 MG tablet  Commonly known as: ACTOS              Consults:  none    Significant Diagnostic Studies:  labs    Treatments:   antibiotics    Disposition:   home  Follow up with Jovanna Farias MD in 1-2 weeks. Signed:   Clovis Lucero MD, FAAFP  9/20/2023, 4:10 PM

## 2023-09-21 LAB
ARTERIAL PATENCY WRIST A: ABNORMAL
COHGB MFR BLD: 3.6 % (ref 0–5)
HCO3 VENOUS: 34.2 MMOL/L (ref 24–30)
O2 SAT, VEN: 94.6 % (ref 60–85)
PCO2, VEN: 54.2 MM HG (ref 39–55)
PH VENOUS: 7.41 (ref 7.32–7.42)
PO2, VEN: 83.8 MM HG (ref 30–50)
POSITIVE BASE EXCESS, VEN: 9.5 MMOL/L (ref 0–2)
TEXT FOR RESPIRATORY: ABNORMAL

## 2023-09-23 ENCOUNTER — APPOINTMENT (OUTPATIENT)
Dept: CT IMAGING | Age: 68
End: 2023-09-23
Payer: MEDICARE

## 2023-09-23 ENCOUNTER — HOSPITAL ENCOUNTER (INPATIENT)
Age: 68
LOS: 6 days | Discharge: NURSING, REHAB OR LONG-TERM CARE | End: 2023-09-29
Attending: EMERGENCY MEDICINE | Admitting: FAMILY MEDICINE
Payer: MEDICARE

## 2023-09-23 ENCOUNTER — APPOINTMENT (OUTPATIENT)
Dept: GENERAL RADIOLOGY | Age: 68
End: 2023-09-23
Payer: MEDICARE
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
## 2023-09-23 DIAGNOSIS — M79.7 FIBROMYALGIA: ICD-10-CM

## 2023-09-23 DIAGNOSIS — G25.3 MYOCLONUS: ICD-10-CM

## 2023-09-23 DIAGNOSIS — R25.1 TREMOR: ICD-10-CM

## 2023-09-23 DIAGNOSIS — M54.2 CERVICALGIA: ICD-10-CM

## 2023-09-23 DIAGNOSIS — G89.4 CHRONIC PAIN SYNDROME: ICD-10-CM

## 2023-09-23 DIAGNOSIS — N17.9 AKI (ACUTE KIDNEY INJURY) (HCC): Primary | ICD-10-CM

## 2023-09-23 LAB
ALBUMIN SERPL-MCNC: 3.2 G/DL (ref 3.5–5.2)
ALP SERPL-CCNC: 75 U/L (ref 35–104)
ALT SERPL-CCNC: 15 U/L (ref 5–33)
ANION GAP SERPL CALCULATED.3IONS-SCNC: 15 MMOL/L (ref 9–17)
AST SERPL-CCNC: 25 U/L
BASOPHILS # BLD: 0.1 K/UL (ref 0–0.2)
BASOPHILS NFR BLD: 1 % (ref 0–2)
BILIRUB SERPL-MCNC: 0.2 MG/DL (ref 0.3–1.2)
BUN SERPL-MCNC: 33 MG/DL (ref 8–23)
CALCIUM SERPL-MCNC: 8.3 MG/DL (ref 8.6–10.4)
CHLORIDE SERPL-SCNC: 99 MMOL/L (ref 98–107)
CO2 SERPL-SCNC: 21 MMOL/L (ref 20–31)
CREAT SERPL-MCNC: 2.8 MG/DL (ref 0.5–0.9)
EOSINOPHIL # BLD: 0.5 K/UL (ref 0–0.4)
EOSINOPHILS RELATIVE PERCENT: 5 % (ref 0–4)
ERYTHROCYTE [DISTWIDTH] IN BLOOD BY AUTOMATED COUNT: 18 % (ref 11.5–14.9)
GFR SERPL CREATININE-BSD FRML MDRD: 18 ML/MIN/1.73M2
GLUCOSE SERPL-MCNC: 131 MG/DL (ref 70–99)
HCT VFR BLD AUTO: 32.7 % (ref 36–46)
HGB BLD-MCNC: 10 G/DL (ref 12–16)
INR PPP: 1
LYMPHOCYTES NFR BLD: 1.5 K/UL (ref 1–4.8)
LYMPHOCYTES RELATIVE PERCENT: 15 % (ref 24–44)
MCH RBC QN AUTO: 27.2 PG (ref 26–34)
MCHC RBC AUTO-ENTMCNC: 30.4 G/DL (ref 31–37)
MCV RBC AUTO: 89.4 FL (ref 80–100)
MONOCYTES NFR BLD: 0.9 K/UL (ref 0.1–1.3)
MONOCYTES NFR BLD: 9 % (ref 1–7)
NEUTROPHILS NFR BLD: 70 % (ref 36–66)
NEUTS SEG NFR BLD: 7.2 K/UL (ref 1.3–9.1)
PARTIAL THROMBOPLASTIN TIME: 19.9 SEC (ref 24–36)
PLATELET # BLD AUTO: 314 K/UL (ref 150–450)
PMV BLD AUTO: 8.2 FL (ref 6–12)
POTASSIUM SERPL-SCNC: 4.8 MMOL/L (ref 3.7–5.3)
PROT SERPL-MCNC: 7.4 G/DL (ref 6.4–8.3)
PROTHROMBIN TIME: 13.5 SEC (ref 11.8–14.6)
RBC # BLD AUTO: 3.66 M/UL (ref 4–5.2)
SODIUM SERPL-SCNC: 135 MMOL/L (ref 135–144)
WBC OTHER # BLD: 10.2 K/UL (ref 3.5–11)

## 2023-09-23 PROCEDURE — 73080 X-RAY EXAM OF ELBOW: CPT

## 2023-09-23 PROCEDURE — 36415 COLL VENOUS BLD VENIPUNCTURE: CPT

## 2023-09-23 PROCEDURE — 6370000000 HC RX 637 (ALT 250 FOR IP): Performed by: FAMILY MEDICINE

## 2023-09-23 PROCEDURE — 70450 CT HEAD/BRAIN W/O DYE: CPT

## 2023-09-23 PROCEDURE — 85610 PROTHROMBIN TIME: CPT

## 2023-09-23 PROCEDURE — 2580000003 HC RX 258: Performed by: EMERGENCY MEDICINE

## 2023-09-23 PROCEDURE — 85730 THROMBOPLASTIN TIME PARTIAL: CPT

## 2023-09-23 PROCEDURE — 85025 COMPLETE CBC W/AUTO DIFF WBC: CPT

## 2023-09-23 PROCEDURE — 71250 CT THORAX DX C-: CPT

## 2023-09-23 PROCEDURE — 99285 EMERGENCY DEPT VISIT HI MDM: CPT

## 2023-09-23 PROCEDURE — 80053 COMPREHEN METABOLIC PANEL: CPT

## 2023-09-23 PROCEDURE — 1200000000 HC SEMI PRIVATE

## 2023-09-23 PROCEDURE — 72125 CT NECK SPINE W/O DYE: CPT

## 2023-09-23 RX ORDER — ONDANSETRON 4 MG/1
4 TABLET, ORALLY DISINTEGRATING ORAL EVERY 8 HOURS PRN
Status: DISCONTINUED | OUTPATIENT
Start: 2023-09-23 | End: 2023-09-29 | Stop reason: HOSPADM

## 2023-09-23 RX ORDER — ENOXAPARIN SODIUM 100 MG/ML
30 INJECTION SUBCUTANEOUS DAILY
Status: DISCONTINUED | OUTPATIENT
Start: 2023-09-24 | End: 2023-09-25

## 2023-09-23 RX ORDER — UBIDECARENONE 200 MG
200 CAPSULE ORAL NIGHTLY
Status: DISCONTINUED | OUTPATIENT
Start: 2023-09-23 | End: 2023-09-29 | Stop reason: HOSPADM

## 2023-09-23 RX ORDER — FAMOTIDINE 20 MG/1
20 TABLET, FILM COATED ORAL 2 TIMES DAILY
Status: DISCONTINUED | OUTPATIENT
Start: 2023-09-23 | End: 2023-09-24

## 2023-09-23 RX ORDER — CETIRIZINE HYDROCHLORIDE 10 MG/1
5 TABLET ORAL DAILY PRN
Status: DISCONTINUED | OUTPATIENT
Start: 2023-09-23 | End: 2023-09-29 | Stop reason: HOSPADM

## 2023-09-23 RX ORDER — INSULIN LISPRO 100 [IU]/ML
0-8 INJECTION, SOLUTION INTRAVENOUS; SUBCUTANEOUS
Status: DISCONTINUED | OUTPATIENT
Start: 2023-09-24 | End: 2023-09-29 | Stop reason: HOSPADM

## 2023-09-23 RX ORDER — LOSARTAN POTASSIUM 25 MG/1
25 TABLET ORAL DAILY
Status: DISCONTINUED | OUTPATIENT
Start: 2023-09-24 | End: 2023-09-28

## 2023-09-23 RX ORDER — ALOGLIPTIN 6.25 MG/1
6.25 TABLET, FILM COATED ORAL DAILY
Status: DISCONTINUED | OUTPATIENT
Start: 2023-09-24 | End: 2023-09-29 | Stop reason: HOSPADM

## 2023-09-23 RX ORDER — CARVEDILOL 12.5 MG/1
12.5 TABLET ORAL 2 TIMES DAILY WITH MEALS
Status: DISCONTINUED | OUTPATIENT
Start: 2023-09-24 | End: 2023-09-29 | Stop reason: HOSPADM

## 2023-09-23 RX ORDER — SODIUM POLYSTYRENE SULFONATE 15 G/60ML
15 SUSPENSION ORAL; RECTAL
Status: DISPENSED | OUTPATIENT
Start: 2023-09-23 | End: 2023-09-24

## 2023-09-23 RX ORDER — SODIUM CHLORIDE 0.9 % (FLUSH) 0.9 %
5-40 SYRINGE (ML) INJECTION EVERY 12 HOURS SCHEDULED
Status: DISCONTINUED | OUTPATIENT
Start: 2023-09-23 | End: 2023-09-29 | Stop reason: HOSPADM

## 2023-09-23 RX ORDER — ONDANSETRON 2 MG/ML
4 INJECTION INTRAMUSCULAR; INTRAVENOUS EVERY 6 HOURS PRN
Status: DISCONTINUED | OUTPATIENT
Start: 2023-09-23 | End: 2023-09-29 | Stop reason: HOSPADM

## 2023-09-23 RX ORDER — LANOLIN ALCOHOL/MO/W.PET/CERES
1000 CREAM (GRAM) TOPICAL DAILY
Status: DISCONTINUED | OUTPATIENT
Start: 2023-09-24 | End: 2023-09-29 | Stop reason: HOSPADM

## 2023-09-23 RX ORDER — PSEUDOEPHEDRINE HCL 120 MG/1
120 TABLET, FILM COATED, EXTENDED RELEASE ORAL DAILY PRN
Status: DISCONTINUED | OUTPATIENT
Start: 2023-09-23 | End: 2023-09-29 | Stop reason: HOSPADM

## 2023-09-23 RX ORDER — DEXTROSE MONOHYDRATE 100 MG/ML
INJECTION, SOLUTION INTRAVENOUS CONTINUOUS PRN
Status: DISCONTINUED | OUTPATIENT
Start: 2023-09-23 | End: 2023-09-29 | Stop reason: HOSPADM

## 2023-09-23 RX ORDER — OXYCODONE HYDROCHLORIDE AND ACETAMINOPHEN 5; 325 MG/1; MG/1
1 TABLET ORAL EVERY 6 HOURS PRN
Status: DISCONTINUED | OUTPATIENT
Start: 2023-09-23 | End: 2023-09-29 | Stop reason: HOSPADM

## 2023-09-23 RX ORDER — SODIUM CHLORIDE 9 MG/ML
INJECTION, SOLUTION INTRAVENOUS CONTINUOUS
Status: DISCONTINUED | OUTPATIENT
Start: 2023-09-23 | End: 2023-09-26

## 2023-09-23 RX ORDER — POTASSIUM CHLORIDE 20 MEQ/1
20 TABLET, EXTENDED RELEASE ORAL 2 TIMES DAILY
Status: DISCONTINUED | OUTPATIENT
Start: 2023-09-23 | End: 2023-09-29 | Stop reason: HOSPADM

## 2023-09-23 RX ORDER — ONDANSETRON 4 MG/1
4 TABLET, FILM COATED ORAL 3 TIMES DAILY PRN
Status: DISCONTINUED | OUTPATIENT
Start: 2023-09-23 | End: 2023-09-29 | Stop reason: HOSPADM

## 2023-09-23 RX ORDER — CALCIUM CARBONATE/VITAMIN D3 600 MG-10
1 TABLET ORAL 2 TIMES DAILY
Status: DISCONTINUED | OUTPATIENT
Start: 2023-09-23 | End: 2023-09-29 | Stop reason: HOSPADM

## 2023-09-23 RX ORDER — SODIUM CHLORIDE 0.9 % (FLUSH) 0.9 %
5-40 SYRINGE (ML) INJECTION PRN
Status: DISCONTINUED | OUTPATIENT
Start: 2023-09-23 | End: 2023-09-29 | Stop reason: HOSPADM

## 2023-09-23 RX ORDER — INSULIN LISPRO 100 [IU]/ML
0-4 INJECTION, SOLUTION INTRAVENOUS; SUBCUTANEOUS NIGHTLY
Status: DISCONTINUED | OUTPATIENT
Start: 2023-09-23 | End: 2023-09-29 | Stop reason: HOSPADM

## 2023-09-23 RX ORDER — FUROSEMIDE 40 MG/1
80 TABLET ORAL DAILY
Status: DISCONTINUED | OUTPATIENT
Start: 2023-09-24 | End: 2023-09-24

## 2023-09-23 RX ORDER — ATORVASTATIN CALCIUM 40 MG/1
40 TABLET, FILM COATED ORAL NIGHTLY
Status: DISCONTINUED | OUTPATIENT
Start: 2023-09-23 | End: 2023-09-29 | Stop reason: HOSPADM

## 2023-09-23 RX ORDER — ACETAMINOPHEN 325 MG/1
650 TABLET ORAL EVERY 6 HOURS PRN
Status: DISCONTINUED | OUTPATIENT
Start: 2023-09-23 | End: 2023-09-29 | Stop reason: HOSPADM

## 2023-09-23 RX ORDER — SODIUM CHLORIDE 9 MG/ML
INJECTION, SOLUTION INTRAVENOUS PRN
Status: DISCONTINUED | OUTPATIENT
Start: 2023-09-23 | End: 2023-09-29 | Stop reason: HOSPADM

## 2023-09-23 RX ORDER — 0.9 % SODIUM CHLORIDE 0.9 %
1000 INTRAVENOUS SOLUTION INTRAVENOUS ONCE
Status: DISCONTINUED | OUTPATIENT
Start: 2023-09-23 | End: 2023-09-23

## 2023-09-23 RX ORDER — ACETAMINOPHEN 650 MG/1
650 SUPPOSITORY RECTAL EVERY 6 HOURS PRN
Status: DISCONTINUED | OUTPATIENT
Start: 2023-09-23 | End: 2023-09-29 | Stop reason: HOSPADM

## 2023-09-23 RX ORDER — M-VIT,TX,IRON,MINS/CALC/FOLIC 27MG-0.4MG
1 TABLET ORAL DAILY
Status: DISCONTINUED | OUTPATIENT
Start: 2023-09-24 | End: 2023-09-29 | Stop reason: HOSPADM

## 2023-09-23 RX ORDER — GLIPIZIDE 5 MG/1
5 TABLET ORAL
Status: DISCONTINUED | OUTPATIENT
Start: 2023-09-24 | End: 2023-09-29 | Stop reason: HOSPADM

## 2023-09-23 RX ORDER — VENLAFAXINE HYDROCHLORIDE 37.5 MG/1
37.5 CAPSULE, EXTENDED RELEASE ORAL DAILY
Status: DISCONTINUED | OUTPATIENT
Start: 2023-09-24 | End: 2023-09-29 | Stop reason: HOSPADM

## 2023-09-23 RX ORDER — PREGABALIN 150 MG/1
150 CAPSULE ORAL 2 TIMES DAILY
Status: DISCONTINUED | OUTPATIENT
Start: 2023-09-23 | End: 2023-09-29 | Stop reason: HOSPADM

## 2023-09-23 RX ADMIN — FAMOTIDINE 20 MG: 20 TABLET ORAL at 22:53

## 2023-09-23 RX ADMIN — ATORVASTATIN CALCIUM 40 MG: 40 TABLET, FILM COATED ORAL at 22:53

## 2023-09-23 RX ADMIN — ACETAMINOPHEN 650 MG: 325 TABLET ORAL at 22:53

## 2023-09-23 RX ADMIN — OXYCODONE AND ACETAMINOPHEN 1 TABLET: 325; 5 TABLET ORAL at 22:53

## 2023-09-23 RX ADMIN — CALCIUM CARBONATE 600 MG (1,500 MG)-VITAMIN D3 400 UNIT TABLET 1 TABLET: at 22:53

## 2023-09-23 RX ADMIN — POTASSIUM CHLORIDE 20 MEQ: 1500 TABLET, EXTENDED RELEASE ORAL at 22:53

## 2023-09-23 RX ADMIN — SODIUM CHLORIDE: 9 INJECTION, SOLUTION INTRAVENOUS at 16:56

## 2023-09-23 RX ADMIN — PREGABALIN 150 MG: 150 CAPSULE ORAL at 22:53

## 2023-09-23 ASSESSMENT — LIFESTYLE VARIABLES
HOW MANY STANDARD DRINKS CONTAINING ALCOHOL DO YOU HAVE ON A TYPICAL DAY: PATIENT DOES NOT DRINK
HOW OFTEN DO YOU HAVE A DRINK CONTAINING ALCOHOL: NEVER

## 2023-09-23 ASSESSMENT — PAIN - FUNCTIONAL ASSESSMENT
PAIN_FUNCTIONAL_ASSESSMENT: 0-10
PAIN_FUNCTIONAL_ASSESSMENT: NONE - DENIES PAIN

## 2023-09-23 ASSESSMENT — PAIN SCALES - GENERAL: PAINLEVEL_OUTOF10: 7

## 2023-09-23 NOTE — ED PROVIDER NOTES
EMERGENCY DEPARTMENT ENCOUNTER    Pt Name: Maura Danielle  MRN: [id number]  [address] [facility] 1955  Date of evaluation: 9/23/23  CHIEF COMPLAINT       Chief Complaint   Patient presents with    Arm Pain     Rt shoulder and rt elbow pain s/p injury    Fatigue    Fall     HISTORY OF PRESENT ILLNESS   Presenting after a fall. Patient said that she slipped and fell backwards with point of impact on her elbows and then she hit the back of her head. She denies losing consciousness. She is complaining of generalized neck and back pain although she says this is chronic in nature. .    The history is provided by the patient. REVIEW OF SYSTEMS     Review of Systems   Constitutional:  Negative for chills and fever. HENT:  Negative for facial swelling. Eyes:  Negative for visual disturbance. Respiratory:  Negative for shortness of breath. Cardiovascular:  Positive for leg swelling (Chronic). Gastrointestinal:  Negative for abdominal pain, nausea and vomiting. Genitourinary:  Negative for flank pain. Musculoskeletal:  Positive for back pain (Chronic) and neck pain (Chronic). Skin:  Negative for wound. Neurological:  Positive for headaches. Negative for dizziness, syncope and light-headedness. Psychiatric/Behavioral:  Negative for behavioral problems.       PASTMEDICAL HISTORY     Past Medical History:   Diagnosis Date    Acute hypoxemic respiratory failure (HCC)     Arthritis     CHF (congestive heart failure) (HCC)     Chronic back pain     Diabetes mellitus type II, controlled ([address]) 2/14/2012    Fibromyalgia     Hyperlipidemia LDL goal < 70 2/14/2012    Hypertension, benign 02/14/2012    Morbid obesity with BMI of 60.0-69.9, adult ([address]) 8/28/2015    Pain of toe of right foot     morgans cyst    Right wrist pain     rate 8    Skin cancer     skin under lt eye,face    Sleep apnea     Bipap does not work    Wears glasses      Past Problem List  Patient Active Problem List   Diagnosis Code

## 2023-09-24 LAB
ALBUMIN SERPL-MCNC: 3.3 G/DL (ref 3.5–5.2)
ALP SERPL-CCNC: 75 U/L (ref 35–104)
ALT SERPL-CCNC: 14 U/L (ref 5–33)
ANION GAP SERPL CALCULATED.3IONS-SCNC: 9 MMOL/L (ref 9–17)
AST SERPL-CCNC: 22 U/L
BACTERIA URNS QL MICRO: ABNORMAL
BASOPHILS # BLD: 0.09 K/UL (ref 0–0.2)
BASOPHILS NFR BLD: 1 % (ref 0–2)
BILIRUB SERPL-MCNC: 0.2 MG/DL (ref 0.3–1.2)
BILIRUB UR QL STRIP: NEGATIVE
BUN SERPL-MCNC: 29 MG/DL (ref 8–23)
CALCIUM SERPL-MCNC: 8.5 MG/DL (ref 8.6–10.4)
CASTS #/AREA URNS LPF: ABNORMAL /LPF
CHLORIDE SERPL-SCNC: 98 MMOL/L (ref 98–107)
CK SERPL-CCNC: 40 U/L (ref 26–192)
CLARITY UR: CLEAR
CO2 SERPL-SCNC: 31 MMOL/L (ref 20–31)
COLOR UR: YELLOW
CREAT SERPL-MCNC: 2 MG/DL (ref 0.5–0.9)
EOSINOPHIL # BLD: 0.46 K/UL (ref 0–0.4)
EOSINOPHILS RELATIVE PERCENT: 5 % (ref 0–4)
EPI CELLS #/AREA URNS HPF: ABNORMAL /HPF
ERYTHROCYTE [DISTWIDTH] IN BLOOD BY AUTOMATED COUNT: 17.3 % (ref 11.5–14.9)
GFR SERPL CREATININE-BSD FRML MDRD: 27 ML/MIN/1.73M2
GLUCOSE BLD-MCNC: 185 MG/DL (ref 65–105)
GLUCOSE BLD-MCNC: 212 MG/DL (ref 65–105)
GLUCOSE SERPL-MCNC: 75 MG/DL (ref 70–99)
GLUCOSE UR STRIP-MCNC: NEGATIVE MG/DL
HCT VFR BLD AUTO: 31.6 % (ref 36–46)
HGB BLD-MCNC: 10 G/DL (ref 12–16)
HGB UR QL STRIP.AUTO: NEGATIVE
KETONES UR STRIP-MCNC: NEGATIVE MG/DL
LEUKOCYTE ESTERASE UR QL STRIP: NEGATIVE
LYMPHOCYTES NFR BLD: 1.55 K/UL (ref 1–4.8)
LYMPHOCYTES RELATIVE PERCENT: 17 % (ref 24–44)
MAGNESIUM SERPL-MCNC: 1.9 MG/DL (ref 1.6–2.6)
MCH RBC QN AUTO: 27.7 PG (ref 26–34)
MCHC RBC AUTO-ENTMCNC: 31.6 G/DL (ref 31–37)
MCV RBC AUTO: 87.5 FL (ref 80–100)
MONOCYTES NFR BLD: 1.37 K/UL (ref 0.1–1.3)
MONOCYTES NFR BLD: 15 % (ref 1–7)
MORPHOLOGY: ABNORMAL
NEUTROPHILS NFR BLD: 62 % (ref 36–66)
NEUTS SEG NFR BLD: 5.63 K/UL (ref 1.3–9.1)
NITRITE UR QL STRIP: NEGATIVE
PH UR STRIP: 5 [PH] (ref 5–8)
PLATELET # BLD AUTO: 276 K/UL (ref 150–450)
PMV BLD AUTO: 8.5 FL (ref 6–12)
POTASSIUM SERPL-SCNC: 3.9 MMOL/L (ref 3.7–5.3)
PROT SERPL-MCNC: 7.2 G/DL (ref 6.4–8.3)
PROT UR STRIP-MCNC: NEGATIVE MG/DL
RBC # BLD AUTO: 3.61 M/UL (ref 4–5.2)
RBC #/AREA URNS HPF: ABNORMAL /HPF
SODIUM SERPL-SCNC: 138 MMOL/L (ref 135–144)
SODIUM UR-SCNC: 25 MMOL/L
SP GR UR STRIP: 1.01 (ref 1–1.03)
TOTAL PROTEIN, URINE: 8 MG/DL
UROBILINOGEN UR STRIP-ACNC: NORMAL EU/DL (ref 0–1)
WBC #/AREA URNS HPF: ABNORMAL /HPF
WBC OTHER # BLD: 9.1 K/UL (ref 3.5–11)

## 2023-09-24 PROCEDURE — 83735 ASSAY OF MAGNESIUM: CPT

## 2023-09-24 PROCEDURE — 6360000002 HC RX W HCPCS: Performed by: FAMILY MEDICINE

## 2023-09-24 PROCEDURE — 82550 ASSAY OF CK (CPK): CPT

## 2023-09-24 PROCEDURE — 36415 COLL VENOUS BLD VENIPUNCTURE: CPT

## 2023-09-24 PROCEDURE — 99223 1ST HOSP IP/OBS HIGH 75: CPT | Performed by: FAMILY MEDICINE

## 2023-09-24 PROCEDURE — 84156 ASSAY OF PROTEIN URINE: CPT

## 2023-09-24 PROCEDURE — 2580000003 HC RX 258: Performed by: FAMILY MEDICINE

## 2023-09-24 PROCEDURE — 84300 ASSAY OF URINE SODIUM: CPT

## 2023-09-24 PROCEDURE — 85025 COMPLETE CBC W/AUTO DIFF WBC: CPT

## 2023-09-24 PROCEDURE — 6370000000 HC RX 637 (ALT 250 FOR IP): Performed by: FAMILY MEDICINE

## 2023-09-24 PROCEDURE — 81001 URINALYSIS AUTO W/SCOPE: CPT

## 2023-09-24 PROCEDURE — 80053 COMPREHEN METABOLIC PANEL: CPT

## 2023-09-24 PROCEDURE — 1200000000 HC SEMI PRIVATE

## 2023-09-24 PROCEDURE — 82947 ASSAY GLUCOSE BLOOD QUANT: CPT

## 2023-09-24 PROCEDURE — 2500000003 HC RX 250 WO HCPCS: Performed by: FAMILY MEDICINE

## 2023-09-24 RX ORDER — FAMOTIDINE 20 MG/1
20 TABLET, FILM COATED ORAL DAILY
Status: DISCONTINUED | OUTPATIENT
Start: 2023-09-25 | End: 2023-09-29 | Stop reason: HOSPADM

## 2023-09-24 RX ORDER — FUROSEMIDE 40 MG/1
40 TABLET ORAL DAILY
Status: DISCONTINUED | OUTPATIENT
Start: 2023-09-25 | End: 2023-09-29 | Stop reason: HOSPADM

## 2023-09-24 RX ADMIN — PREGABALIN 150 MG: 150 CAPSULE ORAL at 08:17

## 2023-09-24 RX ADMIN — POTASSIUM CHLORIDE 20 MEQ: 1500 TABLET, EXTENDED RELEASE ORAL at 21:49

## 2023-09-24 RX ADMIN — CALCIUM CARBONATE 600 MG (1,500 MG)-VITAMIN D3 400 UNIT TABLET 1 TABLET: at 08:18

## 2023-09-24 RX ADMIN — FUROSEMIDE 80 MG: 40 TABLET ORAL at 08:19

## 2023-09-24 RX ADMIN — OXYCODONE AND ACETAMINOPHEN 1 TABLET: 325; 5 TABLET ORAL at 18:47

## 2023-09-24 RX ADMIN — VENLAFAXINE HYDROCHLORIDE 37.5 MG: 37.5 CAPSULE, EXTENDED RELEASE ORAL at 08:27

## 2023-09-24 RX ADMIN — CALCIUM CARBONATE 600 MG (1,500 MG)-VITAMIN D3 400 UNIT TABLET 1 TABLET: at 21:49

## 2023-09-24 RX ADMIN — LOSARTAN POTASSIUM 25 MG: 25 TABLET, FILM COATED ORAL at 08:17

## 2023-09-24 RX ADMIN — Medication 200 MG: at 21:50

## 2023-09-24 RX ADMIN — PREGABALIN 150 MG: 150 CAPSULE ORAL at 21:49

## 2023-09-24 RX ADMIN — CARVEDILOL 12.5 MG: 12.5 TABLET, FILM COATED ORAL at 08:19

## 2023-09-24 RX ADMIN — POTASSIUM CHLORIDE 20 MEQ: 1500 TABLET, EXTENDED RELEASE ORAL at 08:18

## 2023-09-24 RX ADMIN — CYANOCOBALAMIN TAB 1000 MCG 1000 MCG: 1000 TAB at 08:18

## 2023-09-24 RX ADMIN — MULTIPLE VITAMINS W/ MINERALS TAB 1 TABLET: TAB at 08:18

## 2023-09-24 RX ADMIN — ALOGLIPTIN 6.25 MG: 6.25 TABLET, FILM COATED ORAL at 08:26

## 2023-09-24 RX ADMIN — LINACLOTIDE 145 MCG: 145 CAPSULE, GELATIN COATED ORAL at 08:25

## 2023-09-24 RX ADMIN — ATORVASTATIN CALCIUM 40 MG: 40 TABLET, FILM COATED ORAL at 21:49

## 2023-09-24 RX ADMIN — FAMOTIDINE 20 MG: 20 TABLET ORAL at 08:18

## 2023-09-24 RX ADMIN — SODIUM CHLORIDE: 9 INJECTION, SOLUTION INTRAVENOUS at 06:06

## 2023-09-24 RX ADMIN — ENOXAPARIN SODIUM 30 MG: 100 INJECTION SUBCUTANEOUS at 08:19

## 2023-09-24 RX ADMIN — OXYCODONE AND ACETAMINOPHEN 1 TABLET: 325; 5 TABLET ORAL at 12:39

## 2023-09-24 RX ADMIN — GLIPIZIDE 5 MG: 5 TABLET ORAL at 06:40

## 2023-09-24 RX ADMIN — OXYCODONE AND ACETAMINOPHEN 1 TABLET: 325; 5 TABLET ORAL at 06:06

## 2023-09-24 RX ADMIN — ANTI-FUNGAL POWDER MICONAZOLE NITRATE TALC FREE: 1.42 POWDER TOPICAL at 17:39

## 2023-09-24 RX ADMIN — SODIUM CHLORIDE: 9 INJECTION, SOLUTION INTRAVENOUS at 19:09

## 2023-09-24 RX ADMIN — CARVEDILOL 12.5 MG: 12.5 TABLET, FILM COATED ORAL at 17:39

## 2023-09-24 ASSESSMENT — PAIN DESCRIPTION - DESCRIPTORS
DESCRIPTORS: DULL;SHARP
DESCRIPTORS: SHARP

## 2023-09-24 ASSESSMENT — ENCOUNTER SYMPTOMS
SHORTNESS OF BREATH: 0
BACK PAIN: 1
NAUSEA: 0
VOMITING: 0
ABDOMINAL PAIN: 0
FACIAL SWELLING: 0

## 2023-09-24 ASSESSMENT — PAIN SCALES - GENERAL
PAINLEVEL_OUTOF10: 9
PAINLEVEL_OUTOF10: 9
PAINLEVEL_OUTOF10: 8
PAINLEVEL_OUTOF10: 0

## 2023-09-24 ASSESSMENT — PAIN DESCRIPTION - ORIENTATION: ORIENTATION: RIGHT

## 2023-09-24 ASSESSMENT — PAIN DESCRIPTION - LOCATION
LOCATION: SHOULDER
LOCATION: GENERALIZED

## 2023-09-24 NOTE — PLAN OF CARE
Problem: Discharge Planning  Goal: Discharge to home or other facility with appropriate resources  Outcome: Progressing     Problem: Safety - Adult  Goal: Free from fall injury  Outcome: Progressing     Problem: Skin/Tissue Integrity  Goal: Absence of new skin breakdown  Description: 1. Monitor for areas of redness and/or skin breakdown  2. Assess vascular access sites hourly  3. Every 4-6 hours minimum:  Change oxygen saturation probe site  4. Every 4-6 hours:  If on nasal continuous positive airway pressure, respiratory therapy assess nares and determine need for appliance change or resting period.   Outcome: Progressing     Problem: Skin/Tissue Integrity - Adult  Goal: Skin integrity remains intact  Outcome: Progressing  Goal: Incisions, wounds, or drain sites healing without S/S of infection  Outcome: Progressing     Problem: Metabolic/Fluid and Electrolytes - Adult  Goal: Electrolytes maintained within normal limits  Outcome: Progressing  Goal: Hemodynamic stability and optimal renal function maintained  Outcome: Progressing  Goal: Glucose maintained within prescribed range  Outcome: Progressing

## 2023-09-24 NOTE — ACP (ADVANCE CARE PLANNING)
Advance Care Planning     Advance Care Planning Activator (Inpatient)  Conversation Note      Date of ACP Conversation: 9/24/2023     Conversation Conducted with: Patient with Decision Making Capacity    ACP Activator: Cinthya Izquierdo RN    Health Care Decision Maker:     Current Designated Health Care Decision Maker:     Primary Decision Maker: Angus Ceron - [phone]    Secondary Decision Maker: Teena Dickensw - Praneeth - [phone]  Click here to complete Healthcare Decision Makers including section of the Healthcare Decision Maker Relationship (ie \"Primary\")  Today we documented Decision Maker(s) consistent with Legal Next of Kin hierarchy. Care Preferences    Ventilation: \"If you were in your present state of health and suddenly became very ill and were unable to breathe on your own, what would your preference be about the use of a ventilator (breathing machine) if it were available to you? \"      Would the patient desire the use of ventilator (breathing machine)?: yes    \"If your health worsens and it becomes clear that your chance of recovery is unlikely, what would your preference be about the use of a ventilator (breathing machine) if it were available to you? \"     Would the patient desire the use of ventilator (breathing machine)?: No      Resuscitation  \"CPR works best to restart the heart when there is a sudden event, like a heart attack, in someone who is otherwise healthy. Unfortunately, CPR does not typically restart the heart for people who have serious health conditions or who are very sick. \"    \"In the event your heart stopped as a result of an underlying serious health condition, would you want attempts to be made to restart your heart (answer \"yes\" for attempt to resuscitate) or would you prefer a natural death (answer \"no\" for do not attempt to resuscitate)? \" yes       [] Yes   [x] No   Educated Patient / Alpheus Dopp regarding differences between Advance Directives and portable DNR orders.     Length of ACP Conversation in minutes:      Conversation Outcomes:  ACP discussion completed    Follow-up plan:    [] Schedule follow-up conversation to continue planning  [x] Referred individual to Provider for additional questions/concerns   [] Advised patient/agent/surrogate to review completed ACP document and update if needed with changes in condition, patient preferences or care setting    [] This note routed to one or more involved healthcare providers

## 2023-09-24 NOTE — ED NOTES
TRANSFER - OUT REPORT:    Verbal report given to Danna Romero on Jg Elmore  being transferred to 2054 for routine progression of patient care       Report consisted of patient's Situation, Background, Assessment and   Recommendations(SBAR). Information from the following report(s) ED Encounter Summary was reviewed with the receiving nurse. [place] Fall Assessment:    Presents to emergency department  because of falls (Syncope, seizure, or loss of consciousness): Yes  Age > 79: No  Altered Mental Status, Intoxication with alcohol or substance confusion (Disorientation, impaired judgment, poor safety awaremess, or inability to follow instructions): No  Impaired Mobility: Ambulates or transfers with assistive devices or assistance; Unable to ambulate or transer.: Yes             Lines:   Peripheral IV 09/23/23 Left Forearm (Active)        Opportunity for questions and clarification was provided.       Patient transported with:  Marcus Lopez RN  09/64/41 0453

## 2023-09-24 NOTE — PLAN OF CARE
Problem: Discharge Planning  Goal: Discharge to home or other facility with appropriate resources  9/24/2023 0853 by Roberto Hooper RN  Outcome: Progressing     Problem: Safety - Adult  Goal: Free from fall injury  9/24/2023 0853 by Roberto Hooper RN  Outcome: Progressing     Problem: Skin/Tissue Integrity  Goal: Absence of new skin breakdown  9/24/2023 0853 by Roberto Hooper RN  Outcome: Progressing     Problem: Skin/Tissue Integrity - Adult  Goal: Skin integrity remains intact  9/24/2023 0853 by Roberto Hooper RN  Outcome: Progressing     Problem: Skin/Tissue Integrity - Adult  Goal: Incisions, wounds, or drain sites healing without S/S of infection  9/24/2023 0853 by Roberto Hooper RN  Outcome: Progressing     Problem: Metabolic/Fluid and Electrolytes - Adult  Goal: Hemodynamic stability and optimal renal function maintained  9/24/2023 0853 by Roberto Hooper RN  Outcome: Progressing     Problem: Metabolic/Fluid and Electrolytes - Adult  Goal: Glucose maintained within prescribed range  9/24/2023 0853 by Roberto Hooper RN  Outcome: Progressing     Problem: Pain  Goal: Verbalizes/displays adequate comfort level or baseline comfort level  Outcome: Progressing

## 2023-09-24 NOTE — CARE COORDINATION
Case Management Assessment  Initial Evaluation    Date/Time of Evaluation: 9/24/2023 8:54 AM  Assessment Completed by: Helen Boo RN    If patient is discharged prior to next notation, then this note serves as note for discharge by case management. Patient Name: Jeannette Knowles                   YOB: 1955  Diagnosis: MAMTA (acute kidney injury) [facility]) [N17.9]                   Date / Time: 9/23/2023  1:47 PM    Patient Admission Status: Inpatient   Readmission Risk (Low < 19, Mod (19-27), High > 27): Readmission Risk Score: 24.8    Current PCP: Mau Lr MD  PCP verified by CM? Yes    Chart Reviewed: Yes      History Provided by: Patient  Patient Orientation: Alert and Oriented    Patient Cognition: Alert    Hospitalization in the last 30 days (Readmission):  Yes    If yes, Readmission Assessment in  Navigator will be completed. Advance Directives:      Code Status: Full Code   Patient's Primary Decision Maker is: Legal Next of Kin    Primary Decision MakerAntonia Colon - Spouse - [phone]    Secondary Decision Maker: Al Brown - Child - [phone]    Discharge Planning:    Patient lives with: Spouse/Significant Other Type of Home: House  Primary Care Giver: Self  Patient Support Systems include: Spouse/Significant Other, Family Members   Current Financial resources: Medicare  Current community resources:    Current services prior to admission: Oxygen Therapy, Durable Medical Equipment            Current DME: Oxygen Therapy (Comment), Cpap, Lissette Trung, Wheelchair, Glucometer            Type of Home Care services:  OT, PT, Nursing Services    ADLS  Prior functional level: Assistance with the following:, Housework, Shopping, Mobility, Cooking  Current functional level: Assistance with the following:, Cooking, Housework, Shopping, Mobility    PT AM-PAC:   /24  OT AM-PAC:   /24    Family can provide assistance at DC:  Yes  Would you like Case Management to discuss the

## 2023-09-24 NOTE — CONSULTS
NEPHROLOGY CONSULT       Reason for Consult: Acute kidney injury      Chief Complaint: Fall  History Obtained From: Patient and EHR records    History of Present Illness: This is a 79 y.o. female  with a significant past medical history of  fibromyalgia, type 2 diabetes mellitus, obstructive sleep apnea, obesity, systemic hypertension and chronic kidney disease stage IIIb [baseline serum creatinine 1.3 to 1.5 mg/dL], who presented to ER after a fall at home after she slipped and hit her elbow and back of her head- she denied any of consciousness. Recently discharged 1 week ago after management for MAMTA creatinine 3.3 associated with diarrhea and nausea. C. difficile was negative. He reports decreased appetite but has been drinking up to 64 ounces of fluid per day. She denies nausea, decrease in urine output flank pain or gross hematuria or dysuria. She has been taking Lasix 80 mg at home. Pertinent studies were remarkable for BUN of 33 and creatinine of 2.8 mg/dL and hemoglobin of 10.0 g/dL  Urinalysis showed 0-2 WBCs negative leukocyte esterase and nitrite. High urine sodium was 25  Abdominal CT did not show any hydronephrosis or obstruction.     Past Medical History:        Diagnosis Date    Acute hypoxemic respiratory failure (HCC)     Arthritis     CHF (congestive heart failure) (HCC)     Chronic back pain     Diabetes mellitus type II, controlled ([address]) 2/14/2012    Fibromyalgia     Hyperlipidemia LDL goal < 70 2/14/2012    Hypertension, benign 02/14/2012    Morbid obesity with BMI of 60.0-69.9, adult ([address]) 8/28/2015    Pain of toe of right foot     morgans cyst    Right wrist pain     rate 8    Skin cancer     skin under lt eye,face    Sleep apnea     Bipap does not work    Wears glasses        Past Surgical History:        Procedure Laterality Date    CARPAL TUNNEL RELEASE Right 09/05/2014    CATARACT REMOVAL WITH IMPLANT Bilateral 2013    CHOLECYSTECTOMY, LAPAROSCOPIC  1998    COLONOSCOPY N/A

## 2023-09-24 NOTE — H&P
Family Medicine Admit Note    PCP: Jackie Huizar MD    Date of Admission: 9/23/2023    Date of Service: Pt seen/examined on 9/24/23 and Admitted to Inpatient. Chief Complaint:  Fatigue                                 Fall                                 Arm Pain      History Of Present Illness: The patient is a 79 y.o. female who presents to [facility] with complaints of falling at home. She reports that she slipped and fell backwards and hit the back of her head and elbows. She denies any loss of consciousness. She suffers with chronic pain and fibromyalgia but complains of increased neck & back pain. She denies any fevers, chills, cough, congestion, chest pain, SOB, abdominal pain, N/V/D, dysuria, headaches or dizziness.     Past Medical History:        Diagnosis Date    Acute hypoxemic respiratory failure (HCC)     Arthritis     CHF (congestive heart failure) (HCC)     Chronic back pain     Diabetes mellitus type II, controlled ([address]) 2/14/2012    Fibromyalgia     Hyperlipidemia LDL goal < 70 2/14/2012    Hypertension, benign 02/14/2012    Morbid obesity with BMI of 60.0-69.9, adult ([address]) 8/28/2015    Pain of toe of right foot     morgans cyst    Right wrist pain     rate 8    Skin cancer     skin under lt eye,face    Sleep apnea     Bipap does not work    Wears glasses        Past Surgical History:        Procedure Laterality Date    CARPAL TUNNEL RELEASE Right 09/05/2014    CATARACT REMOVAL WITH IMPLANT Bilateral 2013    CHOLECYSTECTOMY, LAPAROSCOPIC  1998    COLONOSCOPY N/A 10/17/2019    COLONOSCOPY DIAGNOSTIC performed by Maame Chilel MD at [address] cyst from base of tail bone    HYSTERECTOMY (CERVIX STATUS UNKNOWN)  [address] Bilateral 01/20/2017    abcess removed    LUMBAR FUSION  10/2010; 03/2011    L4/L5    LUMBAR FUSION  2009    L4-S1    OTHER SURGICAL HISTORY  05/02/2018    Lumbar decompression laminectomy at L3-L4 bilaterally ASSESSMENT/PLAN:  Patient admitted MAMTA. Co-morbidities as above. -MAMTA - recurrent, Nephrology consulted.  -Fibromyalgia with acute pain after fall - monitor labs & percocet reconciled. -DM2 - on carb control diet & SS coverage.  -Home medications reviewed & reconciled.  -Complete orders per chart. DVT Prophylaxis:   Diet: ADULT DIET;  Regular; 4 carb choices (60 gm/meal)  Code Status: Full Code      Dispo - admitted to Med/Surg       @[facility]@

## 2023-09-25 LAB
ANION GAP SERPL CALCULATED.3IONS-SCNC: 8 MMOL/L (ref 9–17)
BASOPHILS # BLD: 0 K/UL (ref 0–0.2)
BASOPHILS NFR BLD: 1 % (ref 0–2)
BUN SERPL-MCNC: 22 MG/DL (ref 8–23)
CALCIUM SERPL-MCNC: 9 MG/DL (ref 8.6–10.4)
CHLORIDE SERPL-SCNC: 103 MMOL/L (ref 98–107)
CO2 SERPL-SCNC: 29 MMOL/L (ref 20–31)
CREAT SERPL-MCNC: 1.3 MG/DL (ref 0.5–0.9)
EOSINOPHIL # BLD: 0.4 K/UL (ref 0–0.4)
EOSINOPHILS RELATIVE PERCENT: 5 % (ref 0–4)
ERYTHROCYTE [DISTWIDTH] IN BLOOD BY AUTOMATED COUNT: 17.5 % (ref 11.5–14.9)
GFR SERPL CREATININE-BSD FRML MDRD: 45 ML/MIN/1.73M2
GLUCOSE BLD-MCNC: 102 MG/DL (ref 65–105)
GLUCOSE BLD-MCNC: 179 MG/DL (ref 65–105)
GLUCOSE BLD-MCNC: 185 MG/DL (ref 65–105)
GLUCOSE SERPL-MCNC: 182 MG/DL (ref 70–99)
HCT VFR BLD AUTO: 31.1 % (ref 36–46)
HGB BLD-MCNC: 9.5 G/DL (ref 12–16)
LYMPHOCYTES NFR BLD: 1.1 K/UL (ref 1–4.8)
LYMPHOCYTES RELATIVE PERCENT: 15 % (ref 24–44)
MAGNESIUM SERPL-MCNC: 1.8 MG/DL (ref 1.6–2.6)
MCH RBC QN AUTO: 27.3 PG (ref 26–34)
MCHC RBC AUTO-ENTMCNC: 30.5 G/DL (ref 31–37)
MCV RBC AUTO: 89.4 FL (ref 80–100)
MONOCYTES NFR BLD: 0.5 K/UL (ref 0.1–1.3)
MONOCYTES NFR BLD: 7 % (ref 1–7)
NEUTROPHILS NFR BLD: 72 % (ref 36–66)
NEUTS SEG NFR BLD: 5.6 K/UL (ref 1.3–9.1)
PLATELET # BLD AUTO: 303 K/UL (ref 150–450)
PMV BLD AUTO: 7.9 FL (ref 6–12)
POTASSIUM SERPL-SCNC: 4.3 MMOL/L (ref 3.7–5.3)
RBC # BLD AUTO: 3.48 M/UL (ref 4–5.2)
SODIUM SERPL-SCNC: 140 MMOL/L (ref 135–144)
WBC OTHER # BLD: 7.6 K/UL (ref 3.5–11)

## 2023-09-25 PROCEDURE — 80048 BASIC METABOLIC PNL TOTAL CA: CPT

## 2023-09-25 PROCEDURE — 83735 ASSAY OF MAGNESIUM: CPT

## 2023-09-25 PROCEDURE — 1200000000 HC SEMI PRIVATE

## 2023-09-25 PROCEDURE — 99232 SBSQ HOSP IP/OBS MODERATE 35: CPT | Performed by: FAMILY MEDICINE

## 2023-09-25 PROCEDURE — 36415 COLL VENOUS BLD VENIPUNCTURE: CPT

## 2023-09-25 PROCEDURE — 6360000002 HC RX W HCPCS: Performed by: FAMILY MEDICINE

## 2023-09-25 PROCEDURE — 85025 COMPLETE CBC W/AUTO DIFF WBC: CPT

## 2023-09-25 PROCEDURE — 82947 ASSAY GLUCOSE BLOOD QUANT: CPT

## 2023-09-25 PROCEDURE — 6370000000 HC RX 637 (ALT 250 FOR IP): Performed by: INTERNAL MEDICINE

## 2023-09-25 PROCEDURE — 6370000000 HC RX 637 (ALT 250 FOR IP): Performed by: FAMILY MEDICINE

## 2023-09-25 RX ORDER — ENOXAPARIN SODIUM 100 MG/ML
30 INJECTION SUBCUTANEOUS 2 TIMES DAILY
Status: DISCONTINUED | OUTPATIENT
Start: 2023-09-25 | End: 2023-09-29 | Stop reason: HOSPADM

## 2023-09-25 RX ADMIN — ALOGLIPTIN 6.25 MG: 6.25 TABLET, FILM COATED ORAL at 07:35

## 2023-09-25 RX ADMIN — FUROSEMIDE 40 MG: 40 TABLET ORAL at 07:31

## 2023-09-25 RX ADMIN — CYANOCOBALAMIN TAB 1000 MCG 1000 MCG: 1000 TAB at 07:31

## 2023-09-25 RX ADMIN — POTASSIUM CHLORIDE 20 MEQ: 1500 TABLET, EXTENDED RELEASE ORAL at 21:16

## 2023-09-25 RX ADMIN — MULTIPLE VITAMINS W/ MINERALS TAB 1 TABLET: TAB at 07:31

## 2023-09-25 RX ADMIN — LOSARTAN POTASSIUM 25 MG: 25 TABLET, FILM COATED ORAL at 07:31

## 2023-09-25 RX ADMIN — LINACLOTIDE 145 MCG: 145 CAPSULE, GELATIN COATED ORAL at 07:35

## 2023-09-25 RX ADMIN — PREGABALIN 150 MG: 150 CAPSULE ORAL at 07:31

## 2023-09-25 RX ADMIN — FAMOTIDINE 20 MG: 20 TABLET ORAL at 07:31

## 2023-09-25 RX ADMIN — ANTI-FUNGAL POWDER MICONAZOLE NITRATE TALC FREE: 1.42 POWDER TOPICAL at 21:17

## 2023-09-25 RX ADMIN — ENOXAPARIN SODIUM 30 MG: 100 INJECTION SUBCUTANEOUS at 07:32

## 2023-09-25 RX ADMIN — CALCIUM CARBONATE 600 MG (1,500 MG)-VITAMIN D3 400 UNIT TABLET 1 TABLET: at 07:31

## 2023-09-25 RX ADMIN — CARVEDILOL 12.5 MG: 12.5 TABLET, FILM COATED ORAL at 17:06

## 2023-09-25 RX ADMIN — CARVEDILOL 12.5 MG: 12.5 TABLET, FILM COATED ORAL at 07:31

## 2023-09-25 RX ADMIN — VENLAFAXINE HYDROCHLORIDE 37.5 MG: 37.5 CAPSULE, EXTENDED RELEASE ORAL at 07:35

## 2023-09-25 RX ADMIN — ATORVASTATIN CALCIUM 40 MG: 40 TABLET, FILM COATED ORAL at 21:16

## 2023-09-25 RX ADMIN — PREGABALIN 150 MG: 150 CAPSULE ORAL at 21:16

## 2023-09-25 RX ADMIN — OXYCODONE AND ACETAMINOPHEN 1 TABLET: 325; 5 TABLET ORAL at 07:31

## 2023-09-25 RX ADMIN — CALCIUM CARBONATE 600 MG (1,500 MG)-VITAMIN D3 400 UNIT TABLET 1 TABLET: at 21:16

## 2023-09-25 RX ADMIN — POTASSIUM CHLORIDE 20 MEQ: 1500 TABLET, EXTENDED RELEASE ORAL at 07:31

## 2023-09-25 RX ADMIN — GLIPIZIDE 5 MG: 5 TABLET ORAL at 07:31

## 2023-09-25 RX ADMIN — OXYCODONE AND ACETAMINOPHEN 1 TABLET: 325; 5 TABLET ORAL at 17:06

## 2023-09-25 RX ADMIN — ENOXAPARIN SODIUM 30 MG: 100 INJECTION SUBCUTANEOUS at 21:16

## 2023-09-25 RX ADMIN — Medication 200 MG: at 21:18

## 2023-09-25 ASSESSMENT — PAIN SCALES - GENERAL: PAINLEVEL_OUTOF10: 7

## 2023-09-25 NOTE — CARE COORDINATION
ONGOING DISCHARGE PLAN:    Patient is alert and oriented x4. Spoke with patient regarding discharge plan and patient confirms that plan is still home with [facility] - [facility]. Pt continues to refuse SNF, states she is only going home. Cre 1.3 today. Nephro adjusting meds. Will continue to follow for additional discharge needs. If patient is discharged prior to next notation, then this note serves as note for discharge by case management.     Electronically signed by Yolanda Capps RN on 9/25/2023 at 12:49 PM

## 2023-09-25 NOTE — PLAN OF CARE
Problem: Discharge Planning  Goal: Discharge to home or other facility with appropriate resources  Outcome: Progressing     Problem: Safety - Adult  Goal: Free from fall injury  Outcome: Progressing     Problem: Skin/Tissue Integrity  Goal: Absence of new skin breakdown  Description: 1. Monitor for areas of redness and/or skin breakdown  2. Assess vascular access sites hourly  3. Every 4-6 hours minimum:  Change oxygen saturation probe site  4. Every 4-6 hours:  If on nasal continuous positive airway pressure, respiratory therapy assess nares and determine need for appliance change or resting period.   Outcome: Progressing     Problem: Skin/Tissue Integrity - Adult  Goal: Skin integrity remains intact  Outcome: Progressing     Problem: Skin/Tissue Integrity - Adult  Goal: Incisions, wounds, or drain sites healing without S/S of infection  Outcome: Progressing     Problem: Skin/Tissue Integrity - Adult  Goal: Oral mucous membranes remain intact  Outcome: Progressing     Problem: Metabolic/Fluid and Electrolytes - Adult  Goal: Electrolytes maintained within normal limits  Outcome: Progressing     Problem: Metabolic/Fluid and Electrolytes - Adult  Goal: Hemodynamic stability and optimal renal function maintained  Outcome: Progressing     Problem: Metabolic/Fluid and Electrolytes - Adult  Goal: Glucose maintained within prescribed range  Outcome: Progressing     Problem: Pain  Goal: Verbalizes/displays adequate comfort level or baseline comfort level  Outcome: Progressing     Problem: Chronic Conditions and Co-morbidities  Goal: Patient's chronic conditions and co-morbidity symptoms are monitored and maintained or improved  Outcome: Progressing

## 2023-09-25 NOTE — PROGRESS NOTES
09/25/23 1512   Encounter Summary   Encounter Overview/Reason  Spiritual/Emotional Needs   Service Provided For: Patient   Referral/Consult From: Rounding   Support System Spouse; Children   Last Encounter  09/25/23   Complexity of Encounter Moderate   Spiritual/Emotional needs   Type Spiritual Support   Assessment/Intervention/Outcome   Assessment Calm;Coping;Peaceful;Tearful   Intervention Active listening;Explored/Affirmed feelings, thoughts, concerns;Prayer (assurance of)/[place];Sustaining Presence/Ministry of presence   Outcome Comfort; Acceptance; Coping;Engaged in conversation;Expressed feelings, needs, and concerns;Receptive

## 2023-09-26 LAB
ANION GAP SERPL CALCULATED.3IONS-SCNC: 8 MMOL/L (ref 9–17)
BUN SERPL-MCNC: 16 MG/DL (ref 8–23)
CALCIUM SERPL-MCNC: 9.2 MG/DL (ref 8.6–10.4)
CHLORIDE SERPL-SCNC: 100 MMOL/L (ref 98–107)
CO2 SERPL-SCNC: 31 MMOL/L (ref 20–31)
CREAT SERPL-MCNC: 1.1 MG/DL (ref 0.5–0.9)
GFR SERPL CREATININE-BSD FRML MDRD: 55 ML/MIN/1.73M2
GLUCOSE BLD-MCNC: 115 MG/DL (ref 65–105)
GLUCOSE BLD-MCNC: 120 MG/DL (ref 65–105)
GLUCOSE BLD-MCNC: 140 MG/DL (ref 65–105)
GLUCOSE BLD-MCNC: 166 MG/DL (ref 65–105)
GLUCOSE SERPL-MCNC: 168 MG/DL (ref 70–99)
POTASSIUM SERPL-SCNC: 4.2 MMOL/L (ref 3.7–5.3)
SODIUM SERPL-SCNC: 139 MMOL/L (ref 135–144)

## 2023-09-26 PROCEDURE — 6370000000 HC RX 637 (ALT 250 FOR IP): Performed by: FAMILY MEDICINE

## 2023-09-26 PROCEDURE — 97162 PT EVAL MOD COMPLEX 30 MIN: CPT

## 2023-09-26 PROCEDURE — 82947 ASSAY GLUCOSE BLOOD QUANT: CPT

## 2023-09-26 PROCEDURE — 36415 COLL VENOUS BLD VENIPUNCTURE: CPT

## 2023-09-26 PROCEDURE — 97530 THERAPEUTIC ACTIVITIES: CPT

## 2023-09-26 PROCEDURE — 6360000002 HC RX W HCPCS: Performed by: FAMILY MEDICINE

## 2023-09-26 PROCEDURE — 1200000000 HC SEMI PRIVATE

## 2023-09-26 PROCEDURE — 6370000000 HC RX 637 (ALT 250 FOR IP): Performed by: INTERNAL MEDICINE

## 2023-09-26 PROCEDURE — 80048 BASIC METABOLIC PNL TOTAL CA: CPT

## 2023-09-26 PROCEDURE — 97166 OT EVAL MOD COMPLEX 45 MIN: CPT

## 2023-09-26 PROCEDURE — 99232 SBSQ HOSP IP/OBS MODERATE 35: CPT | Performed by: FAMILY MEDICINE

## 2023-09-26 PROCEDURE — 2580000003 HC RX 258: Performed by: FAMILY MEDICINE

## 2023-09-26 RX ADMIN — CALCIUM CARBONATE 600 MG (1,500 MG)-VITAMIN D3 400 UNIT TABLET 1 TABLET: at 07:41

## 2023-09-26 RX ADMIN — GLIPIZIDE 5 MG: 5 TABLET ORAL at 07:41

## 2023-09-26 RX ADMIN — CARVEDILOL 12.5 MG: 12.5 TABLET, FILM COATED ORAL at 07:41

## 2023-09-26 RX ADMIN — POTASSIUM CHLORIDE 20 MEQ: 1500 TABLET, EXTENDED RELEASE ORAL at 07:41

## 2023-09-26 RX ADMIN — LOSARTAN POTASSIUM 25 MG: 25 TABLET, FILM COATED ORAL at 07:41

## 2023-09-26 RX ADMIN — Medication 200 MG: at 22:04

## 2023-09-26 RX ADMIN — SODIUM CHLORIDE, PRESERVATIVE FREE 10 ML: 5 INJECTION INTRAVENOUS at 22:07

## 2023-09-26 RX ADMIN — FAMOTIDINE 20 MG: 20 TABLET ORAL at 07:41

## 2023-09-26 RX ADMIN — LINACLOTIDE 145 MCG: 145 CAPSULE, GELATIN COATED ORAL at 07:42

## 2023-09-26 RX ADMIN — ATORVASTATIN CALCIUM 40 MG: 40 TABLET, FILM COATED ORAL at 22:02

## 2023-09-26 RX ADMIN — PREGABALIN 150 MG: 150 CAPSULE ORAL at 22:02

## 2023-09-26 RX ADMIN — CARVEDILOL 12.5 MG: 12.5 TABLET, FILM COATED ORAL at 17:06

## 2023-09-26 RX ADMIN — ALOGLIPTIN 6.25 MG: 6.25 TABLET, FILM COATED ORAL at 07:42

## 2023-09-26 RX ADMIN — CYANOCOBALAMIN TAB 1000 MCG 1000 MCG: 1000 TAB at 07:41

## 2023-09-26 RX ADMIN — ANTI-FUNGAL POWDER MICONAZOLE NITRATE TALC FREE: 1.42 POWDER TOPICAL at 22:02

## 2023-09-26 RX ADMIN — OXYCODONE AND ACETAMINOPHEN 1 TABLET: 325; 5 TABLET ORAL at 22:18

## 2023-09-26 RX ADMIN — VENLAFAXINE HYDROCHLORIDE 37.5 MG: 37.5 CAPSULE, EXTENDED RELEASE ORAL at 07:43

## 2023-09-26 RX ADMIN — OXYCODONE AND ACETAMINOPHEN 1 TABLET: 325; 5 TABLET ORAL at 02:40

## 2023-09-26 RX ADMIN — PREGABALIN 150 MG: 150 CAPSULE ORAL at 07:41

## 2023-09-26 RX ADMIN — POTASSIUM CHLORIDE 20 MEQ: 1500 TABLET, EXTENDED RELEASE ORAL at 22:02

## 2023-09-26 RX ADMIN — CALCIUM CARBONATE 600 MG (1,500 MG)-VITAMIN D3 400 UNIT TABLET 1 TABLET: at 22:02

## 2023-09-26 RX ADMIN — ENOXAPARIN SODIUM 30 MG: 100 INJECTION SUBCUTANEOUS at 07:41

## 2023-09-26 RX ADMIN — OXYCODONE AND ACETAMINOPHEN 1 TABLET: 325; 5 TABLET ORAL at 16:02

## 2023-09-26 RX ADMIN — FUROSEMIDE 40 MG: 40 TABLET ORAL at 07:41

## 2023-09-26 RX ADMIN — MULTIPLE VITAMINS W/ MINERALS TAB 1 TABLET: TAB at 07:41

## 2023-09-26 ASSESSMENT — PAIN DESCRIPTION - ORIENTATION: ORIENTATION: RIGHT;LEFT

## 2023-09-26 ASSESSMENT — PAIN DESCRIPTION - LOCATION
LOCATION: BACK
LOCATION: BACK

## 2023-09-26 ASSESSMENT — PAIN DESCRIPTION - DESCRIPTORS: DESCRIPTORS: ACHING;SHARP

## 2023-09-26 ASSESSMENT — PAIN SCALES - GENERAL
PAINLEVEL_OUTOF10: 7
PAINLEVEL_OUTOF10: 7
PAINLEVEL_OUTOF10: 6

## 2023-09-26 NOTE — CARE COORDINATION
ONGOING DISCHARGE PLAN:    Patient is alert and oriented x4. Spoke with patient regarding discharge plan and patient confirms that plan is still home with [facility]. Continues to refuse SNF. Creatinine 1.1 today. Anticipate home soon. Will continue to follow for additional discharge needs. If patient is discharged prior to next notation, then this note serves as note for discharge by case management. Electronically signed by Criselda Rosales RN on 9/26/2023 at 11:02 AM    Pt's  spoke with patient and she is now considering SNF. Post Acute Facility/Agency List     Provided patient with the following list, the list includes the overall star ratings obtained from CMS per the Medicare Web site (www.Medicare.gov):     [] [address]  [] Acute Inpatient Rehabilitation Facilities  [x] Skilled Nursing Facilities  [] Home Care    Provided verbal instructions on how to utilize the QR Code to obtain additional detailed star ratings from www. Medicare. gov     offered to print and provide the detailed list:    [x]Accepted   []Declined    The following printed detailed lists were provided:     SNF  Electronically signed by Criselda Rosales RN on 9/26/2023 at 3:49 PM    Pt would like SNF (in this order)  LONG TERM ACUTE [facility] AT [facility] of [address] [place] [address] Drive    Referral sent to all 3 facilities. Electronically signed by Criselda Rosales RN on 9/26/2023 at 4:07 PM    Received call from Omar at [facility] AND [facility] stating they cannot accept patient as pt's insurance is out of network.     Electronically signed by Criselda Rosales RN on 9/26/2023 at 4:47 PM

## 2023-09-26 NOTE — CONSULTS
NEPHROLOGY CONSULT       Reason for Consult: Acute kidney injury      Chief Complaint: Fall  History Obtained From: Patient and EHR records    Interval history:  Patient denies shortness of breath. The renal function has improved with IV fluid hydration. Denies urinary complaints. Creatinine is 1.1 mg/dL. Good urine output 1.1 L so far today with Lasix    History of Present Illness: This is a 79 y.o. female  with a significant past medical history of  fibromyalgia, type 2 diabetes mellitus, obstructive sleep apnea, obesity, systemic hypertension and chronic kidney disease stage IIIb [baseline serum creatinine 1.3 to 1.5 mg/dL], who presented to ER after a fall at home after she slipped and hit her elbow and back of her head- she denied any of consciousness. Recently discharged 1 week ago after management for MAMTA creatinine 3.3 associated with diarrhea and nausea. C. difficile was negative. He reports decreased appetite but has been drinking up to 64 ounces of fluid per day. She denies nausea, decrease in urine output flank pain or gross hematuria or dysuria. She has been taking Lasix 80 mg at home. Pertinent studies were remarkable for BUN of 33 and creatinine of 2.8 mg/dL and hemoglobin of 10.0 g/dL  Urinalysis showed 0-2 WBCs negative leukocyte esterase and nitrite. High urine sodium was 25  Abdominal CT did not show any hydronephrosis or obstruction.     Past Medical History:        Diagnosis Date    Acute hypoxemic respiratory failure (HCC)     Arthritis     CHF (congestive heart failure) (HCC)     Chronic back pain     Diabetes mellitus type II, controlled ([address]) 2/14/2012    Fibromyalgia     Hyperlipidemia LDL goal < 70 2/14/2012    Hypertension, benign 02/14/2012    Morbid obesity with BMI of 60.0-69.9, adult ([address]) 8/28/2015    Pain of toe of right foot     morgans cyst    Right wrist pain     rate 8    Skin cancer     skin under lt eye,face    Sleep apnea     Bipap does not work    Wears

## 2023-09-26 NOTE — PROGRESS NOTES
Physical Therapy  Facility/Department: [facility] MED SURG  Physical Therapy Initial Assessment    Name: Catrachita Mac  : 1955  MRN: [id number]  Date of Service: 2023    Discharge Recommendations:  24 hour supervision or assist, Home with Home health PT   PT Equipment Recommendations  Equipment Needed: No      Patient Diagnosis(es): The primary encounter diagnosis was MAMTA (acute kidney injury) ([address]). A diagnosis of Fall, initial encounter was also pertinent to this visit. Past Medical History:  has a past medical history of Acute hypoxemic respiratory failure ([address]), Arthritis, CHF (congestive heart failure) ([address]), Chronic back pain, Diabetes mellitus type II, controlled ([address]), Fibromyalgia, Hyperlipidemia LDL goal < 70, Hypertension, benign, Morbid obesity with BMI of 60.0-69.9, adult ([address]), Pain of toe of right foot, Right wrist pain, Skin cancer, Sleep apnea, and Wears glasses. Past Surgical History:  has a past surgical history that includes Tubal ligation (); Hysterectomy (); lumbar fusion (10/2010; 2011); Carpal tunnel release (Right, 2014); cyst removal (); Cholecystectomy, laparoscopic (); lumbar fusion (); Rotator cuff repair (Left, ); Cataract removal with implant (Bilateral, ); Skin cancer excision (); Inguinal hernia repair (Bilateral, 2017); other surgical history (2018); Upper gastrointestinal endoscopy (N/A, 10/16/2019); and Colonoscopy (N/A, 10/17/2019). Assessment   Body Structures, Functions, Activity Limitations Requiring Skilled Therapeutic Intervention: Decreased functional mobility ; Decreased endurance;Decreased strength;Decreased balance  Assessment: Impaired mobility due to decreased tolerance to activity and safety issues of decreased balance and strength  Decision Making: Medium Complexity  History: Falls  Exam: decreased balance, endurance, strength, mobility  Clinical Presentation: evolving  Requires PT Follow-Up: Yes  Activity

## 2023-09-26 NOTE — PLAN OF CARE
Problem: Discharge Planning  Goal: Discharge to home or other facility with appropriate resources  9/26/2023 1722 by Villa Silver RN  Outcome: Progressing     Problem: Safety - Adult  Goal: Free from fall injury  9/26/2023 1722 by Villa Silver RN  Outcome: Progressing     Problem: Skin/Tissue Integrity  Goal: Absence of new skin breakdown  Description: 1. Monitor for areas of redness and/or skin breakdown  2. Assess vascular access sites hourly  3. Every 4-6 hours minimum:  Change oxygen saturation probe site  4. Every 4-6 hours:  If on nasal continuous positive airway pressure, respiratory therapy assess nares and determine need for appliance change or resting period.   9/26/2023 1722 by Villa Silver RN  Outcome: Progressing     Problem: Skin/Tissue Integrity - Adult  Goal: Skin integrity remains intact  9/26/2023 1722 by Villa Silver RN  Outcome: Progressing     Problem: Skin/Tissue Integrity - Adult  Goal: Incisions, wounds, or drain sites healing without S/S of infection  9/26/2023 1722 by Villa Silver RN  Outcome: Progressing     Problem: Skin/Tissue Integrity - Adult  Goal: Oral mucous membranes remain intact  9/26/2023 1722 by Villa Silver RN  Outcome: Progressing     Problem: Metabolic/Fluid and Electrolytes - Adult  Goal: Electrolytes maintained within normal limits  9/26/2023 1722 by Villa Silver RN  Outcome: Progressing     Problem: Metabolic/Fluid and Electrolytes - Adult  Goal: Hemodynamic stability and optimal renal function maintained  9/26/2023 1722 by Villa Silver RN  Outcome: Progressing     Problem: Metabolic/Fluid and Electrolytes - Adult  Goal: Glucose maintained within prescribed range  9/26/2023 1722 by Villa Silver RN  Outcome: Progressing     Problem: Pain  Goal: Verbalizes/displays adequate comfort level or baseline comfort level  9/26/2023 1722 by Villa Silver RN  Outcome: Progressing     Problem: Chronic Conditions and Co-morbidities  Goal: Patient's chronic conditions and

## 2023-09-26 NOTE — PLAN OF CARE
Problem: Discharge Planning  Goal: Discharge to home or other facility with appropriate resources  9/26/2023 0620 by Tejas Ibrahim RN  Outcome: Progressing  Flowsheets (Taken 9/25/2023 2000)  Discharge to home or other facility with appropriate resources:   Identify barriers to discharge with patient and caregiver   Arrange for needed discharge resources and transportation as appropriate   Identify discharge learning needs (meds, wound care, etc)   Arrange for interpreters to assist at discharge as needed   Refer to discharge planning if patient needs post-hospital services based on physician order or complex needs related to functional status, cognitive ability or social support system  9/25/2023 1732 by Lucia Wood RN  Outcome: Progressing     Problem: Safety - Adult  Goal: Free from fall injury  9/26/2023 0620 by Tejas Ibrahim RN  Outcome: Progressing  9/25/2023 1732 by Lucia Wood RN  Outcome: Progressing     Problem: Skin/Tissue Integrity  Goal: Absence of new skin breakdown  Description: 1. Monitor for areas of redness and/or skin breakdown  2. Assess vascular access sites hourly  3. Every 4-6 hours minimum:  Change oxygen saturation probe site  4. Every 4-6 hours:  If on nasal continuous positive airway pressure, respiratory therapy assess nares and determine need for appliance change or resting period.   9/26/2023 0620 by Tejas Ibrahim RN  Outcome: Progressing  9/25/2023 1732 by Lucia Wood RN  Outcome: Progressing     Problem: Skin/Tissue Integrity - Adult  Goal: Skin integrity remains intact  9/26/2023 0620 by Tejas Ibrahim RN  Outcome: Progressing  Flowsheets (Taken 9/25/2023 2000)  Skin Integrity Remains Intact: Monitor for areas of redness and/or skin breakdown  9/25/2023 1732 by Lucia Wood RN  Outcome: Progressing  Goal: Incisions, wounds, or drain sites healing without S/S of infection  9/26/2023 0620 by Tejas Ibrahim RN  Outcome: Progressing  Flowsheets (Taken 9/25/2023 2000)  Incisions,

## 2023-09-26 NOTE — PROGRESS NOTES
[facility]: GAYLE LOOMIS   Occupational Therapy Evaluation  Date: 23  Patient Name: Erasmo Seth       Room: 0354/6837-94  MRN: [id number]  Account: [de-identified]   : 1955  (70 y.o.) Gender: female     Discharge Recommendations:  Further Occupational Therapy is recommended upon facility discharge. OT Equipment Recommendations  Other: TBD    Referring Practitioner: Caitlin Roth MD  Diagnosis: MAMTA Additional Pertinent Hx: The patient is a 79 y.o. female who presents to [facility] with complaints of falling at home. She reports that she slipped and fell backwards and hit the back of her head and elbows. She denies any loss of consciousness. She suffers with chronic pain and fibromyalgia but complains of increased neck & back pain. She denies any fevers, chills, cough, congestion, chest pain, SOB, abdominal pain, N/V/D, dysuria, headaches or dizziness. Treatment Diagnosis: Impaired self-care status    Past Medical History:  has a past medical history of Acute hypoxemic respiratory failure ([address]), Arthritis, CHF (congestive heart failure) ([address]), Chronic back pain, Diabetes mellitus type II, controlled ([address]), Fibromyalgia, Hyperlipidemia LDL goal < 70, Hypertension, benign, Morbid obesity with BMI of 60.0-69.9, adult ([address]), Pain of toe of right foot, Right wrist pain, Skin cancer, Sleep apnea, and Wears glasses. Past Surgical History:   has a past surgical history that includes Tubal ligation (); Hysterectomy (); lumbar fusion (10/2010; 2011); Carpal tunnel release (Right, 2014); cyst removal (); Cholecystectomy, laparoscopic (); lumbar fusion (); Rotator cuff repair (Left, ); Cataract removal with implant (Bilateral, ); Skin cancer excision (); Inguinal hernia repair (Bilateral, 2017); other surgical history (2018);  Upper gastrointestinal endoscopy (N/A, 10/16/2019); and Colonoscopy (N/A, Equipment evaluation, education, & procurement, Positioning, Home management training, Co-Treatment    OT Individual Minutes  OT Individual Minutes  Time In: 1013  Time Out: 0901  Minutes: 24  Time Code Minutes   Timed Code Treatment Minutes: 12 Minutes    Electronically signed by CHARLY Boss on 9/26/23 at 10:21 AM EDT

## 2023-09-27 LAB
GLUCOSE BLD-MCNC: 132 MG/DL (ref 65–105)
GLUCOSE BLD-MCNC: 133 MG/DL (ref 65–105)
GLUCOSE BLD-MCNC: 168 MG/DL (ref 65–105)
GLUCOSE BLD-MCNC: 174 MG/DL (ref 65–105)

## 2023-09-27 PROCEDURE — 6370000000 HC RX 637 (ALT 250 FOR IP): Performed by: INTERNAL MEDICINE

## 2023-09-27 PROCEDURE — 97116 GAIT TRAINING THERAPY: CPT

## 2023-09-27 PROCEDURE — 1200000000 HC SEMI PRIVATE

## 2023-09-27 PROCEDURE — 97530 THERAPEUTIC ACTIVITIES: CPT

## 2023-09-27 PROCEDURE — 99232 SBSQ HOSP IP/OBS MODERATE 35: CPT | Performed by: FAMILY MEDICINE

## 2023-09-27 PROCEDURE — 6370000000 HC RX 637 (ALT 250 FOR IP): Performed by: FAMILY MEDICINE

## 2023-09-27 PROCEDURE — 2580000003 HC RX 258: Performed by: FAMILY MEDICINE

## 2023-09-27 PROCEDURE — 82947 ASSAY GLUCOSE BLOOD QUANT: CPT

## 2023-09-27 PROCEDURE — 97535 SELF CARE MNGMENT TRAINING: CPT

## 2023-09-27 PROCEDURE — 6360000002 HC RX W HCPCS: Performed by: FAMILY MEDICINE

## 2023-09-27 RX ADMIN — CARVEDILOL 12.5 MG: 12.5 TABLET, FILM COATED ORAL at 10:17

## 2023-09-27 RX ADMIN — LOSARTAN POTASSIUM 25 MG: 25 TABLET, FILM COATED ORAL at 10:16

## 2023-09-27 RX ADMIN — LINACLOTIDE 145 MCG: 145 CAPSULE, GELATIN COATED ORAL at 06:29

## 2023-09-27 RX ADMIN — PREGABALIN 150 MG: 150 CAPSULE ORAL at 21:56

## 2023-09-27 RX ADMIN — ENOXAPARIN SODIUM 30 MG: 100 INJECTION SUBCUTANEOUS at 21:56

## 2023-09-27 RX ADMIN — SODIUM CHLORIDE, PRESERVATIVE FREE 10 ML: 5 INJECTION INTRAVENOUS at 21:58

## 2023-09-27 RX ADMIN — CARVEDILOL 12.5 MG: 12.5 TABLET, FILM COATED ORAL at 17:38

## 2023-09-27 RX ADMIN — FUROSEMIDE 40 MG: 40 TABLET ORAL at 10:16

## 2023-09-27 RX ADMIN — Medication 200 MG: at 21:58

## 2023-09-27 RX ADMIN — ALOGLIPTIN 6.25 MG: 6.25 TABLET, FILM COATED ORAL at 10:15

## 2023-09-27 RX ADMIN — CYANOCOBALAMIN TAB 1000 MCG 1000 MCG: 1000 TAB at 10:17

## 2023-09-27 RX ADMIN — CALCIUM CARBONATE 600 MG (1,500 MG)-VITAMIN D3 400 UNIT TABLET 1 TABLET: at 21:56

## 2023-09-27 RX ADMIN — MULTIPLE VITAMINS W/ MINERALS TAB 1 TABLET: TAB at 10:16

## 2023-09-27 RX ADMIN — CALCIUM CARBONATE 600 MG (1,500 MG)-VITAMIN D3 400 UNIT TABLET 1 TABLET: at 10:16

## 2023-09-27 RX ADMIN — GLIPIZIDE 5 MG: 5 TABLET ORAL at 06:29

## 2023-09-27 RX ADMIN — OXYCODONE AND ACETAMINOPHEN 1 TABLET: 325; 5 TABLET ORAL at 10:12

## 2023-09-27 RX ADMIN — POTASSIUM CHLORIDE 20 MEQ: 1500 TABLET, EXTENDED RELEASE ORAL at 10:16

## 2023-09-27 RX ADMIN — PREGABALIN 150 MG: 150 CAPSULE ORAL at 11:00

## 2023-09-27 RX ADMIN — ENOXAPARIN SODIUM 30 MG: 100 INJECTION SUBCUTANEOUS at 10:16

## 2023-09-27 RX ADMIN — OXYCODONE AND ACETAMINOPHEN 1 TABLET: 325; 5 TABLET ORAL at 17:40

## 2023-09-27 RX ADMIN — OXYCODONE AND ACETAMINOPHEN 1 TABLET: 325; 5 TABLET ORAL at 04:35

## 2023-09-27 RX ADMIN — ONDANSETRON 4 MG: 4 TABLET, ORALLY DISINTEGRATING ORAL at 10:11

## 2023-09-27 RX ADMIN — SODIUM CHLORIDE, PRESERVATIVE FREE 10 ML: 5 INJECTION INTRAVENOUS at 10:17

## 2023-09-27 RX ADMIN — POTASSIUM CHLORIDE 20 MEQ: 1500 TABLET, EXTENDED RELEASE ORAL at 21:56

## 2023-09-27 RX ADMIN — FAMOTIDINE 20 MG: 20 TABLET ORAL at 10:17

## 2023-09-27 RX ADMIN — VENLAFAXINE HYDROCHLORIDE 37.5 MG: 37.5 CAPSULE, EXTENDED RELEASE ORAL at 10:15

## 2023-09-27 RX ADMIN — ATORVASTATIN CALCIUM 40 MG: 40 TABLET, FILM COATED ORAL at 21:56

## 2023-09-27 ASSESSMENT — PAIN SCALES - GENERAL
PAINLEVEL_OUTOF10: 7
PAINLEVEL_OUTOF10: 4
PAINLEVEL_OUTOF10: 8
PAINLEVEL_OUTOF10: 6
PAINLEVEL_OUTOF10: 9

## 2023-09-27 ASSESSMENT — PAIN DESCRIPTION - LOCATION
LOCATION: BACK
LOCATION: BACK
LOCATION: SHOULDER
LOCATION: KNEE

## 2023-09-27 ASSESSMENT — PAIN DESCRIPTION - DESCRIPTORS
DESCRIPTORS: SHARP
DESCRIPTORS: SORE

## 2023-09-27 ASSESSMENT — PAIN DESCRIPTION - ORIENTATION
ORIENTATION: RIGHT;LEFT
ORIENTATION: RIGHT;LEFT
ORIENTATION: RIGHT

## 2023-09-27 NOTE — PROGRESS NOTES
Physical Therapy  Facility/Department: [facility] MED SURG  Daily Treatment Note  NAME: Chris Yeung  : 1955  MRN: [id number]    Date of Service: 2023    Discharge Recommendations:  24 hour supervision or assist, Home with Home health PT   PT Equipment Recommendations  Equipment Needed: No    Patient Diagnosis(es): The primary encounter diagnosis was MAMTA (acute kidney injury) ([address]). A diagnosis of Fall, initial encounter was also pertinent to this visit. Assessment   Activity Tolerance: Patient limited by fatigue  Equipment Needed: No     Plan    Physcial Therapy Plan  General Plan: 5-7 times per week     Restrictions  Restrictions/Precautions  Restrictions/Precautions: Fall Risk, Up as Tolerated, General Precautions  Required Braces or Orthoses?: No  Implants present? : Metal implants (Low back surgery hardware x5 screws)  Position Activity Restriction  Other position/activity restrictions: Up with assist, OT/PT eval and treat     Subjective    Subjective  Subjective: Pt postioned in supine resting, easy to rouse and agreeable to tx. Pt has c/o nausea without emesis. RN Rigo Vides ok's tx. Pain: \"Not really\"  Orientation  Overall Orientation Status: Within Functional Limits  Orientation Level: Oriented X4  Cognition  Overall Cognitive Status: WFL     Objective   Vitals  O2 Device: Nasal cannula  Comment: 3L  Bed Mobility Training  Bed Mobility Training: Yes (HOB elevated and use of bed rails)  Rolling: Stand-by assistance  Supine to Sit: Stand-by assistance  Sit to Supine: Maximum assistance  Scooting:  Total assistance (to HOB, SBA to EOB)  Balance  Sitting: Intact  Standing: With support  Transfer Training  Transfer Training: Yes  Sit to Stand: Contact-guard assistance  Stand to Sit: Contact-guard assistance  Stand Pivot Transfers: Contact-guard assistance  Toilet Transfer: Contact-guard assistance  Gait Training  Gait Training: Yes  Gait  Overall Level of Assistance: Contact-guard assistance  Base of

## 2023-09-27 NOTE — PROGRESS NOTES
[address] [facility]   INPATIENT OCCUPATIONAL THERAPY  PROGRESS NOTE  Date: 2023  Patient Name: Erasmo Seth       Room: 8626/2675-74  MRN: [id number]    : 1955  (79 y.o.)  Gender: female   Referring Practitioner: Caitlin Roth MD  Diagnosis: MAMTA      Discharge Recommendations:  Further Occupational Therapy is recommended upon facility discharge. OT Equipment Recommendations  Other: TBD    Restrictions/Precautions  Restrictions/Precautions  Restrictions/Precautions: Fall Risk;Up as Tolerated;General Precautions  Required Braces or Orthoses?: No  Implants present? : Metal implants (Low back surgery hardware x5 screws)  Position Activity Restriction  Other position/activity restrictions: Up with assist,     Vitals  O2 Device: Nasal cannula  Comment: 3L    Subjective  Subjective  Pain: Pt reports 9/10 pain in mid/upper back  Comments: Okay for OT/PT per RN Jaki Uribe    Objective  Orientation  Overall Orientation Status: Within Functional Limits  Orientation Level: Oriented X4  Cognition  Overall Cognitive Status: WFL    Activities of Daily Living  ADL  Feeding: Independent  Grooming: Setup (pt washes face sitting EOB, oral care completed standing at sink)  UE Bathing: Stand by assistance  LE Bathing: Minimal assistance  LE Bathing Skilled Clinical Factors: assist needed for foot care  UE Dressing: Stand by assistance  LE Dressing: Minimal assistance  Toileting: Minimal assistance  Additional Comments: pt completes UB/LB bathing sitting EOB. other ADL scores are based on skilled observation and clinical reasoning unless otherwise noted. Pt is currently limited by decreased strength, endurance, activity tolerance, and weakness which impacts the pt's ability to safely and independently complete self-care and mobility. pt educated on EC/WS and AE to increase safety and independence during ADLs.  ie reacher, LH sponge, sitting to bathe/dress, keeping bathroom well

## 2023-09-27 NOTE — PLAN OF CARE
Problem: Discharge Planning  Goal: Discharge to home or other facility with appropriate resources  9/27/2023 0417 by Carter Méndez RN  Outcome: Progressing  Flowsheets (Taken 9/27/2023 4295)  Discharge to home or other facility with appropriate resources:   Identify barriers to discharge with patient and caregiver   Arrange for needed discharge resources and transportation as appropriate   Identify discharge learning needs (meds, wound care, etc)   Refer to discharge planning if patient needs post-hospital services based on physician order or complex needs related to functional status, cognitive ability or social support system  Note: Inform pt. Of discharge teaching and planned. Instructed pt. To inform me if further teaching need to be done. Problem: Safety - Adult  Goal: Free from fall injury  9/27/2023 0417 by Carter Méndez RN  Outcome: Progressing  Flowsheets (Taken 9/27/2023 0417)  Free From Fall Injury:   Based on caregiver fall risk screen, instruct family/caregiver to ask for assistance with transferring infant if caregiver noted to have fall risk factors   Instruct family/caregiver on patient safety  Note: Made sure call light was in reach, a clear pathway and adequate lighting was provided. Also made sure that the pt. Was wearing non-slip socks. Problem: Skin/Tissue Integrity  Goal: Absence of new skin breakdown  Description: 1. Monitor for areas of redness and/or skin breakdown  2. Assess vascular access sites hourly  3. Every 4-6 hours minimum:  Change oxygen saturation probe site  4. Every 4-6 hours:  If on nasal continuous positive airway pressure, respiratory therapy assess nares and determine need for appliance change or resting period. 9/27/2023 0417 by Carter Méndez RN  Outcome: Progressing  Note: Educated pt.  About the importance of frequent turns and adequate nutrition and pitting pillows on bony parts of the body       Problem: Metabolic/Fluid and Electrolytes -

## 2023-09-27 NOTE — CARE COORDINATION
Writer submitted authorization through [employer].   Clyde Marquis ID: [id number]  Electronically signed by JERMAINE Westfall on 9/27/2023 at 4:24 PM

## 2023-09-27 NOTE — PROGRESS NOTES
Normal S1 and S2. No S3, S4 or murmurs. Rhythm is regular. LUNGS: Clear to auscultation and percussion without rales, rhonchi, wheezing or diminished breath sounds. ABD-Soft non distended, BS+ Non tender no organomegally  GI-soft nontender  MUSKULOSKELETAL: Adequately aligned spine. No joint erythema or tenderness. EXTREMITIES: 3+edema. NEURO:Alert oriented x 3 ,power 5/5 in all extremities      Labs:   CBC:  Recent Labs     09/25/23  0943   WBC 7.6   RBC 3.48*   HGB 9.5*   HCT 31.1*   MCV 89.4   MCH 27.3   MCHC 30.5*   RDW 17.5*      MPV 7.9      BMP:   Recent Labs     09/25/23  0943 09/26/23  0836    139   K 4.3 4.2    100   CO2 29 31   BUN 22 16   CREATININE 1.3* 1.1*   GLUCOSE 182* 168*   CALCIUM 9.0 9.2        Phosphorus:  No results for input(s): \"PHOS\" in the last 72 hours. Magnesium:   Recent Labs     09/25/23  0943   MG 1.8     Albumin:   No results for input(s): \"LABALBU\" in the last 72 hours. IRON:    Lab Results   Component Value Date/Time    IRON 29 09/14/2023 03:23 PM     Iron Saturation:  No components found for: \"PERCENTFE\"  TIBC:    Lab Results   Component Value Date/Time    TIBC 247 09/14/2023 03:23 PM     FERRITIN:    Lab Results   Component Value Date/Time    FERRITIN 241 09/14/2023 03:23 PM     SPEP:   Lab Results   Component Value Date/Time    PROT 7.2 09/24/2023 06:55 AM    ALBCAL 3.0 06/13/2020 03:42 PM    ALBPCT 44 06/13/2020 03:42 PM    LABALPH 0.2 06/13/2020 03:42 PM    LABALPH 0.9 06/13/2020 03:42 PM    A1PCT 4 06/13/2020 03:42 PM    A2PCT 14 06/13/2020 03:42 PM    BETAPCT 15 06/13/2020 03:42 PM    GAMGLOB 1.6 06/13/2020 03:42 PM    GGPCT 24 06/13/2020 03:42 PM    PATH ELECTRONICALLY SIGNED. Kar Robles M.D. 06/13/2020 03:42 PM    PATH ELECTRONICALLY SIGNED.  Kar Robles M.D. 06/13/2020 03:42 PM     UPEP: No results found for: \"TPU\"     C3:   Lab Results   Component Value Date    C3 200 (H) 09/14/2023     C4:   Lab Results   Component Value Date Continue Lasix to 40 mg daily[on  80 mg daily of lasix at home]  3.  1500 mils fluid restriction and encourage oral intake/nutritional supplement  4. Strict I's and O's  5. Basic Metabolic panel daily  6  Continue losartan 25 mg daily.         Electronically signed by Bonifacio Matamoros MD on 9/27/2023 at 10:00 AM

## 2023-09-27 NOTE — PLAN OF CARE
Problem: Discharge Planning  Goal: Discharge to home or other facility with appropriate resources  9/27/2023 1729 by Hernan Worthington RN  Outcome: Progressing  9/27/2023 0417 by Qing Wood RN  Outcome: Progressing  Flowsheets (Taken 9/27/2023 7807)  Discharge to home or other facility with appropriate resources:   Identify barriers to discharge with patient and caregiver   Arrange for needed discharge resources and transportation as appropriate   Identify discharge learning needs (meds, wound care, etc)   Refer to discharge planning if patient needs post-hospital services based on physician order or complex needs related to functional status, cognitive ability or social support system  Note: Inform pt. Of discharge teaching and planned. Instructed pt. To inform me if further teaching need to be done. Problem: Safety - Adult  Goal: Free from fall injury  9/27/2023 1729 by Hernan Worthington RN  Outcome: Progressing  9/27/2023 0417 by Qing Wood RN  Outcome: Progressing  Flowsheets (Taken 9/27/2023 0417)  Free From Fall Injury:   Based on caregiver fall risk screen, instruct family/caregiver to ask for assistance with transferring infant if caregiver noted to have fall risk factors   Instruct family/caregiver on patient safety  Note: Made sure call light was in reach, a clear pathway and adequate lighting was provided. Also made sure that the pt. Was wearing non-slip socks. Problem: Skin/Tissue Integrity  Goal: Absence of new skin breakdown  Description: 1. Monitor for areas of redness and/or skin breakdown  2. Assess vascular access sites hourly  3. Every 4-6 hours minimum:  Change oxygen saturation probe site  4. Every 4-6 hours:  If on nasal continuous positive airway pressure, respiratory therapy assess nares and determine need for appliance change or resting period.   9/27/2023 1729 by Hernan Worthington RN  Outcome: Progressing  9/27/2023 0417 by Qing Wood RN  Outcome:

## 2023-09-27 NOTE — CARE COORDINATION
Writer placed call to [place] at [facility] [employer] [facility] [employer]. No beds this week, possibly next Thursday. Writer placed call to [facility] [place] central intake. Fax number in [phone] [address] destination goes to the main building, not central intake. Writer faxed pt facesheet for Epic access to [phone]. Electronically signed by JERMAINE Ignacio on 9/27/2023 at 10:35 AM    Writer received call from [facility] [employer] accepts, room would be semi-private with a roommate. Writer met with pt regarding this, pt agreeable to facility. Writer will start authorization.   Electronically signed by JERMAINE Ignacio on 9/27/2023 at 3:10 PM

## 2023-09-28 LAB
BILIRUB UR QL STRIP: NEGATIVE
CLARITY UR: CLEAR
COLOR UR: YELLOW
COMMENT: NORMAL
GLUCOSE BLD-MCNC: 100 MG/DL (ref 65–105)
GLUCOSE BLD-MCNC: 107 MG/DL (ref 65–105)
GLUCOSE BLD-MCNC: 129 MG/DL (ref 65–105)
GLUCOSE BLD-MCNC: 135 MG/DL (ref 65–105)
GLUCOSE UR STRIP-MCNC: NEGATIVE MG/DL
HGB UR QL STRIP.AUTO: NEGATIVE
KETONES UR STRIP-MCNC: NEGATIVE MG/DL
LEUKOCYTE ESTERASE UR QL STRIP: NEGATIVE
NITRITE UR QL STRIP: NEGATIVE
PH UR STRIP: 7.5 [PH] (ref 5–8)
PROT UR STRIP-MCNC: NEGATIVE MG/DL
SP GR UR STRIP: 1.01 (ref 1–1.03)
UROBILINOGEN UR STRIP-ACNC: NORMAL EU/DL (ref 0–1)

## 2023-09-28 PROCEDURE — 1200000000 HC SEMI PRIVATE

## 2023-09-28 PROCEDURE — 82947 ASSAY GLUCOSE BLOOD QUANT: CPT

## 2023-09-28 PROCEDURE — 36415 COLL VENOUS BLD VENIPUNCTURE: CPT

## 2023-09-28 PROCEDURE — 97530 THERAPEUTIC ACTIVITIES: CPT

## 2023-09-28 PROCEDURE — 81003 URINALYSIS AUTO W/O SCOPE: CPT

## 2023-09-28 PROCEDURE — 6370000000 HC RX 637 (ALT 250 FOR IP): Performed by: FAMILY MEDICINE

## 2023-09-28 PROCEDURE — 97535 SELF CARE MNGMENT TRAINING: CPT

## 2023-09-28 PROCEDURE — 6370000000 HC RX 637 (ALT 250 FOR IP): Performed by: INTERNAL MEDICINE

## 2023-09-28 PROCEDURE — 2580000003 HC RX 258: Performed by: FAMILY MEDICINE

## 2023-09-28 RX ORDER — LOSARTAN POTASSIUM 50 MG/1
50 TABLET ORAL DAILY
Status: DISCONTINUED | OUTPATIENT
Start: 2023-09-29 | End: 2023-09-29 | Stop reason: HOSPADM

## 2023-09-28 RX ORDER — FUROSEMIDE 40 MG/1
40 TABLET ORAL DAILY
Qty: 60 TABLET | Refills: 3
Start: 2023-09-29

## 2023-09-28 RX ORDER — LOSARTAN POTASSIUM 25 MG/1
25 TABLET ORAL ONCE
Status: COMPLETED | OUTPATIENT
Start: 2023-09-28 | End: 2023-09-28

## 2023-09-28 RX ORDER — LOSARTAN POTASSIUM 50 MG/1
50 TABLET ORAL DAILY
Qty: 30 TABLET | Refills: 3
Start: 2023-09-29

## 2023-09-28 RX ADMIN — LOSARTAN POTASSIUM 25 MG: 25 TABLET, FILM COATED ORAL at 07:52

## 2023-09-28 RX ADMIN — CETIRIZINE HYDROCHLORIDE 5 MG: 10 TABLET, FILM COATED ORAL at 07:51

## 2023-09-28 RX ADMIN — MULTIPLE VITAMINS W/ MINERALS TAB 1 TABLET: TAB at 07:51

## 2023-09-28 RX ADMIN — ONDANSETRON 4 MG: 4 TABLET, ORALLY DISINTEGRATING ORAL at 07:19

## 2023-09-28 RX ADMIN — FUROSEMIDE 40 MG: 40 TABLET ORAL at 07:51

## 2023-09-28 RX ADMIN — POTASSIUM CHLORIDE 20 MEQ: 1500 TABLET, EXTENDED RELEASE ORAL at 21:24

## 2023-09-28 RX ADMIN — OXYCODONE AND ACETAMINOPHEN 1 TABLET: 325; 5 TABLET ORAL at 13:04

## 2023-09-28 RX ADMIN — SODIUM CHLORIDE, PRESERVATIVE FREE 10 ML: 5 INJECTION INTRAVENOUS at 07:53

## 2023-09-28 RX ADMIN — ALOGLIPTIN 6.25 MG: 6.25 TABLET, FILM COATED ORAL at 07:50

## 2023-09-28 RX ADMIN — VENLAFAXINE HYDROCHLORIDE 37.5 MG: 37.5 CAPSULE, EXTENDED RELEASE ORAL at 07:50

## 2023-09-28 RX ADMIN — CALCIUM CARBONATE 600 MG (1,500 MG)-VITAMIN D3 400 UNIT TABLET 1 TABLET: at 07:51

## 2023-09-28 RX ADMIN — PREGABALIN 150 MG: 150 CAPSULE ORAL at 07:50

## 2023-09-28 RX ADMIN — ATORVASTATIN CALCIUM 40 MG: 40 TABLET, FILM COATED ORAL at 21:24

## 2023-09-28 RX ADMIN — Medication 200 MG: at 21:25

## 2023-09-28 RX ADMIN — ONDANSETRON 4 MG: 4 TABLET, ORALLY DISINTEGRATING ORAL at 17:54

## 2023-09-28 RX ADMIN — OXYCODONE AND ACETAMINOPHEN 1 TABLET: 325; 5 TABLET ORAL at 06:31

## 2023-09-28 RX ADMIN — FAMOTIDINE 20 MG: 20 TABLET ORAL at 07:52

## 2023-09-28 RX ADMIN — SODIUM CHLORIDE, PRESERVATIVE FREE 10 ML: 5 INJECTION INTRAVENOUS at 21:25

## 2023-09-28 RX ADMIN — GLIPIZIDE 5 MG: 5 TABLET ORAL at 06:31

## 2023-09-28 RX ADMIN — LINACLOTIDE 145 MCG: 145 CAPSULE, GELATIN COATED ORAL at 06:31

## 2023-09-28 RX ADMIN — CYANOCOBALAMIN TAB 1000 MCG 1000 MCG: 1000 TAB at 07:51

## 2023-09-28 RX ADMIN — PREGABALIN 150 MG: 150 CAPSULE ORAL at 21:24

## 2023-09-28 RX ADMIN — OXYCODONE AND ACETAMINOPHEN 1 TABLET: 325; 5 TABLET ORAL at 18:50

## 2023-09-28 RX ADMIN — CALCIUM CARBONATE 600 MG (1,500 MG)-VITAMIN D3 400 UNIT TABLET 1 TABLET: at 21:24

## 2023-09-28 RX ADMIN — OXYCODONE AND ACETAMINOPHEN 1 TABLET: 325; 5 TABLET ORAL at 00:22

## 2023-09-28 RX ADMIN — CARVEDILOL 12.5 MG: 12.5 TABLET, FILM COATED ORAL at 07:51

## 2023-09-28 RX ADMIN — CARVEDILOL 12.5 MG: 12.5 TABLET, FILM COATED ORAL at 17:54

## 2023-09-28 RX ADMIN — POTASSIUM CHLORIDE 20 MEQ: 1500 TABLET, EXTENDED RELEASE ORAL at 07:52

## 2023-09-28 RX ADMIN — LOSARTAN POTASSIUM 25 MG: 25 TABLET, FILM COATED ORAL at 09:54

## 2023-09-28 ASSESSMENT — PAIN SCALES - GENERAL
PAINLEVEL_OUTOF10: 10
PAINLEVEL_OUTOF10: 8
PAINLEVEL_OUTOF10: 7
PAINLEVEL_OUTOF10: 0
PAINLEVEL_OUTOF10: 10

## 2023-09-28 ASSESSMENT — PAIN DESCRIPTION - ORIENTATION
ORIENTATION: LEFT;RIGHT
ORIENTATION: RIGHT;LEFT

## 2023-09-28 ASSESSMENT — PAIN DESCRIPTION - DESCRIPTORS
DESCRIPTORS: ACHING;SHARP
DESCRIPTORS: SORE
DESCRIPTORS: SORE
DESCRIPTORS: SHARP;DULL

## 2023-09-28 ASSESSMENT — PAIN DESCRIPTION - LOCATION
LOCATION: BACK
LOCATION: BACK;KNEE
LOCATION: VULVA

## 2023-09-28 NOTE — PLAN OF CARE
Problem: Discharge Planning  Goal: Discharge to home or other facility with appropriate resources  9/28/2023 0450 by Ritika Tipton RN  Outcome: Progressing  Flowsheets (Taken 9/28/2023 0450)  Discharge to home or other facility with appropriate resources:   Identify barriers to discharge with patient and caregiver   Arrange for needed discharge resources and transportation as appropriate   Identify discharge learning needs (meds, wound care, etc)   Refer to discharge planning if patient needs post-hospital services based on physician order or complex needs related to functional status, cognitive ability or social support system  Note: Inform pt. Of discharge teaching and planned. Instructed pt. To inform me if further teaching need to be done. Problem: Safety - Adult  Goal: Free from fall injury  9/28/2023 0450 by Ritika Tipton RN  Outcome: Progressing  Flowsheets (Taken 9/28/2023 0450)  Free From Fall Injury:   Lalita Ponce family/caregiver on patient safety   Based on caregiver fall risk screen, instruct family/caregiver to ask for assistance with transferring infant if caregiver noted to have fall risk factors  Note: Made sure call light was in reach, a clear pathway and adequate lighting was provided. Also made sure that the pt. Was wearing non-slip socks. Problem: Skin/Tissue Integrity  Goal: Absence of new skin breakdown  Description: 1. Monitor for areas of redness and/or skin breakdown  2. Assess vascular access sites hourly  3. Every 4-6 hours minimum:  Change oxygen saturation probe site  4. Every 4-6 hours:  If on nasal continuous positive airway pressure, respiratory therapy assess nares and determine need for appliance change or resting period. 9/28/2023 0450 by Ritika Tipton RN  Outcome: Progressing  Note: Educated pt.  About the importance of frequent turns and adequate nutrition and pitting pillows on bony parts of the body

## 2023-09-28 NOTE — PROGRESS NOTES
[facility]   INPATIENT OCCUPATIONAL THERAPY  PROGRESS NOTE  Date: 2023  Patient Name: Jg Elmore       Room: 3185/2616-12  MRN: [id number]    : 1955  (79 y.o.)  Gender: female   Referring Practitioner: Gela Garcia MD  Diagnosis: MAMTA      Discharge Recommendations:  Further Occupational Therapy is recommended upon facility discharge. OT Equipment Recommendations  Other: TBD    Restrictions/Precautions  Restrictions/Precautions  Restrictions/Precautions: Fall Risk;Up as Tolerated;General Precautions  Required Braces or Orthoses?: No  Implants present? : Metal implants (Low back surgery hardware x5 screws)  Position Activity Restriction  Other position/activity restrictions: Up with assist, OT/PT eval and treat    Vitals  O2 Device: Nasal cannula  Comment: 3L    Subjective  Subjective  Subjective: pt states that she isn't feeling well this AM but is agreeable to complete ADLs. Subjective  Pain: Pt reports 9/10 pain in mid/upper back       Objective  Orientation  Overall Orientation Status: Within Functional Limits  Orientation Level: Oriented X4  Cognition  Overall Cognitive Status: WFL    Activities of Daily Living  ADL  Feeding: Independent  Grooming: Setup (pt washes face sitting edge of recliner, oral care completed standing at sink)  UE Bathing: Stand by assistance  LE Bathing: Minimal assistance  LE Bathing Skilled Clinical Factors: assist needed for foot care  UE Dressing: Stand by assistance  LE Dressing: Minimal assistance  Toileting: Minimal assistance  Toileting Skilled Clinical Factors: per pt report  Additional Comments: pt completes UB/LB bathing sitting edge of recliner c no LOB noted. other ADL scores are based on skilled observation and clinical reasoning unless otherwise noted.  Pt is currently limited by decreased strength, endurance, activity tolerance, and weakness which impacts the pt's ability to safely and independently Recommendations: Patient would benefit from continued therapy after discharge  OT Equipment Recommendations  Other: TBD  Safety Devices  Type of Devices:  All fall risk precautions in place, Call light within reach, Gait belt, Left in chair (RN in room as writer departs)    AM-PAC Daily Activities Inpatient  AM-PAC Daily Activity - Inpatient   How much help is needed for putting on and taking off regular lower body clothing?: A Little  How much help is needed for bathing (which includes washing, rinsing, drying)?: A Little  How much help is needed for toileting (which includes using toilet, bedpan, or urinal)?: A Little  How much help is needed for putting on and taking off regular upper body clothing?: A Little  How much help is needed for taking care of personal grooming?: A Little  How much help for eating meals?: None  AM-PAC Inpatient Daily Activity Raw Score: 19  AM-PAC Inpatient ADL T-Scale Score : 40.22  ADL Inpatient CMS 0-100% Score: 42.8  ADL Inpatient CMS G-Code Modifier : CK    OT Minutes  OT Individual Minutes  Time In: [address]  Time Out: [address] [place] [address] [address]  Minutes: 42      Electronically signed by STEPHEN Klein on 9/28/23 at 10:08 AM EDT

## 2023-09-28 NOTE — FLOWSHEET NOTE
Physical Therapy Cancel Note      DATE: 2023    NAME: Erasmo Seth  MRN: [id number]   : 1955      Patient not seen this date for Physical Therapy due to: Other: Per JOSH Herzog at 1:11pm, pt just got back to bed and needs to rest. Will defer PT at this time; will check back on pt tomorrow.       Electronically signed by Naila Wilson PTA on 2023 at 1:10 PM

## 2023-09-28 NOTE — CARE COORDINATION
DISCHARGE PLANNING NOTE:    Transportation arranged with [facility] for 9/29 @ 11am.    Phone number for report: [phone]    Electronically signed by Casandra Vargas RN on 9/28/2023 at 3:46 PM    Attempted to notify primary care RN, Paige Cobian, that pt will be discharged tomorrow at 11am, however she did not answer. Attempted to notify patient that she will be discharged tomorrow, however she has a sign on her door stating she is trying to sleep and to not disturb.     Electronically signed by Casandra Vargas RN on 9/28/2023 at 4:06 PM

## 2023-09-28 NOTE — PROGRESS NOTES
NEPHROLOGY PROGRESS NOTE      Reason for Consult: Acute kidney injury      Chief Complaint: Fall  History Obtained From: Patient and EHR records    Interval history:  Patient seen and examined, denies shortness of breath. C/o Nausea  The renal function has improved and remained stable denies urinary complaints. Creatinine is 1.1 mg/dL. Good urine output 1.7 L so far today with Lasix    History of Present Illness: This is a 79 y.o. female  with a significant past medical history of  fibromyalgia, type 2 diabetes mellitus, obstructive sleep apnea, obesity, systemic hypertension and chronic kidney disease stage IIIb [baseline serum creatinine 1.3 to 1.5 mg/dL], who presented to ER after a fall at home after she slipped and hit her elbow and back of her head- she denied any of consciousness. Recently discharged 1 week ago after management for MAMTA creatinine 3.3 associated with diarrhea and nausea. C. difficile was negative. He reports decreased appetite but has been drinking up to 64 ounces of fluid per day. She denies nausea, decrease in urine output flank pain or gross hematuria or dysuria. She has been taking Lasix 80 mg at home. Pertinent studies were remarkable for BUN of 33 and creatinine of 2.8 mg/dL and hemoglobin of 10.0 g/dL  Urinalysis showed 0-2 WBCs negative leukocyte esterase and nitrite. High urine sodium was 25  Abdominal CT did not show any hydronephrosis or obstruction.     Past Medical History:        Diagnosis Date    Acute hypoxemic respiratory failure (HCC)     Arthritis     CHF (congestive heart failure) (HCC)     Chronic back pain     Diabetes mellitus type II, controlled ([address]) 2/14/2012    Fibromyalgia     Hyperlipidemia LDL goal < 70 2/14/2012    Hypertension, benign 02/14/2012    Morbid obesity with BMI of 60.0-69.9, adult ([address]) 8/28/2015    Pain of toe of right foot     morgans cyst    Right wrist pain     rate 8    Skin cancer     skin under lt eye,face    Sleep apnea

## 2023-09-28 NOTE — PLAN OF CARE
Problem: Discharge Planning  Goal: Discharge to home or other facility with appropriate resources  9/28/2023 1713 by Priscila Walton RN  Outcome: Progressing  9/28/2023 0450 by Marcy Montenegro RN  Outcome: Progressing  Flowsheets (Taken 9/28/2023 0450)  Discharge to home or other facility with appropriate resources:   Identify barriers to discharge with patient and caregiver   Arrange for needed discharge resources and transportation as appropriate   Identify discharge learning needs (meds, wound care, etc)   Refer to discharge planning if patient needs post-hospital services based on physician order or complex needs related to functional status, cognitive ability or social support system  Note: Inform pt. Of discharge teaching and planned. Instructed pt. To inform me if further teaching need to be done. Problem: Safety - Adult  Goal: Free from fall injury  9/28/2023 1713 by Priscila Walton RN  Outcome: Progressing  9/28/2023 0450 by Marcy Montenegro RN  Outcome: Progressing  Flowsheets (Taken 9/28/2023 0450)  Free From Fall Injury:   Instruct family/caregiver on patient safety   Based on caregiver fall risk screen, instruct family/caregiver to ask for assistance with transferring infant if caregiver noted to have fall risk factors  Note: Made sure call light was in reach, a clear pathway and adequate lighting was provided. Also made sure that the pt. Was wearing non-slip socks. Problem: Skin/Tissue Integrity  Goal: Absence of new skin breakdown  Description: 1. Monitor for areas of redness and/or skin breakdown  2. Assess vascular access sites hourly  3. Every 4-6 hours minimum:  Change oxygen saturation probe site  4. Every 4-6 hours:  If on nasal continuous positive airway pressure, respiratory therapy assess nares and determine need for appliance change or resting period.   9/28/2023 1713 by Priscila Walton RN  Outcome: Progressing  9/28/2023 0450 by Marcy Montenegro RN  Outcome:

## 2023-09-28 NOTE — CARE COORDINATION
Writer checked pt authorization status in [place].   Pt has been approved 9/28-10/2  Electronically signed by JERMAINE Herring on 9/28/2023 at 8:56 AM

## 2023-09-28 NOTE — CARE COORDINATION
Writer received call from bedside RN [place] regarding pt script necessary for discharge. Dr. Ray Anna is no longer at the hospital to write script. Writer placed call to [facility] at [address] [place] [address] to verify if Dr. Ray Anna could e-script something. [facility] shaylee will verify and place call back to writer.   Electronically signed by JERMAINE Martinez on 9/28/2023 at 3:08 PM

## 2023-09-28 NOTE — PROGRESS NOTES
09/28/23 1258   Encounter Summary   Encounter Overview/Reason  Spiritual/Emotional Needs   Service Provided For: Patient   Referral/Consult From: [facility]   Support System Spouse   Last Encounter  09/28/23   Complexity of Encounter Low   Spiritual/Emotional needs   Type Spiritual Support   Assessment/Intervention/Outcome   Assessment Calm;Coping; Hopeful   Intervention Active listening;Explored/Affirmed feelings, thoughts, concerns;Prayer (assurance of)/[place];Sustaining Presence/Ministry of presence   Outcome Coping;Comfort;Encouraged;Engaged in conversation;Expressed Gratitude;Receptive

## 2023-09-29 VITALS
DIASTOLIC BLOOD PRESSURE: 46 MMHG | HEART RATE: 58 BPM | HEIGHT: 60 IN | WEIGHT: 293 LBS | OXYGEN SATURATION: 100 % | RESPIRATION RATE: 18 BRPM | TEMPERATURE: 98.2 F | BODY MASS INDEX: 57.52 KG/M2 | SYSTOLIC BLOOD PRESSURE: 146 MMHG

## 2023-09-29 LAB — GLUCOSE BLD-MCNC: 131 MG/DL (ref 65–105)

## 2023-09-29 PROCEDURE — 6370000000 HC RX 637 (ALT 250 FOR IP): Performed by: INTERNAL MEDICINE

## 2023-09-29 PROCEDURE — 2580000003 HC RX 258: Performed by: FAMILY MEDICINE

## 2023-09-29 PROCEDURE — 6370000000 HC RX 637 (ALT 250 FOR IP): Performed by: FAMILY MEDICINE

## 2023-09-29 PROCEDURE — 82947 ASSAY GLUCOSE BLOOD QUANT: CPT

## 2023-09-29 PROCEDURE — 6360000002 HC RX W HCPCS: Performed by: FAMILY MEDICINE

## 2023-09-29 PROCEDURE — 99239 HOSP IP/OBS DSCHRG MGMT >30: CPT | Performed by: FAMILY MEDICINE

## 2023-09-29 RX ORDER — PREGABALIN 150 MG/1
150 CAPSULE ORAL 2 TIMES DAILY
Qty: 28 CAPSULE | Refills: 0 | Status: SHIPPED | OUTPATIENT
Start: 2023-09-29 | End: 2023-10-13

## 2023-09-29 RX ORDER — CLONAZEPAM 0.5 MG/1
0.5 TABLET ORAL 2 TIMES DAILY PRN
Qty: 28 TABLET | Refills: 0 | Status: SHIPPED | OUTPATIENT
Start: 2023-09-29 | End: 2023-10-13

## 2023-09-29 RX ORDER — OXYCODONE HYDROCHLORIDE AND ACETAMINOPHEN 5; 325 MG/1; MG/1
1 TABLET ORAL EVERY 6 HOURS PRN
Qty: 60 TABLET | Refills: 0 | Status: SHIPPED | OUTPATIENT
Start: 2023-09-29 | End: 2024-09-28

## 2023-09-29 RX ORDER — OXYCODONE HYDROCHLORIDE 30 MG/1
30 TABLET ORAL EVERY 8 HOURS PRN
Qty: 42 TABLET | Refills: 0 | Status: SHIPPED | OUTPATIENT
Start: 2023-09-29 | End: 2023-10-13

## 2023-09-29 RX ADMIN — FAMOTIDINE 20 MG: 20 TABLET ORAL at 08:53

## 2023-09-29 RX ADMIN — OXYCODONE AND ACETAMINOPHEN 1 TABLET: 325; 5 TABLET ORAL at 07:22

## 2023-09-29 RX ADMIN — LOSARTAN POTASSIUM 50 MG: 50 TABLET, FILM COATED ORAL at 08:53

## 2023-09-29 RX ADMIN — GLIPIZIDE 5 MG: 5 TABLET ORAL at 05:59

## 2023-09-29 RX ADMIN — CARVEDILOL 12.5 MG: 12.5 TABLET, FILM COATED ORAL at 08:53

## 2023-09-29 RX ADMIN — SODIUM CHLORIDE, PRESERVATIVE FREE 10 ML: 5 INJECTION INTRAVENOUS at 08:54

## 2023-09-29 RX ADMIN — CYANOCOBALAMIN TAB 1000 MCG 1000 MCG: 1000 TAB at 08:53

## 2023-09-29 RX ADMIN — LINACLOTIDE 145 MCG: 145 CAPSULE, GELATIN COATED ORAL at 05:59

## 2023-09-29 RX ADMIN — ONDANSETRON HYDROCHLORIDE 4 MG: 4 TABLET, FILM COATED ORAL at 08:52

## 2023-09-29 RX ADMIN — FUROSEMIDE 40 MG: 40 TABLET ORAL at 08:54

## 2023-09-29 RX ADMIN — VENLAFAXINE HYDROCHLORIDE 37.5 MG: 37.5 CAPSULE, EXTENDED RELEASE ORAL at 08:53

## 2023-09-29 RX ADMIN — CETIRIZINE HYDROCHLORIDE 5 MG: 10 TABLET, FILM COATED ORAL at 08:53

## 2023-09-29 RX ADMIN — PREGABALIN 150 MG: 150 CAPSULE ORAL at 08:52

## 2023-09-29 RX ADMIN — ENOXAPARIN SODIUM 30 MG: 100 INJECTION SUBCUTANEOUS at 08:54

## 2023-09-29 RX ADMIN — CALCIUM CARBONATE 600 MG (1,500 MG)-VITAMIN D3 400 UNIT TABLET 1 TABLET: at 08:53

## 2023-09-29 RX ADMIN — OXYCODONE AND ACETAMINOPHEN 1 TABLET: 325; 5 TABLET ORAL at 00:51

## 2023-09-29 RX ADMIN — ALOGLIPTIN 6.25 MG: 6.25 TABLET, FILM COATED ORAL at 08:54

## 2023-09-29 RX ADMIN — OXYCODONE AND ACETAMINOPHEN 1 TABLET: 325; 5 TABLET ORAL at 11:21

## 2023-09-29 RX ADMIN — POTASSIUM CHLORIDE 20 MEQ: 1500 TABLET, EXTENDED RELEASE ORAL at 08:53

## 2023-09-29 RX ADMIN — MULTIPLE VITAMINS W/ MINERALS TAB 1 TABLET: TAB at 08:52

## 2023-09-29 ASSESSMENT — PAIN SCALES - GENERAL
PAINLEVEL_OUTOF10: 8
PAINLEVEL_OUTOF10: 8

## 2023-09-29 ASSESSMENT — PAIN DESCRIPTION - LOCATION
LOCATION: GENERALIZED
LOCATION: BACK

## 2023-09-29 ASSESSMENT — PAIN DESCRIPTION - DESCRIPTORS
DESCRIPTORS: SHARP;STABBING
DESCRIPTORS: ACHING

## 2023-09-29 ASSESSMENT — PAIN DESCRIPTION - ORIENTATION: ORIENTATION: RIGHT;LEFT

## 2023-09-29 NOTE — PLAN OF CARE
Problem: Discharge Planning  Goal: Discharge to home or other facility with appropriate resources  9/29/2023 0426 by Madina Franks RN  Outcome: Mick Bull (Taken 9/28/2023 2127 by Marianne Boo RN)  Discharge to home or other facility with appropriate resources: Identify barriers to discharge with patient and caregiver     Problem: Safety - Adult  Goal: Free from fall injury  9/29/2023 0426 by Madina Franks RN  Outcome: Progressing  Flowsheets  Taken 9/29/2023 0426 by Madina Franks RN  Free From Fall Injury:   Instruct family/caregiver on patient safety   Based on caregiver fall risk screen, instruct family/caregiver to ask for assistance with transferring infant if caregiver noted to have fall risk factors  Taken 9/28/2023 2149 by Marianne Boo RN  Free From Fall Injury: Instruct family/caregiver on patient safety  Note: Made sure call light was in reach, a clear pathway and adequate lighting was provided. Also made sure that the pt. Was wearing non-slip socks. Problem: Pain  Goal: Verbalizes/displays adequate comfort level or baseline comfort level  9/29/2023 0426 by Madina Franks RN  Outcome: Progressing  Flowsheets (Taken 9/29/2023 0426)  Verbalizes/displays adequate comfort level or baseline comfort level:   Encourage patient to monitor pain and request assistance   Assess pain using appropriate pain scale   Administer analgesics based on type and severity of pain and evaluate response   Implement non-pharmacological measures as appropriate and evaluate response   Consider cultural and social influences on pain and pain management   Notify Licensed Independent Practitioner if interventions unsuccessful or patient reports new pain  Note: PT. Received pain meds in timely manner. Teach pt. Non-pharm. Methods to handle pain.        Problem: Chronic Conditions and Co-morbidities  Goal: Patient's chronic conditions and co-morbidity symptoms are monitored and maintained or improved  9/29/2023 0426 by Rashad Amaro RN  Outcome: Progressing  Flowsheets  Taken 9/29/2023 0426 by Porter Malcolm - Patient's Chronic Conditions and Co-Morbidity Symptoms are Monitored and Maintained or Improved:   Monitor and assess patient's chronic conditions and comorbid symptoms for stability, deterioration, or improvement   Collaborate with multidisciplinary team to address chronic and comorbid conditions and prevent exacerbation or deterioration   Update acute care plan with appropriate goals if chronic or comorbid symptoms are exacerbated and prevent overall improvement and discharge  Taken 9/28/2023 2127 by Porter Alejandre - Patient's Chronic Conditions and Co-Morbidity Symptoms are Monitored and Maintained or Improved: Monitor and assess patient's chronic conditions and comorbid symptoms for stability, deterioration, or improvement  Note: Made sure pt. Understood what their conditions was and why it was occurring. Also educated pt. On ways to prevent the condition from worsening and from occurring again.

## 2023-09-29 NOTE — CARE COORDINATION
HENS complete.   Document ID : [id number]  Electronically signed by JERMAINE Valderrama on 9/29/2023 at 10:01 AM stretcher

## 2023-09-29 NOTE — PROGRESS NOTES
Patient out of bed with walker and assistance to go to bathroom. Baseline loose stools continue. Dysuria overnight - UA normal.     D/c to SNF today.      98.2, 18, 58, 146/46      Sheri Sr MD, [facility]

## 2023-09-29 NOTE — PROGRESS NOTES
Writer contacted Dr. Autumn Butler as pt is complaining of pain with urination. Order received for UA.

## 2023-09-29 NOTE — CARE COORDINATION
Transportation arranged for patient to go to [employer] at [facility] at [address] via [employer]. Facility informed. Bedside nurse informed.      Number for Report: [phone]  Electronically signed by JERMAINE Henriquez on 9/29/2023 at 9:41 AM

## 2023-09-29 NOTE — PROGRESS NOTES
NEPHROLOGY PROGRESS NOTE      Reason for Consult: Acute kidney injury      Chief Complaint: Fall  History Obtained From: Patient and EHR records    Interval history:  Patient seen and examined, denies shortness of breath. C/o Nausea  The renal function has improved and remained stable denies urinary complaints. Creatinine is 1.1 mg/dL. Good urine output 1.7 L so far today with Lasix  BP stable. History of Present Illness: This is a 79 y.o. female  with a significant past medical history of  fibromyalgia, type 2 diabetes mellitus, obstructive sleep apnea, obesity, systemic hypertension and chronic kidney disease stage IIIb [baseline serum creatinine 1.3 to 1.5 mg/dL], who presented to ER after a fall at home after she slipped and hit her elbow and back of her head- she denied any of consciousness. Recently discharged 1 week ago after management for MAMTA creatinine 3.3 associated with diarrhea and nausea. C. difficile was negative. He reports decreased appetite but has been drinking up to 64 ounces of fluid per day. She denies nausea, decrease in urine output flank pain or gross hematuria or dysuria. She has been taking Lasix 80 mg at home. Pertinent studies were remarkable for BUN of 33 and creatinine of 2.8 mg/dL and hemoglobin of 10.0 g/dL  Urinalysis showed 0-2 WBCs negative leukocyte esterase and nitrite. High urine sodium was 25  Abdominal CT did not show any hydronephrosis or obstruction.     Past Medical History:        Diagnosis Date    Acute hypoxemic respiratory failure (HCC)     Arthritis     CHF (congestive heart failure) (HCC)     Chronic back pain     Diabetes mellitus type II, controlled ([address]) 2/14/2012    Fibromyalgia     Hyperlipidemia LDL goal < 70 2/14/2012    Hypertension, benign 02/14/2012    Morbid obesity with BMI of 60.0-69.9, adult ([address]) 8/28/2015    Pain of toe of right foot     morgans cyst    Right wrist pain     rate 8    Skin cancer     skin under lt eye,face    Sleep

## 2023-09-29 NOTE — PROGRESS NOTES
Pt IV removed, medicated for pain at [id number] for the ride. Pt departed to facility via wheelchair. BM today prior to leaving. Report called to [phone] to [facility].

## 2023-09-29 NOTE — CARE COORDINATION
IMM letter provided to patient. Patient offered four hours to make informed decision regarding appeal process; patient agreeable to discharge.      Electronically signed by JERMAINE Hayward on 9/29/2023 at 9:43 AM

## 2023-10-02 ENCOUNTER — HOSPITAL ENCOUNTER (OUTPATIENT)
Age: 68
Setting detail: SPECIMEN
Discharge: HOME | End: 2023-10-02

## 2023-10-02 LAB
ALBUMIN SERPL-MCNC: 3.3 G/DL (ref 3.5–5.2)
ALP SERPL-CCNC: 80 U/L (ref 35–104)
ALT SERPL-CCNC: 14 U/L (ref 5–33)
ANION GAP SERPL CALCULATED.3IONS-SCNC: 11 MMOL/L (ref 9–17)
AST SERPL-CCNC: 17 U/L
BASOPHILS # BLD: 0.1 K/UL (ref 0–0.2)
BASOPHILS NFR BLD: 1 % (ref 0–2)
BILIRUB SERPL-MCNC: 0.3 MG/DL (ref 0.3–1.2)
BUN SERPL-MCNC: 30 MG/DL (ref 8–23)
BUN/CREAT SERPL: 13 (ref 9–20)
CALCIUM SERPL-MCNC: 8.9 MG/DL (ref 8.6–10.4)
CHLORIDE SERPL-SCNC: 93 MMOL/L (ref 98–107)
CO2 SERPL-SCNC: 27 MMOL/L (ref 20–31)
CREAT SERPL-MCNC: 2.4 MG/DL (ref 0.5–0.9)
EOSINOPHIL # BLD: 0.51 K/UL (ref 0–0.44)
EOSINOPHILS RELATIVE PERCENT: 4 % (ref 1–4)
ERYTHROCYTE [DISTWIDTH] IN BLOOD BY AUTOMATED COUNT: 16.8 % (ref 11.8–14.4)
GFR SERPL CREATININE-BSD FRML MDRD: 22 ML/MIN/1.73M2
GLUCOSE SERPL-MCNC: 167 MG/DL (ref 70–99)
HCT VFR BLD AUTO: 31.1 % (ref 36.3–47.1)
HGB BLD-MCNC: 9.1 G/DL (ref 11.9–15.1)
IMM GRANULOCYTES # BLD AUTO: 0.07 K/UL (ref 0–0.3)
IMM GRANULOCYTES NFR BLD: 1 %
LYMPHOCYTES # BLD: 15 % (ref 24–43)
LYMPHOCYTES NFR BLD: 1.79 K/UL (ref 1.1–3.7)
MCH RBC QN AUTO: 26.9 PG (ref 25.2–33.5)
MCHC RBC AUTO-ENTMCNC: 29.3 G/DL (ref 28.4–34.8)
MCV RBC AUTO: 92 FL (ref 82.6–102.9)
MONOCYTES NFR BLD: 0.81 K/UL (ref 0.1–1.2)
MONOCYTES NFR BLD: 7 % (ref 3–12)
NEUTROPHILS NFR BLD: 72 % (ref 36–65)
NEUTS SEG NFR BLD: 8.86 K/UL (ref 1.5–8.1)
NRBC BLD-RTO: 0 PER 100 WBC
PLATELET # BLD AUTO: 309 K/UL (ref 138–453)
PMV BLD AUTO: 11.1 FL (ref 8.1–13.5)
POTASSIUM SERPL-SCNC: 5.7 MMOL/L (ref 3.7–5.3)
PROT SERPL-MCNC: 7.2 G/DL (ref 6.4–8.3)
RBC # BLD AUTO: 3.38 M/UL (ref 3.95–5.11)
RBC # BLD: ABNORMAL 10*6/UL
SODIUM SERPL-SCNC: 131 MMOL/L (ref 135–144)
WBC OTHER # BLD: 12.1 K/UL (ref 3.5–11.3)

## 2023-10-02 PROCEDURE — 80053 COMPREHEN METABOLIC PANEL: CPT

## 2023-10-02 PROCEDURE — 36415 COLL VENOUS BLD VENIPUNCTURE: CPT

## 2023-10-02 PROCEDURE — 85025 COMPLETE CBC W/AUTO DIFF WBC: CPT

## 2023-10-02 PROCEDURE — P9603 ONE-WAY ALLOW PRORATED MILES: HCPCS

## 2023-10-04 ENCOUNTER — HOSPITAL ENCOUNTER (OUTPATIENT)
Age: 68
Setting detail: SPECIMEN
Discharge: HOME | End: 2023-10-04

## 2023-10-04 LAB
ANION GAP SERPL CALCULATED.3IONS-SCNC: 12 MMOL/L (ref 9–17)
BUN SERPL-MCNC: 20 MG/DL (ref 8–23)
BUN/CREAT SERPL: 14 (ref 9–20)
CALCIUM SERPL-MCNC: 9.1 MG/DL (ref 8.6–10.4)
CHLORIDE SERPL-SCNC: 100 MMOL/L (ref 98–107)
CO2 SERPL-SCNC: 28 MMOL/L (ref 20–31)
CREAT SERPL-MCNC: 1.4 MG/DL (ref 0.5–0.9)
ERYTHROCYTE [DISTWIDTH] IN BLOOD BY AUTOMATED COUNT: 16.8 % (ref 11.8–14.4)
GFR SERPL CREATININE-BSD FRML MDRD: 41 ML/MIN/1.73M2
GLUCOSE SERPL-MCNC: 76 MG/DL (ref 70–99)
HCT VFR BLD AUTO: 29.8 % (ref 36.3–47.1)
HGB BLD-MCNC: 8.8 G/DL (ref 11.9–15.1)
MCH RBC QN AUTO: 26.9 PG (ref 25.2–33.5)
MCHC RBC AUTO-ENTMCNC: 29.5 G/DL (ref 28.4–34.8)
MCV RBC AUTO: 91.1 FL (ref 82.6–102.9)
NRBC BLD-RTO: 0 PER 100 WBC
PLATELET # BLD AUTO: 312 K/UL (ref 138–453)
PMV BLD AUTO: 11.2 FL (ref 8.1–13.5)
POTASSIUM SERPL-SCNC: 4.5 MMOL/L (ref 3.7–5.3)
RBC # BLD AUTO: 3.27 M/UL (ref 3.95–5.11)
SODIUM SERPL-SCNC: 140 MMOL/L (ref 135–144)
WBC OTHER # BLD: 9 K/UL (ref 3.5–11.3)

## 2023-10-04 PROCEDURE — 85027 COMPLETE CBC AUTOMATED: CPT

## 2023-10-04 PROCEDURE — 36415 COLL VENOUS BLD VENIPUNCTURE: CPT

## 2023-10-04 PROCEDURE — P9603 ONE-WAY ALLOW PRORATED MILES: HCPCS

## 2023-10-04 PROCEDURE — 80048 BASIC METABOLIC PNL TOTAL CA: CPT

## 2023-10-10 PROBLEM — R29.6 MULTIPLE FALLS: Status: ACTIVE | Noted: 2021-09-12

## 2023-10-10 PROBLEM — R41.82 ALTERED MENTAL STATUS: Status: ACTIVE | Noted: 2020-09-22

## 2023-10-10 PROBLEM — L30.4 INTERTRIGINOUS DERMATITIS ASSOCIATED WITH MOISTURE: Status: ACTIVE | Noted: 2022-11-14

## 2023-10-10 PROBLEM — L98.491 NON-PRESSURE CHRONIC ULCER OF SKIN OF OTHER SITES LIMITED TO BREAKDOWN OF SKIN (HCC): Status: ACTIVE | Noted: 2023-01-04

## 2023-10-10 PROBLEM — E66.01 MORBID OBESITY (HCC): Status: ACTIVE | Noted: 2021-09-11

## 2023-10-10 PROBLEM — G89.4 CHRONIC PAIN DISORDER: Status: ACTIVE | Noted: 2019-10-17

## 2023-10-12 ENCOUNTER — HOSPITAL ENCOUNTER (OUTPATIENT)
Age: 68
Setting detail: SPECIMEN
Discharge: HOME | End: 2023-10-12

## 2023-10-12 LAB
ANION GAP SERPL CALCULATED.3IONS-SCNC: 9 MMOL/L (ref 9–17)
BUN SERPL-MCNC: 24 MG/DL (ref 8–23)
BUN/CREAT SERPL: 17 (ref 9–20)
CALCIUM SERPL-MCNC: 9 MG/DL (ref 8.6–10.4)
CHLORIDE SERPL-SCNC: 93 MMOL/L (ref 98–107)
CO2 SERPL-SCNC: 29 MMOL/L (ref 20–31)
CREAT SERPL-MCNC: 1.4 MG/DL (ref 0.5–0.9)
ERYTHROCYTE [DISTWIDTH] IN BLOOD BY AUTOMATED COUNT: 16.6 % (ref 11.8–14.4)
GFR SERPL CREATININE-BSD FRML MDRD: 41 ML/MIN/1.73M2
GLUCOSE SERPL-MCNC: 134 MG/DL (ref 70–99)
HCT VFR BLD AUTO: 28.7 % (ref 36.3–47.1)
HGB BLD-MCNC: 8.4 G/DL (ref 11.9–15.1)
MCH RBC QN AUTO: 26.9 PG (ref 25.2–33.5)
MCHC RBC AUTO-ENTMCNC: 29.3 G/DL (ref 28.4–34.8)
MCV RBC AUTO: 92 FL (ref 82.6–102.9)
NRBC BLD-RTO: 0 PER 100 WBC
PLATELET # BLD AUTO: 289 K/UL (ref 138–453)
PMV BLD AUTO: 10.9 FL (ref 8.1–13.5)
POTASSIUM SERPL-SCNC: 4.9 MMOL/L (ref 3.7–5.3)
RBC # BLD AUTO: 3.12 M/UL (ref 3.95–5.11)
SODIUM SERPL-SCNC: 131 MMOL/L (ref 135–144)
WBC OTHER # BLD: 11.7 K/UL (ref 3.5–11.3)

## 2023-10-12 PROCEDURE — 36415 COLL VENOUS BLD VENIPUNCTURE: CPT

## 2023-10-12 PROCEDURE — P9603 ONE-WAY ALLOW PRORATED MILES: HCPCS

## 2023-10-12 PROCEDURE — 85027 COMPLETE CBC AUTOMATED: CPT

## 2023-10-12 PROCEDURE — 80048 BASIC METABOLIC PNL TOTAL CA: CPT

## 2023-10-13 ENCOUNTER — CARE COORDINATION (OUTPATIENT)
Dept: CASE MANAGEMENT | Age: 68
End: 2023-10-13

## 2023-10-13 ENCOUNTER — APPOINTMENT (OUTPATIENT)
Dept: GENERAL RADIOLOGY | Age: 68
DRG: 092 | End: 2023-10-13
Payer: MEDICARE

## 2023-10-13 ENCOUNTER — APPOINTMENT (OUTPATIENT)
Dept: CT IMAGING | Age: 68
DRG: 092 | End: 2023-10-13
Payer: MEDICARE

## 2023-10-13 ENCOUNTER — HOSPITAL ENCOUNTER (INPATIENT)
Age: 68
LOS: 4 days | Discharge: HOME | DRG: 092 | End: 2023-10-18
Attending: EMERGENCY MEDICINE | Admitting: FAMILY MEDICINE
Payer: MEDICARE

## 2023-10-13 DIAGNOSIS — R25.1 TREMOR: ICD-10-CM

## 2023-10-13 DIAGNOSIS — R53.83 OTHER FATIGUE: ICD-10-CM

## 2023-10-13 DIAGNOSIS — R26.89 IMBALANCE: ICD-10-CM
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
## 2023-10-13 LAB
ANION GAP SERPL CALCULATED.3IONS-SCNC: 12 MMOL/L (ref 9–17)
BUN SERPL-MCNC: 22 MG/DL (ref 8–23)
CALCIUM SERPL-MCNC: 9.5 MG/DL (ref 8.6–10.4)
CHLORIDE SERPL-SCNC: 94 MMOL/L (ref 98–107)
CO2 SERPL-SCNC: 29 MMOL/L (ref 20–31)
CREAT SERPL-MCNC: 1.4 MG/DL (ref 0.5–0.9)
GFR SERPL CREATININE-BSD FRML MDRD: 41 ML/MIN/1.73M2
GLUCOSE SERPL-MCNC: 102 MG/DL (ref 70–99)
INR PPP: 1.1
MAGNESIUM SERPL-MCNC: 2.1 MG/DL (ref 1.6–2.6)
PARTIAL THROMBOPLASTIN TIME: 30.5 SEC (ref 24–36)
PHOSPHATE SERPL-MCNC: 4.9 MG/DL (ref 2.6–4.5)
POTASSIUM SERPL-SCNC: 5 MMOL/L (ref 3.7–5.3)
PROTHROMBIN TIME: 14.1 SEC (ref 11.8–14.6)
SODIUM SERPL-SCNC: 135 MMOL/L (ref 135–144)
TROPONIN I SERPL HS-MCNC: 19 NG/L (ref 0–14)

## 2023-10-13 PROCEDURE — 73060 X-RAY EXAM OF HUMERUS: CPT

## 2023-10-13 PROCEDURE — 70450 CT HEAD/BRAIN W/O DYE: CPT

## 2023-10-13 PROCEDURE — 80048 BASIC METABOLIC PNL TOTAL CA: CPT

## 2023-10-13 PROCEDURE — 85730 THROMBOPLASTIN TIME PARTIAL: CPT

## 2023-10-13 PROCEDURE — 84484 ASSAY OF TROPONIN QUANT: CPT

## 2023-10-13 PROCEDURE — 71045 X-RAY EXAM CHEST 1 VIEW: CPT

## 2023-10-13 PROCEDURE — 83735 ASSAY OF MAGNESIUM: CPT

## 2023-10-13 PROCEDURE — 72170 X-RAY EXAM OF PELVIS: CPT

## 2023-10-13 PROCEDURE — 85610 PROTHROMBIN TIME: CPT

## 2023-10-13 PROCEDURE — 72125 CT NECK SPINE W/O DYE: CPT

## 2023-10-13 PROCEDURE — 36415 COLL VENOUS BLD VENIPUNCTURE: CPT

## 2023-10-13 PROCEDURE — 84100 ASSAY OF PHOSPHORUS: CPT

## 2023-10-13 PROCEDURE — 85025 COMPLETE CBC W/AUTO DIFF WBC: CPT

## 2023-10-13 PROCEDURE — 99285 EMERGENCY DEPT VISIT HI MDM: CPT

## 2023-10-13 RX ORDER — 0.9 % SODIUM CHLORIDE 0.9 %
1000 INTRAVENOUS SOLUTION INTRAVENOUS ONCE
Status: COMPLETED | OUTPATIENT
Start: 2023-10-13 | End: 2023-10-14

## 2023-10-13 ASSESSMENT — ENCOUNTER SYMPTOMS
FACIAL SWELLING: 0
PHOTOPHOBIA: 0
EYE PAIN: 0
BACK PAIN: 0
CHEST TIGHTNESS: 0
VOMITING: 0
COLOR CHANGE: 0
VOICE CHANGE: 0
SHORTNESS OF BREATH: 0
ABDOMINAL PAIN: 0
TROUBLE SWALLOWING: 0
NAUSEA: 0

## 2023-10-13 ASSESSMENT — PAIN DESCRIPTION - ORIENTATION: ORIENTATION: RIGHT;LEFT

## 2023-10-13 ASSESSMENT — PAIN DESCRIPTION - LOCATION: LOCATION: SHOULDER

## 2023-10-13 ASSESSMENT — PAIN DESCRIPTION - DESCRIPTORS: DESCRIPTORS: ACHING

## 2023-10-13 ASSESSMENT — PAIN SCALES - GENERAL: PAINLEVEL_OUTOF10: 3

## 2023-10-13 ASSESSMENT — PAIN - FUNCTIONAL ASSESSMENT: PAIN_FUNCTIONAL_ASSESSMENT: 0-10

## 2023-10-13 NOTE — CARE COORDINATION
Care Transitions Outreach Attempt    Call within 2 business days of discharge: Yes   Attempted to reach patient for transitions of care follow up. Unable to reach patient. Caller left a HIPAA compliant message with contact information for a return call. Patient: Maura Danielle Patient : 1955 MRN: <[id number]>    Last Discharge Facility       Date Complaint Diagnosis Description Type Department Provider    23 Arm Pain; Fatigue; Fall MAMTA (acute kidney injury) ([address] ) . .. ED to Hosp-Admission (Discharged) (ADMITTED) [facility] Ashley Grewal MD; [facility]. .. Was this an external facility discharge?  Yes, 10/12/23  Discharge Facility Name: [facility] -10/12    Noted following upcoming appointments from discharge chart review:     Future Appointments   Date Time Provider [address]   10/19/2023  1:30 PM MD MIA[facility] [address]   10/24/2023 11:00 AM MD [facility]   2023  2:00 PM Ashley Grewal MD [address] [facility]

## 2023-10-14 PROBLEM — R26.9 GAIT ABNORMALITY: Status: ACTIVE | Noted: 2023-10-14

## 2023-10-14 LAB
BASOPHILS # BLD: 0.22 K/UL (ref 0–0.2)
BASOPHILS NFR BLD: 2 % (ref 0–2)
EOSINOPHIL # BLD: 0.76 K/UL (ref 0–0.4)
EOSINOPHILS RELATIVE PERCENT: 7 % (ref 0–4)
ERYTHROCYTE [DISTWIDTH] IN BLOOD BY AUTOMATED COUNT: 17.8 % (ref 11.5–14.9)
GLUCOSE BLD-MCNC: 130 MG/DL (ref 65–105)
GLUCOSE BLD-MCNC: 138 MG/DL (ref 65–105)
GLUCOSE BLD-MCNC: 221 MG/DL (ref 65–105)
GLUCOSE BLD-MCNC: 91 MG/DL (ref 65–105)
HCT VFR BLD AUTO: 28.7 % (ref 36–46)
HGB BLD-MCNC: 9.3 G/DL (ref 12–16)
LYMPHOCYTES NFR BLD: 1.53 K/UL (ref 1–4.8)
LYMPHOCYTES RELATIVE PERCENT: 14 % (ref 24–44)
MCH RBC QN AUTO: 27.8 PG (ref 26–34)
MCHC RBC AUTO-ENTMCNC: 32.6 G/DL (ref 31–37)
MCV RBC AUTO: 85.3 FL (ref 80–100)
MONOCYTES NFR BLD: 0.55 K/UL (ref 0.1–1.3)
MONOCYTES NFR BLD: 5 % (ref 1–7)
MORPHOLOGY: ABNORMAL
NEUTROPHILS NFR BLD: 72 % (ref 36–66)
NEUTS SEG NFR BLD: 7.84 K/UL (ref 1.3–9.1)
PLATELET # BLD AUTO: 301 K/UL (ref 150–450)
PMV BLD AUTO: 8.4 FL (ref 6–12)
RBC # BLD AUTO: 3.36 M/UL (ref 4–5.2)
TROPONIN I SERPL HS-MCNC: 19 NG/L (ref 0–14)
TROPONIN I SERPL HS-MCNC: 19 NG/L (ref 0–14)
WBC OTHER # BLD: 10.9 K/UL (ref 3.5–11)

## 2023-10-14 PROCEDURE — 97116 GAIT TRAINING THERAPY: CPT

## 2023-10-14 PROCEDURE — 99223 1ST HOSP IP/OBS HIGH 75: CPT | Performed by: FAMILY MEDICINE

## 2023-10-14 PROCEDURE — 1200000000 HC SEMI PRIVATE

## 2023-10-14 PROCEDURE — 6370000000 HC RX 637 (ALT 250 FOR IP): Performed by: FAMILY MEDICINE

## 2023-10-14 PROCEDURE — 82947 ASSAY GLUCOSE BLOOD QUANT: CPT

## 2023-10-14 PROCEDURE — 6370000000 HC RX 637 (ALT 250 FOR IP): Performed by: EMERGENCY MEDICINE

## 2023-10-14 PROCEDURE — 97530 THERAPEUTIC ACTIVITIES: CPT

## 2023-10-14 PROCEDURE — 36415 COLL VENOUS BLD VENIPUNCTURE: CPT

## 2023-10-14 PROCEDURE — 93005 ELECTROCARDIOGRAM TRACING: CPT | Performed by: EMERGENCY MEDICINE

## 2023-10-14 PROCEDURE — 2580000003 HC RX 258: Performed by: FAMILY MEDICINE

## 2023-10-14 PROCEDURE — 2580000003 HC RX 258: Performed by: EMERGENCY MEDICINE

## 2023-10-14 PROCEDURE — 97162 PT EVAL MOD COMPLEX 30 MIN: CPT

## 2023-10-14 PROCEDURE — 84484 ASSAY OF TROPONIN QUANT: CPT

## 2023-10-14 RX ORDER — SODIUM CHLORIDE 9 MG/ML
INJECTION, SOLUTION INTRAVENOUS PRN
Status: DISCONTINUED | OUTPATIENT
Start: 2023-10-14 | End: 2023-10-18 | Stop reason: HOSPADM

## 2023-10-14 RX ORDER — INSULIN LISPRO 100 [IU]/ML
0-8 INJECTION, SOLUTION INTRAVENOUS; SUBCUTANEOUS
Status: DISCONTINUED | OUTPATIENT
Start: 2023-10-14 | End: 2023-10-18 | Stop reason: HOSPADM

## 2023-10-14 RX ORDER — GLIPIZIDE 5 MG/1
5 TABLET ORAL
Status: DISCONTINUED | OUTPATIENT
Start: 2023-10-14 | End: 2023-10-18 | Stop reason: HOSPADM

## 2023-10-14 RX ORDER — POLYETHYLENE GLYCOL 3350 17 G/17G
17 POWDER, FOR SOLUTION ORAL DAILY PRN
Status: DISCONTINUED | OUTPATIENT
Start: 2023-10-14 | End: 2023-10-18 | Stop reason: HOSPADM

## 2023-10-14 RX ORDER — ATORVASTATIN CALCIUM 40 MG/1
40 TABLET, FILM COATED ORAL NIGHTLY
Status: DISCONTINUED | OUTPATIENT
Start: 2023-10-14 | End: 2023-10-18 | Stop reason: HOSPADM

## 2023-10-14 RX ORDER — ENOXAPARIN SODIUM 100 MG/ML
30 INJECTION SUBCUTANEOUS 2 TIMES DAILY
Status: DISCONTINUED | OUTPATIENT
Start: 2023-10-14 | End: 2023-10-18 | Stop reason: HOSPADM

## 2023-10-14 RX ORDER — POTASSIUM CHLORIDE 7.45 MG/ML
10 INJECTION INTRAVENOUS PRN
Status: DISCONTINUED | OUTPATIENT
Start: 2023-10-14 | End: 2023-10-18 | Stop reason: HOSPADM

## 2023-10-14 RX ORDER — SODIUM CHLORIDE 0.9 % (FLUSH) 0.9 %
5-40 SYRINGE (ML) INJECTION EVERY 12 HOURS SCHEDULED
Status: DISCONTINUED | OUTPATIENT
Start: 2023-10-14 | End: 2023-10-18 | Stop reason: HOSPADM

## 2023-10-14 RX ORDER — ONDANSETRON 2 MG/ML
4 INJECTION INTRAMUSCULAR; INTRAVENOUS EVERY 6 HOURS PRN
Status: DISCONTINUED | OUTPATIENT
Start: 2023-10-14 | End: 2023-10-18 | Stop reason: HOSPADM

## 2023-10-14 RX ORDER — LANOLIN ALCOHOL/MO/W.PET/CERES
1000 CREAM (GRAM) TOPICAL DAILY
Status: DISCONTINUED | OUTPATIENT
Start: 2023-10-14 | End: 2023-10-18 | Stop reason: HOSPADM

## 2023-10-14 RX ORDER — SODIUM CHLORIDE 0.9 % (FLUSH) 0.9 %
10 SYRINGE (ML) INJECTION PRN
Status: DISCONTINUED | OUTPATIENT
Start: 2023-10-14 | End: 2023-10-18 | Stop reason: HOSPADM

## 2023-10-14 RX ORDER — OXYCODONE HYDROCHLORIDE AND ACETAMINOPHEN 5; 325 MG/1; MG/1
1 TABLET ORAL ONCE
Status: COMPLETED | OUTPATIENT
Start: 2023-10-14 | End: 2023-10-14

## 2023-10-14 RX ORDER — MAGNESIUM SULFATE 1 G/100ML
1000 INJECTION INTRAVENOUS PRN
Status: DISCONTINUED | OUTPATIENT
Start: 2023-10-14 | End: 2023-10-18 | Stop reason: HOSPADM

## 2023-10-14 RX ORDER — ONDANSETRON 4 MG/1
4 TABLET, ORALLY DISINTEGRATING ORAL EVERY 8 HOURS PRN
Status: DISCONTINUED | OUTPATIENT
Start: 2023-10-14 | End: 2023-10-18 | Stop reason: HOSPADM

## 2023-10-14 RX ORDER — LOSARTAN POTASSIUM 50 MG/1
50 TABLET ORAL DAILY
Status: DISCONTINUED | OUTPATIENT
Start: 2023-10-14 | End: 2023-10-18 | Stop reason: HOSPADM

## 2023-10-14 RX ORDER — FAMOTIDINE 20 MG/1
20 TABLET, FILM COATED ORAL 2 TIMES DAILY
Status: DISCONTINUED | OUTPATIENT
Start: 2023-10-14 | End: 2023-10-14 | Stop reason: DRUGHIGH

## 2023-10-14 RX ORDER — DEXTROSE MONOHYDRATE 100 MG/ML
INJECTION, SOLUTION INTRAVENOUS CONTINUOUS PRN
Status: DISCONTINUED | OUTPATIENT
Start: 2023-10-14 | End: 2023-10-18 | Stop reason: HOSPADM

## 2023-10-14 RX ORDER — FUROSEMIDE 40 MG/1
40 TABLET ORAL DAILY
Status: DISCONTINUED | OUTPATIENT
Start: 2023-10-14 | End: 2023-10-18 | Stop reason: HOSPADM

## 2023-10-14 RX ORDER — POTASSIUM CHLORIDE 20 MEQ/1
40 TABLET, EXTENDED RELEASE ORAL PRN
Status: DISCONTINUED | OUTPATIENT
Start: 2023-10-14 | End: 2023-10-18 | Stop reason: HOSPADM

## 2023-10-14 RX ORDER — ALOGLIPTIN 12.5 MG/1
12.5 TABLET, FILM COATED ORAL DAILY
Status: DISCONTINUED | OUTPATIENT
Start: 2023-10-14 | End: 2023-10-18 | Stop reason: HOSPADM

## 2023-10-14 RX ORDER — FAMOTIDINE 20 MG/1
20 TABLET, FILM COATED ORAL DAILY
Status: DISCONTINUED | OUTPATIENT
Start: 2023-10-14 | End: 2023-10-18 | Stop reason: HOSPADM

## 2023-10-14 RX ORDER — ACETAMINOPHEN 650 MG/1
650 SUPPOSITORY RECTAL EVERY 6 HOURS PRN
Status: DISCONTINUED | OUTPATIENT
Start: 2023-10-14 | End: 2023-10-18 | Stop reason: HOSPADM

## 2023-10-14 RX ORDER — OXYCODONE HYDROCHLORIDE AND ACETAMINOPHEN 5; 325 MG/1; MG/1
1 TABLET ORAL EVERY 6 HOURS PRN
Status: DISCONTINUED | OUTPATIENT
Start: 2023-10-14 | End: 2023-10-18 | Stop reason: HOSPADM

## 2023-10-14 RX ORDER — VENLAFAXINE HYDROCHLORIDE 37.5 MG/1
37.5 CAPSULE, EXTENDED RELEASE ORAL DAILY
Status: DISCONTINUED | OUTPATIENT
Start: 2023-10-14 | End: 2023-10-18 | Stop reason: HOSPADM

## 2023-10-14 RX ORDER — CARVEDILOL 12.5 MG/1
12.5 TABLET ORAL 2 TIMES DAILY WITH MEALS
Status: DISCONTINUED | OUTPATIENT
Start: 2023-10-14 | End: 2023-10-18 | Stop reason: HOSPADM

## 2023-10-14 RX ORDER — ACETAMINOPHEN 325 MG/1
650 TABLET ORAL EVERY 6 HOURS PRN
Status: DISCONTINUED | OUTPATIENT
Start: 2023-10-14 | End: 2023-10-18 | Stop reason: HOSPADM

## 2023-10-14 RX ORDER — M-VIT,TX,IRON,MINS/CALC/FOLIC 27MG-0.4MG
1 TABLET ORAL DAILY
Status: DISCONTINUED | OUTPATIENT
Start: 2023-10-14 | End: 2023-10-18 | Stop reason: HOSPADM

## 2023-10-14 RX ORDER — CALCIUM CARBONATE 500(1250)
1 TABLET ORAL 2 TIMES DAILY
Status: DISCONTINUED | OUTPATIENT
Start: 2023-10-14 | End: 2023-10-18 | Stop reason: HOSPADM

## 2023-10-14 RX ORDER — CLONAZEPAM 0.5 MG/1
0.5 TABLET ORAL 2 TIMES DAILY PRN
Status: DISCONTINUED | OUTPATIENT
Start: 2023-10-14 | End: 2023-10-18 | Stop reason: HOSPADM

## 2023-10-14 RX ORDER — INSULIN LISPRO 100 [IU]/ML
0-4 INJECTION, SOLUTION INTRAVENOUS; SUBCUTANEOUS NIGHTLY
Status: DISCONTINUED | OUTPATIENT
Start: 2023-10-14 | End: 2023-10-18 | Stop reason: HOSPADM

## 2023-10-14 RX ORDER — ONDANSETRON 4 MG/1
4 TABLET, FILM COATED ORAL 3 TIMES DAILY PRN
Status: DISCONTINUED | OUTPATIENT
Start: 2023-10-14 | End: 2023-10-14 | Stop reason: SDUPTHER

## 2023-10-14 RX ORDER — POTASSIUM CHLORIDE 20 MEQ/1
20 TABLET, EXTENDED RELEASE ORAL 2 TIMES DAILY
Status: DISCONTINUED | OUTPATIENT
Start: 2023-10-14 | End: 2023-10-18 | Stop reason: HOSPADM

## 2023-10-14 RX ORDER — UBIDECARENONE 200 MG
200 CAPSULE ORAL NIGHTLY
Status: DISCONTINUED | OUTPATIENT
Start: 2023-10-14 | End: 2023-10-18 | Stop reason: HOSPADM

## 2023-10-14 RX ADMIN — MULTIPLE VITAMINS W/ MINERALS TAB 1 TABLET: TAB at 09:21

## 2023-10-14 RX ADMIN — LOSARTAN POTASSIUM 50 MG: 50 TABLET, FILM COATED ORAL at 09:21

## 2023-10-14 RX ADMIN — SODIUM CHLORIDE, PRESERVATIVE FREE 10 ML: 5 INJECTION INTRAVENOUS at 20:26

## 2023-10-14 RX ADMIN — CARVEDILOL 12.5 MG: 12.5 TABLET, FILM COATED ORAL at 09:21

## 2023-10-14 RX ADMIN — OXYCODONE AND ACETAMINOPHEN 1 TABLET: 325; 5 TABLET ORAL at 16:59

## 2023-10-14 RX ADMIN — POTASSIUM CHLORIDE 20 MEQ: 1500 TABLET, EXTENDED RELEASE ORAL at 09:21

## 2023-10-14 RX ADMIN — CARVEDILOL 12.5 MG: 12.5 TABLET, FILM COATED ORAL at 16:58

## 2023-10-14 RX ADMIN — FAMOTIDINE 20 MG: 20 TABLET ORAL at 09:21

## 2023-10-14 RX ADMIN — ATORVASTATIN CALCIUM 40 MG: 40 TABLET, FILM COATED ORAL at 20:25

## 2023-10-14 RX ADMIN — CYANOCOBALAMIN TAB 1000 MCG 1000 MCG: 1000 TAB at 09:21

## 2023-10-14 RX ADMIN — SODIUM CHLORIDE, PRESERVATIVE FREE 10 ML: 5 INJECTION INTRAVENOUS at 09:22

## 2023-10-14 RX ADMIN — OXYCODONE AND ACETAMINOPHEN 1 TABLET: 325; 5 TABLET ORAL at 10:50

## 2023-10-14 RX ADMIN — SODIUM CHLORIDE 1000 ML: 9 INJECTION, SOLUTION INTRAVENOUS at 00:25

## 2023-10-14 RX ADMIN — POTASSIUM CHLORIDE 20 MEQ: 1500 TABLET, EXTENDED RELEASE ORAL at 20:24

## 2023-10-14 RX ADMIN — LINACLOTIDE 145 MCG: 145 CAPSULE, GELATIN COATED ORAL at 09:32

## 2023-10-14 RX ADMIN — OXYCODONE AND ACETAMINOPHEN 1 TABLET: 5; 325 TABLET ORAL at 02:24

## 2023-10-14 RX ADMIN — ALOGLIPTIN 12.5 MG: 12.5 TABLET, FILM COATED ORAL at 09:21

## 2023-10-14 RX ADMIN — GLIPIZIDE 5 MG: 5 TABLET ORAL at 09:32

## 2023-10-14 RX ADMIN — FUROSEMIDE 40 MG: 40 TABLET ORAL at 09:21

## 2023-10-14 RX ADMIN — VENLAFAXINE HYDROCHLORIDE 37.5 MG: 37.5 CAPSULE, EXTENDED RELEASE ORAL at 09:32

## 2023-10-14 RX ADMIN — CALCIUM 500 MG: 500 TABLET ORAL at 09:32

## 2023-10-14 RX ADMIN — ONDANSETRON 4 MG: 4 TABLET, ORALLY DISINTEGRATING ORAL at 11:48

## 2023-10-14 RX ADMIN — CALCIUM 500 MG: 500 TABLET ORAL at 20:24

## 2023-10-14 RX ADMIN — Medication 200 MG: at 20:26

## 2023-10-14 ASSESSMENT — PAIN SCALES - GENERAL
PAINLEVEL_OUTOF10: 8
PAINLEVEL_OUTOF10: 8
PAINLEVEL_OUTOF10: 9
PAINLEVEL_OUTOF10: 10

## 2023-10-14 ASSESSMENT — PAIN DESCRIPTION - LOCATION
LOCATION: BACK;SHOULDER
LOCATION: BACK
LOCATION: BACK;ARM

## 2023-10-14 ASSESSMENT — PAIN DESCRIPTION - ORIENTATION
ORIENTATION: LOWER;RIGHT;LEFT
ORIENTATION: MID

## 2023-10-14 ASSESSMENT — PAIN DESCRIPTION - DESCRIPTORS: DESCRIPTORS: ACHING

## 2023-10-14 NOTE — ED NOTES
Ambulated in room with walker and assist of two staff members and tolerated activity fair.      Dave Nolen RN  10/14/23 4190

## 2023-10-14 NOTE — ED PROVIDER NOTES
EMERGENCY DEPARTMENT ENCOUNTER    Pt Name: Calli Ndiaye  MRN: [id number]  [address] [facility] 1955  Date of evaluation: 10/13/23  CHIEF COMPLAINT       Chief Complaint   Patient presents with    Kary Constantino today, uses a walker at home, states that she just feels fatigued      HISTORY OF PRESENT ILLNESS   72-year-old female presenting to the ER after a fall. Patient states that she lost her footing and went down. Patient has been having generalized weakness for the last 10 weeks. Patient just recently had a 3-week admission to the hospital and is unsure why. The history is provided by the patient. Fall  The accident occurred 1 to 2 hours ago. The fall occurred while walking. Pertinent negatives include no abdominal pain, no nausea, no vomiting and no headaches. REVIEW OF SYSTEMS     Review of Systems   Constitutional:  Negative for activity change, appetite change and fatigue. HENT:  Negative for facial swelling, trouble swallowing and voice change. Eyes:  Negative for photophobia and pain. Respiratory:  Negative for chest tightness and shortness of breath. Cardiovascular:  Negative for chest pain and palpitations. Gastrointestinal:  Negative for abdominal pain, nausea and vomiting. Genitourinary:  Negative for dysuria and urgency. Musculoskeletal:  Positive for arthralgias (bilateral shoulders). Negative for back pain. Skin:  Negative for color change and rash. Neurological:  Negative for dizziness, syncope and headaches. Psychiatric/Behavioral:  Negative for behavioral problems and hallucinations.       PASTMEDICAL HISTORY     Past Medical History:   Diagnosis Date    Acute hypoxemic respiratory failure (HCC)     Arthritis     CHF (congestive heart failure) (HCC)     Chronic back pain     Diabetes mellitus type II, controlled ([address]) 2/14/2012    Fibromyalgia     Hyperlipidemia LDL goal < 70 2/14/2012    Hypertension, benign 02/14/2012    Morbid obesity with BMI of 60.0-69.9, adult

## 2023-10-14 NOTE — H&P
HGB 8.4* 9.3*   HCT 28.7* 28.7*    301      RENAL  Recent Labs     10/12/23  0920 10/13/23  2329   * 135   K 4.9 5.0   CL 93* 94*   CO2 29 29   PHOS  --  4.9*   BUN 24* 22   CREATININE 1.4* 1.4*     LFT'S  No results for input(s): \"AST\", \"ALT\", \"ALB\", \"BILIDIR\", \"BILITOT\", \"ALKPHOS\" in the last 72 hours. COAG  Recent Labs     10/13/23  2329   INR 1.1     CARDIAC ENZYMES  No results for input(s): \"CKTOTAL\", \"CKMB\", \"CKMBINDEX\", \"TROPONINI\" in the last 72 hours. U/A:    Lab Results   Component Value Date/Time    NITRITE neg 05/19/2022 10:43 AM    COLORU Yellow 09/28/2023 11:20 PM    WBCUA 0 TO 2 09/24/2023 07:15 AM    RBCUA 0 TO 2 09/24/2023 07:15 AM    MUCUS NOT REPORTED 09/13/2021 10:02 PM    BACTERIA FEW 09/24/2023 07:15 AM    CLARITYU cloudy 05/19/2022 10:43 AM    SPECGRAV 1.008 09/28/2023 11:20 PM    LEUKOCYTESUR NEGATIVE 09/28/2023 11:20 PM    BLOODU moderate 05/19/2022 10:43 AM    GLUCOSEU NEGATIVE 09/28/2023 11:20 PM    AMORPHOUS NOT REPORTED 09/13/2021 10:02 PM       ABG    Lab Results   Component Value Date/Time    XOK2BXQ 32.2 01/06/2020 01:10 PM    H3VAXHUL 87.7 01/06/2020 01:10 PM    PHART 7.416 01/06/2020 01:10 PM    XSW8LLS 50.2 01/06/2020 01:10 PM    PO2ART 58.0 01/06/2020 01:10 PM           Active Hospital Problems    Diagnosis Date Noted    Gait abnormality [R26.9] 10/14/2023    Lymphedema of both lower extremities [I89.0] 06/11/2020    Fibromyalgia [M79.7] 11/22/2019    BRIANNA (obstructive sleep apnea) [G47.33] 09/15/2015    Adult BMI 50.0-59.9 kg/sq m ([address]) [Z68.43] 08/28/2015    Degeneration of intervertebral disc of cervical region [M50.30] 03/08/2012    Hypertension, benign [I10] 02/14/2012    Diabetes mellitus type II, controlled ([address]) [E11.9] 02/14/2012    Mixed hyperlipidemia [E78.2] 02/14/2012         ASSESSMENT/PLAN:  Patient admitted with Gait Abnormality. Co-morbidities as above. -Gait abnormality - PT/OT to eval & treat. -HTN - BP running lower, will monitor.   -DM2 -

## 2023-10-15 LAB
GLUCOSE BLD-MCNC: 111 MG/DL (ref 65–105)
GLUCOSE BLD-MCNC: 145 MG/DL (ref 65–105)
GLUCOSE BLD-MCNC: 146 MG/DL (ref 65–105)
GLUCOSE BLD-MCNC: 147 MG/DL (ref 65–105)
INR PPP: 1.1
PROTHROMBIN TIME: 14.9 SEC (ref 11.8–14.6)

## 2023-10-15 PROCEDURE — 85610 PROTHROMBIN TIME: CPT

## 2023-10-15 PROCEDURE — 36415 COLL VENOUS BLD VENIPUNCTURE: CPT

## 2023-10-15 PROCEDURE — 99232 SBSQ HOSP IP/OBS MODERATE 35: CPT | Performed by: FAMILY MEDICINE

## 2023-10-15 PROCEDURE — 97110 THERAPEUTIC EXERCISES: CPT

## 2023-10-15 PROCEDURE — 6360000002 HC RX W HCPCS: Performed by: FAMILY MEDICINE

## 2023-10-15 PROCEDURE — 1200000000 HC SEMI PRIVATE

## 2023-10-15 PROCEDURE — 2580000003 HC RX 258: Performed by: FAMILY MEDICINE

## 2023-10-15 PROCEDURE — 97116 GAIT TRAINING THERAPY: CPT

## 2023-10-15 PROCEDURE — 82947 ASSAY GLUCOSE BLOOD QUANT: CPT

## 2023-10-15 PROCEDURE — 6370000000 HC RX 637 (ALT 250 FOR IP): Performed by: FAMILY MEDICINE

## 2023-10-15 RX ORDER — HYDRALAZINE HYDROCHLORIDE 20 MG/ML
10 INJECTION INTRAMUSCULAR; INTRAVENOUS EVERY 6 HOURS PRN
Status: DISCONTINUED | OUTPATIENT
Start: 2023-10-15 | End: 2023-10-18 | Stop reason: HOSPADM

## 2023-10-15 RX ADMIN — GLIPIZIDE 5 MG: 5 TABLET ORAL at 07:47

## 2023-10-15 RX ADMIN — POTASSIUM CHLORIDE 20 MEQ: 1500 TABLET, EXTENDED RELEASE ORAL at 07:47

## 2023-10-15 RX ADMIN — CLONAZEPAM 0.5 MG: 0.5 TABLET ORAL at 18:10

## 2023-10-15 RX ADMIN — CARVEDILOL 12.5 MG: 12.5 TABLET, FILM COATED ORAL at 17:06

## 2023-10-15 RX ADMIN — CARVEDILOL 12.5 MG: 12.5 TABLET, FILM COATED ORAL at 07:47

## 2023-10-15 RX ADMIN — OXYCODONE AND ACETAMINOPHEN 1 TABLET: 325; 5 TABLET ORAL at 07:47

## 2023-10-15 RX ADMIN — SODIUM CHLORIDE, PRESERVATIVE FREE 10 ML: 5 INJECTION INTRAVENOUS at 07:46

## 2023-10-15 RX ADMIN — FUROSEMIDE 40 MG: 40 TABLET ORAL at 07:47

## 2023-10-15 RX ADMIN — ONDANSETRON 4 MG: 4 TABLET, ORALLY DISINTEGRATING ORAL at 12:27

## 2023-10-15 RX ADMIN — OXYCODONE AND ACETAMINOPHEN 1 TABLET: 325; 5 TABLET ORAL at 21:21

## 2023-10-15 RX ADMIN — MULTIPLE VITAMINS W/ MINERALS TAB 1 TABLET: TAB at 07:47

## 2023-10-15 RX ADMIN — CALCIUM 500 MG: 500 TABLET ORAL at 07:47

## 2023-10-15 RX ADMIN — OXYCODONE AND ACETAMINOPHEN 1 TABLET: 325; 5 TABLET ORAL at 00:14

## 2023-10-15 RX ADMIN — Medication 200 MG: at 21:23

## 2023-10-15 RX ADMIN — POTASSIUM CHLORIDE 20 MEQ: 1500 TABLET, EXTENDED RELEASE ORAL at 21:20

## 2023-10-15 RX ADMIN — FAMOTIDINE 20 MG: 20 TABLET ORAL at 07:47

## 2023-10-15 RX ADMIN — SODIUM CHLORIDE, PRESERVATIVE FREE 5 ML: 5 INJECTION INTRAVENOUS at 20:27

## 2023-10-15 RX ADMIN — HYDRALAZINE HYDROCHLORIDE 10 MG: 20 INJECTION, SOLUTION INTRAMUSCULAR; INTRAVENOUS at 15:18

## 2023-10-15 RX ADMIN — CYANOCOBALAMIN TAB 1000 MCG 1000 MCG: 1000 TAB at 07:47

## 2023-10-15 RX ADMIN — VENLAFAXINE HYDROCHLORIDE 37.5 MG: 37.5 CAPSULE, EXTENDED RELEASE ORAL at 10:40

## 2023-10-15 RX ADMIN — ALOGLIPTIN 12.5 MG: 12.5 TABLET, FILM COATED ORAL at 07:47

## 2023-10-15 RX ADMIN — LINACLOTIDE 145 MCG: 145 CAPSULE, GELATIN COATED ORAL at 10:40

## 2023-10-15 RX ADMIN — LOSARTAN POTASSIUM 50 MG: 50 TABLET, FILM COATED ORAL at 07:47

## 2023-10-15 RX ADMIN — ATORVASTATIN CALCIUM 40 MG: 40 TABLET, FILM COATED ORAL at 21:20

## 2023-10-15 ASSESSMENT — PAIN DESCRIPTION - LOCATION
LOCATION: SHOULDER
LOCATION: SHOULDER

## 2023-10-15 ASSESSMENT — PAIN DESCRIPTION - DESCRIPTORS: DESCRIPTORS: ACHING

## 2023-10-15 ASSESSMENT — PAIN DESCRIPTION - ORIENTATION: ORIENTATION: RIGHT;LEFT

## 2023-10-15 ASSESSMENT — PAIN SCALES - GENERAL
PAINLEVEL_OUTOF10: 10
PAINLEVEL_OUTOF10: 7
PAINLEVEL_OUTOF10: 9
PAINLEVEL_OUTOF10: 9

## 2023-10-15 NOTE — PLAN OF CARE
Problem: Pain  Goal: Verbalizes/displays adequate comfort level or baseline comfort level  Outcome: Progressing  Flowsheets  Taken 10/15/2023 0830  Verbalizes/displays adequate comfort level or baseline comfort level: Encourage patient to monitor pain and request assistance  Taken 10/15/2023 0604  Verbalizes/displays adequate comfort level or baseline comfort level: Encourage patient to monitor pain and request assistance     Problem: Discharge Planning  Goal: Discharge to home or other facility with appropriate resources  Outcome: Progressing  Flowsheets (Taken 10/15/2023 0759)  Discharge to home or other facility with appropriate resources: Identify barriers to discharge with patient and caregiver     Problem: Safety - Adult  Goal: Free from fall injury  Outcome: Progressing  Flowsheets (Taken 10/15/2023 0807)  Free From Fall Injury: Instruct family/caregiver on patient safety     Problem: Chronic Conditions and Co-morbidities  Goal: Patient's chronic conditions and co-morbidity symptoms are monitored and maintained or improved  Outcome: Progressing  Flowsheets (Taken 10/15/2023 0753)  Care Plan - Patient's Chronic Conditions and Co-Morbidity Symptoms are Monitored and Maintained or Improved: Monitor and assess patient's chronic conditions and comorbid symptoms for stability, deterioration, or improvement

## 2023-10-16 ENCOUNTER — CARE COORDINATION (OUTPATIENT)
Dept: CASE MANAGEMENT | Age: 68
End: 2023-10-16
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
## 2023-10-16 LAB
ANION GAP SERPL CALCULATED.3IONS-SCNC: 9 MMOL/L (ref 9–17)
BUN SERPL-MCNC: 9 MG/DL (ref 8–23)
CALCIUM SERPL-MCNC: 9.2 MG/DL (ref 8.6–10.4)
CHLORIDE SERPL-SCNC: 97 MMOL/L (ref 98–107)
CO2 SERPL-SCNC: 28 MMOL/L (ref 20–31)
CREAT SERPL-MCNC: 0.9 MG/DL (ref 0.5–0.9)
EKG ATRIAL RATE: 64 BPM
EKG P AXIS: 25 DEGREES
EKG P-R INTERVAL: 170 MS
EKG Q-T INTERVAL: 394 MS
EKG QRS DURATION: 86 MS
EKG QTC CALCULATION (BAZETT): 406 MS
EKG R AXIS: 46 DEGREES
EKG T AXIS: 55 DEGREES
EKG VENTRICULAR RATE: 64 BPM
GFR SERPL CREATININE-BSD FRML MDRD: >60 ML/MIN/1.73M2
GLUCOSE BLD-MCNC: 138 MG/DL (ref 65–105)
GLUCOSE BLD-MCNC: 141 MG/DL (ref 65–105)
GLUCOSE BLD-MCNC: 145 MG/DL (ref 65–105)
GLUCOSE BLD-MCNC: 159 MG/DL (ref 65–105)
GLUCOSE SERPL-MCNC: 147 MG/DL (ref 70–99)
POTASSIUM SERPL-SCNC: 4.7 MMOL/L (ref 3.7–5.3)
SODIUM SERPL-SCNC: 134 MMOL/L (ref 135–144)

## 2023-10-16 PROCEDURE — 82947 ASSAY GLUCOSE BLOOD QUANT: CPT

## 2023-10-16 PROCEDURE — 2580000003 HC RX 258: Performed by: FAMILY MEDICINE

## 2023-10-16 PROCEDURE — 97110 THERAPEUTIC EXERCISES: CPT

## 2023-10-16 PROCEDURE — 99232 SBSQ HOSP IP/OBS MODERATE 35: CPT | Performed by: FAMILY MEDICINE

## 2023-10-16 PROCEDURE — 6360000002 HC RX W HCPCS: Performed by: FAMILY MEDICINE

## 2023-10-16 PROCEDURE — 6370000000 HC RX 637 (ALT 250 FOR IP): Performed by: FAMILY MEDICINE

## 2023-10-16 PROCEDURE — 97166 OT EVAL MOD COMPLEX 45 MIN: CPT

## 2023-10-16 PROCEDURE — 80048 BASIC METABOLIC PNL TOTAL CA: CPT

## 2023-10-16 PROCEDURE — 93010 ELECTROCARDIOGRAM REPORT: CPT | Performed by: INTERNAL MEDICINE

## 2023-10-16 PROCEDURE — 97116 GAIT TRAINING THERAPY: CPT

## 2023-10-16 PROCEDURE — 36415 COLL VENOUS BLD VENIPUNCTURE: CPT

## 2023-10-16 PROCEDURE — 97535 SELF CARE MNGMENT TRAINING: CPT

## 2023-10-16 PROCEDURE — 1200000000 HC SEMI PRIVATE

## 2023-10-16 RX ORDER — OXYCODONE HYDROCHLORIDE 10 MG/1
30 TABLET ORAL EVERY 8 HOURS
Status: DISCONTINUED | OUTPATIENT
Start: 2023-10-16 | End: 2023-10-18 | Stop reason: HOSPADM

## 2023-10-16 RX ORDER — PREGABALIN 150 MG/1
150 CAPSULE ORAL 2 TIMES DAILY
Status: DISCONTINUED | OUTPATIENT
Start: 2023-10-16 | End: 2023-10-18 | Stop reason: HOSPADM

## 2023-10-16 RX ORDER — OXYCODONE HYDROCHLORIDE 10 MG/1
30 TABLET ORAL EVERY 8 HOURS PRN
Status: DISCONTINUED | OUTPATIENT
Start: 2023-10-16 | End: 2023-10-16

## 2023-10-16 RX ORDER — LOPERAMIDE HYDROCHLORIDE 2 MG/1
2 CAPSULE ORAL 4 TIMES DAILY PRN
Status: DISCONTINUED | OUTPATIENT
Start: 2023-10-16 | End: 2023-10-18 | Stop reason: HOSPADM

## 2023-10-16 RX ADMIN — OXYCODONE AND ACETAMINOPHEN 1 TABLET: 325; 5 TABLET ORAL at 21:25

## 2023-10-16 RX ADMIN — CARVEDILOL 12.5 MG: 12.5 TABLET, FILM COATED ORAL at 17:24

## 2023-10-16 RX ADMIN — OXYCODONE HYDROCHLORIDE 30 MG: 10 TABLET ORAL at 09:25

## 2023-10-16 RX ADMIN — VENLAFAXINE HYDROCHLORIDE 37.5 MG: 37.5 CAPSULE, EXTENDED RELEASE ORAL at 09:33

## 2023-10-16 RX ADMIN — SODIUM CHLORIDE, PRESERVATIVE FREE 10 ML: 5 INJECTION INTRAVENOUS at 09:37

## 2023-10-16 RX ADMIN — OXYCODONE AND ACETAMINOPHEN 1 TABLET: 325; 5 TABLET ORAL at 03:22

## 2023-10-16 RX ADMIN — ONDANSETRON 4 MG: 4 TABLET, ORALLY DISINTEGRATING ORAL at 02:53

## 2023-10-16 RX ADMIN — PREGABALIN 150 MG: 150 CAPSULE ORAL at 09:33

## 2023-10-16 RX ADMIN — CYANOCOBALAMIN TAB 1000 MCG 1000 MCG: 1000 TAB at 09:25

## 2023-10-16 RX ADMIN — FAMOTIDINE 20 MG: 20 TABLET ORAL at 09:26

## 2023-10-16 RX ADMIN — CALCIUM 500 MG: 500 TABLET ORAL at 09:33

## 2023-10-16 RX ADMIN — LOPERAMIDE HYDROCHLORIDE 2 MG: 2 CAPSULE ORAL at 13:42

## 2023-10-16 RX ADMIN — OXYCODONE HYDROCHLORIDE 30 MG: 10 TABLET ORAL at 17:24

## 2023-10-16 RX ADMIN — CARVEDILOL 12.5 MG: 12.5 TABLET, FILM COATED ORAL at 09:26

## 2023-10-16 RX ADMIN — HYDRALAZINE HYDROCHLORIDE 10 MG: 20 INJECTION, SOLUTION INTRAMUSCULAR; INTRAVENOUS at 09:27

## 2023-10-16 RX ADMIN — MULTIPLE VITAMINS W/ MINERALS TAB 1 TABLET: TAB at 09:26

## 2023-10-16 RX ADMIN — PREGABALIN 150 MG: 150 CAPSULE ORAL at 21:20

## 2023-10-16 RX ADMIN — SODIUM CHLORIDE, PRESERVATIVE FREE 10 ML: 5 INJECTION INTRAVENOUS at 21:23

## 2023-10-16 RX ADMIN — CALCIUM 500 MG: 500 TABLET ORAL at 21:20

## 2023-10-16 RX ADMIN — ALOGLIPTIN 12.5 MG: 12.5 TABLET, FILM COATED ORAL at 09:25

## 2023-10-16 RX ADMIN — LOSARTAN POTASSIUM 50 MG: 50 TABLET, FILM COATED ORAL at 09:26

## 2023-10-16 RX ADMIN — POTASSIUM CHLORIDE 20 MEQ: 1500 TABLET, EXTENDED RELEASE ORAL at 21:20

## 2023-10-16 RX ADMIN — GLIPIZIDE 5 MG: 5 TABLET ORAL at 09:33

## 2023-10-16 RX ADMIN — LINACLOTIDE 145 MCG: 145 CAPSULE, GELATIN COATED ORAL at 09:33

## 2023-10-16 RX ADMIN — Medication 200 MG: at 21:20

## 2023-10-16 RX ADMIN — FUROSEMIDE 40 MG: 40 TABLET ORAL at 09:26

## 2023-10-16 RX ADMIN — ATORVASTATIN CALCIUM 40 MG: 40 TABLET, FILM COATED ORAL at 21:20

## 2023-10-16 RX ADMIN — POTASSIUM CHLORIDE 20 MEQ: 1500 TABLET, EXTENDED RELEASE ORAL at 09:25

## 2023-10-16 ASSESSMENT — PAIN SCALES - GENERAL
PAINLEVEL_OUTOF10: 9
PAINLEVEL_OUTOF10: 8
PAINLEVEL_OUTOF10: 8

## 2023-10-16 ASSESSMENT — PAIN DESCRIPTION - LOCATION: LOCATION: SHOULDER

## 2023-10-16 ASSESSMENT — PAIN DESCRIPTION - DESCRIPTORS: DESCRIPTORS: SHARP

## 2023-10-16 ASSESSMENT — PAIN DESCRIPTION - ORIENTATION: ORIENTATION: RIGHT;LEFT

## 2023-10-16 NOTE — CARE COORDINATION
Care Transitions Outreach Attempt    Call within 2 business days of discharge: Yes   Attempted to reach patient for transitions of care follow up. Unable to reach patient d/t emergency room visit to admission on the same day as initial attempt. CTN never spoke with patient/family. Program resolved. Patient: Meredith Ambriz Patient : 1955 MRN: <[id number]>    Last Discharge Facility       Date Complaint Diagnosis Description Type Department Provider    10/13/23 Fall Fall, initial encounter . .. ED to Hosp-Admission (Current) (ADMITTED) [facility] MARY Rosey Peoples MD; Samia. .. Was this an external facility discharge?  Yes 10/13/23 Discharge Facility Name:  [facility]    Noted following upcoming appointments from discharge chart review:     Future Appointments   Date Time Provider [address]   10/19/2023  1:30 PM MD MIAH Ybarra [facility] [address]   10/24/2023 11:00 AM MD MIAH Tarango [facility] MIA[facility] Renal Se   2023  2:00 PM Rosey Peoples MD [address] [facility]

## 2023-10-17 LAB
GLUCOSE BLD-MCNC: 119 MG/DL (ref 65–105)
GLUCOSE BLD-MCNC: 132 MG/DL (ref 65–105)
GLUCOSE BLD-MCNC: 138 MG/DL (ref 65–105)
GLUCOSE BLD-MCNC: 152 MG/DL (ref 65–105)

## 2023-10-17 PROCEDURE — 97530 THERAPEUTIC ACTIVITIES: CPT

## 2023-10-17 PROCEDURE — 97116 GAIT TRAINING THERAPY: CPT

## 2023-10-17 PROCEDURE — 6360000002 HC RX W HCPCS: Performed by: FAMILY MEDICINE

## 2023-10-17 PROCEDURE — 2580000003 HC RX 258: Performed by: FAMILY MEDICINE

## 2023-10-17 PROCEDURE — 6370000000 HC RX 637 (ALT 250 FOR IP): Performed by: FAMILY MEDICINE

## 2023-10-17 PROCEDURE — 97110 THERAPEUTIC EXERCISES: CPT

## 2023-10-17 PROCEDURE — 82947 ASSAY GLUCOSE BLOOD QUANT: CPT

## 2023-10-17 PROCEDURE — 1200000000 HC SEMI PRIVATE

## 2023-10-17 RX ADMIN — Medication 200 MG: at 21:26

## 2023-10-17 RX ADMIN — CALCIUM 500 MG: 500 TABLET ORAL at 21:22

## 2023-10-17 RX ADMIN — POTASSIUM CHLORIDE 20 MEQ: 1500 TABLET, EXTENDED RELEASE ORAL at 21:21

## 2023-10-17 RX ADMIN — CYANOCOBALAMIN TAB 1000 MCG 1000 MCG: 1000 TAB at 08:43

## 2023-10-17 RX ADMIN — GLIPIZIDE 5 MG: 5 TABLET ORAL at 06:12

## 2023-10-17 RX ADMIN — ALOGLIPTIN 12.5 MG: 12.5 TABLET, FILM COATED ORAL at 08:44

## 2023-10-17 RX ADMIN — VENLAFAXINE HYDROCHLORIDE 37.5 MG: 37.5 CAPSULE, EXTENDED RELEASE ORAL at 08:47

## 2023-10-17 RX ADMIN — LOSARTAN POTASSIUM 50 MG: 50 TABLET, FILM COATED ORAL at 08:43

## 2023-10-17 RX ADMIN — MULTIPLE VITAMINS W/ MINERALS TAB 1 TABLET: TAB at 08:43

## 2023-10-17 RX ADMIN — OXYCODONE HYDROCHLORIDE 30 MG: 10 TABLET ORAL at 01:09

## 2023-10-17 RX ADMIN — CARVEDILOL 12.5 MG: 12.5 TABLET, FILM COATED ORAL at 16:53

## 2023-10-17 RX ADMIN — PREGABALIN 150 MG: 150 CAPSULE ORAL at 21:22

## 2023-10-17 RX ADMIN — FAMOTIDINE 20 MG: 20 TABLET ORAL at 08:44

## 2023-10-17 RX ADMIN — HYDRALAZINE HYDROCHLORIDE 10 MG: 20 INJECTION, SOLUTION INTRAMUSCULAR; INTRAVENOUS at 00:13

## 2023-10-17 RX ADMIN — PREGABALIN 150 MG: 150 CAPSULE ORAL at 08:43

## 2023-10-17 RX ADMIN — OXYCODONE HYDROCHLORIDE 30 MG: 10 TABLET ORAL at 08:43

## 2023-10-17 RX ADMIN — FUROSEMIDE 40 MG: 40 TABLET ORAL at 08:43

## 2023-10-17 RX ADMIN — CALCIUM 500 MG: 500 TABLET ORAL at 08:43

## 2023-10-17 RX ADMIN — POTASSIUM CHLORIDE 20 MEQ: 1500 TABLET, EXTENDED RELEASE ORAL at 08:46

## 2023-10-17 RX ADMIN — OXYCODONE HYDROCHLORIDE 30 MG: 10 TABLET ORAL at 16:53

## 2023-10-17 RX ADMIN — SODIUM CHLORIDE, PRESERVATIVE FREE 10 ML: 5 INJECTION INTRAVENOUS at 08:51

## 2023-10-17 RX ADMIN — CARVEDILOL 12.5 MG: 12.5 TABLET, FILM COATED ORAL at 08:44

## 2023-10-17 RX ADMIN — SODIUM CHLORIDE, PRESERVATIVE FREE 10 ML: 5 INJECTION INTRAVENOUS at 21:24

## 2023-10-17 RX ADMIN — ATORVASTATIN CALCIUM 40 MG: 40 TABLET, FILM COATED ORAL at 21:21

## 2023-10-17 RX ADMIN — OXYCODONE AND ACETAMINOPHEN 1 TABLET: 325; 5 TABLET ORAL at 12:58

## 2023-10-17 ASSESSMENT — PAIN DESCRIPTION - DESCRIPTORS: DESCRIPTORS: SHARP;THROBBING

## 2023-10-17 ASSESSMENT — PAIN SCALES - GENERAL
PAINLEVEL_OUTOF10: 7
PAINLEVEL_OUTOF10: 10
PAINLEVEL_OUTOF10: 7

## 2023-10-17 ASSESSMENT — PAIN DESCRIPTION - LOCATION: LOCATION: SHOULDER

## 2023-10-17 ASSESSMENT — PAIN DESCRIPTION - ORIENTATION: ORIENTATION: LEFT;RIGHT

## 2023-10-17 NOTE — PLAN OF CARE
Problem: Pain  Goal: Verbalizes/displays adequate comfort level or baseline comfort level  Outcome: Progressing, patients pain managed with scheduled Oxy-IR 30 mg every 8 hours, and Percocet 5-325 every 6 hours as needed for pain. Problem: Safety - Adult  Goal: Free from fall injury  Outcome: Progressing, Patient remains free of incidence/ injury. Bed remains in low position. Side rails up x2.

## 2023-10-17 NOTE — PLAN OF CARE
Problem: Pain  Goal: Verbalizes/displays adequate comfort level or baseline comfort level  10/17/2023 1519 by Sheila Castro RN  Outcome: Progressing  Note: Assess the location, characteristics, onset, duration, frequency, quality, and severity of pain. Encourage immediate report of pain. Use appropriate pain scale to rate pain. Manage pain using nonpharmacologic/pharmacologic interventions. 10/17/2023 0418 by Leander Brown RN  Outcome: Progressing     Problem: Discharge Planning  Goal: Discharge to home or other facility with appropriate resources  10/17/2023 0418 by Leander Brown RN  Outcome: Progressing     Problem: Safety - Adult  Goal: Free from fall injury  10/17/2023 1519 by Sheila Castro RN  Outcome: Progressing  Note: Patient remains free of falls and injuries throughout shift. Bed remains in the lowest position, wheels locked, call light and bedside table are within reach.    10/17/2023 0418 by Leander Brown RN  Outcome: Progressing     Problem: Chronic Conditions and Co-morbidities  Goal: Patient's chronic conditions and co-morbidity symptoms are monitored and maintained or improved  10/17/2023 0418 by Leander Brown RN  Outcome: Progressing

## 2023-10-17 NOTE — DISCHARGE INSTR - COC
Continuity of Care Form    Patient Name: Catrachita Mac   :  1955  MRN:  [id number]    Admit date:  10/13/2023  Discharge date:  10/18/2023    Code Status Order: Full Code   Advance Directives:     Admitting Physician:  Dinesh King MD  PCP: Agustin Rice MD    Discharging Nurse: [facility] Unit/Room#: 3834/9994-23  Discharging Unit Phone Number: [phone]    Emergency Contact:   Extended Emergency Contact Information  Primary Emergency Contact: Ciro Andre  Address: [address] [facility], [address] [place] [address] of [address] [place] Phone: [phone]  Mobile Phone: [phone]  Relation: Spouse  Hearing or visual needs: None  Preferred language: English   needed? No  Secondary Emergency Contact: Denise Beltran  Address: [address] of [address] [place] Phone: [phone]  Mobile Phone: [phone]  Relation: Child  Hearing or visual needs: None  Other needs: None  Preferred language: English   needed? No    Past Surgical History:  Past Surgical History:   Procedure Laterality Date    CARPAL TUNNEL RELEASE Right 2014    CATARACT REMOVAL WITH IMPLANT Bilateral     CHOLECYSTECTOMY, LAPAROSCOPIC  1998    COLONOSCOPY N/A 10/17/2019    COLONOSCOPY DIAGNOSTIC performed by Jesus Manuel Muir MD at [address] cyst from base of tail bone    HYSTERECTOMY (CERVIX STATUS UNKNOWN)  [address] [facility] [address] Bilateral 2017    abcess removed    LUMBAR FUSION  10/2010; 2011    L4/L5    LUMBAR FUSION  2009    L4-S1    OTHER SURGICAL HISTORY  2018    Lumbar decompression laminectomy at L3-L4 bilaterally wiith complete facetomies at L3-L4 bilaterally;  diskectomy L3-L4: posterior lumbar interbody fusion; placement of [place]-type graft; local harvest of bone; microsurgical dissection.     ROTATOR CUFF REPAIR Left 2012    SKIN CANCER EXCISION  2005    Basal Cell Carcinoma,

## 2023-10-18 VITALS
SYSTOLIC BLOOD PRESSURE: 143 MMHG | DIASTOLIC BLOOD PRESSURE: 47 MMHG | HEART RATE: 61 BPM | BODY MASS INDEX: 57.52 KG/M2 | TEMPERATURE: 98.1 F | RESPIRATION RATE: 18 BRPM | WEIGHT: 293 LBS | HEIGHT: 60 IN | OXYGEN SATURATION: 100 %

## 2023-10-18 LAB
GLUCOSE BLD-MCNC: 113 MG/DL (ref 65–105)
GLUCOSE BLD-MCNC: 89 MG/DL (ref 65–105)

## 2023-10-18 PROCEDURE — 82947 ASSAY GLUCOSE BLOOD QUANT: CPT

## 2023-10-18 PROCEDURE — 2580000003 HC RX 258: Performed by: FAMILY MEDICINE

## 2023-10-18 PROCEDURE — 6370000000 HC RX 637 (ALT 250 FOR IP): Performed by: FAMILY MEDICINE

## 2023-10-18 PROCEDURE — 97110 THERAPEUTIC EXERCISES: CPT

## 2023-10-18 PROCEDURE — 6360000002 HC RX W HCPCS: Performed by: FAMILY MEDICINE

## 2023-10-18 PROCEDURE — 97116 GAIT TRAINING THERAPY: CPT

## 2023-10-18 PROCEDURE — 97530 THERAPEUTIC ACTIVITIES: CPT

## 2023-10-18 RX ORDER — OXYCODONE HYDROCHLORIDE 30 MG/1
30 TABLET ORAL EVERY 8 HOURS PRN
Qty: 42 TABLET | Refills: 0
Start: 2023-10-18 | End: 2023-11-01

## 2023-10-18 RX ADMIN — CARVEDILOL 12.5 MG: 12.5 TABLET, FILM COATED ORAL at 11:14

## 2023-10-18 RX ADMIN — POTASSIUM CHLORIDE 20 MEQ: 1500 TABLET, EXTENDED RELEASE ORAL at 11:17

## 2023-10-18 RX ADMIN — FAMOTIDINE 20 MG: 20 TABLET ORAL at 11:14

## 2023-10-18 RX ADMIN — PREGABALIN 150 MG: 150 CAPSULE ORAL at 11:01

## 2023-10-18 RX ADMIN — ALOGLIPTIN 12.5 MG: 12.5 TABLET, FILM COATED ORAL at 11:02

## 2023-10-18 RX ADMIN — VENLAFAXINE HYDROCHLORIDE 37.5 MG: 37.5 CAPSULE, EXTENDED RELEASE ORAL at 11:05

## 2023-10-18 RX ADMIN — FUROSEMIDE 40 MG: 40 TABLET ORAL at 11:02

## 2023-10-18 RX ADMIN — CYANOCOBALAMIN TAB 1000 MCG 1000 MCG: 1000 TAB at 11:02

## 2023-10-18 RX ADMIN — CALCIUM 500 MG: 500 TABLET ORAL at 11:02

## 2023-10-18 RX ADMIN — SODIUM CHLORIDE, PRESERVATIVE FREE 10 ML: 5 INJECTION INTRAVENOUS at 11:18

## 2023-10-18 RX ADMIN — LOSARTAN POTASSIUM 50 MG: 50 TABLET, FILM COATED ORAL at 11:01

## 2023-10-18 RX ADMIN — OXYCODONE AND ACETAMINOPHEN 1 TABLET: 325; 5 TABLET ORAL at 14:28

## 2023-10-18 RX ADMIN — HYDRALAZINE HYDROCHLORIDE 10 MG: 20 INJECTION, SOLUTION INTRAMUSCULAR; INTRAVENOUS at 06:26

## 2023-10-18 RX ADMIN — OXYCODONE HYDROCHLORIDE 30 MG: 10 TABLET ORAL at 04:13

## 2023-10-18 RX ADMIN — GLIPIZIDE 5 MG: 5 TABLET ORAL at 05:58

## 2023-10-18 RX ADMIN — OXYCODONE HYDROCHLORIDE 30 MG: 10 TABLET ORAL at 11:00

## 2023-10-18 ASSESSMENT — PAIN DESCRIPTION - DESCRIPTORS
DESCRIPTORS: ACHING
DESCRIPTORS: ACHING;SORE
DESCRIPTORS: ACHING

## 2023-10-18 ASSESSMENT — PAIN DESCRIPTION - LOCATION
LOCATION: SHOULDER
LOCATION: GENERALIZED

## 2023-10-18 ASSESSMENT — PAIN DESCRIPTION - ORIENTATION: ORIENTATION: RIGHT;LEFT

## 2023-10-18 ASSESSMENT — PAIN SCALES - GENERAL
PAINLEVEL_OUTOF10: 9
PAINLEVEL_OUTOF10: 9
PAINLEVEL_OUTOF10: 10

## 2023-10-18 NOTE — PLAN OF CARE
Problem: Pain  Goal: Verbalizes/displays adequate comfort level or baseline comfort level  10/18/2023 1123 by Fallon Ng RN  Outcome: Progressing     Problem: Discharge Planning  Goal: Discharge to home or other facility with appropriate resources  10/18/2023 1123 by Fallon Ng RN  Outcome: Progressing     Problem: Safety - Adult  Goal: Free from fall injury  10/18/2023 1123 by Fallon Ng RN  Outcome: Progressing     Problem: Chronic Conditions and Co-morbidities  Goal: Patient's chronic conditions and co-morbidity symptoms are monitored and maintained or improved  10/18/2023 1123 by Fallon Ng RN  Outcome: Progressing     Problem: Skin/Tissue Integrity  Goal: Absence of new skin breakdown  10/18/2023 1123 by Fallon Ng RN  Outcome: Progressing     Problem: ABCDS Injury Assessment  Goal: Absence of physical injury  10/18/2023 1123 by Fallon Ng RN  Outcome: Progressing

## 2023-10-18 NOTE — PLAN OF CARE
Problem: Pain  Goal: Verbalizes/displays adequate comfort level or baseline comfort level  10/18/2023 0329 by Lexy Robbins RN  Outcome: Progressing, patients pain managed with scheduled and as needed Oxycodone. Problem: Safety - Adult  Goal: Free from fall injury  10/18/2023 0329 by Lexy Robbins RN  Outcome: Progressing, Patient remains free of incidence/ injury. Bed remains in low position. Side rails up x2.          Problem: Discharge Planning  Goal: Discharge to home or other facility with appropriate resources  Outcome: Progressing  Flowsheets (Taken 10/17/2023 2130)  Discharge to home or other facility with appropriate resources:   Identify barriers to discharge with patient and caregiver   Arrange for needed discharge resources and transportation as appropriate   Refer to discharge planning if patient needs post-hospital services based on physician order or complex needs related to functional status, cognitive ability or social support system

## 2023-10-18 NOTE — CARE COORDINATION
Continuity of Care Form    Patient Name: Saman Manning   :  1955  MRN:  [id number]    Admit date:  10/13/2023  Discharge date:  10/18/2023    Code Status Order: Full Code   Advance Directives:     Admitting Physician:  Segundo Solano MD  PCP: Rodolfo Joseph MD    Discharging Nurse: Lissy [facility] Unit/Room#: 0730/5437-22  Discharging Unit Phone Number: [phone]    Emergency Contact:   Extended Emergency Contact Information  Primary Emergency Contact: Arnoldo Vaca  Address: [address] [facility], [address] [place] [address] [place]  of [address] [place] Phone: [phone]  Mobile Phone: [phone]  Relation: Spouse  Hearing or visual needs: None  Preferred language: English   needed? No  Secondary Emergency Contact: Denise Beltran  Address: [address] [place]  of [address] [place] Phone: [phone]  Mobile Phone: [phone]  Relation: Child  Hearing or visual needs: None  Other needs: None  Preferred language: English   needed? No    Past Surgical History:  Past Surgical History:   Procedure Laterality Date    CARPAL TUNNEL RELEASE Right 2014    CATARACT REMOVAL WITH IMPLANT Bilateral 2013    CHOLECYSTECTOMY, LAPAROSCOPIC  1998    COLONOSCOPY N/A 10/17/2019    COLONOSCOPY DIAGNOSTIC performed by Caden Cooley MD at [address] cyst from base of tail bone    HYSTERECTOMY (CERVIX STATUS UNKNOWN)  [address] [address] Bilateral 2017    abcess removed    LUMBAR FUSION  10/2010; 2011    L4/L5    LUMBAR FUSION  2009    L4-S1    OTHER SURGICAL HISTORY  2018    Lumbar decompression laminectomy at L3-L4 bilaterally wiith complete facetomies at L3-L4 bilaterally;  diskectomy L3-L4: posterior lumbar interbody fusion; placement of [place]-type graft; local harvest of bone; microsurgical dissection.     ROTATOR CUFF REPAIR Left 2012    SKIN CANCER EXCISION  2005    Basal Cell Carcinoma,
ONGOING DISCHARGE PLAN:    Patient is alert and oriented x4. Spoke with patient regarding discharge plan and patient confirms that plan is still home with [facility]. She states she was fine while at the SNF. PT/OT      Elevated BP, IV hydralazine    Dr Luis Aguirre did discuss with her about using a power mobility device at home. Will continue to follow for additional discharge needs. If patient is discharged prior to next notation, then this note serves as note for discharge by case management.     Electronically signed by Shruthi Ayala RN on 10/16/2023 at 9:18 AM
ONGOING DISCHARGE PLAN:    Patient is alert and oriented x4. Spoke with patient regarding discharge plan and patient confirms that plan is still to return home with [facility]; [facility]. MARY LOU and DC orders faxed to [facility]. DME: Wheelchair, SC, nebulizer, glucometer, walker, rollator, GB, O2 at 2.5 L NC 24/7, Inogen (port/conc). May need power chair at home. Nubia with [facility] updated on discharge. Will continue to follow for additional discharge needs. If patient is discharged prior to next notation, then this note serves as note for discharge by case management.     Electronically signed by Akua Mason RN on 10/18/2023 at 9:32 AM
ONGOING DISCHARGE PLAN:    Patient is alert and oriented x4. Spoke with patient regarding discharge plan and patient confirms that plan is still to return home with spouse. Patient states that she does not wish to return to a skilled nursing facility. VNS: [facility] has accepted. DME: Wheelchair, SC, nebulizer, glucometer, youssef, rollator, GB, O2 at 2.5L NC 24/7, Inogen (port/conc). May need power chair at home. F/U appointment made with Dr. David Nye 10/19. IMM letter provided to patient. Patient offered four hours to make informed decision regarding appeal process; patient agreeable to discharge. Will continue to follow for additional discharge needs. If patient is discharged prior to next notation, then this note serves as note for discharge by case management.     Electronically signed by Vicky Mccurdy RN on 10/17/2023 at 8:55 AM
ONGOING DISCHARGE PLAN:    Patient is alert and oriented x4. Spoke with patient regarding discharge plan and patient confirms that plan is still wants home with [facility]. PT/OT     Elevated BP, IV hydralazine      Will continue to follow for additional discharge needs. If patient is discharged prior to next notation, then this note serves as note for discharge by case management.     Electronically signed by Yifan Kirby RN on 10/15/2023 at 5:45 PM
complications    Additional Case Management Notes:     10/14/2023 [facility] Medicare(READMIT) from home w/ spouse, d/c from [facility] of [address], fell next day, states walking fine prior  DME: wheelchair,  SC, nebulizer, glucometer, walker, rollato, gb, Inogen, O2 2.5L 24/7 (port/conc); VNS: current w/ [facility] ref sent; PT/OT; [place] 30%; has f/u on 10/19 with Dr Elinor Davila    The Plan for Transition of Care is related to the following treatment goals of Gait abnormality [R26.9]  Imbalance [R26.89]  Fall, initial encounter [W19. XXXA]  Other fatigue [M78.05]    IF APPLICABLE: The Patient and/or patient representative Ramakrishna Dozier and her family were provided with a choice of provider and agrees with the discharge plan. Freedom of choice list with basic dialogue that supports the patient's individualized plan of care/goals and shares the quality data associated with the providers was provided to: Patient   Patient Representative Name:       The Patient and/or Patient Representative Agree with the Discharge Plan?  Yes    Amanda Garner RN  Case Management Department  Ph: [phone] Fax: [phone]

## 2023-10-18 NOTE — PLAN OF CARE
Problem: Pain  Goal: Verbalizes/displays adequate comfort level or baseline comfort level  10/18/2023 1553 by Bozena Robin RN  Outcome: Completed     Problem: Discharge Planning  Goal: Discharge to home or other facility with appropriate resources  10/18/2023 1553 by Bozena Robin RN  Outcome: Completed     Problem: Safety - Adult  Goal: Free from fall injury  10/18/2023 1553 by Bozena Robin RN  Outcome: Completed     Problem: Chronic Conditions and Co-morbidities  Goal: Patient's chronic conditions and co-morbidity symptoms are monitored and maintained or improved  10/18/2023 1553 by Bozena Robin RN  Outcome: Completed     Problem: ABCDS Injury Assessment  Goal: Absence of physical injury  10/18/2023 1553 by Bozena Robin RN  Outcome: Completed

## 2023-10-19 ENCOUNTER — CARE COORDINATION (OUTPATIENT)
Dept: CASE MANAGEMENT | Age: 68
End: 2023-10-19

## 2023-10-19 PROBLEM — L98.491 NON-PRESSURE CHRONIC ULCER OF SKIN OF OTHER SITES LIMITED TO BREAKDOWN OF SKIN (HCC): Status: RESOLVED | Noted: 2023-01-04 | Resolved: 2023-10-19

## 2023-10-19 NOTE — CARE COORDINATION
Care Transitions Outreach Attempt    Call within 2 business days of discharge: Yes   Attempted to reach patient for transitions of care follow up. Unable to reach patient. Spouse answered phone, expressed it was too early to call, will call back later morning. Expressed that patient has appointment later today with PCP. Patient: Mallika Infante Patient : 1955 MRN: [id number]    Last Discharge Facility       Date Complaint Diagnosis Description Type Department Provider    10/13/23 Fall Fall, initial encounter . .. ED to Hosp-Admission (Discharged) (ADMITTED) [facility] Malou White MD; Samia. .. Was this an external facility discharge?  No Discharge Facility Name: [facility]    Noted following upcoming appointments from discharge chart review:   [facility] follow up appointment(s):   Future Appointments   Date Time Provider [address]   10/19/2023  1:30 PM MD MIAH Martin [facility] [address]   10/24/2023 11:00 AM Da Earle Dubin, MD AFL [facility] Renal Se   2023  2:00 PM Malou White MD [address] [facility]     Non-[facility]  follow up appointment(s): n/a

## 2023-10-20 ENCOUNTER — CARE COORDINATION (OUTPATIENT)
Dept: CASE MANAGEMENT | Age: 68
End: 2023-10-20

## 2023-10-20 NOTE — CARE COORDINATION
PCP  Specialist  Home health  When to call Bita [facility]. The patient agrees to contact the PCP office for questions related to their healthcare. Medication reconciliation was performed with patient, who verbalizes understanding of administration of home medications. Medications reviewed, 1111F entered: Reviewed at PCP appt    Was patient discharged with a pulse oximeter? no    Non-face-to-face services provided:  Obtained and reviewed discharge summary and/or continuity of care documents    Offered patient enrollment in the Remote Patient Monitoring (RPM) program for in-home monitoring:  did not discuss . Care Transitions 24 Hour Call    Do you have a copy of your discharge instructions?: Yes  Do you have all of your prescriptions and are they filled?: Yes  Have you been contacted by a [address]?: No  Have you scheduled your follow up appointment?: Yes  How are you going to get to your appointment?: Other (Comment: went to [address] [place] [address] yesterday)  Post Acute Services: [address]. you feel like you have everything you need to keep you well at home?: No  Care Transitions Interventions                                   Discussed follow-up appointments. If no appointment was previously scheduled, appointment scheduling offered: Yes. Is follow up appointment scheduled within 7 days of discharge? Pt went to PCP HFU yesterday. Follow Up  Future Appointments   Date Time Provider [address] [place] [address]   10/24/2023 11:00 AM Angel Gerber MD [facility]   1/19/2024  2:00 PM MD MIAH [facility] Transition Nurse provided contact information. Plan for follow-up call in 5-7 days based on severity of symptoms and risk factors. Plan for next call: symptom management-Waiting on motorized WC from [facility]. Check status. [address] visited? Falls?      Antoinette Malloy, JOSH

## 2023-10-24 PROBLEM — I50.9 HEART FAILURE, UNSPECIFIED (HCC): Status: ACTIVE | Noted: 2023-08-04

## 2023-10-24 PROBLEM — E46 PROTEIN-CALORIE MALNUTRITION (HCC): Status: ACTIVE | Noted: 2023-09-29

## 2023-10-24 PROBLEM — I89.0 LYMPHEDEMA, NOT ELSEWHERE CLASSIFIED: Status: ACTIVE | Noted: 2020-06-11

## 2023-10-24 PROBLEM — R29.6 REPEATED FALLS: Status: ACTIVE | Noted: 2021-09-12

## 2023-10-26 ENCOUNTER — CARE COORDINATION (OUTPATIENT)
Dept: CASE MANAGEMENT | Age: 68
End: 2023-10-26

## 2023-10-26 NOTE — CARE COORDINATION
Care Transitions Outreach Attempt #1    Call within 2 business days of discharge: No   Attempt #1 to reach patient for transitions of care follow up. Unable to reach patient. Left VM to home and mobile numbers requesting call back. Patient: Ulises Abel Patient : 1955 MRN: [id number]    Last Discharge Facility       Date Complaint Diagnosis Description Type Department Provider    10/13/23 Fall Fall, initial encounter . .. ED to Hosp-Admission (Discharged) (ADMITTED) [facility] MED MARY Singh Fitzgerald MD; Samia. .. Noted following upcoming appointments from discharge chart review:   [facility] follow up appointment(s):   Future Appointments   Date Time Provider [address]   2024  2:00 PM Singh Fitzgerald MD [facility] Printer for next call: symptom management-Waiting on motorized WC from [facility]. Check status. [facility] visited? Falls? Domenico Abel RN BSN [facility]  Care Transitions Nurse  [phone]   [email or web address]

## 2023-10-31 ENCOUNTER — CARE COORDINATION (OUTPATIENT)
Dept: CASE MANAGEMENT | Age: 68
End: 2023-10-31

## 2023-10-31 NOTE — CARE COORDINATION
Care Transitions Follow Up Call    Patient Current Location:  Home: [address] [facility]    Care Transition Nurse contacted the patient by telephone to follow up after admission on 10/13/23. Verified name and  with patient as identifiers. Patient: Maude Griffin  Patient : 1955   MRN: [id number]  Reason for Admission: Fall, weakness  Discharge Date: 10/18/23 RARS: Readmission Risk Score: 29.1      Needs to be reviewed by the provider   Additional needs identified to be addressed with provider: Yes  Motorized WC order status             Method of communication with provider: chart routing. Spoke to [place] for transitions call. Stated she did recently fall, but was able to slide down wall without injuring herself. Pt has extended OT order with [facility]. Pt has home 02, stated she thinks she is awaiting measurements for motorized WC. Writer called [facility], informed they no longer provide motorized wheelchairs. Will message PCP office to check on status of DME company. Addressed changes since last contact:   fall. Awaiting motorized WC  Discussed follow-up appointments. Follow Up  Future Appointments   Date Time Provider [address] [place] [address]   2024  2:00 PM Robert Pandey MD [address] [facility] Transition Nurse reviewed discharge instructions with patient and discussed any barriers to care and/or understanding of plan of care after discharge. Discussed appropriate site of care based on symptoms and resources available to patient including: PCP  Specialist  When to call [facility]. The patient agrees to contact the PCP office for questions related to their healthcare.         Care Transitions Subsequent and Final Call    Subsequent and Final Calls  Do you have any ongoing symptoms?: Yes  Onset of Patient-reported symptoms: Other  Patient-reported symptoms: Weakness, Other  Interventions for patient-reported symptoms: Other  Have your medications

## 2023-11-07 ENCOUNTER — CARE COORDINATION (OUTPATIENT)
Dept: CASE MANAGEMENT | Age: 68
End: 2023-11-07

## 2023-11-07 NOTE — CARE COORDINATION
Care Transitions Outreach Attempt    Call within 2 business days of discharge: No   Attempted to reach patient for transitions of care follow up. Unable to reach patient. Patient: Artice Carrel Patient : 1955 MRN: [id number]    Last Discharge Facility       Date Complaint Diagnosis Description Type Department Provider    10/13/23 Fall Fall, initial encounter . .. ED to Hosp-Admission (Discharged) (ADMITTED) [facility] Juarez Davis MD; Samia. .. Was this an external facility discharge?  No Discharge Facility Name: [facility]    Noted following upcoming appointments from discharge chart review:   [address] [facility] follow up appointment(s):   Future Appointments   Date Time Provider [address]   2024  2:00 PM Juarez Davis MD [address]     Non-[facility]  follow up appointment(s): n/a

## 2023-11-08 ENCOUNTER — CARE COORDINATION (OUTPATIENT)
Dept: CASE MANAGEMENT | Age: 68
End: 2023-11-08

## 2023-11-08 NOTE — CARE COORDINATION
Care Transitions Outreach Attempt    Call within 2 business days of discharge: Yes   Attempted to reach patient for transitions of care follow up. Unable to reach patient. 2nd attempt to reach. If not return call by end of day will close episode. Patient: Ulises Abel Patient : 1955 MRN: [id number]    Last Discharge Facility       Date Complaint Diagnosis Description Type Department Provider    10/13/23 Fall Fall, initial encounter . .. ED to Hosp-Admission (Discharged) (ADMITTED) [facility] MARY Singh Fitzgerald MD; Samia. .. Was this an external facility discharge?  No Discharge Facility Name: [facility]    Noted following upcoming appointments from discharge chart review:   [facility] follow up appointment(s):   Future Appointments   Date Time Provider [address]   2024  2:00 PM Singh Fitzgerald MD [facility]     Non-[facility]  follow up appointment(s): n/a
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
## 2023-11-18 ENCOUNTER — APPOINTMENT (OUTPATIENT)
Dept: CT IMAGING | Age: 68
DRG: 565 | End: 2023-11-18
Payer: MEDICARE

## 2023-11-18 ENCOUNTER — APPOINTMENT (OUTPATIENT)
Dept: GENERAL RADIOLOGY | Age: 68
DRG: 565 | End: 2023-11-18
Payer: MEDICARE

## 2023-11-18 ENCOUNTER — HOSPITAL ENCOUNTER (INPATIENT)
Age: 68
LOS: 4 days | Discharge: NURSING, REHAB OR LONG-TERM CARE | DRG: 565 | End: 2023-11-23
Attending: STUDENT IN AN ORGANIZED HEALTH CARE EDUCATION/TRAINING PROGRAM | Admitting: FAMILY MEDICINE
Payer: MEDICARE

## 2023-11-18 DIAGNOSIS — T79.6XXD TRAUMATIC RHABDOMYOLYSIS, SUBSEQUENT ENCOUNTER: ICD-10-CM

## 2023-11-18 DIAGNOSIS — N17.9 AKI (ACUTE KIDNEY INJURY) (HCC): Primary | ICD-10-CM

## 2023-11-18 DIAGNOSIS — R74.8 ELEVATED CK: ICD-10-CM
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
## 2023-11-18 LAB
ANION GAP SERPL CALCULATED.3IONS-SCNC: 9 MMOL/L (ref 9–17)
BACTERIA URNS QL MICRO: ABNORMAL
BASOPHILS # BLD: 0.1 K/UL (ref 0–0.2)
BASOPHILS NFR BLD: 1 % (ref 0–2)
BILIRUB UR QL STRIP: NEGATIVE
BUN SERPL-MCNC: 23 MG/DL (ref 8–23)
CALCIUM SERPL-MCNC: 8.9 MG/DL (ref 8.6–10.4)
CASTS #/AREA URNS LPF: ABNORMAL /LPF
CHLORIDE SERPL-SCNC: 99 MMOL/L (ref 98–107)
CK SERPL-CCNC: 2978 U/L (ref 26–192)
CLARITY UR: CLEAR
CO2 SERPL-SCNC: 32 MMOL/L (ref 20–31)
COLOR UR: YELLOW
CREAT SERPL-MCNC: 1.6 MG/DL (ref 0.5–0.9)
EOSINOPHIL # BLD: 0.6 K/UL (ref 0–0.4)
EOSINOPHILS RELATIVE PERCENT: 4 % (ref 0–4)
EPI CELLS #/AREA URNS HPF: ABNORMAL /HPF
ERYTHROCYTE [DISTWIDTH] IN BLOOD BY AUTOMATED COUNT: 19 % (ref 11.5–14.9)
GFR SERPL CREATININE-BSD FRML MDRD: 35 ML/MIN/1.73M2
GLUCOSE SERPL-MCNC: 143 MG/DL (ref 70–99)
GLUCOSE UR STRIP-MCNC: NEGATIVE MG/DL
HCT VFR BLD AUTO: 31.1 % (ref 36–46)
HGB BLD-MCNC: 9.8 G/DL (ref 12–16)
HGB UR QL STRIP.AUTO: ABNORMAL
KETONES UR STRIP-MCNC: NEGATIVE MG/DL
LEUKOCYTE ESTERASE UR QL STRIP: ABNORMAL
LYMPHOCYTES NFR BLD: 1.3 K/UL (ref 1–4.8)
LYMPHOCYTES RELATIVE PERCENT: 9 % (ref 24–44)
MCH RBC QN AUTO: 28.1 PG (ref 26–34)
MCHC RBC AUTO-ENTMCNC: 31.6 G/DL (ref 31–37)
MCV RBC AUTO: 88.7 FL (ref 80–100)
MONOCYTES NFR BLD: 0.9 K/UL (ref 0.1–1.3)
MONOCYTES NFR BLD: 6 % (ref 1–7)
MYOGLOBIN SERPL-MCNC: 529 NG/ML (ref 25–58)
NEUTROPHILS NFR BLD: 80 % (ref 36–66)
NEUTS SEG NFR BLD: 11.6 K/UL (ref 1.3–9.1)
NITRITE UR QL STRIP: NEGATIVE
PH UR STRIP: 7.5 [PH] (ref 5–8)
PLATELET # BLD AUTO: 301 K/UL (ref 150–450)
PMV BLD AUTO: 8.3 FL (ref 6–12)
POTASSIUM SERPL-SCNC: 4.7 MMOL/L (ref 3.7–5.3)
PROT UR STRIP-MCNC: ABNORMAL MG/DL
RBC # BLD AUTO: 3.51 M/UL (ref 4–5.2)
RBC #/AREA URNS HPF: ABNORMAL /HPF
SODIUM SERPL-SCNC: 140 MMOL/L (ref 135–144)
SP GR UR STRIP: 1.01 (ref 1–1.03)
UROBILINOGEN UR STRIP-ACNC: NORMAL EU/DL (ref 0–1)
WBC #/AREA URNS HPF: ABNORMAL /HPF
WBC OTHER # BLD: 14.5 K/UL (ref 3.5–11)

## 2023-11-18 PROCEDURE — 36415 COLL VENOUS BLD VENIPUNCTURE: CPT

## 2023-11-18 PROCEDURE — 71045 X-RAY EXAM CHEST 1 VIEW: CPT

## 2023-11-18 PROCEDURE — 70450 CT HEAD/BRAIN W/O DYE: CPT

## 2023-11-18 PROCEDURE — 73060 X-RAY EXAM OF HUMERUS: CPT

## 2023-11-18 PROCEDURE — 85025 COMPLETE CBC W/AUTO DIFF WBC: CPT

## 2023-11-18 PROCEDURE — 82550 ASSAY OF CK (CPK): CPT

## 2023-11-18 PROCEDURE — 72125 CT NECK SPINE W/O DYE: CPT

## 2023-11-18 PROCEDURE — 6370000000 HC RX 637 (ALT 250 FOR IP)

## 2023-11-18 PROCEDURE — 2580000003 HC RX 258

## 2023-11-18 PROCEDURE — 73521 X-RAY EXAM HIPS BI 2 VIEWS: CPT

## 2023-11-18 PROCEDURE — 83874 ASSAY OF MYOGLOBIN: CPT

## 2023-11-18 PROCEDURE — 80048 BASIC METABOLIC PNL TOTAL CA: CPT

## 2023-11-18 PROCEDURE — 81001 URINALYSIS AUTO W/SCOPE: CPT

## 2023-11-18 PROCEDURE — 99285 EMERGENCY DEPT VISIT HI MDM: CPT

## 2023-11-18 RX ORDER — SODIUM CHLORIDE 9 MG/ML
INJECTION, SOLUTION INTRAVENOUS CONTINUOUS
Status: DISCONTINUED | OUTPATIENT
Start: 2023-11-18 | End: 2023-11-19

## 2023-11-18 RX ORDER — ACETAMINOPHEN 500 MG
1000 TABLET ORAL ONCE
Status: COMPLETED | OUTPATIENT
Start: 2023-11-18 | End: 2023-11-18

## 2023-11-18 RX ORDER — OXYCODONE HYDROCHLORIDE 5 MG/1
5 TABLET ORAL ONCE
Status: COMPLETED | OUTPATIENT
Start: 2023-11-19 | End: 2023-11-19

## 2023-11-18 RX ADMIN — SODIUM CHLORIDE: 9 INJECTION, SOLUTION INTRAVENOUS at 23:32

## 2023-11-18 RX ADMIN — ACETAMINOPHEN 1000 MG: 500 TABLET ORAL at 23:22

## 2023-11-18 ASSESSMENT — PAIN - FUNCTIONAL ASSESSMENT: PAIN_FUNCTIONAL_ASSESSMENT: 0-10

## 2023-11-18 ASSESSMENT — PAIN SCALES - GENERAL: PAINLEVEL_OUTOF10: 8

## 2023-11-19 ENCOUNTER — APPOINTMENT (OUTPATIENT)
Dept: GENERAL RADIOLOGY | Age: 68
DRG: 565 | End: 2023-11-19
Payer: MEDICARE

## 2023-11-19 PROBLEM — J96.11 CHRONIC RESPIRATORY FAILURE WITH HYPOXIA (HCC): Status: ACTIVE | Noted: 2023-11-19

## 2023-11-19 PROBLEM — J18.9 PNEUMONIA DUE TO INFECTIOUS ORGANISM, UNSPECIFIED LATERALITY, UNSPECIFIED PART OF LUNG: Status: RESOLVED | Noted: 2023-09-13 | Resolved: 2023-11-19

## 2023-11-19 PROBLEM — M62.82 RHABDOMYOLYSIS: Status: ACTIVE | Noted: 2023-11-19

## 2023-11-19 PROBLEM — I50.22 CHRONIC SYSTOLIC (CONGESTIVE) HEART FAILURE (HCC): Status: ACTIVE | Noted: 2023-11-19

## 2023-11-19 PROBLEM — N30.00 ACUTE CYSTITIS WITHOUT HEMATURIA: Status: RESOLVED | Noted: 2023-08-03 | Resolved: 2023-11-19

## 2023-11-19 PROBLEM — L03.115 CELLULITIS OF RIGHT LEG: Status: RESOLVED | Noted: 2020-06-10 | Resolved: 2023-11-19

## 2023-11-19 LAB
ANION GAP SERPL CALCULATED.3IONS-SCNC: 6 MMOL/L (ref 9–17)
BASOPHILS # BLD: 0.1 K/UL (ref 0–0.2)
BASOPHILS NFR BLD: 1 % (ref 0–2)
BUN SERPL-MCNC: 19 MG/DL (ref 8–23)
CALCIUM SERPL-MCNC: 8.8 MG/DL (ref 8.6–10.4)
CHLORIDE SERPL-SCNC: 104 MMOL/L (ref 98–107)
CK SERPL-CCNC: 2270 U/L (ref 26–192)
CO2 SERPL-SCNC: 31 MMOL/L (ref 20–31)
CREAT SERPL-MCNC: 1.2 MG/DL (ref 0.5–0.9)
EOSINOPHIL # BLD: 0.6 K/UL (ref 0–0.4)
EOSINOPHILS RELATIVE PERCENT: 5 % (ref 0–4)
ERYTHROCYTE [DISTWIDTH] IN BLOOD BY AUTOMATED COUNT: 18.8 % (ref 11.5–14.9)
EST. AVERAGE GLUCOSE BLD GHB EST-MCNC: 140 MG/DL
GFR SERPL CREATININE-BSD FRML MDRD: 50 ML/MIN/1.73M2
GLUCOSE BLD-MCNC: 117 MG/DL (ref 65–105)
GLUCOSE BLD-MCNC: 170 MG/DL (ref 65–105)
GLUCOSE BLD-MCNC: 175 MG/DL (ref 65–105)
GLUCOSE BLD-MCNC: 197 MG/DL (ref 65–105)
GLUCOSE SERPL-MCNC: 124 MG/DL (ref 70–99)
HBA1C MFR BLD: 6.5 % (ref 4–6)
HCT VFR BLD AUTO: 28.2 % (ref 36–46)
HGB BLD-MCNC: 8.8 G/DL (ref 12–16)
LYMPHOCYTES NFR BLD: 1.3 K/UL (ref 1–4.8)
LYMPHOCYTES RELATIVE PERCENT: 11 % (ref 24–44)
MCH RBC QN AUTO: 27.5 PG (ref 26–34)
MCHC RBC AUTO-ENTMCNC: 31.2 G/DL (ref 31–37)
MCV RBC AUTO: 88 FL (ref 80–100)
MONOCYTES NFR BLD: 0.7 K/UL (ref 0.1–1.3)
MONOCYTES NFR BLD: 6 % (ref 1–7)
MYOGLOBIN SERPL-MCNC: 336 NG/ML (ref 25–58)
NEUTROPHILS NFR BLD: 77 % (ref 36–66)
NEUTS SEG NFR BLD: 9.3 K/UL (ref 1.3–9.1)
PLATELET # BLD AUTO: 266 K/UL (ref 150–450)
PMV BLD AUTO: 8.4 FL (ref 6–12)
POTASSIUM SERPL-SCNC: 4.3 MMOL/L (ref 3.7–5.3)
RBC # BLD AUTO: 3.21 M/UL (ref 4–5.2)
SODIUM SERPL-SCNC: 141 MMOL/L (ref 135–144)
WBC OTHER # BLD: 12 K/UL (ref 3.5–11)

## 2023-11-19 PROCEDURE — 80048 BASIC METABOLIC PNL TOTAL CA: CPT

## 2023-11-19 PROCEDURE — 82947 ASSAY GLUCOSE BLOOD QUANT: CPT

## 2023-11-19 PROCEDURE — 73552 X-RAY EXAM OF FEMUR 2/>: CPT

## 2023-11-19 PROCEDURE — 83874 ASSAY OF MYOGLOBIN: CPT

## 2023-11-19 PROCEDURE — 2580000003 HC RX 258: Performed by: FAMILY MEDICINE

## 2023-11-19 PROCEDURE — 85025 COMPLETE CBC W/AUTO DIFF WBC: CPT

## 2023-11-19 PROCEDURE — 83036 HEMOGLOBIN GLYCOSYLATED A1C: CPT

## 2023-11-19 PROCEDURE — 6370000000 HC RX 637 (ALT 250 FOR IP)

## 2023-11-19 PROCEDURE — 99223 1ST HOSP IP/OBS HIGH 75: CPT | Performed by: FAMILY MEDICINE

## 2023-11-19 PROCEDURE — 6370000000 HC RX 637 (ALT 250 FOR IP): Performed by: FAMILY MEDICINE

## 2023-11-19 PROCEDURE — 36415 COLL VENOUS BLD VENIPUNCTURE: CPT

## 2023-11-19 PROCEDURE — 82550 ASSAY OF CK (CPK): CPT

## 2023-11-19 PROCEDURE — 6360000002 HC RX W HCPCS: Performed by: FAMILY MEDICINE

## 2023-11-19 PROCEDURE — 1200000000 HC SEMI PRIVATE

## 2023-11-19 RX ORDER — FAMOTIDINE 20 MG/1
20 TABLET, FILM COATED ORAL 2 TIMES DAILY
Status: DISCONTINUED | OUTPATIENT
Start: 2023-11-19 | End: 2023-11-19 | Stop reason: SDUPTHER

## 2023-11-19 RX ORDER — LOSARTAN POTASSIUM 50 MG/1
50 TABLET ORAL DAILY
Status: DISCONTINUED | OUTPATIENT
Start: 2023-11-19 | End: 2023-11-21

## 2023-11-19 RX ORDER — CLONAZEPAM 0.5 MG/1
0.5 TABLET ORAL 2 TIMES DAILY PRN
Status: DISCONTINUED | OUTPATIENT
Start: 2023-11-19 | End: 2023-11-23 | Stop reason: HOSPADM

## 2023-11-19 RX ORDER — POTASSIUM CHLORIDE 7.45 MG/ML
10 INJECTION INTRAVENOUS PRN
Status: DISCONTINUED | OUTPATIENT
Start: 2023-11-19 | End: 2023-11-23 | Stop reason: HOSPADM

## 2023-11-19 RX ORDER — OXYCODONE HCL 10 MG/1
30 TABLET, FILM COATED, EXTENDED RELEASE ORAL EVERY 12 HOURS SCHEDULED
Status: DISCONTINUED | OUTPATIENT
Start: 2023-11-19 | End: 2023-11-19

## 2023-11-19 RX ORDER — VENLAFAXINE HYDROCHLORIDE 37.5 MG/1
37.5 CAPSULE, EXTENDED RELEASE ORAL DAILY
Status: DISCONTINUED | OUTPATIENT
Start: 2023-11-19 | End: 2023-11-23 | Stop reason: HOSPADM

## 2023-11-19 RX ORDER — AMLODIPINE BESYLATE 5 MG/1
5 TABLET ORAL DAILY
Status: DISCONTINUED | OUTPATIENT
Start: 2023-11-19 | End: 2023-11-23 | Stop reason: HOSPADM

## 2023-11-19 RX ORDER — MAGNESIUM SULFATE HEPTAHYDRATE 40 MG/ML
2000 INJECTION, SOLUTION INTRAVENOUS PRN
Status: DISCONTINUED | OUTPATIENT
Start: 2023-11-19 | End: 2023-11-23 | Stop reason: HOSPADM

## 2023-11-19 RX ORDER — ENOXAPARIN SODIUM 100 MG/ML
30 INJECTION SUBCUTANEOUS 2 TIMES DAILY
Status: DISCONTINUED | OUTPATIENT
Start: 2023-11-19 | End: 2023-11-23 | Stop reason: HOSPADM

## 2023-11-19 RX ORDER — PREGABALIN 150 MG/1
CAPSULE ORAL
Status: DISPENSED
Start: 2023-11-19 | End: 2023-11-19

## 2023-11-19 RX ORDER — CALCIUM CARBONATE/VITAMIN D3 600 MG-10
TABLET ORAL
Status: DISPENSED
Start: 2023-11-19 | End: 2023-11-19

## 2023-11-19 RX ORDER — POTASSIUM CHLORIDE 20 MEQ/1
40 TABLET, EXTENDED RELEASE ORAL PRN
Status: DISCONTINUED | OUTPATIENT
Start: 2023-11-19 | End: 2023-11-23 | Stop reason: HOSPADM

## 2023-11-19 RX ORDER — SODIUM CHLORIDE 9 MG/ML
INJECTION, SOLUTION INTRAVENOUS CONTINUOUS
Status: ACTIVE | OUTPATIENT
Start: 2023-11-19 | End: 2023-11-21

## 2023-11-19 RX ORDER — INSULIN LISPRO 100 [IU]/ML
0-4 INJECTION, SOLUTION INTRAVENOUS; SUBCUTANEOUS
Status: DISCONTINUED | OUTPATIENT
Start: 2023-11-19 | End: 2023-11-23 | Stop reason: HOSPADM

## 2023-11-19 RX ORDER — CALCIUM CARBONATE/VITAMIN D3 600 MG-10
1 TABLET ORAL 2 TIMES DAILY
Status: DISCONTINUED | OUTPATIENT
Start: 2023-11-19 | End: 2023-11-23 | Stop reason: HOSPADM

## 2023-11-19 RX ORDER — M-VIT,TX,IRON,MINS/CALC/FOLIC 27MG-0.4MG
1 TABLET ORAL DAILY
Status: DISCONTINUED | OUTPATIENT
Start: 2023-11-19 | End: 2023-11-23 | Stop reason: HOSPADM

## 2023-11-19 RX ORDER — UBIDECARENONE 200 MG
200 CAPSULE ORAL NIGHTLY
Status: DISCONTINUED | OUTPATIENT
Start: 2023-11-19 | End: 2023-11-23 | Stop reason: HOSPADM

## 2023-11-19 RX ORDER — LANOLIN ALCOHOL/MO/W.PET/CERES
1000 CREAM (GRAM) TOPICAL DAILY
Status: DISCONTINUED | OUTPATIENT
Start: 2023-11-19 | End: 2023-11-23 | Stop reason: HOSPADM

## 2023-11-19 RX ORDER — POTASSIUM CHLORIDE 20 MEQ/1
20 TABLET, EXTENDED RELEASE ORAL 2 TIMES DAILY
Status: DISCONTINUED | OUTPATIENT
Start: 2023-11-19 | End: 2023-11-21

## 2023-11-19 RX ORDER — ENOXAPARIN SODIUM 100 MG/ML
INJECTION SUBCUTANEOUS
Status: DISPENSED
Start: 2023-11-19 | End: 2023-11-19

## 2023-11-19 RX ORDER — SODIUM CHLORIDE 0.9 % (FLUSH) 0.9 %
5-40 SYRINGE (ML) INJECTION PRN
Status: DISCONTINUED | OUTPATIENT
Start: 2023-11-19 | End: 2023-11-23 | Stop reason: HOSPADM

## 2023-11-19 RX ORDER — DEXTROSE MONOHYDRATE 100 MG/ML
INJECTION, SOLUTION INTRAVENOUS CONTINUOUS PRN
Status: DISCONTINUED | OUTPATIENT
Start: 2023-11-19 | End: 2023-11-23 | Stop reason: HOSPADM

## 2023-11-19 RX ORDER — SODIUM CHLORIDE 9 MG/ML
INJECTION, SOLUTION INTRAVENOUS PRN
Status: DISCONTINUED | OUTPATIENT
Start: 2023-11-19 | End: 2023-11-23 | Stop reason: HOSPADM

## 2023-11-19 RX ORDER — INSULIN LISPRO 100 [IU]/ML
0-4 INJECTION, SOLUTION INTRAVENOUS; SUBCUTANEOUS NIGHTLY
Status: DISCONTINUED | OUTPATIENT
Start: 2023-11-19 | End: 2023-11-23 | Stop reason: HOSPADM

## 2023-11-19 RX ORDER — ACETAMINOPHEN 325 MG/1
650 TABLET ORAL EVERY 6 HOURS PRN
Status: DISCONTINUED | OUTPATIENT
Start: 2023-11-19 | End: 2023-11-23 | Stop reason: HOSPADM

## 2023-11-19 RX ORDER — GLIPIZIDE 5 MG/1
10 TABLET ORAL
Status: DISCONTINUED | OUTPATIENT
Start: 2023-11-19 | End: 2023-11-23 | Stop reason: HOSPADM

## 2023-11-19 RX ORDER — PREGABALIN 150 MG/1
150 CAPSULE ORAL 2 TIMES DAILY
Status: DISCONTINUED | OUTPATIENT
Start: 2023-11-19 | End: 2023-11-23 | Stop reason: HOSPADM

## 2023-11-19 RX ORDER — ALOGLIPTIN 12.5 MG/1
12.5 TABLET, FILM COATED ORAL DAILY
Status: DISCONTINUED | OUTPATIENT
Start: 2023-11-19 | End: 2023-11-23 | Stop reason: HOSPADM

## 2023-11-19 RX ORDER — ATORVASTATIN CALCIUM 40 MG/1
40 TABLET, FILM COATED ORAL NIGHTLY
Status: DISCONTINUED | OUTPATIENT
Start: 2023-11-19 | End: 2023-11-23 | Stop reason: HOSPADM

## 2023-11-19 RX ORDER — ACETAMINOPHEN 650 MG/1
650 SUPPOSITORY RECTAL EVERY 6 HOURS PRN
Status: DISCONTINUED | OUTPATIENT
Start: 2023-11-19 | End: 2023-11-23 | Stop reason: HOSPADM

## 2023-11-19 RX ORDER — ONDANSETRON 2 MG/ML
4 INJECTION INTRAMUSCULAR; INTRAVENOUS EVERY 6 HOURS PRN
Status: DISCONTINUED | OUTPATIENT
Start: 2023-11-19 | End: 2023-11-23 | Stop reason: HOSPADM

## 2023-11-19 RX ORDER — CARVEDILOL 12.5 MG/1
12.5 TABLET ORAL 2 TIMES DAILY WITH MEALS
Status: DISCONTINUED | OUTPATIENT
Start: 2023-11-19 | End: 2023-11-23 | Stop reason: HOSPADM

## 2023-11-19 RX ORDER — ONDANSETRON 4 MG/1
4 TABLET, ORALLY DISINTEGRATING ORAL EVERY 8 HOURS PRN
Status: DISCONTINUED | OUTPATIENT
Start: 2023-11-19 | End: 2023-11-23 | Stop reason: HOSPADM

## 2023-11-19 RX ORDER — OXYCODONE HYDROCHLORIDE AND ACETAMINOPHEN 5; 325 MG/1; MG/1
TABLET ORAL
Status: DISPENSED
Start: 2023-11-19 | End: 2023-11-19

## 2023-11-19 RX ORDER — FAMOTIDINE 20 MG/1
20 TABLET, FILM COATED ORAL 2 TIMES DAILY
Status: DISCONTINUED | OUTPATIENT
Start: 2023-11-19 | End: 2023-11-21

## 2023-11-19 RX ORDER — POLYETHYLENE GLYCOL 3350 17 G/17G
17 POWDER, FOR SOLUTION ORAL DAILY PRN
Status: DISCONTINUED | OUTPATIENT
Start: 2023-11-19 | End: 2023-11-23 | Stop reason: HOSPADM

## 2023-11-19 RX ORDER — OXYCODONE HYDROCHLORIDE AND ACETAMINOPHEN 5; 325 MG/1; MG/1
1 TABLET ORAL EVERY 6 HOURS PRN
Status: DISCONTINUED | OUTPATIENT
Start: 2023-11-19 | End: 2023-11-23 | Stop reason: HOSPADM

## 2023-11-19 RX ORDER — SODIUM CHLORIDE 0.9 % (FLUSH) 0.9 %
5-40 SYRINGE (ML) INJECTION EVERY 12 HOURS SCHEDULED
Status: DISCONTINUED | OUTPATIENT
Start: 2023-11-19 | End: 2023-11-23 | Stop reason: HOSPADM

## 2023-11-19 RX ORDER — OXYCODONE HYDROCHLORIDE 10 MG/1
30 TABLET ORAL 2 TIMES DAILY
Status: DISCONTINUED | OUTPATIENT
Start: 2023-11-19 | End: 2023-11-20

## 2023-11-19 RX ADMIN — ENOXAPARIN SODIUM 30 MG: 100 INJECTION SUBCUTANEOUS at 03:35

## 2023-11-19 RX ADMIN — FAMOTIDINE 20 MG: 20 TABLET ORAL at 20:46

## 2023-11-19 RX ADMIN — Medication 1 TABLET: at 08:23

## 2023-11-19 RX ADMIN — CALCIUM CARBONATE 600 MG (1,500 MG)-VITAMIN D3 400 UNIT TABLET 1 TABLET: at 03:37

## 2023-11-19 RX ADMIN — CYANOCOBALAMIN TAB 1000 MCG 1000 MCG: 1000 TAB at 08:29

## 2023-11-19 RX ADMIN — CALCIUM CARBONATE 600 MG (1,500 MG)-VITAMIN D3 400 UNIT TABLET 1 TABLET: at 20:46

## 2023-11-19 RX ADMIN — POLYETHYLENE GLYCOL 3350 17 G: 17 POWDER, FOR SOLUTION ORAL at 09:05

## 2023-11-19 RX ADMIN — POTASSIUM CHLORIDE 20 MEQ: 1500 TABLET, EXTENDED RELEASE ORAL at 08:23

## 2023-11-19 RX ADMIN — OXYCODONE HYDROCHLORIDE 30 MG: 10 TABLET ORAL at 10:48

## 2023-11-19 RX ADMIN — Medication 200 MG: at 20:46

## 2023-11-19 RX ADMIN — FAMOTIDINE 20 MG: 20 TABLET ORAL at 06:43

## 2023-11-19 RX ADMIN — ATORVASTATIN CALCIUM 40 MG: 40 TABLET, FILM COATED ORAL at 05:34

## 2023-11-19 RX ADMIN — PREGABALIN 150 MG: 150 CAPSULE ORAL at 03:37

## 2023-11-19 RX ADMIN — PREGABALIN 150 MG: 150 CAPSULE ORAL at 20:47

## 2023-11-19 RX ADMIN — POTASSIUM CHLORIDE 20 MEQ: 1500 TABLET, EXTENDED RELEASE ORAL at 20:46

## 2023-11-19 RX ADMIN — ENOXAPARIN SODIUM 30 MG: 100 INJECTION SUBCUTANEOUS at 20:47

## 2023-11-19 RX ADMIN — LINACLOTIDE 145 MCG: 145 CAPSULE, GELATIN COATED ORAL at 05:50

## 2023-11-19 RX ADMIN — OXYCODONE AND ACETAMINOPHEN 1 TABLET: 5; 325 TABLET ORAL at 23:45

## 2023-11-19 RX ADMIN — OXYCODONE HYDROCHLORIDE 30 MG: 10 TABLET ORAL at 20:46

## 2023-11-19 RX ADMIN — LOSARTAN POTASSIUM 50 MG: 50 TABLET, FILM COATED ORAL at 08:29

## 2023-11-19 RX ADMIN — GLIPIZIDE 10 MG: 5 TABLET ORAL at 05:34

## 2023-11-19 RX ADMIN — ALOGLIPTIN 12.5 MG: 12.5 TABLET, FILM COATED ORAL at 08:22

## 2023-11-19 RX ADMIN — SODIUM CHLORIDE, PRESERVATIVE FREE 10 ML: 5 INJECTION INTRAVENOUS at 19:26

## 2023-11-19 RX ADMIN — VENLAFAXINE HYDROCHLORIDE 37.5 MG: 37.5 CAPSULE, EXTENDED RELEASE ORAL at 10:49

## 2023-11-19 RX ADMIN — CARVEDILOL 12.5 MG: 12.5 TABLET, FILM COATED ORAL at 08:22

## 2023-11-19 RX ADMIN — CARVEDILOL 12.5 MG: 12.5 TABLET, FILM COATED ORAL at 16:41

## 2023-11-19 RX ADMIN — GLIPIZIDE 10 MG: 5 TABLET ORAL at 16:41

## 2023-11-19 RX ADMIN — OXYCODONE HYDROCHLORIDE 5 MG: 5 TABLET ORAL at 01:19

## 2023-11-19 RX ADMIN — AMLODIPINE BESYLATE 5 MG: 5 TABLET ORAL at 08:29

## 2023-11-19 RX ADMIN — OXYCODONE AND ACETAMINOPHEN 1 TABLET: 5; 325 TABLET ORAL at 16:47

## 2023-11-19 RX ADMIN — OXYCODONE AND ACETAMINOPHEN 1 TABLET: 5; 325 TABLET ORAL at 03:38

## 2023-11-19 ASSESSMENT — PAIN DESCRIPTION - ORIENTATION
ORIENTATION: RIGHT
ORIENTATION: RIGHT

## 2023-11-19 ASSESSMENT — PAIN DESCRIPTION - LOCATION
LOCATION: BACK;ABDOMEN;GENERALIZED
LOCATION: SHOULDER
LOCATION: HEAD;GENERALIZED
LOCATION: HIP
LOCATION: BACK

## 2023-11-19 ASSESSMENT — PAIN DESCRIPTION - DESCRIPTORS
DESCRIPTORS: THROBBING
DESCRIPTORS: SHARP

## 2023-11-19 ASSESSMENT — PAIN SCALES - GENERAL
PAINLEVEL_OUTOF10: 2
PAINLEVEL_OUTOF10: 9
PAINLEVEL_OUTOF10: 9
PAINLEVEL_OUTOF10: 7
PAINLEVEL_OUTOF10: 2
PAINLEVEL_OUTOF10: 0
PAINLEVEL_OUTOF10: 10

## 2023-11-19 ASSESSMENT — PAIN - FUNCTIONAL ASSESSMENT
PAIN_FUNCTIONAL_ASSESSMENT: ACTIVITIES ARE NOT PREVENTED
PAIN_FUNCTIONAL_ASSESSMENT: PREVENTS OR INTERFERES SOME ACTIVE ACTIVITIES AND ADLS

## 2023-11-19 NOTE — ED TRIAGE NOTES
Mode of arrival (squad #, walk in, police, etc) : [facility] complaint(s): fall, hip pain, arm pain        Arrival Note (brief scenario, treatment PTA, etc). : pt fell in shower last night and now has upper right arm pain and left hip/buttock pain. Pt in so much pain she is weak and it is hard to walk. Pt denies any head/neck pain or trauma, no LOC, not on blood thinners, pt lives at home with         C= \"Have you ever felt that you should Cut down on your drinking? \"  No  A= \"Have people Annoyed you by criticizing your drinking? \"  No  G= \"Have you ever felt bad or Guilty about your drinking? \"  No  E= \"Have you ever had a drink as an Eye-opener first thing in the morning to steady your nerves or to help a hangover? \"  No      Deferred []      Reason for deferring: N/A    *If yes to two or more: probable alcohol abuse. *

## 2023-11-19 NOTE — H&P
Family Medicine Admit Note    PCP: Kaia Pate MD    Date of Admission: 11/18/2023    Date of Service: Pt seen/examined on 11/19/2023 and Admitted to Inpatient     Chief Complaint:  fall      History Of Present Illness: The patient is a 79 y.o. female who presents to [facility] after falling while cleaning the shower. She landed in a strange position and was there for five hours before her  found her. She states she is feeling much better today except for shoulder and hip pain where she has abrasions. Past Medical History:        Diagnosis Date    Acute hypoxemic respiratory failure (HCC)     Arthritis     CHF (congestive heart failure) ([facility])     Chronic back pain     Diabetes mellitus type II, controlled ([address]) 2/14/2012    Fibromyalgia     Hyperlipidemia LDL goal < 70 2/14/2012    Hypertension, benign 02/14/2012    Morbid obesity with BMI of 60.0-69.9, adult ([address]) 8/28/2015    Pain of toe of right foot     morgans cyst    Right wrist pain     rate 8    Skin cancer     skin under lt eye,face    Sleep apnea     Bipap does not work    Wears glasses        Past Surgical History:        Procedure Laterality Date    CARPAL TUNNEL RELEASE Right 09/05/2014    CATARACT REMOVAL WITH IMPLANT Bilateral 2013    CHOLECYSTECTOMY, LAPAROSCOPIC  1998    COLONOSCOPY N/A 10/17/2019    COLONOSCOPY DIAGNOSTIC performed by Wil Tompkins MD at [address] cyst from base of tail bone    HYSTERECTOMY (CERVIX STATUS UNKNOWN)  [address] [facility] [address] Bilateral 01/20/2017    abcess removed    LUMBAR FUSION  10/2010; 03/2011    L4/L5    LUMBAR FUSION  2009    L4-S1    OTHER SURGICAL HISTORY  05/02/2018    Lumbar decompression laminectomy at L3-L4 bilaterally wiith complete facetomies at L3-L4 bilaterally;  diskectomy L3-L4: posterior lumbar interbody fusion; placement of [place]-type graft; local harvest of bone; microsurgical dissection.     ROTATOR CUFF REPAIR Left CTs     Diabetes - stable      DVT Prophylaxis: enoxaparin  Diet: ADULT DIET;  Regular; 5 carb choices (75 gm/meal)  Code Status: Full Code      Dispo - admitted      Vick Vee MD, FAAFP  11/19/2023, 8:33 AM

## 2023-11-19 NOTE — DISCHARGE INSTR - COC
Continuity of Care Form    Patient Name: Marcianne Cooks   :  1955  MRN:  [id number]    Admit date:  2023  Discharge date:  23    Code Status Order: Full Code   Advance Directives:     Admitting Physician:  Jackie Huizar MD  PCP: Jackie Huizar MD    Discharging Nurse: [facility] Unit/Room#: 2071/2071-01  Discharging Unit Phone Number: [phone]    Emergency Contact:   Extended Emergency Contact Information  Primary Emergency Contact: Sondra Cordon  Address: [address]           [facility], [address] [place] [address] of [address] [place] Phone: [phone]  Mobile Phone: [phone]  Relation: Spouse  Hearing or visual needs: None  Preferred language: English   needed? No  Secondary Emergency Contact: Denise Beltran  Address: [address] of [address] [place] Phone: [phone]  Mobile Phone: [phone]  Relation: Child  Hearing or visual needs: None  Other needs: None  Preferred language: English   needed? No    Past Surgical History:  Past Surgical History:   Procedure Laterality Date    CARPAL TUNNEL RELEASE Right 2014    CATARACT REMOVAL WITH IMPLANT Bilateral     CHOLECYSTECTOMY, LAPAROSCOPIC  1998    COLONOSCOPY N/A 10/17/2019    COLONOSCOPY DIAGNOSTIC performed by Maame Chilel MD at [address] cyst from base of tail bone    HYSTERECTOMY (CERVIX STATUS UNKNOWN)  [address] Bilateral 2017    abcess removed    LUMBAR FUSION  10/2010; 2011    L4/L5    LUMBAR FUSION  2009    L4-S1    OTHER SURGICAL HISTORY  2018    Lumbar decompression laminectomy at L3-L4 bilaterally wiith complete facetomies at L3-L4 bilaterally;  diskectomy L3-L4: posterior lumbar interbody fusion; placement of [place]-type graft; local harvest of bone; microsurgical dissection.     ROTATOR CUFF REPAIR Left 2012    SKIN CANCER EXCISION      Basal Cell Carcinoma, Discharging to Facility/ [address] [facility] [address] [address] [facility] [address]  Phone [phone]  Fax  [phone]     Dialysis Facility (if applicable)   Name:  Address:  Dialysis Schedule:  Phone:  Fax:    / signature: Electronically signed by Filiberto Ha RN on 11/20/23 at 10:10 AM EST    PHYSICIAN SECTION    Prognosis: Good    Condition at Discharge: Stable    Rehab Potential (if transferring to Rehab): Good    Recommended Labs or Other Treatments After Discharge:     Physician Certification: I certify the above information and transfer of Africa Olivares  is necessary for the continuing treatment of the diagnosis listed and that she requires Home Care for less 30 days.      Update Admission H&P: No change in H&P    PHYSICIAN SIGNATURE:  Electronically signed by Kenji Cr MD on 11/23/23 at 7:49 AM EST

## 2023-11-19 NOTE — PROGRESS NOTES
Nurse attempted to call Dr. Jonathon Fox answering office to let him know about this new consult, but there was no response, could not leave a voicemail either.  Nursing will try again in the AM

## 2023-11-19 NOTE — CONSULTS
NEPHROLOGY CONSULT       Reason for Consult: Rhabdomyolysis and renal insufficiency      Chief Complaint: Fall  History Obtained From: Patient and EHR records    History of Present Illness: This is a 79 y.o. female This is a 79 y.o. female  with a significant past medical history of  fibromyalgia, type 2 diabetes mellitus, obstructive sleep apnea, obesity, systemic hypertension and chronic kidney disease stage IIIb [baseline serum creatinine 1.3 to 1.5 mg/dL], who presented to ER after a fall at home. She reports cleaning her shower when she stepped over into the shower with 1 leg and fell flat on her face. She denied any loss of consciousness. She was unable to get off the floor and  lay on the floor for approximately 5 hours as she was unable to get any help or assistance. She denied any of consciousness. Complains of pain in the right shoulder after previous fall- she does have prior history of fall on an admission October 2023 associated with acute kidney injury. Has shoulder and hip abrasions which are not new. Upon presentation to [facility], she had a CPK level of 2978 with creatinine of 1.6 mg/dl BUN 33. White count was 14.5. She had been taking Lasix 40 mg daily at home. Multiple x-rays performed did  not show any acute fracture or dislocation. Head CT scan was negative for acute intracranial abnormality.     Past Medical History:        Diagnosis Date    Acute hypoxemic respiratory failure (HCC)     Arthritis     CHF (congestive heart failure) (HCC)     Chronic back pain     Diabetes mellitus type II, controlled ([address]) 2/14/2012    Fibromyalgia     Hyperlipidemia LDL goal < 70 2/14/2012    Hypertension, benign 02/14/2012    Morbid obesity with BMI of 60.0-69.9, adult ([address]) 8/28/2015    Pain of toe of right foot     morgans cyst    Right wrist pain     rate 8    Skin cancer     skin under lt eye,face    Sleep apnea     Bipap does not work    Wears glasses        Past Surgical CREATININE 1.6* 1.2*   GLUCOSE 143* 124*   CALCIUM 8.9 8.8        Phosphorus:  No results for input(s): \"PHOS\" in the last 72 hours. Magnesium: No results for input(s): \"MG\" in the last 72 hours. Albumin: No results for input(s): \"LABALBU\" in the last 72 hours. IRON:    Lab Results   Component Value Date/Time    IRON 29 09/14/2023 03:23 PM     Iron Saturation:  No components found for: \"PERCENTFE\"  TIBC:    Lab Results   Component Value Date/Time    TIBC 247 09/14/2023 03:23 PM     FERRITIN:    Lab Results   Component Value Date/Time    FERRITIN 241 09/14/2023 03:23 PM     SPEP:   Lab Results   Component Value Date/Time    PROT 7.2 10/02/2023 10:02 AM    ALBCAL 3.0 06/13/2020 03:42 PM    ALBPCT 44 06/13/2020 03:42 PM    LABALPH 0.2 06/13/2020 03:42 PM    LABALPH 0.9 06/13/2020 03:42 PM    A1PCT 4 06/13/2020 03:42 PM    A2PCT 14 06/13/2020 03:42 PM    BETAPCT 15 06/13/2020 03:42 PM    GAMGLOB 1.6 06/13/2020 03:42 PM    GGPCT 24 06/13/2020 03:42 PM    PATH ELECTRONICALLY SIGNED. Machelle Pires M.D. 06/13/2020 03:42 PM    PATH ELECTRONICALLY SIGNED. Machelle Pires M.D. 06/13/2020 03:42 PM     UPEP: No results found for: \"TPU\"     C3:   Lab Results   Component Value Date    C3 200 (H) 09/14/2023     C4:   Lab Results   Component Value Date    C4 30 09/14/2023     MPO ANCA:  No results found for: \"MPO\" .   PR3 ANCA:  No results found for: \"PR3\"  Urine Sodium:    Lab Results   Component Value Date/Time    SAMANTHA 25 09/24/2023 07:15 AM      Urine Potassium:  No results found for: \"KUR\"  Urine Chloride:  No results found for: \"CLU\"  Urine Ph:  No components found for: \"PO4U\"  Urine Osmolarity:  No results found for: \"OSMOU\"  Urine Creatinine:    Lab Results   Component Value Date/Time    LABCREA 113.0 03/15/2022 07:49 AM     Urine Eosinophils: No components found for: \"EOSU\"  Urine Protein:  No results found for: \"TPU\"  Urinalysis:  U/A:   Lab Results   Component Value Date/Time    NITRU NEGATIVE 11/18/2023 10:48

## 2023-11-20 LAB
ANION GAP SERPL CALCULATED.3IONS-SCNC: 9 MMOL/L (ref 9–17)
BASOPHILS # BLD: 0.1 K/UL (ref 0–0.2)
BASOPHILS NFR BLD: 1 % (ref 0–2)
BUN SERPL-MCNC: 19 MG/DL (ref 8–23)
CALCIUM SERPL-MCNC: 9.1 MG/DL (ref 8.6–10.4)
CHLORIDE SERPL-SCNC: 101 MMOL/L (ref 98–107)
CK SERPL-CCNC: 1255 U/L (ref 26–192)
CO2 SERPL-SCNC: 28 MMOL/L (ref 20–31)
CREAT SERPL-MCNC: 1.1 MG/DL (ref 0.5–0.9)
EOSINOPHIL # BLD: 1 K/UL (ref 0–0.4)
EOSINOPHILS RELATIVE PERCENT: 8 % (ref 0–4)
ERYTHROCYTE [DISTWIDTH] IN BLOOD BY AUTOMATED COUNT: 18.6 % (ref 11.5–14.9)
GFR SERPL CREATININE-BSD FRML MDRD: 55 ML/MIN/1.73M2
GLUCOSE BLD-MCNC: 104 MG/DL (ref 65–105)
GLUCOSE BLD-MCNC: 149 MG/DL (ref 65–105)
GLUCOSE BLD-MCNC: 158 MG/DL (ref 65–105)
GLUCOSE BLD-MCNC: 162 MG/DL (ref 65–105)
GLUCOSE SERPL-MCNC: 116 MG/DL (ref 70–99)
HCT VFR BLD AUTO: 29.7 % (ref 36–46)
HGB BLD-MCNC: 9.1 G/DL (ref 12–16)
LYMPHOCYTES NFR BLD: 1.6 K/UL (ref 1–4.8)
LYMPHOCYTES RELATIVE PERCENT: 13 % (ref 24–44)
MCH RBC QN AUTO: 27.2 PG (ref 26–34)
MCHC RBC AUTO-ENTMCNC: 30.8 G/DL (ref 31–37)
MCV RBC AUTO: 88.1 FL (ref 80–100)
MONOCYTES NFR BLD: 0.9 K/UL (ref 0.1–1.3)
MONOCYTES NFR BLD: 8 % (ref 1–7)
MYOGLOBIN SERPL-MCNC: 119 NG/ML (ref 25–58)
NEUTROPHILS NFR BLD: 70 % (ref 36–66)
NEUTS SEG NFR BLD: 8.6 K/UL (ref 1.3–9.1)
PLATELET # BLD AUTO: 299 K/UL (ref 150–450)
PMV BLD AUTO: 8.9 FL (ref 6–12)
POTASSIUM SERPL-SCNC: 4.9 MMOL/L (ref 3.7–5.3)
RBC # BLD AUTO: 3.37 M/UL (ref 4–5.2)
SODIUM SERPL-SCNC: 138 MMOL/L (ref 135–144)
WBC OTHER # BLD: 12.2 K/UL (ref 3.5–11)

## 2023-11-20 PROCEDURE — 83874 ASSAY OF MYOGLOBIN: CPT

## 2023-11-20 PROCEDURE — 99213 OFFICE O/P EST LOW 20 MIN: CPT

## 2023-11-20 PROCEDURE — 1200000000 HC SEMI PRIVATE

## 2023-11-20 PROCEDURE — 97530 THERAPEUTIC ACTIVITIES: CPT

## 2023-11-20 PROCEDURE — 82550 ASSAY OF CK (CPK): CPT

## 2023-11-20 PROCEDURE — 36415 COLL VENOUS BLD VENIPUNCTURE: CPT

## 2023-11-20 PROCEDURE — 6360000002 HC RX W HCPCS: Performed by: FAMILY MEDICINE

## 2023-11-20 PROCEDURE — 85025 COMPLETE CBC W/AUTO DIFF WBC: CPT

## 2023-11-20 PROCEDURE — 2580000003 HC RX 258: Performed by: INTERNAL MEDICINE

## 2023-11-20 PROCEDURE — 82947 ASSAY GLUCOSE BLOOD QUANT: CPT

## 2023-11-20 PROCEDURE — 6370000000 HC RX 637 (ALT 250 FOR IP): Performed by: FAMILY MEDICINE

## 2023-11-20 PROCEDURE — 97166 OT EVAL MOD COMPLEX 45 MIN: CPT

## 2023-11-20 PROCEDURE — 97162 PT EVAL MOD COMPLEX 30 MIN: CPT

## 2023-11-20 PROCEDURE — 97116 GAIT TRAINING THERAPY: CPT

## 2023-11-20 PROCEDURE — 97535 SELF CARE MNGMENT TRAINING: CPT

## 2023-11-20 PROCEDURE — 80048 BASIC METABOLIC PNL TOTAL CA: CPT

## 2023-11-20 RX ORDER — OXYCODONE HYDROCHLORIDE 10 MG/1
30 TABLET ORAL 3 TIMES DAILY
Status: DISCONTINUED | OUTPATIENT
Start: 2023-11-20 | End: 2023-11-23 | Stop reason: HOSPADM

## 2023-11-20 RX ADMIN — ONDANSETRON 4 MG: 2 INJECTION INTRAMUSCULAR; INTRAVENOUS at 02:56

## 2023-11-20 RX ADMIN — GLIPIZIDE 10 MG: 5 TABLET ORAL at 05:49

## 2023-11-20 RX ADMIN — OXYCODONE HYDROCHLORIDE 30 MG: 10 TABLET ORAL at 09:27

## 2023-11-20 RX ADMIN — CALCIUM CARBONATE 600 MG (1,500 MG)-VITAMIN D3 400 UNIT TABLET 1 TABLET: at 09:27

## 2023-11-20 RX ADMIN — OXYCODONE HYDROCHLORIDE 30 MG: 10 TABLET ORAL at 20:55

## 2023-11-20 RX ADMIN — Medication 1 TABLET: at 09:35

## 2023-11-20 RX ADMIN — ENOXAPARIN SODIUM 30 MG: 100 INJECTION SUBCUTANEOUS at 09:30

## 2023-11-20 RX ADMIN — CARVEDILOL 12.5 MG: 12.5 TABLET, FILM COATED ORAL at 16:54

## 2023-11-20 RX ADMIN — FAMOTIDINE 20 MG: 20 TABLET ORAL at 20:55

## 2023-11-20 RX ADMIN — LINACLOTIDE 145 MCG: 145 CAPSULE, GELATIN COATED ORAL at 05:49

## 2023-11-20 RX ADMIN — PREGABALIN 150 MG: 150 CAPSULE ORAL at 20:55

## 2023-11-20 RX ADMIN — CYANOCOBALAMIN TAB 1000 MCG 1000 MCG: 1000 TAB at 09:35

## 2023-11-20 RX ADMIN — SODIUM CHLORIDE: 9 INJECTION, SOLUTION INTRAVENOUS at 17:37

## 2023-11-20 RX ADMIN — FAMOTIDINE 20 MG: 20 TABLET ORAL at 05:49

## 2023-11-20 RX ADMIN — ENOXAPARIN SODIUM 30 MG: 100 INJECTION SUBCUTANEOUS at 20:56

## 2023-11-20 RX ADMIN — Medication 200 MG: at 21:00

## 2023-11-20 RX ADMIN — CALCIUM CARBONATE 600 MG (1,500 MG)-VITAMIN D3 400 UNIT TABLET 1 TABLET: at 20:55

## 2023-11-20 RX ADMIN — VENLAFAXINE HYDROCHLORIDE 37.5 MG: 37.5 CAPSULE, EXTENDED RELEASE ORAL at 09:35

## 2023-11-20 RX ADMIN — POTASSIUM CHLORIDE 20 MEQ: 1500 TABLET, EXTENDED RELEASE ORAL at 09:28

## 2023-11-20 RX ADMIN — LOSARTAN POTASSIUM 50 MG: 50 TABLET, FILM COATED ORAL at 09:27

## 2023-11-20 RX ADMIN — GLIPIZIDE 10 MG: 5 TABLET ORAL at 16:06

## 2023-11-20 RX ADMIN — OXYCODONE HYDROCHLORIDE 30 MG: 10 TABLET ORAL at 16:06

## 2023-11-20 RX ADMIN — ALOGLIPTIN 12.5 MG: 12.5 TABLET, FILM COATED ORAL at 09:28

## 2023-11-20 RX ADMIN — AMLODIPINE BESYLATE 5 MG: 5 TABLET ORAL at 09:27

## 2023-11-20 RX ADMIN — CARVEDILOL 12.5 MG: 12.5 TABLET, FILM COATED ORAL at 07:46

## 2023-11-20 RX ADMIN — POTASSIUM CHLORIDE 20 MEQ: 1500 TABLET, EXTENDED RELEASE ORAL at 20:55

## 2023-11-20 RX ADMIN — PREGABALIN 150 MG: 150 CAPSULE ORAL at 09:35

## 2023-11-20 RX ADMIN — OXYCODONE HYDROCHLORIDE 30 MG: 10 TABLET ORAL at 04:36

## 2023-11-20 ASSESSMENT — PAIN SCALES - GENERAL
PAINLEVEL_OUTOF10: 10
PAINLEVEL_OUTOF10: 7
PAINLEVEL_OUTOF10: 0

## 2023-11-20 ASSESSMENT — PAIN DESCRIPTION - LOCATION: LOCATION: GENERALIZED

## 2023-11-20 ASSESSMENT — PAIN DESCRIPTION - DESCRIPTORS: DESCRIPTORS: ACHING

## 2023-11-20 NOTE — PLAN OF CARE
Problem: Discharge Planning  Goal: Discharge to home or other facility with appropriate resources  11/20/2023 1511 by Shavon Amezcua RN  Outcome: Progressing     Problem: Pain  Goal: Verbalizes/displays adequate comfort level or baseline comfort level  11/20/2023 1511 by Shavon Amezcua RN  Outcome: Progressing     Problem: Safety - Adult  Goal: Free from fall injury  11/20/2023 1511 by Shavon Amezcua RN  Outcome: Progressing     Problem: ABCDS Injury Assessment  Goal: Absence of physical injury  11/20/2023 1511 by Shavon Amezcua RN  Outcome: Progressing     Problem: Chronic Conditions and Co-morbidities  Goal: Patient's chronic conditions and co-morbidity symptoms are monitored and maintained or improved  11/20/2023 1511 by Shavon Amezcua RN  Outcome: Progressing     Problem: Skin/Tissue Integrity  Goal: Absence of new skin breakdown  Description: 1. Monitor for areas of redness and/or skin breakdown  2. Assess vascular access sites hourly  3. Every 4-6 hours minimum:  Change oxygen saturation probe site  4. Every 4-6 hours:  If on nasal continuous positive airway pressure, respiratory therapy assess nares and determine need for appliance change or resting period.   11/20/2023 1511 by Shavon Amezcua RN  Outcome: Progressing

## 2023-11-20 NOTE — CONSULTS
failure with hypoxia ([address]) J96.11         Measurements:  Wound 11/19/23 Arm Right;Upper (Active)   Wound Image   11/20/23 1123   Wound Etiology Traumatic 11/20/23 1123   Dressing Status Old drainage noted;New dressing applied 11/20/23 1123   Wound Cleansed Cleansed with saline 11/20/23 1123   Dressing/Treatment Petroleum gauze; Foam 11/20/23 1123   Wound Length (cm) 9 cm 11/20/23 1123   Wound Width (cm) 13 cm 11/20/23 1123   Wound Depth (cm) 0.1 cm 11/20/23 1123   Wound Surface Area (cm^2) 117 cm^2 11/20/23 1123   Wound Volume (cm^3) 11.7 cm^3 11/20/23 1123   Wound Assessment Bleeding;Pink/red 11/20/23 1123   Drainage Amount Moderate (25-50%) 11/20/23 1123   Drainage Description Serosanguinous 11/20/23 1123   Odor None 11/20/23 1123   Shantel-wound Assessment Blanchable erythema;Fragile 11/20/23 1123   Margins Defined edges 11/20/23 1123   Number of days: 1       Wound 11/19/23 Abdomen Lower;Right (Active)   Wound Image   11/20/23 1123   Wound Etiology Traumatic 11/20/23 1123   Dressing Status Old drainage noted;New dressing applied 11/20/23 1123   Wound Cleansed Cleansed with saline 11/20/23 1123   Dressing/Treatment Petroleum gauze; Foam 11/20/23 1123   Wound Length (cm) 6 cm 11/20/23 1123   Wound Width (cm) 12.5 cm 11/20/23 1123   Wound Depth (cm) 0.1 cm 11/20/23 1123   Wound Surface Area (cm^2) 75 cm^2 11/20/23 1123   Wound Volume (cm^3) 7.5 cm^3 11/20/23 1123   Wound Assessment Herrin/red;Superficial 11/20/23 1123   Drainage Amount Small (< 25%) 11/20/23 1123   Drainage Description Serosanguinous 11/20/23 1123   Odor None 11/20/23 1123   Shantel-wound Assessment Blanchable erythema;Erosion;Fragile 11/20/23 1123   Margins Defined edges 11/20/23 1123   Number of days: 1             WOUND ASSESSMENT:   [facility] nurse consult noted for wounds to right arm and right abdomen. Pt was admitted on 11/18 after a fall in her shower at home. She was not able to get up until her  found her and helped her up.  She was noted to have Plan:  Placement for patient upon discharge: home with support   Patient appropriate for Outpatient [address]: N/A    Patient/Caregiver Teaching:  Level of patientunderstanding able to:     [x] Indicates understanding       [] Needs reinforcement  [] Unsuccessful      [x] Verbal Understanding  [] Demonstrated understanding       [] No evidence of learning  [] Refused teaching         [] N/A       Electronically signed by Marty Mena RN on  11/20/2023 at 12:57 PM

## 2023-11-20 NOTE — PROGRESS NOTES
NEPHROLOGY CONSULT       Reason for Consult: Rhabdomyolysis and renal insufficiency      Chief Complaint: Fall  History Obtained From: Patient and EHR records    History of Present Illness: This is a 79 y.o. female This is a 79 y.o. female  with a significant past medical history of  fibromyalgia, type 2 diabetes mellitus, obstructive sleep apnea, obesity, systemic hypertension and chronic kidney disease stage IIIb [baseline serum creatinine 1.3 to 1.5 mg/dL], who presented to ER after a fall at home. She reports cleaning her shower when she stepped over into the shower with 1 leg and fell flat on her face. She denied any loss of consciousness. She was unable to get off the floor and  lay on the floor for approximately 5 hours as she was unable to get any help or assistance. She denied any of consciousness. Complains of pain in the right shoulder after previous fall- she does have prior history of fall on an admission October 2023 associated with acute kidney injury. Has shoulder and hip abrasions which are not new. Upon presentation to [facility], she had a CPK level of 2978 with creatinine of 1.6 mg/dl BUN 33. White count was 14.5. She had been taking Lasix 40 mg daily at home. Multiple x-rays performed did  not show any acute fracture or dislocation. Head CT scan was negative for acute intracranial abnormality.     Past Medical History:        Diagnosis Date    Acute hypoxemic respiratory failure (HCC)     Arthritis     CHF (congestive heart failure) (HCC)     Chronic back pain     Diabetes mellitus type II, controlled ([address]) 2/14/2012    Fibromyalgia     Hyperlipidemia LDL goal < 70 2/14/2012    Hypertension, benign 02/14/2012    Morbid obesity with BMI of 60.0-69.9, adult ([address]) 8/28/2015    Pain of toe of right foot     morgans cyst    Right wrist pain     rate 8    Skin cancer     skin under lt eye,face    Sleep apnea     Bipap does not work    Wears glasses        Past Surgical

## 2023-11-20 NOTE — PROGRESS NOTES
Physical Therapy  Facility/Department: [facility] MED SURG  Physical Therapy Initial Assessment    Name: Des Day  : 1955  MRN: [id number]  Date of Service: 2023    Discharge Recommendations:  Patient would benefit from continued therapy after discharge, Therapy recommended at discharge          Patient Diagnosis(es): The primary encounter diagnosis was MAMTA (acute kidney injury) ([address]). Diagnoses of Elevated CK and Fall, initial encounter were also pertinent to this visit. Past Medical History:  has a past medical history of Acute hypoxemic respiratory failure ([address]), Arthritis, CHF (congestive heart failure) ([address]), Chronic back pain, Diabetes mellitus type II, controlled ([address]), Fibromyalgia, Hyperlipidemia LDL goal < 70, Hypertension, benign, Morbid obesity with BMI of 60.0-69.9, adult ([address]), Pain of toe of right foot, Right wrist pain, Skin cancer, Sleep apnea, and Wears glasses. Past Surgical History:  has a past surgical history that includes Tubal ligation (); Hysterectomy (); lumbar fusion (10/2010; 2011); Carpal tunnel release (Right, 2014); cyst removal (); Cholecystectomy, laparoscopic (); lumbar fusion (); Rotator cuff repair (Left, ); Cataract removal with implant (Bilateral, ); Skin cancer excision (); Inguinal hernia repair (Bilateral, 2017); other surgical history (2018); Upper gastrointestinal endoscopy (N/A, 10/16/2019); and Colonoscopy (N/A, 10/17/2019). Assessment   Assessment: Pt with muliple falls in the recent year. Pt requires assistnace for safe mobility at this time, required min a for safe mobility, pt is a  high fall risk pt at his time. Will benefit from skilled physical therapy to owrk on strengthening and balance to reduce fall risk.   Treatment Diagnosis: Difficulty walking  Therapy Prognosis: Fair  Decision Making: Medium Complexity  History: Hx o fmultiple falls  Barriers to Learning: Decreased insight into and is in good health, able to assist as needed. Vision/Hearing  Vision  Vision: Impaired  Vision Exceptions: Wears glasses for reading  Hearing  Hearing: Within functional limits    Cognition   Orientation  Overall Orientation Status: Within Functional Limits  Cognition  Overall Cognitive Status: Exceptions  Arousal/Alertness: Appropriate responses to stimuli  Following Commands: Follows multistep commands with repitition; Follows multistep commands with increased time  Attention Span: Attends with cues to redirect  Memory: Appears intact  Safety Judgement: Decreased awareness of need for assistance;Decreased awareness of need for safety  Problem Solving: Assistance required to generate solutions;Assistance required to implement solutions;Assistance required to identify errors made;Assistance required to correct errors made  Insights: Decreased awareness of deficits  Initiation: Requires cues for some  Sequencing: Requires cues for some     Objective   Pulse: 58  BP: (!) 149/56  MAP (Calculated): 87  Respirations: 18  SpO2: 99 %  O2 Device: Nasal cannula  Comment: 2.5 lt o2     Observation/Palpation  Observation: dressing applied to R shoulder        AROM RLE (degrees)  RLE AROM: WFL  AROM LLE (degrees)  LLE AROM : WFL  Strength RLE  Comment: Hip flexion < 3/5, knee 3+/5, ankle 3/5  Strength LLE  Comment: Hip flexion < 3/5, Knee 3+/5, ankle 3/5     Sensation  Overall Sensation Status: Impaired (numbness in B feet)     Bed mobility  Supine to Sit: Minimal assistance (hand held assistance for trunk progression; head of bed elevated)  Sit to Supine: Unable to assess (pt retired in bedside chair)  Scooting: Contact guard assistance  Transfers  Sit to Stand: Minimal Assistance  Stand to Sit: Minimal Assistance  Comment: RW used for mobility, needs verbal cues for safe use of rolling walker  Ambulation  Surface: Level tile  Device: Rolling Walker (Bariatric)  Other Apparatus: O2 (2.5 lt)  Assistance: Contact guard

## 2023-11-20 NOTE — PLAN OF CARE
Problem: Discharge Planning  Goal: Discharge to home or other facility with appropriate resources  Outcome: Progressing  Flowsheets (Taken 11/19/2023 0730 by Godfrey Camargo, RN)  Discharge to home or other facility with appropriate resources:   Identify barriers to discharge with patient and caregiver   Arrange for needed discharge resources and transportation as appropriate   Identify discharge learning needs (meds, wound care, etc)     Problem: Pain  Goal: Verbalizes/displays adequate comfort level or baseline comfort level  Outcome: Progressing  Flowsheets (Taken 11/20/2023 0212)  Verbalizes/displays adequate comfort level or baseline comfort level:   Encourage patient to monitor pain and request assistance   Assess pain using appropriate pain scale     Problem: Safety - Adult  Goal: Free from fall injury  Outcome: Progressing  Flowsheets (Taken 11/19/2023 0730 by Godfrey Camargo, RN)  Free From Fall Injury: Instruct family/caregiver on patient safety     Problem: ABCDS Injury Assessment  Goal: Absence of physical injury  Outcome: Progressing  Flowsheets (Taken 11/19/2023 0730 by Godfrey Camargo, RN)  Absence of Physical Injury: Implement safety measures based on patient assessment     Problem: Chronic Conditions and Co-morbidities  Goal: Patient's chronic conditions and co-morbidity symptoms are monitored and maintained or improved  Outcome: Progressing  Flowsheets (Taken 11/19/2023 0730 by Godfrey Camargo, RN)  Care Plan - Patient's Chronic Conditions and Co-Morbidity Symptoms are Monitored and Maintained or Improved: Monitor and assess patient's chronic conditions and comorbid symptoms for stability, deterioration, or improvement     Problem: Skin/Tissue Integrity  Goal: Absence of new skin breakdown  Description: 1. Monitor for areas of redness and/or skin breakdown  2. Assess vascular access sites hourly  3. Every 4-6 hours minimum:  Change oxygen saturation probe site  4.   Every 4-6 hours:  If on nasal continuous

## 2023-11-20 NOTE — CARE COORDINATION
ONGOING DISCHARGE PLAN:    Patient is alert and oriented x4. Spoke with patient regarding discharge plan and patient confirms that plan is still to return to home w/ Spouse. Current w/ [facility], [facility], will continue. Pt. Wants information on Life Alert System, This has been added [address] CR today 1.1,  Total CK today,1255, from, 2270, Nephro consult. WBC today 12.2. PT/OT on board. Following for any rec/needs. Will continue to follow for additional discharge needs. If patient is discharged prior to next notation, then this note serves as note for discharge by case management.     Electronically signed by Tian Chester RN on 11/20/2023 at 10:30 AM

## 2023-11-20 NOTE — PROGRESS NOTES
Notified Dr. Abebe Going that patient has been complaining of severe pain everywhere 10/10 , there are no pain medications due at this time. Dr. Abebe Going ordered to increase oxycodone dose to T. I.D, instead of B. I.D and give dose now.

## 2023-11-20 NOTE — CARE COORDINATION
Case Management Assessment  Initial Evaluation    Date/Time of Evaluation: 11/19/2023 8:10 PM  Assessment Completed by: Stewart Hopper RN    If patient is discharged prior to next notation, then this note serves as note for discharge by case management. Patient Name: Artice Carrel                   YOB: 1955  Diagnosis: Rhabdomyolysis [M62.82]  Elevated CK [R74.8]  MAMTA (acute kidney injury) ([address]) [N17.9]  Fall, initial encounter [W19. XXXA]                   Date / Time: 11/18/2023  8:40 PM    Patient Admission Status: Inpatient   Readmission Risk (Low < 19, Mod (19-27), High > 27): Readmission Risk Score: 32.8    Current PCP: Juarez Davis MD  PCP verified by CM? Yes    Chart Reviewed: Yes      History Provided by: Patient, Medical Record  Patient Orientation: Alert and Oriented    Patient Cognition: Alert    Hospitalization in the last 30 days (Readmission):  No    If yes, Readmission Assessment in CM Navigator will be completed. Advance Directives:      Code Status: Full Code   Patient's Primary Decision Maker is: Legal Next of Kin    Primary Decision MakerReijeoma Munoz Spouse - [phone]    Secondary Decision Maker: Radha Rios  Child - [phone]    Discharge Planning:    Patient lives with: Spouse/Significant Other Type of Home: House  Primary Care Giver: Self  Patient Support Systems include: Spouse/Significant Other, Children   Current Financial resources: Medicare  Current community resources: ECF/Home Care ([facility]-current)  Current services prior to admission: Durable Medical Equipment, Oxygen Therapy, Home Care            Current DME: Shower Chair, Glucometer, Walker, Wheelchair, Home Aerosol            Type of Home Care services:  Nursing Services, PT, [place]    ADLS  Prior functional level:  Independent in ADLs/IADLs, Assistance with the following:, Housework, Cooking, Shopping  Current functional level:      PT AM-PAC:   /24  OT AM-PAC:   /24    Family can

## 2023-11-20 NOTE — PROGRESS NOTES
[facility]   Occupational Therapy Evaluation  Date: 23  Patient Name: Meredith Ambriz       Room:   MRN: [id number]  Account: [de-identified]   : 1955  (70 y.o.) Gender: female     Discharge Recommendations:  Further Occupational Therapy is recommended upon facility discharge. OT Equipment Recommendations  Other: TBD    Referring Practitioner: Rosey Peoples MD  Diagnosis: Rhabdomyolysis Additional Pertinent Hx: Meredith Ambriz is a 79 y.o. female who presents with Hip Pain, Arm Pain, and Fall     Slipped and fell in bathtub this morning     Down for 4-5 hours so could not get up     Was able to eventually get up and bear weight     Complaining mostly of right arm pain    Treatment Diagnosis: impaired self care status    Past Medical History:  has a past medical history of Acute hypoxemic respiratory failure ([address]), Arthritis, CHF (congestive heart failure) ([address]), Chronic back pain, Diabetes mellitus type II, controlled ([address]), Fibromyalgia, Hyperlipidemia LDL goal < 70, Hypertension, benign, Morbid obesity with BMI of 60.0-69.9, adult ([address]), Pain of toe of right foot, Right wrist pain, Skin cancer, Sleep apnea, and Wears glasses. Past Surgical History:   has a past surgical history that includes Tubal ligation (); Hysterectomy (); lumbar fusion (10/2010; 2011); Carpal tunnel release (Right, 2014); cyst removal (); Cholecystectomy, laparoscopic (); lumbar fusion (); Rotator cuff repair (Left, ); Cataract removal with implant (Bilateral, ); Skin cancer excision (); Inguinal hernia repair (Bilateral, 2017); other surgical history (2018); Upper gastrointestinal endoscopy (N/A, 10/16/2019); and Colonoscopy (N/A, 10/17/2019). Restrictions  Restrictions/Precautions  Restrictions/Precautions: Fall Risk;General Precautions; Up as Tolerated  Required Braces or Orthoses?: No  Implants present? : Decreased functional mobility , Decreased ADL status, Decreased ROM, Decreased strength, Decreased safe awareness, Decreased endurance, Decreased balance, Decreased high-level IADLs  Treatment Diagnosis: impaired self care status  Prognosis: Good  Decision Making: Medium Complexity  Discharge Recommendations: Patient would benefit from continued therapy after discharge    Activity Tolerance  Activity Tolerance: Patient Tolerated treatment well, Patient limited by pain, Patient limited by fatigue  Activity Tolerance Comments: pt limited by pain in R shoulder from fall    Safety Devices  Type of Devices: Nurse notified, Left in chair, Call light within reach, Heels elevated for pressure relief    Patient Education  Patient Education  Education Given To: Patient  Education Provided: Role of Therapy, Plan of Care, Transfer Training  Education Provided Comments: safety/use of RW  Education Method: Demonstration, Verbal  Barriers to Learning: Cognition  Education Outcome: Verbalized understanding, Continued education needed      Functional Outcome Measures  AM-PAC Daily Activity - Inpatient   How much help is needed for putting on and taking off regular lower body clothing?: A Lot  How much help is needed for bathing (which includes washing, rinsing, drying)?: A Lot  How much help is needed for toileting (which includes using toilet, bedpan, or urinal)?: A Lot  How much help is needed for putting on and taking off regular upper body clothing?: A Little  How much help is needed for taking care of personal grooming?: A Little  How much help for eating meals?: A Little  AM-[facility] Inpatient Daily Activity Raw Score: 15  AM-PAC Inpatient ADL T-Scale Score : 34.69  ADL Inpatient CMS 0-100% Score: 56.46  ADL Inpatient CMS G-Code Modifier : CK       Goals     Short Term Goals  Time Frame for Short Term Goals: By discharge, pt will  Short Term Goal 1: perform functional transfers/mobility during self care tasks with SUP, least

## 2023-11-21 LAB
ANION GAP SERPL CALCULATED.3IONS-SCNC: 6 MMOL/L (ref 9–17)
BASOPHILS # BLD: 0.1 K/UL (ref 0–0.2)
BASOPHILS NFR BLD: 1 % (ref 0–2)
BUN SERPL-MCNC: 21 MG/DL (ref 8–23)
CALCIUM SERPL-MCNC: 9.4 MG/DL (ref 8.6–10.4)
CHLORIDE SERPL-SCNC: 103 MMOL/L (ref 98–107)
CK SERPL-CCNC: 595 U/L (ref 26–192)
CO2 SERPL-SCNC: 28 MMOL/L (ref 20–31)
CREAT SERPL-MCNC: 1.3 MG/DL (ref 0.5–0.9)
EOSINOPHIL # BLD: 0.9 K/UL (ref 0–0.4)
EOSINOPHILS RELATIVE PERCENT: 7 % (ref 0–4)
ERYTHROCYTE [DISTWIDTH] IN BLOOD BY AUTOMATED COUNT: 19.3 % (ref 11.5–14.9)
GFR SERPL CREATININE-BSD FRML MDRD: 45 ML/MIN/1.73M2
GLUCOSE BLD-MCNC: 113 MG/DL (ref 65–105)
GLUCOSE BLD-MCNC: 123 MG/DL (ref 65–105)
GLUCOSE BLD-MCNC: 130 MG/DL (ref 65–105)
GLUCOSE BLD-MCNC: 133 MG/DL (ref 65–105)
GLUCOSE BLD-MCNC: 144 MG/DL (ref 65–105)
GLUCOSE BLD-MCNC: 146 MG/DL (ref 65–105)
GLUCOSE BLD-MCNC: 184 MG/DL (ref 65–105)
GLUCOSE SERPL-MCNC: 124 MG/DL (ref 70–99)
HCT VFR BLD AUTO: 31.5 % (ref 36–46)
HGB BLD-MCNC: 9.6 G/DL (ref 12–16)
LYMPHOCYTES NFR BLD: 1.4 K/UL (ref 1–4.8)
LYMPHOCYTES RELATIVE PERCENT: 11 % (ref 24–44)
MCH RBC QN AUTO: 27 PG (ref 26–34)
MCHC RBC AUTO-ENTMCNC: 30.4 G/DL (ref 31–37)
MCV RBC AUTO: 88.9 FL (ref 80–100)
MONOCYTES NFR BLD: 0.8 K/UL (ref 0.1–1.3)
MONOCYTES NFR BLD: 6 % (ref 1–7)
MYOGLOBIN SERPL-MCNC: 66 NG/ML (ref 25–58)
NEUTROPHILS NFR BLD: 75 % (ref 36–66)
NEUTS SEG NFR BLD: 9.6 K/UL (ref 1.3–9.1)
PLATELET # BLD AUTO: 313 K/UL (ref 150–450)
PMV BLD AUTO: 8.5 FL (ref 6–12)
POTASSIUM SERPL-SCNC: 5.2 MMOL/L (ref 3.7–5.3)
POTASSIUM SERPL-SCNC: 5.9 MMOL/L (ref 3.7–5.3)
POTASSIUM SERPL-SCNC: 6 MMOL/L (ref 3.7–5.3)
RBC # BLD AUTO: 3.54 M/UL (ref 4–5.2)
SODIUM SERPL-SCNC: 137 MMOL/L (ref 135–144)
WBC OTHER # BLD: 12.7 K/UL (ref 3.5–11)

## 2023-11-21 PROCEDURE — 2580000003 HC RX 258: Performed by: FAMILY MEDICINE

## 2023-11-21 PROCEDURE — 84132 ASSAY OF SERUM POTASSIUM: CPT

## 2023-11-21 PROCEDURE — 99232 SBSQ HOSP IP/OBS MODERATE 35: CPT | Performed by: FAMILY MEDICINE

## 2023-11-21 PROCEDURE — 82947 ASSAY GLUCOSE BLOOD QUANT: CPT

## 2023-11-21 PROCEDURE — 1200000000 HC SEMI PRIVATE

## 2023-11-21 PROCEDURE — 6370000000 HC RX 637 (ALT 250 FOR IP): Performed by: FAMILY MEDICINE

## 2023-11-21 PROCEDURE — 80048 BASIC METABOLIC PNL TOTAL CA: CPT

## 2023-11-21 PROCEDURE — 6370000000 HC RX 637 (ALT 250 FOR IP): Performed by: INTERNAL MEDICINE

## 2023-11-21 PROCEDURE — 6360000002 HC RX W HCPCS: Performed by: FAMILY MEDICINE

## 2023-11-21 PROCEDURE — 85025 COMPLETE CBC W/AUTO DIFF WBC: CPT

## 2023-11-21 PROCEDURE — 36415 COLL VENOUS BLD VENIPUNCTURE: CPT

## 2023-11-21 PROCEDURE — 82550 ASSAY OF CK (CPK): CPT

## 2023-11-21 PROCEDURE — 83874 ASSAY OF MYOGLOBIN: CPT

## 2023-11-21 PROCEDURE — 97116 GAIT TRAINING THERAPY: CPT

## 2023-11-21 PROCEDURE — 97530 THERAPEUTIC ACTIVITIES: CPT

## 2023-11-21 RX ORDER — FAMOTIDINE 20 MG/1
20 TABLET, FILM COATED ORAL DAILY
Status: DISCONTINUED | OUTPATIENT
Start: 2023-11-22 | End: 2023-11-23 | Stop reason: HOSPADM

## 2023-11-21 RX ORDER — SODIUM CHLORIDE 9 MG/ML
INJECTION, SOLUTION INTRAVENOUS CONTINUOUS
Status: DISCONTINUED | OUTPATIENT
Start: 2023-11-21 | End: 2023-11-21

## 2023-11-21 RX ADMIN — CARVEDILOL 12.5 MG: 12.5 TABLET, FILM COATED ORAL at 17:21

## 2023-11-21 RX ADMIN — LOSARTAN POTASSIUM 50 MG: 50 TABLET, FILM COATED ORAL at 09:16

## 2023-11-21 RX ADMIN — GLIPIZIDE 10 MG: 5 TABLET ORAL at 17:21

## 2023-11-21 RX ADMIN — Medication 1 TABLET: at 09:16

## 2023-11-21 RX ADMIN — CALCIUM CARBONATE 600 MG (1,500 MG)-VITAMIN D3 400 UNIT TABLET 1 TABLET: at 09:16

## 2023-11-21 RX ADMIN — LINACLOTIDE 145 MCG: 145 CAPSULE, GELATIN COATED ORAL at 05:58

## 2023-11-21 RX ADMIN — PREGABALIN 150 MG: 150 CAPSULE ORAL at 09:16

## 2023-11-21 RX ADMIN — CALCIUM CARBONATE 600 MG (1,500 MG)-VITAMIN D3 400 UNIT TABLET 1 TABLET: at 21:00

## 2023-11-21 RX ADMIN — SODIUM CHLORIDE: 9 INJECTION, SOLUTION INTRAVENOUS at 09:15

## 2023-11-21 RX ADMIN — SODIUM CHLORIDE: 9 INJECTION, SOLUTION INTRAVENOUS at 10:00

## 2023-11-21 RX ADMIN — AMLODIPINE BESYLATE 5 MG: 5 TABLET ORAL at 09:16

## 2023-11-21 RX ADMIN — OXYCODONE AND ACETAMINOPHEN 1 TABLET: 5; 325 TABLET ORAL at 05:43

## 2023-11-21 RX ADMIN — ENOXAPARIN SODIUM 30 MG: 100 INJECTION SUBCUTANEOUS at 09:22

## 2023-11-21 RX ADMIN — ONDANSETRON 4 MG: 2 INJECTION INTRAMUSCULAR; INTRAVENOUS at 05:14

## 2023-11-21 RX ADMIN — DEXTROSE MONOHYDRATE 250 ML: 100 INJECTION, SOLUTION INTRAVENOUS at 09:36

## 2023-11-21 RX ADMIN — FAMOTIDINE 20 MG: 20 TABLET ORAL at 05:43

## 2023-11-21 RX ADMIN — SODIUM CHLORIDE, PRESERVATIVE FREE 10 ML: 5 INJECTION INTRAVENOUS at 09:13

## 2023-11-21 RX ADMIN — Medication 200 MG: at 21:02

## 2023-11-21 RX ADMIN — OXYCODONE HYDROCHLORIDE 30 MG: 10 TABLET ORAL at 21:00

## 2023-11-21 RX ADMIN — CARVEDILOL 12.5 MG: 12.5 TABLET, FILM COATED ORAL at 09:16

## 2023-11-21 RX ADMIN — PREGABALIN 150 MG: 150 CAPSULE ORAL at 21:00

## 2023-11-21 RX ADMIN — ENOXAPARIN SODIUM 30 MG: 100 INJECTION SUBCUTANEOUS at 21:01

## 2023-11-21 RX ADMIN — OXYCODONE HYDROCHLORIDE 30 MG: 10 TABLET ORAL at 14:58

## 2023-11-21 RX ADMIN — SODIUM ZIRCONIUM CYCLOSILICATE 5 G: 5 POWDER, FOR SUSPENSION ORAL at 17:22

## 2023-11-21 RX ADMIN — OXYCODONE HYDROCHLORIDE 30 MG: 10 TABLET ORAL at 09:16

## 2023-11-21 RX ADMIN — SODIUM CHLORIDE, PRESERVATIVE FREE 10 ML: 5 INJECTION INTRAVENOUS at 21:05

## 2023-11-21 RX ADMIN — ALOGLIPTIN 12.5 MG: 12.5 TABLET, FILM COATED ORAL at 09:15

## 2023-11-21 RX ADMIN — CYANOCOBALAMIN TAB 1000 MCG 1000 MCG: 1000 TAB at 09:15

## 2023-11-21 RX ADMIN — INSULIN HUMAN 10 UNITS: 100 INJECTION, SOLUTION PARENTERAL at 09:37

## 2023-11-21 RX ADMIN — GLIPIZIDE 10 MG: 5 TABLET ORAL at 05:44

## 2023-11-21 RX ADMIN — VENLAFAXINE HYDROCHLORIDE 37.5 MG: 37.5 CAPSULE, EXTENDED RELEASE ORAL at 09:20

## 2023-11-21 ASSESSMENT — PAIN SCALES - GENERAL
PAINLEVEL_OUTOF10: 7
PAINLEVEL_OUTOF10: 10
PAINLEVEL_OUTOF10: 4

## 2023-11-21 ASSESSMENT — PAIN DESCRIPTION - DESCRIPTORS: DESCRIPTORS: ACHING;THROBBING

## 2023-11-21 NOTE — CARE COORDINATION
ONGOING DISCHARGE PLAN:    Patient is alert and oriented x4. Spoke with patient regarding discharge plan and patient confirms that plan is still to return home with spouse. Discussed with the patient the PT/OT recommendations of skilled nursing facility, however the patient declines because of the upcoming holidays. DME: Wheelchair, SC, walker, glucometer, O2 at 2.5 L NC 24/7 ([employer]). VNS: [facility] current.  today. Nephrology following. Will continue to follow for additional discharge needs. If patient is discharged prior to next notation, then this note serves as note for discharge by case management.     Electronically signed by Vicky Mccurdy RN on 11/21/2023 at 11:53 AM

## 2023-11-21 NOTE — PROGRESS NOTES
[facility]   OCCUPATIONAL THERAPY MISSED TREATMENT NOTE   INPATIENT   Date: 23  Patient Name: Ewa Blake       Room:   MRN: [id number]   Account #: [de-identified]    : 1955  (79 y.o.)  Gender: female   Referring Practitioner: Fam Wagoner MD  Diagnosis: Rhabdomyolysis       REASON FOR MISSED TREATMENT:  Patient declined   -   attempt at 56, pt reports \"I just want to rest, and I need to keep my legs elevated because they are so swollen\" writer educates pt on importance of participating in therapy and that movement/out of bed activity helps with decreasing edema in BLEs. Pt continues to decline at this time and requests writer to re-attempt in PM. OT will re-attempt if time permits in PM.    Re-attempted at [id number], pt sleeping soundly and audibly snoring. Writer awakens pt and pt reports \"not today, I need to sleep\". OT will re-attempt tomorrow.       Electronically signed by CHARLY Walker on 23 at 11:09 AM EST

## 2023-11-21 NOTE — PROGRESS NOTES
Physical Therapy  Facility/Department: [facility] MED SURG  Rehabilitation Physical Therapy     NAME: Travis Ya  : 1955 (79 y.o.)  MRN: [id number]  CODE STATUS: Full Code    Date of Service: 23      Past Medical History:   Diagnosis Date    Acute hypoxemic respiratory failure (HCC)     Arthritis     CHF (congestive heart failure) (HCC)     Chronic back pain     Diabetes mellitus type II, controlled ([address]) 2012    Fibromyalgia     Hyperlipidemia LDL goal < 70 2012    Hypertension, benign 2012    Morbid obesity with BMI of 60.0-69.9, adult ([address]) 2015    Pain of toe of right foot     morgans cyst    Right wrist pain     rate 8    Skin cancer     skin under lt eye,face    Sleep apnea     Bipap does not work    Wears glasses      Past Surgical History:   Procedure Laterality Date    CARPAL TUNNEL RELEASE Right 2014    CATARACT REMOVAL WITH IMPLANT Bilateral     CHOLECYSTECTOMY, LAPAROSCOPIC  1998    COLONOSCOPY N/A 10/17/2019    COLONOSCOPY DIAGNOSTIC performed by Aniyah Wallis MD at  [address] cyst from base of tail bone    HYSTERECTOMY (CERVIX STATUS UNKNOWN)  [address] Bilateral 2017    abcess removed    LUMBAR FUSION  10/2010; 2011    L4/L5    LUMBAR FUSION  2009    L4-S1    OTHER SURGICAL HISTORY  2018    Lumbar decompression laminectomy at L3-L4 bilaterally wiith complete facetomies at L3-L4 bilaterally;  diskectomy L3-L4: posterior lumbar interbody fusion; placement of Wray-type graft; local harvest of bone; microsurgical dissection. ROTATOR CUFF REPAIR Left 2012    SKIN CANCER EXCISION      Basal Cell Carcinoma, Curly size from Lt. face    TUBAL LIGATION      UPPER GASTROINTESTINAL ENDOSCOPY N/A 10/16/2019    EGD BIOPSY performed by Aniyah Wallis MD at [facility]       Patient assessed for rehabilitation services?: Yes  Additional Pertinent Hx: This is a 79 y.o. female This is a 79 y.o. female  with a significant past medical history of  fibromyalgia, type 2 diabetes mellitus, obstructive sleep apnea, obesity, systemic hypertension and chronic kidney disease stage IIIb (baseline serum creatinine 1.3 to 1.5 mg/dL), who presented to ER after a fall at home. She reports cleaning her shower when she stepped over into the shower with 1 leg and fell flat on her face. She denied any loss of consciousness. She was unable to get off the floor and  lay on the floor for approximately 5 hours as she was unable to get any help or assistance. She denied any of consciousness. Complains of pain in the right shoulder after previous fall- she does have prior history of fall on an admission October 2023 associated with acute kidney injury. Has shoulder and hip abrasions which are not new. Upon presentation to [facility], she had a CPK level of 2978 with creatinine of 1.6 mg/dl BUN 33. White count was 14.5. She had been taking Lasix 40 mg daily at home. Multiple x-rays performed did  not show any acute fracture or dislocation. Head CT scan was negative for acute intracranial abnormality. Family / Caregiver Present: No  Referral Date : 11/19/23  Diagnosis: Rhabdomyolosis  General Comment  Comments: Pt agreeable to participate and pleasant/cooperative throughout    Restrictions:  Restrictions/Precautions: Fall Risk;General Precautions; Up as Tolerated     SUBJECTIVE  Subjective: Patient reports  she gets up in room frequently to use bathroom or get up to sit in recliner. Patient stated not up to increasing walk in hallway today. OBJECTIVE                 Functional Mobility  Bed mobility  Supine to Sit: Minimal assistance (assisted trunk progression)  Sit to Supine: Moderate assistance (assisted BLE into bed)  Bed Mobility Comments: patient reporting back pain limiting her mobility and lifting legs into bed.   Transfers  Sit to Stand: Stand by assistance  Stand to Sit: Stand by

## 2023-11-21 NOTE — PLAN OF CARE
Problem: Discharge Planning  Goal: Discharge to home or other facility with appropriate resources  11/21/2023 0458 by Gigi Solis RN  Outcome: Progressing  Flowsheets (Taken 11/20/2023 2000)  Discharge to home or other facility with appropriate resources: Identify barriers to discharge with patient and caregiver     Problem: Pain  Goal: Verbalizes/displays adequate comfort level or baseline comfort level  11/21/2023 0458 by Gigi Solis RN  Outcome: Progressing     Problem: Safety - Adult  Goal: Free from fall injury  11/21/2023 0458 by Gigi Solis RN  Outcome: Progressing  Flowsheets (Taken 11/20/2023 2132)  Free From Fall Injury: Instruct family/caregiver on patient safety     Problem: ABCDS Injury Assessment  Goal: Absence of physical injury  11/21/2023 0458 by Gigi Solis RN  Outcome: Progressing  Flowsheets (Taken 11/20/2023 2132)  Absence of Physical Injury: Implement safety measures based on patient assessment     Problem: Chronic Conditions and Co-morbidities  Goal: Patient's chronic conditions and co-morbidity symptoms are monitored and maintained or improved  11/21/2023 0458 by Gigi Solis RN  Outcome: Progressing  Flowsheets (Taken 11/20/2023 2000)  Care Plan - Patient's Chronic Conditions and Co-Morbidity Symptoms are Monitored and Maintained or Improved: Monitor and assess patient's chronic conditions and comorbid symptoms for stability, deterioration, or improvement     Problem: Skin/Tissue Integrity  Goal: Absence of new skin breakdown  Description: 1. Monitor for areas of redness and/or skin breakdown  2. Assess vascular access sites hourly  3. Every 4-6 hours minimum:  Change oxygen saturation probe site  4. Every 4-6 hours:  If on nasal continuous positive airway pressure, respiratory therapy assess nares and determine need for appliance change or resting period.   11/21/2023 0458 by Gigi Solis RN  Outcome: Progressing     Problem: Respiratory - Adult  Goal: Achieves optimal ventilation and oxygenation  Outcome: Progressing  Flowsheets (Taken 11/20/2023 2000)  Achieves optimal ventilation and oxygenation:   Assess for changes in respiratory status   Oxygen supplementation based on oxygen saturation or arterial blood gases     Problem: Skin/Tissue Integrity - Adult  Goal: Incisions, wounds, or drain sites healing without S/S of infection  Outcome: Progressing  Flowsheets (Taken 11/20/2023 2000)  Incisions, Wounds, or Drain Sites Healing Without Sign and Symptoms of Infection: TWICE DAILY: Assess and document dressing/incision, wound bed, drain sites and surrounding tissue

## 2023-11-21 NOTE — PROGRESS NOTES
Writer notified Dr. Mary Anne Crouch of critical potassium 6.0 this morning. Order received fr STAT potassium redraw.

## 2023-11-21 NOTE — PLAN OF CARE
Problem: Discharge Planning  Goal: Discharge to home or other facility with appropriate resources  11/21/2023 1530 by Sharon Jacobs RN  Outcome: Progressing     Problem: Pain  Goal: Verbalizes/displays adequate comfort level or baseline comfort level  11/21/2023 1530 by Sharon Jacobs RN  Outcome: Progressing     Problem: Safety - Adult  Goal: Free from fall injury  11/21/2023 1530 by Sharon Jacobs RN  Outcome: Progressing     Problem: ABCDS Injury Assessment  Goal: Absence of physical injury  11/21/2023 1530 by Sharon Jacobs RN  Outcome: Progressing     Problem: Chronic Conditions and Co-morbidities  Goal: Patient's chronic conditions and co-morbidity symptoms are monitored and maintained or improved  11/21/2023 1530 by Sharon Jacobs RN  Outcome: Progressing     Problem: Skin/Tissue Integrity  Goal: Absence of new skin breakdown  Description: 1. Monitor for areas of redness and/or skin breakdown  2. Assess vascular access sites hourly  3. Every 4-6 hours minimum:  Change oxygen saturation probe site  4. Every 4-6 hours:  If on nasal continuous positive airway pressure, respiratory therapy assess nares and determine need for appliance change or resting period.   11/21/2023 1530 by Sharon Jacobs RN  Outcome: Progressing     Problem: Respiratory - Adult  Goal: Achieves optimal ventilation and oxygenation  11/21/2023 1530 by Sharon Jacobs RN  Outcome: Progressing     Problem: Skin/Tissue Integrity - Adult  Goal: Incisions, wounds, or drain sites healing without S/S of infection  11/21/2023 1530 by Sharon Jacobs RN  Outcome: Progressing     Problem: Musculoskeletal - Adult  Goal: Return mobility to safest level of function  11/21/2023 1530 by Sharon Jacobs RN  Outcome: Progressing     Problem: Metabolic/Fluid and Electrolytes - Adult  Goal: Electrolytes maintained within normal limits  11/21/2023 1530 by Sharon Jacobs RN  Outcome: Progressing

## 2023-11-21 NOTE — PROGRESS NOTES
NEPHROLOGY PROGRESS NOTE      Reason for Consult: Rhabdomyolysis and renal insufficiency      Chief Complaint: Fall    Subjective/interval history. Patient seen and examined denies any new complaints no dizziness lightheadedness  Serum creatinine 1.3 mg/dL  Potassium 6.0 this morning, recheck is 5.2    History of Present Illness: This is a 79 y.o. female This is a 79 y.o. female  with a significant past medical history of  fibromyalgia, type 2 diabetes mellitus, obstructive sleep apnea, obesity, systemic hypertension and chronic kidney disease stage IIIb [baseline serum creatinine 1.3 to 1.5 mg/dL], who presented to ER after a fall at home. She reports cleaning her shower when she stepped over into the shower with 1 leg and fell flat on her face. She denied any loss of consciousness. She was unable to get off the floor and  lay on the floor for approximately 5 hours as she was unable to get any help or assistance. She denied any of consciousness. Complains of pain in the right shoulder after previous fall- she does have prior history of fall on an admission October 2023 associated with acute kidney injury. Has shoulder and hip abrasions which are not new. Upon presentation to [facility], she had a CPK level of 2978 with creatinine of 1.6 mg/dl BUN 33. White count was 14.5. She had been taking Lasix 40 mg daily at home. Multiple x-rays performed did  not show any acute fracture or dislocation. Head CT scan was negative for acute intracranial abnormality.     Past Medical History:        Diagnosis Date    Acute hypoxemic respiratory failure (HCC)     Arthritis     CHF (congestive heart failure) ([facility])     Chronic back pain     Diabetes mellitus type II, controlled ([address]) 2/14/2012    Fibromyalgia     Hyperlipidemia LDL goal < 70 2/14/2012    Hypertension, benign 02/14/2012    Morbid obesity with BMI of 60.0-69.9, adult ([address]) 8/28/2015    Pain of toe of right foot     morgans cyst    Right wrist components found for: \"EOSU\"  Urine Protein:  No results found for: \"TPU\"  Urinalysis:  U/A:   Lab Results   Component Value Date/Time    NITRU NEGATIVE 11/18/2023 10:48 PM    COLORU Yellow 11/18/2023 10:48 PM    PHUR 7.5 11/18/2023 10:48 PM    WBCUA 3 to 5 11/18/2023 10:48 PM    RBCUA 6 TO 9 11/18/2023 10:48 PM    MUCUS NOT REPORTED 09/13/2021 10:02 PM    TRICHOMONAS NOT REPORTED 09/13/2021 10:02 PM    YEAST NOT REPORTED 09/13/2021 10:02 PM    BACTERIA MODERATE 11/18/2023 10:48 PM    CLARITYU cloudy 05/19/2022 10:43 AM    SPECGRAV 1.013 11/18/2023 10:48 PM    LEUKOCYTESUR TRACE 11/18/2023 10:48 PM    UROBILINOGEN Normal 11/18/2023 10:48 PM    BILIRUBINUR NEGATIVE 11/18/2023 10:48 PM    BILIRUBINUR neg 05/19/2022 10:43 AM    BLOODU moderate 05/19/2022 10:43 AM    GLUCOSEU NEGATIVE 11/18/2023 10:48 PM    KETUA NEGATIVE 11/18/2023 10:48 PM    AMORPHOUS NOT REPORTED 09/13/2021 10:02 PM         Radiology:  Reviewed as available. .prob  Assessment:  1. Traumatic rhabdomyolysis following a fall with prolonged immobilization-CPK level 2978>2270-->1255-->595    2. Acute kidney injury nonoliguric on CKD stage 3 secondary to rhabdomyolysis while on loop diuretic-creatinine  1.1--->1.3    3. Hyperkalemia K-6.0---> improved to 5.2    3. Essential hypertension-above goal    4. Chronic systolic heart failure appears -compensated    5. Type 2 diabetes mellitus    Plan:  1. DC IV fluids  2.   hold statin  4. Hold furosemide  5. Hold losartan due to hyperkalemia      Thank you for the consultation.       Electronically signed by Jane Olivares MD on 11/21/2023 at 2:45 PM

## 2023-11-22 LAB
ANION GAP SERPL CALCULATED.3IONS-SCNC: 9 MMOL/L (ref 9–17)
BUN SERPL-MCNC: 24 MG/DL (ref 8–23)
CALCIUM SERPL-MCNC: 8.8 MG/DL (ref 8.6–10.4)
CHLORIDE SERPL-SCNC: 101 MMOL/L (ref 98–107)
CK SERPL-CCNC: 258 U/L (ref 26–192)
CO2 SERPL-SCNC: 25 MMOL/L (ref 20–31)
CREAT SERPL-MCNC: 1.3 MG/DL (ref 0.5–0.9)
GFR SERPL CREATININE-BSD FRML MDRD: 45 ML/MIN/1.73M2
GLUCOSE BLD-MCNC: 128 MG/DL (ref 65–105)
GLUCOSE BLD-MCNC: 143 MG/DL (ref 65–105)
GLUCOSE BLD-MCNC: 150 MG/DL (ref 65–105)
GLUCOSE BLD-MCNC: 161 MG/DL (ref 65–105)
GLUCOSE BLD-MCNC: 180 MG/DL (ref 65–105)
GLUCOSE BLD-MCNC: 75 MG/DL (ref 65–105)
GLUCOSE SERPL-MCNC: 75 MG/DL (ref 70–99)
MYOGLOBIN SERPL-MCNC: 58 NG/ML (ref 25–58)
POTASSIUM SERPL-SCNC: 5.2 MMOL/L (ref 3.7–5.3)
POTASSIUM SERPL-SCNC: 5.8 MMOL/L (ref 3.7–5.3)
SODIUM SERPL-SCNC: 135 MMOL/L (ref 135–144)

## 2023-11-22 PROCEDURE — 99232 SBSQ HOSP IP/OBS MODERATE 35: CPT | Performed by: FAMILY MEDICINE

## 2023-11-22 PROCEDURE — 6360000002 HC RX W HCPCS: Performed by: FAMILY MEDICINE

## 2023-11-22 PROCEDURE — 6370000000 HC RX 637 (ALT 250 FOR IP): Performed by: FAMILY MEDICINE

## 2023-11-22 PROCEDURE — 1200000000 HC SEMI PRIVATE

## 2023-11-22 PROCEDURE — 82947 ASSAY GLUCOSE BLOOD QUANT: CPT

## 2023-11-22 PROCEDURE — 6370000000 HC RX 637 (ALT 250 FOR IP): Performed by: INTERNAL MEDICINE

## 2023-11-22 PROCEDURE — 82550 ASSAY OF CK (CPK): CPT

## 2023-11-22 PROCEDURE — 80048 BASIC METABOLIC PNL TOTAL CA: CPT

## 2023-11-22 PROCEDURE — 36415 COLL VENOUS BLD VENIPUNCTURE: CPT

## 2023-11-22 PROCEDURE — 2580000003 HC RX 258: Performed by: FAMILY MEDICINE

## 2023-11-22 PROCEDURE — 83874 ASSAY OF MYOGLOBIN: CPT

## 2023-11-22 PROCEDURE — 84132 ASSAY OF SERUM POTASSIUM: CPT

## 2023-11-22 RX ADMIN — OXYCODONE AND ACETAMINOPHEN 1 TABLET: 5; 325 TABLET ORAL at 10:21

## 2023-11-22 RX ADMIN — SODIUM CHLORIDE, PRESERVATIVE FREE 10 ML: 5 INJECTION INTRAVENOUS at 20:19

## 2023-11-22 RX ADMIN — Medication 200 MG: at 20:19

## 2023-11-22 RX ADMIN — CARVEDILOL 12.5 MG: 12.5 TABLET, FILM COATED ORAL at 17:06

## 2023-11-22 RX ADMIN — AMLODIPINE BESYLATE 5 MG: 5 TABLET ORAL at 08:27

## 2023-11-22 RX ADMIN — OXYCODONE HYDROCHLORIDE 30 MG: 10 TABLET ORAL at 08:27

## 2023-11-22 RX ADMIN — GLIPIZIDE 10 MG: 5 TABLET ORAL at 06:40

## 2023-11-22 RX ADMIN — PREGABALIN 150 MG: 150 CAPSULE ORAL at 20:15

## 2023-11-22 RX ADMIN — SODIUM CHLORIDE, PRESERVATIVE FREE 10 ML: 5 INJECTION INTRAVENOUS at 08:27

## 2023-11-22 RX ADMIN — INSULIN HUMAN 10 UNITS: 100 INJECTION, SOLUTION PARENTERAL at 08:28

## 2023-11-22 RX ADMIN — SODIUM ZIRCONIUM CYCLOSILICATE 5 G: 5 POWDER, FOR SUSPENSION ORAL at 20:19

## 2023-11-22 RX ADMIN — FAMOTIDINE 20 MG: 20 TABLET, FILM COATED ORAL at 08:27

## 2023-11-22 RX ADMIN — CARVEDILOL 12.5 MG: 12.5 TABLET, FILM COATED ORAL at 08:27

## 2023-11-22 RX ADMIN — VENLAFAXINE HYDROCHLORIDE 37.5 MG: 37.5 CAPSULE, EXTENDED RELEASE ORAL at 08:36

## 2023-11-22 RX ADMIN — ALOGLIPTIN 12.5 MG: 12.5 TABLET, FILM COATED ORAL at 08:26

## 2023-11-22 RX ADMIN — ENOXAPARIN SODIUM 30 MG: 100 INJECTION SUBCUTANEOUS at 08:26

## 2023-11-22 RX ADMIN — LINACLOTIDE 145 MCG: 145 CAPSULE, GELATIN COATED ORAL at 06:40

## 2023-11-22 RX ADMIN — OXYCODONE AND ACETAMINOPHEN 1 TABLET: 5; 325 TABLET ORAL at 01:33

## 2023-11-22 RX ADMIN — OXYCODONE HYDROCHLORIDE 30 MG: 10 TABLET ORAL at 15:10

## 2023-11-22 RX ADMIN — CALCIUM CARBONATE 600 MG (1,500 MG)-VITAMIN D3 400 UNIT TABLET 1 TABLET: at 08:27

## 2023-11-22 RX ADMIN — OXYCODONE AND ACETAMINOPHEN 1 TABLET: 5; 325 TABLET ORAL at 17:09

## 2023-11-22 RX ADMIN — CYANOCOBALAMIN TAB 1000 MCG 1000 MCG: 1000 TAB at 08:27

## 2023-11-22 RX ADMIN — ENOXAPARIN SODIUM 30 MG: 100 INJECTION SUBCUTANEOUS at 20:15

## 2023-11-22 RX ADMIN — DEXTROSE MONOHYDRATE 250 ML: 100 INJECTION, SOLUTION INTRAVENOUS at 08:34

## 2023-11-22 RX ADMIN — Medication 1 TABLET: at 08:27

## 2023-11-22 RX ADMIN — CALCIUM CARBONATE 600 MG (1,500 MG)-VITAMIN D3 400 UNIT TABLET 1 TABLET: at 20:15

## 2023-11-22 RX ADMIN — OXYCODONE HYDROCHLORIDE 30 MG: 10 TABLET ORAL at 20:15

## 2023-11-22 RX ADMIN — GLIPIZIDE 10 MG: 5 TABLET ORAL at 15:10

## 2023-11-22 RX ADMIN — PREGABALIN 150 MG: 150 CAPSULE ORAL at 08:27

## 2023-11-22 RX ADMIN — SODIUM ZIRCONIUM CYCLOSILICATE 5 G: 5 POWDER, FOR SUSPENSION ORAL at 08:36

## 2023-11-22 ASSESSMENT — PAIN SCALES - GENERAL
PAINLEVEL_OUTOF10: 6
PAINLEVEL_OUTOF10: 8
PAINLEVEL_OUTOF10: 0
PAINLEVEL_OUTOF10: 9
PAINLEVEL_OUTOF10: 7
PAINLEVEL_OUTOF10: 7
PAINLEVEL_OUTOF10: 8

## 2023-11-22 ASSESSMENT — PAIN DESCRIPTION - LOCATION
LOCATION: GENERALIZED
LOCATION: GENERALIZED
LOCATION: COCCYX
LOCATION_2: BACK
LOCATION: GENERALIZED
LOCATION: GENERALIZED
LOCATION: SHOULDER

## 2023-11-22 ASSESSMENT — PAIN DESCRIPTION - ORIENTATION: ORIENTATION: RIGHT

## 2023-11-22 ASSESSMENT — PAIN DESCRIPTION - FREQUENCY: FREQUENCY: INTERMITTENT

## 2023-11-22 ASSESSMENT — PAIN DESCRIPTION - INTENSITY: RATING_2: 8

## 2023-11-22 ASSESSMENT — PAIN DESCRIPTION - DESCRIPTORS
DESCRIPTORS_2: ACHING
DESCRIPTORS: ACHING;BURNING

## 2023-11-22 ASSESSMENT — PAIN DESCRIPTION - ONSET: ONSET: ON-GOING

## 2023-11-22 ASSESSMENT — PAIN - FUNCTIONAL ASSESSMENT: PAIN_FUNCTIONAL_ASSESSMENT: ACTIVITIES ARE NOT PREVENTED

## 2023-11-22 ASSESSMENT — PAIN DESCRIPTION - PAIN TYPE: TYPE: ACUTE PAIN

## 2023-11-22 NOTE — PROGRESS NOTES
NEPHROLOGY CONSULT       Reason for Consult: Rhabdomyolysis and renal insufficiency      Chief Complaint: Fall  History Obtained From: Patient and EHR records    Interval History:  Patient developed hyperkalemia yesterday at 5.8-improving with lokelma and holding losartan. No decrease in urine output. History of Present Illness: This is a 79 y.o. female This is a 79 y.o. female  with a significant past medical history of  fibromyalgia, type 2 diabetes mellitus, obstructive sleep apnea, obesity, systemic hypertension and chronic kidney disease stage IIIb [baseline serum creatinine 1.3 to 1.5 mg/dL], who presented to ER after a fall at home. She reports cleaning her shower when she stepped over into the shower with 1 leg and fell flat on her face. She denied any loss of consciousness. She was unable to get off the floor and  lay on the floor for approximately 5 hours as she was unable to get any help or assistance. She denied any of consciousness. Complains of pain in the right shoulder after previous fall- she does have prior history of fall on an admission October 2023 associated with acute kidney injury. Has shoulder and hip abrasions which are not new. Upon presentation to [facility], she had a CPK level of 2978 with creatinine of 1.6 mg/dl BUN 33. White count was 14.5. She had been taking Lasix 40 mg daily at home. Multiple x-rays performed did  not show any acute fracture or dislocation. Head CT scan was negative for acute intracranial abnormality.     Past Medical History:        Diagnosis Date    Acute hypoxemic respiratory failure (HCC)     Arthritis     CHF (congestive heart failure) (HCC)     Chronic back pain     Diabetes mellitus type II, controlled ([address]) 2/14/2012    Fibromyalgia     Hyperlipidemia LDL goal < 70 2/14/2012    Hypertension, benign 02/14/2012    Morbid obesity with BMI of 60.0-69.9, adult ([address]) 8/28/2015    Pain of toe of right foot     morgans cyst    Right

## 2023-11-22 NOTE — PROGRESS NOTES
DATE: 2023    NAME: Salena Collins  MRN: [id number]   : 1955    Patient not seen this date for Physical Therapy due to:      [] Cancel by RN or physician due to:    [] Hemodialysis    [] Critical Lab Value Level     [] Blood transfusion in progress    [] Acute or unstable cardiovascular status   _MAP < 55 or more than >115  _HR < 40 or > 130    [] Acute or unstable pulmonary status   -FiO2 > 60%   _RR < 5 or >40    _O2 sats < 85%    [] Strict Bedrest    [] Off Unit for surgery or procedure    [] Off Unit for testing       [] Pending imaging to R/O fracture    [x] Refusal by Patient : Pt frustrated she will not be discharged for Thanksgiving, pt does not want to be seen today or tomorrow. [] Other      [] PT being discontinued at this time. Patient independent. No further needs. [] PT being discontinued at this time as the patient has been transferred to hospice care. No further needs.       Sergio Swanson, PTA

## 2023-11-22 NOTE — CARE COORDINATION
ONGOING DISCHARGE PLAN:    Patient is alert and oriented x4. Spoke with patient regarding discharge plan and patient confirms that plan is still home with [facility] - [facility]. Continues to refuse SNF.    K 5.8. . Will continue to follow for additional discharge needs. If patient is discharged prior to next notation, then this note serves as note for discharge by case management.     Electronically signed by Jordan Puckett RN on 11/22/2023 at 12:38 PM

## 2023-11-22 NOTE — PROGRESS NOTES
[id number] [facility]   OCCUPATIONAL THERAPY MISSED TREATMENT NOTE   INPATIENT   Date: 23  Patient Name: Tiffany Levy       Room:   MRN: [id number]   Account #: [de-identified]    : 1955  (79 y.o.)  Gender: female   Referring Practitioner: Mandy Chase MD  Diagnosis: Rhabdomyolysis             REASON FOR MISSED TREATMENT:  Patient declined   -  pt resting in bed upon arrival, pt refusing OT at this time due to fatigue and states \"I'm frustrated\", pt verbalized being upset because she will not be discharged and home for [facility], pt asked this writer to not return this date or check on them tomorrow.                Electronically signed by Juan Jose MITCHELL on 23 at 11:51 AM EST

## 2023-11-22 NOTE — PLAN OF CARE
Problem: Discharge Planning  Goal: Discharge to home or other facility with appropriate resources  11/22/2023 1318 by Mayte PATTEN RN  Outcome: Progressing     Problem: Pain  Goal: Verbalizes/displays adequate comfort level or baseline comfort level  11/22/2023 1318 by Mayte PATTEN RN  Outcome: Progressing     Problem: Chronic Conditions and Co-morbidities  Goal: Patient's chronic conditions and co-morbidity symptoms are monitored and maintained or improved  11/22/2023 1318 by Poppy Martinez RN  Outcome: Progressing

## 2023-11-22 NOTE — PLAN OF CARE
Problem: Discharge Planning  Goal: Discharge to home or other facility with appropriate resources  11/22/2023 0445 by Luca Merino RN  Outcome: Progressing     Problem: Pain  Goal: Verbalizes/displays adequate comfort level or baseline comfort level  11/22/2023 0445 by Luca Merino RN  Outcome: Progressing     Problem: Safety - Adult  Goal: Free from fall injury  11/22/2023 0445 by Luca Merino RN  Outcome: Progressing     Problem: ABCDS Injury Assessment  Goal: Absence of physical injury  11/22/2023 0445 by Luca Merino RN  Outcome: Progressing     Problem: Chronic Conditions and Co-morbidities  Goal: Patient's chronic conditions and co-morbidity symptoms are monitored and maintained or improved  11/22/2023 0445 by Luca Merino RN  Outcome: Progressing  [address] (Taken 11/21/2023 2105 by Stacy Hanson)  Care Plan - Patient's Chronic Conditions and Co-Morbidity Symptoms are Monitored and Maintained or Improved: Monitor and assess patient's chronic conditions and comorbid symptoms for stability, deterioration, or improvement     Problem: Skin/Tissue Integrity  Goal: Absence of new skin breakdown  Description: 1. Monitor for areas of redness and/or skin breakdown  2. Assess vascular access sites hourly  3. Every 4-6 hours minimum:  Change oxygen saturation probe site  4. Every 4-6 hours:  If on nasal continuous positive airway pressure, respiratory therapy assess nares and determine need for appliance change or resting period.   11/22/2023 0445 by Luca Merino RN  Outcome: Progressing     Problem: Respiratory - Adult  Goal: Achieves optimal ventilation and oxygenation  11/22/2023 0445 by Luca Merino RN  Outcome: Progressing     Problem: Skin/Tissue Integrity - Adult  Goal: Incisions, wounds, or drain sites healing without S/S of infection  11/22/2023 0445 by Luca Merino RN  Outcome: Progressing     Problem: Musculoskeletal - Adult  Goal: Return mobility to safest level of function  11/22/2023 0445 by Gigi Solis RN  Outcome: Progressing  Flowsheets (Taken 11/21/2023 2105 by Reginaldo Brown)  Return Mobility to Safest Level of Function: Assist with transfers and ambulation using safe patient handling equipment as needed     Problem: Metabolic/Fluid and Electrolytes - Adult  Goal: Electrolytes maintained within normal limits  11/22/2023 0445 by Gigi Solis RN  Outcome: Progressing  Flowsheets (Taken 11/21/2023 2105 by Reginaldo Brown)  Electrolytes maintained within normal limits: Monitor labs and assess patient for signs and symptoms of electrolyte imbalances

## 2023-11-23 VITALS
HEIGHT: 60 IN | OXYGEN SATURATION: 99 % | WEIGHT: 293 LBS | RESPIRATION RATE: 16 BRPM | HEART RATE: 64 BPM | DIASTOLIC BLOOD PRESSURE: 68 MMHG | TEMPERATURE: 98.2 F | SYSTOLIC BLOOD PRESSURE: 146 MMHG | BODY MASS INDEX: 57.52 KG/M2

## 2023-11-23 PROBLEM — R74.8 ELEVATED CK: Status: ACTIVE | Noted: 2023-11-23

## 2023-11-23 LAB
ANION GAP SERPL CALCULATED.3IONS-SCNC: 6 MMOL/L (ref 9–17)
BUN SERPL-MCNC: 24 MG/DL (ref 8–23)
CALCIUM SERPL-MCNC: 9.5 MG/DL (ref 8.6–10.4)
CHLORIDE SERPL-SCNC: 99 MMOL/L (ref 98–107)
CK SERPL-CCNC: 141 U/L (ref 26–192)
CO2 SERPL-SCNC: 29 MMOL/L (ref 20–31)
CREAT SERPL-MCNC: 1.1 MG/DL (ref 0.5–0.9)
GFR SERPL CREATININE-BSD FRML MDRD: 55 ML/MIN/1.73M2
GLUCOSE BLD-MCNC: 90 MG/DL (ref 65–105)
GLUCOSE SERPL-MCNC: 103 MG/DL (ref 70–99)
MYOGLOBIN SERPL-MCNC: 41 NG/ML (ref 25–58)
POTASSIUM SERPL-SCNC: 5.6 MMOL/L (ref 3.7–5.3)
SODIUM SERPL-SCNC: 134 MMOL/L (ref 135–144)

## 2023-11-23 PROCEDURE — 83874 ASSAY OF MYOGLOBIN: CPT

## 2023-11-23 PROCEDURE — 82550 ASSAY OF CK (CPK): CPT

## 2023-11-23 PROCEDURE — 99239 HOSP IP/OBS DSCHRG MGMT >30: CPT | Performed by: FAMILY MEDICINE

## 2023-11-23 PROCEDURE — 2580000003 HC RX 258: Performed by: FAMILY MEDICINE

## 2023-11-23 PROCEDURE — 82947 ASSAY GLUCOSE BLOOD QUANT: CPT

## 2023-11-23 PROCEDURE — 80048 BASIC METABOLIC PNL TOTAL CA: CPT

## 2023-11-23 PROCEDURE — 6370000000 HC RX 637 (ALT 250 FOR IP): Performed by: INTERNAL MEDICINE

## 2023-11-23 PROCEDURE — 6370000000 HC RX 637 (ALT 250 FOR IP): Performed by: FAMILY MEDICINE

## 2023-11-23 PROCEDURE — 6360000002 HC RX W HCPCS: Performed by: FAMILY MEDICINE

## 2023-11-23 PROCEDURE — 36415 COLL VENOUS BLD VENIPUNCTURE: CPT

## 2023-11-23 PROCEDURE — 97530 THERAPEUTIC ACTIVITIES: CPT

## 2023-11-23 RX ADMIN — OXYCODONE HYDROCHLORIDE 30 MG: 10 TABLET ORAL at 07:56

## 2023-11-23 RX ADMIN — FAMOTIDINE 20 MG: 20 TABLET, FILM COATED ORAL at 07:56

## 2023-11-23 RX ADMIN — CARVEDILOL 12.5 MG: 12.5 TABLET, FILM COATED ORAL at 07:56

## 2023-11-23 RX ADMIN — SODIUM ZIRCONIUM CYCLOSILICATE 5 G: 5 POWDER, FOR SUSPENSION ORAL at 07:59

## 2023-11-23 RX ADMIN — ENOXAPARIN SODIUM 30 MG: 100 INJECTION SUBCUTANEOUS at 07:57

## 2023-11-23 RX ADMIN — Medication 1 TABLET: at 07:56

## 2023-11-23 RX ADMIN — AMLODIPINE BESYLATE 5 MG: 5 TABLET ORAL at 07:56

## 2023-11-23 RX ADMIN — CALCIUM CARBONATE 600 MG (1,500 MG)-VITAMIN D3 400 UNIT TABLET 1 TABLET: at 07:57

## 2023-11-23 RX ADMIN — LINACLOTIDE 145 MCG: 145 CAPSULE, GELATIN COATED ORAL at 06:41

## 2023-11-23 RX ADMIN — PREGABALIN 150 MG: 150 CAPSULE ORAL at 07:56

## 2023-11-23 RX ADMIN — VENLAFAXINE HYDROCHLORIDE 37.5 MG: 37.5 CAPSULE, EXTENDED RELEASE ORAL at 07:59

## 2023-11-23 RX ADMIN — GLIPIZIDE 10 MG: 5 TABLET ORAL at 06:41

## 2023-11-23 RX ADMIN — SODIUM CHLORIDE, PRESERVATIVE FREE 10 ML: 5 INJECTION INTRAVENOUS at 07:59

## 2023-11-23 RX ADMIN — CYANOCOBALAMIN TAB 1000 MCG 1000 MCG: 1000 TAB at 07:57

## 2023-11-23 RX ADMIN — ALOGLIPTIN 12.5 MG: 12.5 TABLET, FILM COATED ORAL at 07:56

## 2023-11-23 NOTE — DISCHARGE INSTRUCTIONS
Your information:  Name: Magan Pascual  : 1955    Your instructions:    N/a    What to do after you leave the hospital:    Recommended diet: regular diet    Recommended activity: activity as tolerated        The following personal items were collected during your admission and were returned to you:    Belongings  Dental Appliances: None  Vision - Corrective Lenses: None  Hearing Aid: None  Clothing: Pajamas, Pants, Shirt, Shorts, Robe, Slippers, Undergarments, At bedside  Jewelry: None  Electronic Devices: None  Weapons (Notify Protective Services/Security): None  Home Medications: None  Valuables Given To: Patient  Provide Name(s) of Who Valuable(s) Were Given To: Yeny Nelson    Information obtained by:  By signing below, I understand that if any problems occur once I leave the hospital I am to contact my PCP or got to the ER. I understand and acknowledge receipt of the instructions indicated above.

## 2023-11-23 NOTE — PLAN OF CARE
Problem: Discharge Planning  Goal: Discharge to home or other facility with appropriate resources  11/23/2023 1156 by Dony Rivers RN  Outcome: Adequate for Discharge  11/23/2023 0235 by Clay Awad RN  Outcome: Progressing  Flowsheets (Taken 11/22/2023 1944)  Discharge to home or other facility with appropriate resources: Identify barriers to discharge with patient and caregiver     Problem: Pain  Goal: Verbalizes/displays adequate comfort level or baseline comfort level  11/23/2023 1156 by Dony Rivers RN  Outcome: Adequate for Discharge  11/23/2023 0235 by Clay Awad RN  Outcome: Progressing  Flowsheets (Taken 11/22/2023 2015)  Verbalizes/displays adequate comfort level or baseline comfort level: Encourage patient to monitor pain and request assistance     Problem: Safety - Adult  Goal: Free from fall injury  11/23/2023 1156 by Dony Rivers RN  Outcome: Adequate for Discharge  11/23/2023 0235 by Clay Awad RN  Outcome: Progressing  Flowsheets (Taken 11/22/2023 2336)  Free From Fall Injury: Based on caregiver fall risk screen, instruct family/caregiver to ask for assistance with transferring infant if caregiver noted to have fall risk factors     Problem: ABCDS Injury Assessment  Goal: Absence of physical injury  11/23/2023 1156 by Dony Rivers RN  Outcome: Adequate for Discharge  11/23/2023 0235 by Clay Awad RN  Outcome: Progressing  Flowsheets (Taken 11/22/2023 2336)  Absence of Physical Injury: Implement safety measures based on patient assessment     Problem: Chronic Conditions and Co-morbidities  Goal: Patient's chronic conditions and co-morbidity symptoms are monitored and maintained or improved  11/23/2023 1156 by Dony Rivers RN  Outcome: Adequate for Discharge  11/23/2023 0235 by Clay Awad RN  Outcome: Progressing  Flowsheets (Taken 11/22/2023 1944)  Care Plan - Patient's Chronic Conditions and Co-Morbidity Symptoms are Monitored and Maintained or Improved: Monitor

## 2023-11-23 NOTE — PROGRESS NOTES
[address] [facility]   INPATIENT OCCUPATIONAL THERAPY  PROGRESS NOTE  Date: 2023  Patient Name: Amirah Garnett       Room:   MRN: [id number]    : 1955  (79 y.o.)  Gender: female   Referring Practitioner: Davide Navas MD  Diagnosis: Rhabdomyolysis      Discharge Recommendations:  Further Occupational Therapy is recommended upon facility discharge. OT Equipment Recommendations  Equipment Needed: Yes  Mobility Devices:  (personal alert system)  Other: TBD    Restrictions/Precautions  Restrictions/Precautions  Restrictions/Precautions: Fall Risk;General Precautions; Up as Tolerated  Required Braces or Orthoses?: No  Implants present? : Metal implants (screws from back surgeries)    Vitals  O2 Device: Nasal cannula    Subjective  Subjective  Subjective: \"I'm going to have to look into that after I get home. \" Jorge Gilford discussed at length the benefits of utilizing personal alert system 2* pts hx of falls and being stuck in shower for 5 hours after this last fall. Subjective  Pain: pt reports 7/10 pain everywhere  Comments: Pt agreeable to education and pleasant/cooperative throughout. pt sitting up in chair as writer arrives/departs. pt declines standing activity    Objective  Orientation  Overall Orientation Status: Within Functional Limits  Cognition  Overall Cognitive Status: Exceptions  Arousal/Alertness: Appropriate responses to stimuli  Following Commands: Follows multistep commands with repitition; Follows multistep commands with increased time  Attention Span: Attends with cues to redirect  Memory: Appears intact  Safety Judgement: Decreased awareness of need for assistance;Decreased awareness of need for safety  Problem Solving: Assistance required to generate solutions;Assistance required to implement solutions;Assistance required to identify errors made;Assistance required to correct errors made  Insights: Decreased awareness of

## 2023-11-23 NOTE — CARE COORDINATION
ONGOING DISCHARGE PLAN:     Patient is alert and oriented x4. Spoke with patient regarding discharge plan and patient confirms that plan is still home with [facility]. Continues to refuse SNF. IMM letter provided to patient. Patient offered four hours to make informed decision regarding appeal process; patient agreeable to discharge. Will continue to follow for additional discharge needs. If patient is discharged prior to next notation, then this note serves as note for discharge by case management.     Electronically signed by Sayra Morse RN on 11/23/2023 at 8:51 AM

## 2023-11-23 NOTE — PLAN OF CARE
Problem: Discharge Planning  Goal: Discharge to home or other facility with appropriate resources  11/23/2023 0235 by Tatyana Cheatham RN  Outcome: Progressing  Flowsheets (Taken 11/22/2023 1944)  Discharge to home or other facility with appropriate resources: Identify barriers to discharge with patient and caregiver     Problem: Pain  Goal: Verbalizes/displays adequate comfort level or baseline comfort level  11/23/2023 0235 by Tatyana Cheatham RN  Outcome: Progressing  Flowsheets (Taken 11/22/2023 2015)  Verbalizes/displays adequate comfort level or baseline comfort level: Encourage patient to monitor pain and request assistance     Problem: Safety - Adult  Goal: Free from fall injury  Outcome: Progressing  Flowsheets (Taken 11/22/2023 2336)  Free From Fall Injury: Based on caregiver fall risk screen, instruct family/caregiver to ask for assistance with transferring infant if caregiver noted to have fall risk factors     Problem: ABCDS Injury Assessment  Goal: Absence of physical injury  Outcome: Progressing  Flowsheets (Taken 11/22/2023 2336)  Absence of Physical Injury: Implement safety measures based on patient assessment     Problem: Chronic Conditions and Co-morbidities  Goal: Patient's chronic conditions and co-morbidity symptoms are monitored and maintained or improved  11/23/2023 0235 by Tatyana Cheatham RN  Outcome: Progressing  Flowsheets (Taken 11/22/2023 1944)  Care Plan - Patient's Chronic Conditions and Co-Morbidity Symptoms are Monitored and Maintained or Improved: Monitor and assess patient's chronic conditions and comorbid symptoms for stability, deterioration, or improvement     Problem: Skin/Tissue Integrity  Goal: Absence of new skin breakdown  Description: 1. Monitor for areas of redness and/or skin breakdown  2. Assess vascular access sites hourly  3. Every 4-6 hours minimum:  Change oxygen saturation probe site  4.   Every 4-6 hours:  If on nasal continuous positive airway pressure,

## 2023-11-23 NOTE — CARE COORDINATION
Continuity of Care Form    Patient Name: Saman Manning   :  1955  MRN:  [id number]    Admit date:  2023  Discharge date:  23    Code Status Order: Full Code   Advance Directives:     Admitting Physician:  Rodolfo Joseph MD  PCP: Rodolfo Joseph MD    Discharging Nurse: [facility] Unit/Room#: 2071/2071-01  Discharging Unit Phone Number: [phone]    Emergency Contact:   Extended Emergency Contact Information  Primary Emergency Contact: Arnoldo Vaca  Address: [address] [place] [address], [address] [place] [address] [place]  of [address] [place] Phone: [phone]  Mobile Phone: [phone]  Relation: Spouse  Hearing or visual needs: None  Preferred language: English   needed? No  Secondary Emergency Contact: Denise Beltran  Address: [place] [address] [place]  of [address] [place] Phone: [phone]  Mobile Phone: [phone]  Relation: Child  Hearing or visual needs: None  Other needs: None  Preferred language: English   needed? No    Past Surgical History:  Past Surgical History:   Procedure Laterality Date    CARPAL TUNNEL RELEASE Right 2014    CATARACT REMOVAL WITH IMPLANT Bilateral     CHOLECYSTECTOMY, LAPAROSCOPIC  1998    COLONOSCOPY N/A 10/17/2019    COLONOSCOPY DIAGNOSTIC performed by Caden Cooley MD at [address] cyst from base of tail bone    HYSTERECTOMY (CERVIX STATUS UNKNOWN)  [address] [facility] Bilateral 2017    abcess removed    LUMBAR FUSION  10/2010; 2011    L4/L5    LUMBAR FUSION  2009    L4-S1    OTHER SURGICAL HISTORY  2018    Lumbar decompression laminectomy at L3-L4 bilaterally wiith complete facetomies at L3-L4 bilaterally;  diskectomy L3-L4: posterior lumbar interbody fusion; placement of Butler-type graft; local harvest of bone; microsurgical dissection.     ROTATOR CUFF REPAIR Left 2012    SKIN CANCER EXCISION      Basal Cell Carcinoma, 40 MG tablet  Commonly known as: LIPITOR  Take 1 tablet by mouth at bedtime   calcium carbonate 600 MG Tabs tablet  Take 1 tablet by mouth in the morning and at bedtime   carvedilol 12.5 MG tablet  Commonly known as: COREG  Take 1 tablet by mouth 2 times daily (with meals)   clonazePAM 0.5 MG tablet  Commonly known as: KLONOPIN  Take 1 tablet by mouth 2 times daily as needed for Anxiety (tremors) for up to 90 days. Max Daily Amount: 1 mg   COQ10 PO  Take 200 mg by mouth nightly   famotidine 20 MG tablet  Commonly known as: PEPCID  TAKE 1 TABLET BY MOUTH TWO TIMES A DAY   furosemide 40 MG tablet  Commonly known as: Lasix  Take 1 tablet by mouth daily   glimepiride 4 MG tablet  Commonly known as: AMARYL  TAKE 2 TABLETS BY MOUTH EVERY MORNING (BEFORE BREAKFAST)   Handicap Placard Misc  by Does not apply route 5 years, cannot walk over 200 ft. Lancets Misc  1 each by Does not apply route daily Accucheck Solange dx E11.9 check daily   linaclotide 145 MCG capsule  Commonly known as: Linzess  Take 1 capsule by mouth every morning (before breakfast)   losartan 50 MG tablet  Commonly known as: COZAAR  Take 1 tablet by mouth daily   ondansetron 4 MG tablet  Commonly known as: ZOFRAN  Take 1 tablet by mouth 3 times daily as needed for Nausea or Vomiting   oxyCODONE-acetaminophen 5-325 MG per tablet  Commonly known as: Percocet  Take 1 tablet by mouth every 6 hours as needed for Pain. pregabalin 150 MG capsule  Commonly known as: LYRICA  Take 1 capsule by mouth 2 times daily for 90 days.  Max Daily Amount: 300 mg   therapeutic multivitamin-minerals tablet  Take 1 tablet by mouth daily   Tradjenta 5 MG tablet  Generic drug: linagliptin  TAKE 1 TABLET BY MOUTH EVERY DAY   venlafaxine 37.5 MG extended release capsule  Commonly known as: EFFEXOR XR  TAKE 1 CAPSULE BY MOUTH EVERY DAY   vitamin B-12 1000 MCG tablet  Commonly known as: CYANOCOBALAMIN  Take 1 tablet by mouth daily     STOP taking these medications    STOP taking these

## 2023-11-26 NOTE — DISCHARGE SUMMARY
[facility] Discharge Summary      Patient ID: Travis Ya    MRN: [id number]     Acct:  [de-identified]       Patient's PCP: Kaleb Morel MD    Admit Date: 11/18/2023     Discharge Date: 11/23/2023      Admitting Physician: Kaleb Morel MD    Discharge Physician: Frances Perez MD     Discharge Diagnoses:    Primary Problem  Rhabdomyolysis    Active Hospital Problems    Diagnosis Date Noted    Elevated CK [R74.8] 11/23/2023    Rhabdomyolysis [M62.82] 11/19/2023    Chronic systolic (congestive) heart failure (HCC) [I50.22] 11/19/2023    Chronic respiratory failure with hypoxia [facility]) [J96.11] 11/19/2023    Lymphedema of both lower extremities [I89.0] 06/11/2020    Chronic renal insufficiency, stage III (moderate) (HCC) [N18.30] 03/16/2020    Class 3 severe obesity due to excess calories with serious comorbidity and body mass index (BMI) of 50.0 to 59.9 in adult ([address]) [E66.01, Z68.43] 01/06/2020    Fibromyalgia [M79.7] 11/22/2019    Iron deficiency anemia [D50.9] 11/22/2019    Lumbar radiculopathy [M54.16] 09/05/2018    BRIANNA (obstructive sleep apnea) [G47.33] 09/15/2015    Diabetes mellitus type II, controlled ([address]) [E11.9] 02/14/2012     Past Medical History:   Diagnosis Date    Acute hypoxemic respiratory failure (HCC)     Arthritis     CHF (congestive heart failure) (HCC)     Chronic back pain     Diabetes mellitus type II, controlled ([address]) 2/14/2012    Fibromyalgia     Hyperlipidemia LDL goal < 70 2/14/2012    Hypertension, benign 02/14/2012    Morbid obesity with BMI of 60.0-69.9, adult ([address]) 8/28/2015    Pain of toe of right foot     morgans cyst    Right wrist pain     rate 8    Skin cancer     skin under lt eye,face    Sleep apnea     Bipap does not work    Wears glasses      The patient was seen and examined on day of discharge and this discharge summary is in conjunction with any daily progress note from day of discharge.     Code Status:  Prior    Hospital Course: Uncomplicated,

## 2023-11-30 ENCOUNTER — HOSPITAL ENCOUNTER (OUTPATIENT)
Age: 68
Setting detail: SPECIMEN
Discharge: HOME | End: 2023-11-30
Payer: MEDICARE

## 2023-11-30 LAB
ALBUMIN SERPL-MCNC: 3.5 G/DL (ref 3.5–5.2)
ALP SERPL-CCNC: 95 U/L (ref 35–104)
ALT SERPL-CCNC: 13 U/L (ref 5–33)
ANION GAP SERPL CALCULATED.3IONS-SCNC: 8 MMOL/L (ref 9–17)
AST SERPL-CCNC: 16 U/L
BILIRUB SERPL-MCNC: 0.2 MG/DL (ref 0.3–1.2)
BUN SERPL-MCNC: 21 MG/DL (ref 8–23)
CALCIUM SERPL-MCNC: 8.9 MG/DL (ref 8.6–10.4)
CHLORIDE SERPL-SCNC: 100 MMOL/L (ref 98–107)
CK SERPL-CCNC: 30 U/L (ref 26–192)
CO2 SERPL-SCNC: 30 MMOL/L (ref 20–31)
CREAT SERPL-MCNC: 1.3 MG/DL (ref 0.5–0.9)
GFR SERPL CREATININE-BSD FRML MDRD: 45 ML/MIN/1.73M2
GLUCOSE SERPL-MCNC: 147 MG/DL (ref 70–99)
POTASSIUM SERPL-SCNC: 4.5 MMOL/L (ref 3.7–5.3)
PROT SERPL-MCNC: 7.7 G/DL (ref 6.4–8.3)
SODIUM SERPL-SCNC: 138 MMOL/L (ref 135–144)

## 2023-11-30 PROCEDURE — 82550 ASSAY OF CK (CPK): CPT

## 2023-11-30 PROCEDURE — 80053 COMPREHEN METABOLIC PANEL: CPT

## 2023-11-30 PROCEDURE — 36415 COLL VENOUS BLD VENIPUNCTURE: CPT

## 2023-12-13 PROBLEM — R79.89 ELEVATED TROPONIN: Status: ACTIVE | Noted: 2023-12-13

## 2023-12-13 PROBLEM — N17.9 ACUTE KIDNEY INJURY (NONTRAUMATIC) (HCC): Status: ACTIVE | Noted: 2023-12-13

## 2023-12-14 PROBLEM — J96.01 ACUTE RESPIRATORY FAILURE WITH HYPOXIA AND HYPERCAPNIA (HCC): Status: ACTIVE | Noted: 2023-12-14

## 2023-12-14 PROBLEM — J96.02 ACUTE RESPIRATORY FAILURE WITH HYPOXIA AND HYPERCAPNIA (HCC): Status: ACTIVE | Noted: 2023-12-14

## 2023-12-16 PROBLEM — R78.81 BACTEREMIA DUE TO STAPHYLOCOCCUS: Status: ACTIVE | Noted: 2023-12-16

## 2023-12-16 PROBLEM — B95.8 BACTEREMIA DUE TO STAPHYLOCOCCUS: Status: ACTIVE | Noted: 2023-12-16

## 2023-12-16 PROBLEM — Z88.0 ALLERGY TO PENICILLIN: Status: ACTIVE | Noted: 2023-12-16

## 2023-12-26 ENCOUNTER — HOSPITAL ENCOUNTER (OUTPATIENT)
Age: 68
Setting detail: SPECIMEN
Discharge: HOME | End: 2023-12-26

## 2023-12-26 LAB
ANION GAP SERPL CALCULATED.3IONS-SCNC: 12 MMOL/L (ref 9–17)
BUN SERPL-MCNC: 16 MG/DL (ref 8–23)
BUN/CREAT SERPL: 12 (ref 9–20)
CALCIUM SERPL-MCNC: 9.2 MG/DL (ref 8.6–10.4)
CHLORIDE SERPL-SCNC: 101 MMOL/L (ref 98–107)
CO2 SERPL-SCNC: 27 MMOL/L (ref 20–31)
CREAT SERPL-MCNC: 1.3 MG/DL (ref 0.5–0.9)
ERYTHROCYTE [DISTWIDTH] IN BLOOD BY AUTOMATED COUNT: 16.7 % (ref 11.8–14.4)
GFR SERPL CREATININE-BSD FRML MDRD: 45 ML/MIN/1.73M2
GLUCOSE SERPL-MCNC: 105 MG/DL (ref 70–99)
HCT VFR BLD AUTO: 29.2 % (ref 36.3–47.1)
HGB BLD-MCNC: 8.6 G/DL (ref 11.9–15.1)
MCH RBC QN AUTO: 26.5 PG (ref 25.2–33.5)
MCHC RBC AUTO-ENTMCNC: 29.5 G/DL (ref 28.4–34.8)
MCV RBC AUTO: 90.1 FL (ref 82.6–102.9)
NRBC BLD-RTO: 0 PER 100 WBC
PLATELET # BLD AUTO: 486 K/UL (ref 138–453)
PMV BLD AUTO: 10.4 FL (ref 8.1–13.5)
POTASSIUM SERPL-SCNC: 4.2 MMOL/L (ref 3.7–5.3)
RBC # BLD AUTO: 3.24 M/UL (ref 3.95–5.11)
SODIUM SERPL-SCNC: 140 MMOL/L (ref 135–144)
WBC OTHER # BLD: 11.5 K/UL (ref 3.5–11.3)

## 2023-12-26 PROCEDURE — P9603 ONE-WAY ALLOW PRORATED MILES: HCPCS

## 2023-12-26 PROCEDURE — 36415 COLL VENOUS BLD VENIPUNCTURE: CPT

## 2023-12-26 PROCEDURE — 80048 BASIC METABOLIC PNL TOTAL CA: CPT

## 2023-12-26 PROCEDURE — 85027 COMPLETE CBC AUTOMATED: CPT

## 2024-01-02 ENCOUNTER — CARE COORDINATION (OUTPATIENT)
Dept: CASE MANAGEMENT | Age: 69
End: 2024-01-02

## 2024-01-02 ENCOUNTER — CARE COORDINATION (OUTPATIENT)
Dept: CARE COORDINATION | Age: 69
End: 2024-01-02

## 2024-01-02 DIAGNOSIS — R79.89 ELEVATED TROPONIN: Primary | ICD-10-CM

## 2024-01-02 PROCEDURE — 1111F DSCHRG MED/CURRENT MED MERGE: CPT | Performed by: FAMILY MEDICINE

## 2024-01-02 NOTE — CARE COORDINATION
Contacted [facility] to schedule a f/u appointment.  Advised to call [phone] as office will be in a new location on Monday.  The number provided rings busy.

## 2024-01-03 NOTE — CARE COORDINATION
Contacted [facility] (number provided yesterday) and phone line continues to ring with no answer or option for voicemail.

## 2024-01-05 NOTE — CARE COORDINATION
Contacted [facility] and phone line continues to ring with no answer or option for voicemail.  Unable to make contact.

## 2024-01-08 ENCOUNTER — CARE COORDINATION (OUTPATIENT)
Dept: CARE COORDINATION | Age: 69
End: 2024-01-08

## 2024-01-08 NOTE — CARE COORDINATION
CTN referral  Spoke with patient explained CC, patient stated she is doing \"really well\" and denied any needs. She did take down my number for any possible future needs.   She was provided with PCP new address and contact number.

## 2024-01-12 PROBLEM — R79.89 ELEVATED TROPONIN: Status: RESOLVED | Noted: 2023-12-13 | Resolved: 2024-01-12

## 2024-01-29 ENCOUNTER — HOSPITAL ENCOUNTER (INPATIENT)
Age: 69
LOS: 8 days | Discharge: HOME HEALTH | DRG: 189 | End: 2024-02-07
Attending: STUDENT IN AN ORGANIZED HEALTH CARE EDUCATION/TRAINING PROGRAM | Admitting: STUDENT IN AN ORGANIZED HEALTH CARE EDUCATION/TRAINING PROGRAM
Payer: MEDICARE

## 2024-01-29 DIAGNOSIS — R06.03 RESPIRATORY DISTRESS: Primary | ICD-10-CM

## 2024-01-29 DIAGNOSIS — E87.20 LACTIC ACIDOSIS: ICD-10-CM

## 2024-01-29 LAB
ALBUMIN SERPL-MCNC: 3.6 G/DL (ref 3.5–5.2)
ALP SERPL-CCNC: 128 U/L (ref 35–104)
ALT SERPL-CCNC: 8 U/L (ref 5–33)
ANION GAP SERPL CALCULATED.3IONS-SCNC: 12 MMOL/L (ref 9–17)
AST SERPL-CCNC: 14 U/L
BASOPHILS # BLD: 0.1 K/UL (ref 0–0.2)
BASOPHILS NFR BLD: 1 % (ref 0–2)
BILIRUB SERPL-MCNC: 0.4 MG/DL (ref 0.3–1.2)
BUN SERPL-MCNC: 18 MG/DL (ref 8–23)
CALCIUM SERPL-MCNC: 9.3 MG/DL (ref 8.6–10.4)
CHLORIDE SERPL-SCNC: 97 MMOL/L (ref 98–107)
CO2 SERPL-SCNC: 28 MMOL/L (ref 20–31)
CREAT SERPL-MCNC: 1.5 MG/DL (ref 0.5–0.9)
EOSINOPHIL # BLD: 0.1 K/UL (ref 0–0.4)
EOSINOPHILS RELATIVE PERCENT: 1 % (ref 0–4)
ERYTHROCYTE [DISTWIDTH] IN BLOOD BY AUTOMATED COUNT: 18.6 % (ref 11.5–14.9)
GFR SERPL CREATININE-BSD FRML MDRD: 38 ML/MIN/1.73M2
GLUCOSE SERPL-MCNC: 274 MG/DL (ref 70–99)
HCT VFR BLD AUTO: 29.9 % (ref 36–46)
HGB BLD-MCNC: 9.3 G/DL (ref 12–16)
INR PPP: 1.3
LACTATE BLDV-SCNC: 3.9 MMOL/L (ref 0.5–2.2)
LYMPHOCYTES NFR BLD: 1.9 K/UL (ref 1–4.8)
LYMPHOCYTES RELATIVE PERCENT: 18 % (ref 24–44)
MCH RBC QN AUTO: 27.1 PG (ref 26–34)
MCHC RBC AUTO-ENTMCNC: 31 G/DL (ref 31–37)
MCV RBC AUTO: 87.4 FL (ref 80–100)
MONOCYTES NFR BLD: 0.2 K/UL (ref 0.1–1.3)
MONOCYTES NFR BLD: 2 % (ref 1–7)
NEUTROPHILS NFR BLD: 78 % (ref 36–66)
NEUTS SEG NFR BLD: 8.5 K/UL (ref 1.3–9.1)
PARTIAL THROMBOPLASTIN TIME: 28.9 SEC (ref 24–36)
PLATELET # BLD AUTO: 366 K/UL (ref 150–450)
PMV BLD AUTO: 8.5 FL (ref 6–12)
POTASSIUM SERPL-SCNC: 5.4 MMOL/L (ref 3.7–5.3)
PROT SERPL-MCNC: 8.1 G/DL (ref 6.4–8.3)
PROTHROMBIN TIME: 16.5 SEC (ref 11.8–14.6)
RBC # BLD AUTO: 3.42 M/UL (ref 4–5.2)
SODIUM SERPL-SCNC: 137 MMOL/L (ref 135–144)
TROPONIN I SERPL HS-MCNC: 27 NG/L (ref 0–14)
WBC OTHER # BLD: 10.7 K/UL (ref 3.5–11)

## 2024-01-29 PROCEDURE — 03HY32Z INSERTION OF MONITORING DEVICE INTO UPPER ARTERY, PERCUTANEOUS APPROACH: ICD-10-PCS | Performed by: STUDENT IN AN ORGANIZED HEALTH CARE EDUCATION/TRAINING PROGRAM

## 2024-01-29 PROCEDURE — 4A133B1 MONITORING OF ARTERIAL PRESSURE, PERIPHERAL, PERCUTANEOUS APPROACH: ICD-10-PCS | Performed by: STUDENT IN AN ORGANIZED HEALTH CARE EDUCATION/TRAINING PROGRAM

## 2024-01-29 PROCEDURE — 85025 COMPLETE CBC W/AUTO DIFF WBC: CPT

## 2024-01-29 PROCEDURE — 85610 PROTHROMBIN TIME: CPT

## 2024-01-29 PROCEDURE — 6360000002 HC RX W HCPCS: Performed by: STUDENT IN AN ORGANIZED HEALTH CARE EDUCATION/TRAINING PROGRAM

## 2024-01-29 PROCEDURE — 83605 ASSAY OF LACTIC ACID: CPT

## 2024-01-29 PROCEDURE — 4A133J1 MONITORING OF ARTERIAL PULSE, PERIPHERAL, PERCUTANEOUS APPROACH: ICD-10-PCS | Performed by: STUDENT IN AN ORGANIZED HEALTH CARE EDUCATION/TRAINING PROGRAM

## 2024-01-29 PROCEDURE — 94660 CPAP INITIATION&MGMT: CPT

## 2024-01-29 PROCEDURE — 85730 THROMBOPLASTIN TIME PARTIAL: CPT

## 2024-01-29 PROCEDURE — 84484 ASSAY OF TROPONIN QUANT: CPT

## 2024-01-29 PROCEDURE — 99285 EMERGENCY DEPT VISIT HI MDM: CPT

## 2024-01-29 PROCEDURE — 80053 COMPREHEN METABOLIC PANEL: CPT

## 2024-01-29 PROCEDURE — 82800 BLOOD PH: CPT

## 2024-01-29 PROCEDURE — 2700000000 HC OXYGEN THERAPY PER DAY

## 2024-01-29 PROCEDURE — 36415 COLL VENOUS BLD VENIPUNCTURE: CPT

## 2024-01-29 RX ORDER — EPINEPHRINE 0.1 MG/ML
20 INJECTION INTRAVENOUS ONCE
Status: COMPLETED | OUTPATIENT
Start: 2024-01-29 | End: 2024-01-29

## 2024-01-29 RX ORDER — NOREPINEPHRINE BITARTRATE 0.06 MG/ML
1-100 INJECTION, SOLUTION INTRAVENOUS CONTINUOUS
Status: DISCONTINUED | OUTPATIENT
Start: 2024-01-29 | End: 2024-01-29 | Stop reason: RX

## 2024-01-29 RX ADMIN — EPINEPHRINE 0.02 MG: 0.1 INJECTION INTRAVENOUS at 22:56

## 2024-01-30 ENCOUNTER — APPOINTMENT (OUTPATIENT)
Dept: GENERAL RADIOLOGY | Age: 69
DRG: 189 | End: 2024-01-30
Payer: MEDICARE

## 2024-01-30 ENCOUNTER — APPOINTMENT (OUTPATIENT)
Dept: CT IMAGING | Age: 69
DRG: 189 | End: 2024-01-30
Payer: MEDICARE

## 2024-01-30 PROBLEM — R06.03 RESPIRATORY DISTRESS: Status: ACTIVE | Noted: 2024-01-30

## 2024-01-30 PROBLEM — K20.90 ESOPHAGITIS: Status: ACTIVE | Noted: 2024-01-30

## 2024-01-30 LAB
AMMONIA PLAS-SCNC: 51 UMOL/L (ref 11–51)
AMORPH SED URNS QL MICRO: ABNORMAL
ANION GAP SERPL CALCULATED.3IONS-SCNC: 12 MMOL/L (ref 9–17)
ARTERIAL PATENCY WRIST A: ABNORMAL
BACTERIA URNS QL MICRO: ABNORMAL
BDY SITE: ABNORMAL
BILIRUB UR QL STRIP: ABNORMAL
BNP SERPL-MCNC: 829 PG/ML
BODY TEMPERATURE: 37
BUN SERPL-MCNC: 23 MG/DL (ref 8–23)
CALCIUM SERPL-MCNC: 8.4 MG/DL (ref 8.6–10.4)
CASTS #/AREA URNS LPF: ABNORMAL /LPF
CASTS #/AREA URNS LPF: ABNORMAL /LPF
CHLORIDE SERPL-SCNC: 101 MMOL/L (ref 98–107)
CLARITY UR: ABNORMAL
CO2 SERPL-SCNC: 26 MMOL/L (ref 20–31)
COHGB MFR BLD: 1.6 % (ref 0–5)
COHGB MFR BLD: 3.4 % (ref 0–5)
COLOR UR: ABNORMAL
CREAT SERPL-MCNC: 1.6 MG/DL (ref 0.5–0.9)
CRP SERPL HS-MCNC: 14.2 MG/L (ref 0–5)
CRYSTALS URNS MICRO: ABNORMAL /HPF
CRYSTALS URNS MICRO: ABNORMAL /HPF
EKG ATRIAL RATE: 47 BPM
EKG P AXIS: 113 DEGREES
EKG P-R INTERVAL: 154 MS
EKG Q-T INTERVAL: 456 MS
EKG QRS DURATION: 76 MS
EKG QTC CALCULATION (BAZETT): 403 MS
EKG R AXIS: 84 DEGREES
EKG T AXIS: 81 DEGREES
EKG VENTRICULAR RATE: 47 BPM
EPI CELLS #/AREA URNS HPF: ABNORMAL /HPF
FLUAV RNA RESP QL NAA+PROBE: NOT DETECTED
FLUBV RNA RESP QL NAA+PROBE: NOT DETECTED
GAS FLOW.O2 O2 DELIVERY SYS: ABNORMAL L/MIN
GFR SERPL CREATININE-BSD FRML MDRD: 35 ML/MIN/1.73M2
GLUCOSE BLD-MCNC: 257 MG/DL (ref 65–105)
GLUCOSE BLD-MCNC: 265 MG/DL (ref 65–105)
GLUCOSE BLD-MCNC: 273 MG/DL (ref 65–105)
GLUCOSE BLD-MCNC: 293 MG/DL (ref 65–105)
GLUCOSE SERPL-MCNC: 227 MG/DL (ref 70–99)
GLUCOSE UR STRIP-MCNC: NEGATIVE MG/DL
HCO3 ARTERIAL: 29.1 MMOL/L (ref 22–26)
HCO3 VENOUS: 24.6 MMOL/L (ref 24–30)
HGB UR QL STRIP.AUTO: NEGATIVE
KETONES UR STRIP-MCNC: ABNORMAL MG/DL
LACTATE BLDV-SCNC: 2.9 MMOL/L (ref 0.5–1.9)
LEUKOCYTE ESTERASE UR QL STRIP: ABNORMAL
METHEMOGLOBIN: 0.7 % (ref 0–1.9)
MUCOUS THREADS URNS QL MICRO: ABNORMAL
NEGATIVE BASE EXCESS, VEN: 1.8 MMOL/L (ref 0–2)
NITRITE UR QL STRIP: NEGATIVE
O2 SAT, ARTERIAL: 95.1 % (ref 95–98)
O2 SAT, VEN: 79.6 % (ref 60–85)
OSMOLALITY SERPL: 310 MOSM/KG (ref 275–295)
PCO2 ARTERIAL: 53.8 MMHG (ref 35–45)
PCO2, VEN: 50 MM HG (ref 39–55)
PH ARTERIAL: 7.34 (ref 7.35–7.45)
PH UR STRIP: 5 [PH] (ref 5–8)
PH VENOUS: 7.3 (ref 7.32–7.42)
PO2 ARTERIAL: 88.9 MMHG (ref 80–100)
PO2, VEN: 50.3 MM HG (ref 30–50)
POSITIVE BASE EXCESS, ART: 3.3 MMOL/L (ref 0–2)
POTASSIUM SERPL-SCNC: 5 MMOL/L (ref 3.7–5.3)
POTASSIUM SERPL-SCNC: 5.4 MMOL/L (ref 3.7–5.3)
POTASSIUM SERPL-SCNC: 5.8 MMOL/L (ref 3.7–5.3)
PROCALCITONIN SERPL-MCNC: 1.14 NG/ML
PROT UR STRIP-MCNC: ABNORMAL MG/DL
PT. POSITION: ABNORMAL
RBC #/AREA URNS HPF: ABNORMAL /HPF
RESPIRATORY RATE: 22
SARS-COV-2 RNA RESP QL NAA+PROBE: NOT DETECTED
SODIUM SERPL-SCNC: 139 MMOL/L (ref 135–144)
SOURCE: NORMAL
SP GR UR STRIP: 1.02 (ref 1–1.03)
SPECIMEN DESCRIPTION: NORMAL
TEXT FOR RESPIRATORY: ABNORMAL
TEXT FOR RESPIRATORY: ABNORMAL
UROBILINOGEN UR STRIP-ACNC: NORMAL EU/DL (ref 0–1)
VENTILATION MODE VENT: ABNORMAL
WBC #/AREA URNS HPF: ABNORMAL /HPF

## 2024-01-30 PROCEDURE — 93010 ELECTROCARDIOGRAM REPORT: CPT | Performed by: INTERNAL MEDICINE

## 2024-01-30 PROCEDURE — 94660 CPAP INITIATION&MGMT: CPT

## 2024-01-30 PROCEDURE — 82947 ASSAY GLUCOSE BLOOD QUANT: CPT

## 2024-01-30 PROCEDURE — 36600 WITHDRAWAL OF ARTERIAL BLOOD: CPT

## 2024-01-30 PROCEDURE — 93005 ELECTROCARDIOGRAM TRACING: CPT | Performed by: STUDENT IN AN ORGANIZED HEALTH CARE EDUCATION/TRAINING PROGRAM

## 2024-01-30 PROCEDURE — 2580000003 HC RX 258

## 2024-01-30 PROCEDURE — 80048 BASIC METABOLIC PNL TOTAL CA: CPT

## 2024-01-30 PROCEDURE — 6370000000 HC RX 637 (ALT 250 FOR IP): Performed by: INTERNAL MEDICINE

## 2024-01-30 PROCEDURE — 6360000002 HC RX W HCPCS: Performed by: STUDENT IN AN ORGANIZED HEALTH CARE EDUCATION/TRAINING PROGRAM

## 2024-01-30 PROCEDURE — 51798 US URINE CAPACITY MEASURE: CPT

## 2024-01-30 PROCEDURE — 99223 1ST HOSP IP/OBS HIGH 75: CPT | Performed by: STUDENT IN AN ORGANIZED HEALTH CARE EDUCATION/TRAINING PROGRAM

## 2024-01-30 PROCEDURE — 5A09457 ASSISTANCE WITH RESPIRATORY VENTILATION, 24-96 CONSECUTIVE HOURS, CONTINUOUS POSITIVE AIRWAY PRESSURE: ICD-10-PCS | Performed by: STUDENT IN AN ORGANIZED HEALTH CARE EDUCATION/TRAINING PROGRAM

## 2024-01-30 PROCEDURE — 2580000003 HC RX 258: Performed by: INTERNAL MEDICINE

## 2024-01-30 PROCEDURE — 84145 PROCALCITONIN (PCT): CPT

## 2024-01-30 PROCEDURE — 83605 ASSAY OF LACTIC ACID: CPT

## 2024-01-30 PROCEDURE — 2500000003 HC RX 250 WO HCPCS: Performed by: STUDENT IN AN ORGANIZED HEALTH CARE EDUCATION/TRAINING PROGRAM

## 2024-01-30 PROCEDURE — 83880 ASSAY OF NATRIURETIC PEPTIDE: CPT

## 2024-01-30 PROCEDURE — 82805 BLOOD GASES W/O2 SATURATION: CPT

## 2024-01-30 PROCEDURE — 71250 CT THORAX DX C-: CPT

## 2024-01-30 PROCEDURE — 81001 URINALYSIS AUTO W/SCOPE: CPT

## 2024-01-30 PROCEDURE — 6370000000 HC RX 637 (ALT 250 FOR IP): Performed by: STUDENT IN AN ORGANIZED HEALTH CARE EDUCATION/TRAINING PROGRAM

## 2024-01-30 PROCEDURE — 6360000002 HC RX W HCPCS

## 2024-01-30 PROCEDURE — 82140 ASSAY OF AMMONIA: CPT

## 2024-01-30 PROCEDURE — 94761 N-INVAS EAR/PLS OXIMETRY MLT: CPT

## 2024-01-30 PROCEDURE — 87636 SARSCOV2 & INF A&B AMP PRB: CPT

## 2024-01-30 PROCEDURE — 87086 URINE CULTURE/COLONY COUNT: CPT

## 2024-01-30 PROCEDURE — 2060000000 HC ICU INTERMEDIATE R&B

## 2024-01-30 PROCEDURE — 87641 MR-STAPH DNA AMP PROBE: CPT

## 2024-01-30 PROCEDURE — 84132 ASSAY OF SERUM POTASSIUM: CPT

## 2024-01-30 PROCEDURE — 2700000000 HC OXYGEN THERAPY PER DAY

## 2024-01-30 PROCEDURE — 2580000003 HC RX 258: Performed by: STUDENT IN AN ORGANIZED HEALTH CARE EDUCATION/TRAINING PROGRAM

## 2024-01-30 PROCEDURE — 86140 C-REACTIVE PROTEIN: CPT

## 2024-01-30 PROCEDURE — 36415 COLL VENOUS BLD VENIPUNCTURE: CPT

## 2024-01-30 PROCEDURE — 83930 ASSAY OF BLOOD OSMOLALITY: CPT

## 2024-01-30 PROCEDURE — 71045 X-RAY EXAM CHEST 1 VIEW: CPT

## 2024-01-30 PROCEDURE — 87040 BLOOD CULTURE FOR BACTERIA: CPT

## 2024-01-30 PROCEDURE — 51701 INSERT BLADDER CATHETER: CPT

## 2024-01-30 RX ORDER — 0.9 % SODIUM CHLORIDE 0.9 %
1000 INTRAVENOUS SOLUTION INTRAVENOUS ONCE
Status: COMPLETED | OUTPATIENT
Start: 2024-01-30 | End: 2024-01-30

## 2024-01-30 RX ORDER — LOSARTAN POTASSIUM 25 MG/1
25 TABLET ORAL DAILY
Status: DISCONTINUED | OUTPATIENT
Start: 2024-01-31 | End: 2024-02-07 | Stop reason: HOSPADM

## 2024-01-30 RX ORDER — SODIUM CHLORIDE 0.9 % (FLUSH) 0.9 %
5-40 SYRINGE (ML) INJECTION PRN
Status: DISCONTINUED | OUTPATIENT
Start: 2024-01-30 | End: 2024-02-07 | Stop reason: HOSPADM

## 2024-01-30 RX ORDER — ONDANSETRON 2 MG/ML
4 INJECTION INTRAMUSCULAR; INTRAVENOUS EVERY 6 HOURS PRN
Status: DISCONTINUED | OUTPATIENT
Start: 2024-01-30 | End: 2024-02-07 | Stop reason: HOSPADM

## 2024-01-30 RX ORDER — POTASSIUM CHLORIDE 20 MEQ/1
40 TABLET, EXTENDED RELEASE ORAL PRN
Status: DISCONTINUED | OUTPATIENT
Start: 2024-01-30 | End: 2024-02-07 | Stop reason: HOSPADM

## 2024-01-30 RX ORDER — FUROSEMIDE 40 MG/1
40 TABLET ORAL DAILY
Status: DISCONTINUED | OUTPATIENT
Start: 2024-01-30 | End: 2024-01-31

## 2024-01-30 RX ORDER — CARVEDILOL 12.5 MG/1
12.5 TABLET ORAL 2 TIMES DAILY WITH MEALS
Status: DISCONTINUED | OUTPATIENT
Start: 2024-01-30 | End: 2024-02-03

## 2024-01-30 RX ORDER — FAMOTIDINE 20 MG/1
20 TABLET, FILM COATED ORAL 2 TIMES DAILY
Status: DISCONTINUED | OUTPATIENT
Start: 2024-01-30 | End: 2024-01-30 | Stop reason: DRUGHIGH

## 2024-01-30 RX ORDER — DEXAMETHASONE SODIUM PHOSPHATE 10 MG/ML
8 INJECTION, SOLUTION INTRAMUSCULAR; INTRAVENOUS ONCE
Status: COMPLETED | OUTPATIENT
Start: 2024-01-30 | End: 2024-01-30

## 2024-01-30 RX ORDER — POTASSIUM CHLORIDE 7.45 MG/ML
10 INJECTION INTRAVENOUS PRN
Status: DISCONTINUED | OUTPATIENT
Start: 2024-01-30 | End: 2024-02-07 | Stop reason: HOSPADM

## 2024-01-30 RX ORDER — LOSARTAN POTASSIUM 50 MG/1
50 TABLET ORAL DAILY
Status: DISCONTINUED | OUTPATIENT
Start: 2024-01-30 | End: 2024-01-30

## 2024-01-30 RX ORDER — ATORVASTATIN CALCIUM 40 MG/1
40 TABLET, FILM COATED ORAL NIGHTLY
Status: DISCONTINUED | OUTPATIENT
Start: 2024-01-30 | End: 2024-02-07 | Stop reason: HOSPADM

## 2024-01-30 RX ORDER — ONDANSETRON 4 MG/1
4 TABLET, ORALLY DISINTEGRATING ORAL EVERY 8 HOURS PRN
Status: DISCONTINUED | OUTPATIENT
Start: 2024-01-30 | End: 2024-02-07 | Stop reason: HOSPADM

## 2024-01-30 RX ORDER — ACETAMINOPHEN 650 MG/1
650 SUPPOSITORY RECTAL EVERY 6 HOURS PRN
Status: DISCONTINUED | OUTPATIENT
Start: 2024-01-30 | End: 2024-02-07 | Stop reason: HOSPADM

## 2024-01-30 RX ORDER — INSULIN GLARGINE 100 [IU]/ML
10 INJECTION, SOLUTION SUBCUTANEOUS DAILY
Status: DISCONTINUED | OUTPATIENT
Start: 2024-01-30 | End: 2024-02-07 | Stop reason: HOSPADM

## 2024-01-30 RX ORDER — INSULIN LISPRO 100 [IU]/ML
0-8 INJECTION, SOLUTION INTRAVENOUS; SUBCUTANEOUS
Status: DISCONTINUED | OUTPATIENT
Start: 2024-01-30 | End: 2024-02-07 | Stop reason: HOSPADM

## 2024-01-30 RX ORDER — ENOXAPARIN SODIUM 100 MG/ML
30 INJECTION SUBCUTANEOUS 2 TIMES DAILY
Status: DISCONTINUED | OUTPATIENT
Start: 2024-01-30 | End: 2024-02-07 | Stop reason: HOSPADM

## 2024-01-30 RX ORDER — FAMOTIDINE 20 MG/1
20 TABLET, FILM COATED ORAL DAILY
Status: DISCONTINUED | OUTPATIENT
Start: 2024-01-30 | End: 2024-01-31

## 2024-01-30 RX ORDER — AMLODIPINE BESYLATE 10 MG/1
10 TABLET ORAL DAILY
Status: DISCONTINUED | OUTPATIENT
Start: 2024-01-30 | End: 2024-02-03

## 2024-01-30 RX ORDER — ACETAMINOPHEN 325 MG/1
650 TABLET ORAL EVERY 6 HOURS PRN
Status: DISCONTINUED | OUTPATIENT
Start: 2024-01-30 | End: 2024-02-07 | Stop reason: HOSPADM

## 2024-01-30 RX ORDER — MAGNESIUM SULFATE HEPTAHYDRATE 40 MG/ML
2000 INJECTION, SOLUTION INTRAVENOUS PRN
Status: DISCONTINUED | OUTPATIENT
Start: 2024-01-30 | End: 2024-02-07 | Stop reason: HOSPADM

## 2024-01-30 RX ORDER — SODIUM CHLORIDE 0.9 % (FLUSH) 0.9 %
5-40 SYRINGE (ML) INJECTION EVERY 12 HOURS SCHEDULED
Status: DISCONTINUED | OUTPATIENT
Start: 2024-01-30 | End: 2024-02-07 | Stop reason: HOSPADM

## 2024-01-30 RX ORDER — POLYETHYLENE GLYCOL 3350 17 G/17G
17 POWDER, FOR SOLUTION ORAL DAILY PRN
Status: DISCONTINUED | OUTPATIENT
Start: 2024-01-30 | End: 2024-02-07 | Stop reason: HOSPADM

## 2024-01-30 RX ORDER — HYDRALAZINE HYDROCHLORIDE 25 MG/1
25 TABLET, FILM COATED ORAL EVERY 8 HOURS SCHEDULED
Status: DISCONTINUED | OUTPATIENT
Start: 2024-01-30 | End: 2024-02-07 | Stop reason: HOSPADM

## 2024-01-30 RX ORDER — SODIUM CHLORIDE 9 MG/ML
INJECTION, SOLUTION INTRAVENOUS CONTINUOUS
Status: DISCONTINUED | OUTPATIENT
Start: 2024-01-30 | End: 2024-02-02

## 2024-01-30 RX ORDER — INSULIN LISPRO 100 [IU]/ML
10 INJECTION, SOLUTION INTRAVENOUS; SUBCUTANEOUS ONCE
Status: COMPLETED | OUTPATIENT
Start: 2024-01-30 | End: 2024-01-30

## 2024-01-30 RX ORDER — CLONAZEPAM 0.5 MG/1
0.5 TABLET ORAL 2 TIMES DAILY PRN
Status: DISCONTINUED | OUTPATIENT
Start: 2024-01-30 | End: 2024-01-30

## 2024-01-30 RX ORDER — INSULIN LISPRO 100 [IU]/ML
0-4 INJECTION, SOLUTION INTRAVENOUS; SUBCUTANEOUS NIGHTLY
Status: DISCONTINUED | OUTPATIENT
Start: 2024-01-30 | End: 2024-02-07 | Stop reason: HOSPADM

## 2024-01-30 RX ORDER — SODIUM CHLORIDE 9 MG/ML
INJECTION, SOLUTION INTRAVENOUS PRN
Status: DISCONTINUED | OUTPATIENT
Start: 2024-01-30 | End: 2024-02-07 | Stop reason: HOSPADM

## 2024-01-30 RX ADMIN — INSULIN LISPRO 10 UNITS: 100 INJECTION, SOLUTION INTRAVENOUS; SUBCUTANEOUS at 15:56

## 2024-01-30 RX ADMIN — CEFTRIAXONE SODIUM 1000 MG: 1 INJECTION, POWDER, FOR SOLUTION INTRAMUSCULAR; INTRAVENOUS at 04:21

## 2024-01-30 RX ADMIN — ATORVASTATIN CALCIUM 40 MG: 40 TABLET, FILM COATED ORAL at 20:36

## 2024-01-30 RX ADMIN — SODIUM CHLORIDE, PRESERVATIVE FREE 10 ML: 5 INJECTION INTRAVENOUS at 20:27

## 2024-01-30 RX ADMIN — ANTI-FUNGAL POWDER MICONAZOLE NITRATE TALC FREE: 1.42 POWDER TOPICAL at 12:03

## 2024-01-30 RX ADMIN — ANTI-FUNGAL POWDER MICONAZOLE NITRATE TALC FREE: 1.42 POWDER TOPICAL at 20:27

## 2024-01-30 RX ADMIN — SODIUM CHLORIDE 1000 ML: 9 INJECTION, SOLUTION INTRAVENOUS at 02:50

## 2024-01-30 RX ADMIN — SODIUM CHLORIDE: 9 INJECTION, SOLUTION INTRAVENOUS at 18:09

## 2024-01-30 RX ADMIN — INSULIN HUMAN 10 UNITS: 100 INJECTION, SOLUTION PARENTERAL at 12:22

## 2024-01-30 RX ADMIN — ENOXAPARIN SODIUM 30 MG: 100 INJECTION SUBCUTANEOUS at 20:36

## 2024-01-30 RX ADMIN — SODIUM ZIRCONIUM CYCLOSILICATE 10 G: 5 POWDER, FOR SUSPENSION ORAL at 12:24

## 2024-01-30 RX ADMIN — CARVEDILOL 12.5 MG: 12.5 TABLET, FILM COATED ORAL at 16:25

## 2024-01-30 RX ADMIN — AZITHROMYCIN MONOHYDRATE 500 MG: 500 INJECTION, POWDER, LYOPHILIZED, FOR SOLUTION INTRAVENOUS at 05:16

## 2024-01-30 RX ADMIN — ENOXAPARIN SODIUM 30 MG: 100 INJECTION SUBCUTANEOUS at 08:27

## 2024-01-30 RX ADMIN — DEXTROSE MONOHYDRATE 250 ML: 100 INJECTION, SOLUTION INTRAVENOUS at 12:24

## 2024-01-30 RX ADMIN — DEXAMETHASONE SODIUM PHOSPHATE 8 MG: 10 INJECTION INTRAMUSCULAR; INTRAVENOUS at 04:13

## 2024-01-30 RX ADMIN — INSULIN GLARGINE 10 UNITS: 100 INJECTION, SOLUTION SUBCUTANEOUS at 16:25

## 2024-01-30 RX ADMIN — INSULIN LISPRO 4 UNITS: 100 INJECTION, SOLUTION INTRAVENOUS; SUBCUTANEOUS at 20:26

## 2024-01-30 ASSESSMENT — PAIN SCALES - WONG BAKER
WONGBAKER_NUMERICALRESPONSE: 0
WONGBAKER_NUMERICALRESPONSE: 2
WONGBAKER_NUMERICALRESPONSE: 0

## 2024-01-30 NOTE — ED PROVIDER NOTES
[facility]  Emergency Department  Faculty Attestation       I performed a history and physical examination of the patient and discussed management with the resident. I reviewed the resident’s note and agree with the documented findings including all diagnostic interpretations and plan of care. Any areas of disagreement are noted on the chart. I was personally present for the key portions of any procedures. I have documented in the chart those procedures where I was not present during the key portions. I have reviewed the emergency nurses triage note. I agree with the chief complaint, past medical history, past surgical history, allergies, medications, social and family history as documented unless otherwise noted below. Documentation of the HPI, Physical Exam and Medical Decision Making performed by brian is based on my personal performance of the HPI, PE and MDM. For Physician Assistant/ Nurse Practitioner cases/documentation I have personally evaluated this patient and have completed at least one if not all key elements of the E/M (history, physical exam, and MDM). Additional findings are as noted.    Pertinent Comments     Primary Care Physician: Alida Archuleta MD    History: This is a 68 y.o. female who presents to the Emergency Department with complaint of    Chief Complaint   Patient presents with    Shortness of Breath    Fatigue         Physical:    ED Triage Vitals [01/29/24 2255]   BP Temp Temp Source Pulse Respirations SpO2 Height Weight - Scale   (!) 147/130 98.5 °F (36.9 °C) Axillary 51 14 100 % 1.524 m (5') 130.6 kg (288 lb)        RADIOLOGY:  CT CHEST ABDOMEN PELVIS WO CONTRAST Additional Contrast? None    Result Date: 1/30/2024  EXAMINATION: CT OF THE CHEST, ABDOMEN, AND PELVIS WITHOUT CONTRAST 1/30/2024 1:41 am TECHNIQUE: CT of the chest, abdomen and pelvis was performed without the administration of intravenous contrast. Multiplanar reformatted images are provided for review. Automated

## 2024-01-30 NOTE — CARE COORDINATION
Case Management Assessment  Initial Evaluation    Date/Time of Evaluation: 1/30/2024 4:49 PM  Assessment Completed by: Mali Sykes RN    If patient is discharged prior to next notation, then this note serves as note for discharge by case management.    Patient Name: Camelia Beltran                   YOB: 1955  Diagnosis: Lactic acidosis [E87.20]  Respiratory distress [R06.03]                   Date / Time: 1/29/2024 10:52 PM    Patient Admission Status: Inpatient   Readmission Risk (Low < 19, Mod (19-27), High > 27): Readmission Risk Score: 34.7    Current PCP: Alida Archuleta MD  PCP verified by CM? Yes    Chart Reviewed: Yes      History Provided by: Spouse, Medical Record  Patient Orientation: Other (see comment), Unable to Assess (on bipap)    Patient Cognition: Alert    Hospitalization in the last 30 days (Readmission):  No    If yes, Readmission Assessment in  Navigator will be completed.    Advance Directives:      Code Status: Full Code   Patient's Primary Decision Maker is: Legal Next of Kin    Primary Decision Maker: Christoph Beltran - Spouse - [phone]    Secondary Decision Maker: Denise Beltran - Child - [phone]    Discharge Planning:    Patient lives with: Spouse/Significant Other, Children Type of Home: House  Primary Care Giver: Family  Patient Support Systems include: Children, Spouse/Significant Other   Current Financial resources: Medicare  Current community resources: ECF/Home Care ([facility]-current)  Current services prior to admission: Durable Medical Equipment, Home Care            Current DME: Shower Chair, Walker, Wheelchair, Home Aerosol, Glucometer, Oxygen Therapy (Comment) (3L 24/7 [employer])            Type of Home Care services:  OT, PT, Nursing Services    ADLS  Prior functional level: Assistance with the following:, Cooking, Housework, Shopping  Current functional level: Other (see comment) (unable to assess)    PT AM-PAC:   /24  OT AM-PAC:

## 2024-01-30 NOTE — ED PROVIDER NOTES
[facility] ED  Emergency Department Encounter  Emergency Medicine Resident     Pt Name:Camelia Beltran  MRN: [id number]  Birthdate 1955  Date of evaluation: 1/29/24  PCP:  Alida Archuleta MD  Note Started: 11:49 PM EST      CHIEF COMPLAINT       Chief Complaint   Patient presents with    Shortness of Breath    Fatigue       HISTORY OF PRESENT ILLNESS  (Location/Symptom, Timing/Onset, Context/Setting, Quality, Duration, Modifying Factors, Severity.)      Camelia Beltran is a 68-year-old female presents to the ED via EMS for shortness of breath and fatigue.  Patient states that at approximately 11 PM on 1/29 she started feeling acutely ill and was found in the bathroom having diarrhea and shortness of breath.  Per EMS, patient's blood pressure was in the 60 to 70s and her oxygen saturation was also in the 70s requiring BVM with improvement.  Due to the low blood pressure on scene by EMS, they gave 1 mL of push dose epi.  On arrival in the ED, the patient was alert and oriented x4 but still requiring oxygen support with BVM.  Patient was transition to BiPAP.    Patient states she wanted to be full code.    She is unsure of what may have caused her sudden onset shortness of breath and fatigue.    Chart review shows that patient has been seen for similar problem secondary to accidental/unintentional overdose of her home pain meds    PAST MEDICAL / SURGICAL / SOCIAL / FAMILY HISTORY      has a past medical history of Acute hypoxemic respiratory failure (HCC), Arthritis, CHF (congestive heart failure) (HCC), Chronic back pain, Diabetes mellitus type II, controlled (HCC), Fibromyalgia, Hyperlipidemia LDL goal < 70, Hypertension, benign, Morbid obesity with BMI of 60.0-69.9, adult (HCC), Pain of toe of right foot, Right wrist pain, Skin cancer, Sleep apnea, and Wears glasses.       has a past surgical history that includes Tubal ligation (1981); Hysterectomy (1998); lumbar fusion (10/2010; 03/2011); Carpal

## 2024-01-30 NOTE — ED TRIAGE NOTES
Mode of arrival (squad #, walk in, police, etc) : [place]        Chief complaint(s): Shortness of breath, fatigue        Arrival Note (brief scenario, treatment PTA, etc).: Per EMS, pt was feeling fatigued, dizzy, short of breath, and had diarrhea. Upon EMS arrival, pt oxygen saturation was 63%. Pt arrived with EMS bag valve masking. Pt given 1mL of Epi by EMS.

## 2024-01-30 NOTE — ED NOTES
Report given to Mohan Antonio from ICU.   Report method in person   The following was reviewed with receiving RN:   Current vital signs:  BP (!) 120/50   Pulse 66   Temp 98.5 °F (36.9 °C) (Axillary)   Resp 15   Ht 1.524 m (5')   Wt 130.6 kg (288 lb)   LMP  (LMP Unknown)   SpO2 100%   BMI 56.25 kg/m²                MEWS Score: 0     Any medication or safety alerts were reviewed. Any pending diagnostics and notifications were also reviewed, as well as any safety concerns or issues, abnormal labs, abnormal imaging, and abnormal assessment findings. Questions were answered.

## 2024-01-31 ENCOUNTER — APPOINTMENT (OUTPATIENT)
Dept: GENERAL RADIOLOGY | Age: 69
DRG: 189 | End: 2024-01-31
Payer: MEDICARE

## 2024-01-31 ENCOUNTER — APPOINTMENT (OUTPATIENT)
Dept: CT IMAGING | Age: 69
DRG: 189 | End: 2024-01-31
Payer: MEDICARE

## 2024-01-31 PROBLEM — R93.89 ABNORMAL FINDING ON IMAGING: Status: ACTIVE | Noted: 2024-01-31

## 2024-01-31 PROBLEM — K56.609 SBO (SMALL BOWEL OBSTRUCTION) (HCC): Status: ACTIVE | Noted: 2024-01-31

## 2024-01-31 PROBLEM — I16.0 HYPERTENSIVE URGENCY: Status: ACTIVE | Noted: 2024-01-31

## 2024-01-31 PROBLEM — E87.20 LACTIC ACIDOSIS: Status: ACTIVE | Noted: 2024-01-31

## 2024-01-31 LAB
AMMONIA PLAS-SCNC: 13 UMOL/L (ref 11–51)
ANION GAP SERPL CALCULATED.3IONS-SCNC: 11 MMOL/L (ref 9–17)
BASOPHILS # BLD: 0 K/UL (ref 0–0.2)
BASOPHILS NFR BLD: 0 % (ref 0–2)
BODY TEMPERATURE: 37
BUN SERPL-MCNC: 25 MG/DL (ref 8–23)
CALCIUM SERPL-MCNC: 8.4 MG/DL (ref 8.6–10.4)
CHLORIDE SERPL-SCNC: 96 MMOL/L (ref 98–107)
CO2 SERPL-SCNC: 26 MMOL/L (ref 20–31)
COHGB MFR BLD: 2.9 % (ref 0–5)
CREAT SERPL-MCNC: 1.3 MG/DL (ref 0.5–0.9)
CRP SERPL HS-MCNC: 78.3 MG/L (ref 0–5)
EOSINOPHIL # BLD: 0 K/UL (ref 0–0.4)
EOSINOPHILS RELATIVE PERCENT: 0 % (ref 0–4)
ERYTHROCYTE [DISTWIDTH] IN BLOOD BY AUTOMATED COUNT: 18.4 % (ref 11.5–14.9)
GFR SERPL CREATININE-BSD FRML MDRD: 45 ML/MIN/1.73M2
GLUCOSE BLD-MCNC: 169 MG/DL (ref 65–105)
GLUCOSE BLD-MCNC: 171 MG/DL (ref 65–105)
GLUCOSE BLD-MCNC: 172 MG/DL (ref 65–105)
GLUCOSE BLD-MCNC: 184 MG/DL (ref 65–105)
GLUCOSE SERPL-MCNC: 173 MG/DL (ref 70–99)
HCO3 VENOUS: 29.5 MMOL/L (ref 24–30)
HCT VFR BLD AUTO: 23.7 % (ref 36–46)
HCT VFR BLD AUTO: 25.7 % (ref 36–46)
HGB BLD-MCNC: 7.4 G/DL (ref 12–16)
HGB BLD-MCNC: 8.1 G/DL (ref 12–16)
LACTATE BLDV-SCNC: 1.7 MMOL/L (ref 0.5–2.2)
LYMPHOCYTES NFR BLD: 1.39 K/UL (ref 1–4.8)
LYMPHOCYTES RELATIVE PERCENT: 8 % (ref 24–44)
MCH RBC QN AUTO: 26.4 PG (ref 26–34)
MCHC RBC AUTO-ENTMCNC: 31.5 G/DL (ref 31–37)
MCV RBC AUTO: 84 FL (ref 80–100)
METHEMOGLOBIN: 0.6 % (ref 0–1.9)
MONOCYTES NFR BLD: 0.52 K/UL (ref 0.1–1.3)
MONOCYTES NFR BLD: 3 % (ref 1–7)
MORPHOLOGY: ABNORMAL
NEUTROPHILS NFR BLD: 89 % (ref 36–66)
NEUTS SEG NFR BLD: 15.49 K/UL (ref 1.3–9.1)
O2 SAT, VEN: 92.2 % (ref 60–85)
PCO2, VEN: 43.8 MM HG (ref 39–55)
PH VENOUS: 7.44 (ref 7.32–7.42)
PLATELET # BLD AUTO: 274 K/UL (ref 150–450)
PMV BLD AUTO: 8.7 FL (ref 6–12)
PO2, VEN: 70.4 MM HG (ref 30–50)
POSITIVE BASE EXCESS, VEN: 5.3 MMOL/L (ref 0–2)
POTASSIUM SERPL-SCNC: 4.8 MMOL/L (ref 3.7–5.3)
PROCALCITONIN SERPL-MCNC: 8.71 NG/ML
RBC # BLD AUTO: 2.82 M/UL (ref 4–5.2)
SODIUM SERPL-SCNC: 133 MMOL/L (ref 135–144)
TEXT FOR RESPIRATORY: ABNORMAL
WBC OTHER # BLD: 17.4 K/UL (ref 3.5–11)

## 2024-01-31 PROCEDURE — 2580000003 HC RX 258: Performed by: STUDENT IN AN ORGANIZED HEALTH CARE EDUCATION/TRAINING PROGRAM

## 2024-01-31 PROCEDURE — 83605 ASSAY OF LACTIC ACID: CPT

## 2024-01-31 PROCEDURE — 94761 N-INVAS EAR/PLS OXIMETRY MLT: CPT

## 2024-01-31 PROCEDURE — 99223 1ST HOSP IP/OBS HIGH 75: CPT | Performed by: INTERNAL MEDICINE

## 2024-01-31 PROCEDURE — A4216 STERILE WATER/SALINE, 10 ML: HCPCS | Performed by: STUDENT IN AN ORGANIZED HEALTH CARE EDUCATION/TRAINING PROGRAM

## 2024-01-31 PROCEDURE — 82947 ASSAY GLUCOSE BLOOD QUANT: CPT

## 2024-01-31 PROCEDURE — 6360000002 HC RX W HCPCS: Performed by: INTERNAL MEDICINE

## 2024-01-31 PROCEDURE — 94660 CPAP INITIATION&MGMT: CPT

## 2024-01-31 PROCEDURE — 99291 CRITICAL CARE FIRST HOUR: CPT | Performed by: STUDENT IN AN ORGANIZED HEALTH CARE EDUCATION/TRAINING PROGRAM

## 2024-01-31 PROCEDURE — 6370000000 HC RX 637 (ALT 250 FOR IP): Performed by: STUDENT IN AN ORGANIZED HEALTH CARE EDUCATION/TRAINING PROGRAM

## 2024-01-31 PROCEDURE — 2060000000 HC ICU INTERMEDIATE R&B

## 2024-01-31 PROCEDURE — 85025 COMPLETE CBC W/AUTO DIFF WBC: CPT

## 2024-01-31 PROCEDURE — 6360000002 HC RX W HCPCS: Performed by: STUDENT IN AN ORGANIZED HEALTH CARE EDUCATION/TRAINING PROGRAM

## 2024-01-31 PROCEDURE — 74018 RADEX ABDOMEN 1 VIEW: CPT

## 2024-01-31 PROCEDURE — C9113 INJ PANTOPRAZOLE SODIUM, VIA: HCPCS | Performed by: STUDENT IN AN ORGANIZED HEALTH CARE EDUCATION/TRAINING PROGRAM

## 2024-01-31 PROCEDURE — 82805 BLOOD GASES W/O2 SATURATION: CPT

## 2024-01-31 PROCEDURE — 2580000003 HC RX 258: Performed by: INTERNAL MEDICINE

## 2024-01-31 PROCEDURE — 85014 HEMATOCRIT: CPT

## 2024-01-31 PROCEDURE — 82140 ASSAY OF AMMONIA: CPT

## 2024-01-31 PROCEDURE — 85018 HEMOGLOBIN: CPT

## 2024-01-31 PROCEDURE — 36415 COLL VENOUS BLD VENIPUNCTURE: CPT

## 2024-01-31 PROCEDURE — 70450 CT HEAD/BRAIN W/O DYE: CPT

## 2024-01-31 PROCEDURE — 80048 BASIC METABOLIC PNL TOTAL CA: CPT

## 2024-01-31 PROCEDURE — 86140 C-REACTIVE PROTEIN: CPT

## 2024-01-31 PROCEDURE — 84145 PROCALCITONIN (PCT): CPT

## 2024-01-31 PROCEDURE — 2500000003 HC RX 250 WO HCPCS: Performed by: STUDENT IN AN ORGANIZED HEALTH CARE EDUCATION/TRAINING PROGRAM

## 2024-01-31 RX ORDER — METOPROLOL TARTRATE 1 MG/ML
5 INJECTION, SOLUTION INTRAVENOUS EVERY 6 HOURS PRN
Status: DISCONTINUED | OUTPATIENT
Start: 2024-01-31 | End: 2024-01-31

## 2024-01-31 RX ORDER — HYDRALAZINE HYDROCHLORIDE 20 MG/ML
10 INJECTION INTRAMUSCULAR; INTRAVENOUS ONCE
Status: DISCONTINUED | OUTPATIENT
Start: 2024-01-31 | End: 2024-01-31 | Stop reason: SDUPTHER

## 2024-01-31 RX ORDER — METOPROLOL TARTRATE 1 MG/ML
5 INJECTION, SOLUTION INTRAVENOUS EVERY 6 HOURS
Status: DISCONTINUED | OUTPATIENT
Start: 2024-01-31 | End: 2024-02-07 | Stop reason: HOSPADM

## 2024-01-31 RX ORDER — FUROSEMIDE 10 MG/ML
20 INJECTION INTRAMUSCULAR; INTRAVENOUS DAILY
Status: DISCONTINUED | OUTPATIENT
Start: 2024-02-01 | End: 2024-02-07 | Stop reason: HOSPADM

## 2024-01-31 RX ORDER — METRONIDAZOLE 500 MG/100ML
500 INJECTION, SOLUTION INTRAVENOUS EVERY 8 HOURS
Status: DISCONTINUED | OUTPATIENT
Start: 2024-01-31 | End: 2024-02-04

## 2024-01-31 RX ORDER — HYDRALAZINE HYDROCHLORIDE 20 MG/ML
10 INJECTION INTRAMUSCULAR; INTRAVENOUS EVERY 6 HOURS
Status: DISCONTINUED | OUTPATIENT
Start: 2024-01-31 | End: 2024-01-31

## 2024-01-31 RX ORDER — HYDRALAZINE HYDROCHLORIDE 20 MG/ML
10 INJECTION INTRAMUSCULAR; INTRAVENOUS EVERY 4 HOURS PRN
Status: DISCONTINUED | OUTPATIENT
Start: 2024-01-31 | End: 2024-02-07 | Stop reason: HOSPADM

## 2024-01-31 RX ORDER — ONDANSETRON 2 MG/ML
4 INJECTION INTRAMUSCULAR; INTRAVENOUS ONCE
Status: COMPLETED | OUTPATIENT
Start: 2024-01-31 | End: 2024-01-31

## 2024-01-31 RX ORDER — HYDRALAZINE HYDROCHLORIDE 20 MG/ML
10 INJECTION INTRAMUSCULAR; INTRAVENOUS EVERY 6 HOURS PRN
Status: DISCONTINUED | OUTPATIENT
Start: 2024-01-31 | End: 2024-01-31

## 2024-01-31 RX ORDER — ONDANSETRON 2 MG/ML
4 INJECTION INTRAMUSCULAR; INTRAVENOUS EVERY 6 HOURS PRN
Status: DISCONTINUED | OUTPATIENT
Start: 2024-01-31 | End: 2024-02-05 | Stop reason: SDUPTHER

## 2024-01-31 RX ADMIN — HYDRALAZINE HYDROCHLORIDE 10 MG: 20 INJECTION, SOLUTION INTRAMUSCULAR; INTRAVENOUS at 22:52

## 2024-01-31 RX ADMIN — ONDANSETRON 4 MG: 4 TABLET, ORALLY DISINTEGRATING ORAL at 16:58

## 2024-01-31 RX ADMIN — CARVEDILOL 12.5 MG: 12.5 TABLET, FILM COATED ORAL at 16:58

## 2024-01-31 RX ADMIN — SODIUM CHLORIDE, PRESERVATIVE FREE 40 MG: 5 INJECTION INTRAVENOUS at 21:34

## 2024-01-31 RX ADMIN — CEFTRIAXONE SODIUM 1000 MG: 1 INJECTION, POWDER, FOR SOLUTION INTRAMUSCULAR; INTRAVENOUS at 21:49

## 2024-01-31 RX ADMIN — INSULIN GLARGINE 10 UNITS: 100 INJECTION, SOLUTION SUBCUTANEOUS at 08:18

## 2024-01-31 RX ADMIN — CEFEPIME 2000 MG: 2 INJECTION, POWDER, FOR SOLUTION INTRAVENOUS at 12:16

## 2024-01-31 RX ADMIN — METRONIDAZOLE 500 MG: 500 INJECTION, SOLUTION INTRAVENOUS at 12:14

## 2024-01-31 RX ADMIN — METRONIDAZOLE 500 MG: 500 INJECTION, SOLUTION INTRAVENOUS at 21:44

## 2024-01-31 RX ADMIN — HYDRALAZINE HYDROCHLORIDE 25 MG: 25 TABLET, FILM COATED ORAL at 16:59

## 2024-01-31 RX ADMIN — AMLODIPINE BESYLATE 10 MG: 10 TABLET ORAL at 11:51

## 2024-01-31 RX ADMIN — CARVEDILOL 12.5 MG: 12.5 TABLET, FILM COATED ORAL at 08:18

## 2024-01-31 RX ADMIN — VANCOMYCIN HYDROCHLORIDE 2500 MG: 1.5 INJECTION, POWDER, LYOPHILIZED, FOR SOLUTION INTRAVENOUS at 15:44

## 2024-01-31 RX ADMIN — ACYCLOVIR SODIUM 650 MG: 50 INJECTION, SOLUTION INTRAVENOUS at 20:30

## 2024-01-31 RX ADMIN — SODIUM CHLORIDE, PRESERVATIVE FREE 40 MG: 5 INJECTION INTRAVENOUS at 11:53

## 2024-01-31 RX ADMIN — HYDRALAZINE HYDROCHLORIDE 10 MG: 20 INJECTION, SOLUTION INTRAMUSCULAR; INTRAVENOUS at 17:28

## 2024-01-31 RX ADMIN — SODIUM CHLORIDE: 9 INJECTION, SOLUTION INTRAVENOUS at 11:54

## 2024-01-31 RX ADMIN — METOPROLOL TARTRATE 5 MG: 5 INJECTION, SOLUTION INTRAVENOUS at 20:55

## 2024-01-31 RX ADMIN — AZITHROMYCIN MONOHYDRATE 500 MG: 500 INJECTION, POWDER, LYOPHILIZED, FOR SOLUTION INTRAVENOUS at 06:36

## 2024-01-31 RX ADMIN — FUROSEMIDE 40 MG: 40 TABLET ORAL at 08:28

## 2024-01-31 RX ADMIN — ANTI-FUNGAL POWDER MICONAZOLE NITRATE TALC FREE: 1.42 POWDER TOPICAL at 21:29

## 2024-01-31 RX ADMIN — ONDANSETRON 4 MG: 2 INJECTION INTRAMUSCULAR; INTRAVENOUS at 17:27

## 2024-01-31 RX ADMIN — CEFTRIAXONE SODIUM 1000 MG: 1 INJECTION, POWDER, FOR SOLUTION INTRAMUSCULAR; INTRAVENOUS at 05:54

## 2024-01-31 ASSESSMENT — PAIN SCALES - WONG BAKER

## 2024-01-31 ASSESSMENT — PAIN SCALES - GENERAL
PAINLEVEL_OUTOF10: 0

## 2024-01-31 NOTE — ACP (ADVANCE CARE PLANNING)
Advance Care Planning     Advance Care Planning Activator (Inpatient)  Conversation Note      Date of ACP Conversation: 1/31/2024     Conversation Conducted with: Patient with Decision Making Capacity    ACP Activator: Mali Sykes RN        Health Care Decision Maker:     Current Designated Health Care Decision Maker:     Primary Decision Maker: Christoph Beltran - Spouse - [phone]    Secondary Decision Maker: Denise Beltran - Child - [phone]    Today we documented Decision Maker(s). The patient will provide ACP documents.    Care Preferences    Ventilation:  \"If you were in your present state of health and suddenly became very ill and were unable to breathe on your own, what would your preference be about the use of a ventilator (breathing machine) if it were available to you?\"      Would the patient desire the use of ventilator (breathing machine)?: yes    \"If your health worsens and it becomes clear that your chance of recovery is unlikely, what would your preference be about the use of a ventilator (breathing machine) if it were available to you?\"     Would the patient desire the use of ventilator (breathing machine)?: No      Resuscitation  \"CPR works best to restart the heart when there is a sudden event, like a heart attack, in someone who is otherwise healthy. Unfortunately, CPR does not typically restart the heart for people who have serious health conditions or who are very sick.\"    \"In the event your heart stopped as a result of an underlying serious health condition, would you want attempts to be made to restart your heart (answer \"yes\" for attempt to resuscitate) or would you prefer a natural death (answer \"no\" for do not attempt to resuscitate)?\" yes       [] Yes   [] No   Educated Patient / Decision Maker regarding differences between Advance Directives and portable DNR orders.    Length of ACP Conversation in minutes:      Conversation Outcomes:  ACP discussion completed    Follow-up

## 2024-01-31 NOTE — CARE COORDINATION
ONGOING DISCHARGE PLAN:    Patient is alert and oriented x4.    Spoke with patient regarding discharge plan and patient confirms that plan is still home with [facility]. Ok for referral to be sent to [facility] if needed.     97% on 2L/Bipap     ID consult- WBC 17.4 (10.7) diarrhea  CRP 78.3, procal 8.71  CDiff pending  IV cefepime/vanco/flagyl    GI consult-diarrhea, GI bleed  Hgb 7.4 (9.3)    Nephrology consult-MAMTA, creatinine 1.3, K 4.8 (5.8)    PT/OT      Will continue to follow for additional discharge needs.    If patient is discharged prior to next notation, then this note serves as note for discharge by case management.    Electronically signed by Mali Sykes RN on 1/31/2024 at 9:27 AM

## 2024-02-01 ENCOUNTER — APPOINTMENT (OUTPATIENT)
Dept: GENERAL RADIOLOGY | Age: 69
DRG: 189 | End: 2024-02-01
Attending: INTERNAL MEDICINE
Payer: MEDICARE

## 2024-02-01 ENCOUNTER — APPOINTMENT (OUTPATIENT)
Dept: GENERAL RADIOLOGY | Age: 69
DRG: 189 | End: 2024-02-01
Payer: MEDICARE

## 2024-02-01 LAB
ABSOLUTE BANDS: 0.15 K/UL (ref 0–1)
ANION GAP SERPL CALCULATED.3IONS-SCNC: 12 MMOL/L (ref 9–17)
BANDS: 1 % (ref 0–10)
BASOPHILS # BLD: 0.15 K/UL (ref 0–0.2)
BASOPHILS NFR BLD: 1 % (ref 0–2)
BUN SERPL-MCNC: 21 MG/DL (ref 8–23)
CALCIUM SERPL-MCNC: 9.1 MG/DL (ref 8.6–10.4)
CHLORIDE SERPL-SCNC: 101 MMOL/L (ref 98–107)
CO2 SERPL-SCNC: 27 MMOL/L (ref 20–31)
CREAT SERPL-MCNC: 1.1 MG/DL (ref 0.5–0.9)
EOSINOPHIL # BLD: 0 K/UL (ref 0–0.4)
EOSINOPHILS RELATIVE PERCENT: 0 % (ref 0–4)
ERYTHROCYTE [DISTWIDTH] IN BLOOD BY AUTOMATED COUNT: 19.3 % (ref 11.5–14.9)
GFR SERPL CREATININE-BSD FRML MDRD: 55 ML/MIN/1.73M2
GLUCOSE BLD-MCNC: 137 MG/DL (ref 65–105)
GLUCOSE BLD-MCNC: 140 MG/DL (ref 65–105)
GLUCOSE BLD-MCNC: 143 MG/DL (ref 65–105)
GLUCOSE BLD-MCNC: 143 MG/DL (ref 65–105)
GLUCOSE SERPL-MCNC: 146 MG/DL (ref 70–99)
HCT VFR BLD AUTO: 25.8 % (ref 36–46)
HGB BLD-MCNC: 8.2 G/DL (ref 12–16)
LYMPHOCYTES NFR BLD: 0.73 K/UL (ref 1–4.8)
LYMPHOCYTES RELATIVE PERCENT: 5 % (ref 24–44)
MCH RBC QN AUTO: 26.4 PG (ref 26–34)
MCHC RBC AUTO-ENTMCNC: 31.7 G/DL (ref 31–37)
MCV RBC AUTO: 83.4 FL (ref 80–100)
MICROORGANISM SPEC CULT: NO GROWTH
MONOCYTES NFR BLD: 0.58 K/UL (ref 0.1–1.3)
MONOCYTES NFR BLD: 4 % (ref 1–7)
MORPHOLOGY: ABNORMAL
MRSA, DNA, NASAL: NEGATIVE
NEUTROPHILS NFR BLD: 89 % (ref 36–66)
NEUTS SEG NFR BLD: 12.89 K/UL (ref 1.3–9.1)
PLATELET # BLD AUTO: 335 K/UL (ref 150–450)
PMV BLD AUTO: 8.2 FL (ref 6–12)
POTASSIUM SERPL-SCNC: 3.8 MMOL/L (ref 3.7–5.3)
RBC # BLD AUTO: 3.1 M/UL (ref 4–5.2)
SODIUM SERPL-SCNC: 140 MMOL/L (ref 135–144)
SPECIMEN DESCRIPTION: NORMAL
SPECIMEN DESCRIPTION: NORMAL
WBC OTHER # BLD: 14.5 K/UL (ref 3.5–11)

## 2024-02-01 PROCEDURE — 82947 ASSAY GLUCOSE BLOOD QUANT: CPT

## 2024-02-01 PROCEDURE — 99233 SBSQ HOSP IP/OBS HIGH 50: CPT | Performed by: STUDENT IN AN ORGANIZED HEALTH CARE EDUCATION/TRAINING PROGRAM

## 2024-02-01 PROCEDURE — 6370000000 HC RX 637 (ALT 250 FOR IP): Performed by: STUDENT IN AN ORGANIZED HEALTH CARE EDUCATION/TRAINING PROGRAM

## 2024-02-01 PROCEDURE — 6360000002 HC RX W HCPCS: Performed by: STUDENT IN AN ORGANIZED HEALTH CARE EDUCATION/TRAINING PROGRAM

## 2024-02-01 PROCEDURE — APPNB30 APP NON BILLABLE TIME 0-30 MINS: Performed by: NURSE PRACTITIONER

## 2024-02-01 PROCEDURE — 94660 CPAP INITIATION&MGMT: CPT

## 2024-02-01 PROCEDURE — 74018 RADEX ABDOMEN 1 VIEW: CPT

## 2024-02-01 PROCEDURE — 36415 COLL VENOUS BLD VENIPUNCTURE: CPT

## 2024-02-01 PROCEDURE — 2700000000 HC OXYGEN THERAPY PER DAY

## 2024-02-01 PROCEDURE — 2580000003 HC RX 258: Performed by: INTERNAL MEDICINE

## 2024-02-01 PROCEDURE — C9113 INJ PANTOPRAZOLE SODIUM, VIA: HCPCS | Performed by: STUDENT IN AN ORGANIZED HEALTH CARE EDUCATION/TRAINING PROGRAM

## 2024-02-01 PROCEDURE — 80048 BASIC METABOLIC PNL TOTAL CA: CPT

## 2024-02-01 PROCEDURE — A4216 STERILE WATER/SALINE, 10 ML: HCPCS | Performed by: STUDENT IN AN ORGANIZED HEALTH CARE EDUCATION/TRAINING PROGRAM

## 2024-02-01 PROCEDURE — 99233 SBSQ HOSP IP/OBS HIGH 50: CPT | Performed by: INTERNAL MEDICINE

## 2024-02-01 PROCEDURE — 94761 N-INVAS EAR/PLS OXIMETRY MLT: CPT

## 2024-02-01 PROCEDURE — 2580000003 HC RX 258: Performed by: STUDENT IN AN ORGANIZED HEALTH CARE EDUCATION/TRAINING PROGRAM

## 2024-02-01 PROCEDURE — 2500000003 HC RX 250 WO HCPCS: Performed by: STUDENT IN AN ORGANIZED HEALTH CARE EDUCATION/TRAINING PROGRAM

## 2024-02-01 PROCEDURE — 2060000000 HC ICU INTERMEDIATE R&B

## 2024-02-01 PROCEDURE — 85025 COMPLETE CBC W/AUTO DIFF WBC: CPT

## 2024-02-01 PROCEDURE — 6360000002 HC RX W HCPCS: Performed by: INTERNAL MEDICINE

## 2024-02-01 PROCEDURE — 99232 SBSQ HOSP IP/OBS MODERATE 35: CPT | Performed by: INTERNAL MEDICINE

## 2024-02-01 RX ADMIN — ACYCLOVIR SODIUM 400 MG: 50 INJECTION, SOLUTION INTRAVENOUS at 11:13

## 2024-02-01 RX ADMIN — SODIUM CHLORIDE, PRESERVATIVE FREE 40 MG: 5 INJECTION INTRAVENOUS at 09:36

## 2024-02-01 RX ADMIN — SODIUM CHLORIDE 6.5 MG/HR: 9 INJECTION, SOLUTION INTRAVENOUS at 14:04

## 2024-02-01 RX ADMIN — VANCOMYCIN HYDROCHLORIDE 1000 MG: 1 INJECTION, POWDER, LYOPHILIZED, FOR SOLUTION INTRAVENOUS at 09:52

## 2024-02-01 RX ADMIN — ACYCLOVIR SODIUM 400 MG: 50 INJECTION, SOLUTION INTRAVENOUS at 18:02

## 2024-02-01 RX ADMIN — SODIUM CHLORIDE, PRESERVATIVE FREE 10 ML: 5 INJECTION INTRAVENOUS at 19:48

## 2024-02-01 RX ADMIN — METRONIDAZOLE 500 MG: 500 INJECTION, SOLUTION INTRAVENOUS at 15:03

## 2024-02-01 RX ADMIN — METOPROLOL TARTRATE 5 MG: 5 INJECTION, SOLUTION INTRAVENOUS at 01:19

## 2024-02-01 RX ADMIN — SODIUM CHLORIDE 9 MG/HR: 9 INJECTION, SOLUTION INTRAVENOUS at 09:55

## 2024-02-01 RX ADMIN — SODIUM CHLORIDE 9 MG/HR: 9 INJECTION, SOLUTION INTRAVENOUS at 18:06

## 2024-02-01 RX ADMIN — FUROSEMIDE 20 MG: 10 INJECTION, SOLUTION INTRAMUSCULAR; INTRAVENOUS at 09:36

## 2024-02-01 RX ADMIN — SODIUM CHLORIDE 10.5 MG/HR: 9 INJECTION, SOLUTION INTRAVENOUS at 21:21

## 2024-02-01 RX ADMIN — METOPROLOL TARTRATE 5 MG: 5 INJECTION, SOLUTION INTRAVENOUS at 05:59

## 2024-02-01 RX ADMIN — METRONIDAZOLE 500 MG: 500 INJECTION, SOLUTION INTRAVENOUS at 05:58

## 2024-02-01 RX ADMIN — SODIUM CHLORIDE 9 MG/HR: 9 INJECTION, SOLUTION INTRAVENOUS at 06:37

## 2024-02-01 RX ADMIN — METOPROLOL TARTRATE 5 MG: 5 INJECTION, SOLUTION INTRAVENOUS at 19:44

## 2024-02-01 RX ADMIN — SODIUM CHLORIDE: 9 INJECTION, SOLUTION INTRAVENOUS at 11:09

## 2024-02-01 RX ADMIN — ANTI-FUNGAL POWDER MICONAZOLE NITRATE TALC FREE: 1.42 POWDER TOPICAL at 19:47

## 2024-02-01 RX ADMIN — ACYCLOVIR SODIUM 650 MG: 50 INJECTION, SOLUTION INTRAVENOUS at 02:01

## 2024-02-01 RX ADMIN — CEFTRIAXONE SODIUM 1000 MG: 1 INJECTION, POWDER, FOR SOLUTION INTRAMUSCULAR; INTRAVENOUS at 21:01

## 2024-02-01 RX ADMIN — METOPROLOL TARTRATE 5 MG: 5 INJECTION, SOLUTION INTRAVENOUS at 14:43

## 2024-02-01 RX ADMIN — ACETAMINOPHEN 650 MG: 325 TABLET, FILM COATED ORAL at 20:59

## 2024-02-01 RX ADMIN — SODIUM CHLORIDE 2.5 MG/HR: 9 INJECTION, SOLUTION INTRAVENOUS at 00:39

## 2024-02-01 RX ADMIN — METRONIDAZOLE 500 MG: 500 INJECTION, SOLUTION INTRAVENOUS at 22:13

## 2024-02-01 RX ADMIN — SODIUM CHLORIDE, PRESERVATIVE FREE 40 MG: 5 INJECTION INTRAVENOUS at 19:44

## 2024-02-01 ASSESSMENT — PAIN SCALES - GENERAL
PAINLEVEL_OUTOF10: 0
PAINLEVEL_OUTOF10: 5
PAINLEVEL_OUTOF10: 8

## 2024-02-01 ASSESSMENT — PAIN SCALES - WONG BAKER

## 2024-02-01 ASSESSMENT — PAIN DESCRIPTION - LOCATION: LOCATION: HEAD

## 2024-02-01 NOTE — CARE COORDINATION
ONGOING DISCHARGE PLAN:    Patient is alert and oriented x4.    Spoke with patient regarding discharge plan and patient confirms that plan is still home with [facility]. [facility] if needed.    1/31 nausea/vomiting  KUB-concern SBO  NG-LIWS    2/1 repeat KUB negative    Surgery consult    IV rocephin/flagyl/acyclovir per ID  Follow cultures    IV lasix daily     HTN on cardene drip    96% on 2L    PT/OT      Will continue to follow for additional discharge needs.    If patient is discharged prior to next notation, then this note serves as note for discharge by case management.    Electronically signed by Mali Sykes RN on 2/1/2024 at 2:59 PM

## 2024-02-01 NOTE — CONSULTS
[facility] -   Infectious diseases evaluation  admission date 1/29/2024    reason for consultation:   Leukocytosis    Impression :   Current:  Leukocytosis , elevated procalcitonin level, possible aspiration, no obvious source of infection .  Epigastric pain /nausea /dysphagia /esophageal thickening on CT /GI consulted   Shortness of breath /acute hypoxic respiratory failure on oxygen.  Diarrhea resolved  Hypoension resolved  Diabetes mellitus  Morbid obesity  Penicillin allergy    Recommendations   Follow blood and urine culture  Stool for C. difficile and GI panel pending  Discontinue cefepime  Ceftriaxone and Flagyl  IV vancomycin  Nasal swab for MRSA pending  Follow CBC, procalcitonin level and C-reactive protein  GI consulted    Infection Control Recommendations   Palms Precautions      Antimicrobial Stewardship Recommendations   Simplification of therapy  Targeted therapy      History of Present Illness:   Initial history:  Camelia Beltran is a 68 y.o.-year-old female was brought to the hospital by EMS for shortness of breath associated with generalized fatigue and diarrhea, reported several bowel movements of loose stools was resolved.  She is currently complaining of epigastric pain associated with nausea.  She is comfortable on oxygen per nasal cannula, denied significant cough, denied fever or chills, no urinary symptoms.  Patient was hypoxic and hypotensive initially.  She received 1 mL of epi by EMS.  Workup was significant for hyperkalemia, elevated lactic acid, elevated procalcitonin and C-reactive protein.  Urinalysis showed small leukocyte esterase.  CT abdomen and pelvis suggestive of esophagitis  Influenza/COVID-19 combo negative  Interval changes  1/31/2024   Patient Vitals for the past 8 hrs:   BP Temp Temp src Pulse Resp SpO2   01/31/24 1859 -- -- -- 89 19 94 %   01/31/24 1830 -- -- -- 76 16 (!) 89 %   01/31/24 1730 (!) 154/106 -- -- 77 21 (!) 
    Department of Internal Medicine  Nephrology Fahad Crespo MD   Consult Note    Reason for consultation: Management of acute kidney injury and chronic kidney disease stage IIIb.    Consulting physician: Ida Valencia MD.    History of present illness: This is a 68 y.o. female with a significant past medical history of  fibromyalgia, hyperlipidemia, morbid obesity, type 2 diabetes mellitus, chronic back pain, systemic hypertension and chronic kidney disease stage IIIb [baseline serum creatinine 1.3 mg/dL], was found on the floor the evening of January 29, 2024 by her  in respiratory distress and complaint of diarrhea.  EMS found patient to be hypotensive and gave 1 mg of epinephrine and started IV fluid and brought patient to the hospital.    Vital signs at presentation were remarkable for blood pressure 76/63 mmHg.  Lactic acid level was elevated at 3.9 mmol/L, high-sensitivity troponin 27; potassium 5.4 mmol/L and elevated BUN/creatinine 18/1.5 mg/dL and hence nephrology consultation.   Patient was seen and examined this morning and is on nocturnal CPAP.  She is also on Cardene drip for elevated blood pressure and is on IV fluids 0.9 normal saline at 75 mill per hour.    Adhesive tape, Morphine, Iron, Lisinopril, Metformin and related, Molds & smuts, and Pcn [penicillins]    Past Medical History:   Diagnosis Date    Acute hypoxemic respiratory failure ([facility])     Arthritis     CHF (congestive heart failure) ([facility])     Chronic back pain     Diabetes mellitus type II, controlled ([facility]) 2/14/2012    Fibromyalgia     Hyperlipidemia LDL goal < 70 2/14/2012    Hypertension, benign 02/14/2012    Morbid obesity with BMI of 60.0-69.9, adult ([facility]) 8/28/2015    Pain of toe of right foot     morgans cyst    Right wrist pain     rate 8    Skin cancer     skin under lt eye,face    Sleep apnea     Bipap does not work    Wears glasses      Scheduled Meds:   acyclovir  5 mg/kg (Adjusted) IntraVENous Q8H    pantoprazole 
   PULMONARY  CONSULT NOTE      Date of Admission: 1/29/2024 10:52 PM    Reason for Consult: AMS, resp failure    Referring Physician: Dr Perdue  PCP: Alida Archuleta MD     History of Present Illness:     68-year-old female presents to the ED on 1/29/24 via EMS for shortness of breath and fatigue.  Patient states that at approximately 11 PM on 1/29 she started feeling acutely ill and was found in the bathroom having diarrhea and shortness of breath.  Per EMS, patient's blood pressure was in the 60 to 70s and her oxygen saturation was also in the 70s requiring BVM with improvement.  Due to the low blood pressure on scene by EMS, they gave 1 mL of push dose epi.  On arrival in the ED, the patient was alert and oriented x4 but still requiring oxygen support with BVM.  Patient was transition to BiPAP.  She is feeling better but not at baseline.  She had similar episode 1 mo ago- ? Due to narcotics and AMS. She uses O2 at home/ has PAP at home but she is not using.    Problem:  Principal Problem:     PMH:   Past Medical History:   Diagnosis Date    Acute hypoxemic respiratory failure (H[facility])     Arthritis     CHF (congestive heart failure) ([facility])     Chronic back pain     Diabetes mellitus type II, controlled ([facility]) 2/14/2012    Fibromyalgia     Hyperlipidemia LDL goal < 70 2/14/2012    Hypertension, benign 02/14/2012    Morbid obesity with BMI of 60.0-69.9, adult ([facility]) 8/28/2015    Pain of toe of right foot     morgans cyst    Right wrist pain     rate 8    Skin cancer     skin under lt eye,face    Sleep apnea     Bipap does not work    Wears glasses        PSH:   Past Surgical History:   Procedure Laterality Date    CARPAL TUNNEL RELEASE Right 09/05/2014    CATARACT REMOVAL WITH IMPLANT Bilateral 2013    CHOLECYSTECTOMY, LAPAROSCOPIC  1998    COLONOSCOPY N/A 10/17/2019    COLONOSCOPY DIAGNOSTIC performed by Nadir Prieto MD at [facility] ENDO    CYST REMOVAL  1985    Polinadal cyst from base of tail bone    HYSTERECTOMY 
days. Max Daily Amount: 36 mg 1/25/24 2/24/24  Alida Archuleta MD   carvedilol (COREG) 12.5 MG tablet TAKE 1 TABLET BY MOUTH TWICE  DAILY WITH MEALS 1/16/24   Alida Archuleta MD   clonazePAM (KLONOPIN) 0.5 MG tablet Take 1 tablet by mouth 2 times daily as needed for Anxiety (tremors) for up to 30 days. Max Daily Amount: 1 mg 12/19/23 1/18/24  Alida Archuleta MD   amLODIPine (NORVASC) 10 MG tablet Take 1 tablet by mouth daily 12/14/23   Alida Archuleta MD   carvedilol (COREG) 6.25 MG tablet Take 1 tablet by mouth 2 times daily (with meals) 12/14/23   Alida Archuleta MD   naloxone 4 MG/0.1ML LIQD nasal spray 1 spray by Nasal route as needed for Opioid Reversal 12/14/23   Alida Archuleta MD   naloxone 4 MG/0.1ML LIQD nasal spray 1 spray by Nasal route as needed for Opioid Reversal  Patient not taking: Reported on 1/2/2024 12/14/23   Alida Archuleta MD   linagliptin (TRADJENTA) 5 MG tablet Take 1 tablet by mouth daily 12/12/23   Alida Archuleta MD   famotidine (PEPCID) 20 MG tablet TAKE 1 TABLET BY MOUTH TWICE  DAILY 12/11/23   Alida Archuleta MD   ondansetron (ZOFRAN) 4 MG tablet Take 1 tablet by mouth 3 times daily as needed for Nausea or Vomiting 10/9/23   Alida Archuleta MD   atorvastatin (LIPITOR) 40 MG tablet Take 1 tablet by mouth at bedtime 10/9/23   Alida Archuleta MD   losartan (COZAAR) 50 MG tablet Take 1 tablet by mouth daily 10/9/23   Alida Archuleta MD   furosemide (LASIX) 40 MG tablet Take 1 tablet by mouth daily 9/29/23   Alida Archuleta MD   calcium carbonate 600 MG TABS tablet Take 1 tablet by mouth in the morning and at bedtime    ProviderIsmael MD   Multiple Vitamins-Minerals (THERAPEUTIC MULTIVITAMIN-MINERALS) tablet Take 1 tablet by mouth daily    ProviderIsmael MD   glimepiride (AMARYL) 4 MG tablet TAKE 2 TABLETS BY MOUTH EVERY MORNING (BEFORE BREAKFAST) 11/2/22   Alida Archuleta MD Handicap Placard 
seen nephrology  Possibly UTI based on UA  Lactic acidosis, repeat downtrending  Acute on chronic hypoxic and hypercapnic respiratory failure; on oxygen at 2 L nasal cannula nightly  Chronic CO2 retainer, baseline CO2 around high 60s-suspect BRIANNA/OHS as well as central sleep apnea  Complex sleep apnea syndrome, sleep study 2013, original diagnostic study showed an AHI of 71; she had to repeat titration studies with CPAP and an auto SV.  The auto SV study showed her AHI was down to 6.7.  Patient was noncompliant and stopped using it; therefore has been placed on 2 L nasal cannula at night  Morbid obesity  Elevated ALP  Hyperglycemia  Elevated trops, at baseline  Acute esophagitis on CT 01/30/2024  Viral versus bacterial gastroenteritis on CT 01/30/2024  History of cholecystectomy  History of hysterectomy  Hypertension  Systolic HFpEF, most recent echo 11/14/2022 shows normal EF 60-65%, with moderate increased LV wall thickness  Hypotension-resolved  HLD  History of PUD with GI bleed  T2DM  Sinus bradycardia  Fibromyalgia and chronic pain, was on oxycodone and per chart it seems that it was weaned down on her last hospital admission 12/12/2023  Prior hospital admissions for home pain meds overdose with resultant worsening respiratory status  Normocytic anemia  Restrictive PFTs with negative methacholine challenge test in 2016  Full CODE STATUS    Plan:    Continue AVAPS  Initial presentation of bradycardia and hypotension, suggest holding Coreg for now and gradual reintroduction of antihypertensive medications  Avoid all narcotics and benzodiazepines  Follow-up on blood cultures  Consider GI panel  Okay with Rocephin and azithromycin-but do not feel that she has minimal    Start POC glucose monitoring with medium dose sliding scale    Give insulin/D10 for hyperkalemia    Start gentle fluids 75 cc/h LR    ICU monitoring for 24 hours    Isis Cesar  FM PGY 2     Patient seen and examined independently by me. Above 
colon.  No ileus or obstruction.  No appendicitis.  No acute diverticulitis. Pelvis: No pelvic mass.  Bladder is decompressed.  Hysterectomy.  No pelvic hemorrhage.  No lymphadenopathy. Peritoneum/Retroperitoneum: No abdominal aortic aneurysm.  Atherosclerosis. No retroperitoneal or mesenteric lymphadenopathy.  No bowel herniation is identified.  Fat containing umbilical hernia. Bones/Soft Tissues: Small calcified granuloma seen in the subcutaneous fat of the anterior abdominal wall.  These could be sequela of prior injections.  No acute fracture is identified in the pelvis.  Multilevel degenerative changes are noted within the lower thoracic and lumbar spine.  Posterior spinal fusion and discectomy changes seen at the level of L3 through S1.  No acute abnormality is identified.     1. Mild esophageal thickening seen in the midthoracic esophagus with minimal adjacent fat stranding. No focal discrete mass. Findings suggestive of acute esophagitis. 2. Liquid stool noted within the colon suggestive of an acute diarrheal illness without significant wall thickening or adjacent inflammatory change. 3. Chronic small airways disease. 4. Cholecystectomy. 5. Hysterectomy. 6. Fat containing umbilical hernia.     XR CHEST PORTABLE    Result Date: 1/30/2024  EXAMINATION: ONE XRAY VIEW OF THE CHEST 1/30/2024 12:47 am COMPARISON: 12/12/2023 HISTORY: ORDERING SYSTEM PROVIDED HISTORY: SOB TECHNOLOGIST PROVIDED HISTORY: SOB Reason for Exam: CXR due to dyspnea and fatigue. FINDINGS: Patient is rotated to the right.  Cardiac and mediastinal silhouettes appear within normal limits for size.  Pulmonary vascularity is normal.  No consolidation or pleural effusion.  No pneumothorax.  No acute osseous abnormality is identified.  Degenerative changes are seen in the spine.     No acute focal infiltrate.         IMPRESSION:   68 y.o. female admitted with hypotension, respiratory distress, weakness, diarrhea  No bowel movement since day of

## 2024-02-02 LAB
ANION GAP SERPL CALCULATED.3IONS-SCNC: 13 MMOL/L (ref 9–17)
BASOPHILS # BLD: 0.33 K/UL (ref 0–0.2)
BASOPHILS NFR BLD: 2 % (ref 0–2)
BUN SERPL-MCNC: 14 MG/DL (ref 8–23)
CALCIUM SERPL-MCNC: 8.8 MG/DL (ref 8.6–10.4)
CHLORIDE SERPL-SCNC: 105 MMOL/L (ref 98–107)
CO2 SERPL-SCNC: 26 MMOL/L (ref 20–31)
CREAT SERPL-MCNC: 0.9 MG/DL (ref 0.5–0.9)
DATE, STOOL #1: NORMAL
EOSINOPHIL # BLD: 0.33 K/UL (ref 0–0.4)
EOSINOPHILS RELATIVE PERCENT: 2 % (ref 0–4)
ERYTHROCYTE [DISTWIDTH] IN BLOOD BY AUTOMATED COUNT: 19.4 % (ref 11.5–14.9)
GFR SERPL CREATININE-BSD FRML MDRD: >60 ML/MIN/1.73M2
GLUCOSE BLD-MCNC: 144 MG/DL (ref 65–105)
GLUCOSE BLD-MCNC: 168 MG/DL (ref 65–105)
GLUCOSE BLD-MCNC: 176 MG/DL (ref 65–105)
GLUCOSE SERPL-MCNC: 148 MG/DL (ref 70–99)
HCT VFR BLD AUTO: 28 % (ref 36–46)
HEMOCCULT SP1 STL QL: NEGATIVE
HGB BLD-MCNC: 8.6 G/DL (ref 12–16)
LYMPHOCYTES NFR BLD: 0.49 K/UL (ref 1–4.8)
LYMPHOCYTES RELATIVE PERCENT: 3 % (ref 24–44)
MCH RBC QN AUTO: 26.2 PG (ref 26–34)
MCHC RBC AUTO-ENTMCNC: 30.8 G/DL (ref 31–37)
MCV RBC AUTO: 85 FL (ref 80–100)
MONOCYTES NFR BLD: 0.82 K/UL (ref 0.1–1.3)
MONOCYTES NFR BLD: 5 % (ref 1–7)
MORPHOLOGY: ABNORMAL
NEUTROPHILS NFR BLD: 88 % (ref 36–66)
NEUTS SEG NFR BLD: 14.33 K/UL (ref 1.3–9.1)
PLATELET # BLD AUTO: 333 K/UL (ref 150–450)
PMV BLD AUTO: 8.5 FL (ref 6–12)
POTASSIUM SERPL-SCNC: 3.6 MMOL/L (ref 3.7–5.3)
RBC # BLD AUTO: 3.3 M/UL (ref 4–5.2)
SODIUM SERPL-SCNC: 144 MMOL/L (ref 135–144)
TIME, STOOL #1: 2246
WBC OTHER # BLD: 16.3 K/UL (ref 3.5–11)

## 2024-02-02 PROCEDURE — 87506 IADNA-DNA/RNA PROBE TQ 6-11: CPT

## 2024-02-02 PROCEDURE — 82270 OCCULT BLOOD FECES: CPT

## 2024-02-02 PROCEDURE — 6370000000 HC RX 637 (ALT 250 FOR IP): Performed by: NURSE PRACTITIONER

## 2024-02-02 PROCEDURE — 2500000003 HC RX 250 WO HCPCS: Performed by: STUDENT IN AN ORGANIZED HEALTH CARE EDUCATION/TRAINING PROGRAM

## 2024-02-02 PROCEDURE — 2580000003 HC RX 258: Performed by: STUDENT IN AN ORGANIZED HEALTH CARE EDUCATION/TRAINING PROGRAM

## 2024-02-02 PROCEDURE — 82947 ASSAY GLUCOSE BLOOD QUANT: CPT

## 2024-02-02 PROCEDURE — 94660 CPAP INITIATION&MGMT: CPT

## 2024-02-02 PROCEDURE — 6360000002 HC RX W HCPCS: Performed by: STUDENT IN AN ORGANIZED HEALTH CARE EDUCATION/TRAINING PROGRAM

## 2024-02-02 PROCEDURE — 36415 COLL VENOUS BLD VENIPUNCTURE: CPT

## 2024-02-02 PROCEDURE — 2700000000 HC OXYGEN THERAPY PER DAY

## 2024-02-02 PROCEDURE — 2580000003 HC RX 258: Performed by: INTERNAL MEDICINE

## 2024-02-02 PROCEDURE — 2060000000 HC ICU INTERMEDIATE R&B

## 2024-02-02 PROCEDURE — A4216 STERILE WATER/SALINE, 10 ML: HCPCS | Performed by: STUDENT IN AN ORGANIZED HEALTH CARE EDUCATION/TRAINING PROGRAM

## 2024-02-02 PROCEDURE — C9113 INJ PANTOPRAZOLE SODIUM, VIA: HCPCS | Performed by: STUDENT IN AN ORGANIZED HEALTH CARE EDUCATION/TRAINING PROGRAM

## 2024-02-02 PROCEDURE — 99233 SBSQ HOSP IP/OBS HIGH 50: CPT | Performed by: INTERNAL MEDICINE

## 2024-02-02 PROCEDURE — 94761 N-INVAS EAR/PLS OXIMETRY MLT: CPT

## 2024-02-02 PROCEDURE — 6360000002 HC RX W HCPCS: Performed by: INTERNAL MEDICINE

## 2024-02-02 PROCEDURE — 6370000000 HC RX 637 (ALT 250 FOR IP): Performed by: STUDENT IN AN ORGANIZED HEALTH CARE EDUCATION/TRAINING PROGRAM

## 2024-02-02 PROCEDURE — APPNB30 APP NON BILLABLE TIME 0-30 MINS: Performed by: NURSE PRACTITIONER

## 2024-02-02 PROCEDURE — 85025 COMPLETE CBC W/AUTO DIFF WBC: CPT

## 2024-02-02 PROCEDURE — 99233 SBSQ HOSP IP/OBS HIGH 50: CPT | Performed by: STUDENT IN AN ORGANIZED HEALTH CARE EDUCATION/TRAINING PROGRAM

## 2024-02-02 PROCEDURE — 80048 BASIC METABOLIC PNL TOTAL CA: CPT

## 2024-02-02 RX ORDER — POLYETHYLENE GLYCOL 3350 17 G/17G
17 POWDER, FOR SOLUTION ORAL DAILY
Status: DISCONTINUED | OUTPATIENT
Start: 2024-02-02 | End: 2024-02-07 | Stop reason: HOSPADM

## 2024-02-02 RX ORDER — SODIUM CHLORIDE 450 MG/100ML
INJECTION, SOLUTION INTRAVENOUS CONTINUOUS
Status: DISCONTINUED | OUTPATIENT
Start: 2024-02-02 | End: 2024-02-04

## 2024-02-02 RX ORDER — ONDANSETRON 2 MG/ML
4 INJECTION INTRAMUSCULAR; INTRAVENOUS EVERY 4 HOURS PRN
Status: DISCONTINUED | OUTPATIENT
Start: 2024-02-02 | End: 2024-02-02 | Stop reason: SDUPTHER

## 2024-02-02 RX ADMIN — METRONIDAZOLE 500 MG: 500 INJECTION, SOLUTION INTRAVENOUS at 05:29

## 2024-02-02 RX ADMIN — POLYETHYLENE GLYCOL 3350 17 G: 17 POWDER, FOR SOLUTION ORAL at 15:39

## 2024-02-02 RX ADMIN — AMLODIPINE BESYLATE 10 MG: 10 TABLET ORAL at 07:33

## 2024-02-02 RX ADMIN — SODIUM CHLORIDE 7.5 MG/HR: 9 INJECTION, SOLUTION INTRAVENOUS at 17:18

## 2024-02-02 RX ADMIN — HYDRALAZINE HYDROCHLORIDE 25 MG: 25 TABLET, FILM COATED ORAL at 12:55

## 2024-02-02 RX ADMIN — FUROSEMIDE 20 MG: 10 INJECTION, SOLUTION INTRAMUSCULAR; INTRAVENOUS at 07:33

## 2024-02-02 RX ADMIN — ONDANSETRON HYDROCHLORIDE 4 MG: 2 SOLUTION INTRAMUSCULAR; INTRAVENOUS at 21:13

## 2024-02-02 RX ADMIN — SODIUM CHLORIDE 12 MG/HR: 9 INJECTION, SOLUTION INTRAVENOUS at 05:18

## 2024-02-02 RX ADMIN — ENOXAPARIN SODIUM 30 MG: 100 INJECTION SUBCUTANEOUS at 21:07

## 2024-02-02 RX ADMIN — METOPROLOL TARTRATE 5 MG: 5 INJECTION, SOLUTION INTRAVENOUS at 05:59

## 2024-02-02 RX ADMIN — ACYCLOVIR SODIUM 400 MG: 50 INJECTION, SOLUTION INTRAVENOUS at 02:14

## 2024-02-02 RX ADMIN — METRONIDAZOLE 500 MG: 500 INJECTION, SOLUTION INTRAVENOUS at 13:50

## 2024-02-02 RX ADMIN — SODIUM CHLORIDE, PRESERVATIVE FREE 10 ML: 5 INJECTION INTRAVENOUS at 21:07

## 2024-02-02 RX ADMIN — ACETAMINOPHEN 650 MG: 325 TABLET, FILM COATED ORAL at 23:23

## 2024-02-02 RX ADMIN — ACETAMINOPHEN 650 MG: 325 TABLET, FILM COATED ORAL at 11:51

## 2024-02-02 RX ADMIN — METOPROLOL TARTRATE 5 MG: 5 INJECTION, SOLUTION INTRAVENOUS at 11:52

## 2024-02-02 RX ADMIN — HYDRALAZINE HYDROCHLORIDE 25 MG: 25 TABLET, FILM COATED ORAL at 20:59

## 2024-02-02 RX ADMIN — METRONIDAZOLE 500 MG: 500 INJECTION, SOLUTION INTRAVENOUS at 22:20

## 2024-02-02 RX ADMIN — SODIUM CHLORIDE 7.5 MG/HR: 9 INJECTION, SOLUTION INTRAVENOUS at 20:58

## 2024-02-02 RX ADMIN — CARVEDILOL 12.5 MG: 12.5 TABLET, FILM COATED ORAL at 07:33

## 2024-02-02 RX ADMIN — CARVEDILOL 12.5 MG: 12.5 TABLET, FILM COATED ORAL at 15:39

## 2024-02-02 RX ADMIN — ACETAMINOPHEN 650 MG: 325 TABLET, FILM COATED ORAL at 17:19

## 2024-02-02 RX ADMIN — SODIUM CHLORIDE, PRESERVATIVE FREE 40 MG: 5 INJECTION INTRAVENOUS at 19:54

## 2024-02-02 RX ADMIN — ATORVASTATIN CALCIUM 40 MG: 40 TABLET, FILM COATED ORAL at 20:59

## 2024-02-02 RX ADMIN — SODIUM CHLORIDE: 4.5 INJECTION, SOLUTION INTRAVENOUS at 13:49

## 2024-02-02 RX ADMIN — ANTI-FUNGAL POWDER MICONAZOLE NITRATE TALC FREE: 1.42 POWDER TOPICAL at 21:07

## 2024-02-02 RX ADMIN — ACYCLOVIR SODIUM 400 MG: 50 INJECTION, SOLUTION INTRAVENOUS at 09:59

## 2024-02-02 RX ADMIN — HYDRALAZINE HYDROCHLORIDE 10 MG: 20 INJECTION, SOLUTION INTRAMUSCULAR; INTRAVENOUS at 23:23

## 2024-02-02 RX ADMIN — SODIUM CHLORIDE 12 MG/HR: 9 INJECTION, SOLUTION INTRAVENOUS at 02:15

## 2024-02-02 RX ADMIN — METOPROLOL TARTRATE 5 MG: 5 INJECTION, SOLUTION INTRAVENOUS at 00:10

## 2024-02-02 RX ADMIN — METOPROLOL TARTRATE 5 MG: 5 INJECTION, SOLUTION INTRAVENOUS at 17:21

## 2024-02-02 RX ADMIN — HYDRALAZINE HYDROCHLORIDE 25 MG: 25 TABLET, FILM COATED ORAL at 07:32

## 2024-02-02 RX ADMIN — SODIUM CHLORIDE 12 MG/HR: 9 INJECTION, SOLUTION INTRAVENOUS at 00:09

## 2024-02-02 RX ADMIN — ACETAMINOPHEN 650 MG: 325 TABLET, FILM COATED ORAL at 06:30

## 2024-02-02 RX ADMIN — ANTI-FUNGAL POWDER MICONAZOLE NITRATE TALC FREE: 1.42 POWDER TOPICAL at 07:23

## 2024-02-02 RX ADMIN — CEFTRIAXONE SODIUM 1000 MG: 1 INJECTION, POWDER, FOR SOLUTION INTRAMUSCULAR; INTRAVENOUS at 21:06

## 2024-02-02 RX ADMIN — SODIUM CHLORIDE, PRESERVATIVE FREE 40 MG: 5 INJECTION INTRAVENOUS at 07:33

## 2024-02-02 ASSESSMENT — PAIN SCALES - WONG BAKER

## 2024-02-02 ASSESSMENT — PAIN DESCRIPTION - DESCRIPTORS
DESCRIPTORS: ACHING
DESCRIPTORS: ACHING

## 2024-02-02 ASSESSMENT — PAIN SCALES - GENERAL
PAINLEVEL_OUTOF10: 8
PAINLEVEL_OUTOF10: 1
PAINLEVEL_OUTOF10: 3
PAINLEVEL_OUTOF10: 5
PAINLEVEL_OUTOF10: 8
PAINLEVEL_OUTOF10: 9

## 2024-02-02 ASSESSMENT — PAIN DESCRIPTION - LOCATION
LOCATION: HEAD

## 2024-02-02 ASSESSMENT — PAIN DESCRIPTION - ORIENTATION: ORIENTATION: MID

## 2024-02-02 NOTE — CARE COORDINATION
ONGOING DISCHARGE PLAN:    Patient is alert and oriented x4.    Spoke with patient regarding discharge plan and patient confirms that plan is still home with [facility]. [facility] accept if needed.      2/1 repeat KUB negative  GI consult  Hgb 8.6    Surgery consult  Diarrhea resolved    Full liquid diet     IV rocephin/flagyl/acyclovir per ID  Follow cultures    IV lasix daily     HTN on cardene drip    96% on 2L    PT/OT      Will continue to follow for additional discharge needs.    If patient is discharged prior to next notation, then this note serves as note for discharge by case management.    Electronically signed by Mali Sykes RN on 2/2/2024 at 1:05 PM

## 2024-02-03 PROBLEM — M62.81 MUSCLE WEAKNESS (GENERALIZED): Status: ACTIVE | Noted: 2023-12-19

## 2024-02-03 PROBLEM — R26.81 UNSTEADINESS ON FEET: Status: ACTIVE | Noted: 2023-12-19

## 2024-02-03 PROBLEM — F32.A DEPRESSION, UNSPECIFIED: Status: ACTIVE | Noted: 2023-12-26

## 2024-02-03 LAB
ABSOLUTE BANDS: 0.62 K/UL (ref 0–1)
ANION GAP SERPL CALCULATED.3IONS-SCNC: 14 MMOL/L (ref 9–17)
BANDS: 3 % (ref 0–10)
BASOPHILS # BLD: 0 K/UL (ref 0–0.2)
BASOPHILS NFR BLD: 0 % (ref 0–2)
BUN SERPL-MCNC: 12 MG/DL (ref 8–23)
CALCIUM SERPL-MCNC: 8.6 MG/DL (ref 8.6–10.4)
CAMPYLOBACTER DNA SPEC NAA+PROBE: NORMAL
CHLORIDE SERPL-SCNC: 102 MMOL/L (ref 98–107)
CO2 SERPL-SCNC: 23 MMOL/L (ref 20–31)
CREAT SERPL-MCNC: 0.9 MG/DL (ref 0.5–0.9)
CRP SERPL HS-MCNC: 42.9 MG/L (ref 0–5)
EOSINOPHIL # BLD: 0.21 K/UL (ref 0–0.4)
EOSINOPHILS RELATIVE PERCENT: 1 % (ref 0–4)
ERYTHROCYTE [DISTWIDTH] IN BLOOD BY AUTOMATED COUNT: 19.4 % (ref 11.5–14.9)
ETEC ELTA+ESTB GENES STL QL NAA+PROBE: NORMAL
GFR SERPL CREATININE-BSD FRML MDRD: >60 ML/MIN/1.73M2
GLUCOSE BLD-MCNC: 148 MG/DL (ref 65–105)
GLUCOSE BLD-MCNC: 158 MG/DL (ref 65–105)
GLUCOSE BLD-MCNC: 177 MG/DL (ref 65–105)
GLUCOSE BLD-MCNC: 197 MG/DL (ref 65–105)
GLUCOSE SERPL-MCNC: 166 MG/DL (ref 70–99)
HCT VFR BLD AUTO: 30 % (ref 36–46)
HGB BLD-MCNC: 9.2 G/DL (ref 12–16)
LYMPHOCYTES NFR BLD: 0.41 K/UL (ref 1–4.8)
LYMPHOCYTES RELATIVE PERCENT: 2 % (ref 24–44)
MCH RBC QN AUTO: 26.3 PG (ref 26–34)
MCHC RBC AUTO-ENTMCNC: 30.6 G/DL (ref 31–37)
MCV RBC AUTO: 86 FL (ref 80–100)
MONOCYTES NFR BLD: 0.82 K/UL (ref 0.1–1.3)
MONOCYTES NFR BLD: 4 % (ref 1–7)
MORPHOLOGY: ABNORMAL
MORPHOLOGY: ABNORMAL
NEUTROPHILS NFR BLD: 90 % (ref 36–66)
NEUTS SEG NFR BLD: 18.54 K/UL (ref 1.3–9.1)
P SHIGELLOIDES DNA STL QL NAA+PROBE: NORMAL
PLATELET # BLD AUTO: 353 K/UL (ref 150–450)
PMV BLD AUTO: 8.9 FL (ref 6–12)
POTASSIUM SERPL-SCNC: 3.7 MMOL/L (ref 3.7–5.3)
PROCALCITONIN SERPL-MCNC: 1.08 NG/ML
RBC # BLD AUTO: 3.49 M/UL (ref 4–5.2)
SALMONELLA DNA SPEC QL NAA+PROBE: NORMAL
SHIGA TOXIN STX GENE SPEC NAA+PROBE: NORMAL
SHIGELLA DNA SPEC QL NAA+PROBE: NORMAL
SODIUM SERPL-SCNC: 139 MMOL/L (ref 135–144)
SPECIMEN DESCRIPTION: NORMAL
V CHOL+PARA RFBL+TRKH+TNAA STL QL NAA+PR: NORMAL
WBC OTHER # BLD: 20.6 K/UL (ref 3.5–11)
Y ENTERO RECN STL QL NAA+PROBE: NORMAL

## 2024-02-03 PROCEDURE — 2500000003 HC RX 250 WO HCPCS: Performed by: STUDENT IN AN ORGANIZED HEALTH CARE EDUCATION/TRAINING PROGRAM

## 2024-02-03 PROCEDURE — 2580000003 HC RX 258: Performed by: INTERNAL MEDICINE

## 2024-02-03 PROCEDURE — 6370000000 HC RX 637 (ALT 250 FOR IP): Performed by: INTERNAL MEDICINE

## 2024-02-03 PROCEDURE — C9113 INJ PANTOPRAZOLE SODIUM, VIA: HCPCS | Performed by: STUDENT IN AN ORGANIZED HEALTH CARE EDUCATION/TRAINING PROGRAM

## 2024-02-03 PROCEDURE — 2700000000 HC OXYGEN THERAPY PER DAY

## 2024-02-03 PROCEDURE — APPNB30 APP NON BILLABLE TIME 0-30 MINS: Performed by: NURSE PRACTITIONER

## 2024-02-03 PROCEDURE — 84145 PROCALCITONIN (PCT): CPT

## 2024-02-03 PROCEDURE — 6370000000 HC RX 637 (ALT 250 FOR IP): Performed by: STUDENT IN AN ORGANIZED HEALTH CARE EDUCATION/TRAINING PROGRAM

## 2024-02-03 PROCEDURE — 99233 SBSQ HOSP IP/OBS HIGH 50: CPT | Performed by: FAMILY MEDICINE

## 2024-02-03 PROCEDURE — 2060000000 HC ICU INTERMEDIATE R&B

## 2024-02-03 PROCEDURE — 99232 SBSQ HOSP IP/OBS MODERATE 35: CPT | Performed by: INTERNAL MEDICINE

## 2024-02-03 PROCEDURE — 6360000002 HC RX W HCPCS: Performed by: INTERNAL MEDICINE

## 2024-02-03 PROCEDURE — 6360000002 HC RX W HCPCS: Performed by: STUDENT IN AN ORGANIZED HEALTH CARE EDUCATION/TRAINING PROGRAM

## 2024-02-03 PROCEDURE — 99233 SBSQ HOSP IP/OBS HIGH 50: CPT | Performed by: INTERNAL MEDICINE

## 2024-02-03 PROCEDURE — 80048 BASIC METABOLIC PNL TOTAL CA: CPT

## 2024-02-03 PROCEDURE — 82947 ASSAY GLUCOSE BLOOD QUANT: CPT

## 2024-02-03 PROCEDURE — 2580000003 HC RX 258: Performed by: STUDENT IN AN ORGANIZED HEALTH CARE EDUCATION/TRAINING PROGRAM

## 2024-02-03 PROCEDURE — 94660 CPAP INITIATION&MGMT: CPT

## 2024-02-03 PROCEDURE — 85025 COMPLETE CBC W/AUTO DIFF WBC: CPT

## 2024-02-03 PROCEDURE — 36415 COLL VENOUS BLD VENIPUNCTURE: CPT

## 2024-02-03 PROCEDURE — 86140 C-REACTIVE PROTEIN: CPT

## 2024-02-03 PROCEDURE — 94761 N-INVAS EAR/PLS OXIMETRY MLT: CPT

## 2024-02-03 RX ORDER — CARVEDILOL 25 MG/1
25 TABLET ORAL 2 TIMES DAILY WITH MEALS
Status: DISCONTINUED | OUTPATIENT
Start: 2024-02-03 | End: 2024-02-07 | Stop reason: HOSPADM

## 2024-02-03 RX ORDER — NIFEDIPINE 30 MG/1
90 TABLET, FILM COATED, EXTENDED RELEASE ORAL DAILY
Status: DISCONTINUED | OUTPATIENT
Start: 2024-02-03 | End: 2024-02-07 | Stop reason: HOSPADM

## 2024-02-03 RX ADMIN — INSULIN GLARGINE 10 UNITS: 100 INJECTION, SOLUTION SUBCUTANEOUS at 07:29

## 2024-02-03 RX ADMIN — ANTI-FUNGAL POWDER MICONAZOLE NITRATE TALC FREE: 1.42 POWDER TOPICAL at 21:04

## 2024-02-03 RX ADMIN — METOPROLOL TARTRATE 5 MG: 5 INJECTION, SOLUTION INTRAVENOUS at 00:31

## 2024-02-03 RX ADMIN — SODIUM CHLORIDE 10 MG/HR: 9 INJECTION, SOLUTION INTRAVENOUS at 06:10

## 2024-02-03 RX ADMIN — HYDRALAZINE HYDROCHLORIDE 10 MG: 20 INJECTION, SOLUTION INTRAMUSCULAR; INTRAVENOUS at 17:29

## 2024-02-03 RX ADMIN — AMLODIPINE BESYLATE 10 MG: 10 TABLET ORAL at 07:28

## 2024-02-03 RX ADMIN — METRONIDAZOLE 500 MG: 500 INJECTION, SOLUTION INTRAVENOUS at 13:29

## 2024-02-03 RX ADMIN — HYDRALAZINE HYDROCHLORIDE 25 MG: 25 TABLET, FILM COATED ORAL at 20:59

## 2024-02-03 RX ADMIN — HYDRALAZINE HYDROCHLORIDE 25 MG: 25 TABLET, FILM COATED ORAL at 12:59

## 2024-02-03 RX ADMIN — CEFTRIAXONE SODIUM 1000 MG: 1 INJECTION, POWDER, FOR SOLUTION INTRAMUSCULAR; INTRAVENOUS at 20:57

## 2024-02-03 RX ADMIN — METOPROLOL TARTRATE 5 MG: 5 INJECTION, SOLUTION INTRAVENOUS at 11:26

## 2024-02-03 RX ADMIN — METOPROLOL TARTRATE 5 MG: 5 INJECTION, SOLUTION INTRAVENOUS at 05:19

## 2024-02-03 RX ADMIN — SODIUM CHLORIDE 7.5 MG/HR: 9 INJECTION, SOLUTION INTRAVENOUS at 00:50

## 2024-02-03 RX ADMIN — SODIUM CHLORIDE: 4.5 INJECTION, SOLUTION INTRAVENOUS at 15:43

## 2024-02-03 RX ADMIN — METRONIDAZOLE 500 MG: 500 INJECTION, SOLUTION INTRAVENOUS at 05:20

## 2024-02-03 RX ADMIN — FUROSEMIDE 20 MG: 10 INJECTION, SOLUTION INTRAMUSCULAR; INTRAVENOUS at 07:29

## 2024-02-03 RX ADMIN — ENOXAPARIN SODIUM 30 MG: 100 INJECTION SUBCUTANEOUS at 20:58

## 2024-02-03 RX ADMIN — ENOXAPARIN SODIUM 30 MG: 100 INJECTION SUBCUTANEOUS at 07:28

## 2024-02-03 RX ADMIN — ANTI-FUNGAL POWDER MICONAZOLE NITRATE TALC FREE: 1.42 POWDER TOPICAL at 07:19

## 2024-02-03 RX ADMIN — CARVEDILOL 12.5 MG: 12.5 TABLET, FILM COATED ORAL at 07:28

## 2024-02-03 RX ADMIN — ATORVASTATIN CALCIUM 40 MG: 40 TABLET, FILM COATED ORAL at 20:59

## 2024-02-03 RX ADMIN — METOPROLOL TARTRATE 5 MG: 5 INJECTION, SOLUTION INTRAVENOUS at 18:08

## 2024-02-03 RX ADMIN — SODIUM CHLORIDE, PRESERVATIVE FREE 40 MG: 5 INJECTION INTRAVENOUS at 20:59

## 2024-02-03 RX ADMIN — CARVEDILOL 25 MG: 25 TABLET, FILM COATED ORAL at 16:42

## 2024-02-03 RX ADMIN — SODIUM CHLORIDE, PRESERVATIVE FREE 40 MG: 5 INJECTION INTRAVENOUS at 07:29

## 2024-02-03 RX ADMIN — METRONIDAZOLE 500 MG: 500 INJECTION, SOLUTION INTRAVENOUS at 21:55

## 2024-02-03 RX ADMIN — SODIUM CHLORIDE 10 MG/HR: 9 INJECTION, SOLUTION INTRAVENOUS at 08:56

## 2024-02-03 RX ADMIN — ACETAMINOPHEN 650 MG: 325 TABLET, FILM COATED ORAL at 09:00

## 2024-02-03 RX ADMIN — NIFEDIPINE 90 MG: 30 TABLET, EXTENDED RELEASE ORAL at 16:42

## 2024-02-03 RX ADMIN — SODIUM CHLORIDE, PRESERVATIVE FREE 10 ML: 5 INJECTION INTRAVENOUS at 20:59

## 2024-02-03 RX ADMIN — HYDRALAZINE HYDROCHLORIDE 25 MG: 25 TABLET, FILM COATED ORAL at 05:19

## 2024-02-03 RX ADMIN — SODIUM CHLORIDE 10 MG/HR: 9 INJECTION, SOLUTION INTRAVENOUS at 03:26

## 2024-02-03 ASSESSMENT — PAIN SCALES - GENERAL
PAINLEVEL_OUTOF10: 5
PAINLEVEL_OUTOF10: 0
PAINLEVEL_OUTOF10: 4
PAINLEVEL_OUTOF10: 9

## 2024-02-03 ASSESSMENT — PAIN DESCRIPTION - LOCATION
LOCATION: HEAD
LOCATION: HEAD

## 2024-02-03 ASSESSMENT — PAIN DESCRIPTION - DESCRIPTORS: DESCRIPTORS: ACHING

## 2024-02-03 ASSESSMENT — PAIN SCALES - WONG BAKER

## 2024-02-03 ASSESSMENT — PAIN DESCRIPTION - ORIENTATION: ORIENTATION: MID

## 2024-02-04 LAB
ANION GAP SERPL CALCULATED.3IONS-SCNC: 15 MMOL/L (ref 9–17)
BASOPHILS # BLD: 0.15 K/UL (ref 0–0.2)
BASOPHILS NFR BLD: 1 % (ref 0–2)
BUN SERPL-MCNC: 14 MG/DL (ref 8–23)
CALCIUM SERPL-MCNC: 8.7 MG/DL (ref 8.6–10.4)
CHLORIDE SERPL-SCNC: 101 MMOL/L (ref 98–107)
CO2 SERPL-SCNC: 22 MMOL/L (ref 20–31)
CREAT SERPL-MCNC: 1 MG/DL (ref 0.5–0.9)
EOSINOPHIL # BLD: 0.45 K/UL (ref 0–0.4)
EOSINOPHILS RELATIVE PERCENT: 3 % (ref 0–4)
ERYTHROCYTE [DISTWIDTH] IN BLOOD BY AUTOMATED COUNT: 19.2 % (ref 11.5–14.9)
GFR SERPL CREATININE-BSD FRML MDRD: >60 ML/MIN/1.73M2
GLUCOSE BLD-MCNC: 166 MG/DL (ref 65–105)
GLUCOSE BLD-MCNC: 186 MG/DL (ref 65–105)
GLUCOSE BLD-MCNC: 222 MG/DL (ref 65–105)
GLUCOSE SERPL-MCNC: 135 MG/DL (ref 70–99)
HCT VFR BLD AUTO: 27.3 % (ref 36–46)
HGB BLD-MCNC: 8.4 G/DL (ref 12–16)
LYMPHOCYTES NFR BLD: 1.19 K/UL (ref 1–4.8)
LYMPHOCYTES RELATIVE PERCENT: 8 % (ref 24–44)
MCH RBC QN AUTO: 26 PG (ref 26–34)
MCHC RBC AUTO-ENTMCNC: 30.9 G/DL (ref 31–37)
MCV RBC AUTO: 84.4 FL (ref 80–100)
MICROORGANISM SPEC CULT: NORMAL
MICROORGANISM SPEC CULT: NORMAL
MONOCYTES NFR BLD: 0.6 K/UL (ref 0.1–1.3)
MONOCYTES NFR BLD: 4 % (ref 1–7)
MORPHOLOGY: ABNORMAL
NEUTROPHILS NFR BLD: 84 % (ref 36–66)
NEUTS SEG NFR BLD: 12.51 K/UL (ref 1.3–9.1)
PLATELET # BLD AUTO: 311 K/UL (ref 150–450)
PMV BLD AUTO: 9.1 FL (ref 6–12)
POTASSIUM SERPL-SCNC: 3.1 MMOL/L (ref 3.7–5.3)
RBC # BLD AUTO: 3.24 M/UL (ref 4–5.2)
SERVICE CMNT-IMP: NORMAL
SERVICE CMNT-IMP: NORMAL
SODIUM SERPL-SCNC: 138 MMOL/L (ref 135–144)
SPECIMEN DESCRIPTION: NORMAL
SPECIMEN DESCRIPTION: NORMAL
WBC OTHER # BLD: 14.9 K/UL (ref 3.5–11)

## 2024-02-04 PROCEDURE — 97162 PT EVAL MOD COMPLEX 30 MIN: CPT

## 2024-02-04 PROCEDURE — 94660 CPAP INITIATION&MGMT: CPT

## 2024-02-04 PROCEDURE — 2580000003 HC RX 258: Performed by: STUDENT IN AN ORGANIZED HEALTH CARE EDUCATION/TRAINING PROGRAM

## 2024-02-04 PROCEDURE — 94761 N-INVAS EAR/PLS OXIMETRY MLT: CPT

## 2024-02-04 PROCEDURE — 2700000000 HC OXYGEN THERAPY PER DAY

## 2024-02-04 PROCEDURE — 99233 SBSQ HOSP IP/OBS HIGH 50: CPT | Performed by: INTERNAL MEDICINE

## 2024-02-04 PROCEDURE — A4216 STERILE WATER/SALINE, 10 ML: HCPCS | Performed by: STUDENT IN AN ORGANIZED HEALTH CARE EDUCATION/TRAINING PROGRAM

## 2024-02-04 PROCEDURE — 85025 COMPLETE CBC W/AUTO DIFF WBC: CPT

## 2024-02-04 PROCEDURE — 2060000000 HC ICU INTERMEDIATE R&B

## 2024-02-04 PROCEDURE — 36415 COLL VENOUS BLD VENIPUNCTURE: CPT

## 2024-02-04 PROCEDURE — 2500000003 HC RX 250 WO HCPCS: Performed by: STUDENT IN AN ORGANIZED HEALTH CARE EDUCATION/TRAINING PROGRAM

## 2024-02-04 PROCEDURE — 82947 ASSAY GLUCOSE BLOOD QUANT: CPT

## 2024-02-04 PROCEDURE — 99231 SBSQ HOSP IP/OBS SF/LOW 25: CPT | Performed by: INTERNAL MEDICINE

## 2024-02-04 PROCEDURE — 6360000002 HC RX W HCPCS: Performed by: STUDENT IN AN ORGANIZED HEALTH CARE EDUCATION/TRAINING PROGRAM

## 2024-02-04 PROCEDURE — 6370000000 HC RX 637 (ALT 250 FOR IP): Performed by: INTERNAL MEDICINE

## 2024-02-04 PROCEDURE — 80048 BASIC METABOLIC PNL TOTAL CA: CPT

## 2024-02-04 PROCEDURE — 6370000000 HC RX 637 (ALT 250 FOR IP): Performed by: STUDENT IN AN ORGANIZED HEALTH CARE EDUCATION/TRAINING PROGRAM

## 2024-02-04 PROCEDURE — C9113 INJ PANTOPRAZOLE SODIUM, VIA: HCPCS | Performed by: STUDENT IN AN ORGANIZED HEALTH CARE EDUCATION/TRAINING PROGRAM

## 2024-02-04 PROCEDURE — 82088 ASSAY OF ALDOSTERONE: CPT

## 2024-02-04 PROCEDURE — 97530 THERAPEUTIC ACTIVITIES: CPT

## 2024-02-04 PROCEDURE — 84244 ASSAY OF RENIN: CPT

## 2024-02-04 PROCEDURE — 99233 SBSQ HOSP IP/OBS HIGH 50: CPT | Performed by: FAMILY MEDICINE

## 2024-02-04 RX ORDER — SPIRONOLACTONE 25 MG/1
25 TABLET ORAL DAILY
Status: DISCONTINUED | OUTPATIENT
Start: 2024-02-04 | End: 2024-02-07 | Stop reason: HOSPADM

## 2024-02-04 RX ORDER — DOXYCYCLINE 100 MG/1
100 CAPSULE ORAL EVERY 12 HOURS SCHEDULED
Status: DISCONTINUED | OUTPATIENT
Start: 2024-02-04 | End: 2024-02-07 | Stop reason: HOSPADM

## 2024-02-04 RX ADMIN — ACETAMINOPHEN 650 MG: 325 TABLET, FILM COATED ORAL at 13:11

## 2024-02-04 RX ADMIN — SODIUM CHLORIDE, PRESERVATIVE FREE 10 ML: 5 INJECTION INTRAVENOUS at 08:02

## 2024-02-04 RX ADMIN — SODIUM CHLORIDE, PRESERVATIVE FREE 40 MG: 5 INJECTION INTRAVENOUS at 07:55

## 2024-02-04 RX ADMIN — METRONIDAZOLE 500 MG: 500 INJECTION, SOLUTION INTRAVENOUS at 15:44

## 2024-02-04 RX ADMIN — CARVEDILOL 25 MG: 25 TABLET, FILM COATED ORAL at 17:13

## 2024-02-04 RX ADMIN — ACETAMINOPHEN 650 MG: 325 TABLET, FILM COATED ORAL at 19:55

## 2024-02-04 RX ADMIN — CARVEDILOL 25 MG: 25 TABLET, FILM COATED ORAL at 07:54

## 2024-02-04 RX ADMIN — ENOXAPARIN SODIUM 30 MG: 100 INJECTION SUBCUTANEOUS at 19:55

## 2024-02-04 RX ADMIN — SODIUM CHLORIDE, PRESERVATIVE FREE 40 MG: 5 INJECTION INTRAVENOUS at 19:56

## 2024-02-04 RX ADMIN — METOPROLOL TARTRATE 5 MG: 5 INJECTION, SOLUTION INTRAVENOUS at 18:18

## 2024-02-04 RX ADMIN — POTASSIUM CHLORIDE 40 MEQ: 1500 TABLET, EXTENDED RELEASE ORAL at 11:46

## 2024-02-04 RX ADMIN — DOXYCYCLINE 100 MG: 100 CAPSULE ORAL at 19:56

## 2024-02-04 RX ADMIN — METRONIDAZOLE 500 MG: 500 INJECTION, SOLUTION INTRAVENOUS at 06:06

## 2024-02-04 RX ADMIN — SODIUM CHLORIDE, PRESERVATIVE FREE 10 ML: 5 INJECTION INTRAVENOUS at 20:03

## 2024-02-04 RX ADMIN — ENOXAPARIN SODIUM 30 MG: 100 INJECTION SUBCUTANEOUS at 07:54

## 2024-02-04 RX ADMIN — HYDRALAZINE HYDROCHLORIDE 25 MG: 25 TABLET, FILM COATED ORAL at 22:36

## 2024-02-04 RX ADMIN — INSULIN GLARGINE 10 UNITS: 100 INJECTION, SOLUTION SUBCUTANEOUS at 07:54

## 2024-02-04 RX ADMIN — SPIRONOLACTONE 25 MG: 25 TABLET, FILM COATED ORAL at 12:56

## 2024-02-04 RX ADMIN — ANTI-FUNGAL POWDER MICONAZOLE NITRATE TALC FREE: 1.42 POWDER TOPICAL at 20:03

## 2024-02-04 RX ADMIN — HYDRALAZINE HYDROCHLORIDE 25 MG: 25 TABLET, FILM COATED ORAL at 06:04

## 2024-02-04 RX ADMIN — METOPROLOL TARTRATE 5 MG: 5 INJECTION, SOLUTION INTRAVENOUS at 00:20

## 2024-02-04 RX ADMIN — HYDRALAZINE HYDROCHLORIDE 10 MG: 20 INJECTION, SOLUTION INTRAMUSCULAR; INTRAVENOUS at 01:51

## 2024-02-04 RX ADMIN — ACETAMINOPHEN 650 MG: 325 TABLET, FILM COATED ORAL at 01:51

## 2024-02-04 RX ADMIN — METOPROLOL TARTRATE 5 MG: 5 INJECTION, SOLUTION INTRAVENOUS at 06:07

## 2024-02-04 RX ADMIN — INSULIN LISPRO 2 UNITS: 100 INJECTION, SOLUTION INTRAVENOUS; SUBCUTANEOUS at 11:45

## 2024-02-04 RX ADMIN — NIFEDIPINE 90 MG: 30 TABLET, EXTENDED RELEASE ORAL at 09:13

## 2024-02-04 RX ADMIN — ATORVASTATIN CALCIUM 40 MG: 40 TABLET, FILM COATED ORAL at 19:56

## 2024-02-04 RX ADMIN — ANTI-FUNGAL POWDER MICONAZOLE NITRATE TALC FREE: 1.42 POWDER TOPICAL at 09:13

## 2024-02-04 RX ADMIN — FUROSEMIDE 20 MG: 10 INJECTION, SOLUTION INTRAMUSCULAR; INTRAVENOUS at 07:54

## 2024-02-04 ASSESSMENT — PAIN SCALES - WONG BAKER
WONGBAKER_NUMERICALRESPONSE: 0

## 2024-02-04 ASSESSMENT — PAIN SCALES - GENERAL
PAINLEVEL_OUTOF10: 0
PAINLEVEL_OUTOF10: 2

## 2024-02-04 ASSESSMENT — PAIN DESCRIPTION - DESCRIPTORS: DESCRIPTORS: ACHING

## 2024-02-04 ASSESSMENT — PAIN DESCRIPTION - ORIENTATION: ORIENTATION: LEFT

## 2024-02-04 ASSESSMENT — PAIN DESCRIPTION - LOCATION: LOCATION: ARM;BACK

## 2024-02-05 LAB
ANION GAP SERPL CALCULATED.3IONS-SCNC: 13 MMOL/L (ref 9–17)
BASOPHILS # BLD: 0 K/UL (ref 0–0.2)
BASOPHILS NFR BLD: 0 % (ref 0–2)
BUN SERPL-MCNC: 17 MG/DL (ref 8–23)
CALCIUM SERPL-MCNC: 9.2 MG/DL (ref 8.6–10.4)
CHLORIDE SERPL-SCNC: 102 MMOL/L (ref 98–107)
CO2 SERPL-SCNC: 23 MMOL/L (ref 20–31)
CREAT SERPL-MCNC: 1.1 MG/DL (ref 0.5–0.9)
EOSINOPHIL # BLD: 0.15 K/UL (ref 0–0.4)
EOSINOPHILS RELATIVE PERCENT: 1 % (ref 0–4)
ERYTHROCYTE [DISTWIDTH] IN BLOOD BY AUTOMATED COUNT: 19.5 % (ref 11.5–14.9)
GFR SERPL CREATININE-BSD FRML MDRD: 55 ML/MIN/1.73M2
GLUCOSE BLD-MCNC: 149 MG/DL (ref 65–105)
GLUCOSE BLD-MCNC: 157 MG/DL (ref 65–105)
GLUCOSE BLD-MCNC: 209 MG/DL (ref 65–105)
GLUCOSE SERPL-MCNC: 139 MG/DL (ref 70–99)
HCT VFR BLD AUTO: 26.8 % (ref 36–46)
HGB BLD-MCNC: 8.4 G/DL (ref 12–16)
LYMPHOCYTES NFR BLD: 1.54 K/UL (ref 1–4.8)
LYMPHOCYTES RELATIVE PERCENT: 10 % (ref 24–44)
MCH RBC QN AUTO: 26.4 PG (ref 26–34)
MCHC RBC AUTO-ENTMCNC: 31.4 G/DL (ref 31–37)
MCV RBC AUTO: 84.3 FL (ref 80–100)
MONOCYTES NFR BLD: 0.62 K/UL (ref 0.1–1.3)
MONOCYTES NFR BLD: 4 % (ref 1–7)
MORPHOLOGY: ABNORMAL
NEUTROPHILS NFR BLD: 85 % (ref 36–66)
NEUTS SEG NFR BLD: 13.09 K/UL (ref 1.3–9.1)
PLATELET # BLD AUTO: 322 K/UL (ref 150–450)
PMV BLD AUTO: 9 FL (ref 6–12)
POTASSIUM SERPL-SCNC: 3.9 MMOL/L (ref 3.7–5.3)
RBC # BLD AUTO: 3.18 M/UL (ref 4–5.2)
SODIUM SERPL-SCNC: 138 MMOL/L (ref 135–144)
WBC OTHER # BLD: 15.4 K/UL (ref 3.5–11)

## 2024-02-05 PROCEDURE — 2580000003 HC RX 258: Performed by: STUDENT IN AN ORGANIZED HEALTH CARE EDUCATION/TRAINING PROGRAM

## 2024-02-05 PROCEDURE — 94660 CPAP INITIATION&MGMT: CPT

## 2024-02-05 PROCEDURE — 85025 COMPLETE CBC W/AUTO DIFF WBC: CPT

## 2024-02-05 PROCEDURE — A4216 STERILE WATER/SALINE, 10 ML: HCPCS | Performed by: STUDENT IN AN ORGANIZED HEALTH CARE EDUCATION/TRAINING PROGRAM

## 2024-02-05 PROCEDURE — 2060000000 HC ICU INTERMEDIATE R&B

## 2024-02-05 PROCEDURE — 6370000000 HC RX 637 (ALT 250 FOR IP): Performed by: INTERNAL MEDICINE

## 2024-02-05 PROCEDURE — 6370000000 HC RX 637 (ALT 250 FOR IP): Performed by: STUDENT IN AN ORGANIZED HEALTH CARE EDUCATION/TRAINING PROGRAM

## 2024-02-05 PROCEDURE — 82947 ASSAY GLUCOSE BLOOD QUANT: CPT

## 2024-02-05 PROCEDURE — 94761 N-INVAS EAR/PLS OXIMETRY MLT: CPT

## 2024-02-05 PROCEDURE — 99232 SBSQ HOSP IP/OBS MODERATE 35: CPT | Performed by: INTERNAL MEDICINE

## 2024-02-05 PROCEDURE — 99232 SBSQ HOSP IP/OBS MODERATE 35: CPT | Performed by: FAMILY MEDICINE

## 2024-02-05 PROCEDURE — C9113 INJ PANTOPRAZOLE SODIUM, VIA: HCPCS | Performed by: STUDENT IN AN ORGANIZED HEALTH CARE EDUCATION/TRAINING PROGRAM

## 2024-02-05 PROCEDURE — 80048 BASIC METABOLIC PNL TOTAL CA: CPT

## 2024-02-05 PROCEDURE — 6360000002 HC RX W HCPCS: Performed by: STUDENT IN AN ORGANIZED HEALTH CARE EDUCATION/TRAINING PROGRAM

## 2024-02-05 PROCEDURE — 2700000000 HC OXYGEN THERAPY PER DAY

## 2024-02-05 PROCEDURE — 36415 COLL VENOUS BLD VENIPUNCTURE: CPT

## 2024-02-05 PROCEDURE — 2500000003 HC RX 250 WO HCPCS: Performed by: STUDENT IN AN ORGANIZED HEALTH CARE EDUCATION/TRAINING PROGRAM

## 2024-02-05 RX ADMIN — ACETAMINOPHEN 650 MG: 325 TABLET, FILM COATED ORAL at 02:53

## 2024-02-05 RX ADMIN — HYDRALAZINE HYDROCHLORIDE 25 MG: 25 TABLET, FILM COATED ORAL at 21:41

## 2024-02-05 RX ADMIN — HYDRALAZINE HYDROCHLORIDE 10 MG: 20 INJECTION, SOLUTION INTRAMUSCULAR; INTRAVENOUS at 04:41

## 2024-02-05 RX ADMIN — FUROSEMIDE 20 MG: 10 INJECTION, SOLUTION INTRAMUSCULAR; INTRAVENOUS at 07:46

## 2024-02-05 RX ADMIN — HYDRALAZINE HYDROCHLORIDE 25 MG: 25 TABLET, FILM COATED ORAL at 13:45

## 2024-02-05 RX ADMIN — SODIUM CHLORIDE, PRESERVATIVE FREE 40 MG: 5 INJECTION INTRAVENOUS at 07:47

## 2024-02-05 RX ADMIN — NIFEDIPINE 90 MG: 30 TABLET, EXTENDED RELEASE ORAL at 07:48

## 2024-02-05 RX ADMIN — SPIRONOLACTONE 25 MG: 25 TABLET, FILM COATED ORAL at 07:46

## 2024-02-05 RX ADMIN — Medication 10 ML: at 11:42

## 2024-02-05 RX ADMIN — SODIUM CHLORIDE, PRESERVATIVE FREE 10 ML: 5 INJECTION INTRAVENOUS at 21:42

## 2024-02-05 RX ADMIN — METOPROLOL TARTRATE 5 MG: 5 INJECTION, SOLUTION INTRAVENOUS at 06:12

## 2024-02-05 RX ADMIN — INSULIN LISPRO 2 UNITS: 100 INJECTION, SOLUTION INTRAVENOUS; SUBCUTANEOUS at 16:15

## 2024-02-05 RX ADMIN — ENOXAPARIN SODIUM 30 MG: 100 INJECTION SUBCUTANEOUS at 07:48

## 2024-02-05 RX ADMIN — SODIUM CHLORIDE, PRESERVATIVE FREE 10 ML: 5 INJECTION INTRAVENOUS at 07:59

## 2024-02-05 RX ADMIN — SODIUM CHLORIDE, PRESERVATIVE FREE 40 MG: 5 INJECTION INTRAVENOUS at 21:41

## 2024-02-05 RX ADMIN — CARVEDILOL 25 MG: 25 TABLET, FILM COATED ORAL at 07:46

## 2024-02-05 RX ADMIN — ACETAMINOPHEN 650 MG: 325 TABLET, FILM COATED ORAL at 09:52

## 2024-02-05 RX ADMIN — DOXYCYCLINE 100 MG: 100 CAPSULE ORAL at 07:54

## 2024-02-05 RX ADMIN — LOSARTAN POTASSIUM 25 MG: 25 TABLET, FILM COATED ORAL at 07:46

## 2024-02-05 RX ADMIN — CARVEDILOL 25 MG: 25 TABLET, FILM COATED ORAL at 16:15

## 2024-02-05 RX ADMIN — METOPROLOL TARTRATE 5 MG: 5 INJECTION, SOLUTION INTRAVENOUS at 00:36

## 2024-02-05 RX ADMIN — ATORVASTATIN CALCIUM 40 MG: 40 TABLET, FILM COATED ORAL at 21:41

## 2024-02-05 RX ADMIN — INSULIN GLARGINE 10 UNITS: 100 INJECTION, SOLUTION SUBCUTANEOUS at 07:49

## 2024-02-05 RX ADMIN — ENOXAPARIN SODIUM 30 MG: 100 INJECTION SUBCUTANEOUS at 21:41

## 2024-02-05 RX ADMIN — METOPROLOL TARTRATE 5 MG: 5 INJECTION, SOLUTION INTRAVENOUS at 11:40

## 2024-02-05 RX ADMIN — DOXYCYCLINE 100 MG: 100 CAPSULE ORAL at 21:41

## 2024-02-05 RX ADMIN — HYDRALAZINE HYDROCHLORIDE 25 MG: 25 TABLET, FILM COATED ORAL at 06:12

## 2024-02-05 ASSESSMENT — PAIN SCALES - GENERAL
PAINLEVEL_OUTOF10: 0
PAINLEVEL_OUTOF10: 9
PAINLEVEL_OUTOF10: 0

## 2024-02-05 ASSESSMENT — PAIN DESCRIPTION - ORIENTATION: ORIENTATION: MID

## 2024-02-05 ASSESSMENT — PAIN DESCRIPTION - DESCRIPTORS: DESCRIPTORS: ACHING

## 2024-02-05 ASSESSMENT — PAIN DESCRIPTION - LOCATION: LOCATION: BACK

## 2024-02-05 NOTE — CARE COORDINATION
ONGOING DISCHARGE PLAN:    Patient is alert and oriented x4.    Spoke with patient regarding discharge plan and patient confirms that plan is still to go home with Spouse and [facility].  Pt declines going to [facility].     DME: nebulizer,SC, WC, walker, glucometer, O2 at 3LPM [place] [employer].     [facility]: current with [facility]    PO doxy, IV Lasix 20 mg BID, cardene gtt off, Low fiber regular diet.     Poss tx to PCU today .     Will continue to follow for additional discharge needs.    If patient is discharged prior to next notation, then this note serves as note for discharge by case management.    Electronically signed by Leatha Giron RN on 2/5/2024 at 1:17 PM

## 2024-02-05 NOTE — DISCHARGE INSTR - COC
Continuity of Care Form    Patient Name: Camelia Beltran   :  1955  MRN:  [id number]    Admit date:  2024  Discharge date:  24    Code Status Order: Full Code   Advance Directives:     Admitting Physician:  Ida Valencia MD  PCP: Alida Archuleta MD    Discharging Nurse: [facility] Unit/Room#:   Discharging Unit Phone Number: [phone]    Emergency Contact:   Extended Emergency Contact Information  Primary Emergency Contact: Christoph Beltran  Address: [address] [place] [address] [id number] [place] [address]  Home Phone: [phone]  Mobile Phone: [phone]  Relation: Spouse  Hearing or visual needs: None  Preferred language: English   needed? No  Secondary Emergency Contact: Denise Beltran  Address: [place] [address] [facility]  Home Phone: [phone]  Mobile Phone: [phone]  Relation: Child  Hearing or visual needs: None  Other needs: None  Preferred language: English   needed? No    Past Surgical History:  Past Surgical History:   Procedure Laterality Date    CARPAL TUNNEL RELEASE Right 2014    CATARACT REMOVAL WITH IMPLANT Bilateral     CHOLECYSTECTOMY, LAPAROSCOPIC      COLONOSCOPY N/A 10/17/2019    COLONOSCOPY DIAGNOSTIC performed by Nadir Prieto MD at [facility] ENDO    CYST REMOVAL  1985    Polinadal cyst from base of tail bone    HYSTERECTOMY (CERVIX STATUS UNKNOWN)  1998    INGUINAL HERNIA REPAIR Bilateral 2017    abcess removed    LUMBAR FUSION  10/2010; 2011    L4/L5    LUMBAR FUSION  2009    L4-S1    OTHER SURGICAL HISTORY  2018    Lumbar decompression laminectomy at L3-L4 bilaterally wiith complete facetomies at L3-L4 bilaterally;  diskectomy L3-L4: posterior lumbar interbody fusion; placement of [place]-type graft; local harvest of bone; microsurgical dissection.    ROTATOR CUFF REPAIR Left     SKIN CANCER EXCISION  2005    Basal Cell CarcinomaCurly

## 2024-02-06 LAB
BASOPHILS # BLD: 0 K/UL (ref 0–0.2)
BASOPHILS NFR BLD: 0 % (ref 0–2)
EOSINOPHIL # BLD: 0.57 K/UL (ref 0–0.4)
EOSINOPHILS RELATIVE PERCENT: 4 % (ref 0–4)
ERYTHROCYTE [DISTWIDTH] IN BLOOD BY AUTOMATED COUNT: 19.9 % (ref 11.5–14.9)
GLUCOSE BLD-MCNC: 133 MG/DL (ref 65–105)
GLUCOSE BLD-MCNC: 149 MG/DL (ref 65–105)
GLUCOSE BLD-MCNC: 161 MG/DL (ref 65–105)
GLUCOSE BLD-MCNC: 172 MG/DL (ref 65–105)
GLUCOSE BLD-MCNC: 183 MG/DL (ref 65–105)
HCT VFR BLD AUTO: 26.2 % (ref 36–46)
HGB BLD-MCNC: 8.3 G/DL (ref 12–16)
LYMPHOCYTES NFR BLD: 0.86 K/UL (ref 1–4.8)
LYMPHOCYTES RELATIVE PERCENT: 6 % (ref 24–44)
MCH RBC QN AUTO: 26.4 PG (ref 26–34)
MCHC RBC AUTO-ENTMCNC: 31.5 G/DL (ref 31–37)
MCV RBC AUTO: 83.7 FL (ref 80–100)
MONOCYTES NFR BLD: 0.86 K/UL (ref 0.1–1.3)
MONOCYTES NFR BLD: 6 % (ref 1–7)
MORPHOLOGY: ABNORMAL
NEUTROPHILS NFR BLD: 84 % (ref 36–66)
NEUTS SEG NFR BLD: 12.01 K/UL (ref 1.3–9.1)
PLATELET # BLD AUTO: 355 K/UL (ref 150–450)
PMV BLD AUTO: 8.8 FL (ref 6–12)
RBC # BLD AUTO: 3.14 M/UL (ref 4–5.2)
WBC OTHER # BLD: 14.3 K/UL (ref 3.5–11)

## 2024-02-06 PROCEDURE — 2500000003 HC RX 250 WO HCPCS: Performed by: STUDENT IN AN ORGANIZED HEALTH CARE EDUCATION/TRAINING PROGRAM

## 2024-02-06 PROCEDURE — C9113 INJ PANTOPRAZOLE SODIUM, VIA: HCPCS | Performed by: STUDENT IN AN ORGANIZED HEALTH CARE EDUCATION/TRAINING PROGRAM

## 2024-02-06 PROCEDURE — 6370000000 HC RX 637 (ALT 250 FOR IP): Performed by: INTERNAL MEDICINE

## 2024-02-06 PROCEDURE — 36415 COLL VENOUS BLD VENIPUNCTURE: CPT

## 2024-02-06 PROCEDURE — 2700000000 HC OXYGEN THERAPY PER DAY

## 2024-02-06 PROCEDURE — 2060000000 HC ICU INTERMEDIATE R&B

## 2024-02-06 PROCEDURE — 97110 THERAPEUTIC EXERCISES: CPT

## 2024-02-06 PROCEDURE — 99232 SBSQ HOSP IP/OBS MODERATE 35: CPT | Performed by: INTERNAL MEDICINE

## 2024-02-06 PROCEDURE — 94660 CPAP INITIATION&MGMT: CPT

## 2024-02-06 PROCEDURE — 97116 GAIT TRAINING THERAPY: CPT

## 2024-02-06 PROCEDURE — 2580000003 HC RX 258: Performed by: STUDENT IN AN ORGANIZED HEALTH CARE EDUCATION/TRAINING PROGRAM

## 2024-02-06 PROCEDURE — 99232 SBSQ HOSP IP/OBS MODERATE 35: CPT | Performed by: FAMILY MEDICINE

## 2024-02-06 PROCEDURE — 94761 N-INVAS EAR/PLS OXIMETRY MLT: CPT

## 2024-02-06 PROCEDURE — 6370000000 HC RX 637 (ALT 250 FOR IP): Performed by: STUDENT IN AN ORGANIZED HEALTH CARE EDUCATION/TRAINING PROGRAM

## 2024-02-06 PROCEDURE — A4216 STERILE WATER/SALINE, 10 ML: HCPCS | Performed by: STUDENT IN AN ORGANIZED HEALTH CARE EDUCATION/TRAINING PROGRAM

## 2024-02-06 PROCEDURE — 97530 THERAPEUTIC ACTIVITIES: CPT

## 2024-02-06 PROCEDURE — 85025 COMPLETE CBC W/AUTO DIFF WBC: CPT

## 2024-02-06 PROCEDURE — 6360000002 HC RX W HCPCS: Performed by: STUDENT IN AN ORGANIZED HEALTH CARE EDUCATION/TRAINING PROGRAM

## 2024-02-06 RX ADMIN — FUROSEMIDE 20 MG: 10 INJECTION, SOLUTION INTRAMUSCULAR; INTRAVENOUS at 09:13

## 2024-02-06 RX ADMIN — SODIUM CHLORIDE, PRESERVATIVE FREE 40 MG: 5 INJECTION INTRAVENOUS at 09:13

## 2024-02-06 RX ADMIN — HYDRALAZINE HYDROCHLORIDE 25 MG: 25 TABLET, FILM COATED ORAL at 23:24

## 2024-02-06 RX ADMIN — SODIUM CHLORIDE, PRESERVATIVE FREE 10 ML: 5 INJECTION INTRAVENOUS at 23:25

## 2024-02-06 RX ADMIN — METOPROLOL TARTRATE 5 MG: 5 INJECTION, SOLUTION INTRAVENOUS at 05:27

## 2024-02-06 RX ADMIN — ATORVASTATIN CALCIUM 40 MG: 40 TABLET, FILM COATED ORAL at 23:24

## 2024-02-06 RX ADMIN — METOPROLOL TARTRATE 5 MG: 5 INJECTION, SOLUTION INTRAVENOUS at 17:45

## 2024-02-06 RX ADMIN — CARVEDILOL 25 MG: 25 TABLET, FILM COATED ORAL at 09:12

## 2024-02-06 RX ADMIN — ACETAMINOPHEN 650 MG: 325 TABLET, FILM COATED ORAL at 00:36

## 2024-02-06 RX ADMIN — SPIRONOLACTONE 25 MG: 25 TABLET, FILM COATED ORAL at 09:12

## 2024-02-06 RX ADMIN — INSULIN GLARGINE 10 UNITS: 100 INJECTION, SOLUTION SUBCUTANEOUS at 09:12

## 2024-02-06 RX ADMIN — HYDRALAZINE HYDROCHLORIDE 25 MG: 25 TABLET, FILM COATED ORAL at 05:27

## 2024-02-06 RX ADMIN — ACETAMINOPHEN 650 MG: 325 TABLET, FILM COATED ORAL at 10:05

## 2024-02-06 RX ADMIN — METOPROLOL TARTRATE 5 MG: 5 INJECTION, SOLUTION INTRAVENOUS at 00:36

## 2024-02-06 RX ADMIN — NIFEDIPINE 90 MG: 30 TABLET, EXTENDED RELEASE ORAL at 13:47

## 2024-02-06 RX ADMIN — SODIUM CHLORIDE, PRESERVATIVE FREE 40 MG: 5 INJECTION INTRAVENOUS at 23:23

## 2024-02-06 RX ADMIN — HYDRALAZINE HYDROCHLORIDE 25 MG: 25 TABLET, FILM COATED ORAL at 13:47

## 2024-02-06 RX ADMIN — METOPROLOL TARTRATE 5 MG: 5 INJECTION, SOLUTION INTRAVENOUS at 12:06

## 2024-02-06 RX ADMIN — DOXYCYCLINE 100 MG: 100 CAPSULE ORAL at 09:12

## 2024-02-06 RX ADMIN — DOXYCYCLINE 100 MG: 100 CAPSULE ORAL at 23:24

## 2024-02-06 RX ADMIN — ENOXAPARIN SODIUM 30 MG: 100 INJECTION SUBCUTANEOUS at 23:23

## 2024-02-06 RX ADMIN — CARVEDILOL 25 MG: 25 TABLET, FILM COATED ORAL at 17:45

## 2024-02-06 RX ADMIN — LOSARTAN POTASSIUM 25 MG: 25 TABLET, FILM COATED ORAL at 09:12

## 2024-02-06 RX ADMIN — ENOXAPARIN SODIUM 30 MG: 100 INJECTION SUBCUTANEOUS at 10:11

## 2024-02-06 RX ADMIN — ACETAMINOPHEN 650 MG: 325 TABLET, FILM COATED ORAL at 16:23

## 2024-02-06 RX ADMIN — SODIUM CHLORIDE, PRESERVATIVE FREE 20 ML: 5 INJECTION INTRAVENOUS at 10:18

## 2024-02-06 ASSESSMENT — PAIN SCALES - GENERAL
PAINLEVEL_OUTOF10: 8
PAINLEVEL_OUTOF10: 6
PAINLEVEL_OUTOF10: 10
PAINLEVEL_OUTOF10: 8
PAINLEVEL_OUTOF10: 0
PAINLEVEL_OUTOF10: 9
PAINLEVEL_OUTOF10: 5

## 2024-02-06 ASSESSMENT — PAIN DESCRIPTION - LOCATION
LOCATION: BACK

## 2024-02-06 ASSESSMENT — PAIN DESCRIPTION - DESCRIPTORS
DESCRIPTORS: ACHING;SHARP
DESCRIPTORS: ACHING;SHARP

## 2024-02-06 ASSESSMENT — PAIN - FUNCTIONAL ASSESSMENT
PAIN_FUNCTIONAL_ASSESSMENT: ACTIVITIES ARE NOT PREVENTED
PAIN_FUNCTIONAL_ASSESSMENT: ACTIVITIES ARE NOT PREVENTED

## 2024-02-06 ASSESSMENT — PAIN DESCRIPTION - PAIN TYPE
TYPE: CHRONIC PAIN
TYPE: CHRONIC PAIN

## 2024-02-06 NOTE — CARE COORDINATION
ONGOING DISCHARGE PLAN:    Patient is alert and oriented x4.    Spoke with patient regarding discharge plan and patient confirms that plan is still home with [facility]. Refuses SNF. Pt states her  is agreeable to plan.    Active order for PO Doxy through 2/11/24. IV Lasix 20mg daily.    Will continue to follow for additional discharge needs.    If patient is discharged prior to next notation, then this note serves as note for discharge by case management.    Electronically signed by Sara Solis RN on 2/6/2024 at 11:39 AM

## 2024-02-07 VITALS
HEART RATE: 67 BPM | DIASTOLIC BLOOD PRESSURE: 64 MMHG | SYSTOLIC BLOOD PRESSURE: 142 MMHG | BODY MASS INDEX: 57.52 KG/M2 | HEIGHT: 60 IN | TEMPERATURE: 97.3 F | RESPIRATION RATE: 20 BRPM | WEIGHT: 293 LBS | OXYGEN SATURATION: 98 %

## 2024-02-07 LAB
ALDOST SERPL-MCNC: <3 NG/DL
ANION GAP SERPL CALCULATED.3IONS-SCNC: 13 MMOL/L (ref 9–17)
BASOPHILS # BLD: 0.1 K/UL (ref 0–0.2)
BASOPHILS NFR BLD: 1 % (ref 0–2)
BUN SERPL-MCNC: 12 MG/DL (ref 8–23)
CALCIUM SERPL-MCNC: 8.8 MG/DL (ref 8.6–10.4)
CHLORIDE SERPL-SCNC: 104 MMOL/L (ref 98–107)
CO2 SERPL-SCNC: 23 MMOL/L (ref 20–31)
CREAT SERPL-MCNC: 1 MG/DL (ref 0.5–0.9)
EOSINOPHIL # BLD: 0.4 K/UL (ref 0–0.4)
EOSINOPHILS RELATIVE PERCENT: 5 % (ref 0–4)
ERYTHROCYTE [DISTWIDTH] IN BLOOD BY AUTOMATED COUNT: 19.5 % (ref 11.5–14.9)
GFR SERPL CREATININE-BSD FRML MDRD: >60 ML/MIN/1.73M2
GLUCOSE BLD-MCNC: 123 MG/DL (ref 65–105)
GLUCOSE BLD-MCNC: 140 MG/DL (ref 65–105)
GLUCOSE SERPL-MCNC: 148 MG/DL (ref 70–99)
HCT VFR BLD AUTO: 26.5 % (ref 36–46)
HGB BLD-MCNC: 8.5 G/DL (ref 12–16)
LYMPHOCYTES NFR BLD: 0.9 K/UL (ref 1–4.8)
LYMPHOCYTES RELATIVE PERCENT: 9 % (ref 24–44)
MCH RBC QN AUTO: 26.9 PG (ref 26–34)
MCHC RBC AUTO-ENTMCNC: 32 G/DL (ref 31–37)
MCV RBC AUTO: 84 FL (ref 80–100)
MONOCYTES NFR BLD: 0.7 K/UL (ref 0.1–1.3)
MONOCYTES NFR BLD: 7 % (ref 1–7)
NEUTROPHILS NFR BLD: 78 % (ref 36–66)
NEUTS SEG NFR BLD: 7.5 K/UL (ref 1.3–9.1)
PLATELET # BLD AUTO: 377 K/UL (ref 150–450)
PMV BLD AUTO: 8.1 FL (ref 6–12)
POTASSIUM SERPL-SCNC: 3.2 MMOL/L (ref 3.7–5.3)
RBC # BLD AUTO: 3.15 M/UL (ref 4–5.2)
SODIUM SERPL-SCNC: 140 MMOL/L (ref 135–144)
WBC OTHER # BLD: 9.6 K/UL (ref 3.5–11)

## 2024-02-07 PROCEDURE — A4216 STERILE WATER/SALINE, 10 ML: HCPCS | Performed by: STUDENT IN AN ORGANIZED HEALTH CARE EDUCATION/TRAINING PROGRAM

## 2024-02-07 PROCEDURE — 2500000003 HC RX 250 WO HCPCS: Performed by: STUDENT IN AN ORGANIZED HEALTH CARE EDUCATION/TRAINING PROGRAM

## 2024-02-07 PROCEDURE — 97110 THERAPEUTIC EXERCISES: CPT

## 2024-02-07 PROCEDURE — 6370000000 HC RX 637 (ALT 250 FOR IP): Performed by: INTERNAL MEDICINE

## 2024-02-07 PROCEDURE — 94660 CPAP INITIATION&MGMT: CPT

## 2024-02-07 PROCEDURE — 80048 BASIC METABOLIC PNL TOTAL CA: CPT

## 2024-02-07 PROCEDURE — 36415 COLL VENOUS BLD VENIPUNCTURE: CPT

## 2024-02-07 PROCEDURE — C9113 INJ PANTOPRAZOLE SODIUM, VIA: HCPCS | Performed by: STUDENT IN AN ORGANIZED HEALTH CARE EDUCATION/TRAINING PROGRAM

## 2024-02-07 PROCEDURE — 99232 SBSQ HOSP IP/OBS MODERATE 35: CPT | Performed by: INTERNAL MEDICINE

## 2024-02-07 PROCEDURE — 99239 HOSP IP/OBS DSCHRG MGMT >30: CPT | Performed by: FAMILY MEDICINE

## 2024-02-07 PROCEDURE — 85025 COMPLETE CBC W/AUTO DIFF WBC: CPT

## 2024-02-07 PROCEDURE — 2580000003 HC RX 258: Performed by: STUDENT IN AN ORGANIZED HEALTH CARE EDUCATION/TRAINING PROGRAM

## 2024-02-07 PROCEDURE — 6360000002 HC RX W HCPCS: Performed by: STUDENT IN AN ORGANIZED HEALTH CARE EDUCATION/TRAINING PROGRAM

## 2024-02-07 PROCEDURE — 82947 ASSAY GLUCOSE BLOOD QUANT: CPT

## 2024-02-07 PROCEDURE — 6370000000 HC RX 637 (ALT 250 FOR IP): Performed by: STUDENT IN AN ORGANIZED HEALTH CARE EDUCATION/TRAINING PROGRAM

## 2024-02-07 RX ORDER — LOSARTAN POTASSIUM 25 MG/1
25 TABLET ORAL DAILY
Qty: 30 TABLET | Refills: 3 | Status: SHIPPED | OUTPATIENT
Start: 2024-02-07

## 2024-02-07 RX ORDER — CARVEDILOL 25 MG/1
25 TABLET ORAL 2 TIMES DAILY WITH MEALS
Qty: 60 TABLET | Refills: 3 | Status: SHIPPED | OUTPATIENT
Start: 2024-02-07

## 2024-02-07 RX ORDER — POTASSIUM CHLORIDE 20 MEQ/1
20 TABLET, EXTENDED RELEASE ORAL DAILY
Qty: 30 TABLET | Refills: 5 | Status: SHIPPED | OUTPATIENT
Start: 2024-02-07

## 2024-02-07 RX ORDER — SPIRONOLACTONE 25 MG/1
25 TABLET ORAL DAILY
Qty: 30 TABLET | Refills: 3 | Status: SHIPPED | OUTPATIENT
Start: 2024-02-07

## 2024-02-07 RX ORDER — DOXYCYCLINE 100 MG/1
100 CAPSULE ORAL EVERY 12 HOURS SCHEDULED
Qty: 9 CAPSULE | Refills: 0 | Status: SHIPPED | OUTPATIENT
Start: 2024-02-07 | End: 2024-02-12

## 2024-02-07 RX ORDER — NIFEDIPINE 30 MG/1
90 TABLET, FILM COATED, EXTENDED RELEASE ORAL DAILY
Qty: 30 TABLET | Refills: 3 | Status: SHIPPED | OUTPATIENT
Start: 2024-02-07

## 2024-02-07 RX ADMIN — NIFEDIPINE 90 MG: 30 TABLET, EXTENDED RELEASE ORAL at 09:41

## 2024-02-07 RX ADMIN — HYDRALAZINE HYDROCHLORIDE 25 MG: 25 TABLET, FILM COATED ORAL at 07:31

## 2024-02-07 RX ADMIN — SODIUM CHLORIDE, PRESERVATIVE FREE 40 MG: 5 INJECTION INTRAVENOUS at 09:46

## 2024-02-07 RX ADMIN — SODIUM CHLORIDE, PRESERVATIVE FREE 10 ML: 5 INJECTION INTRAVENOUS at 09:39

## 2024-02-07 RX ADMIN — INSULIN GLARGINE 10 UNITS: 100 INJECTION, SOLUTION SUBCUTANEOUS at 09:41

## 2024-02-07 RX ADMIN — HYDRALAZINE HYDROCHLORIDE 25 MG: 25 TABLET, FILM COATED ORAL at 12:44

## 2024-02-07 RX ADMIN — FUROSEMIDE 20 MG: 10 INJECTION, SOLUTION INTRAMUSCULAR; INTRAVENOUS at 09:39

## 2024-02-07 RX ADMIN — ACETAMINOPHEN 650 MG: 325 TABLET, FILM COATED ORAL at 12:47

## 2024-02-07 RX ADMIN — METOPROLOL TARTRATE 5 MG: 5 INJECTION, SOLUTION INTRAVENOUS at 12:44

## 2024-02-07 RX ADMIN — LOSARTAN POTASSIUM 25 MG: 25 TABLET, FILM COATED ORAL at 09:41

## 2024-02-07 RX ADMIN — ACETAMINOPHEN 650 MG: 325 TABLET, FILM COATED ORAL at 04:31

## 2024-02-07 RX ADMIN — SPIRONOLACTONE 25 MG: 25 TABLET, FILM COATED ORAL at 09:41

## 2024-02-07 RX ADMIN — DOXYCYCLINE 100 MG: 100 CAPSULE ORAL at 09:41

## 2024-02-07 RX ADMIN — ENOXAPARIN SODIUM 30 MG: 100 INJECTION SUBCUTANEOUS at 09:41

## 2024-02-07 RX ADMIN — POTASSIUM CHLORIDE 40 MEQ: 1500 TABLET, EXTENDED RELEASE ORAL at 09:41

## 2024-02-07 RX ADMIN — CARVEDILOL 25 MG: 25 TABLET, FILM COATED ORAL at 09:41

## 2024-02-07 RX ADMIN — METOPROLOL TARTRATE 5 MG: 5 INJECTION, SOLUTION INTRAVENOUS at 07:31

## 2024-02-07 ASSESSMENT — PAIN SCALES - GENERAL
PAINLEVEL_OUTOF10: 3
PAINLEVEL_OUTOF10: 0
PAINLEVEL_OUTOF10: 9

## 2024-02-07 ASSESSMENT — PAIN DESCRIPTION - LOCATION
LOCATION: BACK
LOCATION: BACK

## 2024-02-07 ASSESSMENT — PAIN - FUNCTIONAL ASSESSMENT: PAIN_FUNCTIONAL_ASSESSMENT: ACTIVITIES ARE NOT PREVENTED

## 2024-02-07 ASSESSMENT — PAIN DESCRIPTION - DESCRIPTORS: DESCRIPTORS: ACHING;SHARP

## 2024-02-07 NOTE — PROGRESS NOTES
[facility] -   Infectious diseases evaluation  admission date 1/29/2024    reason for consultation:   Leukocytosis    Impression :   Current:  Left arm cellulitis  Leukocytosis   Epigastric pain /nausea /dysphagia resolved/esophageal thickening on CT /followed by GI  Shortness of breath /acute hypoxic respiratory failure on oxygen.  Diarrhea resolved  Hypoension resolved  Diabetes mellitus  Morbid obesity  Penicillin allergy    Recommendations   No growth on blood cultures from 1/29/2024 and 1/30/2024  No growth on urine culture from 1/30/2024   GI panel negative  P.o. doxycycline through 2/11/2024  Nasal swab for MRSA negative on 1/30/2024  Follow CBC, procalcitonin level and C-reactive protein      Infection Control Recommendations   [place] Precautions      Antimicrobial Stewardship Recommendations   Simplification of therapy  Targeted therapy      History of Present Illness:   Initial history:  Camelia Beltran is a 68 y.o.-year-old female was brought to the hospital by EMS for shortness of breath associated with generalized fatigue and diarrhea, reported several bowel movements of loose stools was resolved.  She is currently complaining of epigastric pain associated with nausea.  She is comfortable on oxygen per nasal cannula, denied significant cough, denied fever or chills, no urinary symptoms.  Patient was hypoxic and hypotensive initially.  She received 1 mL of epi by EMS.  Workup was significant for hyperkalemia, elevated lactic acid, elevated procalcitonin and C-reactive protein.  Urinalysis showed small leukocyte esterase.  CT abdomen and pelvis suggestive of esophagitis  Influenza/COVID-19 combo negative  Interval changes  2/6/2024   She is afebrile, comfortable on oxygen per nasal cannula, left arm area of swelling and redness improving, denied nausea or vomiting, no diarrhea, no abdominal pain, no other complaints.  WBC 15.4 yesterday  2/3/2024 pro 
          [facility] -   Infectious diseases evaluation  admission date 1/29/2024    reason for consultation:   Leukocytosis    Impression :   Current:  Leukocytosis , elevated procalcitonin level, possible aspiration, no obvious source of infection .  Epigastric pain /nausea /dysphagia /esophageal thickening on CT /GI consulted   Shortness of breath /acute hypoxic respiratory failure on oxygen.  Diarrhea resolved  Hypoension resolved  Diabetes mellitus  Morbid obesity  Penicillin allergy    Recommendations   Follow blood and urine culture  Stool for C. difficile and GI panel pending  Ceftriaxone and Flagyl  Discontinue IV vancomycin  Nasal swab for MRSA negative  Follow CBC, procalcitonin level and C-reactive protein  GI following    Infection Control Recommendations   [place] Precautions      Antimicrobial Stewardship Recommendations   Simplification of therapy  Targeted therapy      History of Present Illness:   Initial history:  Camelia Beltran is a 68 y.o.-year-old female was brought to the hospital by EMS for shortness of breath associated with generalized fatigue and diarrhea, reported several bowel movements of loose stools was resolved.  She is currently complaining of epigastric pain associated with nausea.  She is comfortable on oxygen per nasal cannula, denied significant cough, denied fever or chills, no urinary symptoms.  Patient was hypoxic and hypotensive initially.  She received 1 mL of epi by EMS.  Workup was significant for hyperkalemia, elevated lactic acid, elevated procalcitonin and C-reactive protein.  Urinalysis showed small leukocyte esterase.  CT abdomen and pelvis suggestive of esophagitis  Influenza/COVID-19 combo negative  Interval changes  2/1/2024   She is afebrile, NG tube in place, denied nausea or abdominal pain today, denied diarrhea ,no new complaints  WBC 14.5  No growth on blood cultures for 2 days  Patient Vitals for the past 8 hrs:   BP 
          [facility] -   Infectious diseases evaluation  admission date 1/29/2024    reason for consultation:   Leukocytosis    Impression :   Current:  Leukocytosis , elevated procalcitonin level, possible aspiration, no obvious source of infection .  Epigastric pain /nausea /dysphagia /esophageal thickening on CT /GI consulted   Shortness of breath /acute hypoxic respiratory failure on oxygen.  Diarrhea resolved  Hypoension resolved  Diabetes mellitus  Morbid obesity  Penicillin allergy    Recommendations   Follow blood cultures, no growth to date  No growth on urine culture  Stool for C. difficile and GI panel pending  Ceftriaxone and Flagyl  Discontinue acyclovir  Nasal swab for MRSA negative  Follow CBC, procalcitonin level and C-reactive protein  GI following  Discussed with Dr. Perdue    Infection Control Recommendations   [place] Precautions      Antimicrobial Stewardship Recommendations   Simplification of therapy  Targeted therapy      History of Present Illness:   Initial history:  Camelia Beltran is a 68 y.o.-year-old female was brought to the hospital by EMS for shortness of breath associated with generalized fatigue and diarrhea, reported several bowel movements of loose stools was resolved.  She is currently complaining of epigastric pain associated with nausea.  She is comfortable on oxygen per nasal cannula, denied significant cough, denied fever or chills, no urinary symptoms.  Patient was hypoxic and hypotensive initially.  She received 1 mL of epi by EMS.  Workup was significant for hyperkalemia, elevated lactic acid, elevated procalcitonin and C-reactive protein.  Urinalysis showed small leukocyte esterase.  CT abdomen and pelvis suggestive of esophagitis  Influenza/COVID-19 combo negative  Interval changes  2/2/2024   She is afebrile, NG tube was removed, feeling better, denied abdominal pain, denied nausea or vomiting today, no diarrhea.  WBC 16.3  No 
          [facility] -   Infectious diseases evaluation  admission date 1/29/2024    reason for consultation:   Leukocytosis    Impression :   Current:  Left arm cellulitis  Leukocytosis resolved  Epigastric pain /nausea /dysphagia resolved/esophageal thickening on CT /followed by GI  Shortness of breath /acute hypoxic respiratory failure on oxygen.  Diarrhea resolved  Hypoension resolved  Diabetes mellitus  Morbid obesity  Penicillin allergy    Recommendations   No growth on blood cultures from 1/29/2024 and 1/30/2024  No growth on urine culture from 1/30/2024   GI panel negative  P.o. doxycycline through 2/11/2024  Nasal swab for MRSA negative on 1/30/2024  No objection for discharge from infectious disease point of view  Discussed with nursing staff      Infection Control Recommendations   [place] Precautions      Antimicrobial Stewardship Recommendations   Simplification of therapy  Targeted therapy      History of Present Illness:   Initial history:  Camelia Beltran is a 68 y.o.-year-old female was brought to the hospital by EMS for shortness of breath associated with generalized fatigue and diarrhea, reported several bowel movements of loose stools was resolved.  She is currently complaining of epigastric pain associated with nausea.  She is comfortable on oxygen per nasal cannula, denied significant cough, denied fever or chills, no urinary symptoms.  Patient was hypoxic and hypotensive initially.  She received 1 mL of epi by EMS.  Workup was significant for hyperkalemia, elevated lactic acid, elevated procalcitonin and C-reactive protein.  Urinalysis showed small leukocyte esterase.  CT abdomen and pelvis suggestive of esophagitis  Influenza/COVID-19 combo negative  Interval changes  2/7/2024   She is afebrile, comfortable on oxygen per nasal cannula, denied increased cough or shortness of breath, left arm area of swelling and redness improving, no new complaints.  WBC 
          Pharmacy Note     Renal Dose Adjustment    Camelia Beltran is a 68 y.o. female. Pharmacist assessment of renally cleared medications.    Recent Labs     01/29/24  2254   BUN 18       Recent Labs     01/29/24  2254   CREATININE 1.5*       Estimated Creatinine Clearance: 46 mL/min (A) (based on SCr of 1.5 mg/dL (H)).  Estimated CrCl using Ideal Body Weight: 26 mL/min (based on IBW 45.5 kg)    Height:   Ht Readings from Last 1 Encounters:   01/29/24 1.524 m (5')     Weight:  Wt Readings from Last 1 Encounters:   01/29/24 130.6 kg (288 lb)       The following medication dose has been adjusted based upon renal function per P&T Guidelines:             Famotidine dose adjusted from 20 mg BID to 20 mg daily.   [facility] [place]    1/30/2024   5:39 AM  
      Physical Therapy Cancel Note      DATE: 2024    NAME: Camelia Beltran  MRN: [id number]   : 1955      Patient not seen this date for Physical Therapy due to:    Patient Declined: Pt c/o being nauseous and unable to participate in PT evaluation. Pt reports she will complete eval tomorrow. Time [id number]      Electronically signed by Viki Patton PT on 2024 at 2:06 PM    
      Physical Therapy Cancel Note      DATE: 2024    NAME: Camelia Beltran  MRN: [id number]   : 1955      Patient not seen this date for Physical Therapy due to:    Other: PT deferred today d/t current medical status. Will check back tomorrow. Time : 1100      Electronically signed by Viki Patton PT on 2024 at 11:39 AM    
    Department of Internal Medicine  Nephrology Christopher Luna MD Progress Note    Reason for consultation: Management of acute kidney injury and chronic kidney disease stage IIIb.    Consulting physician: Ida Valencia MD.    Interval history:    Patient was seen and examined today and she is BiPAP while sleeping  Scr 1.1 mg/dl  BP well controlled.   ml      History of present illness: This is a 68 y.o. female with a significant past medical history of  fibromyalgia, hyperlipidemia, morbid obesity, type 2 diabetes mellitus, chronic back pain, systemic hypertension and chronic kidney disease stage IIIb [baseline serum creatinine 1.3 mg/dL], was found on the floor the evening of January 29, 2024 by her  in respiratory distress and complaint of diarrhea.  EMS found patient to be hypotensive and gave 1 mg of epinephrine and started IV fluid and brought patient to the hospital.    Vital signs at presentation were remarkable for blood pressure 76/63 mmHg.  Lactic acid level was elevated at 3.9 mmol/L, high-sensitivity troponin 27; potassium 5.4 mmol/L and elevated BUN/creatinine 18/1.5 mg/dL and hence nephrology consultation.   Patient was seen and examined this morning and is on nocturnal CPAP. She is also on Cardene drip for elevated blood pressure and is on IV fluids 0.9 normal saline at 75 mill per hour.    Scheduled Meds:   spironolactone  25 mg Oral Daily    doxycycline monohydrate  100 mg Oral 2 times per day    carvedilol  25 mg Oral BID WC    NIFEdipine  90 mg Oral Daily    polyethylene glycol  17 g Oral Daily    pantoprazole (PROTONIX) 40 mg in sodium chloride (PF) 0.9 % 10 mL injection  40 mg IntraVENous Q12H    metoprolol  5 mg IntraVENous Q6H    furosemide  20 mg IntraVENous Daily    atorvastatin  40 mg Oral Nightly    hydrALAZINE  25 mg Oral 3 times per day    sodium chloride flush  5-40 mL IntraVENous 2 times per day    enoxaparin  30 mg SubCUTAneous BID    miconazole   Topical BID    
    Department of Internal Medicine  Nephrology Christopher Luna MD Progress Note    Reason for consultation: Management of acute kidney injury and chronic kidney disease stage IIIb.    Consulting physician: Ida Valencia MD.    Interval history:    Patient was seen and examined today and she is tolerating oral diet.   Scr 1.1 mg/dl  BP well controlled.   ml      History of present illness: This is a 68 y.o. female with a significant past medical history of  fibromyalgia, hyperlipidemia, morbid obesity, type 2 diabetes mellitus, chronic back pain, systemic hypertension and chronic kidney disease stage IIIb [baseline serum creatinine 1.3 mg/dL], was found on the floor the evening of January 29, 2024 by her  in respiratory distress and complaint of diarrhea.  EMS found patient to be hypotensive and gave 1 mg of epinephrine and started IV fluid and brought patient to the hospital.    Vital signs at presentation were remarkable for blood pressure 76/63 mmHg.  Lactic acid level was elevated at 3.9 mmol/L, high-sensitivity troponin 27; potassium 5.4 mmol/L and elevated BUN/creatinine 18/1.5 mg/dL and hence nephrology consultation.   Patient was seen and examined this morning and is on nocturnal CPAP. She is also on Cardene drip for elevated blood pressure and is on IV fluids 0.9 normal saline at 75 mill per hour.    Scheduled Meds:   spironolactone  25 mg Oral Daily    doxycycline monohydrate  100 mg Oral 2 times per day    carvedilol  25 mg Oral BID WC    NIFEdipine  90 mg Oral Daily    polyethylene glycol  17 g Oral Daily    pantoprazole (PROTONIX) 40 mg in sodium chloride (PF) 0.9 % 10 mL injection  40 mg IntraVENous Q12H    metoprolol  5 mg IntraVENous Q6H    furosemide  20 mg IntraVENous Daily    atorvastatin  40 mg Oral Nightly    hydrALAZINE  25 mg Oral 3 times per day    sodium chloride flush  5-40 mL IntraVENous 2 times per day    enoxaparin  30 mg SubCUTAneous BID    miconazole   Topical BID 
    Department of Internal Medicine  Nephrology Fahad Crespo MD Progress Note    Reason for consultation: Management of acute kidney injury and chronic kidney disease stage IIIb.    Consulting physician: Ida Valencia MD.    Interval history: Patient was seen and examined today and she was on Levophed drip Cardene drip at 12.5 mg/h.  She is awake, alert and oriented and NG tube was removed last night and she is on clear liquids.  She denies nausea, abdominal pain or diarrhea.  She is nonoliguric.    History of present illness: This is a 68 y.o. female with a significant past medical history of  fibromyalgia, hyperlipidemia, morbid obesity, type 2 diabetes mellitus, chronic back pain, systemic hypertension and chronic kidney disease stage IIIb [baseline serum creatinine 1.3 mg/dL], was found on the floor the evening of January 29, 2024 by her  in respiratory distress and complaint of diarrhea.  EMS found patient to be hypotensive and gave 1 mg of epinephrine and started IV fluid and brought patient to the hospital.    Vital signs at presentation were remarkable for blood pressure 76/63 mmHg.  Lactic acid level was elevated at 3.9 mmol/L, high-sensitivity troponin 27; potassium 5.4 mmol/L and elevated BUN/creatinine 18/1.5 mg/dL and hence nephrology consultation.   Patient was seen and examined this morning and is on nocturnal CPAP. She is also on Cardene drip for elevated blood pressure and is on IV fluids 0.9 normal saline at 75 mill per hour.    Scheduled Meds:   acyclovir  5 mg/kg (Adjusted) IntraVENous Q8H    pantoprazole (PROTONIX) 40 mg in sodium chloride (PF) 0.9 % 10 mL injection  40 mg IntraVENous Q12H    metroNIDAZOLE  500 mg IntraVENous Q8H    metoprolol  5 mg IntraVENous Q6H    cefTRIAXone (ROCEPHIN) IV  1,000 mg IntraVENous Q24H    furosemide  20 mg IntraVENous Daily    amLODIPine  10 mg Oral Daily    atorvastatin  40 mg Oral Nightly    carvedilol  12.5 mg Oral BID WC    hydrALAZINE  25 mg 
    [facility]    Gastroenterology Daily Progress Note      Patient:   Camelia Beltran   :    1955   Facility:   [facility]  Date:     2024  Consultant:   VIDHYA Cavazos - CNP, CNP      SUBJECTIVE  68 y.o. female admitted 2024 with Lactic acidosis [E87.20]  Respiratory distress [R06.03] and seen for anemia with abnormal ct imaging. The pt was seen and examined.  Pt had nausea with non blood emesis last night and was found possible sbo, ng was placed and today kub shows no obstruction. Ng with bile drainage, no bm overnight and she denies abdominal pain. Hgb 8.2.        OBJECTIVE  Scheduled Meds:   pantoprazole (PROTONIX) 40 mg in sodium chloride (PF) 0.9 % 10 mL injection  40 mg IntraVENous Q12H    metroNIDAZOLE  500 mg IntraVENous Q8H    vancomycin (VANCOCIN) intermittent dosing (placeholder)   Other RX Placeholder    vancomycin  1,000 mg IntraVENous Q24H    acyclovir  5 mg/kg IntraVENous Q8H    metoprolol  5 mg IntraVENous Q6H    cefTRIAXone (ROCEPHIN) IV  1,000 mg IntraVENous Q24H    furosemide  20 mg IntraVENous Daily    [Held by provider] amLODIPine  10 mg Oral Daily    [Held by provider] atorvastatin  40 mg Oral Nightly    [Held by provider] carvedilol  12.5 mg Oral BID WC    [Held by provider] hydrALAZINE  25 mg Oral 3 times per day    sodium chloride flush  5-40 mL IntraVENous 2 times per day    [Held by provider] enoxaparin  30 mg SubCUTAneous BID    miconazole   Topical BID    insulin glargine  10 Units SubCUTAneous Daily    insulin lispro  0-8 Units SubCUTAneous TID WC    insulin lispro  0-4 Units SubCUTAneous Nightly    [Held by provider] losartan  25 mg Oral Daily       Vital Signs:  BP (!) 171/50   Pulse 79   Temp 97.9 °F (36.6 °C) (Oral)   Resp 16   Ht 1.524 m (5')   Wt 133 kg (293 lb 3.4 oz)   LMP  (LMP Unknown)   SpO2 94%   BMI 57.26 kg/m²    Review of Systems - History obtained from chart review and the patient  General ROS: 
    PULMONARY PROGRESS NOTE:    REASON FOR VISIT:AMS, resp failure   Interval History:    Shortness of Breath: no  Cough: no  Sputum: no          Hemoptysis: no  Chest Pain: no  Fever: no                   Swelling Feet: no  Headache: no                                           Nausea, Emesis, Abdominal Pain: no  Diarrhea: no         Constipation: no    Events since last visit: Started on Cardene gtt overnight due to HTN. Had an NG tube placed after KUB yesterday concerning for SBO. KUB pending.     PAST MEDICAL HISTORY:      Scheduled Meds:   pantoprazole (PROTONIX) 40 mg in sodium chloride (PF) 0.9 % 10 mL injection  40 mg IntraVENous Q12H    metroNIDAZOLE  500 mg IntraVENous Q8H    vancomycin (VANCOCIN) intermittent dosing (placeholder)   Other RX Placeholder    vancomycin  1,000 mg IntraVENous Q24H    acyclovir  5 mg/kg IntraVENous Q8H    metoprolol  5 mg IntraVENous Q6H    cefTRIAXone (ROCEPHIN) IV  1,000 mg IntraVENous Q24H    furosemide  20 mg IntraVENous Daily    [Held by provider] amLODIPine  10 mg Oral Daily    [Held by provider] atorvastatin  40 mg Oral Nightly    [Held by provider] carvedilol  12.5 mg Oral BID WC    [Held by provider] hydrALAZINE  25 mg Oral 3 times per day    sodium chloride flush  5-40 mL IntraVENous 2 times per day    [Held by provider] enoxaparin  30 mg SubCUTAneous BID    miconazole   Topical BID    insulin glargine  10 Units SubCUTAneous Daily    insulin lispro  0-8 Units SubCUTAneous TID WC    insulin lispro  0-4 Units SubCUTAneous Nightly    [Held by provider] losartan  25 mg Oral Daily     Continuous Infusions:   niCARdipine 9 mg/hr (02/01/24 0637)    sodium chloride      sodium chloride 75 mL/hr at 01/31/24 1154     PRN Meds:ondansetron, hydrALAZINE, sodium chloride flush, sodium chloride, potassium chloride **OR** potassium alternative oral replacement **OR** potassium chloride, magnesium sulfate, ondansetron **OR** ondansetron, polyethylene glycol, acetaminophen **OR** 
    PULMONARY PROGRESS NOTE:    REASON FOR VISIT:AMS, resp failure   Interval History:    Shortness of Breath: no  Cough: no  Sputum: no          Hemoptysis: no  Chest Pain: no  Fever: no                   Swelling Feet: no  Headache: no                                           Nausea, Emesis, Abdominal Pain: no  Diarrhea: no         Constipation: no    Events since last visit: off cardene gtt; nausea, vomiting resolved; taking orally    PAST MEDICAL HISTORY:      Scheduled Meds:   acyclovir  5 mg/kg (Adjusted) IntraVENous Q8H    pantoprazole (PROTONIX) 40 mg in sodium chloride (PF) 0.9 % 10 mL injection  40 mg IntraVENous Q12H    metroNIDAZOLE  500 mg IntraVENous Q8H    metoprolol  5 mg IntraVENous Q6H    cefTRIAXone (ROCEPHIN) IV  1,000 mg IntraVENous Q24H    furosemide  20 mg IntraVENous Daily    amLODIPine  10 mg Oral Daily    atorvastatin  40 mg Oral Nightly    carvedilol  12.5 mg Oral BID WC    hydrALAZINE  25 mg Oral 3 times per day    sodium chloride flush  5-40 mL IntraVENous 2 times per day    [Held by provider] enoxaparin  30 mg SubCUTAneous BID    miconazole   Topical BID    insulin glargine  10 Units SubCUTAneous Daily    insulin lispro  0-8 Units SubCUTAneous TID WC    insulin lispro  0-4 Units SubCUTAneous Nightly    [Held by provider] losartan  25 mg Oral Daily     Continuous Infusions:   niCARdipine Stopped (02/02/24 0912)    sodium chloride      sodium chloride 75 mL/hr at 02/01/24 1109     PRN Meds:ondansetron, hydrALAZINE, sodium chloride flush, sodium chloride, potassium chloride **OR** potassium alternative oral replacement **OR** potassium chloride, magnesium sulfate, ondansetron **OR** ondansetron, polyethylene glycol, acetaminophen **OR** acetaminophen        PHYSICAL EXAMINATION:  BP (!) 182/145   Pulse 71   Temp 97.6 °F (36.4 °C) (Oral)   Resp 16   Ht 1.524 m (5')   Wt 133 kg (293 lb 3.4 oz)   LMP  (LMP Unknown)   SpO2 96%   BMI 57.26 kg/m²       Cardene gtt   General : Awake, 
    PULMONARY PROGRESS NOTE:    REASON FOR VISIT:AMS, resp failure   Interval History:    Shortness of Breath: no  Cough: no  Sputum: no          Hemoptysis: no  Chest Pain: no  Fever: no                   Swelling Feet: yes   Headache: no                                           Nausea, Emesis, Abdominal Pain: no  Diarrhea: no         Constipation: no    Events since last visit: Did not wear bipap overnight. Currently seen on bipap now and states she just had a breathing attack.     PAST MEDICAL HISTORY:      Scheduled Meds:   spironolactone  25 mg Oral Daily    doxycycline monohydrate  100 mg Oral 2 times per day    carvedilol  25 mg Oral BID WC    NIFEdipine  90 mg Oral Daily    polyethylene glycol  17 g Oral Daily    pantoprazole (PROTONIX) 40 mg in sodium chloride (PF) 0.9 % 10 mL injection  40 mg IntraVENous Q12H    metoprolol  5 mg IntraVENous Q6H    furosemide  20 mg IntraVENous Daily    atorvastatin  40 mg Oral Nightly    hydrALAZINE  25 mg Oral 3 times per day    sodium chloride flush  5-40 mL IntraVENous 2 times per day    enoxaparin  30 mg SubCUTAneous BID    miconazole   Topical BID    insulin glargine  10 Units SubCUTAneous Daily    insulin lispro  0-8 Units SubCUTAneous TID WC    insulin lispro  0-4 Units SubCUTAneous Nightly    losartan  25 mg Oral Daily     Continuous Infusions:   sodium chloride       PRN Meds:hydrALAZINE, sodium chloride flush, sodium chloride, potassium chloride **OR** potassium alternative oral replacement **OR** potassium chloride, magnesium sulfate, ondansetron **OR** ondansetron, polyethylene glycol, acetaminophen **OR** acetaminophen        PHYSICAL EXAMINATION:  BP (!) 146/78   Pulse 71   Temp 98.2 °F (36.8 °C) (Axillary)   Resp 20   Ht 1.524 m (5')   Wt 134.2 kg (295 lb 13.7 oz)   LMP  (LMP Unknown)   SpO2 96%   BMI 57.78 kg/m²     General : Awake, alert, on bipap   Neck - supple, no lymphadenopathy, JVD not raised  Heart - regular rhythm, S1 and S2 normal; no 
    PULMONARY PROGRESS NOTE:    REASON FOR VISIT:AMS, resp failure   Interval History:    Shortness of Breath: no  Cough: no  Sputum: no          Hemoptysis: no  Chest Pain: no  Fever: no                   Swelling Feet: yes   Headache: no                                           Nausea, Emesis, Abdominal Pain: no  Diarrhea: no         Constipation: no    Events since last visit: feels ready to go home - has lots of family support - Is ready to start wearing her BIPAP at home    PAST MEDICAL HISTORY:      Scheduled Meds:   spironolactone  25 mg Oral Daily    doxycycline monohydrate  100 mg Oral 2 times per day    carvedilol  25 mg Oral BID WC    NIFEdipine  90 mg Oral Daily    polyethylene glycol  17 g Oral Daily    pantoprazole (PROTONIX) 40 mg in sodium chloride (PF) 0.9 % 10 mL injection  40 mg IntraVENous Q12H    metoprolol  5 mg IntraVENous Q6H    furosemide  20 mg IntraVENous Daily    atorvastatin  40 mg Oral Nightly    hydrALAZINE  25 mg Oral 3 times per day    sodium chloride flush  5-40 mL IntraVENous 2 times per day    enoxaparin  30 mg SubCUTAneous BID    miconazole   Topical BID    insulin glargine  10 Units SubCUTAneous Daily    insulin lispro  0-8 Units SubCUTAneous TID WC    insulin lispro  0-4 Units SubCUTAneous Nightly    losartan  25 mg Oral Daily     Continuous Infusions:   sodium chloride       PRN Meds:hydrALAZINE, sodium chloride flush, sodium chloride, potassium chloride **OR** potassium alternative oral replacement **OR** potassium chloride, magnesium sulfate, ondansetron **OR** ondansetron, polyethylene glycol, acetaminophen **OR** acetaminophen    PHYSICAL EXAMINATION:  BP (!) 142/64   Pulse 67   Temp 97.3 °F (36.3 °C)   Resp 20   Ht 1.524 m (5')   Wt 134.2 kg (295 lb 13.7 oz)   LMP  (LMP Unknown)   SpO2 98%   BMI 57.78 kg/m²     General : Awake, alert, oriented - up in chair, 2.5 l  Neck - supple, no lymphadenopathy, JVD not raised  Heart - regular rhythm, S1 and S2 normal; no 
    PULMONARY PROGRESS NOTE:    REASON FOR VISIT:AMS, resp failure   Interval History:    Shortness of Breath: no  Cough: no  Sputum: no          Hemoptysis: no  Chest Pain: no  Fever: no                   Swelling Feet: yes  Headache: no                                           Nausea, Emesis - no  Abdominal Pain: no  Diarrhea: none         Constipation: no    Events since last visit: no acute overnight events    PAST MEDICAL HISTORY:    Scheduled Meds:   spironolactone  25 mg Oral Daily    doxycycline monohydrate  100 mg Oral 2 times per day    carvedilol  25 mg Oral BID WC    NIFEdipine  90 mg Oral Daily    polyethylene glycol  17 g Oral Daily    pantoprazole (PROTONIX) 40 mg in sodium chloride (PF) 0.9 % 10 mL injection  40 mg IntraVENous Q12H    metoprolol  5 mg IntraVENous Q6H    furosemide  20 mg IntraVENous Daily    atorvastatin  40 mg Oral Nightly    hydrALAZINE  25 mg Oral 3 times per day    sodium chloride flush  5-40 mL IntraVENous 2 times per day    enoxaparin  30 mg SubCUTAneous BID    miconazole   Topical BID    insulin glargine  10 Units SubCUTAneous Daily    insulin lispro  0-8 Units SubCUTAneous TID WC    insulin lispro  0-4 Units SubCUTAneous Nightly    losartan  25 mg Oral Daily     Continuous Infusions:   niCARdipine Stopped (02/03/24 1127)    sodium chloride       PRN Meds:hydrALAZINE, sodium chloride flush, sodium chloride, potassium chloride **OR** potassium alternative oral replacement **OR** potassium chloride, magnesium sulfate, ondansetron **OR** ondansetron, polyethylene glycol, acetaminophen **OR** acetaminophen    PHYSICAL EXAMINATION:  BP (!) 148/74   Pulse 87   Temp 98.7 °F (37.1 °C) (Axillary)   Resp 24   Ht 1.524 m (5')   Wt 134.2 kg (295 lb 13.7 oz)   LMP  (LMP Unknown)   SpO2 91%   BMI 57.78 kg/m²     General : Awake, alert, oriented - still on bipap this morning  Neck - supple, no lymphadenopathy, JVD not raised  Heart - regular rhythm, S1 and S2 normal; no additional 
    PULMONARY PROGRESS NOTE:    REASON FOR VISIT:AMS, resp failure   Interval History:    Shortness of Breath: yes  Cough: no  Sputum: no          Hemoptysis: no  Chest Pain: no  Fever: no                   Swelling Feet: yes  Headache: no                                           Nausea, Emesis - no  Abdominal Pain: no  Diarrhea: none         Constipation: no    Events since last visit: no more nausea or vomiting    PAST MEDICAL HISTORY:    Scheduled Meds:   spironolactone  25 mg Oral Daily    carvedilol  25 mg Oral BID WC    NIFEdipine  90 mg Oral Daily    polyethylene glycol  17 g Oral Daily    pantoprazole (PROTONIX) 40 mg in sodium chloride (PF) 0.9 % 10 mL injection  40 mg IntraVENous Q12H    metroNIDAZOLE  500 mg IntraVENous Q8H    metoprolol  5 mg IntraVENous Q6H    cefTRIAXone (ROCEPHIN) IV  1,000 mg IntraVENous Q24H    furosemide  20 mg IntraVENous Daily    atorvastatin  40 mg Oral Nightly    hydrALAZINE  25 mg Oral 3 times per day    sodium chloride flush  5-40 mL IntraVENous 2 times per day    enoxaparin  30 mg SubCUTAneous BID    miconazole   Topical BID    insulin glargine  10 Units SubCUTAneous Daily    insulin lispro  0-8 Units SubCUTAneous TID WC    insulin lispro  0-4 Units SubCUTAneous Nightly    losartan  25 mg Oral Daily     Continuous Infusions:   niCARdipine Stopped (02/03/24 1127)    sodium chloride       PRN Meds:ondansetron, hydrALAZINE, sodium chloride flush, sodium chloride, potassium chloride **OR** potassium alternative oral replacement **OR** potassium chloride, magnesium sulfate, ondansetron **OR** ondansetron, polyethylene glycol, acetaminophen **OR** acetaminophen    PHYSICAL EXAMINATION:  BP (!) 150/64   Pulse 76   Temp 99 °F (37.2 °C) (Axillary)   Resp 19   Ht 1.524 m (5')   Wt 134.2 kg (295 lb 13.7 oz)   LMP  (LMP Unknown)   SpO2 97%   BMI 57.78 kg/m²     General : Awake, alert, oriented - up in chair  Neck - supple, no lymphadenopathy, JVD not raised  Heart - regular 
   01/31/24 1744   Encounter Summary   Encounter Overview/Reason  Spiritual/Emotional Needs   Service Provided For: Patient   Referral/Consult From: Rounding   Complexity of Encounter Low   Spiritual/Emotional needs   Type Spiritual Support   Assessment/Intervention/Outcome   Assessment Unable to assess  (patient sleeping)   Intervention Prayer (assurance of)/[place]       
   GI Progress notes    2/4/2024   9:59 AM    Name:  Camelia Beltran  MRN:    [id number]     Acct:     [id number]   Room:  2008/2008-01  IP Day: 5     Admit Date: 1/29/2024 10:52 PM  PCP: Alida Archuleta MD    Subjective:     C/C:   Chief Complaint   Patient presents with    Shortness of Breath    Fatigue       Interval History: Status: improved.     Patient seen and examined  No acute events overnight.  On/off BiPAP, typically while sleeping/at rest  No dysphagia, odynophagia  Tolerating diet  No abdominal pain, nausea, vomiting  Hgb stable  Discussed EGD for CT findings of esophagitis.  She is currently refusing.    ROS:  Constitutional: negative for chills, fevers and sweats  Respiratory: negative for cough, dyspnea on exertion, hemoptysis, shortness of breath and wheezing  Cardiovascular: negative for chest pain, chest pressure/discomfort, dyspnea, lower extremity edema and palpitations  Gastrointestinal: negative for abdominal pain, constipation, diarrhea, nausea and vomiting  Neurological: negative for dizziness and headaches    Medications:     Allergies:   Allergies   Allergen Reactions    Adhesive Tape Other (See Comments)     Skin blisters severe skin tearing    Morphine Other (See Comments)     Severe Headache    Iron Other (See Comments)     Stomach cramps    Lisinopril      Elevated creatinine    Metformin And Related      Elevated creatinine      Molds & Smuts      Other reaction(s): Unknown    Pcn [Penicillins] Swelling     Injection site swelling       Current Meds: carvedilol (COREG) tablet 25 mg, BID WC  NIFEdipine (ADALAT CC) extended release tablet 90 mg, Daily  0.45 % sodium chloride infusion, Continuous  polyethylene glycol (GLYCOLAX) packet 17 g, Daily  pantoprazole (PROTONIX) 40 mg in sodium chloride (PF) 0.9 % 10 mL injection, Q12H  metroNIDAZOLE (FLAGYL) 500 mg in 0.9% NaCl 100 mL IVPB premix, Q8H  ondansetron (ZOFRAN) injection 4 mg, Q6H PRN  metoprolol (LOPRESSOR) injection 5 mg, 
  [facility]  Family Medicine      Progress Note      Date:   2/2/2024  Patient name:  Camelia Beltran  Date of admission:  1/29/2024 10:52 PM  MRN:   [id number]  YOB: 1955        Brief HPI    Patient admitted for respiratory failure hypotension    ASSESSMENT/PLAN     Hospital Problems             Last Modified POA    * (Principal) Acute on chronic respiratory failure with hypoxia ([facility]) 1/30/2024 Yes    Secondary hypertension 1/30/2024 Yes    Type 2 diabetes mellitus, without long-term current use of insulin ([facility]) 1/30/2024 Yes    BRIANNA (obstructive sleep apnea) 1/30/2024 Yes    Hyperkalemia 1/30/2024 Yes    Chronic renal insufficiency, stage III (moderate) ([facility]) 1/30/2024 Yes    Primary central sleep apnea 1/30/2024 Yes    Acute cystitis without hematuria 1/30/2024 Yes    Esophagitis 1/30/2024 Yes    Lactic acidosis 1/31/2024 Yes    Abnormal finding on imaging 1/31/2024 Yes    Hypertensive urgency 1/31/2024 Yes    SBO (small bowel obstruction) ([facility]) 1/31/2024 Yes         Monitor H&H  ID on board, continue acyclovir, rocephin, flagyl  Creatinine improving, potassium stable, nephro on board  Pt got NG tube last night, BP elevated, pt remains on cardene drip, wean as tolerated and can resume oral BP meds once pt able to tolerate orals  Continue lantus 10 units, insulin sliding scale  Breathing stable on nasal cannula          Full Code   ADULT DIET; Clear Liquid   DVT:SCD      SUBJECTIVE:     Patient was seen and examined at bedside. Overnight pt developed nausea, vomiting, stat KUB ordered, shows possible SBO  Gen surg consulted, NGT placed  BP very elevated later in the evening, >200 systolic, hydralazine frequency increased, if no improvement then cardene drip to start at midnight with goal BP <160  Pts esophagitis may be cause of n/v, IV acyclovir started    This morning pt is feeling better but NGT is uncomfortable    Review of Systems   Constitutional:  Negative for fever.   Respiratory:  
  [facility] General Surgery   Boubacar Guallpa MD, FACS  Tabatha Molina, APRN-CNP  [address], [address]  [place] [address]  P: [phone], F: [phone]    General and Robotic Surgery  Progress Note               PATIENT NAME: Camelia Beltran     TODAY'S DATE: 2/2/2024, 9:16 AM    SUBJECTIVE: Patient seen and examined at bedside earlier this morning.  Appears comfortable.  Tolerating clear liquid diet.  NG discontinued yesterday evening.  Has not had a bowel movement since admission but does state that she is passing gas.  Denies nausea and vomiting.  Denies fever and chills.  Denies abdominal pain.  Good urine output overnight.    PAST MEDICAL HISTORY     Past Medical History:   Diagnosis Date    Acute hypoxemic respiratory failure ([facility])     Arthritis     CHF (congestive heart failure) ([facility])     Chronic back pain     Diabetes mellitus type II, controlled ([facility]) 2/14/2012    Fibromyalgia     Hyperlipidemia LDL goal < 70 2/14/2012    Hypertension, benign 02/14/2012    Morbid obesity with BMI of 60.0-69.9, adult ([facility]) 8/28/2015    Pain of toe of right foot     morgans cyst    Right wrist pain     rate 8    Skin cancer     skin under lt eye,face    Sleep apnea     Bipap does not work    Wears glasses        PROBLEM LIST     Patient Active Problem List   Diagnosis    Hypertension, benign    Type 2 diabetes mellitus, without long-term current use of insulin ([facility])    Mixed hyperlipidemia    Degeneration of intervertebral disc of cervical region    Cervicalgia    Adult BMI 50.0-59.9 kg/sq m ([facility])    BRIANNA (obstructive sleep apnea)    Anemia    Hepatic steatosis    Lumbar radiculopathy    Morbid obesity ([facility])    Leukocytosis    Chronic pain disorder    MAMTA (acute kidney injury) ([facility])    H/O: GI bleed    Iron deficiency anemia    Fibromyalgia    Hyperkalemia    Class 3 severe obesity due to excess calories with serious comorbidity and body mass index (BMI) of 50.0 to 59.9 in adult ([facility])    Tremor    
  [facility]  Family Medicine      Progress Note      Date:   1/31/2024  Patient name:  Camelia Beltran  Date of admission:  1/29/2024 10:52 PM  MRN:   [id number]  YOB: 1955        Brief HPI    Patient admitted for respiratory failure hypotension    ASSESSMENT/PLAN     Hospital Problems             Last Modified POA    * (Principal) Acute on chronic respiratory failure with hypoxia ([facility]) 1/30/2024 Yes    Secondary hypertension 1/30/2024 Yes    Type 2 diabetes mellitus, without long-term current use of insulin ([facility]) 1/30/2024 Yes    BRIANNA (obstructive sleep apnea) 1/30/2024 Yes    Hyperkalemia 1/30/2024 Yes    Chronic renal insufficiency, stage III (moderate) ([facility]) 1/30/2024 Yes    Primary central sleep apnea 1/30/2024 Yes    Acute cystitis without hematuria 1/30/2024 Yes    Esophagitis 1/30/2024 Yes           Hgb is down from 9.3 to 7.4, this may be dilutional however there was some esophagitis seen on CT and diarrhea illness, will hold lovenox, repeat H&H, start protonix 40 IV BID, consult GI  WBC increased, possibly secondary to aspiration vs esophagitis vs diarrheal illness. Switch abx to cefepime, flagyl, vanc  Creatinine improving, potassium stable, nephro on board  Hold hydralazine & losartan this AM  Continue lantus 10 units, insulin sliding scale      Pt appeared confused as reported by nursing, stat CT head ordered, ammonia ordered - negative  Pt developed nausea, vomiting, stat KUB ordered, shows possible SBO  Gen surg consulted, NGT placed  BP very elevated later in the evening, >200 systolic, hydralazine frequency increased, if no improvement then cardene drip to start at midnight with goal BP <160  Pts esophagitis may be cause of n/v, IV acyclovir started    Full Code   ADULT DIET; Regular   DVT:SCD      SUBJECTIVE:     Patient was seen and examined at bedside. No acute events overnight. Patient denied any new complaints or concerns.  All relevant notes, labs & imaging were reviewed. 
Attempted to call patient's pharmacy to get information about frequency and dosing of meds, no answer at this time   
Auto bp is showing systolic over 200. RN took a manual bp, that read 190/70. RN spoke with Dr. Valencia regarding pt's high blood pressure. MD changed hydralazine order to q4hr - wants a dose now; if bp doesn't come down by midnight, start a cardene gtt.  
Bedside rounding done with Dr. Guallpa. Physician ok with patient's NGT to be removed and start patient on a clear liquid diet.   
Bipap on standby at this time.  Pulse Ox-95% 1.5lpm-  Pt did not wear last night.  Skin score-- 0     Nasal gel pad available at bedside.  Pt awake/alert/no distress noted.         
Comprehensive Nutrition Assessment    Type and Reason for Visit:  RD Nutrition Re-Screen/LOS    Nutrition Recommendations/Plan:   Will provide 5 carbohydrate choices per tray and add Glucerna twice daily     Malnutrition Assessment:  Malnutrition Status:  At risk for malnutrition (Comment) (02/06/24 1616)    Context:  Chronic Illness     Findings of the 6 clinical characteristics of malnutrition:  Energy Intake:  Mild decrease in energy intake (Comment)  Weight Loss:  Mild weight loss (specify amount and time period)     Body Fat Loss:  No significant body fat loss     Muscle Mass Loss:  No significant muscle mass loss    Fluid Accumulation:  Severe Extremities   Strength:  Not Performed    Nutrition Assessment:    Pt was admitted due to Respiratory distress and treated conservatively for a SBO which is now resolved. Low Fiber diet has been initiated, but intake has been less than 25%. Pt becomes very SOB quickly affecting intake.    Nutrition Related Findings:    severe edema BLE ,Labs: POC Glu 183, Meds: Reviewed, PMH: DM, BM 2/6 Wound Type: None       Current Nutrition Intake & Therapies:    Average Meal Intake: 1-25%     ADULT DIET; Regular; Low Fiber    Anthropometric Measures:  Height: 152.4 cm (5')  Ideal Body Weight (IBW): 100 lbs (45 kg)    Admission Body Weight: 132.9 kg (293 lb)  Current Body Weight: 132.9 kg (293 lb), 293 % IBW. Weight Source: Bed Scale  Current BMI (kg/m2): 57.2  Usual Body Weight: 138.3 kg (305 lb) (9/23)  % Weight Change (Calculated): -3.9                    BMI Categories: Obese Class 3 (BMI 40.0 or greater)    Estimated Daily Nutrient Needs:  Energy Requirements Based On: Formula  Weight Used for Energy Requirements: Ideal  Energy (kcal/day): [place] x  1.3= 1200 kcal  Weight Used for Protein Requirements: Ideal  Protein (g/day): 2g/kg= 90 g  Method Used for Fluid Requirements: 1 ml/kcal    Nutrition Diagnosis:   Inadequate oral intake related to impaired respiratory function as 
Dr. Crespo roundtiburcio at bedside. MD wants pt off cardene gtt today - MD thinks it will be possible with her ability to now have oral bp meds again.  
Dr. Luna called the unit and confirmed with him regarding the IV fluid rate. Ordered to increase the rate to 75ml/hr of Normal Saline.  
Dr. Luna notified of new consult. New order received for NS at 50mL/hr, pro-bnp, potassium recheck. He would like to give 5g of lokelma if potassium is greater than 5.3   
Dr. Oates notified of low urine output, and PVR of 29.    
Dr. Valencia would like 2pm dose of hydralazine held this afternoon   
Dr. Иван Reed Served with new NERITES  
[facility]   OCCUPATIONAL THERAPY MISSED TREATMENT NOTE   INPATIENT   Date: 24  Patient Name: Camelia Beltran       Room:   MRN: [id number]   Account #: [id number]    : 1955  (68 y.o.)  Gender: female                 REASON FOR MISSED TREATMENT:  24    -    Refusal by Patient - pt reports being too fatigued for therapy despite education. Pt continues to refuse. OT will continue to follow.     1450       Electronically signed by CHARLY Valente on 24 at 4:09 PM EST    
Informed Dr. Valencia about hyperglycemia, new order for one time dose of 10 units of humalog   
Informed Dr. Valencia about the latest blood sugar of 257mg/dl thru Perfect serve. Asked him if he wants to have blood sugars checked ACHS and also insulin coverage as needed since the patient has only OD order of Lantus 10 units subcutaneous and it was already given this afternoon.    New orders were entered.  
[facility]   OCCUPATIONAL THERAPY MISSED TREATMENT NOTE   INPATIENT   Date: 24  Patient Name: Camelia Beltran       Room:   MRN: [id number]   Account #: [id number]    : 1955  (68 y.o.)  Gender: female                 REASON FOR MISSED TREATMENT:  Patient unable to participate   -    Per JOSH Whitehead, patient is going down for KUB. OT will continue to follow and see patient as time permits. 1501      Electronically signed by CHARLY Brar on 24 at 3:01 PM EST   
NONINVASIVE VENTILATION    PROVIDE OPTIMAL VENTILATION/ACCEPTABLE SPO2   IMPLEMENT NONINVASIVE VENTILATION PROTOCOL   MAINTAIN ACCEPTABLE SPO2   ASSESS SKIN INTEGRITY/BREAKDOWN SCORE   PATIENT EDUCATION AS NEEDED   BIPAP AS NEEDED  
[facility]   OCCUPATIONAL THERAPY MISSED TREATMENT NOTE   INPATIENT   Date: 24  Patient Name: Camelia Beltran       Room:   MRN: [id number]   Account #: [id number]    : 1955  (68 y.o.)  Gender: female                 REASON FOR MISSED TREATMENT:  Hold treatment per nursing request   -    RN deferred OT assessment due to current medical status. OT will continue to follow.  0833        Electronically signed by CHARLY Lyon on 24 at 1:10 PM EST    
Oral BP meds given and cardene gtt stopped. Writer will monitor BP.   
Patient arrived to ICU 2008 and transferred to bed. Monitors placed and patient assessed by primary RNs. Patient oriented to room and call light. Safety precautions in place. Patient remains on BiPap.  
Patient oriented to room. Telemetry applied. VS taken.   
Patient provided medical update and declined prayer; blessing given     01/30/24 1944   Encounter Summary   Encounter Overview/Reason  Spiritual/Emotional Needs   Service Provided For: Patient   Referral/Consult From: Marti   Last Encounter  01/30/24   Complexity of Encounter Moderate   Spiritual/Emotional needs   Type Spiritual Support   Assessment/Intervention/Outcome   Assessment Coping;Hopeful;Impaired resilience;Powerlessness   Intervention Discussed illness injury and it’s impact;Explored/Affirmed feelings, thoughts, concerns;Prayer (assurance of)/[place];Sustaining Presence/Ministry of presence   Outcome Coping;Engaged in conversation;Expressed feelings, needs, and concerns;Expressed Gratitude;Receptive       
Patient wanting up in chair. Writer and PCT assisted patient up to recliner. Patient was appreciative. Patient very dyspneic with exertion, O2 remained stable above 90%. BiPAP applied per patient request.   
Perfect serve sent to Dr. Archuleta informing her on sepsis alert pop up.  
Physical Therapy        Physical Therapy Cancel Note      DATE: 2/3/2024    NAME: Camelia Beltran  MRN: [id number]   : 1955      Patient not seen this date for Physical Therapy due to:    Patient Declined: pt reports RN getting her up to chair(in presently)- pt defers PT at this time due to needing Bipap      Electronically signed by Edelmira Tipton, PT on 2/3/2024 at 1:36 PM     
Physical Therapy        Physical Therapy Cancel Note      DATE: 2024    NAME: Camelia Beltran  MRN: [id number]   : 1955      Patient not seen this date for Physical Therapy due to:    Patient Declined: pt refusing PT eval due to being \"way too tired\"       Electronically signed by Edelmira Tipton PT on 2024 at 3:15 PM     
Physical Therapy  Facility/Department: [facility] PROGRESSIVE CARE  Daily Treatment Note  NAME: Camelia Beltran  : 1955  MRN: [id number]    Date of Service: 2024    Discharge Recommendations:  Continue to assess pending progress, Home with assist PRN, Patient would benefit from continued therapy after discharge        Patient Diagnosis(es): The primary encounter diagnosis was Respiratory distress. A diagnosis of Lactic acidosis was also pertinent to this visit.       Activity Tolerance: Patient limited by fatigue;Patient limited by endurance     Plan    Physical Therapy Plan  General Plan: 5-7 times per week  Current Treatment Recommendations: Strengthening;Balance training;Gait training;Stair training;Functional mobility training;Therapeutic activities;Safety education & training;Transfer training;Endurance training     Restrictions  Restrictions/Precautions  Restrictions/Precautions: Fall Risk, General Precautions  Position Activity Restriction  Other position/activity restrictions: Keep SpO2 above 88%     Subjective    Pt sitting in chair upon entering room, on Bi-pap, pt agreeable to exercise only, declined mobility d/t just being placed on Bi-pap, patient states she may be discharged home today.  Pain: denies pain.     Objective     Transfer Training:  (pt declined, just placed on Bi-pap)       PT Exercises  Exercise Treatment: (B) LE exercise in recliner 10 reps, ankle pumps, heelslides, SLR, hip abd/add SBA-MIN A (Bi-pap) 02 stayed 100%, frequent rest breaks.             Goals  Short Term Goals  Time Frame for Short Term Goals: 3-4 days  Short Term Goal 1: transfers from various surfaces with supervision  Short Term Goal 2: pt able to ambulate with RW and supervision x 50ft with SpO2 above 88%  Short Term Goal 3: pt able to tolerate 20-25min of therapeutic activity/execises for improved endurance  Short Term Goal 4: pt able to negotiate 2 steps with B rails to enter home    Education  Patient 
Physical Therapy  Facility/Department: [facility] PROGRESSIVE CARE  Daily Treatment Note  NAME: Camelia Beltran  : 1955  MRN: [id number]    Date of Service: 2024    Discharge Recommendations:  Continue to assess pending progress, Home with assist PRN, Patient would benefit from continued therapy after discharge        Patient Diagnosis(es): The primary encounter diagnosis was Respiratory distress. A diagnosis of Lactic acidosis was also pertinent to this visit.       Activity Tolerance: Patient limited by endurance;Patient limited by fatigue     Plan    Physical Therapy Plan  General Plan: 5-7 times per week  Current Treatment Recommendations: Strengthening;Balance training;Gait training;Stair training;Functional mobility training;Therapeutic activities;Safety education & training;Transfer training;Endurance training     Restrictions  Restrictions/Precautions  Restrictions/Precautions: Fall Risk, General Precautions  Position Activity Restriction  Other position/activity restrictions: Keep SpO2 above 88%     Subjective    Pt sitting EOB upon entering room  Pain: denies pain     Objective      Bed Mobility Training  Supine to Sit:  (did not occur, pt sitting EOB upon entering room.)  Sit to Supine: Moderate assistance (LE's)  Scooting:  (Gilberts assist to get to HOB)    Balance  Sitting: Intact  Standing: With support (rolling walker)    Transfer Training  Sit to Stand: Contact-guard assistance  Stand to Sit: Contact-guard assistance  Stand Pivot Transfers: Contact-guard assistance (c walker)    Gait Training  Overall Level of Assistance: Contact-guard assistance  Distance (ft): 7 Feet x 2  Base of Support: Widened  Speed/Odette: Slow;Shuffled  Step Length: Left shortened;Right shortened  Stance: Weight shift  Gait Abnormalities: Antalgic;Decreased step clearance (leans on walker @ times c elbows, poor posture, education to correct, poor carryover. poor endurance frequent rest breaks.)       PT 
Physical Therapy  Facility/Department: [facility] ICU  Physical Therapy Initial Assessment    Name: Camelia Beltran  : 1955  MRN: [id number]  Date of Service: 2024    Discharge Recommendations:  Continue to assess pending progress, Home with assist PRN, Patient would benefit from continued therapy after discharge   PT Equipment Recommendations  Equipment Needed: No      Patient Diagnosis(es): The primary encounter diagnosis was Respiratory distress. A diagnosis of Lactic acidosis was also pertinent to this visit.  Past Medical History:  has a past medical history of Acute hypoxemic respiratory failure (HCC), Arthritis, CHF (congestive heart failure) (HCC), Chronic back pain, Diabetes mellitus type II, controlled (HCC), Fibromyalgia, Hyperlipidemia LDL goal < 70, Hypertension, benign, Morbid obesity with BMI of 60.0-69.9, adult (HCC), Pain of toe of right foot, Right wrist pain, Skin cancer, Sleep apnea, and Wears glasses.  Past Surgical History:  has a past surgical history that includes Tubal ligation (); Hysterectomy (); lumbar fusion (10/2010; 2011); Carpal tunnel release (Right, 2014); cyst removal (); Cholecystectomy, laparoscopic (); lumbar fusion (); Rotator cuff repair (Left, ); Cataract removal with implant (Bilateral, ); Skin cancer excision (); Inguinal hernia repair (Bilateral, 2017); other surgical history (2018); Upper gastrointestinal endoscopy (N/A, 10/16/2019); and Colonoscopy (N/A, 10/17/2019).    Assessment   Body Structures, Functions, Activity Limitations Requiring Skilled Therapeutic Intervention: Decreased functional mobility ;Decreased endurance;Decreased balance;Decreased strength  Assessment: Impaired mobility due to decreased tolerance to activity and safety concerns of decreased strength and balance  Decision Making: Medium Complexity  History: CHF  Exam: decreased balance, strength, endurance, mobility  Clinical Presentation: 
Progress Note  Date:2/3/2024       Room:  Patient Name:Camelia Beltran     YOB: 1955     Age:68 y.o.        Subjective    Subjective:  Symptoms:  Improved.  (Slept this morning. I declined giving her sleeping medication last night d/t risk of respiratory depression, and melatonin is unlikely to be helpful d/t her opioid tolerance. ).    Activity level: Impaired due to weakness.    Pain:  She reports no pain.  (\"I haven't had any pain since I came in.\").      Review of Systems  Objective         Vitals Last 24 Hours:  TEMPERATURE:  Temp  Av °F (36.7 °C)  Min: 97.7 °F (36.5 °C)  Max: 98.5 °F (36.9 °C)  RESPIRATIONS RANGE: Resp  Av.2  Min: 12  Max: 35  PULSE OXIMETRY RANGE: SpO2  Av.4 %  Min: 89 %  Max: 100 %  PULSE RANGE: Pulse  Av.7  Min: 56  Max: 98  BLOOD PRESSURE RANGE: Systolic (24hrs), Av , Min:109 , Max:224   ; Diastolic (24hrs), Av, Min:32, Max:161    I/O (24Hr):    Intake/Output Summary (Last 24 hours) at 2/3/2024 1035  Last data filed at 2/3/2024 0627  Gross per 24 hour   Intake 4824.48 ml   Output 1050 ml   Net 3774.48 ml     Objective:  General Appearance:  Comfortable.    Vital signs: (most recent): Blood pressure (!) 163/49, pulse 84, temperature 98 °F (36.7 °C), temperature source Oral, resp. rate 25, height 1.524 m (5'), weight 134.2 kg (295 lb 13.7 oz), SpO2 92 %, not currently breastfeeding.  (On Cardene drip, BP improving this morning. ).    Lungs:  Normal effort and normal respiratory rate.  Breath sounds clear to auscultation.    Heart: Normal rate.  Regular rhythm.  S1 normal and S2 normal.    Abdomen: Abdomen is soft and non-distended.  There is no abdominal tenderness.       Labs/Imaging/Diagnostics    Labs:  CBC:  Recent Labs     24  0336 24  0350 24  0349   WBC 14.5* 16.3* 20.6*   RBC 3.10* 3.30* 3.49*   HGB 8.2* 8.6* 9.2*   HCT 25.8* 28.0* 30.0*   MCV 83.4 85.0 86.0   RDW 19.3* 19.4* 19.4*    333 353 
Progress Note  Date:2024       Room:  Patient Name:Camelia Beltran     YOB: 1955     Age:68 y.o.        Subjective    Subjective:  Symptoms:  Stable.  (Up in chair, Cardene discontinued yesterday. Developed red patch on lateral left lower arm. ).    Diet:  Poor intake.    Activity level: Impaired due to weakness.       Review of Systems  Objective         Vitals Last 24 Hours:  TEMPERATURE:  Temp  Av °F (36.7 °C)  Min: 97.6 °F (36.4 °C)  Max: 99 °F (37.2 °C)  RESPIRATIONS RANGE: Resp  Av.5  Min: 12  Max: 30  PULSE OXIMETRY RANGE: SpO2  Av.5 %  Min: 87 %  Max: 100 %  PULSE RANGE: Pulse  Av.1  Min: 65  Max: 92  BLOOD PRESSURE RANGE: Systolic (24hrs), Av , Min:122 , Max:234   ; Diastolic (24hrs), Av, Min:47, Max:106    I/O (24Hr):    Intake/Output Summary (Last 24 hours) at 2024 0748  Last data filed at 2024 1831  Gross per 24 hour   Intake 1025.48 ml   Output 950 ml   Net 75.48 ml     Objective:  General Appearance:  Comfortable.    Vital signs: (most recent): Blood pressure (!) 150/73, pulse 82, temperature 98 °F (36.7 °C), temperature source Axillary, resp. rate 18, height 1.524 m (5'), weight 134.2 kg (295 lb 13.7 oz), SpO2 95 %, not currently breastfeeding.  (Slightly hypertensive).    Skin:  (Red warm patch on lateral left distal arm)    Labs/Imaging/Diagnostics    Labs:  CBC:  Recent Labs     24  0349 24  0453 24  0430   WBC 20.6* 14.9* 15.4*   RBC 3.49* 3.24* 3.18*   HGB 9.2* 8.4* 8.4*   HCT 30.0* 27.3* 26.8*   MCV 86.0 84.4 84.3   RDW 19.4* 19.2* 19.5*    311 322     CHEMISTRIES:  Recent Labs     24  0349 24  0453 24  0430    138 138   K 3.7 3.1* 3.9    101 102   CO2 23 22 23   BUN 12 14 17   CREATININE 0.9 1.0* 1.1*   GLUCOSE 166* 135* 139*     PT/INR:No results for input(s): \"PROTIME\", \"INR\" in the last 72 hours.  APTT:No results for input(s): \"APTT\" in the last 72 hours.  LIVER PROFILE:No 
Progress Note  Date:2024       Room:[address] [place] [address]  Patient Name:Camelia Beltran     YOB: 1955     Age:68 y.o.        Subjective    Subjective:  Symptoms:  Resolved.    Diet:  Poor intake.    Activity level: Impaired due to weakness.    Pain:  She reports no pain.       Review of Systems  Objective         Vitals Last 24 Hours:  TEMPERATURE:  Temp  Av °F (36.7 °C)  Min: 97.4 °F (36.3 °C)  Max: 98.3 °F (36.8 °C)  RESPIRATIONS RANGE: Resp  Av.2  Min: 14  Max: 20  PULSE OXIMETRY RANGE: SpO2  Av.6 %  Min: 95 %  Max: 100 %  PULSE RANGE: Pulse  Av.7  Min: 55  Max: 74  BLOOD PRESSURE RANGE: Systolic (24hrs), Av , Min:122 , Max:158   ; Diastolic (24hrs), Av, Min:45, Max:116    I/O (24Hr):    Intake/Output Summary (Last 24 hours) at 2024 0729  Last data filed at 2024 1355  Gross per 24 hour   Intake 240 ml   Output --   Net 240 ml     Objective:  General Appearance:  Comfortable.    Vital signs: (most recent): Blood pressure (!) 132/53, pulse 76, temperature 98.1 °F (36.7 °C), temperature source Oral, resp. rate 16, height 1.524 m (5'), weight 134.2 kg (295 lb 13.7 oz), SpO2 97 %, not currently breastfeeding.  (BP significantly improved).      Labs/Imaging/Diagnostics    Labs:  CBC:  Recent Labs     24  0430 24  0844 24  0550   WBC 15.4* 14.3* 9.6   RBC 3.18* 3.14* 3.15*   HGB 8.4* 8.3* 8.5*   HCT 26.8* 26.2* 26.5*   MCV 84.3 83.7 84.0   RDW 19.5* 19.9* 19.5*    355 377     CHEMISTRIES:  Recent Labs     24  0430 24  0550    140   K 3.9 3.2*    104   CO2 23 23   BUN 17 12   CREATININE 1.1* 1.0*   GLUCOSE 139* 148*     PT/INR:No results for input(s): \"PROTIME\", \"INR\" in the last 72 hours.  APTT:No results for input(s): \"APTT\" in the last 72 hours.  LIVER PROFILE:No results for input(s): \"AST\", \"ALT\", \"BILIDIR\", \"BILITOT\", \"ALKPHOS\" in the last 72 hours.    Imaging Last 24 Hours:  No results found.  Assessment//Plan       
Progress Note  Date:2024       Room:[facility]-  Patient Name:Camelia Beltran     YOB: 1955     Age:68 y.o.        Subjective    Subjective:  Symptoms:  Improved.    Diet:  Poor intake (eating yogurt this morning).    Activity level: Impaired due to weakness.    Pain:  She reports no pain.       Review of Systems  Objective         Vitals Last 24 Hours:  TEMPERATURE:  Temp  Av °F (36.7 °C)  Min: 97.4 °F (36.3 °C)  Max: 98.7 °F (37.1 °C)  RESPIRATIONS RANGE: Resp  Av.7  Min: 13  Max: 24  PULSE OXIMETRY RANGE: SpO2  Av.7 %  Min: 78 %  Max: 99 %  PULSE RANGE: Pulse  Av  Min: 56  Max: 102  BLOOD PRESSURE RANGE: Systolic (24hrs), Av , Min:136 , Max:164   ; Diastolic (24hrs), Av, Min:63, Max:116    I/O (24Hr):  No intake or output data in the 24 hours ending 24 0805  Objective:  General Appearance:  Comfortable.    Vital signs: (most recent): Blood pressure (!) 158/116, pulse 74, temperature 98.3 °F (36.8 °C), temperature source Oral, resp. rate 18, height 1.524 m (5'), weight 134.2 kg (295 lb 13.7 oz), SpO2 95 %, not currently breastfeeding.  (hypertensive).    Lungs:  Normal effort and normal respiratory rate.  Breath sounds clear to auscultation.    Heart: Normal rate.  Regular rhythm.  S1 normal and S2 normal.      Labs/Imaging/Diagnostics    Labs:  CBC:  Recent Labs     24  0430   WBC 14.9* 15.4*   RBC 3.24* 3.18*   HGB 8.4* 8.4*   HCT 27.3* 26.8*   MCV 84.4 84.3   RDW 19.2* 19.5*    322     CHEMISTRIES:  Recent Labs     24  0453 24  0430    138   K 3.1* 3.9    102   CO2 22 23   BUN 14 17   CREATININE 1.0* 1.1*   GLUCOSE 135* 139*     PT/INR:No results for input(s): \"PROTIME\", \"INR\" in the last 72 hours.  APTT:No results for input(s): \"APTT\" in the last 72 hours.  LIVER PROFILE:No results for input(s): \"AST\", \"ALT\", \"BILIDIR\", \"BILITOT\", \"ALKPHOS\" in the last 72 hours.    Imaging Last 24 Hours:  No results 
Pt did wear bipap on and off over night   
RN attempted to call GI through [facility] and number kept saying it was out of service. RN messaged Dr. Annie CROW results.   
RN messaged Dr. Valencia regarding C-diff order. Per gabriel Gibson to d.c order.  
RN messaged Dr. Valencia regarding patient's high SBP. Per Dr. Valencia, restart patient's hydralazine.   
RN spoke with Dr. Guallpa. MD agrees with NG tube placement with intermittent suction; NPO, will see the pt in the morning.  
RN spoke with GI doctor Malini. MD ordered NG tube, surgical consult, keep pt NPO, and repeat KUB in the morning.  
RN took manual bp - showing 178/76. Cardene gtt to be started.  
RN updated Dr. Alejandro at Dr. Valencia's request.   
Received call from lab informing that the patient's sample for potassium and pro-bnp was hemolyzed and they will come back to redraw blood from the patient.  
Report given to Yoko MORENO from PCU. Patient transported to room 2093 via wheelchair with all belongings. Staff at bedside   
Stool sample collected for occult and GI panel. Sample sent to lab    
Surgery Progress Note               PATIENT NAME: Camelia Beltran     TODAY'S DATE: 2/3/2024, 2:22 PM    Chief complaint: sbo    SUBJECTIVE:    Patient is eating full liquids .  Patients pain is gone.  Pt is  passing gas.  Pt has had a bowel movement.     OBJECTIVE:   VITALS:  BP (!) 168/48   Pulse 67   Temp 98.5 °F (36.9 °C) (Oral)   Resp 20   Ht 1.524 m (5')   Wt 134.2 kg (295 lb 13.7 oz)   LMP  (LMP Unknown)   SpO2 99%   BMI 57.78 kg/m²     INTAKE/OUTPUT:      Intake/Output Summary (Last 24 hours) at 2/3/2024 1422  Last data filed at 2/3/2024 1221  Gross per 24 hour   Intake 4824.48 ml   Output 1150 ml   Net 3674.48 ml                 CONSTITUTIONAL:  awake and alert.  No acute distress  CARDIOVASCULAR:  regular rate and rhythm and No Murmur  LUNGS:  CTA Bilaterally  ABDOMEN:   Abdomen soft, non-tender, non-distended      Data:  CBC:   Lab Results   Component Value Date/Time    WBC 20.6 02/03/2024 03:49 AM    RBC 3.49 02/03/2024 03:49 AM    RBC 4.16 04/26/2012 03:00 PM    HGB 9.2 02/03/2024 03:49 AM    HCT 30.0 02/03/2024 03:49 AM    MCV 86.0 02/03/2024 03:49 AM    MCH 26.3 02/03/2024 03:49 AM    MCHC 30.6 02/03/2024 03:49 AM    RDW 19.4 02/03/2024 03:49 AM     02/03/2024 03:49 AM     04/26/2012 03:00 PM    MPV 8.9 02/03/2024 03:49 AM     BMP:    Lab Results   Component Value Date/Time     02/03/2024 03:49 AM    K 3.7 02/03/2024 03:49 AM     02/03/2024 03:49 AM    CO2 23 02/03/2024 03:49 AM    BUN 12 02/03/2024 03:49 AM    LABALBU 3.6 01/29/2024 10:54 PM    LABALBU 4.5 03/07/2012 11:34 AM    CREATININE 0.9 02/03/2024 03:49 AM    CALCIUM 8.6 02/03/2024 03:49 AM    GFRAA 57 09/21/2022 01:50 PM    LABGLOM >60 02/03/2024 03:49 AM    GLUCOSE 166 02/03/2024 03:49 AM    GLUCOSE 173 04/26/2012 03:00 PM       ASSESSMENT     Hospital Problems             Last Modified POA    Secondary hypertension 1/30/2024 Yes    Type 2 diabetes mellitus, without long-term current use of insulin (HCC) 
Tried to reach Dr. Luna to relay that urine output of the patient is 50 ml and bladder scan volume is 132 ml. After reviewing the pro-bnp result, ordered to keep the same rate and refer in the morning if urine output is still low.   
[facility]   OCCUPATIONAL THERAPY MISSED TREATMENT NOTE   INPATIENT   Date: 24  Patient Name: Camelia Beltran       Room:   MRN: [id number]   Account #: [id number]    : 1955  (68 y.o.)  Gender: female                 REASON FOR MISSED TREATMENT:  24    -    Refusal by Patient - pt adamantly refuses, reports she is feeling nauseous and not up to participating with occupational therapy at this time. This writer and PT provide education on importance of participation with therapy however pt continues to decline and requests therapy come back tomorrow. OT will continue to follow.    [id number]       Electronically signed by CHARLY Valente on 24 at 2:05 PM EST    
Vancomycin Dosing by Pharmacy - Initial Note   TODAY'S DATE:  1/31/2024  Patient name, age:  Camelia Beltran, 68 y.o.    Indication: Sepsis of unknown origin, empiric  Additional antimicrobials:  cefepime, flagyl     Allergies:  Adhesive tape, Morphine, Iron, Lisinopril, Metformin and related, Molds & smuts, and Pcn [penicillins]   Actual Weight:    Wt Readings from Last 1 Encounters:   01/31/24 133.5 kg (294 lb 5 oz)     Labs/Ancillary Data  Estimated Creatinine Clearance: 54 mL/min (A) (based on SCr of 1.3 mg/dL (H)).  Recent Labs     01/29/24  2254 01/30/24  0911 01/31/24  0418   CREATININE 1.5* 1.6* 1.3*   BUN 18 23 25*   WBC 10.7  --  17.4*     Procalcitonin   Date Value Ref Range Status   01/30/2024 1.14 (H) <0.09 ng/mL Final     Comment:           Suspected Sepsis:  <0.50 ng/mL     Low likelihood of sepsis.  0.50-2.00 ng/mL     Increased likelihood of sepsis. Antibiotics encouraged.  >2.00 ng/mL     High risk of sepsis/shock. Antibiotics strongly encouraged.        Suspected Lower Resp Tract Infections:  <0.24 ng/mL     Low likelihood of bacterial infection.  >0.24 ng/mL     Increased likelihood of bacterial infection. Antibiotics encouraged.        With successful antibiotic therapy, PCT levels should decrease rapidly. (Half-life of 24 to   36 hours.)        Procalcitonin values from samples collected within the first 6 hours of systemic infection   may still be low. Retesting may be indicated.  Values from day 1 and day 4 can be entered into the Change in Procalcitonin Calculator   ([email or web address]) to determine the patient's Mortality Risk Prognosis        In healthy neonates, plasma Procalcitonin (PCT) concentrations increase gradually after   birth, reaching peak values at about 24 hours of age then decrease to normal values below   0.5 ng/mL by 48-72 hours of age.         Intake/Output Summary (Last 24 hours) at 1/31/2024 0832  Last data filed at 1/31/2024 0606  Gross per 24 hour   Intake 
Write spoke with Dr. Archuleta, okay to discontinue IVF   
Writer assisted patient to bed side commode, patient became short of breath and was placed back on BiPAP. RN notified and aware.   
Writer called GI answering service to send a page out for pt's resulted imaging. Awaiting call back.   
Writer messaged Dr. Archuleta about pt's request for something to help her sleep. Dr. Archuleta said, \"We can't risk depressing her respiratory system. Dr. will told me Dr. Dorado said absolutely no opioids or benzodiazepines \". Writer then asked about melatonin and Dr. Archuleta stated that writer was being not helpful.   
  Genitourinary:  Negative for difficulty urinating and dysuria.   Neurological:  Negative for headaches.         OBJECTIVE:     BP (!) 152/51   Pulse 72   Temp 97.6 °F (36.4 °C) (Oral)   Resp 20   Ht 1.524 m (5')   Wt 133 kg (293 lb 3.4 oz)   LMP  (LMP Unknown)   SpO2 98%   BMI 57.26 kg/m²      Physical Exam  Vitals reviewed.   Constitutional:       General: She is not in acute distress.  Eyes:      Extraocular Movements: Extraocular movements intact.      Conjunctiva/sclera: Conjunctivae normal.   Cardiovascular:      Rate and Rhythm: Normal rate and regular rhythm.      Pulses: Normal pulses.      Heart sounds: Normal heart sounds.   Pulmonary:      Effort: Pulmonary effort is normal.      Breath sounds: Normal breath sounds. No wheezing or rales.   Abdominal:      General: There is no distension.      Palpations: Abdomen is soft.      Tenderness: There is no abdominal tenderness. There is no guarding.   Neurological:      General: No focal deficit present.      Mental Status: She is alert and oriented to person, place, and time.          Intake/Output:    Intake/Output Summary (Last 24 hours) at 2/2/2024 0612  Last data filed at 2/1/2024 1827  Gross per 24 hour   Intake 2420.06 ml   Output 900 ml   Net 1520.06 ml         Laboratory Testing:  CBC:   Recent Labs     02/02/24  0350   WBC 16.3*   HGB 8.6*          BMP:    Recent Labs     01/31/24  0418 02/01/24  0336 02/02/24  0350   * 140 144   K 4.8 3.8 3.6*   CL 96* 101 105   CO2 26 27 26   BUN 25* 21 14   CREATININE 1.3* 1.1* 0.9   GLUCOSE 173* 146* 148*       Magnesium:   Lab Results   Component Value Date/Time    MG 2.1 12/12/2023 08:36 PM     Phosphorus:   Lab Results   Component Value Date/Time    PHOS 4.9 10/13/2023 11:29 PM     Ionized Calcium: No results found for: \"CAION\"   PT/INR:    Lab Results   Component Value Date/Time    PROTIME 16.5 01/29/2024 10:54 PM    INR 1.3 01/29/2024 10:54 PM     PTT:    Lab Results   Component Value 
\"PROTIME\", \"INR\" in the last 72 hours.  APTT:No results for input(s): \"APTT\" in the last 72 hours.  LIVER PROFILE:No results for input(s): \"AST\", \"ALT\", \"BILIDIR\", \"BILITOT\", \"ALKPHOS\" in the last 72 hours.    Imaging Last 24 Hours:  No results found.  Assessment//Plan           Hospital Problems             Last Modified POA    Secondary hypertension 1/30/2024 Yes    Type 2 diabetes mellitus, without long-term current use of insulin ([facility]) 1/30/2024 Yes    BRIANNA (obstructive sleep apnea) 1/30/2024 Yes    Hyperkalemia 1/30/2024 Yes    Chronic renal insufficiency, stage III (moderate) ([facility]) 1/30/2024 Yes    Primary central sleep apnea 1/30/2024 Yes    Acute cystitis without hematuria 1/30/2024 Yes    Acute on chronic respiratory failure with hypoxia ([facility]) 2/3/2024 Yes    Esophagitis 1/30/2024 Yes    Lactic acidosis 1/31/2024 Yes    Hypertensive urgency 1/31/2024 Yes    SBO (small bowel obstruction) ([facility]) 2/3/2024 Yes     Assessment & Plan    Acute on chronic hypoxic respiratory failure - BiPAP with sleeping. Improved. Management per pulmonology.     Diabetes - improved control compared to admission     Leukocytosis - abx and management per ID. improving     CKD IIIa - improved     Hypertension/hypertensive urgency - Cardene drip; management per nephrology and critical care     SBO - resolved. Tolerating small amounts of solids.     Esophagitis - per GI       Transfer to [facility] when other services agree.     Electronically signed by Alida Archuleta MD on 2/4/24 at 7:26 AM EST    
lymphadenopathy, JVD not raised, NG tube  Heart - regular rhythm, S1 and S2 normal; no additional sounds heard  Lungs - Air Entry- fair bilaterally; breath sounds : vesicular;   rales/crackles - absent  Abdomen - soft, no tenderness  Upper Extremities  - no cyanosis, mottling; edema : absent  Lower Extremities: no cyanosis, mottling; edema : BLE    Current Laboratory, Radiologic, Microbiologic, and Diagnostic studies reviewed  Data ReviewCBC:   Recent Labs     02/01/24 0336 02/02/24  0350 02/03/24  0349   WBC 14.5* 16.3* 20.6*   RBC 3.10* 3.30* 3.49*   HGB 8.2* 8.6* 9.2*   HCT 25.8* 28.0* 30.0*    333 353       BMP:   Recent Labs     02/01/24 0336 02/02/24  0350 02/03/24  0349   GLUCOSE 146* 148* 166*    144 139   K 3.8 3.6* 3.7   BUN 21 14 12   CREATININE 1.1* 0.9 0.9   CALCIUM 9.1 8.8 8.6           ASSESSMENT / PLAN:    - AMS- ? Due to use of opiates, benzos  - chronic hypoxic resp failure- O2; wean as tolerated  - diuresis  - COPD - BD  - Leucocytosis / elevated procalcitonin - ? Sepsis; empiric antibiotics  Hypertension  Cardene gtt resumed  - acute on chronic hypercapnic resp failure- NIPPV      Electronically signed by Reynaldo Alejandro MD on 02/03/24 at 10:29 AM.    
warm and dry, no rash or erythema  Head: normocephalic and atraumatic  Eyes: pupils equal, round, and reactive to light, extraocular eye movements intact, conjunctivae normal  ENT: hearing grossly normal bilaterally  Neck: neck supple and non tender without mass, no thyromegaly or thyroid nodules, no cervical lymphadenopathy   Pulmonary/Chest: clear to auscultation bilaterally- no wheezes, rales or rhonchi, normal air movement, no respiratory distress  Cardiovascular: normal rate, regular rhythm, normal S1 and S2, no murmurs, rubs, clicks or gallops, distal pulses intact, no carotid bruits  Abdomen: soft, obese  non-tender, non-distended, normal bowel sounds, no masses or organomegaly  Extremities: no cyanosis, clubbing or edema  Musculoskeletal: normal range of motion, no joint swelling, deformity or tenderness  Neurologic: no cranial nerve deficit and muscle strength normal    Lab and Imaging Review     CBC  Recent Labs     01/31/24  0418 01/31/24  1344 02/01/24  0336 02/02/24  0350   WBC 17.4*  --  14.5* 16.3*   HGB 7.4* 8.1* 8.2* 8.6*   HCT 23.7* 25.7* 25.8* 28.0*   MCV 84.0  --  83.4 85.0     --  335 333       BMP  Recent Labs     01/31/24  0418 02/01/24  0336 02/02/24  0350   * 140 144   K 4.8 3.8 3.6*   CL 96* 101 105   CO2 26 27 26   BUN 25* 21 14   CREATININE 1.3* 1.1* 0.9   GLUCOSE 173* 146* 148*   CALCIUM 8.4* 9.1 8.8         AMYLASE/LIPASE/AMMONIA  Recent Labs     01/31/24  1914   AMMONIA 13     FINDINGS:ct 1/30/24     Chest:     Mediastinum: Mild esophageal thickening seen in the midthoracic esophagus  with minimal adjacent fat stranding.  No focal discrete mass.  No thoracic  aortic aneurysm.  Aortic and coronary artery atherosclerosis.  No  cardiomegaly.  No significant pericardial effusion.  Similar appearing lymph  nodes without bulky lymphadenopathy.  Borderline in size.     Lungs/pleura: No pleural effusion.  Mosaic attenuation suggestive of chronic  small airways disease.  No 
line.   Neurological:      General: No focal deficit present.      Mental Status: She is alert and oriented to person, place, and time.   Psychiatric:         Mood and Affect: Mood normal.         Behavior: Behavior normal.         Past Medical History:     Past Medical History:   Diagnosis Date    Acute hypoxemic respiratory failure ([facility])     Arthritis     CHF (congestive heart failure) ([facility])     Chronic back pain     Diabetes mellitus type II, controlled ([facility]) 2/14/2012    Fibromyalgia     Hyperlipidemia LDL goal < 70 2/14/2012    Hypertension, benign 02/14/2012    Morbid obesity with BMI of 60.0-69.9, adult ([facility]) 8/28/2015    Pain of toe of right foot     morgans cyst    Right wrist pain     rate 8    Skin cancer     skin under lt eye,face    Sleep apnea     Bipap does not work    Wears glasses        Past Surgical  History:     Past Surgical History:   Procedure Laterality Date    CARPAL TUNNEL RELEASE Right 09/05/2014    CATARACT REMOVAL WITH IMPLANT Bilateral 2013    CHOLECYSTECTOMY, LAPAROSCOPIC  1998    COLONOSCOPY N/A 10/17/2019    COLONOSCOPY DIAGNOSTIC performed by Nadir Prieto MD at [facility] END[facility]    CYST REMOVAL  1985    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    ROTATOR CUFF REPAIR Left 2012    SKIN CANCER EXCISION  2005    Basal Cell Carcinoma, Curly size from Lt. face    TUBAL LIGATION  1981    UPPER GASTROINTESTINAL ENDOSCOPY N/A 10/16/2019    EGD BIOPSY performed by Nadir Prieto MD at [facility]       Medications:      spironolactone  25 mg 
masses,  no organomegaly    Back: symmetric, no curvature. ROM normal. No CVA tenderness.    Extremities: extremities normal, atraumatic, no cyanosis or edema    Neuro:  Grossly normal    CBC:   Recent Labs     02/01/24  0336 02/02/24  0350 02/03/24  0349   WBC 14.5* 16.3* 20.6*   HGB 8.2* 8.6* 9.2*    333 353       BMP:    Recent Labs     02/01/24  0336 02/02/24  0350 02/03/24  0349    144 139   K 3.8 3.6* 3.7    105 102   CO2 27 26 23   BUN 21 14 12   CREATININE 1.1* 0.9 0.9   GLUCOSE 146* 148* 166*       Lab Results   Component Value Date/Time    NITRU NEGATIVE 01/30/2024 07:00 AM    COLORU Dark Yellow 01/30/2024 07:00 AM    PHUR 5.0 01/30/2024 07:00 AM    WBCUA 6 TO 9 01/30/2024 07:00 AM    RBCUA 0 TO 2 01/30/2024 07:00 AM    MUCUS 1+ 01/30/2024 07:00 AM    TRICHOMONAS NOT REPORTED 09/13/2021 10:02 PM    YEAST NOT REPORTED 09/13/2021 10:02 PM    BACTERIA FEW 01/30/2024 07:00 AM    CLARITYU cloudy 05/19/2022 10:43 AM    SPECGRAV 1.022 01/30/2024 07:00 AM    LEUKOCYTESUR SMALL 01/30/2024 07:00 AM    UROBILINOGEN Normal 01/30/2024 07:00 AM    BILIRUBINUR NEGATIVE  Verified by ictotest. 01/30/2024 07:00 AM    BILIRUBINUR neg 05/19/2022 10:43 AM    BLOODU moderate 05/19/2022 10:43 AM    GLUCOSEU NEGATIVE 01/30/2024 07:00 AM    KETUA TRACE 01/30/2024 07:00 AM    AMORPHOUS 1+ 01/30/2024 07:00 AM     Urine Sodium:     Lab Results   Component Value Date/Time    SAMANTHA 25 09/24/2023 07:15 AM     Urine Chloride:    Lab Results   Component Value Date/Time    CLUR <20 09/14/2023 04:10 PM     Urine Creatinine:     Lab Results   Component Value Date/Time    LABCREA 113.0 03/15/2022 07:49 AM     IMPRESSION/RECOMMENDATIONS:      1.  Acute kidney injury - consistent with ischemic acute tubular necrosis secondary to hypotension.  Hemodynamic profile has improved and GFR is also improved.  Random urine electrolytes were consistent with component of prerenal azotemia.  Renal function has returned to normal today with 
no organomegaly    Back: symmetric, no curvature. ROM normal. No CVA tenderness.    Extremities: extremities normal, atraumatic, no cyanosis or edema    Neuro:  Grossly normal    CBC:   Recent Labs     02/02/24  0350 02/03/24  0349 02/04/24  0453   WBC 16.3* 20.6* 14.9*   HGB 8.6* 9.2* 8.4*    353 311       BMP:    Recent Labs     02/02/24  0350 02/03/24  0349 02/04/24  0453    139 138   K 3.6* 3.7 3.1*    102 101   CO2 26 23 22   BUN 14 12 14   CREATININE 0.9 0.9 1.0*   GLUCOSE 148* 166* 135*       Lab Results   Component Value Date/Time    NITRU NEGATIVE 01/30/2024 07:00 AM    COLORU Dark Yellow 01/30/2024 07:00 AM    PHUR 5.0 01/30/2024 07:00 AM    WBCUA 6 TO 9 01/30/2024 07:00 AM    RBCUA 0 TO 2 01/30/2024 07:00 AM    MUCUS 1+ 01/30/2024 07:00 AM    TRICHOMONAS NOT REPORTED 09/13/2021 10:02 PM    YEAST NOT REPORTED 09/13/2021 10:02 PM    BACTERIA FEW 01/30/2024 07:00 AM    CLARITYU cloudy 05/19/2022 10:43 AM    SPECGRAV 1.022 01/30/2024 07:00 AM    LEUKOCYTESUR SMALL 01/30/2024 07:00 AM    UROBILINOGEN Normal 01/30/2024 07:00 AM    BILIRUBINUR NEGATIVE  Verified by ictotest. 01/30/2024 07:00 AM    BILIRUBINUR neg 05/19/2022 10:43 AM    BLOODU moderate 05/19/2022 10:43 AM    GLUCOSEU NEGATIVE 01/30/2024 07:00 AM    KETUA TRACE 01/30/2024 07:00 AM    AMORPHOUS 1+ 01/30/2024 07:00 AM     Urine Sodium:     Lab Results   Component Value Date/Time    SAMANTHA 25 09/24/2023 07:15 AM     Urine Chloride:    Lab Results   Component Value Date/Time    CLUR <20 09/14/2023 04:10 PM     Urine Creatinine:     Lab Results   Component Value Date/Time    LABCREA 113.0 03/15/2022 07:49 AM     IMPRESSION/RECOMMENDATIONS:      1.  Acute kidney injury - consistent with ischemic acute tubular necrosis secondary to hypotension.  Hemodynamic profile has improved and GFR is also improved.  Random urine electrolytes were consistent with component of prerenal azotemia.  Renal function has returned to normal today with 
fusion; placement of [employer]-type graft; local harvest of bone; microsurgical dissection.    ROTATOR CUFF REPAIR Left 2012    SKIN CANCER EXCISION  2005    Basal Cell Carcinoma, Curly size from Lt. face    TUBAL LIGATION  1981    UPPER GASTROINTESTINAL ENDOSCOPY N/A 10/16/2019    EGD BIOPSY performed by Nadir Prieto MD at [facility]       Medications:      carvedilol  25 mg Oral BID WC    NIFEdipine  90 mg Oral Daily    polyethylene glycol  17 g Oral Daily    pantoprazole (PROTONIX) 40 mg in sodium chloride (PF) 0.9 % 10 mL injection  40 mg IntraVENous Q12H    metroNIDAZOLE  500 mg IntraVENous Q8H    metoprolol  5 mg IntraVENous Q6H    cefTRIAXone (ROCEPHIN) IV  1,000 mg IntraVENous Q24H    furosemide  20 mg IntraVENous Daily    atorvastatin  40 mg Oral Nightly    hydrALAZINE  25 mg Oral 3 times per day    sodium chloride flush  5-40 mL IntraVENous 2 times per day    enoxaparin  30 mg SubCUTAneous BID    miconazole   Topical BID    insulin glargine  10 Units SubCUTAneous Daily    insulin lispro  0-8 Units SubCUTAneous TID WC    insulin lispro  0-4 Units SubCUTAneous Nightly    [Held by provider] losartan  25 mg Oral Daily       Social History:     Social History     Socioeconomic History    Marital status:      Spouse name: Christoph    Number of children: 3    Years of education: Not on file    Highest education level: Not on file   Occupational History    Not on file   Tobacco Use    Smoking status: Never    Smokeless tobacco: Never   Vaping Use    Vaping Use: Not on file   Substance and Sexual Activity    Alcohol use: Never     Comment: rarely    Drug use: No    Sexual activity: Yes     Partners: Male   Other Topics Concern    Not on file   Social History Narrative    Not on file     Social Determinants of Health     Financial Resource Strain: Not on file   Food Insecurity: No Food Insecurity (1/30/2024)    Hunger Vital Sign     Worried About Running Out of Food in the Last Year: Never true     Ran Out of 
results:        Thank you for allowing us to participate in the care of this patient.Please call with questions.    This note is created with the assistance of a speech recognition program.  While intending to generate adocument that actually reflects the content of the visit, the document can still have some errors including those of syntax and sound a like substitutions which may escape proof reading.  It such instances, actual meaningcan be extrapolated by contextual diversion.    Loretta Holm MD  Office: [phone]  Perfect serve / office [phone]        
auscultation bilaterally, normal effort  Heart: regular rate and rhythm, no murmur,  Abdomen: soft, nontender, nondistended, bowel sounds present all four quadrants, no masses, hepatomegaly or splenomegaly  Extremities: no edema, redness or tenderness in the calves  Skin: no gross lesions, rashes, or induration    Assessment:        Primary Problem  Acute on chronic respiratory failure with hypoxia ([facility])     Active Hospital Problems    Diagnosis Date Noted    Secondary hypertension [I15.9] 08/05/2023     Priority: High    Lactic acidosis [E87.20] 01/31/2024    Abnormal finding on imaging [R93.89] 01/31/2024    Hypertensive urgency [I16.0] 01/31/2024    SBO (small bowel obstruction) ([facility]) [K56.609] 01/31/2024    Esophagitis [K20.90] 01/30/2024    Acute on chronic respiratory failure with hypoxia ([facility]) [J96.21] 09/16/2023    Acute cystitis without hematuria [N30.00] 08/03/2023    Primary central sleep apnea [G47.31] 11/08/2020    Chronic renal insufficiency, stage III (moderate) ([facility]) [N18.30] 03/16/2020    Hyperkalemia [E87.5] 01/05/2020    BRIANNA (obstructive sleep apnea) [G47.33] 09/15/2015    Type 2 diabetes mellitus, without long-term current use of insulin ([facility]) [E11.9] 02/14/2012     Past Medical History:   Diagnosis Date    Acute hypoxemic respiratory failure ([facility])     Arthritis     CHF (congestive heart failure) ([facility])     Chronic back pain     Diabetes mellitus type II, controlled ([facility]) 2/14/2012    Fibromyalgia     Hyperlipidemia LDL goal < 70 2/14/2012    Hypertension, benign 02/14/2012    Morbid obesity with BMI of 60.0-69.9, adult ([facility]) 8/28/2015    Pain of toe of right foot     morgans cyst    Right wrist pain     rate 8    Skin cancer     skin under lt eye,face    Sleep apnea     Bipap does not work    Wears glasses         Plan:        Anemia, no overt GI bleeding, hgb has been trending up  Trend HH  Transfuse for hgb < 7  PPI  Abnormal CT showing mild esophageal thickening  PPI  EGD once stable  Diarrhea

## 2024-02-07 NOTE — PLAN OF CARE
Problem: Discharge Planning  Goal: Discharge to home or other facility with appropriate resources  1/31/2024 0110 by Marci Villarreal RN  Outcome: Progressing  Flowsheets (Taken 1/30/2024 2000)  Discharge to home or other facility with appropriate resources:   Identify barriers to discharge with patient and caregiver   Identify discharge learning needs (meds, wound care, etc)   Arrange for needed discharge resources and transportation as appropriate     Problem: Pain  Goal: Verbalizes/displays adequate comfort level or baseline comfort level  1/31/2024 0110 by Marci Villarreal RN  Outcome: Progressing  Flowsheets (Taken 1/30/2024 1900)  Verbalizes/displays adequate comfort level or baseline comfort level:   Encourage patient to monitor pain and request assistance   Assess pain using appropriate pain scale   Administer analgesics based on type and severity of pain and evaluate response   Implement non-pharmacological measures as appropriate and evaluate response     Problem: Skin/Tissue Integrity  Goal: Absence of new skin breakdown  Description: 1.  Monitor for areas of redness and/or skin breakdown  2.  Assess vascular access sites hourly  3.  Every 4-6 hours minimum:  Change oxygen saturation probe site  4.  Every 4-6 hours:  If on nasal continuous positive airway pressure, respiratory therapy assess nares and determine need for appliance change or resting period.  1/31/2024 0110 by Marci Villarreal RN  Outcome: Progressing  Note: No new skin breakdown noted. Offloaded upper and lower limbs with pillows.     Problem: Safety - Adult  Goal: Free from fall injury  1/31/2024 0110 by Marci Villarreal RN  Outcome: Progressing  Flowsheets (Taken 1/30/2024 2000)  Free From Fall Injury: (no relative in the room) --  Note: Bed was locked, in lowest position. Call light within reach. Raised side rails for safety. Hourly rounds observed.     Problem: ABCDS Injury Assessment  Goal: Absence of physical 
  Problem: Discharge Planning  Goal: Discharge to home or other facility with appropriate resources  2/3/2024 0227 by Jeannine Gaytan RN  Outcome: Progressing  Flowsheets  Taken 2/3/2024 0000  Discharge to home or other facility with appropriate resources: Identify barriers to discharge with patient and caregiver  Taken 2/2/2024 2000  Discharge to home or other facility with appropriate resources: Identify barriers to discharge with patient and caregiver     Problem: Pain  Goal: Verbalizes/displays adequate comfort level or baseline comfort level  2/3/2024 0227 by Jeannine Gaytan RN  Outcome: Progressing  Flowsheets  Taken 2/3/2024 0000  Verbalizes/displays adequate comfort level or baseline comfort level: Encourage patient to monitor pain and request assistance  Taken 2/2/2024 2000  Verbalizes/displays adequate comfort level or baseline comfort level: Encourage patient to monitor pain and request assistance     Problem: Skin/Tissue Integrity  Goal: Absence of new skin breakdown  Description: 1.  Monitor for areas of redness and/or skin breakdown  2.  Assess vascular access sites hourly  3.  Every 4-6 hours minimum:  Change oxygen saturation probe site  4.  Every 4-6 hours:  If on nasal continuous positive airway pressure, respiratory therapy assess nares and determine need for appliance change or resting period.  2/3/2024 0227 by Jeannine Gaytan RN  Outcome: Progressing     Problem: Safety - Adult  Goal: Free from fall injury  2/3/2024 0227 by Jeannine Gaytan RN  Outcome: Progressing  Flowsheets (Taken 2/2/2024 2156)  Free From Fall Injury: Instruct family/caregiver on patient safety     Problem: ABCDS Injury Assessment  Goal: Absence of physical injury  2/3/2024 0227 by Jeannine Gaytan RN  Outcome: Progressing  Flowsheets (Taken 2/2/2024 2156)  Absence of Physical Injury: Implement safety measures based on patient assessment     Problem: Chronic Conditions and Co-morbidities  Goal: Patient's chronic conditions and 
  Problem: Discharge Planning  Goal: Discharge to home or other facility with appropriate resources  2/3/2024 1434 by Erick Garza RN  Outcome: Progressing  Flowsheets  Taken 2/3/2024 0800 by Erick Garza RN  Discharge to home or other facility with appropriate resources: Identify barriers to discharge with patient and caregiver  Taken 2/3/2024 0400 by Jeannine Gaytan RN  Discharge to home or other facility with appropriate resources: Identify barriers to discharge with patient and caregiver  2/3/2024 0227 by Jeannine Gaytan RN  Outcome: Progressing  Flowsheets  Taken 2/3/2024 0000  Discharge to home or other facility with appropriate resources: Identify barriers to discharge with patient and caregiver  Taken 2/2/2024 2000  Discharge to home or other facility with appropriate resources: Identify barriers to discharge with patient and caregiver     Problem: Pain  Goal: Verbalizes/displays adequate comfort level or baseline comfort level  2/3/2024 1434 by Erick Garza RN  Outcome: Progressing  Flowsheets (Taken 2/3/2024 0400 by Jeannine Gaytan RN)  Verbalizes/displays adequate comfort level or baseline comfort level: Encourage patient to monitor pain and request assistance  2/3/2024 0227 by Jeannine Gaytan RN  Outcome: Progressing  Flowsheets  Taken 2/3/2024 0000  Verbalizes/displays adequate comfort level or baseline comfort level: Encourage patient to monitor pain and request assistance  Taken 2/2/2024 2000  Verbalizes/displays adequate comfort level or baseline comfort level: Encourage patient to monitor pain and request assistance     Problem: Skin/Tissue Integrity  Goal: Absence of new skin breakdown  Description: 1.  Monitor for areas of redness and/or skin breakdown  2.  Assess vascular access sites hourly  3.  Every 4-6 hours minimum:  Change oxygen saturation probe site  4.  Every 4-6 hours:  If on nasal continuous positive airway pressure, respiratory therapy assess nares and determine 
  Problem: Discharge Planning  Goal: Discharge to home or other facility with appropriate resources  2/5/2024 1711 by Yoko Galan RN  Outcome: Progressing  Flowsheets  Taken 2/5/2024 1557 by Yoko Galan RN  Discharge to home or other facility with appropriate resources: Identify barriers to discharge with patient and caregiver  Taken 2/5/2024 0800 by Tyrel Gama RN  Discharge to home or other facility with appropriate resources:   Identify barriers to discharge with patient and caregiver   Arrange for needed discharge resources and transportation as appropriate     Problem: Pain  Goal: Verbalizes/displays adequate comfort level or baseline comfort level  2/5/2024 1711 by Yoko Galan RN  Outcome: Progressing  Flowsheets (Taken 2/5/2024 0800 by Tyrel Gama, RN)  Verbalizes/displays adequate comfort level or baseline comfort level:   Encourage patient to monitor pain and request assistance   Assess pain using appropriate pain scale     Problem: Skin/Tissue Integrity  Goal: Absence of new skin breakdown  Description: 1.  Monitor for areas of redness and/or skin breakdown  2.  Assess vascular access sites hourly  3.  Every 4-6 hours minimum:  Change oxygen saturation probe site  4.  Every 4-6 hours:  If on nasal continuous positive airway pressure, respiratory therapy assess nares and determine need for appliance change or resting period.  2/5/2024 1711 by Yoko Galan RN  Outcome: Progressing     
  Problem: Discharge Planning  Goal: Discharge to home or other facility with appropriate resources  2/6/2024 0426 by Ping Jules RN  Outcome: Progressing       Problem: Pain  Goal: Verbalizes/displays adequate comfort level or baseline comfort level  2/6/2024 0426 by Ping Jules RN  Outcome: Progressing    Problem: Skin/Tissue Integrity  Goal: Absence of new skin breakdown  Description: 1.  Monitor for areas of redness and/or skin breakdown  2.  Assess vascular access sites hourly  3.  Every 4-6 hours minimum:  Change oxygen saturation probe site  4.  Every 4-6 hours:  If on nasal continuous positive airway pressure, respiratory therapy assess nares and determine need for appliance change or resting period.  2/6/2024 0426 by Ping Jules RN  Outcome: Progressing       Problem: Safety - Adult  Goal: Free from fall injury  2/6/2024 0426 by Ping Jules RN  Outcome: Progressing       Problem: ABCDS Injury Assessment  Goal: Absence of physical injury  Outcome: Progressing     Problem: Chronic Conditions and Co-morbidities  Goal: Patient's chronic conditions and co-morbidity symptoms are monitored and maintained or improved  Outcome: Progressing  Flowsheets (Taken 2/5/2024 5541 by Yoko Galan, RN)  Care Plan - Patient's Chronic Conditions and Co-Morbidity Symptoms are Monitored and Maintained or Improved: Monitor and assess patient's chronic conditions and comorbid symptoms for stability, deterioration, or improvement     
  Problem: Discharge Planning  Goal: Discharge to home or other facility with appropriate resources  2/6/2024 1752 by Katelyn Ba RN  Outcome: Progressing     Problem: Pain  Goal: Verbalizes/displays adequate comfort level or baseline comfort level  2/6/2024 1752 by Katelyn Ba RN  Outcome: Progressing     Problem: Skin/Tissue Integrity  Goal: Absence of new skin breakdown  Description: 1.  Monitor for areas of redness and/or skin breakdown  2.  Assess vascular access sites hourly  3.  Every 4-6 hours minimum:  Change oxygen saturation probe site  4.  Every 4-6 hours:  If on nasal continuous positive airway pressure, respiratory therapy assess nares and determine need for appliance change or resting period.  2/6/2024 1752 by Katelyn Ba RN  Outcome: Progressing     Problem: Safety - Adult  Goal: Free from fall injury  2/6/2024 1752 by Katelyn Ba RN  Outcome: Progressing     Problem: ABCDS Injury Assessment  Goal: Absence of physical injury  2/6/2024 1752 by Katelyn Ba RN  Outcome: Progressing     Problem: Chronic Conditions and Co-morbidities  Goal: Patient's chronic conditions and co-morbidity symptoms are monitored and maintained or improved  2/6/2024 1752 by Katelyn Ba RN  Outcome: Progressing     Problem: Nutrition Deficit:  Goal: Optimize nutritional status  Outcome: Progressing     
  Problem: Discharge Planning  Goal: Discharge to home or other facility with appropriate resources  Outcome: Adequate for Discharge     Problem: Pain  Goal: Verbalizes/displays adequate comfort level or baseline comfort level  Outcome: Adequate for Discharge     Problem: Skin/Tissue Integrity  Goal: Absence of new skin breakdown  Description: 1.  Monitor for areas of redness and/or skin breakdown  2.  Assess vascular access sites hourly  3.  Every 4-6 hours minimum:  Change oxygen saturation probe site  4.  Every 4-6 hours:  If on nasal continuous positive airway pressure, respiratory therapy assess nares and determine need for appliance change or resting period.  Outcome: Adequate for Discharge     Problem: Safety - Adult  Goal: Free from fall injury  Outcome: Adequate for Discharge     Problem: ABCDS Injury Assessment  Goal: Absence of physical injury  Outcome: Adequate for Discharge     Problem: Chronic Conditions and Co-morbidities  Goal: Patient's chronic conditions and co-morbidity symptoms are monitored and maintained or improved  Outcome: Adequate for Discharge     Problem: Nutrition Deficit:  Goal: Optimize nutritional status  Outcome: Adequate for Discharge     
  Problem: Discharge Planning  Goal: Discharge to home or other facility with appropriate resources  Outcome: Progressing     Problem: Pain  Goal: Verbalizes/displays adequate comfort level or baseline comfort level  Outcome: Progressing  Flowsheets (Taken 1/30/2024 0800)  Verbalizes/displays adequate comfort level or baseline comfort level:   Assess pain using appropriate pain scale   Implement non-pharmacological measures as appropriate and evaluate response     Problem: Skin/Tissue Integrity  Goal: Absence of new skin breakdown  Description: 1.  Monitor for areas of redness and/or skin breakdown  2.  Assess vascular access sites hourly  3.  Every 4-6 hours minimum:  Change oxygen saturation probe site  4.  Every 4-6 hours:  If on nasal continuous positive airway pressure, respiratory therapy assess nares and determine need for appliance change or resting period.  Outcome: Progressing     Problem: Safety - Adult  Goal: Free from fall injury  Outcome: Progressing  Flowsheets (Taken 1/30/2024 1033)  Free From Fall Injury: Instruct family/caregiver on patient safety     Problem: ABCDS Injury Assessment  Goal: Absence of physical injury  Outcome: Progressing  Flowsheets (Taken 1/30/2024 1033)  Absence of Physical Injury: Implement safety measures based on patient assessment     
  Problem: Discharge Planning  Goal: Discharge to home or other facility with appropriate resources  Outcome: Progressing  Flowsheets  Taken 2/1/2024 0000  Discharge to home or other facility with appropriate resources: Identify barriers to discharge with patient and caregiver       Problem: Pain  Goal: Verbalizes/displays adequate comfort level or baseline comfort level  Outcome: Progressing  Flowsheets  Taken 2/1/2024 0000  Verbalizes/displays adequate comfort level or baseline comfort level: Encourage patient to monitor pain and request assistance       Problem: Skin/Tissue Integrity  Goal: Absence of new skin breakdown  Description: 1.  Monitor for areas of redness and/or skin breakdown  2.  Assess vascular access sites hourly  3.  Every 4-6 hours minimum:  Change oxygen saturation probe site  4.  Every 4-6 hours:  If on nasal continuous positive airway pressure, respiratory therapy assess nares and determine need for appliance change or resting period.  Outcome: Progressing     Problem: Safety - Adult  Goal: Free from fall injury  Outcome: Progressing  Flowsheets (Taken 1/31/2024 2317)  Free From Fall Injury: Instruct family/caregiver on patient safety     Problem: ABCDS Injury Assessment  Goal: Absence of physical injury  Outcome: Progressing  Flowsheets (Taken 1/31/2024 2317)  Absence of Physical Injury: Implement safety measures based on patient assessment     Problem: Chronic Conditions and Co-morbidities  Goal: Patient's chronic conditions and co-morbidity symptoms are monitored and maintained or improved  Outcome: Progressing  Flowsheets  Taken 2/1/2024 0000  Care Plan - Patient's Chronic Conditions and Co-Morbidity Symptoms are Monitored and Maintained or Improved: Monitor and assess patient's chronic conditions and comorbid symptoms for stability, deterioration, or improvement       
  Problem: Discharge Planning  Goal: Discharge to home or other facility with appropriate resources  Outcome: Progressing  Flowsheets  Taken 2/2/2024 0400  Discharge to home or other facility with appropriate resources: Identify barriers to discharge with patient and caregiver  Taken 2/2/2024 0000  Discharge to home or other facility with appropriate resources: Identify barriers to discharge with patient and caregiver  Taken 2/1/2024 2000  Discharge to home or other facility with appropriate resources: Identify barriers to discharge with patient and caregiver     Problem: Pain  Goal: Verbalizes/displays adequate comfort level or baseline comfort level  Outcome: Progressing  Flowsheets  Taken 2/2/2024 0000  Verbalizes/displays adequate comfort level or baseline comfort level: Encourage patient to monitor pain and request assistance  Taken 2/1/2024 2000  Verbalizes/displays adequate comfort level or baseline comfort level: Encourage patient to monitor pain and request assistance     Problem: Skin/Tissue Integrity  Goal: Absence of new skin breakdown  Description: 1.  Monitor for areas of redness and/or skin breakdown  2.  Assess vascular access sites hourly  3.  Every 4-6 hours minimum:  Change oxygen saturation probe site  4.  Every 4-6 hours:  If on nasal continuous positive airway pressure, respiratory therapy assess nares and determine need for appliance change or resting period.  Outcome: Progressing     Problem: Safety - Adult  Goal: Free from fall injury  Outcome: Progressing  Flowsheets (Taken 2/1/2024 2257)  Free From Fall Injury: Instruct family/caregiver on patient safety     Problem: ABCDS Injury Assessment  Goal: Absence of physical injury  Outcome: Progressing  Flowsheets (Taken 2/1/2024 2257)  Absence of Physical Injury: Implement safety measures based on patient assessment     Problem: Chronic Conditions and Co-morbidities  Goal: Patient's chronic conditions and co-morbidity symptoms are monitored and 
  Problem: Discharge Planning  Goal: Discharge to home or other facility with appropriate resources  Outcome: Progressing  Flowsheets (Taken 2/4/2024 0800 by Riya Gunter, RN)  Discharge to home or other facility with appropriate resources:   Identify barriers to discharge with patient and caregiver   Arrange for needed discharge resources and transportation as appropriate   Identify discharge learning needs (meds, wound care, etc)   Arrange for interpreters to assist at discharge as needed   Refer to discharge planning if patient needs post-hospital services based on physician order or complex needs related to functional status, cognitive ability or social support system     Problem: Pain  Goal: Verbalizes/displays adequate comfort level or baseline comfort level  Outcome: Progressing  Flowsheets (Taken 2/3/2024 0400 by Jeannine Gaytan, RN)  Verbalizes/displays adequate comfort level or baseline comfort level: Encourage patient to monitor pain and request assistance     Problem: Skin/Tissue Integrity  Goal: Absence of new skin breakdown  Description: 1.  Monitor for areas of redness and/or skin breakdown  2.  Assess vascular access sites hourly  3.  Every 4-6 hours minimum:  Change oxygen saturation probe site  4.  Every 4-6 hours:  If on nasal continuous positive airway pressure, respiratory therapy assess nares and determine need for appliance change or resting period.  Outcome: Progressing     Problem: Safety - Adult  Goal: Free from fall injury  Outcome: Progressing  Flowsheets (Taken 2/4/2024 0800 by Riya Gunter, RN)  Free From Fall Injury:   Instruct family/caregiver on patient safety   Based on caregiver fall risk screen, instruct family/caregiver to ask for assistance with transferring infant if caregiver noted to have fall risk factors     Problem: ABCDS Injury Assessment  Goal: Absence of physical injury  Outcome: Progressing  Flowsheets (Taken 2/4/2024 0800 by Riya Gunter, RN)  Absence of Physical 
Patient's chronic conditions and co-morbidity symptoms are monitored and maintained or improved  2/4/2024 0028 by Jenifer Hathaway, RN  Outcome: Progressing  2/3/2024 1434 by Erick Garza RN  Outcome: Progressing  Flowsheets  Taken 2/3/2024 0800 by Erick Garza, RN  Care Plan - Patient's Chronic Conditions and Co-Morbidity Symptoms are Monitored and Maintained or Improved: Monitor and assess patient's chronic conditions and comorbid symptoms for stability, deterioration, or improvement  Taken 2/3/2024 0400 by Jeannine Gaytan, RN  Care Plan - Patient's Chronic Conditions and Co-Morbidity Symptoms are Monitored and Maintained or Improved: Monitor and assess patient's chronic conditions and comorbid symptoms for stability, deterioration, or improvement     
injury  2/2/2024 1609 by Erick Garza RN  Outcome: Progressing  2/2/2024 0735 by Jeannine Gaytan RN  Outcome: Progressing  Flowsheets (Taken 2/1/2024 2257)  Free From Fall Injury: Instruct family/caregiver on patient safety     Problem: ABCDS Injury Assessment  Goal: Absence of physical injury  2/2/2024 1609 by Erick Garza RN  Outcome: Progressing  2/2/2024 0735 by Jeannine Gaytan RN  Outcome: Progressing  Flowsheets (Taken 2/1/2024 2257)  Absence of Physical Injury: Implement safety measures based on patient assessment     Problem: Chronic Conditions and Co-morbidities  Goal: Patient's chronic conditions and co-morbidity symptoms are monitored and maintained or improved  2/2/2024 1609 by Erick Garza RN  Outcome: Progressing  Flowsheets (Taken 2/2/2024 0800)  Care Plan - Patient's Chronic Conditions and Co-Morbidity Symptoms are Monitored and Maintained or Improved: Monitor and assess patient's chronic conditions and comorbid symptoms for stability, deterioration, or improvement  2/2/2024 0735 by Jeannine Gaytan RN  Outcome: Progressing  Flowsheets  Taken 2/2/2024 0400  Care Plan - Patient's Chronic Conditions and Co-Morbidity Symptoms are Monitored and Maintained or Improved: Monitor and assess patient's chronic conditions and comorbid symptoms for stability, deterioration, or improvement  Taken 2/2/2024 0000  Care Plan - Patient's Chronic Conditions and Co-Morbidity Symptoms are Monitored and Maintained or Improved: Monitor and assess patient's chronic conditions and comorbid symptoms for stability, deterioration, or improvement  Taken 2/1/2024 2000  Care Plan - Patient's Chronic Conditions and Co-Morbidity Symptoms are Monitored and Maintained or Improved: Monitor and assess patient's chronic conditions and comorbid symptoms for stability, deterioration, or improvement

## 2024-02-07 NOTE — CARE COORDINATION
ONGOING DISCHARGE PLAN:    Patient is alert and oriented x4.    Spoke with patient regarding discharge plan and patient confirms that plan is still to discharge to home with [facility]    Patient is agreeable to [facility]     Pt discharged       Will continue to follow for additional discharge needs.    If patient is discharged prior to next notation, then this note serves as note for discharge by case management.    Electronically signed by Nancy Benito RN on 2/7/2024 at 2:36 PM

## 2024-02-08 ENCOUNTER — CARE COORDINATION (OUTPATIENT)
Dept: CASE MANAGEMENT | Age: 69
End: 2024-02-08

## 2024-02-08 LAB — RENIN PLAS-CCNC: 5.7 NG/ML/HR

## 2024-02-08 NOTE — CARE COORDINATION
Care Transitions Initial Follow Up Call Attempt #1 -Attempted initial 24 hour transitional call to patient/spouse.  Left VM on both contact numbers to return call directly to [facility].     Call within 2 business days of discharge: Yes    I contacted [facility] who are in process of scheduling start of care visit    - [facility] admission for acute on chronic respiratory failure. Home with [facility]    Patient: Camelia Beltran   Patient : 1955   MRN: [id number]    Reason for Admission: acute on chronic respiratory failure  Discharge Date: 24   RARS: Readmission Risk Score: 36.2      Last Discharge Facility       Date Complaint Diagnosis Description Type Department Provider    24 Shortness of Breath; Fatigue Respiratory distress ... ED to Hosp-Admission (Discharged) (ADMITTED) Ida Waldrop MD; BENJAMIN Andre..            Was this an external facility discharge? No   Non-face-to-face services provided:  Scheduled appointment with PCP-routed  Obtained and reviewed discharge summary and/or continuity of care documents  Communication with home health agencies or other community services the patient is currently using-[facility] - scheduling visit      Care Transition Nurse provided contact information.      Sisi Reynaga RN

## 2024-02-09 ENCOUNTER — HOSPITAL ENCOUNTER (OUTPATIENT)
Age: 69
Setting detail: SPECIMEN
Discharge: HOME | End: 2024-02-09
Payer: MEDICARE

## 2024-02-09 ENCOUNTER — CARE COORDINATION (OUTPATIENT)
Dept: CASE MANAGEMENT | Age: 69
End: 2024-02-09

## 2024-02-09 LAB — POTASSIUM SERPL-SCNC: 3.6 MMOL/L (ref 3.7–5.3)

## 2024-02-09 PROCEDURE — 84132 ASSAY OF SERUM POTASSIUM: CPT

## 2024-02-09 NOTE — CARE COORDINATION
Care Transitions Initial Follow Up Call    Call within 2 business days of discharge: Yes    Patient Current Location:  Home: [address] [place] [id number]    Care Transition Nurse contacted the patient by telephone to perform post hospital discharge assessment. Verified name and  with patient as identifiers. Provided introduction to self, and explanation of the Care Transition Nurse role.     Patient: Camelia Beltran Patient : 1955   MRN: [id number]  Reason for Admission: Respiratory distress  Discharge Date: 24 RARS: Readmission Risk Score: 36.2      Last Discharge Facility       Date Complaint Diagnosis Description Type Department Provider    24 Shortness of Breath; Fatigue Respiratory distress ... ED to Hosp-Admission (Discharged) (ADMITTED) Ida Waldrop MD; RICHA Andre.          [facility]  Was this an external facility discharge? No Discharge Facility: [facility]    Challenges to be reviewed by the provider   Additional needs identified to be addressed with provider: Yes  Needs PCP HFU appt               Method of communication with provider: phone.    Spoke to Camelia for transitions initial call. Patient stated she is doing better, [facility] came for SOC yesterday, will be back today to collect potassium lab. Stated BP with [facility] was \"excellent\", cannot recall exact numbers. Patient has not checked vitals or sugars herself. Denies swelling, increased SOB, increased weakness, dizziness or light headedness. Medications reviewed. Pt requested asst making HFU with PCP. Soonest available was  at 1:10. Patient updated. Pt offered RPM, had in past, declined RPM.  Agreeable to transitions calls.     Care Transition Nurse reviewed discharge instructions with patient who verbalized understanding. The patient was given an opportunity to ask questions and does not have any further questions or concerns at this time. Were discharge instructions available to patient? Yes. Reviewed appropriate site

## 2024-02-14 ENCOUNTER — CARE COORDINATION (OUTPATIENT)
Dept: CASE MANAGEMENT | Age: 69
End: 2024-02-14

## 2024-02-14 NOTE — CARE COORDINATION
Care Transitions Follow Up Call    Patient Current Location:  Home: [address] [place] [id number]    Care Transition Nurse contacted the patient by telephone to follow up after admission on 24.  Verified name and  with patient as identifiers.    Patient: Camelia Beltran  Patient : 1955   MRN: [id number]  Reason for Admission: Respiratory distress  Discharge Date: 24 RARS: Readmission Risk Score: 36.2      Needs to be reviewed by the provider   Additional needs identified to be addressed with provider: No  none             Method of communication with provider: none.    Spoke to Camelia for transitions call. Pt is doing well, [facility] following, coming out tomorrow for a visit. Stated her spouse saw PCP 2 days ago and said Camelia does not have to come in on  for a visit unless having issues. Appointment for HFU was cancelled. Breathing is baseline, BP wnl. No concerns.     Addressed changes since last contact:   HFU cancelled per OK by PCP  Discussed follow-up appointments.    No future appointments.      Care Transition Nurse reviewed discharge instructions with patient and discussed any barriers to care and/or understanding of plan of care after discharge. Discussed appropriate site of care based on symptoms and resources available to patient including: PCP  Specialist  When to call 911  [employer]. The patient agrees to contact the PCP office for questions related to their healthcare.       Offered patient enrollment in the Remote Patient Monitoring (RPM) program for in-home monitoring: Patient declined.     Care Transitions Subsequent and Final Call    Subsequent and Final Calls  Do you have any ongoing symptoms?: No  Have your medications changed?: No  Do you have any questions related to your medications?: No  Do you currently have any active services?: No  Are you currently active with any services?: Home Health  Do you have any needs or concerns that I can assist you with?:

## 2024-02-22 ENCOUNTER — CARE COORDINATION (OUTPATIENT)
Dept: CASE MANAGEMENT | Age: 69
End: 2024-02-22

## 2024-02-22 NOTE — CARE COORDINATION
Care Transitions Follow Up Call    Patient Current Location:  Home: [facility] [address]  [facility] [address]    Care Transition Nurse contacted the patient by telephone to follow up after admission on 24.  Verified name and  with patient as identifiers.    Patient: Camelia Beltran  Patient : 1955   MRN: [id number]  Reason for Admission: Respiratory distress  Discharge Date: 24 RARS: Readmission Risk Score: 36.2      Needs to be reviewed by the provider   Additional needs identified to be addressed with provider: No  none             Method of communication with provider: none.    Spoke to Camelia for transitions call. Noted HHC RN contacted PCP on  regarding rhonchi right lung, non productive cough, bradycardia, diarrhea. PCP ordered CXR from mobile unit, hold Linzess, change Carvedilol to 12.5 MG bid.    Patient stated mobile CXR does not come to [facility] and she does not feel well enough to go out at this time. Stated she is doing OK, still fatigued, diarrhea improved. Checks sats and pulse at home. Sats wnl, pulse was 65, 52 last check today. Denies dizziness, light headedness, increased SOB, worsening cough.     HHC next visit is . Advised to contact office or HHC with concerns, ED for severe symptoms.     Addressed changes since last contact:   did not get CXR, monitoring pulse, sats  Discussed follow-up appointments.     No future appointments.      Care Transition Nurse reviewed discharge instructions with patient and discussed any barriers to care and/or understanding of plan of care after discharge. Discussed appropriate site of care based on symptoms and resources available to patient including: PCP  Specialist  Home health  When to call [email or web address]  [email or web address]. The patient agrees to contact the PCP office for questions related to their healthcare.     Patients top risk factors for readmission: medical condition-CHF, COPD  Interventions to address risk factors: Obtained and reviewed

## 2024-02-28 ENCOUNTER — CARE COORDINATION (OUTPATIENT)
Dept: CASE MANAGEMENT | Age: 69
End: 2024-02-28

## 2024-02-28 NOTE — CARE COORDINATION
assist you with?: No  Identified Barriers: Other  Care Transitions Interventions  Other Interventions:             Care Transition Nurse provided contact information for future needs. Plan for follow-up call in 5-7 days based on severity of symptoms and risk factors.  Plan for next call: symptom management-final call? HHC, how is her  after open heart surgery? Any low pulse issues? Diarrhea? Fatigue improving? Hand off to ACM?    Chio Macedo RN

## 2024-03-06 ENCOUNTER — CARE COORDINATION (OUTPATIENT)
Dept: CASE MANAGEMENT | Age: 69
End: 2024-03-06

## 2024-03-06 RX ORDER — CEPHALEXIN 500 MG/1
500 CAPSULE ORAL 2 TIMES DAILY
Qty: 14 CAPSULE | Refills: 0 | Status: SHIPPED | OUTPATIENT
Start: 2024-03-06 | End: 2024-03-13

## 2024-03-06 NOTE — CARE COORDINATION
Care Transitions Final Follow Up Call    Patient Current Location:  Home: [address] [place] [id number]    Care Transition Nurse contacted the patient by telephone to follow up after admission. Verified name and  with patient as identifiers.    Patient: Camelia Beltran           Patient : 1955   MRN: [id number]             Reason for Admission: acute on chronic respiratory failure  Discharge Date: 24           RARS: Readmission Risk Score: 36.2         Needs to be reviewed by the provider   Additional needs identified to be addressed with provider:     Yes  [facility] nurse is with patient now & is notifying PCP about dysuria & checking for order to check urine             Method of communication with provider:  per [facility] nurse .    - [facility] admission for acute on chronic respiratory failure. Home with [facility]     Camelia sounds upbeat this morning during our conversation.  Reports that cough & diarrhea are totally gone & is \"on the mend.\"  Reports that her BP + HR are doing well, but did not give me specific readings.    She does inform me that her [facility] nurse is with her at her home now & plans to notify PCP of dysuria patient is having & is checking for an order to check urine.  I asked again about scheduling appt with PCP, but declines my offer to assist in scheduling another appt at this time.    Patient did have HFU appt scheduled for , but then checked with PCP and said she was told she did not need to be seen in office for that appt.   Noted PCP had seen patient during hospitalization & completed patient's discharge summary.    Camelia says she is doing well now except still having a hard time sleeping, so is still fatigued.  Her  is still a patient at [facility] after having CABG - plan is for him to be discharged to rehab when ready.    Informed of final CT call - agrees with ACM referral.  CT program ended + encounter/referral routed to ACM covering PCP office - Naima Brown

## 2024-03-07 ENCOUNTER — CARE COORDINATION (OUTPATIENT)
Dept: CASE MANAGEMENT | Age: 69
End: 2024-03-07

## 2024-03-07 ENCOUNTER — HOSPITAL ENCOUNTER (OUTPATIENT)
Age: 69
Setting detail: SPECIMEN
Discharge: HOME | End: 2024-03-07
Payer: MEDICARE

## 2024-03-07 LAB
BACTERIA URNS QL MICRO: ABNORMAL
BILIRUB UR QL STRIP: NEGATIVE
CASTS #/AREA URNS LPF: ABNORMAL /LPF
CLARITY UR: CLEAR
COLOR UR: YELLOW
EPI CELLS #/AREA URNS HPF: ABNORMAL /HPF
GLUCOSE UR STRIP-MCNC: NEGATIVE MG/DL
HGB UR QL STRIP.AUTO: NEGATIVE
KETONES UR STRIP-MCNC: NEGATIVE MG/DL
LEUKOCYTE ESTERASE UR QL STRIP: ABNORMAL
NITRITE UR QL STRIP: NEGATIVE
PH UR STRIP: 5 [PH] (ref 5–8)
PROT UR STRIP-MCNC: NEGATIVE MG/DL
RBC #/AREA URNS HPF: ABNORMAL /HPF
SP GR UR STRIP: 1.01 (ref 1–1.03)
UROBILINOGEN UR STRIP-ACNC: NORMAL EU/DL (ref 0–1)
WBC #/AREA URNS HPF: ABNORMAL /HPF

## 2024-03-07 PROCEDURE — 81001 URINALYSIS AUTO W/SCOPE: CPT

## 2024-03-07 PROCEDURE — 87077 CULTURE AEROBIC IDENTIFY: CPT

## 2024-03-07 PROCEDURE — 87086 URINE CULTURE/COLONY COUNT: CPT

## 2024-03-07 PROCEDURE — 87186 SC STD MICRODIL/AGAR DIL: CPT

## 2024-03-07 NOTE — CARE COORDINATION
Care Transitions -    Noted incoming message back from PCP re: keflex script sent to patient's pharmacy for urinary symptoms.     I notified patient & she will  keflex today after [facility] nurse obtains urine speciman.     CLEMENCIA Avila will be following patient for care coordination - referral sent on 3/6.

## 2024-03-08 LAB
MICROORGANISM SPEC CULT: ABNORMAL
SPECIMEN DESCRIPTION: ABNORMAL

## 2024-03-11 ENCOUNTER — CARE COORDINATION (OUTPATIENT)
Dept: CARE COORDINATION | Age: 69
End: 2024-03-11

## 2024-03-11 NOTE — CARE COORDINATION
CTN hand off  Spoke with patient who stated she is doing much better.   She is participating in PT and has home health. Her son is going to  the antibiotic for her.   No increased SOB or swelling and her glucose is averaging .  Patient denied any needs and will reach out in the future with any concerns.

## 2024-03-22 ENCOUNTER — TELEPHONE (OUTPATIENT)
Dept: PHARMACY | Facility: CLINIC | Age: 69
End: 2024-03-22

## 2024-03-22 NOTE — TELEPHONE ENCOUNTER
[facility] CLINICAL PHARMACY: ADHERENCE REVIEW  Identified care gap per [employer]: fills at [facility] : ACE/ARB adherence    Patient also appears to be prescribed: Diabetes (filled 90 days 24)    ASSESSMENT    ACE/ARB ADHERENCE    Insurance Records claims through 2024 (Prior Year PDC = 100% - PASSED; YTD PDC = FIRST FILL; Potential Fail Date: 24):     Losartan 25mg last filled on 24 for 30 day supply. Next refill due: 3.8.24    Prescribed si tablet/capsule daily    Per Insurer Portal: same as above.    BP Readings from Last 3 Encounters:   24 (!) 142/64   23 (!) 144/54   23 (!) 146/68     Estimated Creatinine Clearance: 71 mL/min (A) (based on SCr of 1 mg/dL (H)).  Lab Results   Component Value Date    CREATININE 1.0 (H) 2024     Lab Results   Component Value Date    K 3.6 (L) 2024       PLAN  Per insurer report, LIS-0 - co-pays are based on tiers and patient is subject to coverage gap.    The following are interventions that have been identified:   Patient OVERDUE refilling Losartan 25mg and active on home medication list.     Reached patient to review. Gave patient education: verified medication, instructions, and education.  She states he has extras from when it was stopped before and she was sick. She is taking every day like prescribed.  She will call in refills when she is out of this supply.    Verified medication instructions and Education provided.      Last Visit: 10.19.23  Next Visit: none  Recent Visits  Date Type Provider Dept   10/19/23 Office Visit Alida Archuleta MD [facility]   23 Office Visit Alida Archuleta MD [facility]   23 Office Visit Alida Archuleta MD [facility]   23 Office Visit Alida Archuleta MD [facility]   23 Office Visit Alida Archuleta MD [facility]   22 Office Visit Alida Archuleta MD [facility]   22 Office Visit

## 2024-04-26 ENCOUNTER — TELEPHONE (OUTPATIENT)
Dept: PHARMACY | Facility: CLINIC | Age: 69
End: 2024-04-26

## 2024-04-26 NOTE — TELEPHONE ENCOUNTER
[facility] CLINICAL PHARMACY: ADHERENCE REVIEW  Identified care gap per [employer] : ACE/ARB and Diabetes adherence    ASSESSMENT    ACE/ARB ADHERENCE    Insurance Records claims through 2024 (Prior Year PDC = PASSED ; YTD PDC = FIRST FILL; Potential Fail Date: 24):     Losartan 25mg last filled on 24 for 30 day supply. Next refill due: 3.8.24    Prescribed si tablet/capsule daily    Per Insurer Portal: same as above.    Per [facility]: will get 90 day supply ready to  since past due.    BP Readings from Last 3 Encounters:   24 (!) 142/64   23 (!) 144/54   23 (!) 146/68     Estimated Creatinine Clearance: 71 mL/min (A) (based on SCr of 1 mg/dL (H)).  Lab Results   Component Value Date    CREATININE 1.0 (H) 2024     Lab Results   Component Value Date    K 3.6 (L) 2024     DIABETES ADHERENCE    Insurance Records claims through 2024 (Prior Year PDC = 99% - PASSED ; YTD PDC = 100%; Potential Fail Date: 24):     Glimepiride 4mg  last filled on 24 for 90 day supply. Next refill due: 24    Prescribed si tablet/capsule twice daily    Per Insurer Portal: same as above.    Per [facility]:  3 refills remaining.    DIABETES ADHERENCE    Insurance Records claims through 2024 (Prior Year PDC = 99% - PASSED ; YTD PDC = 100%; Potential Fail Date: 24):     Tradjenta 5mg last filled on 3.26.24 for 90 day supply. Next refill due: 24    Prescribed si tablet/capsule daily    Per Insurer Portal: same     May need refills at next fill.    Lab Results   Component Value Date    LABA1C 6.5 (H) 2023    LABA1C 7.1 (H) 2023    LABA1C 7.0 (H) 2023     NOTE: A1c >9%    PLAN  Per insurer report, LIS-0 - co-pays are based on tiers and patient is subject to coverage gap.      The following are interventions that have been identified:   Patient OVERDUE refilling losartan 25mg and active on home

## 2024-05-21 ENCOUNTER — HOSPITAL ENCOUNTER (OUTPATIENT)
Age: 69
Setting detail: SPECIMEN
Discharge: HOME | End: 2024-05-21

## 2024-05-21 DIAGNOSIS — E11.21 CONTROLLED TYPE 2 DIABETES MELLITUS WITH DIABETIC NEPHROPATHY, UNSPECIFIED WHETHER LONG TERM INSULIN USE (HCC): ICD-10-CM

## 2024-05-21 PROBLEM — I50.23 ACUTE ON CHRONIC SYSTOLIC CONGESTIVE HEART FAILURE (HCC): Status: RESOLVED | Noted: 2023-08-05 | Resolved: 2024-05-21

## 2024-05-21 PROBLEM — E46 PROTEIN-CALORIE MALNUTRITION (HCC): Status: RESOLVED | Noted: 2023-09-29 | Resolved: 2024-05-21

## 2024-05-21 LAB
ALBUMIN SERPL-MCNC: 4.3 G/DL (ref 3.5–5.2)
ALBUMIN/GLOB SERPL: 1 {RATIO} (ref 1–2.5)
ALP SERPL-CCNC: 88 U/L (ref 35–104)
ALT SERPL-CCNC: 18 U/L (ref 10–35)
ANION GAP SERPL CALCULATED.3IONS-SCNC: 14 MMOL/L (ref 9–16)
AST SERPL-CCNC: 21 U/L (ref 10–35)
BASOPHILS # BLD: 0.12 K/UL (ref 0–0.2)
BASOPHILS NFR BLD: 1 % (ref 0–2)
BILIRUB SERPL-MCNC: 0.2 MG/DL (ref 0–1.2)
BUN SERPL-MCNC: 33 MG/DL (ref 8–23)
CALCIUM SERPL-MCNC: 9.8 MG/DL (ref 8.6–10.4)
CHLORIDE SERPL-SCNC: 100 MMOL/L (ref 98–107)
CHOLEST SERPL-MCNC: 148 MG/DL (ref 0–199)
CHOLESTEROL/HDL RATIO: 4
CO2 SERPL-SCNC: 27 MMOL/L (ref 20–31)
CREAT SERPL-MCNC: 1.5 MG/DL (ref 0.5–0.9)
CREAT UR-MCNC: 56.7 MG/DL (ref 28–217)
EOSINOPHIL # BLD: 0.51 K/UL (ref 0–0.44)
EOSINOPHILS RELATIVE PERCENT: 3 % (ref 1–4)
ERYTHROCYTE [DISTWIDTH] IN BLOOD BY AUTOMATED COUNT: 16.2 % (ref 11.8–14.4)
EST. AVERAGE GLUCOSE BLD GHB EST-MCNC: 151 MG/DL
GFR, ESTIMATED: 38 ML/MIN/1.73M2
GLUCOSE SERPL-MCNC: 156 MG/DL (ref 74–99)
HBA1C MFR BLD: 6.9 % (ref 4–6)
HCT VFR BLD AUTO: 36 % (ref 36.3–47.1)
HDLC SERPL-MCNC: 37 MG/DL
HGB BLD-MCNC: 11 G/DL (ref 11.9–15.1)
IMM GRANULOCYTES # BLD AUTO: 0.12 K/UL (ref 0–0.3)
IMM GRANULOCYTES NFR BLD: 1 %
LDLC SERPL CALC-MCNC: 67 MG/DL (ref 0–100)
LYMPHOCYTES NFR BLD: 1.86 K/UL (ref 1.1–3.7)
LYMPHOCYTES RELATIVE PERCENT: 12 % (ref 24–43)
MCH RBC QN AUTO: 29.2 PG (ref 25.2–33.5)
MCHC RBC AUTO-ENTMCNC: 30.6 G/DL (ref 28.4–34.8)
MCV RBC AUTO: 95.5 FL (ref 82.6–102.9)
MICROALBUMIN UR-MCNC: <12 MG/L (ref 0–20)
MICROALBUMIN/CREAT UR-RTO: NORMAL MCG/MG CREAT (ref 0–25)
MONOCYTES NFR BLD: 1.05 K/UL (ref 0.1–1.2)
MONOCYTES NFR BLD: 7 % (ref 3–12)
NEUTROPHILS NFR BLD: 76 % (ref 36–65)
NEUTS SEG NFR BLD: 11.44 K/UL (ref 1.5–8.1)
NRBC BLD-RTO: 0 PER 100 WBC
PLATELET # BLD AUTO: 410 K/UL (ref 138–453)
PMV BLD AUTO: 11.1 FL (ref 8.1–13.5)
POTASSIUM SERPL-SCNC: 5.5 MMOL/L (ref 3.7–5.3)
PROT SERPL-MCNC: 8.5 G/DL (ref 6.6–8.7)
RBC # BLD AUTO: 3.77 M/UL (ref 3.95–5.11)
RBC # BLD: ABNORMAL 10*6/UL
SODIUM SERPL-SCNC: 141 MMOL/L (ref 136–145)
TRIGL SERPL-MCNC: 224 MG/DL
VLDLC SERPL CALC-MCNC: 45 MG/DL
WBC OTHER # BLD: 15.1 K/UL (ref 3.5–11.3)

## 2024-10-29 ENCOUNTER — CARE COORDINATION (OUTPATIENT)
Dept: CARE COORDINATION | Age: 69
End: 2024-10-29

## 2024-10-29 NOTE — TELEPHONE ENCOUNTER
Call patient - hold carvedilol until further notice (don't take tonight's dose either). Check with her before you leave tonight about her BP and heart rate, please.

## 2024-10-29 NOTE — CARE COORDINATION
Patient notified of PCP recommendations as follows:  Call patient - hold carvedilol until further notice (don't take tonight's dose either). Check with her before you leave tonight about her BP and heart rate, please.       Patient understood and repeated instructions.    Current BP is 129/57 pulse 50.  Agreed to monitor this evening and call ACM in the morning with readings. Understands to seek immediate care ig she becomes symptomatic.    Gino Lu(Attending)

## 2024-10-29 NOTE — CARE COORDINATION
Patient called to report her BP this morning was 115/51 heart rate was 51. Heart rate is now 43, no symptoms other than feeling fatigued.   She did say that Jessie recently decreased carvedilol to 12.5 twice daily and increased her lasix to 80 mg due to swelling. She reports her swelling has resolved.   I encouraged her to report to the ED if her symptoms worsen before PCP makes recommendations. Educated on rising slowly to avoid falls.

## 2025-03-03 PROBLEM — N30.00 ACUTE CYSTITIS WITHOUT HEMATURIA: Status: RESOLVED | Noted: 2023-08-03 | Resolved: 2025-03-03

## 2025-03-03 PROBLEM — M62.82 RHABDOMYOLYSIS: Status: RESOLVED | Noted: 2023-11-19 | Resolved: 2025-03-03

## 2025-03-03 PROBLEM — K56.609 SBO (SMALL BOWEL OBSTRUCTION) (HCC): Status: RESOLVED | Noted: 2024-01-31 | Resolved: 2025-03-03

## 2025-03-03 PROBLEM — E61.1 IRON DEFICIENCY: Status: RESOLVED | Noted: 2021-07-15 | Resolved: 2025-03-03

## 2025-03-03 PROBLEM — I16.0 HYPERTENSIVE URGENCY: Status: RESOLVED | Noted: 2024-01-31 | Resolved: 2025-03-03

## 2025-03-03 PROBLEM — J96.02 ACUTE RESPIRATORY FAILURE WITH HYPOXIA AND HYPERCAPNIA (HCC): Status: RESOLVED | Noted: 2023-12-14 | Resolved: 2025-03-03

## 2025-03-03 PROBLEM — I50.22 CHRONIC SYSTOLIC (CONGESTIVE) HEART FAILURE (HCC): Status: ACTIVE | Noted: 2023-08-04

## 2025-03-03 PROBLEM — I50.9 ACUTE CHF (CONGESTIVE HEART FAILURE) (HCC): Status: RESOLVED | Noted: 2023-08-04 | Resolved: 2025-03-03

## 2025-03-03 PROBLEM — N17.9 ACUTE KIDNEY INJURY (NONTRAUMATIC): Status: RESOLVED | Noted: 2023-12-13 | Resolved: 2025-03-03

## 2025-03-03 PROBLEM — R41.82 ALTERED MENTAL STATUS: Status: RESOLVED | Noted: 2020-09-22 | Resolved: 2025-03-03

## 2025-03-03 PROBLEM — J96.01 ACUTE RESPIRATORY FAILURE WITH HYPOXIA AND HYPERCAPNIA (HCC): Status: RESOLVED | Noted: 2023-12-14 | Resolved: 2025-03-03

## 2025-03-03 PROBLEM — I15.9 SECONDARY HYPERTENSION: Status: RESOLVED | Noted: 2023-08-05 | Resolved: 2025-03-03

## 2025-03-03 PROBLEM — E87.5 HYPERKALEMIA: Status: RESOLVED | Noted: 2020-01-05 | Resolved: 2025-03-03

## 2025-03-03 PROBLEM — N17.9 AKI (ACUTE KIDNEY INJURY): Status: RESOLVED | Noted: 2019-11-22 | Resolved: 2025-03-03

## 2025-03-03 PROBLEM — R10.84 GENERALIZED ABDOMINAL PAIN: Status: RESOLVED | Noted: 2020-09-22 | Resolved: 2025-03-03

## 2025-03-03 PROBLEM — R74.8 ELEVATED CK: Status: RESOLVED | Noted: 2023-11-23 | Resolved: 2025-03-03

## 2025-03-03 PROBLEM — J96.21 ACUTE ON CHRONIC RESPIRATORY FAILURE WITH HYPOXIA (HCC): Status: RESOLVED | Noted: 2023-09-16 | Resolved: 2025-03-03

## 2025-06-18 ENCOUNTER — CARE COORDINATION (OUTPATIENT)
Dept: CARE COORDINATION | Age: 70
End: 2025-06-18

## 2025-06-18 NOTE — CARE COORDINATION
Ambulatory Care Coordination Note     2025 2:28 PM     Patient Current Location:  Home: [facility] [address] [address]  [facility] [address]     This patient was received as a referral from the patient (self-referred).    Patient contacted the ACM by telephone. Verified name and  with patient as identifiers. Provided introduction to self, and explanation of the ACM role.   Patient accepted care management services at this time.          ACM: AI CORONEL RN     Challenges to be reviewed by the provider   Additional needs identified to be addressed with provider Yes  medications- refill pended in separate encounter.               Method of communication with provider: chart routing.    Utilization: Initial Call - N/A    Care Summary Note: Patient called asking for a refill, stated the phones at PCP office are not working. I messaged the office to let them know and pended the refill to PCP. Patient stated she thinks she will need to bring her  home from the SNF due to how much it is costing them for him to be there. This will be difficult on her, he will need a lot of help per patient. I will continue to support her through this and help with what I can.     Offered patient enrollment in the Remote Patient Monitoring (RPM) program for in-home monitoring: Patient is not eligible for RPM program because: continue to address.     Assessments Completed:   Diabetes Assessment          No patient-reported symptoms         ,   Congestive Heart Failure Assessment       Other symptoms causing concern      Symptoms:     Symptom course: stable  Salt intake watch compared to last visit: stable      ,   General Assessment    Do you have any symptoms that are causing concern?: No          Medications Reviewed:   Patient denies any changes with medications and reports taking all medications as prescribed.    Advance Care Planning:   Not on file; education provided     Care Planning:   Education Documentation  Types of Medications You

## 2025-07-11 ENCOUNTER — CARE COORDINATION (OUTPATIENT)
Dept: CARE COORDINATION | Age: 70
End: 2025-07-11

## 2025-07-11 NOTE — CARE COORDINATION
Ambulatory Care Coordination Note     2025 9:32 AM     Patient Current Location:  Home: [facility] [address]  [place] [address]     ACM contacted the patient by telephone. Verified name and  with patient as identifiers.         ACM: AI CORONEL RN     Challenges to be reviewed by the provider   Additional needs identified to be addressed with provider No  NA               Method of communication with provider: none.    Utilization: Patient has not had any utilization since our last call.     Care Summary Note: Patient denied any needs from ACM or PCP today. She has been busy taking care of needs with her  at a [facility]. She stated she has decided to have him stay there, she mentioned it would be too much on her to bring him home.     Offered patient enrollment in the Remote Patient Monitoring (RPM) program for in-home monitoring: Yes, but did not enroll at this time: continue to address.     Assessments Completed:   Diabetes Assessment          No patient-reported symptoms         ,   Congestive Heart Failure Assessment       No patient-reported symptoms      Symptoms:     Symptom course: stable  Salt intake watch compared to last visit: stable      ,   General Assessment              Medications Reviewed:   Patient denies any changes with medications and reports taking all medications as prescribed.    Advance Care Planning:   Not on file; education provided     Care Planning:   Education Documentation  Types of Medications You May Be On, taught by Ai Coronel, RN at 2025  9:31 AM.  Learner: Patient  Readiness: Acceptance  Method: Explanation  Response: Verbalizes Understanding    when to call provider - medication reaction, taught by Ai Coronel, RN at 2025  9:31 AM.  Learner: Patient  Readiness: Acceptance  Method: Explanation  Response: Verbalizes Understanding    Education Comments  No comments found.     ,    Goals Addressed                   This Visit's Progress     Conditions and

## 2025-08-04 ENCOUNTER — CARE COORDINATION (OUTPATIENT)
Dept: CARE COORDINATION | Age: 70
End: 2025-08-04

## 2025-08-12 ENCOUNTER — CARE COORDINATION (OUTPATIENT)
Dept: CARE COORDINATION | Age: 70
End: 2025-08-12

## 2025-08-13 ENCOUNTER — CARE COORDINATION (OUTPATIENT)
Dept: CARE COORDINATION | Age: 70
End: 2025-08-13

## 2025-08-26 ENCOUNTER — CARE COORDINATION (OUTPATIENT)
Dept: CARE COORDINATION | Age: 70
End: 2025-08-26

## (undated) DEVICE — GLOVE ORANGE PI 8   MSG9080

## (undated) DEVICE — GOWN,POLY REINFORCED,LG: Brand: MEDLINE

## (undated) DEVICE — JELLY,LUBE,STERILE,FLIP TOP,TUBE,2-OZ: Brand: MEDLINE

## (undated) DEVICE — FORCEPS BX L240CM JAW DIA2.8MM L CAP W/ NDL MIC MESH TOOTH

## (undated) DEVICE — DEFENDO AIR WATER SUCTION AND BIOPSY VALVE KIT FOR  OLYMPUS: Brand: DEFENDO AIR/WATER/SUCTION AND BIOPSY VALVE

## (undated) DEVICE — BITEBLOCK 54FR W/ DENT RIM BLOX